# Patient Record
Sex: FEMALE | Race: WHITE | Employment: OTHER | URBAN - NONMETROPOLITAN AREA
[De-identification: names, ages, dates, MRNs, and addresses within clinical notes are randomized per-mention and may not be internally consistent; named-entity substitution may affect disease eponyms.]

---

## 2017-01-13 ENCOUNTER — TELEPHONE (OUTPATIENT)
Dept: VASCULAR SURGERY | Age: 82
End: 2017-01-13

## 2017-01-16 ENCOUNTER — HOSPITAL ENCOUNTER (OUTPATIENT)
Dept: VASCULAR LAB | Age: 82
Discharge: HOME | End: 2017-01-16
Payer: MEDICARE

## 2017-01-16 ENCOUNTER — OFFICE VISIT (OUTPATIENT)
Dept: VASCULAR SURGERY | Age: 82
End: 2017-01-16
Payer: MEDICARE

## 2017-01-16 VITALS — DIASTOLIC BLOOD PRESSURE: 78 MMHG | TEMPERATURE: 98.6 F | HEART RATE: 70 BPM | SYSTOLIC BLOOD PRESSURE: 167 MMHG

## 2017-01-16 DIAGNOSIS — I73.9 PVD (PERIPHERAL VASCULAR DISEASE) (HCC): ICD-10-CM

## 2017-01-16 DIAGNOSIS — I65.23 BILATERAL CAROTID ARTERY STENOSIS: Primary | ICD-10-CM

## 2017-01-16 DIAGNOSIS — I70.213 ATHEROSCLEROSIS OF NATIVE ARTERY OF BOTH LOWER EXTREMITIES WITH INTERMITTENT CLAUDICATION (HCC): ICD-10-CM

## 2017-01-16 DIAGNOSIS — I65.23 BILATERAL CAROTID ARTERY STENOSIS: ICD-10-CM

## 2017-01-16 PROCEDURE — 93922 UPR/L XTREMITY ART 2 LEVELS: CPT

## 2017-01-16 PROCEDURE — G8598 ASA/ANTIPLAT THER USED: HCPCS | Performed by: NURSE PRACTITIONER

## 2017-01-16 PROCEDURE — G8427 DOCREV CUR MEDS BY ELIG CLIN: HCPCS | Performed by: NURSE PRACTITIONER

## 2017-01-16 PROCEDURE — 1090F PRES/ABSN URINE INCON ASSESS: CPT | Performed by: NURSE PRACTITIONER

## 2017-01-16 PROCEDURE — G8400 PT W/DXA NO RESULTS DOC: HCPCS | Performed by: NURSE PRACTITIONER

## 2017-01-16 PROCEDURE — 4040F PNEUMOC VAC/ADMIN/RCVD: CPT | Performed by: NURSE PRACTITIONER

## 2017-01-16 PROCEDURE — G8421 BMI NOT CALCULATED: HCPCS | Performed by: NURSE PRACTITIONER

## 2017-01-16 PROCEDURE — 93926 LOWER EXTREMITY STUDY: CPT

## 2017-01-16 PROCEDURE — 4004F PT TOBACCO SCREEN RCVD TLK: CPT | Performed by: NURSE PRACTITIONER

## 2017-01-16 PROCEDURE — G8484 FLU IMMUNIZE NO ADMIN: HCPCS | Performed by: NURSE PRACTITIONER

## 2017-01-16 PROCEDURE — 1123F ACP DISCUSS/DSCN MKR DOCD: CPT | Performed by: NURSE PRACTITIONER

## 2017-01-16 PROCEDURE — 93880 EXTRACRANIAL BILAT STUDY: CPT

## 2017-01-16 PROCEDURE — 99214 OFFICE O/P EST MOD 30 MIN: CPT | Performed by: NURSE PRACTITIONER

## 2017-01-16 RX ORDER — PRAMIPEXOLE DIHYDROCHLORIDE 0.25 MG/1
0.25 TABLET ORAL NIGHTLY
COMMUNITY
Start: 2016-10-12 | End: 2017-10-15 | Stop reason: SDUPTHER

## 2017-01-16 RX ORDER — ATORVASTATIN CALCIUM 40 MG/1
40 TABLET, FILM COATED ORAL NIGHTLY
COMMUNITY
Start: 2016-12-27 | End: 2017-09-15 | Stop reason: SDUPTHER

## 2017-01-16 RX ORDER — HYDROCODONE BITARTRATE AND ACETAMINOPHEN 5; 325 MG/1; MG/1
1 TABLET ORAL EVERY 6 HOURS PRN
COMMUNITY
Start: 2016-12-22 | End: 2017-06-12 | Stop reason: SDUPTHER

## 2017-01-17 ENCOUNTER — TELEPHONE (OUTPATIENT)
Dept: VASCULAR SURGERY | Age: 82
End: 2017-01-17

## 2017-01-17 ENCOUNTER — PREP FOR PROCEDURE (OUTPATIENT)
Dept: VASCULAR SURGERY | Age: 82
End: 2017-01-17

## 2017-01-17 RX ORDER — CLONIDINE HYDROCHLORIDE 0.1 MG/1
0.1 TABLET ORAL PRN
Status: CANCELLED | OUTPATIENT
Start: 2017-01-17

## 2017-01-17 RX ORDER — SODIUM CHLORIDE 0.9 % (FLUSH) 0.9 %
10 SYRINGE (ML) INJECTION PRN
Status: CANCELLED | OUTPATIENT
Start: 2017-01-17

## 2017-01-17 RX ORDER — SODIUM CHLORIDE 9 MG/ML
INJECTION, SOLUTION INTRAVENOUS CONTINUOUS
Status: CANCELLED | OUTPATIENT
Start: 2017-01-17

## 2017-01-17 RX ORDER — ASPIRIN 81 MG/1
81 TABLET ORAL ONCE
Status: CANCELLED | OUTPATIENT
Start: 2017-01-17 | End: 2017-01-17

## 2017-01-25 ENCOUNTER — HOSPITAL ENCOUNTER (OUTPATIENT)
Dept: INTERVENTIONAL RADIOLOGY/VASCULAR | Age: 82
Discharge: HOME | End: 2017-01-25
Payer: MEDICARE

## 2017-01-25 VITALS
OXYGEN SATURATION: 93 % | SYSTOLIC BLOOD PRESSURE: 147 MMHG | TEMPERATURE: 99.4 F | HEART RATE: 62 BPM | RESPIRATION RATE: 19 BRPM | BODY MASS INDEX: 24.66 KG/M2 | WEIGHT: 148 LBS | HEIGHT: 65 IN | DIASTOLIC BLOOD PRESSURE: 55 MMHG

## 2017-01-25 DIAGNOSIS — I70.213 ATHEROSCLEROSIS OF NATIVE ARTERY OF BOTH LOWER EXTREMITIES WITH INTERMITTENT CLAUDICATION (HCC): ICD-10-CM

## 2017-01-25 PROCEDURE — 6360000002 HC RX W HCPCS: Performed by: SURGERY

## 2017-01-25 PROCEDURE — 6370000000 HC RX 637 (ALT 250 FOR IP): Performed by: SURGERY

## 2017-01-25 PROCEDURE — 75716 ARTERY X-RAYS ARMS/LEGS: CPT | Performed by: SURGERY

## 2017-01-25 PROCEDURE — 6370000000 HC RX 637 (ALT 250 FOR IP): Performed by: NURSE PRACTITIONER

## 2017-01-25 PROCEDURE — 37221 PR REVSC OPN/PRQ ILIAC ART W/STNT PLMT & ANGIOPLSTY: CPT | Performed by: SURGERY

## 2017-01-25 PROCEDURE — 37226 HC FEMPOP PLASTY & STENT: CPT | Performed by: SURGERY

## 2017-01-25 PROCEDURE — 37223 HC PLASTY ILIAC W STENT EA ADDL: CPT | Performed by: SURGERY

## 2017-01-25 PROCEDURE — 37221 HC PLASTY ILIAC W STENT: CPT | Performed by: SURGERY

## 2017-01-25 PROCEDURE — 2580000003 HC RX 258: Performed by: NURSE PRACTITIONER

## 2017-01-25 PROCEDURE — 6360000002 HC RX W HCPCS: Performed by: NURSE PRACTITIONER

## 2017-01-25 PROCEDURE — 93922 UPR/L XTREMITY ART 2 LEVELS: CPT

## 2017-01-25 PROCEDURE — 2500000003 HC RX 250 WO HCPCS: Performed by: SURGERY

## 2017-01-25 PROCEDURE — C1769 GUIDE WIRE: HCPCS

## 2017-01-25 PROCEDURE — C1725 CATH, TRANSLUMIN NON-LASER: HCPCS

## 2017-01-25 PROCEDURE — 75625 CONTRAST EXAM ABDOMINL AORTA: CPT | Performed by: SURGERY

## 2017-01-25 PROCEDURE — 37224 PR REVSC OPN/PRG FEM/POP W/ANGIOPLASTY UNI: CPT | Performed by: SURGERY

## 2017-01-25 PROCEDURE — 6360000004 HC RX CONTRAST MEDICATION: Performed by: SURGERY

## 2017-01-25 RX ORDER — ONDANSETRON 2 MG/ML
4 INJECTION INTRAMUSCULAR; INTRAVENOUS EVERY 8 HOURS PRN
Status: DISCONTINUED | OUTPATIENT
Start: 2017-01-25 | End: 2017-01-27 | Stop reason: HOSPADM

## 2017-01-25 RX ORDER — IODIXANOL 320 MG/ML
INJECTION, SOLUTION INTRAVASCULAR
Status: COMPLETED | OUTPATIENT
Start: 2017-01-25 | End: 2017-01-25

## 2017-01-25 RX ORDER — FENTANYL CITRATE 50 UG/ML
INJECTION, SOLUTION INTRAMUSCULAR; INTRAVENOUS
Status: COMPLETED | OUTPATIENT
Start: 2017-01-25 | End: 2017-01-25

## 2017-01-25 RX ORDER — MIDAZOLAM HYDROCHLORIDE 1 MG/ML
INJECTION INTRAMUSCULAR; INTRAVENOUS
Status: COMPLETED | OUTPATIENT
Start: 2017-01-25 | End: 2017-01-25

## 2017-01-25 RX ORDER — CLONIDINE HYDROCHLORIDE 0.1 MG/1
0.1 TABLET ORAL PRN
Status: DISCONTINUED | OUTPATIENT
Start: 2017-01-25 | End: 2017-01-27 | Stop reason: HOSPADM

## 2017-01-25 RX ORDER — HEPARIN SODIUM 5000 [USP'U]/ML
INJECTION, SOLUTION INTRAVENOUS; SUBCUTANEOUS
Status: COMPLETED | OUTPATIENT
Start: 2017-01-25 | End: 2017-01-25

## 2017-01-25 RX ORDER — CLOPIDOGREL BISULFATE 75 MG/1
75 TABLET ORAL ONCE
Status: COMPLETED | OUTPATIENT
Start: 2017-01-25 | End: 2017-01-25

## 2017-01-25 RX ORDER — ACETAMINOPHEN 325 MG/1
650 TABLET ORAL EVERY 4 HOURS PRN
Status: DISCONTINUED | OUTPATIENT
Start: 2017-01-25 | End: 2017-01-25 | Stop reason: SDUPTHER

## 2017-01-25 RX ORDER — HYDROCODONE BITARTRATE AND ACETAMINOPHEN 5; 325 MG/1; MG/1
2 TABLET ORAL EVERY 4 HOURS PRN
Status: DISCONTINUED | OUTPATIENT
Start: 2017-01-25 | End: 2017-01-27 | Stop reason: HOSPADM

## 2017-01-25 RX ORDER — CLOPIDOGREL BISULFATE 75 MG/1
75 TABLET ORAL DAILY
Qty: 30 TABLET | Refills: 12 | Status: SHIPPED | OUTPATIENT
Start: 2017-01-25 | End: 2017-01-25

## 2017-01-25 RX ORDER — LIDOCAINE HYDROCHLORIDE 20 MG/ML
INJECTION, SOLUTION INFILTRATION; PERINEURAL
Status: COMPLETED | OUTPATIENT
Start: 2017-01-25 | End: 2017-01-25

## 2017-01-25 RX ORDER — SODIUM CHLORIDE 9 MG/ML
INJECTION, SOLUTION INTRAVENOUS CONTINUOUS
Status: DISCONTINUED | OUTPATIENT
Start: 2017-01-25 | End: 2017-01-27 | Stop reason: HOSPADM

## 2017-01-25 RX ORDER — ASPIRIN 81 MG/1
81 TABLET ORAL ONCE
Status: COMPLETED | OUTPATIENT
Start: 2017-01-25 | End: 2017-01-25

## 2017-01-25 RX ORDER — ACETAMINOPHEN 325 MG/1
650 TABLET ORAL EVERY 4 HOURS PRN
Status: DISCONTINUED | OUTPATIENT
Start: 2017-01-25 | End: 2017-01-27 | Stop reason: HOSPADM

## 2017-01-25 RX ORDER — CLOPIDOGREL BISULFATE 75 MG/1
75 TABLET ORAL DAILY
Qty: 30 TABLET | Refills: 12 | Status: SHIPPED | OUTPATIENT
Start: 2017-01-25 | End: 2017-04-06 | Stop reason: SDUPTHER

## 2017-01-25 RX ORDER — SODIUM CHLORIDE 0.9 % (FLUSH) 0.9 %
10 SYRINGE (ML) INJECTION PRN
Status: DISCONTINUED | OUTPATIENT
Start: 2017-01-25 | End: 2017-01-27 | Stop reason: HOSPADM

## 2017-01-25 RX ORDER — HYDROCODONE BITARTRATE AND ACETAMINOPHEN 5; 325 MG/1; MG/1
1 TABLET ORAL EVERY 4 HOURS PRN
Status: DISCONTINUED | OUTPATIENT
Start: 2017-01-25 | End: 2017-01-27 | Stop reason: HOSPADM

## 2017-01-25 RX ADMIN — CLOPIDOGREL BISULFATE 75 MG: 75 TABLET ORAL at 11:20

## 2017-01-25 RX ADMIN — FENTANYL CITRATE 25 MCG: 50 INJECTION INTRAMUSCULAR; INTRAVENOUS at 08:35

## 2017-01-25 RX ADMIN — HEPARIN SODIUM 3000 UNITS: 5000 INJECTION, SOLUTION INTRAVENOUS; SUBCUTANEOUS at 08:34

## 2017-01-25 RX ADMIN — LIDOCAINE HYDROCHLORIDE 15 ML: 20 INJECTION, SOLUTION INFILTRATION; PERINEURAL at 09:23

## 2017-01-25 RX ADMIN — ONDANSETRON 4 MG: 2 INJECTION INTRAMUSCULAR; INTRAVENOUS at 11:04

## 2017-01-25 RX ADMIN — HEPARIN SODIUM 1000 UNITS: 5000 INJECTION, SOLUTION INTRAVENOUS; SUBCUTANEOUS at 09:49

## 2017-01-25 RX ADMIN — LIDOCAINE HYDROCHLORIDE 10 ML: 20 INJECTION, SOLUTION INFILTRATION; PERINEURAL at 08:19

## 2017-01-25 RX ADMIN — Medication 2 G: at 07:46

## 2017-01-25 RX ADMIN — MIDAZOLAM HYDROCHLORIDE 1 MG: 1 INJECTION, SOLUTION INTRAMUSCULAR; INTRAVENOUS at 08:35

## 2017-01-25 RX ADMIN — MIDAZOLAM HYDROCHLORIDE 1 MG: 1 INJECTION, SOLUTION INTRAMUSCULAR; INTRAVENOUS at 08:13

## 2017-01-25 RX ADMIN — IODIXANOL 175 ML: 320 INJECTION, SOLUTION INTRAVASCULAR at 10:12

## 2017-01-25 RX ADMIN — FENTANYL CITRATE 25 MCG: 50 INJECTION INTRAMUSCULAR; INTRAVENOUS at 09:26

## 2017-01-25 RX ADMIN — FENTANYL CITRATE 25 MCG: 50 INJECTION INTRAMUSCULAR; INTRAVENOUS at 08:13

## 2017-01-25 RX ADMIN — FENTANYL CITRATE 50 MCG: 50 INJECTION INTRAMUSCULAR; INTRAVENOUS at 08:56

## 2017-01-25 RX ADMIN — SODIUM CHLORIDE: 9 INJECTION, SOLUTION INTRAVENOUS at 07:15

## 2017-01-25 RX ADMIN — HEPARIN SODIUM 5000 UNITS: 5000 INJECTION, SOLUTION INTRAVENOUS; SUBCUTANEOUS at 08:12

## 2017-01-25 RX ADMIN — ACETAMINOPHEN 650 MG: 325 TABLET, FILM COATED ORAL at 12:29

## 2017-01-25 RX ADMIN — ASPIRIN 81 MG: 81 TABLET, COATED ORAL at 06:53

## 2017-01-25 RX ADMIN — MIDAZOLAM HYDROCHLORIDE 1 MG: 1 INJECTION, SOLUTION INTRAMUSCULAR; INTRAVENOUS at 09:23

## 2017-01-25 ASSESSMENT — PAIN SCALES - GENERAL
PAINLEVEL_OUTOF10: 10
PAINLEVEL_OUTOF10: 5

## 2017-02-01 ENCOUNTER — OFFICE VISIT (OUTPATIENT)
Dept: VASCULAR SURGERY | Age: 82
End: 2017-02-01
Payer: MEDICARE

## 2017-02-01 VITALS
OXYGEN SATURATION: 98 % | SYSTOLIC BLOOD PRESSURE: 138 MMHG | TEMPERATURE: 99 F | DIASTOLIC BLOOD PRESSURE: 72 MMHG | HEART RATE: 67 BPM

## 2017-02-01 DIAGNOSIS — I70.213 ATHEROSCLEROSIS OF NATIVE ARTERY OF BOTH LOWER EXTREMITIES WITH INTERMITTENT CLAUDICATION (HCC): Primary | ICD-10-CM

## 2017-02-01 PROCEDURE — 1090F PRES/ABSN URINE INCON ASSESS: CPT | Performed by: PHYSICIAN ASSISTANT

## 2017-02-01 PROCEDURE — G8484 FLU IMMUNIZE NO ADMIN: HCPCS | Performed by: PHYSICIAN ASSISTANT

## 2017-02-01 PROCEDURE — 4004F PT TOBACCO SCREEN RCVD TLK: CPT | Performed by: PHYSICIAN ASSISTANT

## 2017-02-01 PROCEDURE — 4040F PNEUMOC VAC/ADMIN/RCVD: CPT | Performed by: PHYSICIAN ASSISTANT

## 2017-02-01 PROCEDURE — 99213 OFFICE O/P EST LOW 20 MIN: CPT | Performed by: PHYSICIAN ASSISTANT

## 2017-02-01 PROCEDURE — G8400 PT W/DXA NO RESULTS DOC: HCPCS | Performed by: PHYSICIAN ASSISTANT

## 2017-02-01 PROCEDURE — G8420 CALC BMI NORM PARAMETERS: HCPCS | Performed by: PHYSICIAN ASSISTANT

## 2017-02-01 PROCEDURE — 1123F ACP DISCUSS/DSCN MKR DOCD: CPT | Performed by: PHYSICIAN ASSISTANT

## 2017-02-01 PROCEDURE — G8598 ASA/ANTIPLAT THER USED: HCPCS | Performed by: PHYSICIAN ASSISTANT

## 2017-02-01 PROCEDURE — G8427 DOCREV CUR MEDS BY ELIG CLIN: HCPCS | Performed by: PHYSICIAN ASSISTANT

## 2017-04-06 RX ORDER — CLOPIDOGREL BISULFATE 75 MG/1
75 TABLET ORAL DAILY
Qty: 30 TABLET | Refills: 12 | Status: SHIPPED | OUTPATIENT
Start: 2017-04-06 | End: 2018-01-29 | Stop reason: SDUPTHER

## 2017-05-02 ENCOUNTER — OFFICE VISIT (OUTPATIENT)
Dept: VASCULAR SURGERY | Age: 82
End: 2017-05-02
Payer: MEDICARE

## 2017-05-02 ENCOUNTER — HOSPITAL ENCOUNTER (OUTPATIENT)
Dept: VASCULAR LAB | Age: 82
Discharge: HOME | End: 2017-05-02
Payer: MEDICARE

## 2017-05-02 VITALS
OXYGEN SATURATION: 97 % | SYSTOLIC BLOOD PRESSURE: 128 MMHG | DIASTOLIC BLOOD PRESSURE: 60 MMHG | BODY MASS INDEX: 24.49 KG/M2 | RESPIRATION RATE: 20 BRPM | HEIGHT: 65 IN | WEIGHT: 147 LBS | HEART RATE: 70 BPM

## 2017-05-02 DIAGNOSIS — I73.9 PVD (PERIPHERAL VASCULAR DISEASE) (HCC): Primary | ICD-10-CM

## 2017-05-02 DIAGNOSIS — I70.213 ATHEROSCLEROSIS OF NATIVE ARTERY OF BOTH LOWER EXTREMITIES WITH INTERMITTENT CLAUDICATION (HCC): ICD-10-CM

## 2017-05-02 PROCEDURE — G8420 CALC BMI NORM PARAMETERS: HCPCS | Performed by: PHYSICIAN ASSISTANT

## 2017-05-02 PROCEDURE — 4040F PNEUMOC VAC/ADMIN/RCVD: CPT | Performed by: PHYSICIAN ASSISTANT

## 2017-05-02 PROCEDURE — 4004F PT TOBACCO SCREEN RCVD TLK: CPT | Performed by: PHYSICIAN ASSISTANT

## 2017-05-02 PROCEDURE — 93926 LOWER EXTREMITY STUDY: CPT

## 2017-05-02 PROCEDURE — 99213 OFFICE O/P EST LOW 20 MIN: CPT | Performed by: PHYSICIAN ASSISTANT

## 2017-05-02 PROCEDURE — 1123F ACP DISCUSS/DSCN MKR DOCD: CPT | Performed by: PHYSICIAN ASSISTANT

## 2017-05-02 PROCEDURE — 93922 UPR/L XTREMITY ART 2 LEVELS: CPT

## 2017-05-02 PROCEDURE — G8427 DOCREV CUR MEDS BY ELIG CLIN: HCPCS | Performed by: PHYSICIAN ASSISTANT

## 2017-05-02 PROCEDURE — G8400 PT W/DXA NO RESULTS DOC: HCPCS | Performed by: PHYSICIAN ASSISTANT

## 2017-05-02 PROCEDURE — 1090F PRES/ABSN URINE INCON ASSESS: CPT | Performed by: PHYSICIAN ASSISTANT

## 2017-05-02 PROCEDURE — G8598 ASA/ANTIPLAT THER USED: HCPCS | Performed by: PHYSICIAN ASSISTANT

## 2017-05-15 LAB
ALBUMIN SERPL-MCNC: 4 G/DL (ref 3.5–5.2)
ALP BLD-CCNC: 102 U/L (ref 35–104)
ALT SERPL-CCNC: 13 U/L (ref 5–33)
ANION GAP SERPL CALCULATED.3IONS-SCNC: 15 MMOL/L (ref 7–19)
AST SERPL-CCNC: 17 U/L (ref 5–32)
BILIRUB SERPL-MCNC: 0.4 MG/DL (ref 0.2–1.2)
BUN BLDV-MCNC: 17 MG/DL (ref 8–23)
CALCIUM SERPL-MCNC: 9.3 MG/DL (ref 8.8–10.2)
CHLORIDE BLD-SCNC: 98 MMOL/L (ref 98–111)
CHOLESTEROL, TOTAL: 140 MG/DL (ref 160–199)
CO2: 26 MMOL/L (ref 22–29)
CREAT SERPL-MCNC: 1 MG/DL (ref 0.5–0.9)
GFR NON-AFRICAN AMERICAN: 53
GLOBULIN: 2.7 G/DL
GLUCOSE BLD-MCNC: 94 MG/DL (ref 74–109)
HDLC SERPL-MCNC: 51 MG/DL (ref 65–121)
LDL CHOLESTEROL CALCULATED: 70 MG/DL
POTASSIUM SERPL-SCNC: 4.3 MMOL/L (ref 3.5–5)
SODIUM BLD-SCNC: 139 MMOL/L (ref 136–145)
TOTAL PROTEIN: 6.7 G/DL (ref 6.6–8.7)
TRIGL SERPL-MCNC: 95 MG/DL (ref 150–199)
VITAMIN D 25-HYDROXY: 29.3 NG/ML

## 2017-06-12 RX ORDER — HYDROCODONE BITARTRATE AND ACETAMINOPHEN 5; 325 MG/1; MG/1
1 TABLET ORAL EVERY 6 HOURS PRN
Qty: 120 TABLET | Refills: 0 | Status: SHIPPED | OUTPATIENT
Start: 2017-06-12 | End: 2017-07-05 | Stop reason: SDUPTHER

## 2017-07-05 RX ORDER — HYDROCHLOROTHIAZIDE 12.5 MG/1
12.5 CAPSULE, GELATIN COATED ORAL 4 TIMES DAILY PRN
Qty: 120 CAPSULE | Refills: 3 | OUTPATIENT
Start: 2017-07-05

## 2017-07-05 RX ORDER — HYDROCHLOROTHIAZIDE 12.5 MG/1
12.5 CAPSULE, GELATIN COATED ORAL DAILY
Qty: 30 CAPSULE | Refills: 5 | Status: CANCELLED | OUTPATIENT
Start: 2017-07-05

## 2017-07-06 RX ORDER — HYDROCODONE BITARTRATE AND ACETAMINOPHEN 5; 325 MG/1; MG/1
1 TABLET ORAL EVERY 6 HOURS PRN
Qty: 120 TABLET | Refills: 0 | Status: SHIPPED | OUTPATIENT
Start: 2017-07-06 | End: 2017-08-08 | Stop reason: SDUPTHER

## 2017-07-12 ENCOUNTER — TELEPHONE (OUTPATIENT)
Dept: INTERNAL MEDICINE | Age: 82
End: 2017-07-12

## 2017-08-08 RX ORDER — HYDROCODONE BITARTRATE AND ACETAMINOPHEN 5; 325 MG/1; MG/1
1 TABLET ORAL EVERY 6 HOURS PRN
Qty: 120 TABLET | Refills: 0 | Status: SHIPPED | OUTPATIENT
Start: 2017-08-08 | End: 2017-09-06 | Stop reason: SDUPTHER

## 2017-09-06 DIAGNOSIS — G89.4 CHRONIC PAIN SYNDROME: Primary | ICD-10-CM

## 2017-09-06 PROBLEM — G89.29 CHRONIC PAIN: Status: ACTIVE | Noted: 2017-09-06

## 2017-09-06 RX ORDER — HYDROCODONE BITARTRATE AND ACETAMINOPHEN 5; 325 MG/1; MG/1
1 TABLET ORAL EVERY 6 HOURS PRN
Qty: 120 TABLET | Refills: 0 | Status: SHIPPED | OUTPATIENT
Start: 2017-09-06 | End: 2017-10-09 | Stop reason: SDUPTHER

## 2017-09-11 DIAGNOSIS — I10 HYPERTENSION, ESSENTIAL: ICD-10-CM

## 2017-09-11 DIAGNOSIS — E55.9 UNSPECIFIED VITAMIN D DEFICIENCY: ICD-10-CM

## 2017-09-11 DIAGNOSIS — E78.5 OTHER AND UNSPECIFIED HYPERLIPIDEMIA: Primary | ICD-10-CM

## 2017-09-15 DIAGNOSIS — I10 HYPERTENSION, ESSENTIAL: ICD-10-CM

## 2017-09-15 DIAGNOSIS — E55.9 UNSPECIFIED VITAMIN D DEFICIENCY: ICD-10-CM

## 2017-09-15 DIAGNOSIS — E78.5 OTHER AND UNSPECIFIED HYPERLIPIDEMIA: ICD-10-CM

## 2017-09-15 LAB
ALBUMIN SERPL-MCNC: 3.9 G/DL (ref 3.5–5.2)
ALP BLD-CCNC: 103 U/L (ref 35–104)
ALT SERPL-CCNC: 12 U/L (ref 5–33)
ANION GAP SERPL CALCULATED.3IONS-SCNC: 14 MMOL/L (ref 7–19)
AST SERPL-CCNC: 16 U/L (ref 5–32)
BILIRUB SERPL-MCNC: 0.4 MG/DL (ref 0.2–1.2)
BUN BLDV-MCNC: 21 MG/DL (ref 8–23)
CALCIUM SERPL-MCNC: 9.3 MG/DL (ref 8.8–10.2)
CHLORIDE BLD-SCNC: 99 MMOL/L (ref 98–111)
CHOLESTEROL, TOTAL: 166 MG/DL (ref 160–199)
CO2: 27 MMOL/L (ref 22–29)
CREAT SERPL-MCNC: 1 MG/DL (ref 0.5–0.9)
GFR NON-AFRICAN AMERICAN: 53
GLUCOSE BLD-MCNC: 85 MG/DL (ref 74–109)
HCT VFR BLD CALC: 39.5 % (ref 37–47)
HDLC SERPL-MCNC: 48 MG/DL (ref 65–121)
HEMOGLOBIN: 13 G/DL (ref 12–16)
LDL CHOLESTEROL CALCULATED: 95 MG/DL
MCH RBC QN AUTO: 30.3 PG (ref 27–31)
MCHC RBC AUTO-ENTMCNC: 32.9 G/DL (ref 33–37)
MCV RBC AUTO: 92.1 FL (ref 81–99)
PDW BLD-RTO: 13.9 % (ref 11.5–14.5)
PLATELET # BLD: 254 K/UL (ref 130–400)
PMV BLD AUTO: 9.4 FL (ref 9.4–12.3)
POTASSIUM SERPL-SCNC: 4.4 MMOL/L (ref 3.5–5)
RBC # BLD: 4.29 M/UL (ref 4.2–5.4)
SODIUM BLD-SCNC: 140 MMOL/L (ref 136–145)
TOTAL PROTEIN: 6.6 G/DL (ref 6.6–8.7)
TRIGL SERPL-MCNC: 113 MG/DL (ref 150–199)
VITAMIN D 25-HYDROXY: 35.4 NG/ML
WBC # BLD: 6 K/UL (ref 4.8–10.8)

## 2017-09-15 RX ORDER — ATORVASTATIN CALCIUM 40 MG/1
TABLET, FILM COATED ORAL
Qty: 90 TABLET | Refills: 0 | Status: SHIPPED | OUTPATIENT
Start: 2017-09-15 | End: 2017-11-20 | Stop reason: SDUPTHER

## 2017-09-15 RX ORDER — HYDROCHLOROTHIAZIDE 12.5 MG/1
CAPSULE, GELATIN COATED ORAL
Qty: 90 CAPSULE | Refills: 3 | Status: SHIPPED | OUTPATIENT
Start: 2017-09-15 | End: 2018-08-15 | Stop reason: SDUPTHER

## 2017-09-15 RX ORDER — LISINOPRIL 40 MG/1
TABLET ORAL
Qty: 90 TABLET | Refills: 3 | Status: SHIPPED | OUTPATIENT
Start: 2017-09-15 | End: 2018-08-15 | Stop reason: SDUPTHER

## 2017-09-15 RX ORDER — AMLODIPINE BESYLATE 5 MG/1
TABLET ORAL
Qty: 90 TABLET | Refills: 3 | Status: SHIPPED | OUTPATIENT
Start: 2017-09-15 | End: 2018-08-15 | Stop reason: SDUPTHER

## 2017-09-17 PROBLEM — G25.81 RESTLESS LEGS SYNDROME: Chronic | Status: ACTIVE | Noted: 2017-09-17

## 2017-09-17 PROBLEM — J43.9 PULMONARY EMPHYSEMA (HCC): Chronic | Status: ACTIVE | Noted: 2017-09-17

## 2017-09-17 PROBLEM — M81.0 OSTEOPOROSIS: Chronic | Status: ACTIVE | Noted: 2017-09-17

## 2017-09-18 RX ORDER — IBANDRONATE SODIUM 150 MG/1
150 TABLET, FILM COATED ORAL
COMMUNITY
End: 2018-05-10

## 2017-09-19 ENCOUNTER — OFFICE VISIT (OUTPATIENT)
Dept: INTERNAL MEDICINE | Age: 82
End: 2017-09-19
Payer: MEDICARE

## 2017-09-19 VITALS
HEART RATE: 62 BPM | SYSTOLIC BLOOD PRESSURE: 128 MMHG | DIASTOLIC BLOOD PRESSURE: 70 MMHG | WEIGHT: 140 LBS | BODY MASS INDEX: 23.32 KG/M2 | OXYGEN SATURATION: 96 % | RESPIRATION RATE: 18 BRPM | HEIGHT: 65 IN

## 2017-09-19 DIAGNOSIS — E78.2 MIXED HYPERLIPIDEMIA: ICD-10-CM

## 2017-09-19 DIAGNOSIS — I73.9 PVD (PERIPHERAL VASCULAR DISEASE) (HCC): ICD-10-CM

## 2017-09-19 DIAGNOSIS — J43.9 PULMONARY EMPHYSEMA, UNSPECIFIED EMPHYSEMA TYPE (HCC): Chronic | ICD-10-CM

## 2017-09-19 DIAGNOSIS — M81.0 OSTEOPOROSIS, UNSPECIFIED OSTEOPOROSIS TYPE, UNSPECIFIED PATHOLOGICAL FRACTURE PRESENCE: Primary | Chronic | ICD-10-CM

## 2017-09-19 DIAGNOSIS — M15.9 PRIMARY OSTEOARTHRITIS INVOLVING MULTIPLE JOINTS: ICD-10-CM

## 2017-09-19 DIAGNOSIS — I65.23 BILATERAL CAROTID ARTERY STENOSIS: ICD-10-CM

## 2017-09-19 DIAGNOSIS — I10 ESSENTIAL HYPERTENSION: ICD-10-CM

## 2017-09-19 PROCEDURE — 3023F SPIROM DOC REV: CPT | Performed by: INTERNAL MEDICINE

## 2017-09-19 PROCEDURE — 99214 OFFICE O/P EST MOD 30 MIN: CPT | Performed by: INTERNAL MEDICINE

## 2017-09-19 PROCEDURE — G8598 ASA/ANTIPLAT THER USED: HCPCS | Performed by: INTERNAL MEDICINE

## 2017-09-19 PROCEDURE — 1090F PRES/ABSN URINE INCON ASSESS: CPT | Performed by: INTERNAL MEDICINE

## 2017-09-19 PROCEDURE — G8926 SPIRO NO PERF OR DOC: HCPCS | Performed by: INTERNAL MEDICINE

## 2017-09-19 PROCEDURE — 4005F PHARM THX FOR OP RXD: CPT | Performed by: INTERNAL MEDICINE

## 2017-09-19 PROCEDURE — G8400 PT W/DXA NO RESULTS DOC: HCPCS | Performed by: INTERNAL MEDICINE

## 2017-09-19 PROCEDURE — 4004F PT TOBACCO SCREEN RCVD TLK: CPT | Performed by: INTERNAL MEDICINE

## 2017-09-19 PROCEDURE — G8420 CALC BMI NORM PARAMETERS: HCPCS | Performed by: INTERNAL MEDICINE

## 2017-09-19 PROCEDURE — G8427 DOCREV CUR MEDS BY ELIG CLIN: HCPCS | Performed by: INTERNAL MEDICINE

## 2017-09-19 PROCEDURE — 4040F PNEUMOC VAC/ADMIN/RCVD: CPT | Performed by: INTERNAL MEDICINE

## 2017-09-19 PROCEDURE — 1123F ACP DISCUSS/DSCN MKR DOCD: CPT | Performed by: INTERNAL MEDICINE

## 2017-09-19 ASSESSMENT — ENCOUNTER SYMPTOMS
SHORTNESS OF BREATH: 0
BACK PAIN: 0
NAUSEA: 0
COUGH: 0
DIARRHEA: 0
ABDOMINAL PAIN: 0

## 2017-10-09 DIAGNOSIS — G89.4 CHRONIC PAIN SYNDROME: ICD-10-CM

## 2017-10-09 RX ORDER — HYDROCODONE BITARTRATE AND ACETAMINOPHEN 5; 325 MG/1; MG/1
1 TABLET ORAL EVERY 6 HOURS PRN
Qty: 120 TABLET | Refills: 0 | Status: SHIPPED | OUTPATIENT
Start: 2017-10-09 | End: 2017-11-07 | Stop reason: SDUPTHER

## 2017-10-16 RX ORDER — PRAMIPEXOLE DIHYDROCHLORIDE 0.25 MG/1
TABLET ORAL
Qty: 180 TABLET | Refills: 1 | Status: SHIPPED | OUTPATIENT
Start: 2017-10-16 | End: 2018-06-29 | Stop reason: SDUPTHER

## 2017-11-07 DIAGNOSIS — G89.4 CHRONIC PAIN SYNDROME: ICD-10-CM

## 2017-11-07 RX ORDER — HYDROCODONE BITARTRATE AND ACETAMINOPHEN 5; 325 MG/1; MG/1
1 TABLET ORAL EVERY 6 HOURS PRN
Qty: 120 TABLET | Refills: 0 | Status: SHIPPED | OUTPATIENT
Start: 2017-11-07 | End: 2017-12-07 | Stop reason: SDUPTHER

## 2017-11-08 ENCOUNTER — HOSPITAL ENCOUNTER (OUTPATIENT)
Dept: VASCULAR LAB | Age: 82
Discharge: HOME | End: 2017-11-08
Payer: MEDICARE

## 2017-11-08 ENCOUNTER — OFFICE VISIT (OUTPATIENT)
Dept: VASCULAR SURGERY | Age: 82
End: 2017-11-08
Payer: MEDICARE

## 2017-11-08 VITALS
DIASTOLIC BLOOD PRESSURE: 80 MMHG | RESPIRATION RATE: 18 BRPM | TEMPERATURE: 96.5 F | SYSTOLIC BLOOD PRESSURE: 140 MMHG | HEART RATE: 68 BPM

## 2017-11-08 DIAGNOSIS — I73.9 PVD (PERIPHERAL VASCULAR DISEASE) (HCC): ICD-10-CM

## 2017-11-08 DIAGNOSIS — I70.213 ATHEROSCLEROSIS OF NATIVE ARTERY OF BOTH LOWER EXTREMITIES WITH INTERMITTENT CLAUDICATION (HCC): Primary | ICD-10-CM

## 2017-11-08 PROCEDURE — G8598 ASA/ANTIPLAT THER USED: HCPCS | Performed by: PHYSICIAN ASSISTANT

## 2017-11-08 PROCEDURE — 4040F PNEUMOC VAC/ADMIN/RCVD: CPT | Performed by: PHYSICIAN ASSISTANT

## 2017-11-08 PROCEDURE — G8427 DOCREV CUR MEDS BY ELIG CLIN: HCPCS | Performed by: PHYSICIAN ASSISTANT

## 2017-11-08 PROCEDURE — 4004F PT TOBACCO SCREEN RCVD TLK: CPT | Performed by: PHYSICIAN ASSISTANT

## 2017-11-08 PROCEDURE — 99213 OFFICE O/P EST LOW 20 MIN: CPT | Performed by: PHYSICIAN ASSISTANT

## 2017-11-08 PROCEDURE — G8484 FLU IMMUNIZE NO ADMIN: HCPCS | Performed by: PHYSICIAN ASSISTANT

## 2017-11-08 PROCEDURE — G8420 CALC BMI NORM PARAMETERS: HCPCS | Performed by: PHYSICIAN ASSISTANT

## 2017-11-08 PROCEDURE — 1090F PRES/ABSN URINE INCON ASSESS: CPT | Performed by: PHYSICIAN ASSISTANT

## 2017-11-08 PROCEDURE — 93922 UPR/L XTREMITY ART 2 LEVELS: CPT

## 2017-11-08 PROCEDURE — 1123F ACP DISCUSS/DSCN MKR DOCD: CPT | Performed by: PHYSICIAN ASSISTANT

## 2017-11-08 PROCEDURE — G8400 PT W/DXA NO RESULTS DOC: HCPCS | Performed by: PHYSICIAN ASSISTANT

## 2017-11-08 PROCEDURE — 93926 LOWER EXTREMITY STUDY: CPT

## 2017-11-08 NOTE — PROGRESS NOTES
Patient Care Team:  Coleen Robert MD as PCP - General (Internal Medicine)  Dolly Fitzgerald MD as Consulting Physician (Vascular Surgery)  Tasha Lozada    Mrs. Samir Alegria has a history of peripheral vascular disease of the lower extremities. She has had this for 1 - 5 years. Current treatment includes clopidogrel 75 mg po qd, ASA EC and a statin daily. Annie Cao has not had new wounds. She  reports claudication at approximately 1/2 block. She is still dancing. She reports continued intermittent neuropathy type pain in her legs. Teresa Mari is a 80 y.o. female with the following history reviewed and recorded in [facility]:  Patient Active Problem List    Diagnosis Date Noted    Osteoporosis 09/17/2017    Restless legs syndrome 09/17/2017    Pulmonary emphysema ([facility] [address] 09/17/2017    Chronic pain 09/06/2017    Atherosclerosis of native artery of both lower extremities with intermittent claudication (HCC)     PVD (peripheral vascular disease) ([facility] [address] 02/05/2016     S/p R SHE PTA, L SFA [id number], s/p  L SFA stent with bilat kissing iliac stents and R SHE stent extension 2017      Chronic respiratory failure ([facility] [address] 10/23/2013    Carotid artery stenosis 09/19/2013    Atherosclerosis of native artery of extremity with intermittent claudication (HCC) 09/19/2013    Hyperlipemia     Hypertension     Irritable bowel syndrome     Osteoarthritis     Actinic keratosis     Macular degeneration      Current Outpatient Prescriptions   Medication Sig Dispense Refill    HYDROcodone-acetaminophen (NORCO) 5-325 MG per tablet Take 1 tablet by mouth every 6 hours as needed for Pain .  Earliest Fill Date: 11/7/17 120 tablet 0    pramipexole (MIRAPEX) 0.25 MG tablet TAKE 1 TO 2 TABLETS BY MOUTH AT BEDTIME AS NEEDED 180 tablet 1    ibandronate (BONIVA) 150 MG tablet Take 150 mg by mouth every 30 days      atorvastatin (LIPITOR) 40 MG tablet TAKE 1 TABLET DAILY (NEEDS APPOINTMENT WITH PCP) 90 tablet 0  hydrochlorothiazide (MICROZIDE) 12.5 MG capsule TAKE 1 CAPSULE EVERY MORNING 90 capsule 3    amLODIPine (NORVASC) 5 MG tablet TAKE 1 TABLET EVERY DAY 90 tablet 3    lisinopril (PRINIVIL;ZESTRIL) 40 MG tablet TAKE 1 TABLET EVERY DAY 90 tablet 3    clopidogrel (PLAVIX) 75 MG tablet Take 1 tablet by mouth daily 30 tablet 12    vitamin D (ERGOCALCIFEROL) 43648 UNITS CAPS capsule Take 50,000 Units by mouth once a week      aspirin 81 MG tablet Take 81 mg by mouth daily.  budesonide-formoterol (SYMBICORT) 160-4.5 MCG/ACT AERO Inhale 2 puffs into the lungs 2 times daily.  dicyclomine (BENTYL) 10 MG capsule Take 10 mg by mouth three times daily. No current facility-administered medications for this visit. Allergies: Valium  Past Medical History:   Diagnosis Date    Actinic keratosis     Atherosclerosis of native arteries of the extremities with intermittent claudication 9/19/2013    Benign fundic gland polyps of stomach     Blocked artery (HCC)     Carotid artery bruit     right    Carotid artery stenosis 9/19/2013    Chronic cough     Emphysema of lung (HCC)     Hyperlipemia     Hypertension     Irritable bowel syndrome     Lower back pain     Macular degeneration     Need for prophylactic vaccination with Streptococcus pneumoniae (Pneumococcus) and Influenza vaccines     Osteoarthritis     Osteoporosis 9/17/2017    Prediabetes     Pulmonary emphysema ([address] 9/17/2017    PVD (peripheral vascular disease) ([facility] [address]     Restless legs syndrome     Transient ischemic attack      Past Surgical History:   Procedure Laterality Date    HYSTERECTOMY      OTHER SURGICAL HISTORY      cyst on breast    VASCULAR SURGERY  9-25-13 [facility]    Fluroscopic guidance for access tor R femoral artery after attempted access of L femoral artery. Aortoiliofemoral arteriogram with bilateral lower extremity runoff. R proximal common iliac artery angioplasty X2 with 8mm x 40 mm balloon.  L superficial femoral artery angioplasty x1 with 4 mm x 40 mm balloon, then x1 with 5mm x 40 mm balloon. L superficial femoral artery SUPERA stent 4mm x 40mm and     VASCULAR SURGERY  cont. ..    a second SUPERA stent 4 mm x 60 mm Completion arteriograms Mynx closure R groin access site.  VASCULAR SURGERY  01/25/2017    SLC-AIF with runoff angioplasty L SFA with 5x20 cutting balloon, 5x150 lutonix DCB, 5x40 [place]aseo balloon stent proximal L SFA with 5x80 Innova stent kissing stents B SHE with 9x59 atrium stents extension R SHE/EIA with 9x57 express stent extension L SHE with 9x37 express stent completion arteriograms Mynx closure B CFA     Family History   Problem Relation Age of Onset    Cancer Mother     Cancer Father     High Blood Pressure Son     High Cholesterol Son     High Blood Pressure Daughter     High Cholesterol Daughter     Heart Attack Daughter     Heart Attack Brother      Social History   Substance Use Topics    Smoking status: Current Every Day Smoker     Packs/day: 0.50    Smokeless tobacco: Never Used      Comment: 3/4 of a pack daily    Alcohol use No         Review of Systems    Constitutional - no significant activity change, appetite change, or unexpected weight change. No fever or chills. No diaphoresis or significant fatigue. HENT - no significant rhinorrhea or epistaxis. No tinnitus or significant hearing loss. Eyes - no sudden vision change or amaurosis. Respiratory - no significant shortness of breath, wheezing, or stridor. No apnea, cough, or chest tightness associated with shortness of breath. Cardiovascular - no chest pain, syncope, or significant dizziness. No palpitations or significant leg swelling. Patient reports no claudication. Gastrointestinal - no abdominal swelling or pain. No blood in stool. No severe constipation, diarrhea, nausea, or vomiting. Genitourinary - No difficulty urinating, dysuria, frequency, or urgency. No flank pain or hematuria.   Musculoskeletal - no back pain, gait disturbance, or myalgia. Skin - no color change, rash, pallor, or new wound. Neurologic - no dizziness, facial asymmetry, or light headedness. No seizures. No speech difficulty or lateralizing weakness. Hematologic - no easy bruising or excessive bleeding. Psychiatric - no severe anxiety or nervousness. No confusion. All other review of systems are negative. Physical Exam    BP (!) 140/80 (Site: Left Arm, Position: Sitting, Cuff Size: Medium Adult) Comment: left  Pulse 68   Temp 96.5 °F (35.8 °C)   Resp 18     Constitutional - well developed, well nourished. No diaphoresis or acute distress. HENT - head normocephalic. Right external ear canal appears normal.  Left external ear canal appears normal.  Septum appears midline. Eyes - conjunctiva normal.  EOMS normal.  No exudate. No icterus. Neck- ROM appears normal, no tracheal deviation. Cardiovascular - Regular rate and rhythm. Heart sounds are normal.  No murmur, rub, or gallop. Carotid pulses are 2+ to palpation bilaterally without bruit. Extremities - Radial and brachial pulses are 2+ to palpation bilaterally. Bilateral DP and right  PT pulses are palpable. No cyanosis, clubbing, or significant edema. No signs atheroembolic event. Pulmonary - effort appears normal.  No respiratory distress. Lungs - Breath sounds normal. No wheezes or rales. GI - Abdomen - soft, non tender, bowel sounds X 4 quadrants. No guarding or rebound tenderness. No distension or palpable mass. Genitourinary - deferred. Musculoskeletal - ROM appears normal.  No significant edema. Neurologic - alert and oriented X 3. Physiologic. Skin - warm, dry, and intact. No rash, erythema, or pallor.   Psychiatric - mood, affect, and behavior appear normal.  Judgment and thought processes appear normal.    Risk factors for atherosclerosis of all vascular beds have been reviewed with the patient including:  Family history, tobacco abuse in all forms, elevated cholesterol, hyperlipidemia, and diabetes. Left lower extremity arterial scan:  Velocities left leg stent range from 132-330 Cm/s  Right KOLTON 0.82, Left KOLTON 0.87  Individual films reviewed: Yes. Test results were reviewed with the patient. Disease process is stable      Risk factors for atherosclerosis of all vascular beds have been reviewed with the patient including:  Family history, tobacco abuse in all forms, elevated cholesterol, hyperlipidemia, and diabetes. Options have been discussed with the patient including continued medical management. Patient has opted to proceed with continued medical management. Assessment    1.  Atherosclerosis of native artery of both lower extremities with intermittent claudication (HCC)        Plan    Continue ASA EC 81 mg daily  Continue Plavix 75 mg po daily  Strongly encourage statin therapy  Education about caludication causes and treatment discussed  Call with any new wound development or progressive claudication  Walking program  Good moisturization  Good skin care  Recommend no smoking  Follow up with a left A'scan with KOLTON's in 6 months

## 2017-11-21 RX ORDER — ATORVASTATIN CALCIUM 40 MG/1
TABLET, FILM COATED ORAL
Qty: 90 TABLET | Refills: 1 | Status: SHIPPED | OUTPATIENT
Start: 2017-11-21 | End: 2018-03-26 | Stop reason: SDUPTHER

## 2017-12-07 DIAGNOSIS — G89.4 CHRONIC PAIN SYNDROME: ICD-10-CM

## 2017-12-07 RX ORDER — HYDROCODONE BITARTRATE AND ACETAMINOPHEN 5; 325 MG/1; MG/1
1 TABLET ORAL EVERY 6 HOURS PRN
Qty: 120 TABLET | Refills: 0 | Status: SHIPPED | OUTPATIENT
Start: 2017-12-07 | End: 2018-01-08 | Stop reason: SDUPTHER

## 2018-01-08 DIAGNOSIS — G89.4 CHRONIC PAIN SYNDROME: ICD-10-CM

## 2018-01-08 RX ORDER — HYDROCODONE BITARTRATE AND ACETAMINOPHEN 5; 325 MG/1; MG/1
1 TABLET ORAL EVERY 6 HOURS PRN
Qty: 120 TABLET | Refills: 0 | Status: SHIPPED | OUTPATIENT
Start: 2018-01-08 | End: 2018-05-07 | Stop reason: SDUPTHER

## 2018-01-29 RX ORDER — CLOPIDOGREL BISULFATE 75 MG/1
TABLET ORAL
Qty: 90 TABLET | Refills: 1 | Status: SHIPPED | OUTPATIENT
Start: 2018-01-29 | End: 2018-06-11 | Stop reason: SDUPTHER

## 2018-02-09 ENCOUNTER — TELEPHONE (OUTPATIENT)
Dept: INTERNAL MEDICINE | Age: 83
End: 2018-02-09

## 2018-02-09 RX ORDER — HYDROCODONE BITARTRATE AND ACETAMINOPHEN 5; 325 MG/1; MG/1
TABLET ORAL
Qty: 120 TABLET | Refills: 0 | Status: SHIPPED | OUTPATIENT
Start: 2018-02-09 | End: 2018-03-07 | Stop reason: SDUPTHER

## 2018-02-12 DIAGNOSIS — E78.2 MIXED HYPERLIPIDEMIA: ICD-10-CM

## 2018-02-12 DIAGNOSIS — M81.0 OSTEOPOROSIS, UNSPECIFIED OSTEOPOROSIS TYPE, UNSPECIFIED PATHOLOGICAL FRACTURE PRESENCE: Chronic | ICD-10-CM

## 2018-02-12 DIAGNOSIS — I10 ESSENTIAL HYPERTENSION: ICD-10-CM

## 2018-02-12 LAB
ALBUMIN SERPL-MCNC: 4 G/DL (ref 3.5–5.2)
ALP BLD-CCNC: 100 U/L (ref 35–104)
ALT SERPL-CCNC: 12 U/L (ref 5–33)
ANION GAP SERPL CALCULATED.3IONS-SCNC: 13 MMOL/L (ref 7–19)
AST SERPL-CCNC: 18 U/L (ref 5–32)
BILIRUB SERPL-MCNC: 0.4 MG/DL (ref 0.2–1.2)
BUN BLDV-MCNC: 19 MG/DL (ref 8–23)
CALCIUM SERPL-MCNC: 9.5 MG/DL (ref 8.8–10.2)
CHLORIDE BLD-SCNC: 99 MMOL/L (ref 98–111)
CHOLESTEROL, TOTAL: 151 MG/DL (ref 160–199)
CO2: 30 MMOL/L (ref 22–29)
CREAT SERPL-MCNC: 1.2 MG/DL (ref 0.5–0.9)
GFR NON-AFRICAN AMERICAN: 43
GLUCOSE BLD-MCNC: 88 MG/DL (ref 74–109)
HDLC SERPL-MCNC: 53 MG/DL (ref 65–121)
LDL CHOLESTEROL CALCULATED: 79 MG/DL
POTASSIUM SERPL-SCNC: 4.4 MMOL/L (ref 3.5–5)
SODIUM BLD-SCNC: 142 MMOL/L (ref 136–145)
TOTAL PROTEIN: 6.8 G/DL (ref 6.6–8.7)
TRIGL SERPL-MCNC: 97 MG/DL (ref 0–149)

## 2018-02-13 LAB — VITAMIN D 1,25-DIHYDROXY: 25.9 PG/ML (ref 19.9–79.3)

## 2018-02-16 ENCOUNTER — TELEPHONE (OUTPATIENT)
Dept: INTERNAL MEDICINE | Age: 83
End: 2018-02-16

## 2018-02-16 DIAGNOSIS — R94.4 DECREASED GFR: Primary | ICD-10-CM

## 2018-02-16 DIAGNOSIS — M81.0 OSTEOPOROSIS, UNSPECIFIED OSTEOPOROSIS TYPE, UNSPECIFIED PATHOLOGICAL FRACTURE PRESENCE: ICD-10-CM

## 2018-02-16 RX ORDER — ERGOCALCIFEROL (VITAMIN D2) 1250 MCG
50000 CAPSULE ORAL WEEKLY
Qty: 4 CAPSULE | Refills: 3 | Status: SHIPPED | OUTPATIENT
Start: 2018-02-16 | End: 2018-06-13 | Stop reason: SDUPTHER

## 2018-02-16 NOTE — TELEPHONE ENCOUNTER
Notes Recorded by Becky Zimmerman on 2/16/2018 at 11:00 AM CST  I sw pt about her results and she voiced understanding. I will send in the Vitamin D to [facility] in [place]. She will come back in March to get another lab. She will keep appt with Lizet Cola coming up.  ------    Notes Recorded by Umberto Oswald MD on 2/15/2018 at 10:47 AM CST  She sees Lizetbret Childress in a few days. Her vitamin D has drifted back down and I would put her back on ergocalciferol 50,000 units weekly for about 4 months. She has been on over-the-counter only. She might not be taking it. Her cholesterol looks good. Her CMP looks stable other than a slight decline in GFR. We might want to repeat that in a month or so.  I included Lizet Cola on this so she would know that we made vitamin D change

## 2018-03-05 RX ORDER — BUDESONIDE AND FORMOTEROL FUMARATE DIHYDRATE 160; 4.5 UG/1; UG/1
AEROSOL RESPIRATORY (INHALATION)
Qty: 30.6 INHALER | Refills: 2 | Status: SHIPPED | OUTPATIENT
Start: 2018-03-05 | End: 2019-05-10 | Stop reason: SDUPTHER

## 2018-03-07 RX ORDER — HYDROCODONE BITARTRATE AND ACETAMINOPHEN 5; 325 MG/1; MG/1
TABLET ORAL
Qty: 120 TABLET | Refills: 0 | Status: SHIPPED | OUTPATIENT
Start: 2018-03-07 | End: 2018-03-12 | Stop reason: SDUPTHER

## 2018-03-07 NOTE — TELEPHONE ENCOUNTER
Pt needs to schedule an appointment. She has not been seen since September. Appointments have been cancelled.

## 2018-03-12 ENCOUNTER — OFFICE VISIT (OUTPATIENT)
Dept: INTERNAL MEDICINE | Age: 83
End: 2018-03-12
Payer: MEDICARE

## 2018-03-12 VITALS
SYSTOLIC BLOOD PRESSURE: 144 MMHG | BODY MASS INDEX: 23.14 KG/M2 | TEMPERATURE: 97.5 F | HEIGHT: 66 IN | DIASTOLIC BLOOD PRESSURE: 72 MMHG | WEIGHT: 144 LBS | HEART RATE: 68 BPM | OXYGEN SATURATION: 94 %

## 2018-03-12 DIAGNOSIS — R94.4 DECREASED GFR: ICD-10-CM

## 2018-03-12 DIAGNOSIS — G89.4 CHRONIC PAIN SYNDROME: ICD-10-CM

## 2018-03-12 DIAGNOSIS — Z23 INFLUENZA VACCINE NEEDED: ICD-10-CM

## 2018-03-12 DIAGNOSIS — M15.9 PRIMARY OSTEOARTHRITIS INVOLVING MULTIPLE JOINTS: Primary | ICD-10-CM

## 2018-03-12 LAB
ALBUMIN SERPL-MCNC: 3.8 G/DL (ref 3.5–5.2)
ALP BLD-CCNC: 102 U/L (ref 35–104)
ALT SERPL-CCNC: 20 U/L (ref 5–33)
ANION GAP SERPL CALCULATED.3IONS-SCNC: 10 MMOL/L (ref 7–19)
AST SERPL-CCNC: 23 U/L (ref 5–32)
BILIRUB SERPL-MCNC: 0.3 MG/DL (ref 0.2–1.2)
BUN BLDV-MCNC: 14 MG/DL (ref 8–23)
CALCIUM SERPL-MCNC: 9.1 MG/DL (ref 8.8–10.2)
CHLORIDE BLD-SCNC: 99 MMOL/L (ref 98–111)
CO2: 32 MMOL/L (ref 22–29)
CREAT SERPL-MCNC: 0.9 MG/DL (ref 0.5–0.9)
GFR NON-AFRICAN AMERICAN: 60
GLUCOSE BLD-MCNC: 91 MG/DL (ref 74–109)
POTASSIUM SERPL-SCNC: 4.2 MMOL/L (ref 3.5–5)
SODIUM BLD-SCNC: 141 MMOL/L (ref 136–145)
TOTAL PROTEIN: 6.4 G/DL (ref 6.6–8.7)

## 2018-03-12 PROCEDURE — 1090F PRES/ABSN URINE INCON ASSESS: CPT | Performed by: NURSE PRACTITIONER

## 2018-03-12 PROCEDURE — G8420 CALC BMI NORM PARAMETERS: HCPCS | Performed by: NURSE PRACTITIONER

## 2018-03-12 PROCEDURE — 4040F PNEUMOC VAC/ADMIN/RCVD: CPT | Performed by: NURSE PRACTITIONER

## 2018-03-12 PROCEDURE — G0008 ADMIN INFLUENZA VIRUS VAC: HCPCS | Performed by: NURSE PRACTITIONER

## 2018-03-12 PROCEDURE — G8482 FLU IMMUNIZE ORDER/ADMIN: HCPCS | Performed by: NURSE PRACTITIONER

## 2018-03-12 PROCEDURE — 1123F ACP DISCUSS/DSCN MKR DOCD: CPT | Performed by: NURSE PRACTITIONER

## 2018-03-12 PROCEDURE — 4004F PT TOBACCO SCREEN RCVD TLK: CPT | Performed by: NURSE PRACTITIONER

## 2018-03-12 PROCEDURE — 99214 OFFICE O/P EST MOD 30 MIN: CPT | Performed by: NURSE PRACTITIONER

## 2018-03-12 PROCEDURE — G8427 DOCREV CUR MEDS BY ELIG CLIN: HCPCS | Performed by: NURSE PRACTITIONER

## 2018-03-12 PROCEDURE — G8400 PT W/DXA NO RESULTS DOC: HCPCS | Performed by: NURSE PRACTITIONER

## 2018-03-12 PROCEDURE — 90662 IIV NO PRSV INCREASED AG IM: CPT | Performed by: NURSE PRACTITIONER

## 2018-03-12 PROCEDURE — G8598 ASA/ANTIPLAT THER USED: HCPCS | Performed by: NURSE PRACTITIONER

## 2018-03-12 RX ORDER — HYDROCODONE BITARTRATE AND ACETAMINOPHEN 5; 325 MG/1; MG/1
1 TABLET ORAL 4 TIMES DAILY PRN
Qty: 120 TABLET | Refills: 0 | Status: SHIPPED | OUTPATIENT
Start: 2018-03-12 | End: 2018-04-09 | Stop reason: SDUPTHER

## 2018-03-12 RX ORDER — ERGOCALCIFEROL 1.25 MG/1
50000 CAPSULE ORAL WEEKLY
Qty: 12 CAPSULE | Refills: 0 | Status: SHIPPED | OUTPATIENT
Start: 2018-03-12 | End: 2018-11-05 | Stop reason: SDUPTHER

## 2018-03-12 ASSESSMENT — PATIENT HEALTH QUESTIONNAIRE - PHQ9
SUM OF ALL RESPONSES TO PHQ QUESTIONS 1-9: 0
SUM OF ALL RESPONSES TO PHQ9 QUESTIONS 1 & 2: 0
1. LITTLE INTEREST OR PLEASURE IN DOING THINGS: 0
2. FEELING DOWN, DEPRESSED OR HOPELESS: 0

## 2018-03-12 ASSESSMENT — ENCOUNTER SYMPTOMS
COUGH: 0
BACK PAIN: 0
PHOTOPHOBIA: 0
COLOR CHANGE: 0
VOMITING: 0
NAUSEA: 0
SHORTNESS OF BREATH: 0
RHINORRHEA: 0
VOICE CHANGE: 0

## 2018-03-12 NOTE — PROGRESS NOTES
visual disturbance. Respiratory: Negative for cough and shortness of breath. Cardiovascular: Negative for chest pain and palpitations. Gastrointestinal: Negative for nausea and vomiting. Endocrine: Negative. Negative for cold intolerance and heat intolerance. Genitourinary: Negative for difficulty urinating and flank pain. Musculoskeletal: Negative for back pain and neck pain. Skin: Negative for color change and rash. Allergic/Immunologic: Negative for environmental allergies and food allergies. Neurological: Negative for dizziness, speech difficulty and headaches. Hematological: Does not bruise/bleed easily. Psychiatric/Behavioral: Negative for sleep disturbance and suicidal ideas. Objective:     Physical Exam   Constitutional: She is oriented to person, place, and time. She appears well-developed and well-nourished. HENT:   Head: Atraumatic. Right Ear: External ear normal.   Left Ear: External ear normal.   Nose: Nose normal.   Mouth/Throat: Oropharynx is clear and moist.   Eyes: Conjunctivae are normal. Pupils are equal, round, and reactive to light. Neck: Normal range of motion. Neck supple. Cardiovascular: Normal rate, regular rhythm, S1 normal, S2 normal and normal heart sounds. Pulmonary/Chest: Effort normal and breath sounds normal.   Abdominal: Soft. Bowel sounds are normal.   Musculoskeletal: Normal range of motion. Neurological: She is alert and oriented to person, place, and time. Skin: Skin is warm and dry. Psychiatric: She has a normal mood and affect. Her behavior is normal.   Nursing note and vitals reviewed. BP (!) 144/72   Pulse 68   Temp 97.5 °F (36.4 °C)   Ht 5' 5.5\" (1.664 m)   Wt 144 lb (65.3 kg)   SpO2 94%   BMI 23.60 kg/m²     Assessment:      1. Primary osteoarthritis involving multiple joints     2. Chronic pain syndrome     3. Influenza vaccine needed         Plan:     Repeat CMP today; flu shot given.  Follow-up in 3 months with

## 2018-03-26 RX ORDER — ATORVASTATIN CALCIUM 40 MG/1
TABLET, FILM COATED ORAL
Qty: 90 TABLET | Refills: 1 | Status: SHIPPED | OUTPATIENT
Start: 2018-03-26 | End: 2018-08-15 | Stop reason: SDUPTHER

## 2018-04-09 RX ORDER — HYDROCODONE BITARTRATE AND ACETAMINOPHEN 5; 325 MG/1; MG/1
1 TABLET ORAL 4 TIMES DAILY PRN
Qty: 120 TABLET | Refills: 0 | Status: SHIPPED | OUTPATIENT
Start: 2018-04-09 | End: 2018-05-10

## 2018-05-07 DIAGNOSIS — G89.4 CHRONIC PAIN SYNDROME: ICD-10-CM

## 2018-05-07 RX ORDER — HYDROCODONE BITARTRATE AND ACETAMINOPHEN 5; 325 MG/1; MG/1
1 TABLET ORAL EVERY 6 HOURS PRN
Qty: 120 TABLET | Refills: 0 | Status: SHIPPED | OUTPATIENT
Start: 2018-05-07 | End: 2018-06-05 | Stop reason: SDUPTHER

## 2018-05-10 ENCOUNTER — HOSPITAL ENCOUNTER (OUTPATIENT)
Dept: VASCULAR LAB | Age: 83
Discharge: HOME | End: 2018-05-10
Payer: MEDICARE

## 2018-05-10 ENCOUNTER — OFFICE VISIT (OUTPATIENT)
Dept: VASCULAR SURGERY | Age: 83
End: 2018-05-10
Payer: MEDICARE

## 2018-05-10 VITALS — SYSTOLIC BLOOD PRESSURE: 122 MMHG | DIASTOLIC BLOOD PRESSURE: 62 MMHG | HEART RATE: 67 BPM | TEMPERATURE: 98.8 F

## 2018-05-10 DIAGNOSIS — I70.213 ATHEROSCLEROSIS OF NATIVE ARTERY OF BOTH LOWER EXTREMITIES WITH INTERMITTENT CLAUDICATION (HCC): ICD-10-CM

## 2018-05-10 DIAGNOSIS — I65.23 BILATERAL CAROTID ARTERY STENOSIS: Primary | ICD-10-CM

## 2018-05-10 PROCEDURE — 1090F PRES/ABSN URINE INCON ASSESS: CPT | Performed by: NURSE PRACTITIONER

## 2018-05-10 PROCEDURE — 93922 UPR/L XTREMITY ART 2 LEVELS: CPT

## 2018-05-10 PROCEDURE — 1123F ACP DISCUSS/DSCN MKR DOCD: CPT | Performed by: NURSE PRACTITIONER

## 2018-05-10 PROCEDURE — G8400 PT W/DXA NO RESULTS DOC: HCPCS | Performed by: NURSE PRACTITIONER

## 2018-05-10 PROCEDURE — G8420 CALC BMI NORM PARAMETERS: HCPCS | Performed by: NURSE PRACTITIONER

## 2018-05-10 PROCEDURE — 4040F PNEUMOC VAC/ADMIN/RCVD: CPT | Performed by: NURSE PRACTITIONER

## 2018-05-10 PROCEDURE — G8598 ASA/ANTIPLAT THER USED: HCPCS | Performed by: NURSE PRACTITIONER

## 2018-05-10 PROCEDURE — 99214 OFFICE O/P EST MOD 30 MIN: CPT | Performed by: NURSE PRACTITIONER

## 2018-05-10 PROCEDURE — 4004F PT TOBACCO SCREEN RCVD TLK: CPT | Performed by: NURSE PRACTITIONER

## 2018-05-10 PROCEDURE — G8427 DOCREV CUR MEDS BY ELIG CLIN: HCPCS | Performed by: NURSE PRACTITIONER

## 2018-05-10 PROCEDURE — 93926 LOWER EXTREMITY STUDY: CPT

## 2018-05-15 ENCOUNTER — HOSPITAL ENCOUNTER (OUTPATIENT)
Dept: VASCULAR LAB | Age: 83
Discharge: HOME | End: 2018-05-15
Payer: MEDICARE

## 2018-05-15 DIAGNOSIS — I65.23 BILATERAL CAROTID ARTERY STENOSIS: ICD-10-CM

## 2018-05-15 PROCEDURE — 93880 EXTRACRANIAL BILAT STUDY: CPT

## 2018-05-16 ENCOUNTER — PREP FOR PROCEDURE (OUTPATIENT)
Dept: VASCULAR SURGERY | Age: 83
End: 2018-05-16

## 2018-05-16 RX ORDER — ASPIRIN 81 MG/1
81 TABLET ORAL ONCE
Status: CANCELLED | OUTPATIENT
Start: 2018-05-16 | End: 2018-05-16

## 2018-05-16 RX ORDER — CLONIDINE HYDROCHLORIDE 0.1 MG/1
0.1 TABLET ORAL PRN
Status: CANCELLED | OUTPATIENT
Start: 2018-05-16

## 2018-05-16 RX ORDER — SODIUM CHLORIDE 9 MG/ML
INJECTION, SOLUTION INTRAVENOUS CONTINUOUS
Status: CANCELLED | OUTPATIENT
Start: 2018-05-16

## 2018-05-16 RX ORDER — SODIUM CHLORIDE 0.9 % (FLUSH) 0.9 %
10 SYRINGE (ML) INJECTION PRN
Status: CANCELLED | OUTPATIENT
Start: 2018-05-16

## 2018-05-17 ENCOUNTER — TELEPHONE (OUTPATIENT)
Dept: VASCULAR SURGERY | Age: 83
End: 2018-05-17

## 2018-05-17 DIAGNOSIS — Z01.818 PRE-OP TESTING: Primary | ICD-10-CM

## 2018-05-21 DIAGNOSIS — Z01.818 PRE-OP TESTING: ICD-10-CM

## 2018-05-21 LAB
ANION GAP SERPL CALCULATED.3IONS-SCNC: 14 MMOL/L (ref 7–19)
BUN BLDV-MCNC: 16 MG/DL (ref 8–23)
CALCIUM SERPL-MCNC: 9.6 MG/DL (ref 8.8–10.2)
CHLORIDE BLD-SCNC: 97 MMOL/L (ref 98–111)
CO2: 30 MMOL/L (ref 22–29)
CREAT SERPL-MCNC: 1 MG/DL (ref 0.5–0.9)
GFR NON-AFRICAN AMERICAN: 53
GLUCOSE BLD-MCNC: 98 MG/DL (ref 74–109)
HCT VFR BLD CALC: 45.4 % (ref 37–47)
HEMOGLOBIN: 14.4 G/DL (ref 12–16)
MCH RBC QN AUTO: 29 PG (ref 27–31)
MCHC RBC AUTO-ENTMCNC: 31.7 G/DL (ref 33–37)
MCV RBC AUTO: 91.5 FL (ref 81–99)
PDW BLD-RTO: 14.2 % (ref 11.5–14.5)
PLATELET # BLD: 245 K/UL (ref 130–400)
PMV BLD AUTO: 9.8 FL (ref 9.4–12.3)
POTASSIUM SERPL-SCNC: 4.3 MMOL/L (ref 3.5–5)
RBC # BLD: 4.96 M/UL (ref 4.2–5.4)
SODIUM BLD-SCNC: 141 MMOL/L (ref 136–145)
WBC # BLD: 5.8 K/UL (ref 4.8–10.8)

## 2018-05-29 ENCOUNTER — HOSPITAL ENCOUNTER (OUTPATIENT)
Dept: INTERVENTIONAL RADIOLOGY/VASCULAR | Age: 83
Discharge: HOME | End: 2018-05-29
Payer: MEDICARE

## 2018-05-29 VITALS
OXYGEN SATURATION: 97 % | BODY MASS INDEX: 23.14 KG/M2 | HEIGHT: 66 IN | SYSTOLIC BLOOD PRESSURE: 109 MMHG | WEIGHT: 144 LBS | RESPIRATION RATE: 16 BRPM | HEART RATE: 57 BPM | DIASTOLIC BLOOD PRESSURE: 50 MMHG | TEMPERATURE: 98.7 F

## 2018-05-29 DIAGNOSIS — I73.9 PVD (PERIPHERAL VASCULAR DISEASE) (HCC): ICD-10-CM

## 2018-05-29 PROCEDURE — 99152 MOD SED SAME PHYS/QHP 5/>YRS: CPT | Performed by: SURGERY

## 2018-05-29 PROCEDURE — 36200 PLACE CATHETER IN AORTA: CPT | Performed by: SURGERY

## 2018-05-29 PROCEDURE — 75625 CONTRAST EXAM ABDOMINL AORTA: CPT | Performed by: SURGERY

## 2018-05-29 PROCEDURE — 2500000003 HC RX 250 WO HCPCS: Performed by: SURGERY

## 2018-05-29 PROCEDURE — 6360000002 HC RX W HCPCS: Performed by: NURSE PRACTITIONER

## 2018-05-29 PROCEDURE — 6360000002 HC RX W HCPCS: Performed by: SURGERY

## 2018-05-29 PROCEDURE — 37224 PR REVSC OPN/PRG FEM/POP W/ANGIOPLASTY UNI: CPT | Performed by: SURGERY

## 2018-05-29 PROCEDURE — 37224 HC PLASTY UNI FEMPOP: CPT | Performed by: SURGERY

## 2018-05-29 PROCEDURE — 99153 MOD SED SAME PHYS/QHP EA: CPT | Performed by: SURGERY

## 2018-05-29 PROCEDURE — 2709999900 IR AORTAGRAM ABDOMINAL SERIALOGRAM

## 2018-05-29 PROCEDURE — 6370000000 HC RX 637 (ALT 250 FOR IP): Performed by: NURSE PRACTITIONER

## 2018-05-29 PROCEDURE — 2580000003 HC RX 258: Performed by: NURSE PRACTITIONER

## 2018-05-29 PROCEDURE — 75716 ARTERY X-RAYS ARMS/LEGS: CPT | Performed by: SURGERY

## 2018-05-29 PROCEDURE — 6360000004 HC RX CONTRAST MEDICATION: Performed by: SURGERY

## 2018-05-29 PROCEDURE — 75774 ARTERY X-RAY EACH VESSEL: CPT | Performed by: SURGERY

## 2018-05-29 RX ORDER — ASPIRIN 81 MG/1
81 TABLET ORAL ONCE
Status: COMPLETED | OUTPATIENT
Start: 2018-05-29 | End: 2018-05-29

## 2018-05-29 RX ORDER — HYDROCODONE BITARTRATE AND ACETAMINOPHEN 5; 325 MG/1; MG/1
2 TABLET ORAL EVERY 4 HOURS PRN
Status: DISCONTINUED | OUTPATIENT
Start: 2018-05-29 | End: 2018-05-31 | Stop reason: HOSPADM

## 2018-05-29 RX ORDER — HYDROCODONE BITARTRATE AND ACETAMINOPHEN 5; 325 MG/1; MG/1
1 TABLET ORAL EVERY 4 HOURS PRN
Status: DISCONTINUED | OUTPATIENT
Start: 2018-05-29 | End: 2018-05-31 | Stop reason: HOSPADM

## 2018-05-29 RX ORDER — FENTANYL CITRATE 50 UG/ML
INJECTION, SOLUTION INTRAMUSCULAR; INTRAVENOUS
Status: COMPLETED | OUTPATIENT
Start: 2018-05-29 | End: 2018-05-29

## 2018-05-29 RX ORDER — MIDAZOLAM HYDROCHLORIDE 1 MG/ML
INJECTION INTRAMUSCULAR; INTRAVENOUS
Status: COMPLETED | OUTPATIENT
Start: 2018-05-29 | End: 2018-05-29

## 2018-05-29 RX ORDER — ACETAMINOPHEN 325 MG/1
650 TABLET ORAL EVERY 4 HOURS PRN
Status: DISCONTINUED | OUTPATIENT
Start: 2018-05-29 | End: 2018-05-31 | Stop reason: HOSPADM

## 2018-05-29 RX ORDER — LIDOCAINE HYDROCHLORIDE 20 MG/ML
INJECTION, SOLUTION INFILTRATION; PERINEURAL
Status: COMPLETED | OUTPATIENT
Start: 2018-05-29 | End: 2018-05-29

## 2018-05-29 RX ORDER — SODIUM CHLORIDE 0.9 % (FLUSH) 0.9 %
10 SYRINGE (ML) INJECTION PRN
Status: DISCONTINUED | OUTPATIENT
Start: 2018-05-29 | End: 2018-05-31 | Stop reason: HOSPADM

## 2018-05-29 RX ORDER — SODIUM CHLORIDE 9 MG/ML
INJECTION, SOLUTION INTRAVENOUS CONTINUOUS
Status: DISCONTINUED | OUTPATIENT
Start: 2018-05-29 | End: 2018-05-31 | Stop reason: HOSPADM

## 2018-05-29 RX ORDER — IODIXANOL 320 MG/ML
INJECTION, SOLUTION INTRAVASCULAR
Status: COMPLETED | OUTPATIENT
Start: 2018-05-29 | End: 2018-05-29

## 2018-05-29 RX ORDER — ONDANSETRON 2 MG/ML
4 INJECTION INTRAMUSCULAR; INTRAVENOUS EVERY 8 HOURS PRN
Status: DISCONTINUED | OUTPATIENT
Start: 2018-05-29 | End: 2018-05-31 | Stop reason: HOSPADM

## 2018-05-29 RX ORDER — HEPARIN SODIUM 5000 [USP'U]/ML
INJECTION, SOLUTION INTRAVENOUS; SUBCUTANEOUS
Status: COMPLETED | OUTPATIENT
Start: 2018-05-29 | End: 2018-05-29

## 2018-05-29 RX ORDER — CLONIDINE HYDROCHLORIDE 0.1 MG/1
0.1 TABLET ORAL PRN
Status: DISCONTINUED | OUTPATIENT
Start: 2018-05-29 | End: 2018-05-31 | Stop reason: HOSPADM

## 2018-05-29 RX ORDER — SODIUM CHLORIDE 9 MG/ML
INJECTION, SOLUTION INTRAVENOUS CONTINUOUS
Status: DISCONTINUED | OUTPATIENT
Start: 2018-05-29 | End: 2018-05-29 | Stop reason: SDUPTHER

## 2018-05-29 RX ADMIN — FENTANYL CITRATE 25 MCG: 50 INJECTION, SOLUTION INTRAMUSCULAR; INTRAVENOUS at 08:49

## 2018-05-29 RX ADMIN — LIDOCAINE HYDROCHLORIDE 10 ML: 20 INJECTION, SOLUTION INFILTRATION; PERINEURAL at 08:03

## 2018-05-29 RX ADMIN — MIDAZOLAM HYDROCHLORIDE 1 MG: 1 INJECTION, SOLUTION INTRAMUSCULAR; INTRAVENOUS at 08:49

## 2018-05-29 RX ADMIN — Medication 2 G: at 07:45

## 2018-05-29 RX ADMIN — FENTANYL CITRATE 25 MCG: 50 INJECTION, SOLUTION INTRAMUSCULAR; INTRAVENOUS at 08:05

## 2018-05-29 RX ADMIN — MIDAZOLAM HYDROCHLORIDE 1 MG: 1 INJECTION, SOLUTION INTRAMUSCULAR; INTRAVENOUS at 08:03

## 2018-05-29 RX ADMIN — SODIUM CHLORIDE: 9 INJECTION, SOLUTION INTRAVENOUS at 07:11

## 2018-05-29 RX ADMIN — HEPARIN SODIUM 4000 UNITS: 5000 INJECTION, SOLUTION INTRAVENOUS; SUBCUTANEOUS at 08:34

## 2018-05-29 RX ADMIN — FENTANYL CITRATE 25 MCG: 50 INJECTION, SOLUTION INTRAMUSCULAR; INTRAVENOUS at 08:03

## 2018-05-29 RX ADMIN — IODIXANOL 100 ML: 320 INJECTION, SOLUTION INTRAVASCULAR at 08:48

## 2018-05-29 RX ADMIN — MIDAZOLAM HYDROCHLORIDE 1 MG: 1 INJECTION, SOLUTION INTRAMUSCULAR; INTRAVENOUS at 08:28

## 2018-05-29 RX ADMIN — HEPARIN SODIUM 5000 UNITS: 5000 INJECTION, SOLUTION INTRAVENOUS; SUBCUTANEOUS at 08:02

## 2018-05-29 RX ADMIN — FENTANYL CITRATE 25 MCG: 50 INJECTION, SOLUTION INTRAMUSCULAR; INTRAVENOUS at 08:28

## 2018-05-29 RX ADMIN — MIDAZOLAM HYDROCHLORIDE 1 MG: 1 INJECTION, SOLUTION INTRAMUSCULAR; INTRAVENOUS at 08:05

## 2018-05-29 RX ADMIN — ASPIRIN 81 MG: 81 TABLET, COATED ORAL at 07:16

## 2018-06-05 DIAGNOSIS — G89.4 CHRONIC PAIN SYNDROME: ICD-10-CM

## 2018-06-05 RX ORDER — HYDROCODONE BITARTRATE AND ACETAMINOPHEN 5; 325 MG/1; MG/1
1 TABLET ORAL EVERY 6 HOURS PRN
Qty: 120 TABLET | Refills: 0 | Status: SHIPPED | OUTPATIENT
Start: 2018-06-05 | End: 2018-07-05 | Stop reason: SDUPTHER

## 2018-06-13 ENCOUNTER — OFFICE VISIT (OUTPATIENT)
Dept: INTERNAL MEDICINE | Age: 83
End: 2018-06-13
Payer: MEDICARE

## 2018-06-13 VITALS
DIASTOLIC BLOOD PRESSURE: 60 MMHG | WEIGHT: 142.4 LBS | OXYGEN SATURATION: 95 % | SYSTOLIC BLOOD PRESSURE: 122 MMHG | RESPIRATION RATE: 20 BRPM | HEART RATE: 71 BPM | BODY MASS INDEX: 24.31 KG/M2 | HEIGHT: 64 IN

## 2018-06-13 DIAGNOSIS — M81.8 OTHER OSTEOPOROSIS WITHOUT CURRENT PATHOLOGICAL FRACTURE: ICD-10-CM

## 2018-06-13 DIAGNOSIS — Z12.11 COLON CANCER SCREENING: ICD-10-CM

## 2018-06-13 DIAGNOSIS — K58.9 IRRITABLE BOWEL SYNDROME, UNSPECIFIED TYPE: ICD-10-CM

## 2018-06-13 DIAGNOSIS — I70.213 ATHEROSCLEROSIS OF NATIVE ARTERY OF BOTH LOWER EXTREMITIES WITH INTERMITTENT CLAUDICATION (HCC): ICD-10-CM

## 2018-06-13 DIAGNOSIS — I73.9 PVD (PERIPHERAL VASCULAR DISEASE) (HCC): ICD-10-CM

## 2018-06-13 DIAGNOSIS — Z13.820 SCREENING FOR OSTEOPOROSIS: ICD-10-CM

## 2018-06-13 DIAGNOSIS — E78.2 MIXED HYPERLIPIDEMIA: Primary | ICD-10-CM

## 2018-06-13 DIAGNOSIS — J43.9 PULMONARY EMPHYSEMA, UNSPECIFIED EMPHYSEMA TYPE (HCC): Chronic | ICD-10-CM

## 2018-06-13 DIAGNOSIS — I10 ESSENTIAL HYPERTENSION: ICD-10-CM

## 2018-06-13 DIAGNOSIS — E55.9 VITAMIN D DEFICIENCY: Chronic | ICD-10-CM

## 2018-06-13 DIAGNOSIS — G25.81 RESTLESS LEGS SYNDROME: Chronic | ICD-10-CM

## 2018-06-13 DIAGNOSIS — M81.0 OSTEOPOROSIS, UNSPECIFIED OSTEOPOROSIS TYPE, UNSPECIFIED PATHOLOGICAL FRACTURE PRESENCE: Chronic | ICD-10-CM

## 2018-06-13 DIAGNOSIS — Z12.39 BREAST CANCER SCREENING: ICD-10-CM

## 2018-06-13 DIAGNOSIS — I65.23 BILATERAL CAROTID ARTERY STENOSIS: ICD-10-CM

## 2018-06-13 PROCEDURE — 4040F PNEUMOC VAC/ADMIN/RCVD: CPT | Performed by: INTERNAL MEDICINE

## 2018-06-13 PROCEDURE — 3023F SPIROM DOC REV: CPT | Performed by: INTERNAL MEDICINE

## 2018-06-13 PROCEDURE — 99214 OFFICE O/P EST MOD 30 MIN: CPT | Performed by: INTERNAL MEDICINE

## 2018-06-13 PROCEDURE — 1090F PRES/ABSN URINE INCON ASSESS: CPT | Performed by: INTERNAL MEDICINE

## 2018-06-13 PROCEDURE — G8427 DOCREV CUR MEDS BY ELIG CLIN: HCPCS | Performed by: INTERNAL MEDICINE

## 2018-06-13 PROCEDURE — 4004F PT TOBACCO SCREEN RCVD TLK: CPT | Performed by: INTERNAL MEDICINE

## 2018-06-13 PROCEDURE — G8420 CALC BMI NORM PARAMETERS: HCPCS | Performed by: INTERNAL MEDICINE

## 2018-06-13 PROCEDURE — G8399 PT W/DXA RESULTS DOCUMENT: HCPCS | Performed by: INTERNAL MEDICINE

## 2018-06-13 PROCEDURE — G8926 SPIRO NO PERF OR DOC: HCPCS | Performed by: INTERNAL MEDICINE

## 2018-06-13 PROCEDURE — 1123F ACP DISCUSS/DSCN MKR DOCD: CPT | Performed by: INTERNAL MEDICINE

## 2018-06-13 PROCEDURE — G8598 ASA/ANTIPLAT THER USED: HCPCS | Performed by: INTERNAL MEDICINE

## 2018-06-13 ASSESSMENT — ENCOUNTER SYMPTOMS
COUGH: 0
CONSTIPATION: 0
NAUSEA: 0
SHORTNESS OF BREATH: 1
ABDOMINAL PAIN: 0
WHEEZING: 0
DIARRHEA: 0

## 2018-06-13 ASSESSMENT — PATIENT HEALTH QUESTIONNAIRE - PHQ9
2. FEELING DOWN, DEPRESSED OR HOPELESS: 0
SUM OF ALL RESPONSES TO PHQ9 QUESTIONS 1 & 2: 0
SUM OF ALL RESPONSES TO PHQ QUESTIONS 1-9: 0
1. LITTLE INTEREST OR PLEASURE IN DOING THINGS: 0

## 2018-06-20 ENCOUNTER — HOSPITAL ENCOUNTER (OUTPATIENT)
Dept: WOMENS IMAGING | Age: 83
Discharge: HOME | End: 2018-06-20
Payer: MEDICARE

## 2018-06-20 DIAGNOSIS — M81.8 OTHER OSTEOPOROSIS WITHOUT CURRENT PATHOLOGICAL FRACTURE: ICD-10-CM

## 2018-06-20 DIAGNOSIS — Z13.820 SCREENING FOR OSTEOPOROSIS: ICD-10-CM

## 2018-06-20 DIAGNOSIS — Z12.39 BREAST CANCER SCREENING: ICD-10-CM

## 2018-06-20 PROCEDURE — 77067 SCR MAMMO BI INCL CAD: CPT

## 2018-06-20 PROCEDURE — 77080 DXA BONE DENSITY AXIAL: CPT

## 2018-06-25 ENCOUNTER — OFFICE VISIT (OUTPATIENT)
Dept: VASCULAR SURGERY | Age: 83
End: 2018-06-25
Payer: MEDICARE

## 2018-06-25 VITALS
OXYGEN SATURATION: 97 % | RESPIRATION RATE: 18 BRPM | HEART RATE: 68 BPM | SYSTOLIC BLOOD PRESSURE: 124 MMHG | DIASTOLIC BLOOD PRESSURE: 72 MMHG

## 2018-06-25 DIAGNOSIS — I73.9 PVD (PERIPHERAL VASCULAR DISEASE) (HCC): Primary | ICD-10-CM

## 2018-06-25 PROCEDURE — 1090F PRES/ABSN URINE INCON ASSESS: CPT | Performed by: PHYSICIAN ASSISTANT

## 2018-06-25 PROCEDURE — G8427 DOCREV CUR MEDS BY ELIG CLIN: HCPCS | Performed by: PHYSICIAN ASSISTANT

## 2018-06-25 PROCEDURE — G8598 ASA/ANTIPLAT THER USED: HCPCS | Performed by: PHYSICIAN ASSISTANT

## 2018-06-25 PROCEDURE — G8399 PT W/DXA RESULTS DOCUMENT: HCPCS | Performed by: PHYSICIAN ASSISTANT

## 2018-06-25 PROCEDURE — 1123F ACP DISCUSS/DSCN MKR DOCD: CPT | Performed by: PHYSICIAN ASSISTANT

## 2018-06-25 PROCEDURE — 4004F PT TOBACCO SCREEN RCVD TLK: CPT | Performed by: PHYSICIAN ASSISTANT

## 2018-06-25 PROCEDURE — G8420 CALC BMI NORM PARAMETERS: HCPCS | Performed by: PHYSICIAN ASSISTANT

## 2018-06-25 PROCEDURE — 99212 OFFICE O/P EST SF 10 MIN: CPT | Performed by: PHYSICIAN ASSISTANT

## 2018-06-25 PROCEDURE — 4040F PNEUMOC VAC/ADMIN/RCVD: CPT | Performed by: PHYSICIAN ASSISTANT

## 2018-06-28 DIAGNOSIS — K58.9 IRRITABLE BOWEL SYNDROME, UNSPECIFIED TYPE: ICD-10-CM

## 2018-06-28 DIAGNOSIS — I10 ESSENTIAL HYPERTENSION: ICD-10-CM

## 2018-06-28 DIAGNOSIS — I73.9 PVD (PERIPHERAL VASCULAR DISEASE) (HCC): ICD-10-CM

## 2018-06-28 DIAGNOSIS — G25.81 RESTLESS LEGS SYNDROME: Chronic | ICD-10-CM

## 2018-06-28 DIAGNOSIS — E55.9 VITAMIN D DEFICIENCY: Chronic | ICD-10-CM

## 2018-06-28 DIAGNOSIS — M81.0 OSTEOPOROSIS, UNSPECIFIED OSTEOPOROSIS TYPE, UNSPECIFIED PATHOLOGICAL FRACTURE PRESENCE: Chronic | ICD-10-CM

## 2018-06-28 DIAGNOSIS — I70.213 ATHEROSCLEROSIS OF NATIVE ARTERY OF BOTH LOWER EXTREMITIES WITH INTERMITTENT CLAUDICATION (HCC): ICD-10-CM

## 2018-06-28 DIAGNOSIS — E78.2 MIXED HYPERLIPIDEMIA: ICD-10-CM

## 2018-06-28 DIAGNOSIS — J43.9 PULMONARY EMPHYSEMA, UNSPECIFIED EMPHYSEMA TYPE (HCC): Chronic | ICD-10-CM

## 2018-06-28 DIAGNOSIS — I65.23 BILATERAL CAROTID ARTERY STENOSIS: ICD-10-CM

## 2018-06-28 LAB
ALBUMIN SERPL-MCNC: 3.9 G/DL (ref 3.5–5.2)
ALP BLD-CCNC: 107 U/L (ref 35–104)
ALT SERPL-CCNC: 12 U/L (ref 5–33)
ANION GAP SERPL CALCULATED.3IONS-SCNC: 12 MMOL/L (ref 7–19)
AST SERPL-CCNC: 18 U/L (ref 5–32)
BILIRUB SERPL-MCNC: 0.3 MG/DL (ref 0.2–1.2)
BUN BLDV-MCNC: 15 MG/DL (ref 8–23)
CALCIUM SERPL-MCNC: 9.5 MG/DL (ref 8.8–10.2)
CHLORIDE BLD-SCNC: 98 MMOL/L (ref 98–111)
CHOLESTEROL, TOTAL: 148 MG/DL (ref 160–199)
CO2: 28 MMOL/L (ref 22–29)
CREAT SERPL-MCNC: 1.1 MG/DL (ref 0.5–0.9)
GFR NON-AFRICAN AMERICAN: 47
GLUCOSE BLD-MCNC: 109 MG/DL (ref 74–109)
HDLC SERPL-MCNC: 45 MG/DL (ref 65–121)
LDL CHOLESTEROL CALCULATED: 78 MG/DL
POTASSIUM SERPL-SCNC: 4.3 MMOL/L (ref 3.5–5)
SODIUM BLD-SCNC: 138 MMOL/L (ref 136–145)
TOTAL PROTEIN: 6.9 G/DL (ref 6.6–8.7)
TRIGL SERPL-MCNC: 125 MG/DL (ref 0–149)
VITAMIN D 25-HYDROXY: 36.8 NG/ML

## 2018-06-29 RX ORDER — PRAMIPEXOLE DIHYDROCHLORIDE 0.25 MG/1
TABLET ORAL
Qty: 180 TABLET | Refills: 1 | Status: SHIPPED | OUTPATIENT
Start: 2018-06-29 | End: 2018-12-12 | Stop reason: SDUPTHER

## 2018-07-05 DIAGNOSIS — G89.4 CHRONIC PAIN SYNDROME: ICD-10-CM

## 2018-07-05 RX ORDER — HYDROCODONE BITARTRATE AND ACETAMINOPHEN 5; 325 MG/1; MG/1
1 TABLET ORAL EVERY 6 HOURS PRN
Qty: 120 TABLET | Refills: 0 | Status: SHIPPED | OUTPATIENT
Start: 2018-07-05 | End: 2018-08-03 | Stop reason: SDUPTHER

## 2018-07-06 DIAGNOSIS — M81.0 OSTEOPOROSIS, UNSPECIFIED OSTEOPOROSIS TYPE, UNSPECIFIED PATHOLOGICAL FRACTURE PRESENCE: ICD-10-CM

## 2018-07-06 RX ORDER — ERGOCALCIFEROL 1.25 MG/1
50000 CAPSULE ORAL WEEKLY
Qty: 4 CAPSULE | Refills: 3 | Status: SHIPPED | OUTPATIENT
Start: 2018-07-06 | End: 2018-10-16 | Stop reason: SDUPTHER

## 2018-07-17 ENCOUNTER — TELEPHONE (OUTPATIENT)
Dept: INTERNAL MEDICINE | Age: 83
End: 2018-07-17

## 2018-07-17 RX ORDER — IBANDRONATE SODIUM 150 MG/1
150 TABLET, FILM COATED ORAL
Qty: 4 TABLET | Refills: 3 | Status: SHIPPED | OUTPATIENT
Start: 2018-07-17 | End: 2018-07-20 | Stop reason: CLARIF

## 2018-07-18 ENCOUNTER — TELEPHONE (OUTPATIENT)
Dept: INTERNAL MEDICINE | Age: 83
End: 2018-07-18

## 2018-07-18 NOTE — TELEPHONE ENCOUNTER
[facility] states insurance will pay for Fosamax and requesting to substitute Fosamax for Boniva. Please advise if that is ok or if you want us to PA the Boniva.

## 2018-07-20 RX ORDER — ALENDRONATE SODIUM 70 MG/1
70 TABLET ORAL
Qty: 13 TABLET | Refills: 3 | Status: SHIPPED | OUTPATIENT
Start: 2018-07-20 | End: 2018-10-15 | Stop reason: SDUPTHER

## 2018-07-20 NOTE — TELEPHONE ENCOUNTER
Requested Prescriptions     Pending Prescriptions Disp Refills    alendronate (FOSAMAX) 70 MG tablet 13 tablet 3     Sig: Take 1 tablet by mouth every 7 days (Replaces Rosa Elena)

## 2018-07-27 RX ORDER — CLOPIDOGREL BISULFATE 75 MG/1
75 TABLET ORAL DAILY
COMMUNITY
End: 2020-08-10

## 2018-07-27 RX ORDER — ATORVASTATIN CALCIUM 80 MG/1
40 TABLET, FILM COATED ORAL DAILY
COMMUNITY
End: 2021-01-01 | Stop reason: SDUPTHER

## 2018-07-27 RX ORDER — DICYCLOMINE HYDROCHLORIDE 10 MG/1
10 CAPSULE ORAL 2 TIMES DAILY PRN
Status: ON HOLD | COMMUNITY
End: 2022-01-01

## 2018-07-27 RX ORDER — ASPIRIN 81 MG/1
81 TABLET ORAL DAILY
COMMUNITY
End: 2022-01-01 | Stop reason: HOSPADM

## 2018-07-27 RX ORDER — AMLODIPINE BESYLATE 5 MG/1
5 TABLET ORAL DAILY
Status: ON HOLD | COMMUNITY
End: 2021-01-01 | Stop reason: SDUPTHER

## 2018-07-27 RX ORDER — HYDROCHLOROTHIAZIDE 12.5 MG/1
12.5 CAPSULE, GELATIN COATED ORAL DAILY
COMMUNITY
End: 2021-01-01 | Stop reason: HOSPADM

## 2018-07-27 RX ORDER — LISINOPRIL 40 MG/1
40 TABLET ORAL DAILY
COMMUNITY
End: 2021-01-01 | Stop reason: SDUPTHER

## 2018-07-27 RX ORDER — BUDESONIDE AND FORMOTEROL FUMARATE DIHYDRATE 160; 4.5 UG/1; UG/1
2 AEROSOL RESPIRATORY (INHALATION)
COMMUNITY
End: 2021-01-01

## 2018-07-27 RX ORDER — HYDROCODONE BITARTRATE AND ACETAMINOPHEN 5; 325 MG/1; MG/1
1 TABLET ORAL AS NEEDED
COMMUNITY
End: 2021-01-01

## 2018-07-27 RX ORDER — ERGOCALCIFEROL 1.25 MG/1
50000 CAPSULE ORAL WEEKLY
COMMUNITY
End: 2021-01-01 | Stop reason: SDUPTHER

## 2018-07-27 RX ORDER — PRAMIPEXOLE DIHYDROCHLORIDE 0.25 MG/1
.25-.5 TABLET ORAL NIGHTLY PRN
Status: ON HOLD | COMMUNITY
End: 2022-01-01

## 2018-08-03 DIAGNOSIS — G89.4 CHRONIC PAIN SYNDROME: ICD-10-CM

## 2018-08-03 RX ORDER — HYDROCODONE BITARTRATE AND ACETAMINOPHEN 5; 325 MG/1; MG/1
1 TABLET ORAL EVERY 6 HOURS PRN
Qty: 120 TABLET | Refills: 0 | Status: SHIPPED | OUTPATIENT
Start: 2018-08-03 | End: 2018-09-04 | Stop reason: SDUPTHER

## 2018-08-15 RX ORDER — AMLODIPINE BESYLATE 5 MG/1
TABLET ORAL
Qty: 90 TABLET | Refills: 3 | Status: SHIPPED | OUTPATIENT
Start: 2018-08-15 | End: 2019-06-17 | Stop reason: SDUPTHER

## 2018-08-15 RX ORDER — LISINOPRIL 40 MG/1
TABLET ORAL
Qty: 90 TABLET | Refills: 3 | Status: SHIPPED | OUTPATIENT
Start: 2018-08-15 | End: 2019-06-17 | Stop reason: SDUPTHER

## 2018-08-15 RX ORDER — HYDROCHLOROTHIAZIDE 12.5 MG/1
CAPSULE, GELATIN COATED ORAL
Qty: 90 CAPSULE | Refills: 3 | Status: SHIPPED | OUTPATIENT
Start: 2018-08-15 | End: 2019-06-17 | Stop reason: SDUPTHER

## 2018-08-15 RX ORDER — ATORVASTATIN CALCIUM 40 MG/1
TABLET, FILM COATED ORAL
Qty: 90 TABLET | Refills: 3 | Status: SHIPPED | OUTPATIENT
Start: 2018-08-15 | End: 2018-10-16 | Stop reason: SDUPTHER

## 2018-09-04 ENCOUNTER — TELEPHONE (OUTPATIENT)
Dept: INTERNAL MEDICINE | Age: 83
End: 2018-09-04

## 2018-09-04 DIAGNOSIS — G89.4 CHRONIC PAIN SYNDROME: ICD-10-CM

## 2018-09-04 NOTE — TELEPHONE ENCOUNTER
Pt forgot to call and request her Hydrocodone script. She is needing this as soon as possible. She states that her son  in her home and he daughter was recently in a wreck. With all this going on she simply forgot to call us. I told her that I would call her once this was ready for her to . Requested Prescriptions     Pending Prescriptions Disp Refills    HYDROcodone-acetaminophen (NORCO) 5-325 MG per tablet 120 tablet 0     Sig: Take 1 tablet by mouth every 6 hours as needed for Pain for up to 30 days. G89.4.  Earliest Fill Date: 18

## 2018-09-05 RX ORDER — HYDROCODONE BITARTRATE AND ACETAMINOPHEN 5; 325 MG/1; MG/1
1 TABLET ORAL EVERY 6 HOURS PRN
Qty: 120 TABLET | Refills: 0 | Status: SHIPPED | OUTPATIENT
Start: 2018-09-05 | End: 2018-10-02 | Stop reason: SDUPTHER

## 2018-09-12 ENCOUNTER — OFFICE VISIT (OUTPATIENT)
Dept: GASTROENTEROLOGY | Facility: CLINIC | Age: 83
End: 2018-09-12

## 2018-09-12 VITALS
WEIGHT: 143 LBS | BODY MASS INDEX: 23.82 KG/M2 | DIASTOLIC BLOOD PRESSURE: 66 MMHG | OXYGEN SATURATION: 95 % | SYSTOLIC BLOOD PRESSURE: 172 MMHG | HEIGHT: 65 IN | TEMPERATURE: 95.9 F | HEART RATE: 68 BPM

## 2018-09-12 DIAGNOSIS — Z72.0 TOBACCO USE: ICD-10-CM

## 2018-09-12 DIAGNOSIS — Z86.010 HX OF COLONIC POLYP: Primary | ICD-10-CM

## 2018-09-12 DIAGNOSIS — Z83.71 FAMILY HX COLONIC POLYPS: ICD-10-CM

## 2018-09-12 DIAGNOSIS — Z80.0 FAMILY HX OF COLON CANCER: ICD-10-CM

## 2018-09-12 DIAGNOSIS — D68.9 COAGULOPATHY (HCC): ICD-10-CM

## 2018-09-12 DIAGNOSIS — I10 ESSENTIAL HYPERTENSION: ICD-10-CM

## 2018-09-12 PROBLEM — Z83.719 FAMILY HX COLONIC POLYPS: Status: ACTIVE | Noted: 2018-09-12

## 2018-09-12 PROCEDURE — S0260 H&P FOR SURGERY: HCPCS | Performed by: NURSE PRACTITIONER

## 2018-09-12 RX ORDER — POLYETHYLENE GLYCOL 3350, SODIUM CHLORIDE, SODIUM BICARBONATE, POTASSIUM CHLORIDE 420; 11.2; 5.72; 1.48 G/4L; G/4L; G/4L; G/4L
4000 POWDER, FOR SOLUTION ORAL SEE ADMIN INSTRUCTIONS
Qty: 4000 ML | Refills: 0 | Status: SHIPPED | OUTPATIENT
Start: 2018-09-12 | End: 2019-10-03

## 2018-09-12 RX ORDER — ALENDRONATE SODIUM 70 MG/1
70 TABLET ORAL
COMMUNITY
Start: 2018-07-20 | End: 2019-10-03

## 2018-09-12 NOTE — PROGRESS NOTES
[facility]    Primary Physician Jhon Mcdonnell MD    9/12/2018    Cindy Hicks   1935      Chief Complaint   Patient presents with   • Colonoscopy       Subjective     HPI    Cindy Hicks is a 82 y.o. female who presents as a referral for preventative maintenance. She has no complaints of nausea or vomiting. No change in bowels. No wt loss. No BRBPR. No melena. No abdominal pain.  Last colonoscopy was 3/2013 mild diverticulosis, ascending polyp ( hyperplastic), small internal hemorrhoid, recall rec 3 yr.  The patient does  have history of colon polyps. The patient does not  have history of colon cancer.   There is  family history of colon polyps brother in his 70's.    There is family history of colon cancer sister in her 50's.       She is on plavix , from vascular perspective followed by the vascular group at [facility].      Past Medical History:   Diagnosis Date   • Actinic keratosis    • Arthritis    • Atherosclerosis    • Benign fundic gland polyps of stomach    • Blocked artery (CMS/HCC)    • Carotid artery bruit    • Carotid artery stenosis    • COPD (chronic obstructive pulmonary disease) (CMS/HCC)    • Diverticulosis    • Emphysema of lung (CMS/HCC)    • History of colon polyps    • Hyperlipidemia    • Hypertension    • IBS (irritable bowel syndrome)    • Macular degeneration    • Osteoporosis    • Prediabetes    • PVD (peripheral vascular disease) (CMS/HCC)    • TIA (transient ischemic attack)    • Vitamin D deficiency        Past Surgical History:   Procedure Laterality Date   • CATARACT EXTRACTION     • COLONOSCOPY  03/15/2013    polyp, hyperplastic   • CYST REMOVAL     • FEMORAL ARTERY STENT     • HYSTERECTOMY     • VASCULAR SURGERY      multiple       Outpatient Prescriptions Marked as Taking for the 9/12/18 encounter (Office Visit) with Elsa Ward APRN   Medication Sig Dispense Refill   • alendronate (FOSAMAX) 70 MG tablet Take 70 mg by mouth.     • amLODIPine  (NORVASC) 5 MG tablet Take 5 mg by mouth Daily.     • aspirin 81 MG EC tablet Take 81 mg by mouth Daily.     • atorvastatin (LIPITOR) 40 MG tablet Take 40 mg by mouth Daily.     • budesonide-formoterol (SYMBICORT) 160-4.5 MCG/ACT inhaler Inhale 2 puffs 2 (Two) Times a Day.     • clopidogrel (PLAVIX) 75 MG tablet Take 75 mg by mouth Daily.     • dicyclomine (BENTYL) 10 MG capsule Take 10 mg by mouth 3 (Three) Times a Day.     • hydrochlorothiazide (MICROZIDE) 12.5 MG capsule Take 12.5 mg by mouth Daily.     • HYDROcodone-acetaminophen (NORCO) 5-325 MG per tablet Take 1 tablet by mouth As Needed.     • lisinopril (PRINIVIL,ZESTRIL) 40 MG tablet Take 40 mg by mouth Daily.     • pramipexole (MIRAPEX) 0.25 MG tablet Take 0.25 mg by mouth 3 (Three) Times a Day.     • vitamin D (ERGOCALCIFEROL) 89193 units capsule capsule Take 50,000 Units by mouth 1 (One) Time Per Week.         Allergies   Allergen Reactions   • Codeine Nausea And Vomiting   • Valium [Diazepam] Nausea Only       Social History     Social History   • Marital status:      Spouse name: N/A   • Number of children: N/A   • Years of education: N/A     Occupational History   • Not on file.     Social History Main Topics   • Smoking status: Current Every Day Smoker   • Smokeless tobacco: Never Used   • Alcohol use No   • Drug use: No   • Sexual activity: Defer     Other Topics Concern   • Not on file     Social History Narrative   • No narrative on file       Family History   Problem Relation Age of Onset   • Colon cancer Sister    • Colon polyps Brother        Review of Systems   Constitutional: Negative for appetite change, chills, fatigue, fever and unexpected weight change.   HENT: Negative for sore throat and trouble swallowing.    Eyes: Negative for visual disturbance.   Respiratory: Negative for cough, chest tightness, shortness of breath and wheezing.    Cardiovascular: Negative for chest pain and palpitations.   Gastrointestinal: Negative for  abdominal distention, abdominal pain, anal bleeding, blood in stool, constipation, diarrhea, nausea, rectal pain and vomiting.        As mentioned in hpi   Genitourinary: Negative for difficulty urinating and hematuria.   Musculoskeletal: Positive for arthralgias (chronic ) and back pain (chronic ).   Skin: Negative for color change and rash.   Neurological: Positive for dizziness (occasional ) and light-headedness (occasional ). Negative for seizures, syncope and headaches.   Hematological: Negative for adenopathy.   Psychiatric/Behavioral: Negative for confusion. The patient is not nervous/anxious.        Objective     Vitals:    09/12/18 0931   BP: 172/66   Pulse: 68   Temp: 95.9 °F (35.5 °C)   SpO2: 95%     1    09/12/18 0931   Weight: 64.9 kg (143 lb)     Body mass index is 23.8 kg/m².    Physical Exam   Constitutional: She appears well-developed and well-nourished. No distress.   HENT:   Head: Normocephalic and atraumatic.   Eyes: EOM are normal. No scleral icterus.   Neck: Neck supple. No JVD present.   Cardiovascular: Normal rate, regular rhythm and normal heart sounds.    Pulmonary/Chest: Effort normal and breath sounds normal. No stridor.   Abdominal: Soft. Bowel sounds are normal. She exhibits no distension and no mass. There is no tenderness. There is no rebound and no guarding.   Musculoskeletal: Normal range of motion. She exhibits no deformity.   Neurological: She is alert.   Skin: Skin is warm and dry. No rash noted.   Psychiatric: She has a normal mood and affect. Her behavior is normal.   Vitals reviewed.      Imaging Results (most recent)     None          Assessment/Plan     Cindy was seen today for colonoscopy.    Diagnoses and all orders for this visit:    Hx of colonic polyp  -     Case Request; Standing  -     Case Request    Family hx of colon cancer  -     Case Request; Standing  -     Case Request    Family hx colonic polyps  -     Case Request; Standing  -     Case  Request    Coagulopathy (CMS/[facility])  Comments:  plavix for PVD, followed by the vascular group at [facility].     Essential hypertension    Tobacco use    Other orders  -     Follow Anesthesia Guidelines / Standing Orders; Future  -     Implement Anesthesia Orders Day of Procedure; Standing  -     Obtain Informed Consent; Standing  -     polyethylene glycol-electrolytes (NULYTELY WITH FLAVOR PACKS) 420 g solution; Take 4,000 mL by mouth See Admin Instructions.    Plan for colonoscopy. The patient was advised to take any blood pressure or heart  medications the morning of  procedure if that is when he/she normally takes.                  Body mass index is 23.8 kg/m².    Patient's Body mass index is 23.8 kg/m². BMI is within normal parameters. No follow-up required.      COLONOSCOPY WITH ANESTHESIA (N/A)  All risks, benefits, alternatives, and indications of colonoscopy procedure have been discussed with the patient. Risks to include perforation of the colon requiring possible surgery or colostomy, risk of bleeding from biopsies or removal of colon tissue, possibility of missing a colon polyp or cancer, or adverse drug reaction.  Benefits to include the diagnosis and management of disease of the colon and rectum. Alternatives to include barium enema, radiographic evaluation, lab testing or no intervention. Pt verbalizes understanding and agrees.    The patient was advised on the risks of stopping blood thinners for a procedure.  The risks discussed included the risk of developing myocardial infarction, CVA, embolus, clogging of the arteries or stents, etc.  We discussed the potential consequences of the risks discussed.  The benefits of stopping as well as the alternatives were discussed as well.   Patient is to hold their anticoagulation medication per the direction of their prescribing physician, Dr. Obrien.    A letter will be sent to prescribing This is to prevent any risk or complication from bleeding intra and post  procedure. If they develop bleeding post procedure they are to go the emergency department for further evaluation and treatment immediately.       I advised Cindy of the risks of continuing to use tobacco, and I provided her with tobacco cessation educational materials in the After Visit Summary.     During this visit, I spent > 3  minutes counseling the patient regarding tobacco cessation.                  HALIAM Garrett      EMR Dragon/transcription disclaimer:  Much of this encounter note is electronic transcription/translation of spoken language to printed text.  The electronic translation of spoken language may be erroneous, or at times, nonsensical words or phrases may be inadvertently transcribed.  Although I have reviewed the note for such errors, some may still exist.

## 2018-09-17 ENCOUNTER — TELEPHONE (OUTPATIENT)
Dept: GASTROENTEROLOGY | Facility: CLINIC | Age: 83
End: 2018-09-17

## 2018-09-17 NOTE — TELEPHONE ENCOUNTER
Please let patient know that monica willson with vascular at [facility] ok'd her to be off plavix 7 days prior to procedure scheduled 10-2-18. Thank you

## 2018-09-19 ENCOUNTER — TELEPHONE (OUTPATIENT)
Dept: INTERNAL MEDICINE | Age: 83
End: 2018-09-19

## 2018-10-02 ENCOUNTER — ANESTHESIA (OUTPATIENT)
Dept: GASTROENTEROLOGY | Facility: HOSPITAL | Age: 83
End: 2018-10-02

## 2018-10-02 ENCOUNTER — ANESTHESIA EVENT (OUTPATIENT)
Dept: GASTROENTEROLOGY | Facility: HOSPITAL | Age: 83
End: 2018-10-02

## 2018-10-02 ENCOUNTER — HOSPITAL ENCOUNTER (OUTPATIENT)
Facility: HOSPITAL | Age: 83
Setting detail: HOSPITAL OUTPATIENT SURGERY
Discharge: HOME | End: 2018-10-02
Attending: INTERNAL MEDICINE | Admitting: INTERNAL MEDICINE

## 2018-10-02 VITALS
TEMPERATURE: 98 F | BODY MASS INDEX: 23.32 KG/M2 | OXYGEN SATURATION: 97 % | WEIGHT: 140 LBS | HEIGHT: 65 IN | HEART RATE: 63 BPM | SYSTOLIC BLOOD PRESSURE: 127 MMHG | DIASTOLIC BLOOD PRESSURE: 47 MMHG | RESPIRATION RATE: 17 BRPM

## 2018-10-02 DIAGNOSIS — G89.4 CHRONIC PAIN SYNDROME: ICD-10-CM

## 2018-10-02 DIAGNOSIS — Z86.010 HX OF COLONIC POLYP: ICD-10-CM

## 2018-10-02 DIAGNOSIS — Z80.0 FAMILY HX OF COLON CANCER: ICD-10-CM

## 2018-10-02 DIAGNOSIS — Z83.71 FAMILY HX COLONIC POLYPS: ICD-10-CM

## 2018-10-02 PROCEDURE — 88305 TISSUE EXAM BY PATHOLOGIST: CPT | Performed by: INTERNAL MEDICINE

## 2018-10-02 PROCEDURE — 25010000002 PROPOFOL 10 MG/ML EMULSION: Performed by: NURSE ANESTHETIST, CERTIFIED REGISTERED

## 2018-10-02 PROCEDURE — 45385 COLONOSCOPY W/LESION REMOVAL: CPT | Performed by: INTERNAL MEDICINE

## 2018-10-02 RX ORDER — SODIUM CHLORIDE 0.9 % (FLUSH) 0.9 %
3 SYRINGE (ML) INJECTION EVERY 12 HOURS SCHEDULED
Status: CANCELLED | OUTPATIENT
Start: 2018-10-02

## 2018-10-02 RX ORDER — SODIUM CHLORIDE 0.9 % (FLUSH) 0.9 %
3-10 SYRINGE (ML) INJECTION AS NEEDED
Status: CANCELLED | OUTPATIENT
Start: 2018-10-02

## 2018-10-02 RX ORDER — LIDOCAINE HYDROCHLORIDE 20 MG/ML
INJECTION, SOLUTION INFILTRATION; PERINEURAL AS NEEDED
Status: DISCONTINUED | OUTPATIENT
Start: 2018-10-02 | End: 2018-10-02 | Stop reason: SURG

## 2018-10-02 RX ORDER — ONDANSETRON 2 MG/ML
4 INJECTION INTRAMUSCULAR; INTRAVENOUS ONCE AS NEEDED
Status: DISCONTINUED | OUTPATIENT
Start: 2018-10-02 | End: 2018-10-02 | Stop reason: HOSPADM

## 2018-10-02 RX ORDER — SODIUM CHLORIDE 0.9 % (FLUSH) 0.9 %
3 SYRINGE (ML) INJECTION AS NEEDED
Status: DISCONTINUED | OUTPATIENT
Start: 2018-10-02 | End: 2018-10-02 | Stop reason: HOSPADM

## 2018-10-02 RX ORDER — PROPOFOL 10 MG/ML
VIAL (ML) INTRAVENOUS AS NEEDED
Status: DISCONTINUED | OUTPATIENT
Start: 2018-10-02 | End: 2018-10-02 | Stop reason: SURG

## 2018-10-02 RX ORDER — HYDROCODONE BITARTRATE AND ACETAMINOPHEN 5; 325 MG/1; MG/1
1 TABLET ORAL EVERY 6 HOURS PRN
Qty: 120 TABLET | Refills: 0 | Status: SHIPPED | OUTPATIENT
Start: 2018-10-02 | End: 2018-10-29 | Stop reason: SDUPTHER

## 2018-10-02 RX ORDER — SODIUM CHLORIDE 9 MG/ML
100 INJECTION, SOLUTION INTRAVENOUS CONTINUOUS
Status: CANCELLED | OUTPATIENT
Start: 2018-10-02

## 2018-10-02 RX ORDER — SODIUM CHLORIDE 9 MG/ML
500 INJECTION, SOLUTION INTRAVENOUS CONTINUOUS PRN
Status: DISCONTINUED | OUTPATIENT
Start: 2018-10-02 | End: 2018-10-02 | Stop reason: HOSPADM

## 2018-10-02 RX ADMIN — PROPOFOL 20 MG: 10 INJECTION, EMULSION INTRAVENOUS at 08:28

## 2018-10-02 RX ADMIN — PROPOFOL 20 MG: 10 INJECTION, EMULSION INTRAVENOUS at 08:31

## 2018-10-02 RX ADMIN — PROPOFOL 20 MG: 10 INJECTION, EMULSION INTRAVENOUS at 08:23

## 2018-10-02 RX ADMIN — PROPOFOL 20 MG: 10 INJECTION, EMULSION INTRAVENOUS at 08:26

## 2018-10-02 RX ADMIN — PROPOFOL 20 MG: 10 INJECTION, EMULSION INTRAVENOUS at 08:21

## 2018-10-02 RX ADMIN — PROPOFOL 50 MG: 10 INJECTION, EMULSION INTRAVENOUS at 08:14

## 2018-10-02 RX ADMIN — PROPOFOL 20 MG: 10 INJECTION, EMULSION INTRAVENOUS at 08:17

## 2018-10-02 RX ADMIN — PROPOFOL 20 MG: 10 INJECTION, EMULSION INTRAVENOUS at 08:24

## 2018-10-02 RX ADMIN — PROPOFOL 20 MG: 10 INJECTION, EMULSION INTRAVENOUS at 08:19

## 2018-10-02 RX ADMIN — SODIUM CHLORIDE 500 ML: 9 INJECTION, SOLUTION INTRAVENOUS at 07:19

## 2018-10-02 RX ADMIN — LIDOCAINE HYDROCHLORIDE 60 MG: 20 INJECTION, SOLUTION INFILTRATION; PERINEURAL at 08:14

## 2018-10-02 RX ADMIN — PROPOFOL 20 MG: 10 INJECTION, EMULSION INTRAVENOUS at 08:15

## 2018-10-02 RX ADMIN — LIDOCAINE HYDROCHLORIDE 0.5 ML: 10 INJECTION, SOLUTION EPIDURAL; INFILTRATION; INTRACAUDAL; PERINEURAL at 07:19

## 2018-10-02 NOTE — ANESTHESIA POSTPROCEDURE EVALUATION
Patient: Cindy Hicks    Procedure Summary     Date:  10/02/18 Room / Location:  [facility]COPY 4 /  [facility]    Anesthesia Start:  0810 Anesthesia Stop:  0836    Procedure:  COLONOSCOPY WITH ANESTHESIA (N/A ) Diagnosis:       Family hx colonic polyps      Family hx of colon cancer      Hx of colonic polyp      (Family hx colonic polyps [Z83.71])      (Family hx of colon cancer [Z80.0])      (Hx of colonic polyp [Z86.010])    Surgeon:  Harris Escobar MD Provider:  Evangelina Kumar CRNA    Anesthesia Type:  general ASA Status:  3          Anesthesia Type: general  Last vitals  BP   170/74 (10/02/18 0659)   Temp   98 °F (36.7 °C) (10/02/18 0659)   Pulse   69 (10/02/18 0659)   Resp   20 (10/02/18 0659)     SpO2   95 % (10/02/18 0659)     Post Anesthesia Care and Evaluation    Patient location during evaluation: PHASE II  Patient participation: complete - patient participated  Level of consciousness: awake and alert  Pain score: 0  Pain management: adequate  Airway patency: patent  Anesthetic complications: No anesthetic complications    Cardiovascular status: acceptable and stable  Respiratory status: acceptable and unassisted  Hydration status: stable

## 2018-10-02 NOTE — ANESTHESIA PREPROCEDURE EVALUATION
Anesthesia Evaluation     no history of anesthetic complications:  NPO Solid Status: > 8 hours  NPO Liquid Status: > 2 hours           Airway   Mallampati: I  TM distance: >3 FB  Neck ROM: full  Dental    (+) edentulous, upper dentures and lower dentures    Pulmonary    (+) a smoker (1 ppd) Current, COPD, decreased breath sounds (globally decreased),   (-) asthma, recent URI, sleep apnea  Cardiovascular - normal exam  Exercise tolerance: good (4-7 METS)    PT is on anticoagulation therapy  Rhythm: regular  Rate: normal    (+) hypertension well controlled, hyperlipidemia,  carotid artery disease  (-) pacemaker, past MI, angina, cardiac stents, CABG      Neuro/Psych  (+) TIA (>10 yrs ago),     (-) seizures, CVA  GI/Hepatic/Renal/Endo    (-) GERD, liver disease, no renal disease, diabetes, hypothyroidism, hyperthyroidism    Musculoskeletal     Abdominal    Substance History      OB/GYN          Other                        Anesthesia Plan    ASA 3     general   total IV anesthesia  intravenous induction   Anesthetic plan, all risks, benefits, and alternatives have been provided, discussed and informed consent has been obtained with: patient.

## 2018-10-03 LAB
CYTO UR: NORMAL
LAB AP CASE REPORT: NORMAL
PATH REPORT.FINAL DX SPEC: NORMAL
PATH REPORT.GROSS SPEC: NORMAL

## 2018-10-10 DIAGNOSIS — J43.9 PULMONARY EMPHYSEMA, UNSPECIFIED EMPHYSEMA TYPE (HCC): Chronic | ICD-10-CM

## 2018-10-10 DIAGNOSIS — M81.0 OSTEOPOROSIS, UNSPECIFIED OSTEOPOROSIS TYPE, UNSPECIFIED PATHOLOGICAL FRACTURE PRESENCE: Chronic | ICD-10-CM

## 2018-10-10 DIAGNOSIS — I10 ESSENTIAL HYPERTENSION: ICD-10-CM

## 2018-10-10 DIAGNOSIS — I70.213 ATHEROSCLEROSIS OF NATIVE ARTERY OF BOTH LOWER EXTREMITIES WITH INTERMITTENT CLAUDICATION (HCC): ICD-10-CM

## 2018-10-10 DIAGNOSIS — E55.9 VITAMIN D DEFICIENCY: Chronic | ICD-10-CM

## 2018-10-10 DIAGNOSIS — E78.2 MIXED HYPERLIPIDEMIA: ICD-10-CM

## 2018-10-10 DIAGNOSIS — G25.81 RESTLESS LEGS SYNDROME: Chronic | ICD-10-CM

## 2018-10-10 DIAGNOSIS — I65.23 BILATERAL CAROTID ARTERY STENOSIS: ICD-10-CM

## 2018-10-10 DIAGNOSIS — K58.9 IRRITABLE BOWEL SYNDROME, UNSPECIFIED TYPE: ICD-10-CM

## 2018-10-10 DIAGNOSIS — I73.9 PVD (PERIPHERAL VASCULAR DISEASE) (HCC): ICD-10-CM

## 2018-10-10 LAB
ALBUMIN SERPL-MCNC: 3.9 G/DL (ref 3.5–5.2)
ALP BLD-CCNC: 93 U/L (ref 35–104)
ALT SERPL-CCNC: 13 U/L (ref 5–33)
ANION GAP SERPL CALCULATED.3IONS-SCNC: 11 MMOL/L (ref 7–19)
AST SERPL-CCNC: 17 U/L (ref 5–32)
BASOPHILS ABSOLUTE: 0 K/UL (ref 0–0.2)
BASOPHILS RELATIVE PERCENT: 0.7 % (ref 0–1)
BILIRUB SERPL-MCNC: 0.3 MG/DL (ref 0.2–1.2)
BUN BLDV-MCNC: 15 MG/DL (ref 8–23)
CALCIUM SERPL-MCNC: 9.4 MG/DL (ref 8.8–10.2)
CHLORIDE BLD-SCNC: 100 MMOL/L (ref 98–111)
CHOLESTEROL, TOTAL: 152 MG/DL (ref 160–199)
CO2: 30 MMOL/L (ref 22–29)
CREAT SERPL-MCNC: 1.1 MG/DL (ref 0.5–0.9)
EOSINOPHILS ABSOLUTE: 0.2 K/UL (ref 0–0.6)
EOSINOPHILS RELATIVE PERCENT: 4.4 % (ref 0–5)
GFR NON-AFRICAN AMERICAN: 47
GLUCOSE BLD-MCNC: 99 MG/DL (ref 74–109)
HCT VFR BLD CALC: 42 % (ref 37–47)
HDLC SERPL-MCNC: 50 MG/DL (ref 65–121)
HEMOGLOBIN: 13.2 G/DL (ref 12–16)
LDL CHOLESTEROL CALCULATED: 80 MG/DL
LYMPHOCYTES ABSOLUTE: 1.4 K/UL (ref 1.1–4.5)
LYMPHOCYTES RELATIVE PERCENT: 26.5 % (ref 20–40)
MCH RBC QN AUTO: 28.7 PG (ref 27–31)
MCHC RBC AUTO-ENTMCNC: 31.4 G/DL (ref 33–37)
MCV RBC AUTO: 91.3 FL (ref 81–99)
MONOCYTES ABSOLUTE: 0.6 K/UL (ref 0–0.9)
MONOCYTES RELATIVE PERCENT: 11.2 % (ref 0–10)
NEUTROPHILS ABSOLUTE: 3.1 K/UL (ref 1.5–7.5)
NEUTROPHILS RELATIVE PERCENT: 57 % (ref 50–65)
PDW BLD-RTO: 14.2 % (ref 11.5–14.5)
PLATELET # BLD: 249 K/UL (ref 130–400)
PMV BLD AUTO: 9.7 FL (ref 9.4–12.3)
POTASSIUM SERPL-SCNC: 4.7 MMOL/L (ref 3.5–5)
RBC # BLD: 4.6 M/UL (ref 4.2–5.4)
SODIUM BLD-SCNC: 141 MMOL/L (ref 136–145)
TOTAL PROTEIN: 6.6 G/DL (ref 6.6–8.7)
TRIGL SERPL-MCNC: 112 MG/DL (ref 0–149)
WBC # BLD: 5.4 K/UL (ref 4.8–10.8)

## 2018-10-15 ENCOUNTER — OFFICE VISIT (OUTPATIENT)
Dept: VASCULAR SURGERY | Age: 83
End: 2018-10-15
Payer: MEDICARE

## 2018-10-15 ENCOUNTER — HOSPITAL ENCOUNTER (OUTPATIENT)
Dept: NON INVASIVE DIAGNOSTICS | Age: 83
Discharge: HOME | End: 2018-10-15
Payer: MEDICARE

## 2018-10-15 VITALS
SYSTOLIC BLOOD PRESSURE: 142 MMHG | DIASTOLIC BLOOD PRESSURE: 60 MMHG | RESPIRATION RATE: 18 BRPM | OXYGEN SATURATION: 96 % | HEART RATE: 70 BPM

## 2018-10-15 DIAGNOSIS — I73.9 PVD (PERIPHERAL VASCULAR DISEASE) (HCC): Primary | ICD-10-CM

## 2018-10-15 DIAGNOSIS — I73.9 PVD (PERIPHERAL VASCULAR DISEASE) (HCC): ICD-10-CM

## 2018-10-15 PROCEDURE — G8598 ASA/ANTIPLAT THER USED: HCPCS | Performed by: PHYSICIAN ASSISTANT

## 2018-10-15 PROCEDURE — 93926 LOWER EXTREMITY STUDY: CPT

## 2018-10-15 PROCEDURE — G8427 DOCREV CUR MEDS BY ELIG CLIN: HCPCS | Performed by: PHYSICIAN ASSISTANT

## 2018-10-15 PROCEDURE — 1101F PT FALLS ASSESS-DOCD LE1/YR: CPT | Performed by: PHYSICIAN ASSISTANT

## 2018-10-15 PROCEDURE — G8420 CALC BMI NORM PARAMETERS: HCPCS | Performed by: PHYSICIAN ASSISTANT

## 2018-10-15 PROCEDURE — 1090F PRES/ABSN URINE INCON ASSESS: CPT | Performed by: PHYSICIAN ASSISTANT

## 2018-10-15 PROCEDURE — 4040F PNEUMOC VAC/ADMIN/RCVD: CPT | Performed by: PHYSICIAN ASSISTANT

## 2018-10-15 PROCEDURE — G8484 FLU IMMUNIZE NO ADMIN: HCPCS | Performed by: PHYSICIAN ASSISTANT

## 2018-10-15 PROCEDURE — 1123F ACP DISCUSS/DSCN MKR DOCD: CPT | Performed by: PHYSICIAN ASSISTANT

## 2018-10-15 PROCEDURE — 4004F PT TOBACCO SCREEN RCVD TLK: CPT | Performed by: PHYSICIAN ASSISTANT

## 2018-10-15 PROCEDURE — 99213 OFFICE O/P EST LOW 20 MIN: CPT | Performed by: PHYSICIAN ASSISTANT

## 2018-10-15 PROCEDURE — G8399 PT W/DXA RESULTS DOCUMENT: HCPCS | Performed by: PHYSICIAN ASSISTANT

## 2018-10-15 PROCEDURE — 93923 UPR/LXTR ART STDY 3+ LVLS: CPT

## 2018-10-15 RX ORDER — ALENDRONATE SODIUM 70 MG/1
70 TABLET ORAL WEEKLY
COMMUNITY
Start: 2018-07-20 | End: 2019-04-15 | Stop reason: SDUPTHER

## 2018-10-15 NOTE — PROGRESS NOTES
Patient Care Team:  Ferrell Kanner, MD as PCP - General (Internal Medicine)  Ferrell Kanner, MD as PCP - S Attributed Provider  Raj Bennett MD as Consulting Physician (Vascular Surgery)  Carlos Monterroso      Ms. Londell Schaumann  presents for follow up PVD. She had an arteriogram with BA left SFA done on 5/29/18 by Dr. Shraddha Garsia. She reports no new wounds. She denies any claudication or IRP. Gary Dsouza is a 80 y.o. female with the following history reviewed and recorded in [facility]:  Patient Active Problem List    Diagnosis Date Noted    Vitamin D deficiency 06/13/2018    Osteoporosis 09/17/2017    Restless legs syndrome 09/17/2017    Pulmonary emphysema ([facility] 09/17/2017    Chronic pain 09/06/2017    Atherosclerosis of native artery of both lower extremities with intermittent claudication (HCC)     PVD (peripheral vascular disease) ([facility] 02/05/2016    Chronic respiratory failure ([facility] 10/23/2013    Carotid artery stenosis 09/19/2013    Atherosclerosis of native artery of extremity with intermittent claudication (HCC) 09/19/2013    Hyperlipemia     Hypertension     Irritable bowel syndrome     Osteoarthritis     Actinic keratosis     Macular degeneration      Current Outpatient Prescriptions   Medication Sig Dispense Refill    alendronate (FOSAMAX) 70 MG tablet Take 70 mg by mouth once a week      HYDROcodone-acetaminophen (NORCO) 5-325 MG per tablet Take 1 tablet by mouth every 6 hours as needed for Pain for up to 30 days.  G89.4. 120 tablet 0    atorvastatin (LIPITOR) 40 MG tablet TAKE 1 TABLET EVERY DAY (NEEDS APPOINTMENT WITH PRIMARY CARE PHYSICIAN) 90 tablet 3    hydrochlorothiazide (MICROZIDE) 12.5 MG capsule TAKE 1 CAPSULE EVERY MORNING 90 capsule 3    amLODIPine (NORVASC) 5 MG tablet TAKE 1 TABLET EVERY DAY 90 tablet 3    lisinopril (PRINIVIL;ZESTRIL) 40 MG tablet TAKE 1 TABLET EVERY DAY 90 tablet 3    vitamin D (ERGOCALCIFEROL) 77919 units CAPS capsule TAKE 1 CAPSULE BY MOUTH ONCE A

## 2018-10-16 ENCOUNTER — OFFICE VISIT (OUTPATIENT)
Dept: INTERNAL MEDICINE | Age: 83
End: 2018-10-16
Payer: MEDICARE

## 2018-10-16 VITALS
BODY MASS INDEX: 23.37 KG/M2 | WEIGHT: 145.4 LBS | HEART RATE: 70 BPM | OXYGEN SATURATION: 92 % | HEIGHT: 66 IN | RESPIRATION RATE: 22 BRPM | SYSTOLIC BLOOD PRESSURE: 118 MMHG | DIASTOLIC BLOOD PRESSURE: 56 MMHG

## 2018-10-16 DIAGNOSIS — E78.2 MIXED HYPERLIPIDEMIA: Primary | ICD-10-CM

## 2018-10-16 DIAGNOSIS — M81.0 OSTEOPOROSIS, UNSPECIFIED OSTEOPOROSIS TYPE, UNSPECIFIED PATHOLOGICAL FRACTURE PRESENCE: Chronic | ICD-10-CM

## 2018-10-16 DIAGNOSIS — I10 ESSENTIAL HYPERTENSION: ICD-10-CM

## 2018-10-16 DIAGNOSIS — M15.9 PRIMARY OSTEOARTHRITIS INVOLVING MULTIPLE JOINTS: ICD-10-CM

## 2018-10-16 DIAGNOSIS — Z23 FLU VACCINE NEED: ICD-10-CM

## 2018-10-16 DIAGNOSIS — Z23 NEED FOR PROPHYLACTIC VACCINATION AGAINST DIPHTHERIA-TETANUS-PERTUSSIS (DTP): ICD-10-CM

## 2018-10-16 DIAGNOSIS — Z23 NEED FOR PROPHYLACTIC VACCINATION AND INOCULATION AGAINST VARICELLA: ICD-10-CM

## 2018-10-16 DIAGNOSIS — I65.23 BILATERAL CAROTID ARTERY STENOSIS: ICD-10-CM

## 2018-10-16 DIAGNOSIS — E55.9 VITAMIN D DEFICIENCY: Chronic | ICD-10-CM

## 2018-10-16 DIAGNOSIS — I73.9 PVD (PERIPHERAL VASCULAR DISEASE) (HCC): ICD-10-CM

## 2018-10-16 DIAGNOSIS — J43.9 PULMONARY EMPHYSEMA, UNSPECIFIED EMPHYSEMA TYPE (HCC): Chronic | ICD-10-CM

## 2018-10-16 DIAGNOSIS — G25.81 RESTLESS LEGS SYNDROME: Chronic | ICD-10-CM

## 2018-10-16 DIAGNOSIS — I70.213 ATHEROSCLEROSIS OF NATIVE ARTERY OF BOTH LOWER EXTREMITIES WITH INTERMITTENT CLAUDICATION (HCC): ICD-10-CM

## 2018-10-16 DIAGNOSIS — H35.30 MACULAR DEGENERATION OF BOTH EYES, UNSPECIFIED TYPE: ICD-10-CM

## 2018-10-16 PROCEDURE — G8420 CALC BMI NORM PARAMETERS: HCPCS | Performed by: INTERNAL MEDICINE

## 2018-10-16 PROCEDURE — 4040F PNEUMOC VAC/ADMIN/RCVD: CPT | Performed by: INTERNAL MEDICINE

## 2018-10-16 PROCEDURE — 90662 IIV NO PRSV INCREASED AG IM: CPT | Performed by: INTERNAL MEDICINE

## 2018-10-16 PROCEDURE — 1090F PRES/ABSN URINE INCON ASSESS: CPT | Performed by: INTERNAL MEDICINE

## 2018-10-16 PROCEDURE — G8427 DOCREV CUR MEDS BY ELIG CLIN: HCPCS | Performed by: INTERNAL MEDICINE

## 2018-10-16 PROCEDURE — 1123F ACP DISCUSS/DSCN MKR DOCD: CPT | Performed by: INTERNAL MEDICINE

## 2018-10-16 PROCEDURE — 1101F PT FALLS ASSESS-DOCD LE1/YR: CPT | Performed by: INTERNAL MEDICINE

## 2018-10-16 PROCEDURE — 4004F PT TOBACCO SCREEN RCVD TLK: CPT | Performed by: INTERNAL MEDICINE

## 2018-10-16 PROCEDURE — G8482 FLU IMMUNIZE ORDER/ADMIN: HCPCS | Performed by: INTERNAL MEDICINE

## 2018-10-16 PROCEDURE — G8598 ASA/ANTIPLAT THER USED: HCPCS | Performed by: INTERNAL MEDICINE

## 2018-10-16 PROCEDURE — G0008 ADMIN INFLUENZA VIRUS VAC: HCPCS | Performed by: INTERNAL MEDICINE

## 2018-10-16 PROCEDURE — G8399 PT W/DXA RESULTS DOCUMENT: HCPCS | Performed by: INTERNAL MEDICINE

## 2018-10-16 PROCEDURE — G8926 SPIRO NO PERF OR DOC: HCPCS | Performed by: INTERNAL MEDICINE

## 2018-10-16 PROCEDURE — 3023F SPIROM DOC REV: CPT | Performed by: INTERNAL MEDICINE

## 2018-10-16 PROCEDURE — 99214 OFFICE O/P EST MOD 30 MIN: CPT | Performed by: INTERNAL MEDICINE

## 2018-10-16 RX ORDER — ATORVASTATIN CALCIUM 80 MG/1
80 TABLET, FILM COATED ORAL DAILY
Qty: 90 TABLET | Refills: 3 | Status: SHIPPED | OUTPATIENT
Start: 2018-10-16 | End: 2019-11-04 | Stop reason: SDUPTHER

## 2018-10-16 ASSESSMENT — ENCOUNTER SYMPTOMS
COUGH: 0
NAUSEA: 0
SHORTNESS OF BREATH: 0
ABDOMINAL PAIN: 0
DIARRHEA: 0

## 2018-10-16 NOTE — PROGRESS NOTES
Chief Complaint   Patient presents with    Hyperlipidemia      HPI:   Vascular followup yesterday with  Stable ABIs bilaterally. Continued Plavix and ASA. Hyperlipidemia    Lipids are currently being treated with atorvastatin. No reported side effects from lipid medication. Low-fat diet is being followed. Hypertension  Blood pressure control has been acceptable  . Compliant with medications. Tolerating medications without problem. Mirapex helps RLS still and tolerated well. COPD has been stable. Compliant with inhalers. Has not had to use Proventil. She has been able to tolerate Fosomax for osteoporosis. She takes Vit D weekly at present. Past Medical History:   Diagnosis Date    Actinic keratosis     Atherosclerosis of native arteries of the extremities with intermittent claudication 9/19/2013    Benign fundic gland polyps of stomach     Blocked artery     Carotid artery bruit     right    Carotid artery stenosis 9/19/2013    Chronic cough     Emphysema of lung ([address]     Hyperlipemia     Hypertension     Irritable bowel syndrome     Lower back pain     Macular degeneration     Need for prophylactic vaccination with Streptococcus pneumoniae (Pneumococcus) and Influenza vaccines     Osteoarthritis     Osteoporosis 9/17/2017    Prediabetes     Pulmonary emphysema ([address] 9/17/2017    PVD (peripheral vascular disease) ([address]     Restless legs syndrome     Transient ischemic attack     Vitamin D deficiency       Past Surgical History:   Procedure Laterality Date    CATARACT REMOVAL Bilateral     HYSTERECTOMY      OTHER SURGICAL HISTORY      cyst on breast    VASCULAR SURGERY  9-25-13 [facility]    Fluroscopic guidance for access tor R femoral artery after attempted access of L femoral artery. Aortoiliofemoral arteriogram with bilateral lower extremity runoff. R proximal common iliac artery angioplasty X2 with 8mm x 40 mm balloon.  L superficial femoral artery angioplasty cyanosis or edema. Neurologic: No focal weakness. Cranial nerves are intact except for decreased vision.      Results for orders placed or performed in visit on 10/10/18   Comprehensive Metabolic Panel   Result Value Ref Range    Sodium 141 136 - 145 mmol/L    Potassium 4.7 3.5 - 5.0 mmol/L    Chloride 100 98 - 111 mmol/L    CO2 30 (H) 22 - 29 mmol/L    Anion Gap 11 7 - 19 mmol/L    Glucose 99 74 - 109 mg/dL    BUN 15 8 - 23 mg/dL    CREATININE 1.1 (H) 0.5 - 0.9 mg/dL    GFR Non- 47 (A) >60    Calcium 9.4 8.8 - 10.2 mg/dL    Total Protein 6.6 6.6 - 8.7 g/dL    Alb 3.9 3.5 - 5.2 g/dL    Total Bilirubin 0.3 0.2 - 1.2 mg/dL    Alkaline Phosphatase 93 35 - 104 U/L    ALT 13 5 - 33 U/L    AST 17 5 - 32 U/L   Lipid Panel   Result Value Ref Range    Cholesterol, Total 152 (L) 160 - 199 mg/dL    Triglycerides 112 0 - 149 mg/dL    HDL 50 (L) 65 - 121 mg/dL    LDL Calculated 80 <100 mg/dL   CBC Auto Differential   Result Value Ref Range    WBC 5.4 4.8 - 10.8 K/uL    RBC 4.60 4.20 - 5.40 M/uL    Hemoglobin 13.2 12.0 - 16.0 g/dL    Hematocrit 42.0 37.0 - 47.0 %    MCV 91.3 81.0 - 99.0 fL    MCH 28.7 27.0 - 31.0 pg    MCHC 31.4 (L) 33.0 - 37.0 g/dL    RDW 14.2 11.5 - 14.5 %    Platelets 466 402 - 560 K/uL    MPV 9.7 9.4 - 12.3 fL    Neutrophils % 57.0 50.0 - 65.0 %    Lymphocytes % 26.5 20.0 - 40.0 %    Monocytes % 11.2 (H) 0.0 - 10.0 %    Eosinophils % 4.4 0.0 - 5.0 %    Basophils % 0.7 0.0 - 1.0 %    Neutrophils # 3.1 1.5 - 7.5 K/uL    Lymphocytes # 1.4 1.1 - 4.5 K/uL    Monocytes # 0.60 0.00 - 0.90 K/uL    Eosinophils # 0.20 0.00 - 0.60 K/uL    Basophils # 0.00 0.00 - 0.20 K/uL           Patient Active Problem List   Diagnosis    Mixed hyperlipidemia    Hypertension    Irritable bowel syndrome    Osteoarthritis    Actinic keratosis    Macular degeneration    Carotid artery stenosis    Atherosclerosis of native artery of extremity with intermittent claudication (HCC)    Chronic respiratory failure

## 2018-10-17 DIAGNOSIS — I73.9 PVD (PERIPHERAL VASCULAR DISEASE) (HCC): ICD-10-CM

## 2018-10-17 DIAGNOSIS — I70.213 ATHEROSCLEROSIS OF NATIVE ARTERY OF BOTH LOWER EXTREMITIES WITH INTERMITTENT CLAUDICATION (HCC): ICD-10-CM

## 2018-10-17 DIAGNOSIS — I65.23 BILATERAL CAROTID ARTERY STENOSIS: Primary | ICD-10-CM

## 2018-10-29 DIAGNOSIS — G89.4 CHRONIC PAIN SYNDROME: ICD-10-CM

## 2018-10-29 RX ORDER — HYDROCODONE BITARTRATE AND ACETAMINOPHEN 5; 325 MG/1; MG/1
1 TABLET ORAL EVERY 6 HOURS PRN
Qty: 120 TABLET | Refills: 0 | Status: SHIPPED | OUTPATIENT
Start: 2018-10-29 | End: 2018-11-27 | Stop reason: SDUPTHER

## 2018-11-06 RX ORDER — ERGOCALCIFEROL 1.25 MG/1
CAPSULE ORAL
Qty: 4 CAPSULE | Refills: 1 | Status: SHIPPED | OUTPATIENT
Start: 2018-11-06 | End: 2019-01-24 | Stop reason: SDUPTHER

## 2018-11-27 DIAGNOSIS — G89.4 CHRONIC PAIN SYNDROME: ICD-10-CM

## 2018-11-27 RX ORDER — HYDROCODONE BITARTRATE AND ACETAMINOPHEN 5; 325 MG/1; MG/1
1 TABLET ORAL EVERY 6 HOURS PRN
Qty: 120 TABLET | Refills: 0 | Status: SHIPPED | OUTPATIENT
Start: 2018-11-27 | End: 2018-12-26 | Stop reason: SDUPTHER

## 2018-11-27 NOTE — TELEPHONE ENCOUNTER
CHATO:11/27/18  Last appt:10/16/2018  Next appt:4/15/2019  Requested Prescriptions     Pending Prescriptions Disp Refills    HYDROcodone-acetaminophen (NORCO) 5-325 MG per tablet 120 tablet 0     Sig: Take 1 tablet by mouth every 6 hours as needed for Pain for up to 30 days. G89.4.

## 2018-12-17 RX ORDER — PRAMIPEXOLE DIHYDROCHLORIDE 0.25 MG/1
TABLET ORAL
Qty: 180 TABLET | Refills: 3 | Status: SHIPPED | OUTPATIENT
Start: 2018-12-17 | End: 2019-09-18 | Stop reason: SDUPTHER

## 2018-12-26 DIAGNOSIS — G89.4 CHRONIC PAIN SYNDROME: ICD-10-CM

## 2018-12-27 RX ORDER — HYDROCODONE BITARTRATE AND ACETAMINOPHEN 5; 325 MG/1; MG/1
1 TABLET ORAL EVERY 6 HOURS PRN
Qty: 120 TABLET | Refills: 0 | Status: SHIPPED | OUTPATIENT
Start: 2018-12-27 | End: 2019-01-25 | Stop reason: SDUPTHER

## 2019-01-25 DIAGNOSIS — G89.4 CHRONIC PAIN SYNDROME: ICD-10-CM

## 2019-01-25 RX ORDER — ERGOCALCIFEROL 1.25 MG/1
CAPSULE ORAL
Qty: 4 CAPSULE | Refills: 5 | Status: SHIPPED | OUTPATIENT
Start: 2019-01-25 | End: 2019-07-26 | Stop reason: SDUPTHER

## 2019-01-25 RX ORDER — HYDROCODONE BITARTRATE AND ACETAMINOPHEN 5; 325 MG/1; MG/1
1 TABLET ORAL EVERY 6 HOURS PRN
Qty: 120 TABLET | Refills: 0 | Status: SHIPPED | OUTPATIENT
Start: 2019-01-25 | End: 2019-02-21 | Stop reason: SDUPTHER

## 2019-02-21 DIAGNOSIS — G89.4 CHRONIC PAIN SYNDROME: ICD-10-CM

## 2019-02-22 RX ORDER — HYDROCODONE BITARTRATE AND ACETAMINOPHEN 5; 325 MG/1; MG/1
1 TABLET ORAL EVERY 6 HOURS PRN
Qty: 120 TABLET | Refills: 0 | Status: SHIPPED | OUTPATIENT
Start: 2019-02-22 | End: 2019-03-19 | Stop reason: SDUPTHER

## 2019-03-19 DIAGNOSIS — G89.4 CHRONIC PAIN SYNDROME: ICD-10-CM

## 2019-03-19 RX ORDER — HYDROCODONE BITARTRATE AND ACETAMINOPHEN 5; 325 MG/1; MG/1
1 TABLET ORAL EVERY 6 HOURS PRN
Qty: 120 TABLET | Refills: 0 | Status: SHIPPED | OUTPATIENT
Start: 2019-03-19 | End: 2019-03-22 | Stop reason: SDUPTHER

## 2019-03-22 DIAGNOSIS — G89.4 CHRONIC PAIN SYNDROME: ICD-10-CM

## 2019-03-22 RX ORDER — HYDROCODONE BITARTRATE AND ACETAMINOPHEN 5; 325 MG/1; MG/1
1 TABLET ORAL EVERY 6 HOURS PRN
Qty: 120 TABLET | Refills: 0 | Status: SHIPPED | OUTPATIENT
Start: 2019-03-22 | End: 2019-04-15 | Stop reason: SDUPTHER

## 2019-04-08 DIAGNOSIS — J43.9 PULMONARY EMPHYSEMA, UNSPECIFIED EMPHYSEMA TYPE (HCC): Chronic | ICD-10-CM

## 2019-04-08 DIAGNOSIS — I65.23 BILATERAL CAROTID ARTERY STENOSIS: ICD-10-CM

## 2019-04-08 DIAGNOSIS — I73.9 PVD (PERIPHERAL VASCULAR DISEASE) (HCC): ICD-10-CM

## 2019-04-08 DIAGNOSIS — H35.30 MACULAR DEGENERATION OF BOTH EYES, UNSPECIFIED TYPE: ICD-10-CM

## 2019-04-08 DIAGNOSIS — M81.0 OSTEOPOROSIS, UNSPECIFIED OSTEOPOROSIS TYPE, UNSPECIFIED PATHOLOGICAL FRACTURE PRESENCE: Chronic | ICD-10-CM

## 2019-04-08 DIAGNOSIS — I70.213 ATHEROSCLEROSIS OF NATIVE ARTERY OF BOTH LOWER EXTREMITIES WITH INTERMITTENT CLAUDICATION (HCC): ICD-10-CM

## 2019-04-08 DIAGNOSIS — E78.2 MIXED HYPERLIPIDEMIA: ICD-10-CM

## 2019-04-08 DIAGNOSIS — E55.9 VITAMIN D DEFICIENCY: Chronic | ICD-10-CM

## 2019-04-08 DIAGNOSIS — G25.81 RESTLESS LEGS SYNDROME: Chronic | ICD-10-CM

## 2019-04-08 DIAGNOSIS — M15.9 PRIMARY OSTEOARTHRITIS INVOLVING MULTIPLE JOINTS: ICD-10-CM

## 2019-04-08 DIAGNOSIS — I10 ESSENTIAL HYPERTENSION: ICD-10-CM

## 2019-04-08 LAB
ALBUMIN SERPL-MCNC: 4.1 G/DL (ref 3.5–5.2)
ALP BLD-CCNC: 84 U/L (ref 35–104)
ALT SERPL-CCNC: 13 U/L (ref 5–33)
ANION GAP SERPL CALCULATED.3IONS-SCNC: 13 MMOL/L (ref 7–19)
AST SERPL-CCNC: 17 U/L (ref 5–32)
BASOPHILS ABSOLUTE: 0.1 K/UL (ref 0–0.2)
BASOPHILS RELATIVE PERCENT: 1.1 % (ref 0–1)
BILIRUB SERPL-MCNC: <0.2 MG/DL (ref 0.2–1.2)
BUN BLDV-MCNC: 21 MG/DL (ref 8–23)
CALCIUM SERPL-MCNC: 9 MG/DL (ref 8.8–10.2)
CHLORIDE BLD-SCNC: 101 MMOL/L (ref 98–111)
CHOLESTEROL, TOTAL: 144 MG/DL (ref 160–199)
CO2: 27 MMOL/L (ref 22–29)
CREAT SERPL-MCNC: 1 MG/DL (ref 0.5–0.9)
EOSINOPHILS ABSOLUTE: 0.3 K/UL (ref 0–0.6)
EOSINOPHILS RELATIVE PERCENT: 4.9 % (ref 0–5)
GFR NON-AFRICAN AMERICAN: 53
GLUCOSE BLD-MCNC: 103 MG/DL (ref 74–109)
HCT VFR BLD CALC: 39.9 % (ref 37–47)
HDLC SERPL-MCNC: 54 MG/DL (ref 65–121)
HEMOGLOBIN: 12.3 G/DL (ref 12–16)
LDL CHOLESTEROL CALCULATED: 73 MG/DL
LYMPHOCYTES ABSOLUTE: 1.3 K/UL (ref 1.1–4.5)
LYMPHOCYTES RELATIVE PERCENT: 22.9 % (ref 20–40)
MCH RBC QN AUTO: 29.4 PG (ref 27–31)
MCHC RBC AUTO-ENTMCNC: 30.8 G/DL (ref 33–37)
MCV RBC AUTO: 95.2 FL (ref 81–99)
MONOCYTES ABSOLUTE: 0.7 K/UL (ref 0–0.9)
MONOCYTES RELATIVE PERCENT: 11.5 % (ref 0–10)
NEUTROPHILS ABSOLUTE: 3.4 K/UL (ref 1.5–7.5)
NEUTROPHILS RELATIVE PERCENT: 59.2 % (ref 50–65)
PDW BLD-RTO: 13.9 % (ref 11.5–14.5)
PLATELET # BLD: 246 K/UL (ref 130–400)
PMV BLD AUTO: 10 FL (ref 9.4–12.3)
POTASSIUM SERPL-SCNC: 4.7 MMOL/L (ref 3.5–5)
RBC # BLD: 4.19 M/UL (ref 4.2–5.4)
SODIUM BLD-SCNC: 141 MMOL/L (ref 136–145)
TOTAL PROTEIN: 6.8 G/DL (ref 6.6–8.7)
TRIGL SERPL-MCNC: 85 MG/DL (ref 0–149)
VITAMIN D 25-HYDROXY: 49.3 NG/ML
WBC # BLD: 5.7 K/UL (ref 4.8–10.8)

## 2019-04-15 ENCOUNTER — OFFICE VISIT (OUTPATIENT)
Dept: INTERNAL MEDICINE | Age: 84
End: 2019-04-15
Payer: MEDICARE

## 2019-04-15 VITALS
HEIGHT: 66 IN | DIASTOLIC BLOOD PRESSURE: 82 MMHG | RESPIRATION RATE: 18 BRPM | SYSTOLIC BLOOD PRESSURE: 136 MMHG | OXYGEN SATURATION: 97 % | WEIGHT: 142 LBS | HEART RATE: 72 BPM | BODY MASS INDEX: 22.82 KG/M2

## 2019-04-15 DIAGNOSIS — Z72.0 TOBACCO ABUSE: ICD-10-CM

## 2019-04-15 DIAGNOSIS — Z12.39 SCREENING FOR BREAST CANCER: Primary | ICD-10-CM

## 2019-04-15 DIAGNOSIS — M89.9 DISORDER OF BONE: ICD-10-CM

## 2019-04-15 DIAGNOSIS — Z00.00 HEALTHCARE MAINTENANCE: ICD-10-CM

## 2019-04-15 DIAGNOSIS — E78.2 MIXED HYPERLIPIDEMIA: ICD-10-CM

## 2019-04-15 DIAGNOSIS — Z78.0 POST-MENOPAUSAL: ICD-10-CM

## 2019-04-15 DIAGNOSIS — I70.213 ATHEROSCLEROSIS OF NATIVE ARTERY OF BOTH LOWER EXTREMITIES WITH INTERMITTENT CLAUDICATION (HCC): ICD-10-CM

## 2019-04-15 DIAGNOSIS — G89.4 CHRONIC PAIN SYNDROME: ICD-10-CM

## 2019-04-15 DIAGNOSIS — J43.9 PULMONARY EMPHYSEMA, UNSPECIFIED EMPHYSEMA TYPE (HCC): ICD-10-CM

## 2019-04-15 DIAGNOSIS — I10 ESSENTIAL HYPERTENSION: ICD-10-CM

## 2019-04-15 DIAGNOSIS — M85.89 OSTEOPENIA OF MULTIPLE SITES: ICD-10-CM

## 2019-04-15 PROCEDURE — 1090F PRES/ABSN URINE INCON ASSESS: CPT | Performed by: NURSE PRACTITIONER

## 2019-04-15 PROCEDURE — G8598 ASA/ANTIPLAT THER USED: HCPCS | Performed by: NURSE PRACTITIONER

## 2019-04-15 PROCEDURE — G8926 SPIRO NO PERF OR DOC: HCPCS | Performed by: NURSE PRACTITIONER

## 2019-04-15 PROCEDURE — 99214 OFFICE O/P EST MOD 30 MIN: CPT | Performed by: NURSE PRACTITIONER

## 2019-04-15 PROCEDURE — G8399 PT W/DXA RESULTS DOCUMENT: HCPCS | Performed by: NURSE PRACTITIONER

## 2019-04-15 PROCEDURE — G8420 CALC BMI NORM PARAMETERS: HCPCS | Performed by: NURSE PRACTITIONER

## 2019-04-15 PROCEDURE — G8427 DOCREV CUR MEDS BY ELIG CLIN: HCPCS | Performed by: NURSE PRACTITIONER

## 2019-04-15 PROCEDURE — 3023F SPIROM DOC REV: CPT | Performed by: NURSE PRACTITIONER

## 2019-04-15 PROCEDURE — 4004F PT TOBACCO SCREEN RCVD TLK: CPT | Performed by: NURSE PRACTITIONER

## 2019-04-15 PROCEDURE — 4040F PNEUMOC VAC/ADMIN/RCVD: CPT | Performed by: NURSE PRACTITIONER

## 2019-04-15 PROCEDURE — 1123F ACP DISCUSS/DSCN MKR DOCD: CPT | Performed by: NURSE PRACTITIONER

## 2019-04-15 RX ORDER — ALENDRONATE SODIUM 70 MG/1
70 TABLET ORAL WEEKLY
Qty: 21 TABLET | Refills: 1 | Status: SHIPPED | OUTPATIENT
Start: 2019-04-15 | End: 2019-09-18 | Stop reason: SDUPTHER

## 2019-04-15 RX ORDER — HYDROCODONE BITARTRATE AND ACETAMINOPHEN 5; 325 MG/1; MG/1
1 TABLET ORAL EVERY 6 HOURS PRN
Qty: 120 TABLET | Refills: 0 | Status: SHIPPED | OUTPATIENT
Start: 2019-04-22 | End: 2019-06-17 | Stop reason: SDUPTHER

## 2019-04-15 ASSESSMENT — PATIENT HEALTH QUESTIONNAIRE - PHQ9
SUM OF ALL RESPONSES TO PHQ QUESTIONS 1-9: 0
SUM OF ALL RESPONSES TO PHQ QUESTIONS 1-9: 0
SUM OF ALL RESPONSES TO PHQ9 QUESTIONS 1 & 2: 0
1. LITTLE INTEREST OR PLEASURE IN DOING THINGS: 0
2. FEELING DOWN, DEPRESSED OR HOPELESS: 0

## 2019-04-15 ASSESSMENT — ENCOUNTER SYMPTOMS
CONSTIPATION: 0
EYE DISCHARGE: 0
EYE ITCHING: 0
ABDOMINAL DISTENTION: 0
DIARRHEA: 0
NAUSEA: 0
COUGH: 0
VOMITING: 0
SHORTNESS OF BREATH: 0
CHOKING: 0
BLOOD IN STOOL: 0
SORE THROAT: 0
BACK PAIN: 1
COLOR CHANGE: 0
STRIDOR: 0
ABDOMINAL PAIN: 0
WHEEZING: 0
TROUBLE SWALLOWING: 0

## 2019-04-15 NOTE — PROGRESS NOTES
[facility]  [facility]  [address] [place] [address]  Dept: [phone]  Dept Fax: [phone]  Loc: [phone]    Samanta Gomez is a 80 y.o. female who presents today for her medical conditions/complaints as noted below. Samanta Gomez is c/arlyn Hyperlipidemia (Patient is here for routine follow up visit and review of labs done 4/8/19.)        HPI:     HPI   1.  PAD  Has stents in her legs; has pain with this ; She sees Vascular routinely and aster have appt this month  2. Osteoarthritis  With chronic pain syndrome  On hydrocodone;    3.  HTN;  HTN:  Stable on current meds; No side effects of the meds; Takes as directed; takes blood pressure 3-4 times a week   4. Hyperlipidemia  Hyperlipidemia-  Stable on current meds; Takes as directed; No side effects of the meds;  5. Osteopenia;  -  She is on fosamax; No side effects of the meds;  \  6. COPD  Stable with inhalers mostly at night; She has a chronic cough ; continues to smoke 1 PPD  She has a 70+ pack year history   Chief Complaint   Patient presents with    Hyperlipidemia     Patient is here for routine follow up visit and review of labs done 4/8/19.        Past Medical History:   Diagnosis Date    Actinic keratosis     Atherosclerosis of native arteries of the extremities with intermittent claudication 9/19/2013    Benign fundic gland polyps of stomach     Blocked artery     Carotid artery bruit     right    Carotid artery stenosis 9/19/2013    Chronic cough     Emphysema of lung ([address]     Hyperlipemia     Hypertension     Irritable bowel syndrome     Lower back pain     Macular degeneration     Need for prophylactic vaccination with Streptococcus pneumoniae (Pneumococcus) and Influenza vaccines     Osteoarthritis     Osteoporosis 9/17/2017    Prediabetes     Pulmonary emphysema ([address] 9/17/2017    PVD (peripheral vascular disease) (HCC)     Restless legs syndrome  Transient ischemic attack     Vitamin D deficiency       Past Surgical History:   Procedure Laterality Date    CATARACT REMOVAL Bilateral     HYSTERECTOMY      OTHER SURGICAL HISTORY      cyst on breast    VASCULAR SURGERY  9-25-13 [facility]    Fluroscopic guidance for access tor R femoral artery after attempted access of L femoral artery. Aortoiliofemoral arteriogram with bilateral lower extremity runoff. R proximal common iliac artery angioplasty X2 with 8mm x 40 mm balloon. L superficial femoral artery angioplasty x1 with 4 mm x 40 mm balloon, then x1 with 5mm x 40 mm balloon. L superficial femoral artery SUPERA stent 4mm x 40mm and     VASCULAR SURGERY  cont. ..    a second SUPERA stent 4 mm x 60 mm Completion arteriograms Mynx closure R groin access site.  VASCULAR SURGERY  01/25/2017    SLC-AIF with runoff angioplasty L SFA with 5x20 cutting balloon, 5x150 lutonix DCB, 5x40 Paseo balloon stent proximal L SFA with 5x80 Innova stent kissing stents B SHE with 9x59 atrium stents extension R SHE/EIA with 9x57 express stent extension L SHE with 9x37 express stent completion arteriograms Mynx closure B CFA    VASCULAR SURGERY  05/29/2018    DJM. Angio, SFA PTA       Vitals 4/15/2019 10/16/2018 10/15/2018 10/15/2018 6/25/2018 5/54/2015   SYSTOLIC 248 025 092 474 356 452   DIASTOLIC 82 56 60 60 72 70   Site - Left Upper Arm Left Upper Arm Right Upper Arm Right Arm Left Arm   Position - Sitting Sitting Sitting Sitting Sitting   Cuff Size - - Medium Adult Medium Adult Medium Adult Medium Adult   Pulse 72 70 70 70 68 68   Temp - - - - - -   Resp 18 22 - 18 - 18   Weight 142 lb 145 lb 6.4 oz - - - -   Height 5' 5.5\" 5' 5.5\" - - - -   BMI (wt*703/ht~2) 23.27 kg/m2 23.83 kg/m2 - - - -   Some recent data might be hidden       Family History   Problem Relation Age of Onset    Cancer Mother     Cancer Father     High Blood Pressure Son     High Cholesterol Son     High Blood Pressure Daughter     High Cholesterol Daughter     Heart Attack Daughter     Heart Attack Brother        Social History     Tobacco Use    Smoking status: Current Every Day Smoker     Packs/day: 0.75    Smokeless tobacco: Never Used    Tobacco comment: 3/4 of a pack daily   Substance Use Topics    Alcohol use: No      Current Outpatient Medications   Medication Sig Dispense Refill    [START ON 4/22/2019] HYDROcodone-acetaminophen (NORCO) 5-325 MG per tablet Take 1 tablet by mouth every 6 hours as needed for Pain for up to 30 days. G89.4 120 tablet 0    alendronate (FOSAMAX) 70 MG tablet Take 1 tablet by mouth once a week 21 tablet 1    vitamin D (ERGOCALCIFEROL) 67287 units CAPS capsule TAKE 1 CAPSULE BY MOUTH ONCE A WEEK 4 capsule 5    pramipexole (MIRAPEX) 0.25 MG tablet TAKE 1 TO 2 TABLETS BY MOUTH AT BEDTIME AS NEEDED 180 tablet 3    atorvastatin (LIPITOR) 80 MG tablet Take 1 tablet by mouth daily 90 tablet 3    hydrochlorothiazide (MICROZIDE) 12.5 MG capsule TAKE 1 CAPSULE EVERY MORNING 90 capsule 3    amLODIPine (NORVASC) 5 MG tablet TAKE 1 TABLET EVERY DAY 90 tablet 3    lisinopril (PRINIVIL;ZESTRIL) 40 MG tablet TAKE 1 TABLET EVERY DAY 90 tablet 3    clopidogrel (PLAVIX) 75 MG tablet TAKE 1 TABLET EVERY DAY 90 tablet 1    SYMBICORT 160-4.5 MCG/ACT AERO INHALE 2 PUFFS TWICE DAILY. RINSE MOUTH AFTER USE. 30.6 Inhaler 2    aspirin 81 MG tablet Take 81 mg by mouth daily.  dicyclomine (BENTYL) 10 MG capsule Take 10 mg by mouth three times daily. No current facility-administered medications for this visit.       Allergies   Allergen Reactions    Codeine Nausea And Vomiting    Valium Nausea Only       Health Maintenance   Topic Date Due    Shingles Vaccine (1 of 2) 10/19/1985    DTaP/Tdap/Td vaccine (1 - Tdap) 05/06/2019 (Originally 10/19/1954)    Potassium monitoring  04/08/2020    Creatinine monitoring  04/08/2020    DEXA (modify frequency per FRAX score)  06/20/2020    Breast cancer screen  06/20/2020    Colon cancer screen colonoscopy  10/03/2021    Flu vaccine  Completed    Pneumococcal 65+ years Vaccine  Completed       No results found for: LABA1C  No results found for: PSA, PSADIA  No results found for: TSH]  Lab Results   Component Value Date     04/08/2019    K 4.7 04/08/2019     04/08/2019    CO2 27 04/08/2019    BUN 21 04/08/2019    CREATININE 1.0 (H) 04/08/2019    GLUCOSE 103 04/08/2019    CALCIUM 9.0 04/08/2019    PROT 6.8 04/08/2019    LABALBU 4.1 04/08/2019    BILITOT <0.2 04/08/2019    ALKPHOS 84 04/08/2019    AST 17 04/08/2019    ALT 13 04/08/2019    LABGLOM 53 (A) 04/08/2019    GLOB 2.7 05/15/2017     Lab Results   Component Value Date    CHOL 144 (L) 04/08/2019    CHOL 152 (L) 10/10/2018    CHOL 148 (L) 06/28/2018     Lab Results   Component Value Date    TRIG 85 04/08/2019    TRIG 112 10/10/2018    TRIG 125 06/28/2018     Lab Results   Component Value Date    HDL 54 (L) 04/08/2019    HDL 50 (L) 10/10/2018    HDL 45 (L) 06/28/2018     Lab Results   Component Value Date    LDLCALC 73 04/08/2019    LDLCALC 80 10/10/2018    LDLCALC 78 06/28/2018     Lab Results   Component Value Date     04/08/2019    K 4.7 04/08/2019     04/08/2019    CO2 27 04/08/2019    BUN 21 04/08/2019    CREATININE 1.0 04/08/2019    GLUCOSE 103 04/08/2019    CALCIUM 9.0 04/08/2019      Lab Results   Component Value Date    WBC 5.7 04/08/2019    HGB 12.3 04/08/2019    HCT 39.9 04/08/2019    MCV 95.2 04/08/2019     04/08/2019    LABLYMP 1.47 09/25/2013    LYMPHOPCT 22.9 04/08/2019    RBC 4.19 (L) 04/08/2019    MCH 29.4 04/08/2019    MCHC 30.8 (L) 04/08/2019    RDW 13.9 04/08/2019     Lab Results   Component Value Date    VITD25 49.3 04/08/2019       Subjective:      Review of Systems   Constitutional: Negative for fatigue, fever and unexpected weight change. HENT: Negative for ear discharge, ear pain, mouth sores, sore throat and trouble swallowing.     Eyes: Positive for visual disturbance (advanced macular ). Negative for discharge and itching. Respiratory: Negative for cough, choking, shortness of breath, wheezing and stridor. Cardiovascular: Negative for chest pain, palpitations and leg swelling. Gastrointestinal: Negative for abdominal distention, abdominal pain, blood in stool, constipation, diarrhea, nausea and vomiting. Endocrine: Negative for cold intolerance, polydipsia and polyuria. Genitourinary: Negative for difficulty urinating, dysuria, frequency and urgency. Musculoskeletal: Positive for arthralgias and back pain. Negative for gait problem. Skin: Negative for color change and rash. Allergic/Immunologic: Negative for food allergies and immunocompromised state. Neurological: Negative for dizziness, tremors, syncope, speech difficulty, weakness and headaches. Hematological: Negative for adenopathy. Does not bruise/bleed easily. Psychiatric/Behavioral: Negative for confusion and hallucinations. Objective:     Physical Exam   Constitutional: She is oriented to person, place, and time. She appears well-developed and well-nourished. No distress. HENT:   Head: Normocephalic and atraumatic. Eyes: Pupils are equal, round, and reactive to light. Right eye exhibits no discharge. Left eye exhibits no discharge. No scleral icterus. Neck: Normal range of motion. Neck supple. No JVD present. No thyromegaly present. Cardiovascular: Normal rate, regular rhythm and normal heart sounds. No murmur heard. Pulmonary/Chest: Effort normal and breath sounds normal. No respiratory distress. She has no wheezes. She has no rales. Coarse cough     Abdominal: Soft. Bowel sounds are normal. She exhibits no distension and no mass. There is no tenderness. There is no rebound and no guarding. Musculoskeletal: Normal range of motion. She exhibits no edema or tenderness. Neurological: She is alert and oriented to person, place, and time. She has normal reflexes.  She displays normal reflexes. No cranial nerve deficit. Coordination normal.   Skin: Skin is warm and dry. No rash noted. No erythema. Psychiatric: Her behavior is normal. Judgment and thought content normal. She does not exhibit a depressed mood. /82   Pulse 72   Resp 18   Ht 5' 5.5\" (1.664 m)   Wt 142 lb (64.4 kg)   SpO2 97%   BMI 23.27 kg/m²     Assessment:       Diagnosis Orders   1. Screening for breast cancer  TG DIGITAL DIAGNOSTIC W OR WO CAD BILATERAL   2. Chronic pain syndrome  HYDROcodone-acetaminophen (NORCO) 5-325 MG per tablet   3. Post-menopausal  DEXA BONE DENSITY 2 SITES   4. Tobacco abuse  CT CHEST LOW DOSE   5. Atherosclerosis of native artery of both lower extremities with intermittent claudication ([address]     6. Pulmonary emphysema, unspecified emphysema type ([address]     7. Osteopenia of multiple sites     8. Essential hypertension  CBC Auto Differential    Comprehensive Metabolic Panel   9. Mixed hyperlipidemia     10. Healthcare maintenance  CBC Auto Differential    Comprehensive Metabolic Panel    Vitamin D 25 Hydroxy    TSH without Reflex   11. Disorder of bone   Vitamin D 25 Hydroxy     Labs reviewedfrom 4/8/19    Plan:        Patient given educational materials - see patient instructions. Discussed use, benefit, and side effects of prescribed medications. Allpatient questions answered. Pt voiced understanding. Reviewed health maintenance. Instructed to continue current medications, diet and exercise. Patient agreed with treatment plan. Follow up as directed. MEDICATIONS:  Orders Placed This Encounter   Medications    HYDROcodone-acetaminophen (NORCO) 5-325 MG per tablet     Sig: Take 1 tablet by mouth every 6 hours as needed for Pain for up to 30 days.  G89.4     Dispense:  120 tablet     Refill:  0     Reduce doses taken as pain becomes manageable    alendronate (FOSAMAX) 70 MG tablet     Sig: Take 1 tablet by mouth once a week     Dispense:  21 tablet     Refill:  1     Quality & Risk Score Accuracy    Visit Dx:  H74.951 - Atherosclerosis of native artery of both lower extremities with intermittent claudication (HCC)  Assessment and plan:  Stable based upon symptoms and exam. Continue current treatment plan and follow up at least yearly. Visit Dx:  J43.9 - Pulmonary emphysema, unspecified emphysema type ([facility]  Assessment and plan:  Stable based upon symptoms and exam. Continue current treatment plan and follow up at least yearly. Last edited 04/15/19 09:39 CDT by PRAVEEN Silvestre         ORDERS:  Orders Placed This Encounter   Procedures    TG DIGITAL DIAGNOSTIC W OR WO CAD BILATERAL    DEXA BONE DENSITY 2 SITES    CT CHEST LOW DOSE    CBC Auto Differential    Comprehensive Metabolic Panel    Vitamin D 25 Hydroxy    TSH without Reflex       Follow-up:  Return in about 3 months (around 7/15/2019) for awv, yearly physical.    PATIENT INSTRUCTIONS:  Patient Instructions   1.  PAd;  Keep fu with Vascular  2. Chronic pain syndrome;  Stable on current meds   3. HTN . The current medical regimen is effective;  continue present plan and medications. 4.  Hyperlipidemia; The current medical regimen is effective;  continue present plan and medications. 5. Osteopenia; Bone density   6. Health maintenance;  Mammogram    7. COPD we will get low dose CT she has never had one     Electronically signed by PRAVEEN Silvestre on 4/15/2019 at 9:41 AM    EMRDragon/transcription disclaimer:  Much of this encounter note is electronic transcription/translation of spoken language to printed texts. The electronic translation of spoken language may be erroneous, or at times,nonsensical words or phrases may be inadvertently transcribed.   Although I have reviewed the note for such errors, some may still exist.

## 2019-04-15 NOTE — PATIENT INSTRUCTIONS
1.  PAd;  Keep fu with Vascular  2. Chronic pain syndrome;  Stable on current meds   3. HTN . The current medical regimen is effective;  continue present plan and medications. 4.  Hyperlipidemia; The current medical regimen is effective;  continue present plan and medications. 5. Osteopenia; Bone density   6. Health maintenance;  Mammogram    7.   COPD we will get low dose CT she has never had one

## 2019-04-22 ENCOUNTER — HOSPITAL ENCOUNTER (EMERGENCY)
Age: 84
Discharge: HOME | End: 2019-04-22
Payer: MEDICARE

## 2019-04-22 ENCOUNTER — APPOINTMENT (OUTPATIENT)
Dept: CT IMAGING | Age: 84
End: 2019-04-22
Payer: MEDICARE

## 2019-04-22 ENCOUNTER — APPOINTMENT (OUTPATIENT)
Dept: GENERAL RADIOLOGY | Age: 84
End: 2019-04-22
Payer: MEDICARE

## 2019-04-22 VITALS
SYSTOLIC BLOOD PRESSURE: 166 MMHG | OXYGEN SATURATION: 92 % | BODY MASS INDEX: 23.66 KG/M2 | HEART RATE: 67 BPM | RESPIRATION RATE: 23 BRPM | DIASTOLIC BLOOD PRESSURE: 69 MMHG | TEMPERATURE: 98.1 F | HEIGHT: 65 IN | WEIGHT: 142 LBS

## 2019-04-22 DIAGNOSIS — R42 DIZZINESS: Primary | ICD-10-CM

## 2019-04-22 DIAGNOSIS — H81.10 BENIGN PAROXYSMAL POSITIONAL VERTIGO, UNSPECIFIED LATERALITY: ICD-10-CM

## 2019-04-22 LAB
ALBUMIN SERPL-MCNC: 4.1 G/DL (ref 3.5–5.2)
ALP BLD-CCNC: 87 U/L (ref 35–104)
ALT SERPL-CCNC: 13 U/L (ref 5–33)
ANION GAP SERPL CALCULATED.3IONS-SCNC: 12 MMOL/L (ref 7–19)
AST SERPL-CCNC: 22 U/L (ref 5–32)
BASOPHILS ABSOLUTE: 0.1 K/UL (ref 0–0.2)
BASOPHILS RELATIVE PERCENT: 1.1 % (ref 0–1)
BILIRUB SERPL-MCNC: <0.2 MG/DL (ref 0.2–1.2)
BILIRUBIN URINE: NEGATIVE
BLOOD, URINE: NEGATIVE
BUN BLDV-MCNC: 27 MG/DL (ref 8–23)
CALCIUM SERPL-MCNC: 9.1 MG/DL (ref 8.8–10.2)
CHLORIDE BLD-SCNC: 96 MMOL/L (ref 98–111)
CLARITY: CLEAR
CO2: 29 MMOL/L (ref 22–29)
COLOR: YELLOW
CREAT SERPL-MCNC: 1.4 MG/DL (ref 0.5–0.9)
EOSINOPHILS ABSOLUTE: 0.3 K/UL (ref 0–0.6)
EOSINOPHILS RELATIVE PERCENT: 4.9 % (ref 0–5)
GFR NON-AFRICAN AMERICAN: 36
GLUCOSE BLD-MCNC: 168 MG/DL (ref 74–109)
GLUCOSE URINE: NEGATIVE MG/DL
HCT VFR BLD CALC: 39.3 % (ref 37–47)
HEMOGLOBIN: 12.4 G/DL (ref 12–16)
INR BLD: 1.05 (ref 0.88–1.18)
KETONES, URINE: NEGATIVE MG/DL
LEUKOCYTE ESTERASE, URINE: NEGATIVE
LYMPHOCYTES ABSOLUTE: 1.4 K/UL (ref 1.1–4.5)
LYMPHOCYTES RELATIVE PERCENT: 25.9 % (ref 20–40)
MCH RBC QN AUTO: 28.7 PG (ref 27–31)
MCHC RBC AUTO-ENTMCNC: 31.6 G/DL (ref 33–37)
MCV RBC AUTO: 91 FL (ref 81–99)
MONOCYTES ABSOLUTE: 0.7 K/UL (ref 0–0.9)
MONOCYTES RELATIVE PERCENT: 12.8 % (ref 0–10)
NEUTROPHILS ABSOLUTE: 3.1 K/UL (ref 1.5–7.5)
NEUTROPHILS RELATIVE PERCENT: 55.1 % (ref 50–65)
NITRITE, URINE: NEGATIVE
PDW BLD-RTO: 13.6 % (ref 11.5–14.5)
PH UA: 6.5 (ref 5–8)
PLATELET # BLD: 233 K/UL (ref 130–400)
PMV BLD AUTO: 9.6 FL (ref 9.4–12.3)
POTASSIUM SERPL-SCNC: 3.8 MMOL/L (ref 3.5–5)
PROTEIN UA: NEGATIVE MG/DL
PROTHROMBIN TIME: 13.1 SEC (ref 12–14.6)
RBC # BLD: 4.32 M/UL (ref 4.2–5.4)
SODIUM BLD-SCNC: 137 MMOL/L (ref 136–145)
SPECIFIC GRAVITY UA: 1.01 (ref 1–1.03)
TOTAL PROTEIN: 6.7 G/DL (ref 6.6–8.7)
TROPONIN: <0.01 NG/ML (ref 0–0.03)
URINE REFLEX TO CULTURE: NORMAL
UROBILINOGEN, URINE: 1 E.U./DL
WBC # BLD: 5.6 K/UL (ref 4.8–10.8)

## 2019-04-22 PROCEDURE — 36415 COLL VENOUS BLD VENIPUNCTURE: CPT

## 2019-04-22 PROCEDURE — 99285 EMERGENCY DEPT VISIT HI MDM: CPT

## 2019-04-22 PROCEDURE — 70450 CT HEAD/BRAIN W/O DYE: CPT

## 2019-04-22 PROCEDURE — 93005 ELECTROCARDIOGRAM TRACING: CPT

## 2019-04-22 PROCEDURE — 99284 EMERGENCY DEPT VISIT MOD MDM: CPT | Performed by: NURSE PRACTITIONER

## 2019-04-22 PROCEDURE — 85025 COMPLETE CBC W/AUTO DIFF WBC: CPT

## 2019-04-22 PROCEDURE — 81003 URINALYSIS AUTO W/O SCOPE: CPT

## 2019-04-22 PROCEDURE — 6370000000 HC RX 637 (ALT 250 FOR IP): Performed by: NURSE PRACTITIONER

## 2019-04-22 PROCEDURE — 71045 X-RAY EXAM CHEST 1 VIEW: CPT

## 2019-04-22 PROCEDURE — 85610 PROTHROMBIN TIME: CPT

## 2019-04-22 PROCEDURE — 84484 ASSAY OF TROPONIN QUANT: CPT

## 2019-04-22 PROCEDURE — 2580000003 HC RX 258: Performed by: NURSE PRACTITIONER

## 2019-04-22 PROCEDURE — 80053 COMPREHEN METABOLIC PANEL: CPT

## 2019-04-22 RX ORDER — MECLIZINE HYDROCHLORIDE 25 MG/1
25 TABLET ORAL 3 TIMES DAILY PRN
Qty: 30 TABLET | Refills: 0 | Status: SHIPPED | OUTPATIENT
Start: 2019-04-22 | End: 2019-05-02

## 2019-04-22 RX ORDER — MECLIZINE HCL 12.5 MG/1
25 TABLET ORAL ONCE
Status: COMPLETED | OUTPATIENT
Start: 2019-04-22 | End: 2019-04-22

## 2019-04-22 RX ORDER — 0.9 % SODIUM CHLORIDE 0.9 %
500 INTRAVENOUS SOLUTION INTRAVENOUS ONCE
Status: COMPLETED | OUTPATIENT
Start: 2019-04-22 | End: 2019-04-22

## 2019-04-22 RX ADMIN — SODIUM CHLORIDE 500 ML: 9 INJECTION, SOLUTION INTRAVENOUS at 17:58

## 2019-04-22 RX ADMIN — MECLIZINE 25 MG: 12.5 TABLET ORAL at 17:54

## 2019-04-22 ASSESSMENT — ENCOUNTER SYMPTOMS
VOMITING: 0
NAUSEA: 0

## 2019-04-22 NOTE — ED PROVIDER NOTES
[facility] EMERGENCY DEPT  eMERGENCY dEPARTMENT eNCOUnter      Pt Name: Margo Johnson  MRN: [id number]  [place] 1935  Date of evaluation: 4/22/2019  Provider: PRAVEEN Groves    CHIEF COMPLAINT       Chief Complaint   Patient presents with    Hypertension    Dizziness         HISTORY OF PRESENT ILLNESS   (Location/Symptom, Timing/Onset,Context/Setting, Quality, Duration, Modifying Factors, Severity)  Note limiting factors. Margo Johnson is a 80 y.o. female who presents to the emergency department with vertigo. Started 60 minutes ago. Worse with any head movement. Has not felt good for the last 2 days. She has been dizzy before but never this bad. She denies any chest pain. She denies any fever. No change in medication. No headache. Patient is blind. The history is provided by the patient. Dizziness   Quality:  Head spinning  Severity:  Severe  Onset quality:  Sudden  Duration:  1 hour  Timing:  Constant  Progression:  Worsening  Chronicity:  Recurrent  Context: head movement    Context: not with loss of consciousness and not with medication    Worsened by:  Turning head  Ineffective treatments:  None tried  Associated symptoms: no chest pain, no headaches, no nausea, no syncope, no vomiting and no weakness    Risk factors: hx of stroke        NursingNotes were reviewed. REVIEW OF SYSTEMS    (2-9 systems for level 4, 10 or more for level 5)     Review of Systems   Cardiovascular: Negative for chest pain and syncope. Gastrointestinal: Negative for nausea and vomiting. Neurological: Positive for dizziness. Negative for weakness and headaches. Except as noted above the remainder of the review of systems was reviewed and negative.        PAST MEDICAL HISTORY     Past Medical History:   Diagnosis Date    Actinic keratosis     Atherosclerosis of native arteries of the extremities with intermittent claudication 9/19/2013    Benign fundic gland polyps of stomach     Blocked artery     Carotid artery bruit     right    Carotid artery stenosis 9/19/2013    Chronic cough     Emphysema of lung (HCC)     Hyperlipemia     Hypertension     Irritable bowel syndrome     Lower back pain     Macular degeneration     Need for prophylactic vaccination with Streptococcus pneumoniae (Pneumococcus) and Influenza vaccines     Osteoarthritis     Osteoporosis 9/17/2017    Prediabetes     Pulmonary emphysema ([facility] 9/17/2017    PVD (peripheral vascular disease) ([facility]     Restless legs syndrome     Transient ischemic attack     Vitamin D deficiency          SURGICALHISTORY       Past Surgical History:   Procedure Laterality Date    CATARACT REMOVAL Bilateral     HYSTERECTOMY      OTHER SURGICAL HISTORY      cyst on breast    VASCULAR SURGERY  9-25-13 [facility] & [address]    Fluroscopic guidance for access tor R femoral artery after attempted access of L femoral artery. Aortoiliofemoral arteriogram with bilateral lower extremity runoff. R proximal common iliac artery angioplasty X2 with 8mm x 40 mm balloon. L superficial femoral artery angioplasty x1 with 4 mm x 40 mm balloon, then x1 with 5mm x 40 mm balloon. L superficial femoral artery SUPERA stent 4mm x 40mm and     VASCULAR SURGERY  cont. ..    a second SUPERA stent 4 mm x 60 mm Completion arteriograms Mynx closure R groin access site.  VASCULAR SURGERY  01/25/2017    SLC-AIF with runoff angioplasty L SFA with 5x20 cutting balloon, 5x150 lutonix DCB, 5x40 [address]aseo balloon stent proximal L SFA with 5x80 Innova stent kissing stents B SHE with 9x59 atrium stents extension R SHE/EIA with 9x57 express stent extension L SHE with 9x37 express stent completion arteriograms Mynx closure B CFA    VASCULAR SURGERY  05/29/2018    DJM. Angio, SFA PTA         CURRENT MEDICATIONS       Previous Medications    ALENDRONATE (FOSAMAX) 70 MG TABLET    Take 1 tablet by mouth once a week    AMLODIPINE (NORVASC) 5 MG TABLET    TAKE 1 TABLET EVERY DAY    ASPIRIN 81 MG TABLET    Take 81 mg by mouth daily. ATORVASTATIN (LIPITOR) 80 MG TABLET    Take 1 tablet by mouth daily    CLOPIDOGREL (PLAVIX) 75 MG TABLET    TAKE 1 TABLET EVERY DAY    DICYCLOMINE (BENTYL) 10 MG CAPSULE    Take 10 mg by mouth three times daily. HYDROCHLOROTHIAZIDE (MICROZIDE) 12.5 MG CAPSULE    TAKE 1 CAPSULE EVERY MORNING    HYDROCODONE-ACETAMINOPHEN (NORCO) 5-325 MG PER TABLET    Take 1 tablet by mouth every 6 hours as needed for Pain for up to 30 days. G89.4    LISINOPRIL (PRINIVIL;ZESTRIL) 40 MG TABLET    TAKE 1 TABLET EVERY DAY    PRAMIPEXOLE (MIRAPEX) 0.25 MG TABLET    TAKE 1 TO 2 TABLETS BY MOUTH AT BEDTIME AS NEEDED    SYMBICORT 160-4.5 MCG/ACT AERO    INHALE 2 PUFFS TWICE DAILY. RINSE MOUTH AFTER USE.    VITAMIN D (ERGOCALCIFEROL) 28778 UNITS CAPS CAPSULE    TAKE 1 CAPSULE BY MOUTH ONCE A WEEK       ALLERGIES     Codeine and Valium    FAMILY HISTORY       Family History   Problem Relation Age of Onset    Cancer Mother     Cancer Father     High Blood Pressure Son     High Cholesterol Son     High Blood Pressure Daughter     High Cholesterol Daughter     Heart Attack Daughter     Heart Attack Brother           SOCIAL HISTORY       Social History     Socioeconomic History    Marital status:       Spouse name: None    Number of children: 2    Years of education: 9    Highest education level: None   Occupational History    Occupation: retired   Social Needs    Financial resource strain: None    Food insecurity:     Worry: None     Inability: None    Transportation needs:     Medical: None     Non-medical: None   Tobacco Use    Smoking status: Current Every Day Smoker     Packs/day: 0.75     Types: Cigarettes    Smokeless tobacco: Never Used    Tobacco comment: 3/4 of a pack daily   Substance and Sexual Activity    Alcohol use: No    Drug use: No    Sexual activity: Never   Lifestyle    Physical activity:     Days per week: None     Minutes per session: None    Stress: None

## 2019-04-22 NOTE — ED NOTES
Bed: 09  Expected date:   Expected time:   Means of arrival:   Comments:  Alexander Warren RN  04/22/19 [id number]

## 2019-04-22 NOTE — ED NOTES
Patient placed on cardiac monitor, continuous pulse oximeter, and NIBP monitor. Monitor alarms on.        Pool Barnes RN  04/22/19 [id number]

## 2019-04-23 LAB
EKG P AXIS: NORMAL DEGREES
EKG P-R INTERVAL: NORMAL MS
EKG Q-T INTERVAL: 410 MS
EKG QRS DURATION: 88 MS
EKG QTC CALCULATION (BAZETT): 429 MS
EKG T AXIS: 81 DEGREES

## 2019-05-10 RX ORDER — BUDESONIDE AND FORMOTEROL FUMARATE DIHYDRATE 160; 4.5 UG/1; UG/1
AEROSOL RESPIRATORY (INHALATION)
Qty: 30.6 INHALER | Refills: 2 | Status: SHIPPED | OUTPATIENT
Start: 2019-05-10 | End: 2019-11-25 | Stop reason: SDUPTHER

## 2019-05-20 ENCOUNTER — HOSPITAL ENCOUNTER (OUTPATIENT)
Dept: CT IMAGING | Age: 84
Discharge: HOME | End: 2019-05-20
Payer: MEDICARE

## 2019-05-20 DIAGNOSIS — Z72.0 TOBACCO ABUSE: ICD-10-CM

## 2019-05-20 PROCEDURE — G0297 LDCT FOR LUNG CA SCREEN: HCPCS

## 2019-05-28 ENCOUNTER — HOSPITAL ENCOUNTER (OUTPATIENT)
Dept: VASCULAR LAB | Age: 84
Discharge: HOME | End: 2019-05-28
Payer: MEDICARE

## 2019-05-28 DIAGNOSIS — I65.23 BILATERAL CAROTID ARTERY STENOSIS: ICD-10-CM

## 2019-05-28 DIAGNOSIS — I73.9 PVD (PERIPHERAL VASCULAR DISEASE) (HCC): ICD-10-CM

## 2019-05-28 DIAGNOSIS — I70.213 ATHEROSCLEROSIS OF NATIVE ARTERY OF BOTH LOWER EXTREMITIES WITH INTERMITTENT CLAUDICATION (HCC): ICD-10-CM

## 2019-05-28 PROCEDURE — 93926 LOWER EXTREMITY STUDY: CPT

## 2019-05-28 PROCEDURE — 93922 UPR/L XTREMITY ART 2 LEVELS: CPT

## 2019-05-28 PROCEDURE — 93880 EXTRACRANIAL BILAT STUDY: CPT

## 2019-06-17 DIAGNOSIS — G89.4 CHRONIC PAIN SYNDROME: ICD-10-CM

## 2019-06-17 RX ORDER — HYDROCODONE BITARTRATE AND ACETAMINOPHEN 5; 325 MG/1; MG/1
1 TABLET ORAL EVERY 6 HOURS PRN
Qty: 120 TABLET | Refills: 0 | Status: SHIPPED | OUTPATIENT
Start: 2019-06-17 | End: 2019-07-10 | Stop reason: SDUPTHER

## 2019-06-17 NOTE — TELEPHONE ENCOUNTER
Michael Gerber called to request a refill on her medication. Last office visit : 4/15/2019   Next office visit : 7/15/2019     Last UDS: No results found for: Michciara Schmidt, LABBENZ, BUPRENUR, COCAIMETSCRU, GABAPENTIN, MDMA, METAMPU, OPIATESCREENURINE, OXTCOSU, PHENCYCLIDINESCREENURINE, PROPOXYPHENE, THCSCREENUR, TRICYUR    Last Yogi: 4/2019  Medication Contract: 4/15/19    Requested Prescriptions     Pending Prescriptions Disp Refills    HYDROcodone-acetaminophen (NORCO) 5-325 MG per tablet 120 tablet 0     Sig: Take 1 tablet by mouth every 6 hours as needed for Pain for up to 30 days. G89.4         Please approve or refuse this medication.    Brandon Noe

## 2019-06-18 RX ORDER — AMLODIPINE BESYLATE 5 MG/1
TABLET ORAL
Qty: 90 TABLET | Refills: 3 | Status: SHIPPED | OUTPATIENT
Start: 2019-06-18 | End: 2020-03-30

## 2019-06-18 RX ORDER — LISINOPRIL 40 MG/1
TABLET ORAL
Qty: 90 TABLET | Refills: 3 | Status: SHIPPED | OUTPATIENT
Start: 2019-06-18 | End: 2019-12-09 | Stop reason: SDUPTHER

## 2019-06-18 RX ORDER — HYDROCHLOROTHIAZIDE 12.5 MG/1
CAPSULE, GELATIN COATED ORAL
Qty: 90 CAPSULE | Refills: 3 | Status: SHIPPED | OUTPATIENT
Start: 2019-06-18 | End: 2020-03-30

## 2019-06-18 NOTE — TELEPHONE ENCOUNTER
Keon Chavez called requesting a refill of the below medication which has been pended for you:     Requested Prescriptions     Pending Prescriptions Disp Refills    hydrochlorothiazide (MICROZIDE) 12.5 MG capsule [Pharmacy Med Name: HYDROCHLOROTHIAZIDE 12.5 MG Capsule] 90 capsule 3     Sig: TAKE 1 CAPSULE EVERY MORNING    lisinopril (PRINIVIL;ZESTRIL) 40 MG tablet [Pharmacy Med Name: LISINOPRIL 40 MG Tablet] 90 tablet 3     Sig: TAKE 1 TABLET EVERY DAY    amLODIPine (NORVASC) 5 MG tablet [Pharmacy Med Name: AMLODIPINE BESYLATE 5 MG Tablet] 90 tablet 3     Sig: TAKE 1 TABLET EVERY DAY       Last Appointment Date: 10/16/2018  Next Appointment Date: 7/15/2019    Allergies   Allergen Reactions    Codeine Nausea And Vomiting    Valium Nausea Only

## 2019-06-26 ENCOUNTER — OFFICE VISIT (OUTPATIENT)
Dept: VASCULAR SURGERY | Age: 84
End: 2019-06-26
Payer: MEDICARE

## 2019-06-26 VITALS
OXYGEN SATURATION: 98 % | SYSTOLIC BLOOD PRESSURE: 137 MMHG | RESPIRATION RATE: 18 BRPM | TEMPERATURE: 98.6 F | DIASTOLIC BLOOD PRESSURE: 64 MMHG | HEART RATE: 77 BPM

## 2019-06-26 DIAGNOSIS — I70.218 ATHEROSCLEROSIS OF NATIVE ARTERY OF OTHER EXTREMITY WITH INTERMITTENT CLAUDICATION (HCC): Primary | ICD-10-CM

## 2019-06-26 DIAGNOSIS — I65.23 BILATERAL CAROTID ARTERY STENOSIS: ICD-10-CM

## 2019-06-26 PROCEDURE — G8598 ASA/ANTIPLAT THER USED: HCPCS | Performed by: PHYSICIAN ASSISTANT

## 2019-06-26 PROCEDURE — G8427 DOCREV CUR MEDS BY ELIG CLIN: HCPCS | Performed by: PHYSICIAN ASSISTANT

## 2019-06-26 PROCEDURE — 1123F ACP DISCUSS/DSCN MKR DOCD: CPT | Performed by: PHYSICIAN ASSISTANT

## 2019-06-26 PROCEDURE — G8420 CALC BMI NORM PARAMETERS: HCPCS | Performed by: PHYSICIAN ASSISTANT

## 2019-06-26 PROCEDURE — 4004F PT TOBACCO SCREEN RCVD TLK: CPT | Performed by: PHYSICIAN ASSISTANT

## 2019-06-26 PROCEDURE — 1090F PRES/ABSN URINE INCON ASSESS: CPT | Performed by: PHYSICIAN ASSISTANT

## 2019-06-26 PROCEDURE — G8399 PT W/DXA RESULTS DOCUMENT: HCPCS | Performed by: PHYSICIAN ASSISTANT

## 2019-06-26 PROCEDURE — 99214 OFFICE O/P EST MOD 30 MIN: CPT | Performed by: PHYSICIAN ASSISTANT

## 2019-06-26 PROCEDURE — 4040F PNEUMOC VAC/ADMIN/RCVD: CPT | Performed by: PHYSICIAN ASSISTANT

## 2019-06-26 RX ORDER — CLOPIDOGREL BISULFATE 75 MG/1
TABLET ORAL
Qty: 90 TABLET | Refills: 1 | Status: SHIPPED | OUTPATIENT
Start: 2019-06-26 | End: 2019-11-11 | Stop reason: SDUPTHER

## 2019-06-28 NOTE — PROGRESS NOTES
Patient Care Team:  PRAVEEN Dorado as PCP - General (Nurse Practitioner Acute Care)  PRAVEEN Dorado as PCP - [facility]  Marlon Rivas MD as Consulting Physician (Vascular Surgery)  NO TEAM MEMBERS      History and Physical    Mrs. Rose Marie aMrie is a 79 yo female who has a history of PVD. She has had this for > 5 years. She comes in today for follow up and to discuss recent results of her DINESH. Current medication include statin, asa, Plavix daily. Ty Foster has not had new wounds. She reports calf claudication (cramping) at approximately 1 block. She reports a short recovery phase when she stops walking. She is still smoking.      Eloina Robbins is a 80 y.o. female with the following history reviewed and recorded in [facility]:  Patient Active Problem List    Diagnosis Date Noted    Osteopenia of multiple sites 04/15/2019    Vitamin D deficiency 06/13/2018    Osteoporosis 09/17/2017    Restless legs syndrome 09/17/2017    Pulmonary emphysema ([address] 09/17/2017    Chronic pain 09/06/2017    Atherosclerosis of native artery of both lower extremities with intermittent claudication ([address]     PVD (peripheral vascular disease) ([address] 02/05/2016     S/p R SHE PTA, L SFA [id number], s/p  L SFA stent with bilat kissing iliac stents and R SHE stent extension 2017      Chronic respiratory failure ([address] 10/23/2013    Carotid artery stenosis 09/19/2013    Atherosclerosis of native artery of extremity with intermittent claudication (HCC) 09/19/2013    Atherosclerosis of native arteries of the extremities with intermittent claudication 09/19/2013    Mixed hyperlipidemia     Hypertension     Irritable bowel syndrome     Osteoarthritis     Actinic keratosis     Macular degeneration      Current Outpatient Medications   Medication Sig Dispense Refill    hydrochlorothiazide (MICROZIDE) 12.5 MG capsule TAKE 1 CAPSULE EVERY MORNING 90 capsule 3    lisinopril (PRINIVIL;ZESTRIL) 40 MG tablet TAKE 1 daily  Continue Plavix 75 mg daily (hold 5 days for procedure)  Strongly encourage she continue statin therapy  Good skin care/checks daily  Recommend smoking cessation  Discussed walking as much as possible    Proceed with Aortogram with bilateral runoff; possible angioplasty/atherectomy/stent (we will treat her renal insufficiency with mucomyst and IVF's for hydration)

## 2019-07-02 ENCOUNTER — TELEPHONE (OUTPATIENT)
Dept: VASCULAR SURGERY | Age: 84
End: 2019-07-02

## 2019-07-02 RX ORDER — ACETYLCYSTEINE 100 MG/ML
SOLUTION ORAL; RESPIRATORY (INHALATION)
Qty: 1 ML | Refills: 0 | Status: ON HOLD | OUTPATIENT
Start: 2019-07-02 | End: 2019-08-14 | Stop reason: HOSPADM

## 2019-07-10 ENCOUNTER — PREP FOR PROCEDURE (OUTPATIENT)
Dept: VASCULAR SURGERY | Age: 84
End: 2019-07-10

## 2019-07-10 DIAGNOSIS — G89.4 CHRONIC PAIN SYNDROME: ICD-10-CM

## 2019-07-10 DIAGNOSIS — M89.9 DISORDER OF BONE: ICD-10-CM

## 2019-07-10 DIAGNOSIS — I70.218 ATHEROSCLEROSIS OF NATIVE ARTERY OF OTHER EXTREMITY WITH INTERMITTENT CLAUDICATION (HCC): Primary | ICD-10-CM

## 2019-07-10 DIAGNOSIS — Z00.00 HEALTHCARE MAINTENANCE: ICD-10-CM

## 2019-07-10 DIAGNOSIS — I10 ESSENTIAL HYPERTENSION: ICD-10-CM

## 2019-07-10 LAB
ALBUMIN SERPL-MCNC: 4.1 G/DL (ref 3.5–5.2)
ALP BLD-CCNC: 86 U/L (ref 35–104)
ALT SERPL-CCNC: 12 U/L (ref 5–33)
ANION GAP SERPL CALCULATED.3IONS-SCNC: 13 MMOL/L (ref 7–19)
AST SERPL-CCNC: 16 U/L (ref 5–32)
BASOPHILS ABSOLUTE: 0.1 K/UL (ref 0–0.2)
BASOPHILS RELATIVE PERCENT: 1.1 % (ref 0–1)
BILIRUB SERPL-MCNC: 0.3 MG/DL (ref 0.2–1.2)
BUN BLDV-MCNC: 19 MG/DL (ref 8–23)
CALCIUM SERPL-MCNC: 9.5 MG/DL (ref 8.8–10.2)
CHLORIDE BLD-SCNC: 99 MMOL/L (ref 98–111)
CO2: 30 MMOL/L (ref 22–29)
CREAT SERPL-MCNC: 1 MG/DL (ref 0.5–0.9)
EOSINOPHILS ABSOLUTE: 0.3 K/UL (ref 0–0.6)
EOSINOPHILS RELATIVE PERCENT: 5.9 % (ref 0–5)
GFR NON-AFRICAN AMERICAN: 53
GLUCOSE BLD-MCNC: 90 MG/DL (ref 74–109)
HCT VFR BLD CALC: 41.9 % (ref 37–47)
HEMOGLOBIN: 12.9 G/DL (ref 12–16)
LYMPHOCYTES ABSOLUTE: 1.3 K/UL (ref 1.1–4.5)
LYMPHOCYTES RELATIVE PERCENT: 23.4 % (ref 20–40)
MCH RBC QN AUTO: 28.4 PG (ref 27–31)
MCHC RBC AUTO-ENTMCNC: 30.8 G/DL (ref 33–37)
MCV RBC AUTO: 92.1 FL (ref 81–99)
MONOCYTES ABSOLUTE: 0.7 K/UL (ref 0–0.9)
MONOCYTES RELATIVE PERCENT: 12.4 % (ref 0–10)
NEUTROPHILS ABSOLUTE: 3.1 K/UL (ref 1.5–7.5)
NEUTROPHILS RELATIVE PERCENT: 57 % (ref 50–65)
PDW BLD-RTO: 14.7 % (ref 11.5–14.5)
PLATELET # BLD: 228 K/UL (ref 130–400)
PMV BLD AUTO: 10 FL (ref 9.4–12.3)
POTASSIUM SERPL-SCNC: 4.4 MMOL/L (ref 3.5–5)
RBC # BLD: 4.55 M/UL (ref 4.2–5.4)
SODIUM BLD-SCNC: 142 MMOL/L (ref 136–145)
TOTAL PROTEIN: 6.6 G/DL (ref 6.6–8.7)
TSH SERPL DL<=0.05 MIU/L-ACNC: 2.53 UIU/ML (ref 0.27–4.2)
VITAMIN D 25-HYDROXY: 57.1 NG/ML
WBC # BLD: 5.4 K/UL (ref 4.8–10.8)

## 2019-07-10 RX ORDER — ASPIRIN 81 MG/1
81 TABLET ORAL ONCE
Status: CANCELLED | OUTPATIENT
Start: 2019-07-10 | End: 2019-07-10

## 2019-07-10 RX ORDER — HYDROCODONE BITARTRATE AND ACETAMINOPHEN 5; 325 MG/1; MG/1
1 TABLET ORAL EVERY 6 HOURS PRN
Qty: 120 TABLET | Refills: 0 | Status: SHIPPED | OUTPATIENT
Start: 2019-07-17 | End: 2019-08-12 | Stop reason: SDUPTHER

## 2019-07-10 RX ORDER — ONDANSETRON 2 MG/ML
4 INJECTION INTRAMUSCULAR; INTRAVENOUS EVERY 6 HOURS PRN
Status: CANCELLED | OUTPATIENT
Start: 2019-07-10

## 2019-07-10 RX ORDER — SODIUM CHLORIDE 0.9 % (FLUSH) 0.9 %
10 SYRINGE (ML) INJECTION PRN
Status: CANCELLED | OUTPATIENT
Start: 2019-07-10

## 2019-07-10 NOTE — H&P
Substance Use Topics    Alcohol use: No         Review of Systems     Constitutional - no significant activity change, appetite change, or unexpected weight change. No fever or chills. No diaphoresis or significant fatigue. HENT - no significant rhinorrhea or epistaxis. No tinnitus or significant hearing loss. Eyes - no sudden vision change or amaurosis. Respiratory - no significant shortness of breath, wheezing, or stridor. No apnea, cough, or chest tightness associated with shortness of breath. Cardiovascular - no chest pain, syncope, or significant dizziness. No palpitations or significant leg swelling. Patient reports left calf claudication. (see HPI)  Gastrointestinal - no abdominal swelling or pain. No blood in stool. No severe constipation, diarrhea, nausea, or vomiting. Genitourinary - No difficulty urinating, dysuria, frequency, or urgency. No flank pain or hematuria. Musculoskeletal - no back pain, gait disturbance, or myalgia. Skin - no color change, rash, pallor, or new wound. Neurologic - no dizziness, facial asymmetry, or light headedness. No seizures. No speech difficulty or lateralizing weakness. Hematologic - no easy bruising or excessive bleeding. Psychiatric - no severe anxiety or nervousness. No confusion. All other review of systems are negative.        Physical Exam     /64 Comment: left  Pulse 77   Temp 98.6 °F (37 °C)   Resp 18   SpO2 98%      Constitutional - well developed, well nourished. No diaphoresis or acute distress. HENT - head normocephalic. Right external ear canal appears normal.  Left external ear canal appears normal.  Septum appears midline. Eyes - conjunctiva normal.  EOMS normal.  No exudate. No icterus. Neck- ROM appears normal, no tracheal deviation. Cardiovascular - Regular rate and rhythm. Heart sounds are normal.  No murmur, rub, or gallop. Carotid pulses are 2+ to palpation bilaterally without bruit.     Extremities - Radial

## 2019-07-15 ENCOUNTER — OFFICE VISIT (OUTPATIENT)
Dept: INTERNAL MEDICINE | Age: 84
End: 2019-07-15
Payer: MEDICARE

## 2019-07-15 VITALS
HEIGHT: 66 IN | RESPIRATION RATE: 18 BRPM | DIASTOLIC BLOOD PRESSURE: 64 MMHG | WEIGHT: 140 LBS | OXYGEN SATURATION: 98 % | HEART RATE: 64 BPM | SYSTOLIC BLOOD PRESSURE: 122 MMHG | BODY MASS INDEX: 22.5 KG/M2

## 2019-07-15 DIAGNOSIS — M89.9 DISORDER OF BONE: ICD-10-CM

## 2019-07-15 DIAGNOSIS — M81.0 OSTEOPOROSIS, UNSPECIFIED OSTEOPOROSIS TYPE, UNSPECIFIED PATHOLOGICAL FRACTURE PRESENCE: Chronic | ICD-10-CM

## 2019-07-15 DIAGNOSIS — Z00.00 ROUTINE GENERAL MEDICAL EXAMINATION AT A HEALTH CARE FACILITY: ICD-10-CM

## 2019-07-15 DIAGNOSIS — I73.9 PVD (PERIPHERAL VASCULAR DISEASE) (HCC): Primary | ICD-10-CM

## 2019-07-15 DIAGNOSIS — Z00.00 HEALTHCARE MAINTENANCE: ICD-10-CM

## 2019-07-15 DIAGNOSIS — N18.30 CHRONIC KIDNEY DISEASE, STAGE III (MODERATE) (HCC): ICD-10-CM

## 2019-07-15 DIAGNOSIS — I10 ESSENTIAL HYPERTENSION: ICD-10-CM

## 2019-07-15 PROCEDURE — G0439 PPPS, SUBSEQ VISIT: HCPCS | Performed by: NURSE PRACTITIONER

## 2019-07-15 PROCEDURE — 4004F PT TOBACCO SCREEN RCVD TLK: CPT | Performed by: NURSE PRACTITIONER

## 2019-07-15 PROCEDURE — 1123F ACP DISCUSS/DSCN MKR DOCD: CPT | Performed by: NURSE PRACTITIONER

## 2019-07-15 PROCEDURE — G8427 DOCREV CUR MEDS BY ELIG CLIN: HCPCS | Performed by: NURSE PRACTITIONER

## 2019-07-15 PROCEDURE — G8420 CALC BMI NORM PARAMETERS: HCPCS | Performed by: NURSE PRACTITIONER

## 2019-07-15 PROCEDURE — 1090F PRES/ABSN URINE INCON ASSESS: CPT | Performed by: NURSE PRACTITIONER

## 2019-07-15 PROCEDURE — 4040F PNEUMOC VAC/ADMIN/RCVD: CPT | Performed by: NURSE PRACTITIONER

## 2019-07-15 PROCEDURE — 99214 OFFICE O/P EST MOD 30 MIN: CPT | Performed by: NURSE PRACTITIONER

## 2019-07-15 PROCEDURE — G8598 ASA/ANTIPLAT THER USED: HCPCS | Performed by: NURSE PRACTITIONER

## 2019-07-15 PROCEDURE — G8399 PT W/DXA RESULTS DOCUMENT: HCPCS | Performed by: NURSE PRACTITIONER

## 2019-07-15 ASSESSMENT — ENCOUNTER SYMPTOMS
ABDOMINAL PAIN: 0
STRIDOR: 0
COUGH: 0
EYE ITCHING: 0
SORE THROAT: 0
BLOOD IN STOOL: 0
DIARRHEA: 0
WHEEZING: 0
SHORTNESS OF BREATH: 0
EYE DISCHARGE: 0
CHOKING: 0
COLOR CHANGE: 0
VOMITING: 0
TROUBLE SWALLOWING: 0
NAUSEA: 0
CONSTIPATION: 0
ABDOMINAL DISTENTION: 0

## 2019-07-15 ASSESSMENT — PATIENT HEALTH QUESTIONNAIRE - PHQ9
SUM OF ALL RESPONSES TO PHQ QUESTIONS 1-9: 0
SUM OF ALL RESPONSES TO PHQ QUESTIONS 1-9: 0

## 2019-07-15 ASSESSMENT — LIFESTYLE VARIABLES: HOW OFTEN DO YOU HAVE A DRINK CONTAINING ALCOHOL: 0

## 2019-07-15 NOTE — PROGRESS NOTES
Temp - - 98.6 - - -   Resp 18 18 - - - -   SpO2 98 - 98 92 - 93   Weight 140 lb - - - - -   Height 5' 5.5\" - - - - -   BMI (wt*703/ht~2) 22.94 kg/m2 - - - - -   Some recent data might be hidden       Family History   Problem Relation Age of Onset    Cancer Mother     Cancer Father     High Blood Pressure Son     High Cholesterol Son     High Blood Pressure Daughter     High Cholesterol Daughter     Heart Attack Daughter     Heart Attack Brother        Social History     Tobacco Use    Smoking status: Current Every Day Smoker     Packs/day: 0.75     Years: 58.00     Pack years: 43.50     Types: Cigarettes    Smokeless tobacco: Never Used    Tobacco comment: 3/4 of a pack daily   Substance Use Topics    Alcohol use: No      Current Outpatient Medications   Medication Sig Dispense Refill    [START ON 7/17/2019] HYDROcodone-acetaminophen (NORCO) 5-325 MG per tablet Take 1 tablet by mouth every 6 hours as needed for Pain for up to 30 days. G89.4 120 tablet 0    acetylcysteine (MUCOMYST) 10 % nebulizer solution Procedure scheduled for 7/18/19. Mucomyst 600mg po bid the day before procedure, the day of procedure, and the day after procedure. 1 mL 0    clopidogrel (PLAVIX) 75 MG tablet TAKE 1 TABLET EVERY DAY 90 tablet 1    hydrochlorothiazide (MICROZIDE) 12.5 MG capsule TAKE 1 CAPSULE EVERY MORNING 90 capsule 3    lisinopril (PRINIVIL;ZESTRIL) 40 MG tablet TAKE 1 TABLET EVERY DAY 90 tablet 3    amLODIPine (NORVASC) 5 MG tablet TAKE 1 TABLET EVERY DAY 90 tablet 3    SYMBICORT 160-4.5 MCG/ACT AERO INHALE 2 PUFFS TWICE DAILY.  RINSE MOUTH AFTER USE. 30.6 Inhaler 2    alendronate (FOSAMAX) 70 MG tablet Take 1 tablet by mouth once a week 21 tablet 1    vitamin D (ERGOCALCIFEROL) 52031 units CAPS capsule TAKE 1 CAPSULE BY MOUTH ONCE A WEEK 4 capsule 5    pramipexole (MIRAPEX) 0.25 MG tablet TAKE 1 TO 2 TABLETS BY MOUTH AT BEDTIME AS NEEDED 180 tablet 3    atorvastatin (LIPITOR) 80 MG tablet Take 1 tablet

## 2019-07-15 NOTE — PATIENT INSTRUCTIONS
usually necessary to meet this goal.  · When exposed to the sun, use a sunscreen that protects against both UVA and UVB radiation with an SPF of 30 or greater. Reapply every 2 to 3 hours or after sweating, drying off with a towel, or swimming. · Always wear a seat belt when traveling in a car. Always wear a helmet when riding a bicycle or motorcycle.

## 2019-07-18 ENCOUNTER — HOSPITAL ENCOUNTER (OUTPATIENT)
Dept: INTERVENTIONAL RADIOLOGY/VASCULAR | Age: 84
Discharge: HOME | End: 2019-07-18
Payer: MEDICARE

## 2019-07-18 VITALS
BODY MASS INDEX: 22.5 KG/M2 | OXYGEN SATURATION: 91 % | WEIGHT: 140 LBS | RESPIRATION RATE: 22 BRPM | HEIGHT: 66 IN | HEART RATE: 61 BPM | SYSTOLIC BLOOD PRESSURE: 167 MMHG | TEMPERATURE: 98.5 F | DIASTOLIC BLOOD PRESSURE: 76 MMHG

## 2019-07-18 DIAGNOSIS — I70.213 ATHEROSCLEROSIS OF NATIVE ARTERY OF BOTH LOWER EXTREMITIES WITH INTERMITTENT CLAUDICATION (HCC): ICD-10-CM

## 2019-07-18 PROCEDURE — 2709999900 IR AORTAGRAM ABDOMINAL SERIALOGRAM

## 2019-07-18 PROCEDURE — 36200 PLACE CATHETER IN AORTA: CPT | Performed by: SURGERY

## 2019-07-18 PROCEDURE — 2500000003 HC RX 250 WO HCPCS: Performed by: SURGERY

## 2019-07-18 PROCEDURE — 75625 CONTRAST EXAM ABDOMINL AORTA: CPT | Performed by: SURGERY

## 2019-07-18 PROCEDURE — 2500000003 HC RX 250 WO HCPCS: Performed by: PHYSICIAN ASSISTANT

## 2019-07-18 PROCEDURE — 99153 MOD SED SAME PHYS/QHP EA: CPT | Performed by: SURGERY

## 2019-07-18 PROCEDURE — 99152 MOD SED SAME PHYS/QHP 5/>YRS: CPT | Performed by: SURGERY

## 2019-07-18 PROCEDURE — 6360000004 HC RX CONTRAST MEDICATION: Performed by: SURGERY

## 2019-07-18 PROCEDURE — 6360000002 HC RX W HCPCS: Performed by: SURGERY

## 2019-07-18 PROCEDURE — 2580000003 HC RX 258: Performed by: SURGERY

## 2019-07-18 PROCEDURE — 75716 ARTERY X-RAYS ARMS/LEGS: CPT | Performed by: SURGERY

## 2019-07-18 PROCEDURE — 2580000003 HC RX 258: Performed by: PHYSICIAN ASSISTANT

## 2019-07-18 RX ORDER — ONDANSETRON 2 MG/ML
4 INJECTION INTRAMUSCULAR; INTRAVENOUS EVERY 8 HOURS PRN
Status: DISCONTINUED | OUTPATIENT
Start: 2019-07-18 | End: 2019-07-20 | Stop reason: HOSPADM

## 2019-07-18 RX ORDER — MIDAZOLAM HYDROCHLORIDE 1 MG/ML
INJECTION INTRAMUSCULAR; INTRAVENOUS
Status: COMPLETED | OUTPATIENT
Start: 2019-07-18 | End: 2019-07-18

## 2019-07-18 RX ORDER — LIDOCAINE HYDROCHLORIDE 20 MG/ML
INJECTION, SOLUTION INFILTRATION; PERINEURAL
Status: COMPLETED | OUTPATIENT
Start: 2019-07-18 | End: 2019-07-18

## 2019-07-18 RX ORDER — HYDROCODONE BITARTRATE AND ACETAMINOPHEN 5; 325 MG/1; MG/1
1 TABLET ORAL EVERY 4 HOURS PRN
Status: DISCONTINUED | OUTPATIENT
Start: 2019-07-18 | End: 2019-07-20 | Stop reason: HOSPADM

## 2019-07-18 RX ORDER — CEFAZOLIN SODIUM 1 G/50ML
1 INJECTION, SOLUTION INTRAVENOUS
Status: DISPENSED | OUTPATIENT
Start: 2019-07-18 | End: 2019-07-18

## 2019-07-18 RX ORDER — FUROSEMIDE 10 MG/ML
10 INJECTION INTRAMUSCULAR; INTRAVENOUS ONCE
Status: ON HOLD | COMMUNITY
End: 2019-08-14 | Stop reason: HOSPADM

## 2019-07-18 RX ORDER — FENTANYL CITRATE 50 UG/ML
INJECTION, SOLUTION INTRAMUSCULAR; INTRAVENOUS
Status: COMPLETED | OUTPATIENT
Start: 2019-07-18 | End: 2019-07-18

## 2019-07-18 RX ORDER — SODIUM CHLORIDE 9 MG/ML
INJECTION, SOLUTION INTRAVENOUS CONTINUOUS
Status: DISCONTINUED | OUTPATIENT
Start: 2019-07-18 | End: 2019-07-20 | Stop reason: HOSPADM

## 2019-07-18 RX ORDER — ASPIRIN 81 MG/1
81 TABLET ORAL ONCE
Status: DISCONTINUED | OUTPATIENT
Start: 2019-07-18 | End: 2019-07-20 | Stop reason: HOSPADM

## 2019-07-18 RX ORDER — IODIXANOL 320 MG/ML
INJECTION, SOLUTION INTRAVASCULAR
Status: COMPLETED | OUTPATIENT
Start: 2019-07-18 | End: 2019-07-18

## 2019-07-18 RX ORDER — SODIUM CHLORIDE 0.9 % (FLUSH) 0.9 %
10 SYRINGE (ML) INJECTION PRN
Status: DISCONTINUED | OUTPATIENT
Start: 2019-07-18 | End: 2019-07-20 | Stop reason: HOSPADM

## 2019-07-18 RX ORDER — HYDROCODONE BITARTRATE AND ACETAMINOPHEN 5; 325 MG/1; MG/1
2 TABLET ORAL EVERY 4 HOURS PRN
Status: DISCONTINUED | OUTPATIENT
Start: 2019-07-18 | End: 2019-07-20 | Stop reason: HOSPADM

## 2019-07-18 RX ORDER — ACETAMINOPHEN 325 MG/1
650 TABLET ORAL EVERY 4 HOURS PRN
Status: DISCONTINUED | OUTPATIENT
Start: 2019-07-18 | End: 2019-07-20 | Stop reason: HOSPADM

## 2019-07-18 RX ORDER — HEPARIN SODIUM 5000 [USP'U]/ML
INJECTION, SOLUTION INTRAVENOUS; SUBCUTANEOUS
Status: COMPLETED | OUTPATIENT
Start: 2019-07-18 | End: 2019-07-18

## 2019-07-18 RX ORDER — ONDANSETRON 2 MG/ML
4 INJECTION INTRAMUSCULAR; INTRAVENOUS EVERY 6 HOURS PRN
Status: DISCONTINUED | OUTPATIENT
Start: 2019-07-18 | End: 2019-07-20 | Stop reason: HOSPADM

## 2019-07-18 RX ADMIN — Medication: at 09:42

## 2019-07-18 RX ADMIN — IODIXANOL 120 ML: 320 INJECTION, SOLUTION INTRAVASCULAR at 14:43

## 2019-07-18 RX ADMIN — FENTANYL CITRATE 25 MCG: 50 INJECTION INTRAMUSCULAR; INTRAVENOUS at 14:24

## 2019-07-18 RX ADMIN — SODIUM CHLORIDE: 9 INJECTION, SOLUTION INTRAVENOUS at 15:34

## 2019-07-18 RX ADMIN — LIDOCAINE HYDROCHLORIDE 10 ML: 20 INJECTION, SOLUTION INFILTRATION; PERINEURAL at 14:26

## 2019-07-18 RX ADMIN — HEPARIN SODIUM 5000 UNITS: 5000 INJECTION, SOLUTION INTRAVENOUS; SUBCUTANEOUS at 14:24

## 2019-07-18 RX ADMIN — MIDAZOLAM 1 MG: 1 INJECTION INTRAMUSCULAR; INTRAVENOUS at 14:24

## 2019-07-18 NOTE — PROGRESS NOTES
Patient states need to urinate. Voided approx 400ml clear yellow urine per bedpan. No voiced complaints.

## 2019-07-18 NOTE — H&P
wheezing, or stridor.  No apnea, cough, or chest tightness associated with shortness of breath. Cardiovascular - no chest pain, syncope, or significant dizziness.  No palpitations or significant leg swelling.  Patient reports left calf claudication. (see HPI)  Gastrointestinal - no abdominal swelling or pain.  No blood in stool.  No severe constipation, diarrhea, nausea, or vomiting. Genitourinary - No difficulty urinating, dysuria, frequency, or urgency.  No flank pain or hematuria. Musculoskeletal - no back pain, gait disturbance, or myalgia. Skin - no color change, rash, pallor, or new wound. Neurologic - no dizziness, facial asymmetry, or light headedness.  No seizures.  No speech difficulty or lateralizing weakness. Hematologic - no easy bruising or excessive bleeding. Psychiatric - no severe anxiety or nervousness.  No confusion. All other review of systems are negative.        Physical Exam     /64 Comment: left  Pulse 77   Temp 98.6 °F (37 °C)   Resp 18   SpO2 98%      Constitutional - well developed, well nourished.  No diaphoresis or acute distress. HENT - head normocephalic.  Right external ear canal appears normal.  Left external ear canal appears normal.  Septum appears midline. Eyes - conjunctiva normal.  EOMS normal.  No exudate.  No icterus. Neck- ROM appears normal, no tracheal deviation. Cardiovascular - Regular rate and rhythm.  Heart sounds are normal.  No murmur, rub, or gallop.  Carotid pulses are 2+ to palpation bilaterally without bruit.    Extremities - Radial and brachial pulses are 2+ to palpation bilaterally. Femoral pulses are palpable. DP and PT pulses are palpable. No cyanosis, clubbing, or significant edema.  No signs atheroembolic event.   Pulmonary - effort appears normal.  No respiratory distress.    Lungs - Breath sounds normal. No wheezes or rales.    GI - Abdomen - soft, non tender, bowel sounds X 4 quadrants.  No guarding or rebound tenderness.  No          Plan        Continue ASA EC 81 mg daily  Continue Plavix 75 mg daily (hold 5 days for procedure)  Strongly encourage she continue statin therapy  Good skin care/checks daily  Recommend smoking cessation  Discussed walking as much as possible     Proceed with Aortogram with bilateral runoff; possible angioplasty/atherectomy/stent (we will treat her renal insufficiency with mucomyst and IVF's for hydration)

## 2019-07-19 NOTE — PROGRESS NOTES
Patient ambulated to bathroom then ambulated down hallway without difficulty. IV D/C'd. Discharge instructions given. Patient voiced understanding and patient discharged home with granddaughter in stable condition.

## 2019-07-22 ENCOUNTER — OFFICE VISIT (OUTPATIENT)
Dept: VASCULAR SURGERY | Age: 84
End: 2019-07-22
Payer: MEDICARE

## 2019-07-22 ENCOUNTER — HOSPITAL ENCOUNTER (OUTPATIENT)
Dept: GENERAL RADIOLOGY | Age: 84
Discharge: HOME | End: 2019-07-22
Payer: MEDICARE

## 2019-07-22 ENCOUNTER — HOSPITAL ENCOUNTER (OUTPATIENT)
Dept: PREADMISSION TESTING | Age: 84
Discharge: HOME | End: 2019-07-26
Payer: MEDICARE

## 2019-07-22 VITALS — HEIGHT: 66 IN | WEIGHT: 137 LBS | BODY MASS INDEX: 22.02 KG/M2

## 2019-07-22 VITALS
HEART RATE: 72 BPM | SYSTOLIC BLOOD PRESSURE: 140 MMHG | RESPIRATION RATE: 18 BRPM | OXYGEN SATURATION: 97 % | TEMPERATURE: 98.1 F | DIASTOLIC BLOOD PRESSURE: 71 MMHG

## 2019-07-22 DIAGNOSIS — I70.218 ATHEROSCLEROSIS OF NATIVE ARTERY OF OTHER EXTREMITY WITH INTERMITTENT CLAUDICATION (HCC): ICD-10-CM

## 2019-07-22 DIAGNOSIS — Z01.818 PRE-OP TESTING: Primary | ICD-10-CM

## 2019-07-22 DIAGNOSIS — Z01.818 PRE-OP TESTING: ICD-10-CM

## 2019-07-22 DIAGNOSIS — Z09 FOLLOW UP: Primary | ICD-10-CM

## 2019-07-22 LAB
ANION GAP SERPL CALCULATED.3IONS-SCNC: 12 MMOL/L (ref 7–19)
APTT: 29.7 SEC (ref 26–36.2)
BUN BLDV-MCNC: 19 MG/DL (ref 8–23)
CALCIUM SERPL-MCNC: 9.4 MG/DL (ref 8.8–10.2)
CHLORIDE BLD-SCNC: 98 MMOL/L (ref 98–111)
CO2: 29 MMOL/L (ref 22–29)
CREAT SERPL-MCNC: 1.1 MG/DL (ref 0.5–0.9)
EKG P AXIS: 74 DEGREES
EKG P-R INTERVAL: 180 MS
EKG Q-T INTERVAL: 430 MS
EKG QRS DURATION: 84 MS
EKG QTC CALCULATION (BAZETT): 431 MS
EKG T AXIS: 76 DEGREES
GFR NON-AFRICAN AMERICAN: 47
GLUCOSE BLD-MCNC: 104 MG/DL (ref 74–109)
HCT VFR BLD CALC: 41.9 % (ref 37–47)
HEMOGLOBIN: 13.1 G/DL (ref 12–16)
INR BLD: 1.06 (ref 0.88–1.18)
MCH RBC QN AUTO: 28.5 PG (ref 27–31)
MCHC RBC AUTO-ENTMCNC: 31.3 G/DL (ref 33–37)
MCV RBC AUTO: 91.3 FL (ref 81–99)
PDW BLD-RTO: 15 % (ref 11.5–14.5)
PLATELET # BLD: 243 K/UL (ref 130–400)
PMV BLD AUTO: 10.3 FL (ref 9.4–12.3)
POTASSIUM SERPL-SCNC: 4.3 MMOL/L (ref 3.5–5)
PROTHROMBIN TIME: 13.2 SEC (ref 12–14.6)
RBC # BLD: 4.59 M/UL (ref 4.2–5.4)
SODIUM BLD-SCNC: 139 MMOL/L (ref 136–145)
WBC # BLD: 5.7 K/UL (ref 4.8–10.8)

## 2019-07-22 PROCEDURE — G8427 DOCREV CUR MEDS BY ELIG CLIN: HCPCS | Performed by: PHYSICIAN ASSISTANT

## 2019-07-22 PROCEDURE — 1090F PRES/ABSN URINE INCON ASSESS: CPT | Performed by: PHYSICIAN ASSISTANT

## 2019-07-22 PROCEDURE — 80048 BASIC METABOLIC PNL TOTAL CA: CPT

## 2019-07-22 PROCEDURE — 93005 ELECTROCARDIOGRAM TRACING: CPT

## 2019-07-22 PROCEDURE — G8399 PT W/DXA RESULTS DOCUMENT: HCPCS | Performed by: PHYSICIAN ASSISTANT

## 2019-07-22 PROCEDURE — 99214 OFFICE O/P EST MOD 30 MIN: CPT | Performed by: PHYSICIAN ASSISTANT

## 2019-07-22 PROCEDURE — G8598 ASA/ANTIPLAT THER USED: HCPCS | Performed by: PHYSICIAN ASSISTANT

## 2019-07-22 PROCEDURE — 1123F ACP DISCUSS/DSCN MKR DOCD: CPT | Performed by: PHYSICIAN ASSISTANT

## 2019-07-22 PROCEDURE — G8420 CALC BMI NORM PARAMETERS: HCPCS | Performed by: PHYSICIAN ASSISTANT

## 2019-07-22 PROCEDURE — 71046 X-RAY EXAM CHEST 2 VIEWS: CPT

## 2019-07-22 PROCEDURE — 4040F PNEUMOC VAC/ADMIN/RCVD: CPT | Performed by: PHYSICIAN ASSISTANT

## 2019-07-22 PROCEDURE — 85027 COMPLETE CBC AUTOMATED: CPT

## 2019-07-22 PROCEDURE — 85730 THROMBOPLASTIN TIME PARTIAL: CPT

## 2019-07-22 PROCEDURE — 85610 PROTHROMBIN TIME: CPT

## 2019-07-22 PROCEDURE — 87081 CULTURE SCREEN ONLY: CPT

## 2019-07-22 PROCEDURE — 4004F PT TOBACCO SCREEN RCVD TLK: CPT | Performed by: PHYSICIAN ASSISTANT

## 2019-07-22 RX ORDER — FUROSEMIDE 20 MG/1
10 TABLET ORAL DAILY
COMMUNITY

## 2019-07-22 NOTE — PROGRESS NOTES
are 2+ to palpation bilaterally without bruit. Extremities - Radial and brachial pulses are 2+ to palpation bilaterally. Femoral pulses are palpable. DP and PT pulses are palpable. Right groin with small area of bruising noted. No cyanosis, clubbing, or significant edema. No signs atheroembolic event. Pulmonary - effort appears normal.  No respiratory distress. Lungs - Breath sounds normal. No wheezes or rales. GI - Abdomen - soft, non tender, bowel sounds X 4 quadrants. No guarding or rebound tenderness. No distension or palpable mass. Genitourinary - deferred. Musculoskeletal - ROM appears normal.  No significant edema. Neurologic - alert and oriented X 3. Physiologic. Skin - warm, dry, and intact. No rash, erythema, or pallor. Psychiatric - mood, affect, and behavior appear normal.  Judgment and thought processes appear normal.     Risk factors for atherosclerosis of all vascular beds have been reviewed with the patient including:  Family history, tobacco abuse in all forms, elevated cholesterol, hyperlipidemia, and diabetes.     Left lower extremity arterial scan: 5/28/19[facility]  Impression        Left lower extremity arterial duplex exam was performed. There appears to    be a focal plaque in the CFA causing elevated velocities and a stenosis.    There is patency of the SFA stent, however, there is intrastent recurrence    distally this causes about a 50% stenosis. Run off vessels are patent.    (velocity at distal stent 379 cm/s)      Right KOLTON 0.87, Left KOLTON 0.82. Individual images reviewed by myself  Test results were reviewed with the patient.     A-gram - 7/18/19 (reviewed by Dr. Kelly Reyes)  Showed the patient is in need of a left CFA endarterectomy due to severe plaque  (final report no available for viewing)        Options have been discussed by Dr. Kelly Reyes  with the patient including continued medical management vs. proceeding with left CFA endarterectomy .   Patient has opted to proceed

## 2019-07-24 LAB — MRSA CULTURE ONLY: NORMAL

## 2019-07-25 ENCOUNTER — HOSPITAL ENCOUNTER (OUTPATIENT)
Dept: WOMENS IMAGING | Age: 84
Discharge: HOME | End: 2019-07-25
Payer: MEDICARE

## 2019-07-25 DIAGNOSIS — Z78.0 POST-MENOPAUSAL: ICD-10-CM

## 2019-07-25 DIAGNOSIS — Z12.39 SCREENING FOR BREAST CANCER: ICD-10-CM

## 2019-07-25 PROCEDURE — 77063 BREAST TOMOSYNTHESIS BI: CPT

## 2019-07-26 RX ORDER — ERGOCALCIFEROL 1.25 MG/1
CAPSULE ORAL
Qty: 4 CAPSULE | Refills: 5 | Status: SHIPPED | OUTPATIENT
Start: 2019-07-26 | End: 2019-12-09 | Stop reason: SDUPTHER

## 2019-08-05 RX ORDER — DICYCLOMINE HYDROCHLORIDE 10 MG/1
10 CAPSULE ORAL 2 TIMES DAILY
Qty: 120 CAPSULE | Refills: 2 | Status: SHIPPED | OUTPATIENT
Start: 2019-08-05 | End: 2019-09-18 | Stop reason: SDUPTHER

## 2019-08-12 ENCOUNTER — PREP FOR PROCEDURE (OUTPATIENT)
Dept: VASCULAR SURGERY | Age: 84
End: 2019-08-12

## 2019-08-12 DIAGNOSIS — G89.4 CHRONIC PAIN SYNDROME: ICD-10-CM

## 2019-08-12 RX ORDER — SODIUM CHLORIDE 0.9 % (FLUSH) 0.9 %
10 SYRINGE (ML) INJECTION EVERY 12 HOURS SCHEDULED
Status: CANCELLED | OUTPATIENT
Start: 2019-08-12

## 2019-08-12 RX ORDER — ASPIRIN 81 MG/1
81 TABLET ORAL ONCE
Status: CANCELLED | OUTPATIENT
Start: 2019-08-12 | End: 2019-08-12

## 2019-08-12 RX ORDER — HYDROCODONE BITARTRATE AND ACETAMINOPHEN 5; 325 MG/1; MG/1
1 TABLET ORAL EVERY 6 HOURS PRN
Qty: 120 TABLET | Refills: 0 | Status: SHIPPED | OUTPATIENT
Start: 2019-08-12 | End: 2019-09-09 | Stop reason: SDUPTHER

## 2019-08-12 RX ORDER — SODIUM CHLORIDE 0.9 % (FLUSH) 0.9 %
10 SYRINGE (ML) INJECTION PRN
Status: CANCELLED | OUTPATIENT
Start: 2019-08-12

## 2019-08-12 NOTE — H&P (VIEW-ONLY)
Refill    hydrochlorothiazide (MICROZIDE) 12.5 MG capsule TAKE 1 CAPSULE EVERY MORNING 90 capsule 3    lisinopril (PRINIVIL;ZESTRIL) 40 MG tablet TAKE 1 TABLET EVERY DAY 90 tablet 3    amLODIPine (NORVASC) 5 MG tablet TAKE 1 TABLET EVERY DAY 90 tablet 3    HYDROcodone-acetaminophen (NORCO) 5-325 MG per tablet Take 1 tablet by mouth every 6 hours as needed for Pain for up to 30 days. G89.4 120 tablet 0    SYMBICORT 160-4.5 MCG/ACT AERO INHALE 2 PUFFS TWICE DAILY.  RINSE MOUTH AFTER USE. 30.6 Inhaler 2    alendronate (FOSAMAX) 70 MG tablet Take 1 tablet by mouth once a week 21 tablet 1    vitamin D (ERGOCALCIFEROL) 64250 units CAPS capsule TAKE 1 CAPSULE BY MOUTH ONCE A WEEK 4 capsule 5    pramipexole (MIRAPEX) 0.25 MG tablet TAKE 1 TO 2 TABLETS BY MOUTH AT BEDTIME AS NEEDED 180 tablet 3    atorvastatin (LIPITOR) 80 MG tablet Take 1 tablet by mouth daily 90 tablet 3    aspirin 81 MG tablet Take 81 mg by mouth daily.        dicyclomine (BENTYL) 10 MG capsule Take 10 mg by mouth three times daily.        clopidogrel (PLAVIX) 75 MG tablet TAKE 1 TABLET EVERY DAY 90 tablet 1      No current facility-administered medications for this visit.          Allergies: Codeine and Valium  Past Medical History           Past Medical History:   Diagnosis Date    Actinic keratosis      Atherosclerosis of native arteries of the extremities with intermittent claudication 9/19/2013    Benign fundic gland polyps of stomach      Blocked artery      Carotid artery bruit       right    Carotid artery stenosis 9/19/2013    Chronic cough      Emphysema of lung (HCC)      Hyperlipemia      Hypertension      Irritable bowel syndrome      Lower back pain      Macular degeneration      Need for prophylactic vaccination with Streptococcus pneumoniae (Pneumococcus) and Influenza vaccines      Osteoarthritis      Osteoporosis 9/17/2017    Prediabetes      Pulmonary emphysema ([facility] 9/17/2017    PVD (peripheral vascular

## 2019-08-13 ENCOUNTER — APPOINTMENT (OUTPATIENT)
Dept: INTERVENTIONAL RADIOLOGY/VASCULAR | Age: 84
DRG: 254 | End: 2019-08-13
Attending: SURGERY
Payer: MEDICARE

## 2019-08-13 ENCOUNTER — HOSPITAL ENCOUNTER (INPATIENT)
Age: 84
LOS: 1 days | Discharge: HOME | DRG: 254 | End: 2019-08-14
Attending: SURGERY | Admitting: SURGERY
Payer: MEDICARE

## 2019-08-13 ENCOUNTER — ANESTHESIA (OUTPATIENT)
Dept: OPERATING ROOM | Age: 84
DRG: 254 | End: 2019-08-13
Payer: MEDICARE

## 2019-08-13 ENCOUNTER — ANESTHESIA EVENT (OUTPATIENT)
Dept: OPERATING ROOM | Age: 84
DRG: 254 | End: 2019-08-13
Payer: MEDICARE

## 2019-08-13 VITALS
OXYGEN SATURATION: 80 % | SYSTOLIC BLOOD PRESSURE: 156 MMHG | RESPIRATION RATE: 6 BRPM | DIASTOLIC BLOOD PRESSURE: 67 MMHG | TEMPERATURE: 96.8 F

## 2019-08-13 PROBLEM — I70.219 ATHEROSCLEROSIS WITH CLAUDICATION OF EXTREMITY (HCC): Status: ACTIVE | Noted: 2019-08-13

## 2019-08-13 LAB
ABO/RH: NORMAL
ANION GAP SERPL CALCULATED.3IONS-SCNC: 10 MMOL/L (ref 7–19)
ANTIBODY SCREEN: NORMAL
APTT: 28.3 SEC (ref 26–36.2)
BUN BLDV-MCNC: 24 MG/DL (ref 8–23)
CALCIUM SERPL-MCNC: 9.3 MG/DL (ref 8.8–10.2)
CHLORIDE BLD-SCNC: 103 MMOL/L (ref 98–111)
CO2: 28 MMOL/L (ref 22–29)
CREAT SERPL-MCNC: 1.3 MG/DL (ref 0.5–0.9)
GFR NON-AFRICAN AMERICAN: 39
GLUCOSE BLD-MCNC: 148 MG/DL (ref 74–109)
HCT VFR BLD CALC: 41 % (ref 37–47)
HEMOGLOBIN: 13 G/DL (ref 12–16)
INR BLD: 1.08 (ref 0.88–1.18)
MCH RBC QN AUTO: 29.4 PG (ref 27–31)
MCHC RBC AUTO-ENTMCNC: 31.7 G/DL (ref 33–37)
MCV RBC AUTO: 92.8 FL (ref 81–99)
PDW BLD-RTO: 15.2 % (ref 11.5–14.5)
PLATELET # BLD: 235 K/UL (ref 130–400)
PMV BLD AUTO: 10 FL (ref 9.4–12.3)
POTASSIUM REFLEX MAGNESIUM: 4.6 MMOL/L (ref 3.5–4.9)
PROTHROMBIN TIME: 13.4 SEC (ref 12–14.6)
RBC # BLD: 4.42 M/UL (ref 4.2–5.4)
SODIUM BLD-SCNC: 141 MMOL/L (ref 136–145)
WBC # BLD: 6.3 K/UL (ref 4.8–10.8)

## 2019-08-13 PROCEDURE — 6360000002 HC RX W HCPCS: Performed by: SURGERY

## 2019-08-13 PROCEDURE — 3600000015 HC SURGERY LEVEL 5 ADDTL 15MIN: Performed by: SURGERY

## 2019-08-13 PROCEDURE — B41GYZZ FLUOROSCOPY OF LEFT LOWER EXTREMITY ARTERIES USING OTHER CONTRAST: ICD-10-PCS | Performed by: SURGERY

## 2019-08-13 PROCEDURE — 2580000003 HC RX 258: Performed by: SURGERY

## 2019-08-13 PROCEDURE — 6360000002 HC RX W HCPCS: Performed by: NURSE PRACTITIONER

## 2019-08-13 PROCEDURE — C1769 GUIDE WIRE: HCPCS | Performed by: SURGERY

## 2019-08-13 PROCEDURE — 85730 THROMBOPLASTIN TIME PARTIAL: CPT

## 2019-08-13 PROCEDURE — 04CL0ZZ EXTIRPATION OF MATTER FROM LEFT FEMORAL ARTERY, OPEN APPROACH: ICD-10-PCS | Performed by: SURGERY

## 2019-08-13 PROCEDURE — 7100000001 HC PACU RECOVERY - ADDTL 15 MIN: Performed by: SURGERY

## 2019-08-13 PROCEDURE — 2780000010 HC IMPLANT OTHER: Performed by: SURGERY

## 2019-08-13 PROCEDURE — 7100000000 HC PACU RECOVERY - FIRST 15 MIN: Performed by: SURGERY

## 2019-08-13 PROCEDURE — 94640 AIRWAY INHALATION TREATMENT: CPT

## 2019-08-13 PROCEDURE — 36415 COLL VENOUS BLD VENIPUNCTURE: CPT

## 2019-08-13 PROCEDURE — 88311 DECALCIFY TISSUE: CPT

## 2019-08-13 PROCEDURE — 3700000001 HC ADD 15 MINUTES (ANESTHESIA): Performed by: SURGERY

## 2019-08-13 PROCEDURE — 6370000000 HC RX 637 (ALT 250 FOR IP): Performed by: SURGERY

## 2019-08-13 PROCEDURE — 04UL0JZ SUPPLEMENT LEFT FEMORAL ARTERY WITH SYNTHETIC SUBSTITUTE, OPEN APPROACH: ICD-10-PCS | Performed by: SURGERY

## 2019-08-13 PROCEDURE — 86901 BLOOD TYPING SEROLOGIC RH(D): CPT

## 2019-08-13 PROCEDURE — 2700000000 HC OXYGEN THERAPY PER DAY

## 2019-08-13 PROCEDURE — 3700000000 HC ANESTHESIA ATTENDED CARE: Performed by: SURGERY

## 2019-08-13 PROCEDURE — 94002 VENT MGMT INPAT INIT DAY: CPT

## 2019-08-13 PROCEDURE — 3600000005 HC SURGERY LEVEL 5 BASE: Performed by: SURGERY

## 2019-08-13 PROCEDURE — 86900 BLOOD TYPING SEROLOGIC ABO: CPT

## 2019-08-13 PROCEDURE — C1894 INTRO/SHEATH, NON-LASER: HCPCS | Performed by: SURGERY

## 2019-08-13 PROCEDURE — 6360000002 HC RX W HCPCS: Performed by: ANESTHESIOLOGY

## 2019-08-13 PROCEDURE — 1210000000 HC MED SURG R&B

## 2019-08-13 PROCEDURE — 86850 RBC ANTIBODY SCREEN: CPT

## 2019-08-13 PROCEDURE — C1768 GRAFT, VASCULAR: HCPCS | Performed by: SURGERY

## 2019-08-13 PROCEDURE — 85027 COMPLETE CBC AUTOMATED: CPT

## 2019-08-13 PROCEDURE — 85610 PROTHROMBIN TIME: CPT

## 2019-08-13 PROCEDURE — 2709999900 HC NON-CHARGEABLE SUPPLY: Performed by: SURGERY

## 2019-08-13 PROCEDURE — 88304 TISSUE EXAM BY PATHOLOGIST: CPT

## 2019-08-13 PROCEDURE — 35302 RECHANNELING OF ARTERY: CPT | Performed by: SURGERY

## 2019-08-13 PROCEDURE — 2500000003 HC RX 250 WO HCPCS

## 2019-08-13 PROCEDURE — 80048 BASIC METABOLIC PNL TOTAL CA: CPT

## 2019-08-13 PROCEDURE — 6360000002 HC RX W HCPCS

## 2019-08-13 DEVICE — PATCH BIOLOGIC VASC 1CM WX14CM L .55MM THICKNESSXENOSURE: Type: IMPLANTABLE DEVICE | Site: ARTERIAL | Status: FUNCTIONAL

## 2019-08-13 RX ORDER — PROPOFOL 10 MG/ML
INJECTION, EMULSION INTRAVENOUS PRN
Status: DISCONTINUED | OUTPATIENT
Start: 2019-08-13 | End: 2019-08-13 | Stop reason: SDUPTHER

## 2019-08-13 RX ORDER — SODIUM CHLORIDE, SODIUM LACTATE, POTASSIUM CHLORIDE, CALCIUM CHLORIDE 600; 310; 30; 20 MG/100ML; MG/100ML; MG/100ML; MG/100ML
INJECTION, SOLUTION INTRAVENOUS CONTINUOUS
Status: DISCONTINUED | OUTPATIENT
Start: 2019-08-13 | End: 2019-08-13

## 2019-08-13 RX ORDER — SODIUM CHLORIDE 0.9 % (FLUSH) 0.9 %
10 SYRINGE (ML) INJECTION PRN
Status: DISCONTINUED | OUTPATIENT
Start: 2019-08-13 | End: 2019-08-13 | Stop reason: HOSPADM

## 2019-08-13 RX ORDER — ONDANSETRON 2 MG/ML
4 INJECTION INTRAMUSCULAR; INTRAVENOUS EVERY 6 HOURS PRN
Status: DISCONTINUED | OUTPATIENT
Start: 2019-08-13 | End: 2019-08-14 | Stop reason: HOSPADM

## 2019-08-13 RX ORDER — SODIUM CHLORIDE 0.9 % (FLUSH) 0.9 %
10 SYRINGE (ML) INJECTION EVERY 12 HOURS SCHEDULED
Status: DISCONTINUED | OUTPATIENT
Start: 2019-08-13 | End: 2019-08-14 | Stop reason: HOSPADM

## 2019-08-13 RX ORDER — HYDROCODONE BITARTRATE AND ACETAMINOPHEN 5; 325 MG/1; MG/1
1 TABLET ORAL EVERY 4 HOURS PRN
Status: DISCONTINUED | OUTPATIENT
Start: 2019-08-13 | End: 2019-08-14 | Stop reason: HOSPADM

## 2019-08-13 RX ORDER — LIDOCAINE HYDROCHLORIDE 10 MG/ML
INJECTION, SOLUTION EPIDURAL; INFILTRATION; INTRACAUDAL; PERINEURAL PRN
Status: DISCONTINUED | OUTPATIENT
Start: 2019-08-13 | End: 2019-08-13 | Stop reason: SDUPTHER

## 2019-08-13 RX ORDER — MORPHINE SULFATE 2 MG/ML
2 INJECTION, SOLUTION INTRAMUSCULAR; INTRAVENOUS EVERY 5 MIN PRN
Status: DISCONTINUED | OUTPATIENT
Start: 2019-08-13 | End: 2019-08-13 | Stop reason: HOSPADM

## 2019-08-13 RX ORDER — MORPHINE SULFATE 2 MG/ML
4 INJECTION, SOLUTION INTRAMUSCULAR; INTRAVENOUS EVERY 5 MIN PRN
Status: DISCONTINUED | OUTPATIENT
Start: 2019-08-13 | End: 2019-08-13 | Stop reason: HOSPADM

## 2019-08-13 RX ORDER — SODIUM CHLORIDE 0.9 % (FLUSH) 0.9 %
10 SYRINGE (ML) INJECTION PRN
Status: DISCONTINUED | OUTPATIENT
Start: 2019-08-13 | End: 2019-08-14 | Stop reason: HOSPADM

## 2019-08-13 RX ORDER — BUDESONIDE 0.25 MG/2ML
0.25 INHALANT ORAL 2 TIMES DAILY
Status: DISCONTINUED | OUTPATIENT
Start: 2019-08-13 | End: 2019-08-14 | Stop reason: HOSPADM

## 2019-08-13 RX ORDER — ACETYLCYSTEINE 200 MG/ML
600 SOLUTION ORAL; RESPIRATORY (INHALATION) 2 TIMES DAILY
Status: DISCONTINUED | OUTPATIENT
Start: 2019-08-13 | End: 2019-08-14 | Stop reason: HOSPADM

## 2019-08-13 RX ORDER — ALBUTEROL SULFATE 2.5 MG/3ML
2.5 SOLUTION RESPIRATORY (INHALATION) 4 TIMES DAILY
Status: DISCONTINUED | OUTPATIENT
Start: 2019-08-13 | End: 2019-08-14 | Stop reason: HOSPADM

## 2019-08-13 RX ORDER — CLOPIDOGREL BISULFATE 75 MG/1
75 TABLET ORAL DAILY
Status: DISCONTINUED | OUTPATIENT
Start: 2019-08-13 | End: 2019-08-14 | Stop reason: HOSPADM

## 2019-08-13 RX ORDER — SUCCINYLCHOLINE CHLORIDE 20 MG/ML
INJECTION INTRAMUSCULAR; INTRAVENOUS PRN
Status: DISCONTINUED | OUTPATIENT
Start: 2019-08-13 | End: 2019-08-13 | Stop reason: SDUPTHER

## 2019-08-13 RX ORDER — AMLODIPINE BESYLATE 5 MG/1
5 TABLET ORAL DAILY
Status: DISCONTINUED | OUTPATIENT
Start: 2019-08-14 | End: 2019-08-14 | Stop reason: HOSPADM

## 2019-08-13 RX ORDER — HYDROCHLOROTHIAZIDE 12.5 MG/1
12.5 CAPSULE, GELATIN COATED ORAL 2 TIMES DAILY
Status: DISCONTINUED | OUTPATIENT
Start: 2019-08-13 | End: 2019-08-14 | Stop reason: HOSPADM

## 2019-08-13 RX ORDER — FENTANYL CITRATE 50 UG/ML
INJECTION, SOLUTION INTRAMUSCULAR; INTRAVENOUS PRN
Status: DISCONTINUED | OUTPATIENT
Start: 2019-08-13 | End: 2019-08-13 | Stop reason: SDUPTHER

## 2019-08-13 RX ORDER — ALENDRONATE SODIUM 70 MG/1
70 TABLET ORAL WEEKLY
Status: DISCONTINUED | OUTPATIENT
Start: 2019-08-13 | End: 2019-08-13

## 2019-08-13 RX ORDER — SODIUM CHLORIDE 0.9 % (FLUSH) 0.9 %
10 SYRINGE (ML) INJECTION EVERY 12 HOURS SCHEDULED
Status: DISCONTINUED | OUTPATIENT
Start: 2019-08-13 | End: 2019-08-13 | Stop reason: HOSPADM

## 2019-08-13 RX ORDER — MORPHINE SULFATE 4 MG/ML
4 INJECTION, SOLUTION INTRAMUSCULAR; INTRAVENOUS
Status: DISCONTINUED | OUTPATIENT
Start: 2019-08-13 | End: 2019-08-13 | Stop reason: HOSPADM

## 2019-08-13 RX ORDER — POLYETHYLENE GLYCOL 3350 17 G/17G
17 POWDER, FOR SOLUTION ORAL DAILY PRN
Status: DISCONTINUED | OUTPATIENT
Start: 2019-08-13 | End: 2019-08-14 | Stop reason: HOSPADM

## 2019-08-13 RX ORDER — MIDAZOLAM HYDROCHLORIDE 1 MG/ML
2 INJECTION INTRAMUSCULAR; INTRAVENOUS
Status: COMPLETED | OUTPATIENT
Start: 2019-08-13 | End: 2019-08-13

## 2019-08-13 RX ORDER — HEPARIN SODIUM 1000 [USP'U]/ML
INJECTION, SOLUTION INTRAVENOUS; SUBCUTANEOUS PRN
Status: DISCONTINUED | OUTPATIENT
Start: 2019-08-13 | End: 2019-08-13 | Stop reason: SDUPTHER

## 2019-08-13 RX ORDER — ROCURONIUM BROMIDE 10 MG/ML
INJECTION, SOLUTION INTRAVENOUS PRN
Status: DISCONTINUED | OUTPATIENT
Start: 2019-08-13 | End: 2019-08-13 | Stop reason: SDUPTHER

## 2019-08-13 RX ORDER — PROMETHAZINE HYDROCHLORIDE 25 MG/ML
6.25 INJECTION, SOLUTION INTRAMUSCULAR; INTRAVENOUS
Status: COMPLETED | OUTPATIENT
Start: 2019-08-13 | End: 2019-08-13

## 2019-08-13 RX ORDER — EPHEDRINE SULFATE 50 MG/ML
INJECTION, SOLUTION INTRAVENOUS PRN
Status: DISCONTINUED | OUTPATIENT
Start: 2019-08-13 | End: 2019-08-13 | Stop reason: SDUPTHER

## 2019-08-13 RX ORDER — DICYCLOMINE HYDROCHLORIDE 10 MG/1
10 CAPSULE ORAL 2 TIMES DAILY
Status: DISCONTINUED | OUTPATIENT
Start: 2019-08-13 | End: 2019-08-14 | Stop reason: HOSPADM

## 2019-08-13 RX ORDER — ONDANSETRON 2 MG/ML
INJECTION INTRAMUSCULAR; INTRAVENOUS PRN
Status: DISCONTINUED | OUTPATIENT
Start: 2019-08-13 | End: 2019-08-13 | Stop reason: SDUPTHER

## 2019-08-13 RX ORDER — DIPHENHYDRAMINE HYDROCHLORIDE 50 MG/ML
12.5 INJECTION INTRAMUSCULAR; INTRAVENOUS
Status: DISCONTINUED | OUTPATIENT
Start: 2019-08-13 | End: 2019-08-13 | Stop reason: HOSPADM

## 2019-08-13 RX ORDER — HYDRALAZINE HYDROCHLORIDE 20 MG/ML
5 INJECTION INTRAMUSCULAR; INTRAVENOUS EVERY 10 MIN PRN
Status: DISCONTINUED | OUTPATIENT
Start: 2019-08-13 | End: 2019-08-13 | Stop reason: HOSPADM

## 2019-08-13 RX ORDER — PRAMIPEXOLE DIHYDROCHLORIDE 0.12 MG/1
0.25 TABLET ORAL NIGHTLY
Status: DISCONTINUED | OUTPATIENT
Start: 2019-08-13 | End: 2019-08-14 | Stop reason: HOSPADM

## 2019-08-13 RX ORDER — SODIUM CHLORIDE 9 MG/ML
INJECTION, SOLUTION INTRAVENOUS CONTINUOUS
Status: ACTIVE | OUTPATIENT
Start: 2019-08-13 | End: 2019-08-13

## 2019-08-13 RX ORDER — LABETALOL 20 MG/4 ML (5 MG/ML) INTRAVENOUS SYRINGE
5 EVERY 10 MIN PRN
Status: DISCONTINUED | OUTPATIENT
Start: 2019-08-13 | End: 2019-08-13 | Stop reason: HOSPADM

## 2019-08-13 RX ORDER — PROTAMINE SULFATE 10 MG/ML
INJECTION, SOLUTION INTRAVENOUS PRN
Status: DISCONTINUED | OUTPATIENT
Start: 2019-08-13 | End: 2019-08-13 | Stop reason: SDUPTHER

## 2019-08-13 RX ORDER — METOCLOPRAMIDE HYDROCHLORIDE 5 MG/ML
10 INJECTION INTRAMUSCULAR; INTRAVENOUS
Status: DISCONTINUED | OUTPATIENT
Start: 2019-08-13 | End: 2019-08-13 | Stop reason: HOSPADM

## 2019-08-13 RX ORDER — LIDOCAINE HYDROCHLORIDE 10 MG/ML
1 INJECTION, SOLUTION EPIDURAL; INFILTRATION; INTRACAUDAL; PERINEURAL
Status: DISCONTINUED | OUTPATIENT
Start: 2019-08-13 | End: 2019-08-13 | Stop reason: HOSPADM

## 2019-08-13 RX ORDER — DEXAMETHASONE SODIUM PHOSPHATE 10 MG/ML
INJECTION INTRAMUSCULAR; INTRAVENOUS PRN
Status: DISCONTINUED | OUTPATIENT
Start: 2019-08-13 | End: 2019-08-13 | Stop reason: SDUPTHER

## 2019-08-13 RX ORDER — FUROSEMIDE 20 MG/1
10 TABLET ORAL DAILY
Status: DISCONTINUED | OUTPATIENT
Start: 2019-08-13 | End: 2019-08-14 | Stop reason: HOSPADM

## 2019-08-13 RX ORDER — HYDROCODONE BITARTRATE AND ACETAMINOPHEN 5; 325 MG/1; MG/1
2 TABLET ORAL EVERY 4 HOURS PRN
Status: DISCONTINUED | OUTPATIENT
Start: 2019-08-13 | End: 2019-08-14 | Stop reason: HOSPADM

## 2019-08-13 RX ORDER — FENTANYL CITRATE 50 UG/ML
50 INJECTION, SOLUTION INTRAMUSCULAR; INTRAVENOUS
Status: DISCONTINUED | OUTPATIENT
Start: 2019-08-13 | End: 2019-08-13 | Stop reason: HOSPADM

## 2019-08-13 RX ORDER — MEPERIDINE HYDROCHLORIDE 50 MG/ML
12.5 INJECTION INTRAMUSCULAR; INTRAVENOUS; SUBCUTANEOUS EVERY 5 MIN PRN
Status: DISCONTINUED | OUTPATIENT
Start: 2019-08-13 | End: 2019-08-13 | Stop reason: HOSPADM

## 2019-08-13 RX ORDER — HYDRALAZINE HYDROCHLORIDE 20 MG/ML
10 INJECTION INTRAMUSCULAR; INTRAVENOUS
Status: DISCONTINUED | OUTPATIENT
Start: 2019-08-13 | End: 2019-08-14 | Stop reason: HOSPADM

## 2019-08-13 RX ORDER — ATORVASTATIN CALCIUM 80 MG/1
80 TABLET, FILM COATED ORAL DAILY
Status: DISCONTINUED | OUTPATIENT
Start: 2019-08-13 | End: 2019-08-14 | Stop reason: HOSPADM

## 2019-08-13 RX ORDER — CLONIDINE HYDROCHLORIDE 0.1 MG/1
0.1 TABLET ORAL
Status: DISCONTINUED | OUTPATIENT
Start: 2019-08-13 | End: 2019-08-14 | Stop reason: HOSPADM

## 2019-08-13 RX ORDER — LISINOPRIL 20 MG/1
40 TABLET ORAL DAILY
Status: DISCONTINUED | OUTPATIENT
Start: 2019-08-14 | End: 2019-08-14 | Stop reason: HOSPADM

## 2019-08-13 RX ORDER — ASPIRIN 81 MG/1
81 TABLET ORAL DAILY
Status: DISCONTINUED | OUTPATIENT
Start: 2019-08-14 | End: 2019-08-14 | Stop reason: HOSPADM

## 2019-08-13 RX ORDER — SCOLOPAMINE TRANSDERMAL SYSTEM 1 MG/1
1 PATCH, EXTENDED RELEASE TRANSDERMAL ONCE
Status: DISCONTINUED | OUTPATIENT
Start: 2019-08-13 | End: 2019-08-13 | Stop reason: HOSPADM

## 2019-08-13 RX ORDER — ASPIRIN 81 MG/1
81 TABLET ORAL ONCE
Status: DISCONTINUED | OUTPATIENT
Start: 2019-08-13 | End: 2019-08-13 | Stop reason: HOSPADM

## 2019-08-13 RX ORDER — ACETAMINOPHEN 325 MG/1
650 TABLET ORAL EVERY 4 HOURS PRN
Status: DISCONTINUED | OUTPATIENT
Start: 2019-08-13 | End: 2019-08-14 | Stop reason: HOSPADM

## 2019-08-13 RX ADMIN — HEPARIN SODIUM 6000 UNITS: 1000 INJECTION, SOLUTION INTRAVENOUS; SUBCUTANEOUS at 11:51

## 2019-08-13 RX ADMIN — ACETAMINOPHEN 650 MG: 325 TABLET ORAL at 17:29

## 2019-08-13 RX ADMIN — EPHEDRINE SULFATE 10 MG: 50 INJECTION INTRAMUSCULAR; INTRAVENOUS; SUBCUTANEOUS at 11:10

## 2019-08-13 RX ADMIN — SUCCINYLCHOLINE CHLORIDE 140 MG: 20 INJECTION, SOLUTION INTRAMUSCULAR; INTRAVENOUS; PARENTERAL at 10:51

## 2019-08-13 RX ADMIN — PRAMIPEXOLE DIHYDROCHLORIDE 0.25 MG: 0.12 TABLET ORAL at 21:53

## 2019-08-13 RX ADMIN — Medication 2 G: at 11:00

## 2019-08-13 RX ADMIN — Medication 10 ML: at 21:56

## 2019-08-13 RX ADMIN — FUROSEMIDE 10 MG: 20 TABLET ORAL at 17:29

## 2019-08-13 RX ADMIN — FENTANYL CITRATE 50 MCG: 50 INJECTION INTRAMUSCULAR; INTRAVENOUS at 12:44

## 2019-08-13 RX ADMIN — Medication 2 G: at 20:10

## 2019-08-13 RX ADMIN — MIDAZOLAM 2 MG: 1 INJECTION INTRAMUSCULAR; INTRAVENOUS at 09:59

## 2019-08-13 RX ADMIN — SUGAMMADEX 10 MG: 100 INJECTION, SOLUTION INTRAVENOUS at 12:51

## 2019-08-13 RX ADMIN — ROCURONIUM BROMIDE 50 MG: 10 INJECTION INTRAVENOUS at 11:00

## 2019-08-13 RX ADMIN — FENTANYL CITRATE 50 MCG: 50 INJECTION INTRAMUSCULAR; INTRAVENOUS at 12:32

## 2019-08-13 RX ADMIN — SODIUM CHLORIDE, SODIUM LACTATE, POTASSIUM CHLORIDE, AND CALCIUM CHLORIDE: 600; 310; 30; 20 INJECTION, SOLUTION INTRAVENOUS at 11:41

## 2019-08-13 RX ADMIN — PROTAMINE SULFATE 30 MG: 10 INJECTION, SOLUTION INTRAVENOUS at 12:41

## 2019-08-13 RX ADMIN — BUDESONIDE 250 MCG: 0.25 INHALANT RESPIRATORY (INHALATION) at 19:11

## 2019-08-13 RX ADMIN — PROPOFOL 160 MG: 10 INJECTION, EMULSION INTRAVENOUS at 10:51

## 2019-08-13 RX ADMIN — DICYCLOMINE HYDROCHLORIDE 10 MG: 10 CAPSULE ORAL at 21:53

## 2019-08-13 RX ADMIN — EPHEDRINE SULFATE 10 MG: 50 INJECTION INTRAMUSCULAR; INTRAVENOUS; SUBCUTANEOUS at 11:03

## 2019-08-13 RX ADMIN — PROMETHAZINE HYDROCHLORIDE 6.25 MG: 25 INJECTION INTRAMUSCULAR; INTRAVENOUS at 14:21

## 2019-08-13 RX ADMIN — CLOPIDOGREL BISULFATE 75 MG: 75 TABLET ORAL at 17:29

## 2019-08-13 RX ADMIN — ROCURONIUM BROMIDE 30 MG: 10 INJECTION INTRAVENOUS at 12:28

## 2019-08-13 RX ADMIN — MORPHINE SULFATE 2 MG: 2 INJECTION, SOLUTION INTRAMUSCULAR; INTRAVENOUS at 14:00

## 2019-08-13 RX ADMIN — FENTANYL CITRATE 50 MCG: 50 INJECTION INTRAMUSCULAR; INTRAVENOUS at 11:41

## 2019-08-13 RX ADMIN — HYDROCHLOROTHIAZIDE 12.5 MG: 12.5 CAPSULE ORAL at 21:53

## 2019-08-13 RX ADMIN — ONDANSETRON HYDROCHLORIDE 4 MG: 2 INJECTION, SOLUTION INTRAMUSCULAR; INTRAVENOUS at 12:41

## 2019-08-13 RX ADMIN — ATORVASTATIN CALCIUM 80 MG: 80 TABLET, FILM COATED ORAL at 17:36

## 2019-08-13 RX ADMIN — SUGAMMADEX 50 MG: 100 INJECTION, SOLUTION INTRAVENOUS at 12:53

## 2019-08-13 RX ADMIN — DEXAMETHASONE SODIUM PHOSPHATE 10 MG: 10 INJECTION INTRAMUSCULAR; INTRAVENOUS at 11:00

## 2019-08-13 RX ADMIN — SUGAMMADEX 10 MG: 100 INJECTION, SOLUTION INTRAVENOUS at 12:52

## 2019-08-13 RX ADMIN — ALBUTEROL SULFATE 2.5 MG: 2.5 SOLUTION RESPIRATORY (INHALATION) at 19:12

## 2019-08-13 RX ADMIN — ACETYLCYSTEINE 600 MG: 200 SOLUTION ORAL; RESPIRATORY (INHALATION) at 21:53

## 2019-08-13 RX ADMIN — SODIUM CHLORIDE 75 ML/HR: 9 INJECTION, SOLUTION INTRAVENOUS at 17:18

## 2019-08-13 RX ADMIN — SODIUM CHLORIDE, SODIUM LACTATE, POTASSIUM CHLORIDE, AND CALCIUM CHLORIDE: 600; 310; 30; 20 INJECTION, SOLUTION INTRAVENOUS at 09:12

## 2019-08-13 RX ADMIN — LIDOCAINE HYDROCHLORIDE 50 MG: 10 INJECTION, SOLUTION EPIDURAL; INFILTRATION; INTRACAUDAL; PERINEURAL at 10:51

## 2019-08-13 ASSESSMENT — PAIN DESCRIPTION - PAIN TYPE
TYPE: SURGICAL PAIN
TYPE: SURGICAL PAIN

## 2019-08-13 ASSESSMENT — PAIN SCALES - GENERAL
PAINLEVEL_OUTOF10: 0
PAINLEVEL_OUTOF10: 0
PAINLEVEL_OUTOF10: 5
PAINLEVEL_OUTOF10: 6

## 2019-08-13 ASSESSMENT — PULMONARY FUNCTION TESTS
PIF_VALUE: 4.1
PIF_VALUE: 25.2
PIF_VALUE: 28.9

## 2019-08-13 ASSESSMENT — PAIN - FUNCTIONAL ASSESSMENT: PAIN_FUNCTIONAL_ASSESSMENT: 0-10

## 2019-08-13 ASSESSMENT — ENCOUNTER SYMPTOMS: SHORTNESS OF BREATH: 0

## 2019-08-13 ASSESSMENT — LIFESTYLE VARIABLES: SMOKING_STATUS: 1

## 2019-08-13 NOTE — PROGRESS NOTES
PHARMACY NOTE  Rajan Whitmore was ordered Fosamax. Per the [address], this medication is non-formulary and not stocked by pharmacy. It has been discontinued. The medication can be reordered at discharge.      Electronically signed by hCris Deluna [facility] on 8/13/2019 at 4:20 PM

## 2019-08-13 NOTE — ANESTHESIA PRE PROCEDURE
tablet TAKE 1 TO 2 TABLETS BY MOUTH AT BEDTIME AS NEEDED 12/17/18   PRAVEEN Berrios   atorvastatin (LIPITOR) 80 MG tablet Take 1 tablet by mouth daily 10/16/18   Juan Nicholson MD   aspirin 81 MG tablet Take 81 mg by mouth daily. Historical Provider, MD       Current medications:    Current Facility-Administered Medications   Medication Dose Route Frequency Provider Last Rate Last Dose    lactated ringers infusion   Intravenous Continuous India Galvan MD        ceFAZolin (ANCEF) 2 g in 0.9% sodium chloride 50 mL IVPB  2 g Intravenous On Call to [address] [place] [address], APRRISSA        sodium chloride flush 0.9 % injection 10 mL  10 mL Intravenous 2 times per day PRAVEEN Arteaga        sodium chloride flush 0.9 % injection 10 mL  10 mL Intravenous PRN PRAVEEN Arteaga        aspirin EC tablet 81 mg  81 mg Oral Once PRAVEEN Arteaga           Allergies:     Allergies   Allergen Reactions    Codeine Nausea And Vomiting    Valium Nausea Only       Problem List:    Patient Active Problem List   Diagnosis Code    Mixed hyperlipidemia E78.2    Hypertension I10    Irritable bowel syndrome K58.9    Osteoarthritis M19.90    Actinic keratosis L57.0    Macular degeneration H35.30    Carotid artery stenosis I65.29    Atherosclerosis of native artery of extremity with intermittent claudication ([facility]) I70.219    Chronic respiratory failure ([facility]) J96.10    PVD (peripheral vascular disease) ([facility]) I73.9    Atherosclerosis of native artery of both lower extremities with intermittent claudication ([facility]) I70.213    Chronic pain G89.29    Osteoporosis M81.0    Restless legs syndrome G25.81    Pulmonary emphysema ([facility]) J43.9    Vitamin D deficiency E55.9    Osteopenia of multiple sites M85.89    Atherosclerosis of native arteries of the extremities with intermittent claudication I70.219    Chronic kidney disease, stage III (moderate) ([facility]) N18.3       Past Medical History:        Diagnosis Date

## 2019-08-13 NOTE — PROGRESS NOTES
4 Eyes Skin Assessment    Manuel Woodard is being assessed upon: Admission    I agree that I, Simon Becker, along with Malini Bennett (either 2 RN's or 1 LPN and 1 RN) have performed a thorough Head to Toe Skin Assessment on the patient. ALL assessment sites listed below have been assessed. Areas assessed by both nurses:     [x]   Head, Face, and Ears   [x]   Shoulders, Back, and Chest  [x]   Arms, Elbows, and Hands   [x]   Coccyx, Sacrum, and Ischium  [x]   Legs, Feet, and Heels    Does the Patient have Skin Breakdown? Yes, wound(s) noted upon assessment. It is the responsibility of the Primary Nurse to assure that the following documentation, preventions, orders, and consults are complete on the above noted wound(s): Wound LDA initiated. LDA Flowsheet Documentation includes the Donna-wound, Wound Assessment, Measurements, Dressing Treatment, Drainage, and Color.     Demarcus Prevention initiated: Yes  Wound Care Orders initiated: Yes    [id number]  nurse consulted for Pressure Injury (Stage 3,4, Unstageable, DTI, NWPT, and Complex wounds) and New or Established Ostomies: No        Primary Nurse eSignature: Simon Becker RN on 8/13/2019 at 5:00 PM      Co-Signer eSignature: Electronically signed by Franklin Patel RN on 8/13/19 at 6:59 PM

## 2019-08-14 VITALS
RESPIRATION RATE: 18 BRPM | BODY MASS INDEX: 22.82 KG/M2 | TEMPERATURE: 98 F | HEART RATE: 66 BPM | OXYGEN SATURATION: 94 % | WEIGHT: 137 LBS | DIASTOLIC BLOOD PRESSURE: 60 MMHG | SYSTOLIC BLOOD PRESSURE: 130 MMHG | HEIGHT: 65 IN

## 2019-08-14 PROBLEM — N18.30 CHRONIC KIDNEY DISEASE, STAGE III (MODERATE) (HCC): Chronic | Status: ACTIVE | Noted: 2019-07-15

## 2019-08-14 LAB
ANION GAP SERPL CALCULATED.3IONS-SCNC: 13 MMOL/L (ref 7–19)
BUN BLDV-MCNC: 21 MG/DL (ref 8–23)
CALCIUM SERPL-MCNC: 8.4 MG/DL (ref 8.8–10.2)
CHLORIDE BLD-SCNC: 99 MMOL/L (ref 98–111)
CO2: 27 MMOL/L (ref 22–29)
CREAT SERPL-MCNC: 1.1 MG/DL (ref 0.5–0.9)
GFR NON-AFRICAN AMERICAN: 47
GLUCOSE BLD-MCNC: 139 MG/DL (ref 74–109)
HCT VFR BLD CALC: 38.1 % (ref 37–47)
HEMOGLOBIN: 11.9 G/DL (ref 12–16)
MCH RBC QN AUTO: 29.5 PG (ref 27–31)
MCHC RBC AUTO-ENTMCNC: 31.2 G/DL (ref 33–37)
MCV RBC AUTO: 94.5 FL (ref 81–99)
PDW BLD-RTO: 15.2 % (ref 11.5–14.5)
PLATELET # BLD: 227 K/UL (ref 130–400)
PMV BLD AUTO: 9.9 FL (ref 9.4–12.3)
POTASSIUM SERPL-SCNC: 4.2 MMOL/L (ref 3.5–5)
RBC # BLD: 4.03 M/UL (ref 4.2–5.4)
SODIUM BLD-SCNC: 139 MMOL/L (ref 136–145)
WBC # BLD: 10.3 K/UL (ref 4.8–10.8)

## 2019-08-14 PROCEDURE — 94640 AIRWAY INHALATION TREATMENT: CPT

## 2019-08-14 PROCEDURE — 6370000000 HC RX 637 (ALT 250 FOR IP): Performed by: SURGERY

## 2019-08-14 PROCEDURE — 36415 COLL VENOUS BLD VENIPUNCTURE: CPT

## 2019-08-14 PROCEDURE — 93922 UPR/L XTREMITY ART 2 LEVELS: CPT

## 2019-08-14 PROCEDURE — 80048 BASIC METABOLIC PNL TOTAL CA: CPT

## 2019-08-14 PROCEDURE — 6360000002 HC RX W HCPCS: Performed by: SURGERY

## 2019-08-14 PROCEDURE — 2580000003 HC RX 258: Performed by: SURGERY

## 2019-08-14 PROCEDURE — 85027 COMPLETE CBC AUTOMATED: CPT

## 2019-08-14 PROCEDURE — 99024 POSTOP FOLLOW-UP VISIT: CPT | Performed by: NURSE PRACTITIONER

## 2019-08-14 RX ADMIN — HYDROCHLOROTHIAZIDE 12.5 MG: 12.5 CAPSULE ORAL at 08:15

## 2019-08-14 RX ADMIN — ACETAMINOPHEN 650 MG: 325 TABLET ORAL at 06:00

## 2019-08-14 RX ADMIN — Medication 10 ML: at 08:16

## 2019-08-14 RX ADMIN — ASPIRIN 81 MG: 81 TABLET, COATED ORAL at 08:15

## 2019-08-14 RX ADMIN — Medication 2 G: at 02:40

## 2019-08-14 RX ADMIN — DICYCLOMINE HYDROCHLORIDE 10 MG: 10 CAPSULE ORAL at 08:15

## 2019-08-14 RX ADMIN — AMLODIPINE BESYLATE 5 MG: 5 TABLET ORAL at 08:15

## 2019-08-14 RX ADMIN — ALBUTEROL SULFATE 2.5 MG: 2.5 SOLUTION RESPIRATORY (INHALATION) at 11:06

## 2019-08-14 RX ADMIN — ATORVASTATIN CALCIUM 80 MG: 80 TABLET, FILM COATED ORAL at 08:15

## 2019-08-14 RX ADMIN — CLOPIDOGREL BISULFATE 75 MG: 75 TABLET ORAL at 08:15

## 2019-08-14 RX ADMIN — FUROSEMIDE 10 MG: 20 TABLET ORAL at 08:15

## 2019-08-14 RX ADMIN — LISINOPRIL 40 MG: 20 TABLET ORAL at 08:15

## 2019-08-14 RX ADMIN — ALBUTEROL SULFATE 2.5 MG: 2.5 SOLUTION RESPIRATORY (INHALATION) at 07:19

## 2019-08-14 RX ADMIN — BUDESONIDE 250 MCG: 0.25 INHALANT RESPIRATORY (INHALATION) at 07:19

## 2019-08-14 ASSESSMENT — PAIN SCALES - GENERAL: PAINLEVEL_OUTOF10: 3

## 2019-08-14 NOTE — DISCHARGE SUMMARY
cooperative. HEENT: Normocephalic. Atraumatic. SLAVA. Neck: Supple. No JVD. Trachea midline. Respiratory:  Normal respiratory effort. Coarse, but fairly clear to auscultation bilaterally without rales, wheezes, or rhonchi. Cardiovascular: Regular rate and rhythm with normal heart tones without murmurs, rubs or gallops. Abdomen: Soft, non-tender, non-distended with normal bowel sounds. Extremities: No clubbing, cyanosis or edema bilaterally. Feet warm to touch/pink color, +palp DP pulses noted. Neurologic:  Grossly intact. Psychiatric: Alert and oriented, thought content appropriate, normal insight. Surgical Incision:  Left groin incision line with edges well approximated with staples. Activity: Activity as tolerated    Diet: Renal, 2gm Na    Labs: For convenience and continuity at follow-up the following most recent labs are provided:    CBC:   Recent Labs     08/13/19 0922 08/14/19  0753   WBC 6.3 10.3   HGB 13.0 11.9*   HCT 41.0 38.1   MCV 92.8 94.5    227     BMP:    Recent Labs     08/13/19  0922 08/14/19  0753    139   K 4.6 4.2    99   CO2 28 27   BUN 24* 21   CREATININE 1.3* 1.1*     PT/INR:   Recent Labs     08/13/19 0922   PROTIME 13.4   INR 1.08     APTT:   Recent Labs     08/13/19 0922   APTT 28.3         Discharge Medications:     Current Discharge Medication List           Details   HYDROcodone-acetaminophen (NORCO) 5-325 MG per tablet Take 1 tablet by mouth every 6 hours as needed for Pain for up to 30 days.  G89.4  Qty: 120 tablet, Refills: 0    Comments: Reduce doses taken as pain becomes manageable  Associated Diagnoses: Chronic pain syndrome      dicyclomine (BENTYL) 10 MG capsule Take 1 capsule by mouth 2 times daily as needed  Qty: 120 capsule, Refills: 2      vitamin D (ERGOCALCIFEROL) 26909 units CAPS capsule TAKE ONE CAPSULE BY MOUTH ONE TIME PER WEEK  Qty: 4 capsule, Refills: 5      furosemide (LASIX) 20 MG tablet Take 10 mg by mouth daily      clopidogrel

## 2019-08-14 NOTE — PLAN OF CARE
Problem: Falls - Risk of:  Goal: Will remain free from falls  Description  Will remain free from falls  8/14/2019 0509 by Arianne Chung RN  Outcome: Ongoing  8/13/2019 1711 by Simon Becker RN  Outcome: Ongoing  Goal: Absence of physical injury  Description  Absence of physical injury  8/14/2019 0509 by Arianne Chung RN  Outcome: Ongoing  8/13/2019 1711 by Simon Becker RN  Outcome: Ongoing     Problem: Infection:  Goal: Will remain free from infection  Description  Will remain free from infection  8/14/2019 0509 by Arianne Chung RN  Outcome: Ongoing  8/13/2019 1711 by Simon Becker RN  Outcome: Ongoing     Problem: Safety:  Goal: Free from accidental physical injury  Description  Free from accidental physical injury  8/14/2019 0509 by Arianne Chung RN  Outcome: Ongoing  8/13/2019 1711 by Simon Becker RN  Outcome: Ongoing  Goal: Free from intentional harm  Description  Free from intentional harm  8/14/2019 0509 by Arianne Chung RN  Outcome: Ongoing  8/13/2019 1711 by Simon Becker RN  Outcome: Ongoing     Problem: Infection:  Goal: Will remain free from infection  Description  Will remain free from infection  8/14/2019 0509 by Arianne Chung RN  Outcome: Ongoing  8/13/2019 1711 by Simon Becker RN  Outcome: Ongoing     Problem: Safety:  Goal: Free from accidental physical injury  Description  Free from accidental physical injury  8/14/2019 0509 by Arianne Chung RN  Outcome: Ongoing  8/13/2019 1711 by Simon Becker RN  Outcome: Ongoing  Goal: Free from intentional harm  Description  Free from intentional harm  8/14/2019 0509 by Arianne Chung RN  Outcome: Ongoing  8/13/2019 1711 by Simon Becker RN  Outcome: Ongoing     Problem: Daily Care:  Goal: Daily care needs are met  Description  Daily care needs are met  8/14/2019 0509 by Arianne Chung RN  Outcome: Ongoing  8/13/2019 1711 by Simon Becker RN  Outcome: Ongoing     Problem: Discharge Planning:  Goal: Patients continuum of care needs are met  Description  Patients continuum of care needs are met  8/14/2019 0509 by Mandi Thrasher RN  Outcome: Ongoing  8/13/2019 1711 by Jb German RN  Outcome: Ongoing

## 2019-08-14 NOTE — PROGRESS NOTES
Vascular Surgery  Dr.Scott Apoorva Salazar   Daily Progress Note    Pt Name: [id number] Record Number: [id number]  Date of Birth 1935   Today's Date: 8/14/2019    SUBJECTIVE:     Patient was seen and examined. Pain is  controlled  Patient stating she feels shaky this am like her Parkinson's is a little worse because of her OR    OBJECTIVE:     Patient Vitals for the past 24 hrs:   BP Temp Temp src Pulse Resp SpO2 Height Weight   08/14/19 0719 -- -- -- -- 18 94 % -- --   08/14/19 0610 130/60 98 °F (36.7 °C) Temporal 66 16 93 % -- --   08/13/19 2337 121/61 98.8 °F (37.1 °C) Temporal 68 18 94 % -- --   08/13/19 1822 126/65 98.6 °F (37 °C) Temporal 69 16 90 % -- --   08/13/19 1535 (!) 115/50 97.5 °F (36.4 °C) -- 67 16 92 % -- --   08/13/19 1521 (!) 128/47 -- -- 69 19 96 % -- --   08/13/19 1515 (!) 99/51 -- -- 69 14 91 % -- --   08/13/19 1500 (!) 91/53 97.3 °F (36.3 °C) Tympanic 69 14 95 % -- --   08/13/19 1445 93/64 -- -- 68 16 96 % -- --   08/13/19 1430 (!) 104/56 -- -- 66 17 95 % -- --   08/13/19 1415 (!) 106/51 -- -- 65 17 95 % -- --   08/13/19 1400 (!) 91/56 -- -- 66 17 96 % -- --   08/13/19 1345 (!) 104/57 -- -- 64 17 94 % -- --   08/13/19 1333 (!) 100/54 -- -- 64 14 93 % -- --   08/13/19 1331 -- -- -- 69 15 (!) 84 % -- --   08/13/19 1330 -- -- -- 70 -- -- -- --   08/13/19 1329 (!) 99/59 -- -- 68 20 98 % -- --   08/13/19 1325 (!) 98/53 -- -- 56 14 97 % -- --   08/13/19 1324 -- -- -- 56 14 97 % -- --   08/13/19 1321 -- 98.2 °F (36.8 °C) Tympanic 58 10 98 % -- --   08/13/19 1018 (!) 141/63 98.1 °F (36.7 °C) Tympanic 65 16 97 % 5' 5\" (1.651 m) 137 lb (62.1 kg)   08/13/19 0907 -- -- -- -- -- -- 5' 5\" (1.651 m) 137 lb (62.1 kg)       Intake/Output Summary (Last 24 hours) at 8/14/2019 0807  Last data filed at 8/14/2019 0606  Gross per 24 hour   Intake 2170 ml   Output 1600 ml   Net 570 ml     In: 1170 [P.O.:640; I.V.:480]  Out: 1600 [Urine:1600]    I/O last 3 completed shifts:   In: 2170 [P.O.:640; I.V.:1480; IV Piggyback:50]  Out: 1600 [[facility]:0950]     Date 08/14/19 0000 - 08/14/19 2359   Shift 2454-7947 3650-4001 3199-8575 24 Hour Total   INTAKE   P.O.(mL/kg/hr) 200(0.4)   200   IV Piggyback(mL/kg) 50(0.8)   50(0.8)   Shift Total(mL/kg) 250(4)   250(4)   OUTPUT   Urine(mL/kg/hr) 1400(2.8)   1400   Shift Total(mL/kg) 1400(22.5)   1400(22.5)   Weight (kg) 62.1 62.1 62.1 62.1     Wt Readings from Last 3 Encounters:   08/13/19 137 lb (62.1 kg)   07/22/19 137 lb (62.1 kg)   07/18/19 140 lb (63.5 kg)      Body mass index is 22.8 kg/m². Diet: DIET RENAL;    MEDS:     Scheduled Meds:   acetylcysteine  600 mg Oral BID    amLODIPine  5 mg Oral Daily    aspirin  81 mg Oral Daily    atorvastatin  80 mg Oral Daily    clopidogrel  75 mg Oral Daily    dicyclomine  10 mg Oral BID    furosemide  10 mg Oral Daily    hydrochlorothiazide  12.5 mg Oral BID    lisinopril  40 mg Oral Daily    pramipexole  0.25 mg Oral Nightly    sodium chloride flush  10 mL Intravenous 2 times per day    budesonide  0.25 mg Nebulization BID    And    albuterol  2.5 mg Nebulization 4x daily     Continuous Infusions:  PRN Meds:  sodium chloride flush 10 mL PRN   acetaminophen 650 mg Q4H PRN   HYDROcodone 5 mg - acetaminophen 1 tablet Q4H PRN   Or     HYDROcodone 5 mg - acetaminophen 2 tablet Q4H PRN   ondansetron 4 mg Q6H PRN   metoprolol tartrate 25 mg Q12H PRN   cloNIDine 0.1 mg Q2H PRN   HYDROmorphone 0.25 mg Q3H PRN   Or     HYDROmorphone 0.5 mg Q3H PRN   polyethylene glycol 17 g Daily PRN   hydrALAZINE 10 mg Q1H PRN     PHYSICAL EXAM:     CONSTITUTIONAL: Alert and oriented times 3, no acute distress and cooperative to examination. LUNGS: slightly coarse bilaterally anterior  CARDIOVASCULAR: Regular rate and rhythm  ABDOMEN: soft, nontender, nondistended  NEUROLOGIC: Awake, alert, oriented to name, place and time.     WOUND/INCISION:  Left groin incision line with edges well approximated with staples  EXTREMITY: feet warm to touch/pink

## 2019-08-15 ENCOUNTER — CARE COORDINATION (OUTPATIENT)
Dept: CASE MANAGEMENT | Age: 84
End: 2019-08-15

## 2019-08-15 DIAGNOSIS — I70.213 ATHEROSCLEROSIS OF NATIVE ARTERY OF BOTH LOWER EXTREMITIES WITH INTERMITTENT CLAUDICATION (HCC): Primary | ICD-10-CM

## 2019-08-15 PROCEDURE — 1111F DSCHRG MED/CURRENT MED MERGE: CPT | Performed by: NURSE PRACTITIONER

## 2019-08-15 NOTE — CARE COORDINATION
[facility] Transitions Initial Follow Up Call    Call within 2 business days of discharge: Yes    Patient: Greta Singh Patient : 1935   MRN: [id number]  Reason for Admission:   Discharge Date: 19 RARS: Readmission Risk Score: 15      Last Discharge [facility]       Complaint Diagnosis Description Type Department Provider    19   Admission (Discharged) Kye Wang MD           Spoke with: [address] [place] [address]: [place] [id number]      Non-face-to-face services provided:  Reviewed encounter information for continuity of care prior to follow up phone call - chart notes, consults, progress notes, test results, med list, appointments, AVS, other information. Care Transitions 24 Hour Call    Do you have any ongoing symptoms?:  No  Do you have a copy of your discharge instructions?:  Yes  Do you have all of your prescriptions and are they filled?:  Yes  Have you been contacted by a [address]?:  No  Have you scheduled your follow up appointment?:  Yes  How are you going to get to your appointment?:  Car - family or friend to transport  Were you discharged with any Home Care or Post Acute Services:  No  Do you have support at home?:  Child, Grandchild  Do you feel like you have everything you need to keep you well at home?:  Yes  Are you an active caregiver in your home?:  Yes  Care Transitions Interventions         Follow Up : Spoke to patient today for initial follow up phone call after discharge. She says she is tired, but doing well. She says she had some soup and a sandwich for lunch, saying her appetite is good. She is just not use to sitting and not being as active as she was, but she understands it will take a little time to feel better and get back to her normal. She was able to get her discharge medications today, and reconcile them. 1111F order in. She has her follow up appointments set up and is aware.  She says family

## 2019-08-16 ENCOUNTER — CARE COORDINATION (OUTPATIENT)
Dept: CASE MANAGEMENT | Age: 84
End: 2019-08-16

## 2019-08-16 NOTE — CARE COORDINATION
[facility] Transitions Follow Up Call    2019    Patient: Promise Bender  Patient : 1935   MRN: [id number]    Discharge Date: 19 RARS: Readmission Risk Score: 15         Spoke with:Kylie Desouza Transitions Subsequent and Final Call    Subsequent and Final Calls  Care Transitions Interventions  Other Interventions: Follow Up: Received a call from patient reporting that she is needing a small shower chair for her use because she is too weak to  the shower at this time. Informed her that Medicare and Medicaid do not pay for those. She reported that she does not have the money to buy one out right herself. Will check around and see if I can find one for her.         Future Appointments   Date Time Provider Silvia Chan   2019 10:30 AM PRAVEEN Nath [facility]   2019 11:30 AM RAVEN Angel [facility]   2019  9:00 AM PRAVEEN Nath [facility]   2020 10:15 AM PRAVEEN Nath [facility] Vera RN

## 2019-08-19 ENCOUNTER — TELEPHONE (OUTPATIENT)
Dept: INTERNAL MEDICINE | Age: 84
End: 2019-08-19

## 2019-08-21 ENCOUNTER — CARE COORDINATION (OUTPATIENT)
Dept: CASE MANAGEMENT | Age: 84
End: 2019-08-21

## 2019-08-22 ENCOUNTER — CARE COORDINATION (OUTPATIENT)
Dept: CASE MANAGEMENT | Age: 84
End: 2019-08-22

## 2019-08-26 ENCOUNTER — CARE COORDINATION (OUTPATIENT)
Dept: CASE MANAGEMENT | Age: 84
End: 2019-08-26

## 2019-08-29 ENCOUNTER — OFFICE VISIT (OUTPATIENT)
Dept: VASCULAR SURGERY | Age: 84
End: 2019-08-29

## 2019-08-29 VITALS
RESPIRATION RATE: 18 BRPM | HEART RATE: 74 BPM | OXYGEN SATURATION: 98 % | TEMPERATURE: 99 F | SYSTOLIC BLOOD PRESSURE: 127 MMHG | DIASTOLIC BLOOD PRESSURE: 67 MMHG

## 2019-08-29 DIAGNOSIS — I73.9 PVD (PERIPHERAL VASCULAR DISEASE) (HCC): ICD-10-CM

## 2019-08-29 DIAGNOSIS — I73.9 PAD (PERIPHERAL ARTERY DISEASE) (HCC): ICD-10-CM

## 2019-08-29 DIAGNOSIS — I70.218 ATHEROSCLEROSIS OF NATIVE ARTERY OF OTHER EXTREMITY WITH INTERMITTENT CLAUDICATION (HCC): Primary | ICD-10-CM

## 2019-08-29 PROCEDURE — 99024 POSTOP FOLLOW-UP VISIT: CPT | Performed by: PHYSICIAN ASSISTANT

## 2019-08-29 NOTE — PROGRESS NOTES
Patient Care Team:  PRAVEEN Escobar as PCP - General (Nurse Practitioner Acute Care)  PRAVEEN Escobar as PCP - [facility]  Willie Cochran MD as Consulting Physician (Vascular Surgery)  [address] [place] [address], RN as Care Transition  Jacob Esposito RN as Care Transition    Ms. Kyle Tinsley is a 80 y.o. female who presents for post op evaluation. Patient had a Left common femoral proximal superficial femoral and profunda femoris endarterectomy with bovine pericardial patch on 8/13/19 This was performed by Dr. Samia Alexis. Post op symptoms reported none. Post op pain is mild. She denies any fever/chills. On evaluation today, incision is clean, dry, intact. Today we removed all sutures and applied steri strips. Bilateral DP,PT and Peroneal pulses are 2+ with DS. Feet are warm and without wounds. Today we have recommended she wash incision daily with soap and water and dry thoroughly. We have recommended continued ASA 81 mg po qd, clopidogrel 75 mg po qd daily. We will follow up with her in 3 months with a left A'scan with KOLTON's with or sooner if She develops fever/chills or wound deterioration. This should bring you up to date on Ulysess Pretty  As always we want to thank you for allowing us to participate in the care of your patients.     Sincerely,    Etienne Morris PA-C

## 2019-08-30 ENCOUNTER — CARE COORDINATION (OUTPATIENT)
Dept: CASE MANAGEMENT | Age: 84
End: 2019-08-30

## 2019-09-05 ENCOUNTER — OFFICE VISIT (OUTPATIENT)
Dept: VASCULAR SURGERY | Age: 84
End: 2019-09-05

## 2019-09-05 ENCOUNTER — HOSPITAL ENCOUNTER (OUTPATIENT)
Dept: VASCULAR LAB | Age: 84
Discharge: HOME | End: 2019-09-05
Payer: MEDICARE

## 2019-09-05 VITALS — DIASTOLIC BLOOD PRESSURE: 70 MMHG | SYSTOLIC BLOOD PRESSURE: 147 MMHG | HEART RATE: 78 BPM

## 2019-09-05 DIAGNOSIS — M79.605 PAIN AND SWELLING OF LOWER EXTREMITY, LEFT: ICD-10-CM

## 2019-09-05 DIAGNOSIS — M79.605 PAIN AND SWELLING OF LOWER EXTREMITY, LEFT: Primary | ICD-10-CM

## 2019-09-05 DIAGNOSIS — M79.89 PAIN AND SWELLING OF LOWER EXTREMITY, LEFT: Primary | ICD-10-CM

## 2019-09-05 DIAGNOSIS — M79.89 PAIN AND SWELLING OF LOWER EXTREMITY, LEFT: ICD-10-CM

## 2019-09-05 PROCEDURE — 99024 POSTOP FOLLOW-UP VISIT: CPT | Performed by: PHYSICIAN ASSISTANT

## 2019-09-05 PROCEDURE — 93971 EXTREMITY STUDY: CPT

## 2019-09-06 ENCOUNTER — TELEPHONE (OUTPATIENT)
Dept: VASCULAR SURGERY | Age: 84
End: 2019-09-06

## 2019-09-09 DIAGNOSIS — G89.4 CHRONIC PAIN SYNDROME: ICD-10-CM

## 2019-09-09 RX ORDER — HYDROCODONE BITARTRATE AND ACETAMINOPHEN 5; 325 MG/1; MG/1
1 TABLET ORAL EVERY 6 HOURS PRN
Qty: 120 TABLET | Refills: 0 | Status: SHIPPED | OUTPATIENT
Start: 2019-09-09 | End: 2019-10-07 | Stop reason: SDUPTHER

## 2019-09-09 NOTE — TELEPHONE ENCOUNTER
Ricardo Mullen called requesting a refill of the below medication which has been pended for you:     Requested Prescriptions     Pending Prescriptions Disp Refills    HYDROcodone-acetaminophen (NORCO) 5-325 MG per tablet 120 tablet 0     Sig: Take 1 tablet by mouth every 6 hours as needed for Pain for up to 30 days.  G89.4   Johan Pop 9/9/19            Drug Contract 4/15/19    Last Appointment Date: 7/15/2019  Next Appointment Date: 11/18/2019    Allergies   Allergen Reactions    Codeine Nausea And Vomiting    Valium Nausea Only

## 2019-09-11 ENCOUNTER — HOSPITAL ENCOUNTER (INPATIENT)
Age: 84
LOS: 2 days | Discharge: HOME | DRG: 378 | End: 2019-09-13
Attending: EMERGENCY MEDICINE | Admitting: INTERNAL MEDICINE
Payer: MEDICARE

## 2019-09-11 ENCOUNTER — APPOINTMENT (OUTPATIENT)
Dept: GENERAL RADIOLOGY | Age: 84
DRG: 378 | End: 2019-09-11
Payer: MEDICARE

## 2019-09-11 DIAGNOSIS — K92.1 MELENA: ICD-10-CM

## 2019-09-11 DIAGNOSIS — Z98.890 HISTORY OF VASCULAR SURGERY: ICD-10-CM

## 2019-09-11 DIAGNOSIS — D62 ANEMIA DUE TO ACUTE BLOOD LOSS: Primary | ICD-10-CM

## 2019-09-11 PROBLEM — K92.2 GI BLEED: Status: ACTIVE | Noted: 2019-09-11

## 2019-09-11 LAB
ABO/RH: NORMAL
ALBUMIN SERPL-MCNC: 3.7 G/DL (ref 3.5–5.2)
ALP BLD-CCNC: 68 U/L (ref 35–104)
ALT SERPL-CCNC: 11 U/L (ref 5–33)
ANION GAP SERPL CALCULATED.3IONS-SCNC: 11 MMOL/L (ref 7–19)
ANTIBODY SCREEN: NORMAL
APTT: 25.9 SEC (ref 26–36.2)
AST SERPL-CCNC: 16 U/L (ref 5–32)
BASOPHILS ABSOLUTE: 0 K/UL (ref 0–0.2)
BASOPHILS RELATIVE PERCENT: 0.6 % (ref 0–1)
BILIRUB SERPL-MCNC: <0.2 MG/DL (ref 0.2–1.2)
BILIRUBIN DIRECT: 0.2 MG/DL (ref 0–0.3)
BILIRUBIN, INDIRECT: 0 MG/DL (ref 0.1–1)
BUN BLDV-MCNC: 45 MG/DL (ref 8–23)
CALCIUM SERPL-MCNC: 9.4 MG/DL (ref 8.8–10.2)
CHLORIDE BLD-SCNC: 101 MMOL/L (ref 98–111)
CO2: 26 MMOL/L (ref 22–29)
CREAT SERPL-MCNC: 1.4 MG/DL (ref 0.5–0.9)
EOSINOPHILS ABSOLUTE: 0.3 K/UL (ref 0–0.6)
EOSINOPHILS RELATIVE PERCENT: 5.2 % (ref 0–5)
GFR NON-AFRICAN AMERICAN: 36
GLUCOSE BLD-MCNC: 111 MG/DL (ref 74–109)
HCT VFR BLD CALC: 29.3 % (ref 37–47)
HEMOGLOBIN: 8.5 G/DL (ref 12–16)
HEMOGLOBIN: 9.2 G/DL (ref 12–16)
IMMATURE GRANULOCYTES #: 0 K/UL
INR BLD: 1.07 (ref 0.88–1.18)
LACTIC ACID, SEPSIS: 0.7 MG/DL (ref 0.5–1.9)
LACTIC ACID, SEPSIS: 1 MG/DL (ref 0.5–1.9)
LIPASE: 53 U/L (ref 13–60)
LYMPHOCYTES ABSOLUTE: 1 K/UL (ref 1.1–4.5)
LYMPHOCYTES RELATIVE PERCENT: 21 % (ref 20–40)
MCH RBC QN AUTO: 29.6 PG (ref 27–31)
MCHC RBC AUTO-ENTMCNC: 31.4 G/DL (ref 33–37)
MCV RBC AUTO: 94.2 FL (ref 81–99)
MONOCYTES ABSOLUTE: 0.6 K/UL (ref 0–0.9)
MONOCYTES RELATIVE PERCENT: 11.5 % (ref 0–10)
NEUTROPHILS ABSOLUTE: 2.9 K/UL (ref 1.5–7.5)
NEUTROPHILS RELATIVE PERCENT: 61.3 % (ref 50–65)
PDW BLD-RTO: 14.7 % (ref 11.5–14.5)
PLATELET # BLD: 230 K/UL (ref 130–400)
PMV BLD AUTO: 9.7 FL (ref 9.4–12.3)
POTASSIUM REFLEX MAGNESIUM: 4.3 MMOL/L (ref 3.5–5)
PROTHROMBIN TIME: 13.3 SEC (ref 12–14.6)
RBC # BLD: 3.11 M/UL (ref 4.2–5.4)
SODIUM BLD-SCNC: 138 MMOL/L (ref 136–145)
TOTAL PROTEIN: 6 G/DL (ref 6.6–8.7)
WBC # BLD: 4.8 K/UL (ref 4.8–10.8)

## 2019-09-11 PROCEDURE — 85025 COMPLETE CBC W/AUTO DIFF WBC: CPT

## 2019-09-11 PROCEDURE — 6360000002 HC RX W HCPCS: Performed by: EMERGENCY MEDICINE

## 2019-09-11 PROCEDURE — 2580000003 HC RX 258: Performed by: EMERGENCY MEDICINE

## 2019-09-11 PROCEDURE — 80048 BASIC METABOLIC PNL TOTAL CA: CPT

## 2019-09-11 PROCEDURE — 87040 BLOOD CULTURE FOR BACTERIA: CPT

## 2019-09-11 PROCEDURE — 80076 HEPATIC FUNCTION PANEL: CPT

## 2019-09-11 PROCEDURE — 71046 X-RAY EXAM CHEST 2 VIEWS: CPT

## 2019-09-11 PROCEDURE — 99999 PR OFFICE/OUTPT VISIT,PROCEDURE ONLY: CPT | Performed by: EMERGENCY MEDICINE

## 2019-09-11 PROCEDURE — 85730 THROMBOPLASTIN TIME PARTIAL: CPT

## 2019-09-11 PROCEDURE — 85018 HEMOGLOBIN: CPT

## 2019-09-11 PROCEDURE — 94640 AIRWAY INHALATION TREATMENT: CPT

## 2019-09-11 PROCEDURE — 99221 1ST HOSP IP/OBS SF/LOW 40: CPT | Performed by: INTERNAL MEDICINE

## 2019-09-11 PROCEDURE — 83690 ASSAY OF LIPASE: CPT

## 2019-09-11 PROCEDURE — 93005 ELECTROCARDIOGRAM TRACING: CPT

## 2019-09-11 PROCEDURE — 83605 ASSAY OF LACTIC ACID: CPT

## 2019-09-11 PROCEDURE — 6360000002 HC RX W HCPCS: Performed by: INTERNAL MEDICINE

## 2019-09-11 PROCEDURE — 86850 RBC ANTIBODY SCREEN: CPT

## 2019-09-11 PROCEDURE — 85610 PROTHROMBIN TIME: CPT

## 2019-09-11 PROCEDURE — 96375 TX/PRO/DX INJ NEW DRUG ADDON: CPT

## 2019-09-11 PROCEDURE — 96374 THER/PROPH/DIAG INJ IV PUSH: CPT

## 2019-09-11 PROCEDURE — 86901 BLOOD TYPING SEROLOGIC RH(D): CPT

## 2019-09-11 PROCEDURE — 99285 EMERGENCY DEPT VISIT HI MDM: CPT

## 2019-09-11 PROCEDURE — 1210000000 HC MED SURG R&B

## 2019-09-11 PROCEDURE — 36415 COLL VENOUS BLD VENIPUNCTURE: CPT

## 2019-09-11 PROCEDURE — 86900 BLOOD TYPING SEROLOGIC ABO: CPT

## 2019-09-11 PROCEDURE — C9113 INJ PANTOPRAZOLE SODIUM, VIA: HCPCS | Performed by: EMERGENCY MEDICINE

## 2019-09-11 RX ORDER — SODIUM CHLORIDE 9 MG/ML
INJECTION, SOLUTION INTRAVENOUS CONTINUOUS
Status: DISCONTINUED | OUTPATIENT
Start: 2019-09-11 | End: 2019-09-13 | Stop reason: HOSPADM

## 2019-09-11 RX ORDER — 0.9 % SODIUM CHLORIDE 0.9 %
10 VIAL (ML) INJECTION DAILY
Status: DISCONTINUED | OUTPATIENT
Start: 2019-09-11 | End: 2019-09-11 | Stop reason: DRUGHIGH

## 2019-09-11 RX ORDER — ONDANSETRON 2 MG/ML
4 INJECTION INTRAMUSCULAR; INTRAVENOUS EVERY 6 HOURS PRN
Status: DISCONTINUED | OUTPATIENT
Start: 2019-09-11 | End: 2019-09-13 | Stop reason: HOSPADM

## 2019-09-11 RX ORDER — ALBUTEROL SULFATE 2.5 MG/3ML
2.5 SOLUTION RESPIRATORY (INHALATION) 4 TIMES DAILY
Status: DISCONTINUED | OUTPATIENT
Start: 2019-09-11 | End: 2019-09-13 | Stop reason: HOSPADM

## 2019-09-11 RX ORDER — BUDESONIDE 0.5 MG/2ML
0.5 INHALANT ORAL 2 TIMES DAILY
Status: DISCONTINUED | OUTPATIENT
Start: 2019-09-11 | End: 2019-09-13 | Stop reason: HOSPADM

## 2019-09-11 RX ORDER — PANTOPRAZOLE SODIUM 40 MG/10ML
40 INJECTION, POWDER, LYOPHILIZED, FOR SOLUTION INTRAVENOUS EVERY 12 HOURS
Status: DISCONTINUED | OUTPATIENT
Start: 2019-09-12 | End: 2019-09-13 | Stop reason: HOSPADM

## 2019-09-11 RX ORDER — SODIUM CHLORIDE 0.9 % (FLUSH) 0.9 %
10 SYRINGE (ML) INJECTION PRN
Status: DISCONTINUED | OUTPATIENT
Start: 2019-09-11 | End: 2019-09-13 | Stop reason: HOSPADM

## 2019-09-11 RX ORDER — PRAMIPEXOLE DIHYDROCHLORIDE 0.25 MG/1
0.25 TABLET ORAL NIGHTLY
Status: DISCONTINUED | OUTPATIENT
Start: 2019-09-11 | End: 2019-09-13 | Stop reason: HOSPADM

## 2019-09-11 RX ORDER — SODIUM CHLORIDE 0.9 % (FLUSH) 0.9 %
10 SYRINGE (ML) INJECTION EVERY 12 HOURS SCHEDULED
Status: DISCONTINUED | OUTPATIENT
Start: 2019-09-11 | End: 2019-09-13 | Stop reason: SDUPTHER

## 2019-09-11 RX ORDER — ATORVASTATIN CALCIUM 80 MG/1
80 TABLET, FILM COATED ORAL DAILY
Status: DISCONTINUED | OUTPATIENT
Start: 2019-09-11 | End: 2019-09-13 | Stop reason: HOSPADM

## 2019-09-11 RX ORDER — 0.9 % SODIUM CHLORIDE 0.9 %
10 VIAL (ML) INJECTION EVERY 12 HOURS
Status: DISCONTINUED | OUTPATIENT
Start: 2019-09-12 | End: 2019-09-13 | Stop reason: HOSPADM

## 2019-09-11 RX ORDER — ACETAMINOPHEN 325 MG/1
650 TABLET ORAL EVERY 4 HOURS PRN
Status: DISCONTINUED | OUTPATIENT
Start: 2019-09-11 | End: 2019-09-13 | Stop reason: HOSPADM

## 2019-09-11 RX ORDER — METOCLOPRAMIDE HYDROCHLORIDE 5 MG/ML
10 INJECTION INTRAMUSCULAR; INTRAVENOUS ONCE
Status: COMPLETED | OUTPATIENT
Start: 2019-09-11 | End: 2019-09-11

## 2019-09-11 RX ORDER — HYDROCODONE BITARTRATE AND ACETAMINOPHEN 5; 325 MG/1; MG/1
1 TABLET ORAL EVERY 6 HOURS PRN
Status: DISCONTINUED | OUTPATIENT
Start: 2019-09-11 | End: 2019-09-13 | Stop reason: HOSPADM

## 2019-09-11 RX ORDER — PANTOPRAZOLE SODIUM 40 MG/10ML
80 INJECTION, POWDER, LYOPHILIZED, FOR SOLUTION INTRAVENOUS DAILY
Status: DISCONTINUED | OUTPATIENT
Start: 2019-09-11 | End: 2019-09-11 | Stop reason: DRUGHIGH

## 2019-09-11 RX ORDER — SODIUM CHLORIDE, SODIUM LACTATE, POTASSIUM CHLORIDE, CALCIUM CHLORIDE 600; 310; 30; 20 MG/100ML; MG/100ML; MG/100ML; MG/100ML
1000 INJECTION, SOLUTION INTRAVENOUS ONCE
Status: COMPLETED | OUTPATIENT
Start: 2019-09-11 | End: 2019-09-11

## 2019-09-11 RX ADMIN — SODIUM CHLORIDE 10 ML: 9 INJECTION, SOLUTION INTRAMUSCULAR; INTRAVENOUS; SUBCUTANEOUS at 13:40

## 2019-09-11 RX ADMIN — METOCLOPRAMIDE 10 MG: 5 INJECTION, SOLUTION INTRAMUSCULAR; INTRAVENOUS at 15:14

## 2019-09-11 RX ADMIN — PANTOPRAZOLE SODIUM 80 MG: 40 INJECTION, POWDER, FOR SOLUTION INTRAVENOUS at 13:36

## 2019-09-11 RX ADMIN — BUDESONIDE 500 MCG: 0.5 INHALANT RESPIRATORY (INHALATION) at 21:16

## 2019-09-11 RX ADMIN — ALBUTEROL SULFATE 2.5 MG: 2.5 SOLUTION RESPIRATORY (INHALATION) at 21:16

## 2019-09-11 RX ADMIN — SODIUM CHLORIDE, POTASSIUM CHLORIDE, SODIUM LACTATE AND CALCIUM CHLORIDE 1000 ML: 600; 310; 30; 20 INJECTION, SOLUTION INTRAVENOUS at 15:14

## 2019-09-11 ASSESSMENT — PULMONARY FUNCTION TESTS: PEFR_L/MIN: 18

## 2019-09-11 ASSESSMENT — ENCOUNTER SYMPTOMS
RHINORRHEA: 0
COUGH: 1
VOICE CHANGE: 0
BLOOD IN STOOL: 1
EYE PAIN: 0
ABDOMINAL PAIN: 0
VOMITING: 0
SHORTNESS OF BREATH: 0
DIARRHEA: 0
EYE REDNESS: 0

## 2019-09-11 ASSESSMENT — PAIN SCALES - GENERAL: PAINLEVEL_OUTOF10: 0

## 2019-09-11 NOTE — ED PROVIDER NOTES
Negative for arthralgias and gait problem. Skin: Negative for rash and wound. Neurological: Positive for dizziness and light-headedness. Negative for weakness and headaches. Hematological: Negative. Psychiatric/Behavioral: Negative. All other systems reviewed and are negative. A complete review of systems was performed and is negative except as noted above in the HPI. PAST MEDICAL HISTORY     Past Medical History:   Diagnosis Date    Actinic keratosis     Atherosclerosis of native arteries of the extremities with intermittent claudication 9/19/2013    Benign fundic gland polyps of stomach     Blocked artery     Carotid artery bruit     right    Carotid artery stenosis 9/19/2013    Cerebral artery occlusion with cerebral infarction (HCC)     Chronic cough     Emphysema of lung (HCC)     Hyperlipemia     Hypertension     Irritable bowel syndrome     Lower back pain     Macular degeneration     Need for prophylactic vaccination with Streptococcus pneumoniae (Pneumococcus) and Influenza vaccines     Osteoarthritis     Osteoporosis 9/17/2017    PONV (postoperative nausea and vomiting)     Prediabetes     Pulmonary emphysema ([address] 9/17/2017    PVD [facility]peripheral vascular disease) ([facility])     Restless legs syndrome     Transient ischemic attack     Vitamin D deficiency          SURGICAL HISTORY       Past Surgical History:   Procedure Laterality Date    CATARACT REMOVAL Bilateral     FEMORAL ENDARTERECTOMY Left 8/13/2019    LEFT COMMON FEMORAL ENDARTERECTOMY WITH BOVINE PATCH ANGIOPLASTY AND COMPLETION ANGIOGRAM performed by Tram Guadarrama MD at [address]      cyst on breast    VASCULAR SURGERY  9-25-13 [facility]    Fluroscopic guidance for access tor R femoral artery after attempted access of L femoral artery. Aortoiliofemoral arteriogram with bilateral lower extremity runoff.  R proximal common iliac artery angioplasty X2 with 8mm x 40 mm Mother     Cancer Father     High Blood Pressure Son     High Cholesterol Son     High Blood Pressure Daughter     High Cholesterol Daughter     Heart Attack Daughter     Heart Attack Brother           SOCIAL HISTORY       Social History     Socioeconomic History    Marital status:      Spouse name: None    Number of children: 2    Years of education: 9    Highest education level: None   Occupational History    Occupation: retired   Social Needs    Financial resource strain: None    Food insecurity:     Worry: None     Inability: None    Transportation needs:     Medical: None     Non-medical: None   Tobacco Use    Smoking status: Current Every Day Smoker     Packs/day: 0.75     Years: 58.00     Pack years: 43.50     Types: Cigarettes    Smokeless tobacco: Never Used    Tobacco comment: 3/4 of a pack daily   Substance and Sexual Activity    Alcohol use: No    Drug use: No    Sexual activity: Never   Lifestyle    Physical activity:     Days per week: None     Minutes per session: None    Stress: None   Relationships    Social connections:     Talks on phone: None     Gets together: None     Attends Restorationism service: None     Active member of club or organization: None     Attends meetings of clubs or organizations: None     Relationship status: None    Intimate partner violence:     Fear of current or ex partner: None     Emotionally abused: None     Physically abused: None     Forced sexual activity: None   Other Topics Concern    None   Social History Narrative    None       SCREENINGS             PHYSICAL EXAM    (up to 7 for level 4, 8 or more for level 5)     ED Triage Vitals [09/11/19 1255]   BP Temp Temp Source Pulse Resp SpO2 Height Weight   (!) 123/49 98.3 °F (36.8 °C) Oral 68 17 93 % 5' 5\" (1.651 m) 143 lb (64.9 kg)       Physical Exam   Constitutional: She is oriented to person, place, and time. She appears well-developed and well-nourished. No distress.    HENT:   Head: 3.11 (*)     Hemoglobin 9.2 (*)     Hematocrit 29.3 (*)     MCHC 31.4 (*)     RDW 14.7 (*)     Monocytes % 11.5 (*)     Eosinophils % 5.2 (*)     Lymphocytes Absolute 1.0 (*)     All other components within normal limits   BASIC METABOLIC PANEL W/ REFLEX TO MG FOR LOW K - Abnormal; Notable for the following components:    Glucose 111 (*)     BUN 45 (*)     CREATININE 1.4 (*)     GFR Non- 36 (*)     All other components within normal limits   HEPATIC FUNCTION PANEL - Abnormal; Notable for the following components: Total Protein 6.0 (*)     Bilirubin, Indirect 0.0 (*)     All other components within normal limits   APTT - Abnormal; Notable for the following components:    aPTT 25.9 (*)     All other components within normal limits   CULTURE BLOOD #1   CULTURE BLOOD #2   LIPASE   PROTIME-INR   LACTATE, SEPSIS   URINALYSIS   LACTATE, SEPSIS   TYPE AND SCREEN       All other labs were within normal range or not returned as of this dictation. Medications   pantoprazole (PROTONIX) injection 80 mg (80 mg Intravenous Given 9/11/19 1336)     And   sodium chloride (PF) 0.9 % injection 10 mL (10 mLs Intravenous Given 9/11/19 1340)   lactated ringers infusion 1,000 mL (has no administration in time range)   metoclopramide (REGLAN) injection 10 mg (has no administration in time range)       EMERGENCY DEPARTMENT COURSE and DIFFERENTIALDIAGNOSIS/MDM:   Vitals:    Vitals:    09/11/19 1255 09/11/19 1302 09/11/19 1432   BP: (!) 123/49 (!) 108/57 (!) 135/46   Pulse: 68 66 65   Resp: 17 17 18   Temp: 98.3 °F (36.8 °C)     TempSrc: Oral     SpO2: 93% 96% 96%   Weight: 143 lb (64.9 kg)     Height: 5' 5\" (1.651 m)         MDM      Blood cultures and other infectious work-up ordered given patient's recent procedure, though story seems consistent with upper GI bleeding. ED Course as of Sep 11 1450   Wed Sep 11, 2019   1408 Hemoccult positive. Hemoglobin has dropped by nearly 3 points compared to last check.     [MARTI]

## 2019-09-11 NOTE — CONSULTS
[x] +BSx4, [x] NTND, [x] soft, [x] no guarding,  [x] no peritoneal signs  Muscuoskeletal: [x] Normal gait and station, [x]  Normal nails and digits bilaterally, [x] no muscle atrophy  Skin/SubQ: [x] No jaundice, [x] warm, dry skin, left inguinal clean incision site  Neurologic: [x]  Sensation grossly intact, [x] no slurred speech, [x]  No focal deficits  Psychiatric: [x]  Orientated to person, place, and time; [x] mood and affect unremarkable, [x] memory recent and remote intact      Labs:     Recent Labs     09/11/19  1320   WBC 4.8   RBC 3.11*   HGB 9.2*   HCT 29.3*   MCV 94.2   MCH 29.6   MCHC 31.4*        Recent Labs     09/11/19  1320      K 4.3   ANIONGAP 11      CO2 26   BUN 45*   CREATININE 1.4*   GLUCOSE 111*   CALCIUM 9.4     No results for input(s): MG, PHOS in the last 72 hours. Recent Labs     09/11/19  1320   AST 16   ALT 11   BILITOT <0.2   ALKPHOS 68     HgBA1c:  No components found for: HGBA1C  FLP:    Lab Results   Component Value Date    TRIG 85 04/08/2019    HDL 54 04/08/2019    LDLCALC 73 04/08/2019     TSH:    Lab Results   Component Value Date    TSH 2.530 07/10/2019     Troponin T: No results for input(s): TROPONINI in the last 72 hours. INR:   Recent Labs     09/11/19  1320   INR 1.07       Recent Labs     09/11/19  1320   LIPASE 48       Radiology:  Xr Chest Standard (2 Vw)    Result Date: 9/11/2019  Exam:   XR CHEST (2 VW)  Date:  9/11/2019 History:  Female, age  80 years; fatigue COMPARISON:  Chest x-ray dated 7/22/2019 Findings : The heart and mediastinum are normal in size. Lungs are without focal infiltrate, mass or effusions. Chronic interstitial lung changes. Emphysema. The bones show no acute pathology. Impression: No acute cardiopulmonary disease.  Signed by Dr Harris Gallardo on 9/11/2019 2:00 PM    Vl Extremity Venous Left    Result Date: 9/5/2019  Vascular Lower Extremities DVT Study Procedure  Demographics   Patient Name    Digna Hager Age

## 2019-09-12 PROBLEM — D50.0 NORMOCYTIC ANEMIA DUE TO BLOOD LOSS: Status: ACTIVE | Noted: 2019-09-12

## 2019-09-12 PROBLEM — N17.9 ACUTE KIDNEY INJURY SUPERIMPOSED ON CHRONIC KIDNEY DISEASE (HCC): Status: RESOLVED | Noted: 2019-09-12 | Resolved: 2019-09-12

## 2019-09-12 PROBLEM — D62 ANEMIA DUE TO ACUTE BLOOD LOSS: Status: ACTIVE | Noted: 2019-09-12

## 2019-09-12 PROBLEM — N18.9 ACUTE KIDNEY INJURY SUPERIMPOSED ON CHRONIC KIDNEY DISEASE (HCC): Status: ACTIVE | Noted: 2019-09-12

## 2019-09-12 PROBLEM — N18.9 ACUTE KIDNEY INJURY SUPERIMPOSED ON CHRONIC KIDNEY DISEASE (HCC): Status: RESOLVED | Noted: 2019-09-12 | Resolved: 2019-09-12

## 2019-09-12 PROBLEM — N17.9 ACUTE KIDNEY INJURY SUPERIMPOSED ON CHRONIC KIDNEY DISEASE (HCC): Status: ACTIVE | Noted: 2019-09-12

## 2019-09-12 LAB
ALBUMIN SERPL-MCNC: 3.3 G/DL (ref 3.5–5.2)
ALP BLD-CCNC: 61 U/L (ref 35–104)
ALT SERPL-CCNC: 9 U/L (ref 5–33)
ANION GAP SERPL CALCULATED.3IONS-SCNC: 8 MMOL/L (ref 7–19)
AST SERPL-CCNC: 15 U/L (ref 5–32)
BASOPHILS ABSOLUTE: 0 K/UL (ref 0–0.2)
BASOPHILS RELATIVE PERCENT: 0.7 % (ref 0–1)
BILIRUB SERPL-MCNC: <0.2 MG/DL (ref 0.2–1.2)
BUN BLDV-MCNC: 33 MG/DL (ref 8–23)
CALCIUM SERPL-MCNC: 8.9 MG/DL (ref 8.8–10.2)
CHLORIDE BLD-SCNC: 107 MMOL/L (ref 98–111)
CO2: 28 MMOL/L (ref 22–29)
CREAT SERPL-MCNC: 1.2 MG/DL (ref 0.5–0.9)
CREATININE URINE: 34.9 MG/DL (ref 4.2–622)
EKG P AXIS: 81 DEGREES
EKG P-R INTERVAL: 182 MS
EKG Q-T INTERVAL: 440 MS
EKG QRS DURATION: 86 MS
EKG QTC CALCULATION (BAZETT): 443 MS
EKG T AXIS: 79 DEGREES
EOSINOPHILS ABSOLUTE: 0.2 K/UL (ref 0–0.6)
EOSINOPHILS RELATIVE PERCENT: 4.2 % (ref 0–5)
GFR NON-AFRICAN AMERICAN: 43
GLUCOSE BLD-MCNC: 105 MG/DL (ref 74–109)
HCT VFR BLD CALC: 26.3 % (ref 37–47)
HCT VFR BLD CALC: 27.7 % (ref 37–47)
HEMOGLOBIN: 8.1 G/DL (ref 12–16)
HEMOGLOBIN: 8.1 G/DL (ref 12–16)
HEMOGLOBIN: 8.6 G/DL (ref 12–16)
HEMOGLOBIN: 8.8 G/DL (ref 12–16)
IMMATURE GRANULOCYTES #: 0 K/UL
INR BLD: 1.15 (ref 0.88–1.18)
LACTIC ACID: 0.6 MMOL/L (ref 0.5–1.9)
LYMPHOCYTES ABSOLUTE: 1.1 K/UL (ref 1.1–4.5)
LYMPHOCYTES RELATIVE PERCENT: 24.3 % (ref 20–40)
MCH RBC QN AUTO: 29.5 PG (ref 27–31)
MCH RBC QN AUTO: 29.6 PG (ref 27–31)
MCHC RBC AUTO-ENTMCNC: 30.8 G/DL (ref 33–37)
MCHC RBC AUTO-ENTMCNC: 31 G/DL (ref 33–37)
MCV RBC AUTO: 94.9 FL (ref 81–99)
MCV RBC AUTO: 96 FL (ref 81–99)
MICROALBUMIN UR-MCNC: <1.2 MG/DL (ref 0–19)
MICROALBUMIN/CREAT UR-RTO: NORMAL MG/G
MONOCYTES ABSOLUTE: 0.5 K/UL (ref 0–0.9)
MONOCYTES RELATIVE PERCENT: 10.2 % (ref 0–10)
NEUTROPHILS ABSOLUTE: 2.7 K/UL (ref 1.5–7.5)
NEUTROPHILS RELATIVE PERCENT: 60.2 % (ref 50–65)
PDW BLD-RTO: 14.7 % (ref 11.5–14.5)
PDW BLD-RTO: 15 % (ref 11.5–14.5)
PLATELET # BLD: 189 K/UL (ref 130–400)
PLATELET # BLD: 215 K/UL (ref 130–400)
PMV BLD AUTO: 10 FL (ref 9.4–12.3)
PMV BLD AUTO: 9.4 FL (ref 9.4–12.3)
POTASSIUM REFLEX MAGNESIUM: 4.5 MMOL/L (ref 3.5–5)
PROTHROMBIN TIME: 14.1 SEC (ref 12–14.6)
RBC # BLD: 2.74 M/UL (ref 4.2–5.4)
RBC # BLD: 2.92 M/UL (ref 4.2–5.4)
SODIUM BLD-SCNC: 143 MMOL/L (ref 136–145)
SODIUM URINE: 65 MMOL/L
TOTAL PROTEIN: 5.4 G/DL (ref 6.6–8.7)
UREA NITROGEN, UR: 346 MG/DL
WBC # BLD: 4.3 K/UL (ref 4.8–10.8)
WBC # BLD: 4.5 K/UL (ref 4.8–10.8)

## 2019-09-12 PROCEDURE — 84540 ASSAY OF URINE/UREA-N: CPT

## 2019-09-12 PROCEDURE — 85027 COMPLETE CBC AUTOMATED: CPT

## 2019-09-12 PROCEDURE — 84300 ASSAY OF URINE SODIUM: CPT

## 2019-09-12 PROCEDURE — 83605 ASSAY OF LACTIC ACID: CPT

## 2019-09-12 PROCEDURE — 82570 ASSAY OF URINE CREATININE: CPT

## 2019-09-12 PROCEDURE — 94640 AIRWAY INHALATION TREATMENT: CPT

## 2019-09-12 PROCEDURE — 2580000003 HC RX 258: Performed by: INTERNAL MEDICINE

## 2019-09-12 PROCEDURE — 6360000002 HC RX W HCPCS: Performed by: INTERNAL MEDICINE

## 2019-09-12 PROCEDURE — 99232 SBSQ HOSP IP/OBS MODERATE 35: CPT | Performed by: INTERNAL MEDICINE

## 2019-09-12 PROCEDURE — 6370000000 HC RX 637 (ALT 250 FOR IP): Performed by: INTERNAL MEDICINE

## 2019-09-12 PROCEDURE — C9113 INJ PANTOPRAZOLE SODIUM, VIA: HCPCS | Performed by: INTERNAL MEDICINE

## 2019-09-12 PROCEDURE — 2580000003 HC RX 258: Performed by: FAMILY MEDICINE

## 2019-09-12 PROCEDURE — 36415 COLL VENOUS BLD VENIPUNCTURE: CPT

## 2019-09-12 PROCEDURE — 1210000000 HC MED SURG R&B

## 2019-09-12 PROCEDURE — 80053 COMPREHEN METABOLIC PANEL: CPT

## 2019-09-12 PROCEDURE — 85025 COMPLETE CBC W/AUTO DIFF WBC: CPT

## 2019-09-12 PROCEDURE — 82043 UR ALBUMIN QUANTITATIVE: CPT

## 2019-09-12 PROCEDURE — 97165 OT EVAL LOW COMPLEX 30 MIN: CPT

## 2019-09-12 PROCEDURE — 85018 HEMOGLOBIN: CPT

## 2019-09-12 PROCEDURE — 85610 PROTHROMBIN TIME: CPT

## 2019-09-12 RX ADMIN — Medication 10 ML: at 14:41

## 2019-09-12 RX ADMIN — ALBUTEROL SULFATE 2.5 MG: 2.5 SOLUTION RESPIRATORY (INHALATION) at 20:37

## 2019-09-12 RX ADMIN — BUDESONIDE 500 MCG: 0.5 INHALANT RESPIRATORY (INHALATION) at 20:37

## 2019-09-12 RX ADMIN — ALBUTEROL SULFATE 2.5 MG: 2.5 SOLUTION RESPIRATORY (INHALATION) at 06:38

## 2019-09-12 RX ADMIN — ATORVASTATIN CALCIUM 80 MG: 80 TABLET, FILM COATED ORAL at 10:49

## 2019-09-12 RX ADMIN — ALBUTEROL SULFATE 2.5 MG: 2.5 SOLUTION RESPIRATORY (INHALATION) at 14:21

## 2019-09-12 RX ADMIN — PANTOPRAZOLE SODIUM 40 MG: 40 INJECTION, POWDER, FOR SOLUTION INTRAVENOUS at 14:41

## 2019-09-12 RX ADMIN — ACETAMINOPHEN 650 MG: 325 TABLET ORAL at 23:12

## 2019-09-12 RX ADMIN — SODIUM CHLORIDE: 9 INJECTION, SOLUTION INTRAVENOUS at 17:20

## 2019-09-12 RX ADMIN — BUDESONIDE 500 MCG: 0.5 INHALANT RESPIRATORY (INHALATION) at 06:38

## 2019-09-12 RX ADMIN — ALBUTEROL SULFATE 2.5 MG: 2.5 SOLUTION RESPIRATORY (INHALATION) at 10:12

## 2019-09-12 ASSESSMENT — PAIN SCALES - GENERAL
PAINLEVEL_OUTOF10: 4
PAINLEVEL_OUTOF10: 0
PAINLEVEL_OUTOF10: 0

## 2019-09-12 ASSESSMENT — PULMONARY FUNCTION TESTS
PEFR_L/MIN: 16
PEFR_L/MIN: 16
PEFR_L/MIN: 18
PEFR_L/MIN: 18

## 2019-09-12 NOTE — CARE COORDINATION
able to live independently?:  Yes  Hearing and Vision  Visual Impairment:  Visual impairment (Glasses/contacts)  Hearing Impairment:  Hard of hearing  Care Transitions Interventions         Follow Up: Met at bedside with patient and discussed CTC process. Contact information given. Patient is agreeable with appropriate follow up as indicated after discharge. Patient is known to me from previous hospitalizations and CTC follow. She lives at home with her daughters and grandchildren. She reported that nothing has changed at home. She is not back to complete independence with ADLs, but getting there since last admission and surgery. She is not aware of any needs at home. She denied any issues with obtaining medications or supplies. She denied any need for DME. She is not able to drive but one of her daughter's takes her to appointments, grocery shopping, etc.  She reported that she had been having bleeding for about 5 days and just let it go too long before coming in to be evaluated. She said she \"just didn't feel it was that bad. \"  She is not sure at this time what the plan is, possibly a scope today or tomorrow. No immediate needs noted. Will follow along while here and after discharge as indicated. Future Appointments   Date Time Provider Silvia Chan   11/18/2019  9:00 [facility]   12/5/2019  8:30 AM [facility]   12/5/2019  9:00 AM [facility]   12/5/2019 10:00 AM Quiana Padilla PA-C [facility]   7/17/2020 10:15 AM [facility]       Health Maintenance  There are no preventive care reminders to display for this patient.     Jamie Armenta RN

## 2019-09-12 NOTE — PROGRESS NOTES
!Yes       !Yes            ! None      ! +------------------------------------+----------+---------------+----------+ ! PTV                                 ! Yes       ! Yes            ! None      ! +------------------------------------+----------+---------------+----------+ ! GSV                                 ! Yes       ! Yes            ! None      ! +------------------------------------+----------+---------------+----------+ ! ATV                                 ! Yes       ! Yes            ! None      ! +------------------------------------+----------+---------------+----------+ ! Peroneal                            !Yes       ! Yes            ! None      ! +------------------------------------+----------+---------------+----------+ ! Soleal                              !Yes       ! Yes            ! None      ! +------------------------------------+----------+---------------+----------+    Vl Monisha Bilateral Limited 1-2 Levels    Result Date: 8/14/2019  Vascular Lower Arterial Plethysmography Procedure  Demographics   Patient Name    Seamus An Age                   80   Patient Number  [id number]            Gender                Female   Visit Number    [id number]         Interpreting          Jaime Jose MD                                    Physician   Date of Birth   1935        Referring Physician   Ashanti Parker MD   Accession       [id number]         [address]  Number  Procedure Type of Study:   Extremities Arteries:Lower Arterial Plethysmography, VL ANKLE / BRACHIAL  INDICES EXTREMITY COMPLETE . Indications for Study:MONISHA (post-op). Risk Factors   - The patient's risk factor(s) include: dyslipidemia and arterial     hypertension.   - Current - Every day.    - The patient's risk factor(s) include: Prior CVA, ,  Impression   Based on ankle brachial indices and doppler waveforms, the patient has  mildly diminished flow to the bilateral lower extremity arterial system at  rest.   Signature 18   Temp:  98 °F (36.7 °C) 98.5 °F (36.9 °C) 97 °F (36.1 °C)   TempSrc:  Temporal Temporal    SpO2:  95% 91% 95%   Weight:  143 lb (64.9 kg)     Height:  5' 5\" (1.651 m)       24HR INTAKE/OUTPUT:      Intake/Output Summary (Last 24 hours) at 9/12/2019 1135  Last data filed at 9/12/2019 4990  Gross per 24 hour   Intake 100 ml   Output 1000 ml   Net -900 ml     Constitutional: [x] NAD, [x] of stated age, [x] well nourished  Eyes: [x] conjunctiva clear, [x] Non inflamed irises, [x] no scleral icterus  ENT/Mouth: [x]  Nares patent with pink mucosa, [x] oropharynx clear without exudates or erythema, [x] hearing grossly normal  Head/Neck: [x] symmetrical, [x] supple  Lungs: [x] respirations non labored with good effort, [x] CTA bilaterally, [x] no respiratory distress  Heart: [x] normal S1S2, [x]  Regular rate, [x] pedal pulses preserved 2/4 bilaterally, [x] no LE edema  Abdomen: [x] +BSx4, [x] NTND, [x] soft, [x] no guarding, [x] no peritoneal signs  Muscuoskeletal: [x] Normal gait and station, [x]  Normal nails and digits bilaterally, [x] no muscle atrophy  Skin/SubQ: [x] No jaundice, [x] warm, dry skin, [x] no masses or rashes on inspection,   Psychiatric: [x]  Orientated to person, place, and time; [x] mood and affect unremarkable, [x] memory recent and remote intact      Impression:       1. GI bleed  2. Hx TIA on antiplatelets  3. Anemia  4. Hx emphysema    Plan:   - Pt's Hgb has stabilized. She last took her ASA/Plavix yesterday and are allowing effect to reduce. She is hemodynamically stable without hematemesis, hematochezia, shortness of breath, and chest pain. - continue protonix IV and monitor H/H.  Consider endoscopy towards the end of this week or early next week  - ok for clear liquids today    Samantha Arana, DO

## 2019-09-13 ENCOUNTER — ANESTHESIA EVENT (OUTPATIENT)
Dept: ENDOSCOPY | Age: 84
DRG: 378 | End: 2019-09-13
Payer: MEDICARE

## 2019-09-13 ENCOUNTER — ANESTHESIA (OUTPATIENT)
Dept: ENDOSCOPY | Age: 84
DRG: 378 | End: 2019-09-13
Payer: MEDICARE

## 2019-09-13 VITALS
RESPIRATION RATE: 23 BRPM | OXYGEN SATURATION: 97 % | DIASTOLIC BLOOD PRESSURE: 49 MMHG | SYSTOLIC BLOOD PRESSURE: 120 MMHG

## 2019-09-13 VITALS
HEART RATE: 70 BPM | DIASTOLIC BLOOD PRESSURE: 58 MMHG | OXYGEN SATURATION: 97 % | BODY MASS INDEX: 23.82 KG/M2 | WEIGHT: 143 LBS | SYSTOLIC BLOOD PRESSURE: 162 MMHG | HEIGHT: 65 IN | TEMPERATURE: 97.9 F | RESPIRATION RATE: 20 BRPM

## 2019-09-13 LAB
HCT VFR BLD CALC: 25.7 % (ref 37–47)
HCT VFR BLD CALC: 26.2 % (ref 37–47)
HEMOGLOBIN: 8.2 G/DL (ref 12–16)
HEMOGLOBIN: 8.2 G/DL (ref 12–16)
MCH RBC QN AUTO: 29.8 PG (ref 27–31)
MCH RBC QN AUTO: 30.1 PG (ref 27–31)
MCHC RBC AUTO-ENTMCNC: 31.3 G/DL (ref 33–37)
MCHC RBC AUTO-ENTMCNC: 31.9 G/DL (ref 33–37)
MCV RBC AUTO: 94.5 FL (ref 81–99)
MCV RBC AUTO: 95.3 FL (ref 81–99)
PDW BLD-RTO: 14.7 % (ref 11.5–14.5)
PDW BLD-RTO: 14.9 % (ref 11.5–14.5)
PLATELET # BLD: 208 K/UL (ref 130–400)
PLATELET # BLD: 214 K/UL (ref 130–400)
PMV BLD AUTO: 10 FL (ref 9.4–12.3)
PMV BLD AUTO: 9.7 FL (ref 9.4–12.3)
RBC # BLD: 2.72 M/UL (ref 4.2–5.4)
RBC # BLD: 2.75 M/UL (ref 4.2–5.4)
WBC # BLD: 4 K/UL (ref 4.8–10.8)
WBC # BLD: 6 K/UL (ref 4.8–10.8)

## 2019-09-13 PROCEDURE — 0DJ08ZZ INSPECTION OF UPPER INTESTINAL TRACT, VIA NATURAL OR ARTIFICIAL OPENING ENDOSCOPIC: ICD-10-PCS | Performed by: INTERNAL MEDICINE

## 2019-09-13 PROCEDURE — 2580000003 HC RX 258: Performed by: NURSE ANESTHETIST, CERTIFIED REGISTERED

## 2019-09-13 PROCEDURE — 2500000003 HC RX 250 WO HCPCS: Performed by: NURSE ANESTHETIST, CERTIFIED REGISTERED

## 2019-09-13 PROCEDURE — 7100000001 HC PACU RECOVERY - ADDTL 15 MIN: Performed by: INTERNAL MEDICINE

## 2019-09-13 PROCEDURE — 6360000002 HC RX W HCPCS: Performed by: INTERNAL MEDICINE

## 2019-09-13 PROCEDURE — 94640 AIRWAY INHALATION TREATMENT: CPT

## 2019-09-13 PROCEDURE — 36415 COLL VENOUS BLD VENIPUNCTURE: CPT

## 2019-09-13 PROCEDURE — 6360000002 HC RX W HCPCS: Performed by: NURSE ANESTHETIST, CERTIFIED REGISTERED

## 2019-09-13 PROCEDURE — 2580000003 HC RX 258: Performed by: INTERNAL MEDICINE

## 2019-09-13 PROCEDURE — 3609012800 HC EGD DIAGNOSTIC ONLY: Performed by: INTERNAL MEDICINE

## 2019-09-13 PROCEDURE — C9113 INJ PANTOPRAZOLE SODIUM, VIA: HCPCS | Performed by: INTERNAL MEDICINE

## 2019-09-13 PROCEDURE — 43235 EGD DIAGNOSTIC BRUSH WASH: CPT | Performed by: INTERNAL MEDICINE

## 2019-09-13 PROCEDURE — 3700000001 HC ADD 15 MINUTES (ANESTHESIA): Performed by: INTERNAL MEDICINE

## 2019-09-13 PROCEDURE — 7100000000 HC PACU RECOVERY - FIRST 15 MIN: Performed by: INTERNAL MEDICINE

## 2019-09-13 PROCEDURE — 85027 COMPLETE CBC AUTOMATED: CPT

## 2019-09-13 PROCEDURE — 3700000000 HC ANESTHESIA ATTENDED CARE: Performed by: INTERNAL MEDICINE

## 2019-09-13 RX ORDER — DIPHENHYDRAMINE HYDROCHLORIDE 50 MG/ML
12.5 INJECTION INTRAMUSCULAR; INTRAVENOUS
Status: DISCONTINUED | OUTPATIENT
Start: 2019-09-13 | End: 2019-09-13 | Stop reason: HOSPADM

## 2019-09-13 RX ORDER — ONDANSETRON 2 MG/ML
4 INJECTION INTRAMUSCULAR; INTRAVENOUS
Status: DISCONTINUED | OUTPATIENT
Start: 2019-09-13 | End: 2019-09-13 | Stop reason: HOSPADM

## 2019-09-13 RX ORDER — PANTOPRAZOLE SODIUM 40 MG/1
40 TABLET, DELAYED RELEASE ORAL
Qty: 60 TABLET | Refills: 1 | Status: SHIPPED | OUTPATIENT
Start: 2019-09-13 | End: 2019-09-18 | Stop reason: SDUPTHER

## 2019-09-13 RX ORDER — 0.9 % SODIUM CHLORIDE 0.9 %
10 VIAL (ML) INJECTION PRN
Status: DISCONTINUED | OUTPATIENT
Start: 2019-09-13 | End: 2019-09-13 | Stop reason: HOSPADM

## 2019-09-13 RX ORDER — SODIUM CHLORIDE 0.9 % (FLUSH) 0.9 %
10 SYRINGE (ML) INJECTION EVERY 12 HOURS SCHEDULED
Status: DISCONTINUED | OUTPATIENT
Start: 2019-09-13 | End: 2019-09-13 | Stop reason: HOSPADM

## 2019-09-13 RX ORDER — LIDOCAINE HYDROCHLORIDE 10 MG/ML
INJECTION, SOLUTION INFILTRATION; PERINEURAL PRN
Status: DISCONTINUED | OUTPATIENT
Start: 2019-09-13 | End: 2019-09-13 | Stop reason: SDUPTHER

## 2019-09-13 RX ORDER — PROPOFOL 10 MG/ML
INJECTION, EMULSION INTRAVENOUS PRN
Status: DISCONTINUED | OUTPATIENT
Start: 2019-09-13 | End: 2019-09-13 | Stop reason: SDUPTHER

## 2019-09-13 RX ORDER — PROMETHAZINE HYDROCHLORIDE 25 MG/ML
6.25 INJECTION, SOLUTION INTRAMUSCULAR; INTRAVENOUS
Status: DISCONTINUED | OUTPATIENT
Start: 2019-09-13 | End: 2019-09-13 | Stop reason: HOSPADM

## 2019-09-13 RX ORDER — SODIUM CHLORIDE, SODIUM LACTATE, POTASSIUM CHLORIDE, CALCIUM CHLORIDE 600; 310; 30; 20 MG/100ML; MG/100ML; MG/100ML; MG/100ML
INJECTION, SOLUTION INTRAVENOUS CONTINUOUS PRN
Status: DISCONTINUED | OUTPATIENT
Start: 2019-09-13 | End: 2019-09-13 | Stop reason: SDUPTHER

## 2019-09-13 RX ADMIN — BUDESONIDE 500 MCG: 0.5 INHALANT RESPIRATORY (INHALATION) at 07:04

## 2019-09-13 RX ADMIN — ALBUTEROL SULFATE 2.5 MG: 2.5 SOLUTION RESPIRATORY (INHALATION) at 07:04

## 2019-09-13 RX ADMIN — PANTOPRAZOLE SODIUM 40 MG: 40 INJECTION, POWDER, FOR SOLUTION INTRAVENOUS at 13:27

## 2019-09-13 RX ADMIN — LIDOCAINE HYDROCHLORIDE 50 MG: 10 INJECTION, SOLUTION INFILTRATION; PERINEURAL at 14:26

## 2019-09-13 RX ADMIN — ALBUTEROL SULFATE 2.5 MG: 2.5 SOLUTION RESPIRATORY (INHALATION) at 10:37

## 2019-09-13 RX ADMIN — SODIUM CHLORIDE, SODIUM LACTATE, POTASSIUM CHLORIDE, AND CALCIUM CHLORIDE: 600; 310; 30; 20 INJECTION, SOLUTION INTRAVENOUS at 14:22

## 2019-09-13 RX ADMIN — PROPOFOL 100 MG: 10 INJECTION, EMULSION INTRAVENOUS at 14:26

## 2019-09-13 RX ADMIN — Medication 10 ML: at 13:27

## 2019-09-13 ASSESSMENT — LIFESTYLE VARIABLES: SMOKING_STATUS: 1

## 2019-09-13 ASSESSMENT — ENCOUNTER SYMPTOMS: SHORTNESS OF BREATH: 0

## 2019-09-13 NOTE — ANESTHESIA PRE PROCEDURE
ROS       (-) hiatal hernia and GERD       Endo/Other: Negative Endo/Other ROS   (+) blood dyscrasia: anemia:., .          Pt had PAT visit. Abdominal:       Abdomen: soft. Vascular:   + PVD, aortic or cerebral, . Anesthesia Plan      general     ASA 3       Induction: intravenous. Anesthetic plan and risks discussed with patient. Plan discussed with CRNA.     Attending anesthesiologist reviewed and agrees with Pre Eval content              Bibi Paula MD   9/13/2019

## 2019-09-13 NOTE — DISCHARGE SUMMARY
however patient is tolerating p.o. and okay for discharge home according to GI. Medically she is stable. We will discharge her home today to follow-up as an outpatient with PCP for repeat CBC and with GI in several weeks. Exam:   Vitals: BP (!) 162/58   Pulse 70   Temp 97.9 °F (36.6 °C) (Temporal)   Resp 20   Ht 5' 5\" (1.651 m)   Wt 143 lb (64.9 kg)   SpO2 97%   BMI 23.80 kg/m²   24HR INTAKE/OUTPUT:      Intake/Output Summary (Last 24 hours) at 9/13/2019 1628  Last data filed at 9/13/2019 1434  Gross per 24 hour   Intake 200 ml   Output --   Net 200 ml     General appearance: NAD, alert and cooperative with exam  HEENT: atraumatic, PERRLA, EOMI, anicteric, trachea midline  Lungs: no increased work of breathing, CTAB, no wheezing/rhonchi/rales  Heart: RRR, +s1/s2, no rub  Abdomen: soft, nt/nd, +BS, no rebound/gaurding  Extremities: atraumatic, no edema, no cyanosis   Neurologic: AAOx3, no focal deficits  Skin: no rashes  Psych: Normal affect        Consults:   IP CONSULT TO GI  IP CONSULT TO CASE MANAGEMENT    Significant Diagnostic Studies:     XR CHEST STANDARD (2 VW) [[id number]] Resulted: 09/11/19 1400      Order Status: Completed Specimen: Chest Updated: 09/11/19 1402     Narrative:       Exam:   XR CHEST (2 VW)    Date:  9/11/2019   History:  Female, age  80 years; fatigue  COMPARISON:  Chest x-ray dated 7/22/2019  Findings : The heart and mediastinum are normal in size. Lungs are without focal  infiltrate, mass or effusions. Chronic interstitial lung changes. Emphysema. The bones show no acute pathology.       Impression:       Impression:  No acute cardiopulmonary disease. Signed by Dr Jam Simeon on 9/11/2019 2:00 PM             Disposition:  home     Condition at discharge: stable, tolerating po    Discharge Instructions/Follow-up:  F/u with pcp (repeat cbc check) and GI    Code Status:  Full Code     Activity: activity as tolerated    Labs:  For convenience and continuity at follow-up (LIPITOR) 80 MG tablet Take 1 tablet by mouth daily  Qty: 90 tablet, Refills: 3      aspirin 81 MG tablet Take 81 mg by mouth daily. Current Facility-Administered Medications   Medication Dose Route Frequency Provider Last Rate Last Dose    [MAR Hold] sodium chloride flush 0.9 % injection 10 mL  10 mL Intravenous 2 times per day Doris Nunez, DO        [MAR Hold] atorvastatin (LIPITOR) tablet 80 mg  80 mg Oral Daily Monroe Owusu MD   80 mg at 09/12/19 1049    [MAR Hold] HYDROcodone-acetaminophen (NORCO) 5-325 MG per tablet 1 tablet  1 tablet Oral Q6H PRN Monroe Owusu MD        [facility] Hold] sodium chloride flush 0.9 % injection 10 mL  10 mL Intravenous PRN Monroe Owusu MD        [facility] Hold] acetaminophen (TYLENOL) tablet 650 mg  650 mg Oral Q4H PRN Monroe Owusu MD   650 mg at 09/12/19 2312    [MAR Hold] ondansetron (ZOFRAN) injection 4 mg  4 mg Intravenous Q6H PRN Monroe Owusu MD        [facility] Hold] 0.9 % sodium chloride infusion   Intravenous Continuous Davian Kendrick, DO 50 mL/hr at 09/12/19 1720      [MAR Hold] pantoprazole (PROTONIX) injection 40 mg  40 mg Intravenous Q12H Monroe Owusu MD   40 mg at 09/13/19 1327    And    [MAR Hold] sodium chloride (PF) 0.9 % injection 10 mL  10 mL Intravenous Q12H Monroe Owusu MD   10 mL at 09/13/19 1327    [MAR Hold] budesonide (PULMICORT) nebulizer suspension 500 mcg  0.5 mg Nebulization BID Monroe Owusu MD   500 mcg at 09/13/19 0704    And    [MAR Hold] albuterol (PROVENTIL) nebulizer solution 2.5 mg  2.5 mg Nebulization 4x daily Monroe Owusu MD   2.5 mg at 09/13/19 1037    [MAR Hold] pramipexole (MIRAPEX) tablet 0.25 mg  0.25 mg Oral Nightly Monroe Owusu MD           Time Spent on discharge is more than 33 minutes in the examination, evaluation, counseling and review of medications and discharge plan. [employer], D.O.   Internal Medicine Hospitalist    Thank you PRAVEEN Woodall for the

## 2019-09-16 ENCOUNTER — CARE COORDINATION (OUTPATIENT)
Dept: CASE MANAGEMENT | Age: 84
End: 2019-09-16

## 2019-09-16 LAB
BLOOD CULTURE, ROUTINE: NORMAL
CULTURE, BLOOD 2: NORMAL

## 2019-09-17 ENCOUNTER — CARE COORDINATION (OUTPATIENT)
Dept: CASE MANAGEMENT | Age: 84
End: 2019-09-17

## 2019-09-17 DIAGNOSIS — K92.2 GASTROINTESTINAL HEMORRHAGE, UNSPECIFIED GASTROINTESTINAL HEMORRHAGE TYPE: Primary | ICD-10-CM

## 2019-09-17 PROCEDURE — 1111F DSCHRG MED/CURRENT MED MERGE: CPT | Performed by: NURSE PRACTITIONER

## 2019-09-17 NOTE — CARE COORDINATION
and about, making her daughter a meatloaf today for supper. She says she is getting stronger day by day, just slow going. She knows to take only tylenol, no NSAIDS, (motrin, ibuprofen, naproxen). She was able to get her discharge medications, reconciled them today. 1111F order added. She follows up tomorrow with Zenaida Alvarado for her HFU. She is to follow up with GI on October 1st with DR. Hodan Flores. Advised to call with any problems or concerns. Will follow up with patient later this week.    Future Appointments   Date Time Provider Silvia Chan   9/18/2019  9:00 AM PRAVEEN [place] [facility]   10/1/2019  4:40 PM Eliana Trimble MD [facility]   11/18/2019  9:00 AM [facility]   12/5/2019  8:30 AM L [facility]ASC LAB ROOM 1 [facility]   12/5/2019  9:00 AM L VASC LAB ROOM 1 L [facility]ASC LAB [facility]   12/5/2019 10:00 AM Tomás Godoy PA-C [facility]   7/17/2020 10:15 AM PRAVEEN [place] [facility]       Jamie Nolasco RN

## 2019-09-18 ENCOUNTER — OFFICE VISIT (OUTPATIENT)
Dept: INTERNAL MEDICINE | Age: 84
End: 2019-09-18
Payer: MEDICARE

## 2019-09-18 VITALS
OXYGEN SATURATION: 96 % | DIASTOLIC BLOOD PRESSURE: 60 MMHG | RESPIRATION RATE: 18 BRPM | BODY MASS INDEX: 22.99 KG/M2 | HEART RATE: 72 BPM | SYSTOLIC BLOOD PRESSURE: 136 MMHG | WEIGHT: 138 LBS | HEIGHT: 65 IN

## 2019-09-18 DIAGNOSIS — K92.2 GASTROINTESTINAL HEMORRHAGE, UNSPECIFIED GASTROINTESTINAL HEMORRHAGE TYPE: Primary | ICD-10-CM

## 2019-09-18 DIAGNOSIS — J43.9 PULMONARY EMPHYSEMA, UNSPECIFIED EMPHYSEMA TYPE (HCC): Chronic | ICD-10-CM

## 2019-09-18 DIAGNOSIS — M81.0 OSTEOPOROSIS, UNSPECIFIED OSTEOPOROSIS TYPE, UNSPECIFIED PATHOLOGICAL FRACTURE PRESENCE: Chronic | ICD-10-CM

## 2019-09-18 DIAGNOSIS — G47.09 OTHER INSOMNIA: ICD-10-CM

## 2019-09-18 DIAGNOSIS — I70.213 ATHEROSCLEROSIS OF NATIVE ARTERY OF BOTH LOWER EXTREMITIES WITH INTERMITTENT CLAUDICATION (HCC): ICD-10-CM

## 2019-09-18 PROCEDURE — 1111F DSCHRG MED/CURRENT MED MERGE: CPT | Performed by: NURSE PRACTITIONER

## 2019-09-18 PROCEDURE — 99495 TRANSJ CARE MGMT MOD F2F 14D: CPT | Performed by: NURSE PRACTITIONER

## 2019-09-18 RX ORDER — ALENDRONATE SODIUM 70 MG/1
70 TABLET ORAL WEEKLY
Qty: 21 TABLET | Refills: 1 | Status: SHIPPED | OUTPATIENT
Start: 2019-09-18 | End: 2019-09-18 | Stop reason: SINTOL

## 2019-09-18 RX ORDER — PANTOPRAZOLE SODIUM 40 MG/1
40 TABLET, DELAYED RELEASE ORAL
Qty: 180 TABLET | Refills: 1 | Status: SHIPPED | OUTPATIENT
Start: 2019-09-18 | End: 2020-03-23

## 2019-09-18 RX ORDER — TRAZODONE HYDROCHLORIDE 50 MG/1
50 TABLET ORAL NIGHTLY
Qty: 90 TABLET | Refills: 1 | Status: SHIPPED | OUTPATIENT
Start: 2019-09-18 | End: 2021-01-11

## 2019-09-18 RX ORDER — PRAMIPEXOLE DIHYDROCHLORIDE 0.25 MG/1
TABLET ORAL
Qty: 180 TABLET | Refills: 3 | Status: SHIPPED | OUTPATIENT
Start: 2019-09-18 | End: 2020-05-26

## 2019-09-18 RX ORDER — DICYCLOMINE HYDROCHLORIDE 10 MG/1
10 CAPSULE ORAL 2 TIMES DAILY
Qty: 120 CAPSULE | Refills: 2 | Status: SHIPPED | OUTPATIENT
Start: 2019-09-18 | End: 2020-10-06 | Stop reason: SDUPTHER

## 2019-09-18 NOTE — PROGRESS NOTES
systems was negative except for what was noted in the HPI. Vitals:    09/18/19 0857   BP: 136/60   Pulse: 72   Resp: 18   SpO2: 96%   Weight: 138 lb (62.6 kg)   Height: 5' 5\" (1.651 m)     Body mass index is 22.96 kg/m². Wt Readings from Last 3 Encounters:   09/18/19 138 lb (62.6 kg)   09/11/19 143 lb (64.9 kg)   08/13/19 137 lb (62.1 kg)     BP Readings from Last 3 Encounters:   09/18/19 136/60   09/13/19 (!) 162/58   09/13/19 (!) 120/49        Physical Exam:  General; alert, oriented, no acute distress. Skin no rashes or worrisome lesions. HEENT head is atraumatic. She has advanced macular degeneration she has very poor vision  Neck is supple without masses. Chest is clear without rales, rhonchi. Cardiovascular S1, S2 without murmur. Blood glucoses no cyanosis, clubbing peripheral edema. Abdomen is soft. Bowel sounds is present   Musculoskeletal adequate muscle tone range of motion and strength   blood vessels. No cyanosis clubbing peripheral edema; she has a healing incision in the left groin from her recent PAD surgery  Psych alert and oriented. No complaints      Assessment/Plan:  1. Gastrointestinal hemorrhage, unspecified gastrointestinal hemorrhage type  Stable on BID protonix     2. Atherosclerosis of native artery of both lower extremities with intermittent claudication ([address]  Stable post op; On asa and plavix     3. Pulmonary emphysema, unspecified emphysema type (HCC)  Stable  No recent exacerbations      4. Other insomnia  We will try trazaodone 50 at bedtime    5.  Osteoporosis, unspecified osteoporosis type, unspecified pathological fracture presence  We will try to get bone density redone and updated and get her on proliz and off the fosamax        Medical Decision Making: moderate complexity

## 2019-09-19 ENCOUNTER — CARE COORDINATION (OUTPATIENT)
Dept: CASE MANAGEMENT | Age: 84
End: 2019-09-19

## 2019-09-23 ENCOUNTER — CARE COORDINATION (OUTPATIENT)
Dept: CASE MANAGEMENT | Age: 84
End: 2019-09-23

## 2019-09-23 NOTE — CARE COORDINATION
[facility] Transitions Follow Up Call    2019    Patient: Bibi Wallace  Patient : 1935   MRN: [id number]  Reason for Admission:   Discharge Date: 19 RARS: Readmission Risk Score: 12         Spoke with: [address] [place] [address] Transitions Subsequent and Final Call    Subsequent and Final Calls  Do you have any ongoing symptoms?:  No  Have your medications changed?:  No  Do you have any questions related to your medications?:  No  Do you currently have any active services?:  No  Do you have any needs or concerns that I can assist you with?:  No  Identified Barriers:  None  Care Transitions Interventions  Other Interventions: Follow Up : Spoke with patient today for follow up phone call. She says she is doing much better today, voice sounds stronger. She says her bowel issues have gotten better, no dark stools. Advised to watch for any changes in stool or black tarry stools. She says her appetite is good, she had lost 6-8 lbs, and she can tell she is getting it back. Today she had some BBQ ribs for lunch. She has had her follow up appointment with PCP. She says her leg is still sore from the left femoral endarterectomy she had done back in August. Otherwise she states that all is going good with it. She has a follow up with GI on , and vascular in December. No problems or issues going on with her. Encouraged her to call with concerns prn. Will follow up with her later this week.    Future Appointments   Date Time Provider Silvia Chan   10/1/2019  4:40 PM MD JAROCHO [facility]ues [facility]   2019  9:00 AM PRAVEEN [facility]   2019  8:30 AM [facility] ROOM 1 [facility] LAB [facility]   2019  9:00 AM [facility] LAB ROOM 1 [facility] LAB [facility]   2019 10:00 AM Javier Centeno PA-C [facility]   2020 10:15 AM [facility]       Km Snow RN

## 2019-09-27 ENCOUNTER — CARE COORDINATION (OUTPATIENT)
Dept: CASE MANAGEMENT | Age: 84
End: 2019-09-27

## 2019-10-01 ENCOUNTER — TELEPHONE (OUTPATIENT)
Dept: GASTROENTEROLOGY | Age: 84
End: 2019-10-01

## 2019-10-02 ENCOUNTER — TELEPHONE (OUTPATIENT)
Dept: GASTROENTEROLOGY | Facility: CLINIC | Age: 84
End: 2019-10-02

## 2019-10-02 NOTE — TELEPHONE ENCOUNTER
Pt states she has been having black stools. She has an appt with Elsa tomorrow. I explained if she feels she needs to she should go to our urgent care or the ER.

## 2019-10-03 ENCOUNTER — OFFICE VISIT (OUTPATIENT)
Dept: GASTROENTEROLOGY | Facility: CLINIC | Age: 84
End: 2019-10-03

## 2019-10-03 VITALS
SYSTOLIC BLOOD PRESSURE: 142 MMHG | OXYGEN SATURATION: 99 % | BODY MASS INDEX: 23.16 KG/M2 | DIASTOLIC BLOOD PRESSURE: 50 MMHG | HEIGHT: 65 IN | WEIGHT: 139 LBS | HEART RATE: 69 BPM | TEMPERATURE: 97.9 F

## 2019-10-03 DIAGNOSIS — K27.9 PEPTIC ULCER DISEASE: ICD-10-CM

## 2019-10-03 DIAGNOSIS — D68.9 COAGULOPATHY (HCC): ICD-10-CM

## 2019-10-03 DIAGNOSIS — K92.1 MELENA: Primary | ICD-10-CM

## 2019-10-03 PROCEDURE — 99214 OFFICE O/P EST MOD 30 MIN: CPT | Performed by: NURSE PRACTITIONER

## 2019-10-03 NOTE — PROGRESS NOTES
[facility] GASTROENTEROLOGY - OFFICE NOTE    10/3/2019    Cindy Hicks   1935    Primary Physician: Pam Sin APRN    Chief Complaint   Patient presents with   • Black or Bloody Stool     recent hospital stay ([facility])         HISTORY OF PRESENT ILLNESS:    Cindy Hicks is a 83 y.o. female presents with black stool and aniyah pain x 3 days. Had recent egd by Dr. Mahajan at [facility] 9-13-19 noting antral ulcer. Was on protonix in the hospital but did not take it after discharge. Takes asa daily. On plavix for history vascular surgeries and has not taken in the last 3 days.  She started back on her Plavix after she was discharged from [facility] on September 13, 2019.  No n/v. No bright red blood per rectum.        On admission to [facility] September 11, 2019 hemoglobin was 9.2 and prior to discharge hemoglobin was 8.2.  She did not require blood transfusion.   August 14, 2019 hemoglobin 11.9.  July 2019 hemoglobin 13.1.      EGD  at [facility] 9-13-19  By Dr. Mahajan  IMPRESSION:  1. Clean based antral ulcer (4mm)    Last colonoscopy 10/2018 by dr mckay.     She is on Plavix and states is managed by Dr. Calderon.  She had a left common femoral proximal superficial femoral and profound femoris endarterectomy with bovine pericardial patch on August 13, 2019.  This was performed by Dr. Calderon.      Past Medical History:   Diagnosis Date   • Actinic keratosis    • Arthritis    • Atherosclerosis    • Benign fundic gland polyps of stomach    • Bleeding ulcer    • Blocked artery    • Carotid artery bruit    • Carotid artery stenosis    • COPD (chronic obstructive pulmonary disease) (CMS/[facility])    • Diverticulosis    • Emphysema of lung (CMS/[facility])    • History of colon polyps    • Hyperlipidemia    • Hypertension    • IBS (irritable bowel syndrome)    • Macular degeneration    • Osteoporosis    • Prediabetes    • PVD (peripheral vascular disease) (CMS/[facility])    • TIA (transient ischemic attack)    • Vitamin D  deficiency        Past Surgical History:   Procedure Laterality Date   • CATARACT EXTRACTION     • COLONOSCOPY  03/15/2013    polyp, hyperplastic   • COLONOSCOPY N/A 10/2/2018    Procedure: COLONOSCOPY WITH ANESTHESIA;  Surgeon: Harris Escobar MD;  Location: [facility] ENDOSCOPY;  Service: Gastroenterology   • CYST REMOVAL     • ENDOSCOPY  2019   • FEMORAL ARTERY STENT     • HYSTERECTOMY     • VASCULAR SURGERY      multiple       Outpatient Medications Marked as Taking for the 10/3/19 encounter (Office Visit) with Elsa Ward APRN   Medication Sig Dispense Refill   • amLODIPine (NORVASC) 5 MG tablet Take 5 mg by mouth Daily.     • aspirin 81 MG EC tablet Take 81 mg by mouth Daily.     • atorvastatin (LIPITOR) 40 MG tablet Take 40 mg by mouth Daily.     • budesonide-formoterol (SYMBICORT) 160-4.5 MCG/ACT inhaler Inhale 2 puffs 2 (Two) Times a Day.     • clopidogrel (PLAVIX) 75 MG tablet Take 75 mg by mouth Daily.     • dicyclomine (BENTYL) 10 MG capsule Take 10 mg by mouth 3 (Three) Times a Day.     • hydrochlorothiazide (MICROZIDE) 12.5 MG capsule Take 12.5 mg by mouth Daily.     • HYDROcodone-acetaminophen (NORCO) 5-325 MG per tablet Take 1 tablet by mouth As Needed.     • lisinopril (PRINIVIL,ZESTRIL) 40 MG tablet Take 40 mg by mouth Daily.     • pramipexole (MIRAPEX) 0.25 MG tablet Take 0.25 mg by mouth 3 (Three) Times a Day.     • vitamin D (ERGOCALCIFEROL) 19707 units capsule capsule Take 50,000 Units by mouth 1 (One) Time Per Week.         Allergies   Allergen Reactions   • Codeine Nausea And Vomiting   • Valium [Diazepam] Nausea Only       Social History     Socioeconomic History   • Marital status:      Spouse name: Not on file   • Number of children: Not on file   • Years of education: Not on file   • Highest education level: Not on file   Tobacco Use   • Smoking status: Current Every Day Smoker     Packs/day: 1.00     Types: Cigarettes   • Smokeless tobacco: Never Used   Substance and Sexual  "Activity   • Alcohol use: No   • Drug use: No   • Sexual activity: Defer       Family History   Problem Relation Age of Onset   • Colon cancer Sister    • Colon polyps Brother        Review of Systems   Constitutional: Negative for chills and fever.   Respiratory: Negative for cough, shortness of breath and wheezing.    Cardiovascular: Negative for chest pain and palpitations.   Gastrointestinal: Positive for abdominal pain. Negative for abdominal distention, constipation, diarrhea, nausea and vomiting.        Vitals:    10/03/19 1252   BP: 142/50   Pulse: 69   Temp: 97.9 °F (36.6 °C)   SpO2: 99%   Weight: 63 kg (139 lb)   Height: 163.8 cm (64.5\")      Body mass index is 23.49 kg/m².    Physical Exam   Constitutional: No distress.   Cardiovascular: Normal rate, regular rhythm and normal heart sounds.   Pulmonary/Chest: Effort normal and breath sounds normal.   Abdominal: Soft. Bowel sounds are normal. She exhibits no distension. There is no tenderness.   Musculoskeletal: She exhibits no edema.   Neurological: She is alert.   Skin: Skin is warm and dry.   Vitals reviewed.      Results for orders placed or performed during the hospital encounter of 10/02/18   Tissue Pathology Exam   Result Value Ref Range    Case Report       Surgical Pathology Report                         Case: [id number]                                  Authorizing Provider:  Harris Escobar MD      Collected:           10/02/2018 08:21 AM          Ordering Location:     [facility]     Received:            10/02/2018 10:49 AM                                 ENDOSCOPY                                                                    Pathologist:           Lito Sin MD                                                     Specimens:   1) - Large Intestine, polyp at proximal transverse x2                                               2) - Large Intestine, polyp at distal ascending                                            " "Final Diagnosis       1.  Colon, proximal transverse colonic polypectomies ×2: Tubular adenomas, negative for evidence of high-grade dysplasia.    2.  Colon, distal ascending colonic polypectomy: Tubular adenoma, negative for evidence of high-grade dysplasia.      Gross Description       Specimen #1 is received in a formalin filled container, labeled with the patient's name, date of birth, and \"polyp at proximal transverse ×2\".  The specimen consists of 2 red-tan soft tissue polyps aggregating to 0.5 x 0.3 x 0.2 cm.  The resection margin of the larger polyp is inked blue and the polyp is bisected.  The specimen is totally submitted in block 1A.    Specimen #2 is received in a formalin filled container, labeled with the patient's name, date of birth, and \"polyp at distal ascending\".  The specimen consists of one yellow-pink soft tissue polyp measuring 0.4 x 0.3 x 0.3 cm.  The polyp is bisected and totally submitted in block 2A.      Microscopic Description       1-2.Microscopic examination reveal sections of polypoid fragments of colonic-type mucosa demonstrating pseudostratified columnar lining epithelium with elongated hyperchromatic nuclei.  The underlying colonic crypts are crowded and slightly irregularly shaped and also lined by pseudostratified columnar epithelium.  No evidence of high-grade dysplasia is noted.             ASSESSMENT AND PLAN    Assessment/Plan     Cindy was seen today for black or bloody stool.    Diagnoses and all orders for this visit:    Melena  -     CBC & Differential  -     Case Request; Standing  -     Case Request    Peptic ulcer disease  -     Case Request; Standing  -     Case Request    Coagulopathy (CMS/HCC)    Other orders  -     Follow Anesthesia Guidelines / Standing Orders; Future  -     Implement Anesthesia Orders Day of Procedure; Standing  -     Obtain Informed Consent; Standing  -     Obtain Informed Consent; Future          She had recent upper endoscopy noting an " antral ulcer which was clean based.  She was supposed to be on Protonix upon discharge but the patient cannot remember if she was taking.  She stopped Plavix 3 days ago due to black stool.  She is hemodynamically stable.  I have instructed that if she has worsening symptoms then she must go to the emergency room.  We did discuss symptoms of too much blood loss.  She verbalized understanding.  We are going to schedule her for an upper endoscopy in the morning.  Her daughter is with her today and she is going to check her medications at home to see if she does have Protonix.  I have asked her to take either that or over-the-counter Prilosec daily.  I would like her to take today as well.  I am going to check a CBC and will contact her with results.  Recommend ER if worsening symptoms.      ESOPHAGOGASTRODUODENOSCOPY WITH ANESTHESIA (N/A)   Risk, benefits, and alternatives of endoscopy were explained in full.  They understand that there is a risk of bleeding, perforation, and infection.  The risk of perforation goes up with esophageal dilation.  Other options to evaluate UGI complaints could involve barium swallow or UGI series, but these would be diagnostic tests only.  Patient was given time to ask questions.  I answered them to their satisfaction and they are agreeable to proceeding         Body mass index is 23.49 kg/m².    Patient's Body mass index is 23.49 kg/m². BMI is within normal parameters. No follow-up required..             HALIMA Garrett    EMR Dragon/transcription disclaimer:  Much of this encounter note is electronic transcription/translation of spoken language to printed text.  The electronic translation of spoken language may be erroneous, or at times, nonsensical words or phrases may be inadvertently transcribed.  Although I have reviewed the note for such errors, some may still exist.

## 2019-10-04 ENCOUNTER — ANESTHESIA (OUTPATIENT)
Dept: GASTROENTEROLOGY | Facility: HOSPITAL | Age: 84
End: 2019-10-04

## 2019-10-04 ENCOUNTER — ANESTHESIA EVENT (OUTPATIENT)
Dept: GASTROENTEROLOGY | Facility: HOSPITAL | Age: 84
End: 2019-10-04

## 2019-10-04 ENCOUNTER — HOSPITAL ENCOUNTER (OUTPATIENT)
Facility: HOSPITAL | Age: 84
Setting detail: HOSPITAL OUTPATIENT SURGERY
Discharge: HOME | End: 2019-10-04
Attending: INTERNAL MEDICINE | Admitting: INTERNAL MEDICINE

## 2019-10-04 VITALS
HEIGHT: 65 IN | TEMPERATURE: 97.7 F | SYSTOLIC BLOOD PRESSURE: 139 MMHG | BODY MASS INDEX: 22.82 KG/M2 | DIASTOLIC BLOOD PRESSURE: 49 MMHG | RESPIRATION RATE: 16 BRPM | WEIGHT: 137 LBS | OXYGEN SATURATION: 94 % | HEART RATE: 58 BPM

## 2019-10-04 LAB
BASOPHILS # BLD AUTO: 0.04 10*3/MM3 (ref 0–0.2)
BASOPHILS NFR BLD AUTO: 0.7 % (ref 0–1.5)
DEPRECATED RDW RBC AUTO: 47.6 FL (ref 37–54)
EOSINOPHIL # BLD AUTO: 0.33 10*3/MM3 (ref 0–0.4)
EOSINOPHIL NFR BLD AUTO: 5.8 % (ref 0.3–6.2)
ERYTHROCYTE [DISTWIDTH] IN BLOOD BY AUTOMATED COUNT: 14.7 % (ref 12.3–15.4)
HCT VFR BLD AUTO: 25.2 % (ref 34–46.6)
HGB BLD-MCNC: 8 G/DL (ref 12–15.9)
IMM GRANULOCYTES # BLD AUTO: 0.02 10*3/MM3 (ref 0–0.05)
IMM GRANULOCYTES NFR BLD AUTO: 0.4 % (ref 0–0.5)
LYMPHOCYTES # BLD AUTO: 1.25 10*3/MM3 (ref 0.7–3.1)
LYMPHOCYTES NFR BLD AUTO: 21.9 % (ref 19.6–45.3)
MCH RBC QN AUTO: 28.4 PG (ref 26.6–33)
MCHC RBC AUTO-ENTMCNC: 31.7 G/DL (ref 31.5–35.7)
MCV RBC AUTO: 89.4 FL (ref 79–97)
MONOCYTES # BLD AUTO: 0.61 10*3/MM3 (ref 0.1–0.9)
MONOCYTES NFR BLD AUTO: 10.7 % (ref 5–12)
NEUTROPHILS # BLD AUTO: 3.46 10*3/MM3 (ref 1.7–7)
NEUTROPHILS NFR BLD AUTO: 60.5 % (ref 42.7–76)
NRBC BLD AUTO-RTO: 0 /100 WBC (ref 0–0.2)
PLATELET # BLD AUTO: 273 10*3/MM3 (ref 140–450)
PMV BLD AUTO: 9.5 FL (ref 6–12)
RBC # BLD AUTO: 2.82 10*6/MM3 (ref 3.77–5.28)
WBC NRBC COR # BLD: 5.71 10*3/MM3 (ref 3.4–10.8)

## 2019-10-04 PROCEDURE — 36415 COLL VENOUS BLD VENIPUNCTURE: CPT | Performed by: NURSE PRACTITIONER

## 2019-10-04 PROCEDURE — 43235 EGD DIAGNOSTIC BRUSH WASH: CPT | Performed by: INTERNAL MEDICINE

## 2019-10-04 PROCEDURE — 85025 COMPLETE CBC W/AUTO DIFF WBC: CPT | Performed by: NURSE PRACTITIONER

## 2019-10-04 PROCEDURE — 25010000002 PROPOFOL 10 MG/ML EMULSION: Performed by: NURSE ANESTHETIST, CERTIFIED REGISTERED

## 2019-10-04 RX ORDER — PROPOFOL 10 MG/ML
VIAL (ML) INTRAVENOUS AS NEEDED
Status: DISCONTINUED | OUTPATIENT
Start: 2019-10-04 | End: 2019-10-04 | Stop reason: SURG

## 2019-10-04 RX ORDER — SODIUM CHLORIDE 9 MG/ML
500 INJECTION, SOLUTION INTRAVENOUS CONTINUOUS PRN
Status: DISCONTINUED | OUTPATIENT
Start: 2019-10-04 | End: 2019-10-04 | Stop reason: HOSPADM

## 2019-10-04 RX ORDER — SODIUM CHLORIDE 0.9 % (FLUSH) 0.9 %
10 SYRINGE (ML) INJECTION AS NEEDED
Status: DISCONTINUED | OUTPATIENT
Start: 2019-10-04 | End: 2019-10-04 | Stop reason: HOSPADM

## 2019-10-04 RX ORDER — ONDANSETRON 2 MG/ML
4 INJECTION INTRAMUSCULAR; INTRAVENOUS ONCE AS NEEDED
Status: DISCONTINUED | OUTPATIENT
Start: 2019-10-04 | End: 2019-10-04 | Stop reason: HOSPADM

## 2019-10-04 RX ORDER — PANTOPRAZOLE SODIUM 40 MG/1
40 TABLET, DELAYED RELEASE ORAL 2 TIMES DAILY
COMMUNITY

## 2019-10-04 RX ADMIN — PROPOFOL 50 MG: 10 INJECTION, EMULSION INTRAVENOUS at 13:29

## 2019-10-04 RX ADMIN — LIDOCAINE HYDROCHLORIDE 40 MG: 20 INJECTION, SOLUTION INTRAVENOUS at 13:29

## 2019-10-04 RX ADMIN — PROPOFOL 10 MG: 10 INJECTION, EMULSION INTRAVENOUS at 13:33

## 2019-10-04 RX ADMIN — SODIUM CHLORIDE 500 ML: 9 INJECTION, SOLUTION INTRAVENOUS at 11:57

## 2019-10-04 RX ADMIN — PROPOFOL 25 MG: 10 INJECTION, EMULSION INTRAVENOUS at 13:30

## 2019-10-04 NOTE — INTERVAL H&P NOTE
H&P updated. The patient was examined and the following changes are noted:  She does tell me she was taking Protonix twice a day every day since she left the hospital in mid-September.  She feels well today.

## 2019-10-04 NOTE — DISCHARGE INSTRUCTIONS
Nov 6th with Elsa at 1:30  Call office Mon 10-7-19 and let them know how your doing, ask them when to restart your Plavix.

## 2019-10-04 NOTE — ANESTHESIA PREPROCEDURE EVALUATION
Anesthesia Evaluation     Patient summary reviewed   no history of anesthetic complications:  NPO Solid Status: > 8 hours  NPO Liquid Status: > 2 hours           Airway   Mallampati: I  TM distance: >3 FB  Neck ROM: full  Dental    (+) edentulous, upper dentures and lower dentures    Pulmonary    (+) a smoker (1 ppd) Current, COPD,   (-) asthma, recent URI, sleep apnea  Cardiovascular   Exercise tolerance: good (4-7 METS)    (+) hypertension well controlled, PVD, hyperlipidemia,  carotid artery disease  (-) pacemaker, past MI, angina, cardiac stents, CABG    ROS comment: Off plavix x 4 days    Neuro/Psych  (+) TIA (>10 yrs ago),     (-) seizures, CVA  GI/Hepatic/Renal/Endo    (+)  PUD,    (-) GERD, liver disease, no renal disease, diabetes, hypothyroidism, hyperthyroidism    Musculoskeletal     Abdominal    Substance History      OB/GYN          Other                          Anesthesia Plan    ASA 3     MAC     intravenous induction   Anesthetic plan, all risks, benefits, and alternatives have been provided, discussed and informed consent has been obtained with: patient.

## 2019-10-04 NOTE — ANESTHESIA POSTPROCEDURE EVALUATION
Patient: Cindy Hicks    Procedure Summary     Date:  10/04/19 Room / Location:  [facility] ENDOSCOPY 2 /  [facility]    Anesthesia Start:  1326 Anesthesia Stop:  1337    Procedure:  ESOPHAGOGASTRODUODENOSCOPY WITH ANESTHESIA (N/A ) Diagnosis:       Melena      Peptic ulcer disease      (Melena [K92.1])      (Peptic ulcer disease [K27.9])    Surgeon:  Harris Escobar MD Provider:  Alexander Ponce CRNA    Anesthesia Type:  MAC ASA Status:  3          Anesthesia Type: MAC  Last vitals  BP   155/48 (10/04/19 1141)   Temp   97.7 °F (36.5 °C) (10/04/19 1141)   Pulse   68 (10/04/19 1141)   Resp   20 (10/04/19 1141)     SpO2   98 % (10/04/19 1141)     Post Anesthesia Care and Evaluation    Patient location during evaluation: PHASE II  Patient participation: complete - patient participated  Level of consciousness: responsive to light touch  Pain score: 0  Airway patency: patent  Anesthetic complications: No anesthetic complications  PONV Status: none  Cardiovascular status: acceptable  Respiratory status: acceptable  Hydration status: acceptable  No anesthesia care post op

## 2019-10-07 ENCOUNTER — TELEPHONE (OUTPATIENT)
Dept: GASTROENTEROLOGY | Facility: CLINIC | Age: 84
End: 2019-10-07

## 2019-10-07 DIAGNOSIS — G89.4 CHRONIC PAIN SYNDROME: ICD-10-CM

## 2019-10-07 RX ORDER — HYDROCODONE BITARTRATE AND ACETAMINOPHEN 5; 325 MG/1; MG/1
1 TABLET ORAL EVERY 6 HOURS PRN
Qty: 120 TABLET | Refills: 0 | Status: SHIPPED | OUTPATIENT
Start: 2019-10-09 | End: 2019-11-05 | Stop reason: SDUPTHER

## 2019-10-07 NOTE — TELEPHONE ENCOUNTER
Let her know that her hemoglobin was 8.0 which is relatively unchanged from when she was in the hospital at [facility].  If she is no longer having black stools she can resume the Plavix after she has been off of it for 1 week.  She should continue to follow-up with her primary care provider and contact us if she has any additional bleeding or black stools.

## 2019-10-28 RX ORDER — ALENDRONATE SODIUM 70 MG/1
TABLET ORAL
Qty: 12 TABLET | Refills: 1 | Status: SHIPPED | OUTPATIENT
Start: 2019-10-28 | End: 2021-01-11

## 2019-11-04 ENCOUNTER — HOSPITAL ENCOUNTER (OUTPATIENT)
Dept: CT IMAGING | Age: 84
Discharge: HOME | End: 2019-11-04
Payer: MEDICARE

## 2019-11-04 ENCOUNTER — OFFICE VISIT (OUTPATIENT)
Dept: INTERNAL MEDICINE | Age: 84
End: 2019-11-04
Payer: MEDICARE

## 2019-11-04 ENCOUNTER — HOSPITAL ENCOUNTER (OUTPATIENT)
Dept: NON INVASIVE DIAGNOSTICS | Age: 84
Discharge: HOME | End: 2019-11-04
Payer: MEDICARE

## 2019-11-04 VITALS
SYSTOLIC BLOOD PRESSURE: 136 MMHG | RESPIRATION RATE: 18 BRPM | WEIGHT: 142 LBS | HEART RATE: 54 BPM | OXYGEN SATURATION: 95 % | DIASTOLIC BLOOD PRESSURE: 52 MMHG | BODY MASS INDEX: 23.66 KG/M2 | HEIGHT: 65 IN

## 2019-11-04 DIAGNOSIS — Z23 NEED FOR INFLUENZA VACCINATION: ICD-10-CM

## 2019-11-04 DIAGNOSIS — R55 NEAR SYNCOPE: ICD-10-CM

## 2019-11-04 DIAGNOSIS — N39.498 OTHER URINARY INCONTINENCE: ICD-10-CM

## 2019-11-04 DIAGNOSIS — M79.89 LEFT LEG SWELLING: ICD-10-CM

## 2019-11-04 DIAGNOSIS — M79.605 LEFT LEG PAIN: ICD-10-CM

## 2019-11-04 DIAGNOSIS — M79.605 LEFT LEG PAIN: Primary | ICD-10-CM

## 2019-11-04 LAB
ALBUMIN SERPL-MCNC: 4.1 G/DL (ref 3.5–5.2)
ALP BLD-CCNC: 90 U/L (ref 35–104)
ALT SERPL-CCNC: 16 U/L (ref 5–33)
ANION GAP SERPL CALCULATED.3IONS-SCNC: 13 MMOL/L (ref 7–19)
AST SERPL-CCNC: 19 U/L (ref 5–32)
BASOPHILS ABSOLUTE: 0.1 K/UL (ref 0–0.2)
BASOPHILS RELATIVE PERCENT: 0.7 % (ref 0–1)
BILIRUB SERPL-MCNC: 0.3 MG/DL (ref 0.2–1.2)
BUN BLDV-MCNC: 20 MG/DL (ref 8–23)
CALCIUM SERPL-MCNC: 8.9 MG/DL (ref 8.8–10.2)
CHLORIDE BLD-SCNC: 98 MMOL/L (ref 98–111)
CO2: 26 MMOL/L (ref 22–29)
CREAT SERPL-MCNC: 1.1 MG/DL (ref 0.5–0.9)
EOSINOPHILS ABSOLUTE: 0.2 K/UL (ref 0–0.6)
EOSINOPHILS RELATIVE PERCENT: 1.8 % (ref 0–5)
GFR NON-AFRICAN AMERICAN: 47
GFR NON-AFRICAN AMERICAN: 47
GLUCOSE BLD-MCNC: 137 MG/DL (ref 74–109)
HCT VFR BLD CALC: 26.2 % (ref 37–47)
HEMOGLOBIN: 7.6 G/DL (ref 12–16)
IMMATURE GRANULOCYTES #: 0 K/UL
LYMPHOCYTES ABSOLUTE: 1.1 K/UL (ref 1.1–4.5)
LYMPHOCYTES RELATIVE PERCENT: 11.8 % (ref 20–40)
MCH RBC QN AUTO: 25 PG (ref 27–31)
MCHC RBC AUTO-ENTMCNC: 29 G/DL (ref 33–37)
MCV RBC AUTO: 86.2 FL (ref 81–99)
MONOCYTES ABSOLUTE: 1 K/UL (ref 0–0.9)
MONOCYTES RELATIVE PERCENT: 11 % (ref 0–10)
NEUTROPHILS ABSOLUTE: 7 K/UL (ref 1.5–7.5)
NEUTROPHILS RELATIVE PERCENT: 74.3 % (ref 50–65)
PDW BLD-RTO: 17.4 % (ref 11.5–14.5)
PERFORMED ON: ABNORMAL
PLATELET # BLD: 329 K/UL (ref 130–400)
PMV BLD AUTO: 10.1 FL (ref 9.4–12.3)
POC CREATININE: 1.1 MG/DL (ref 0.3–1.3)
POC SAMPLE TYPE: ABNORMAL
POTASSIUM SERPL-SCNC: 4.8 MMOL/L (ref 3.5–5)
RBC # BLD: 3.04 M/UL (ref 4.2–5.4)
SODIUM BLD-SCNC: 137 MMOL/L (ref 136–145)
TOTAL PROTEIN: 6.8 G/DL (ref 6.6–8.7)
WBC # BLD: 9.4 K/UL (ref 4.8–10.8)

## 2019-11-04 PROCEDURE — 1123F ACP DISCUSS/DSCN MKR DOCD: CPT | Performed by: NURSE PRACTITIONER

## 2019-11-04 PROCEDURE — 4040F PNEUMOC VAC/ADMIN/RCVD: CPT | Performed by: NURSE PRACTITIONER

## 2019-11-04 PROCEDURE — G0008 ADMIN INFLUENZA VIRUS VAC: HCPCS | Performed by: NURSE PRACTITIONER

## 2019-11-04 PROCEDURE — 0509F URINE INCON PLAN DOCD: CPT | Performed by: NURSE PRACTITIONER

## 2019-11-04 PROCEDURE — 82565 ASSAY OF CREATININE: CPT

## 2019-11-04 PROCEDURE — 99214 OFFICE O/P EST MOD 30 MIN: CPT | Performed by: NURSE PRACTITIONER

## 2019-11-04 PROCEDURE — G8420 CALC BMI NORM PARAMETERS: HCPCS | Performed by: NURSE PRACTITIONER

## 2019-11-04 PROCEDURE — 93971 EXTREMITY STUDY: CPT

## 2019-11-04 PROCEDURE — G8399 PT W/DXA RESULTS DOCUMENT: HCPCS | Performed by: NURSE PRACTITIONER

## 2019-11-04 PROCEDURE — 90653 IIV ADJUVANT VACCINE IM: CPT | Performed by: NURSE PRACTITIONER

## 2019-11-04 PROCEDURE — G8598 ASA/ANTIPLAT THER USED: HCPCS | Performed by: NURSE PRACTITIONER

## 2019-11-04 PROCEDURE — 1090F PRES/ABSN URINE INCON ASSESS: CPT | Performed by: NURSE PRACTITIONER

## 2019-11-04 PROCEDURE — 4004F PT TOBACCO SCREEN RCVD TLK: CPT | Performed by: NURSE PRACTITIONER

## 2019-11-04 PROCEDURE — G8484 FLU IMMUNIZE NO ADMIN: HCPCS | Performed by: NURSE PRACTITIONER

## 2019-11-04 PROCEDURE — 6360000004 HC RX CONTRAST MEDICATION: Performed by: NURSE PRACTITIONER

## 2019-11-04 PROCEDURE — G8427 DOCREV CUR MEDS BY ELIG CLIN: HCPCS | Performed by: NURSE PRACTITIONER

## 2019-11-04 PROCEDURE — 71275 CT ANGIOGRAPHY CHEST: CPT

## 2019-11-04 RX ORDER — ATORVASTATIN CALCIUM 80 MG/1
TABLET, FILM COATED ORAL
Qty: 90 TABLET | Refills: 3 | Status: SHIPPED | OUTPATIENT
Start: 2019-11-04 | End: 2020-08-24

## 2019-11-04 RX ADMIN — IOPAMIDOL 90 ML: 755 INJECTION, SOLUTION INTRAVENOUS at 09:56

## 2019-11-04 ASSESSMENT — ENCOUNTER SYMPTOMS
CHOKING: 0
BLOOD IN STOOL: 0
TROUBLE SWALLOWING: 0
SHORTNESS OF BREATH: 1
ABDOMINAL DISTENTION: 0
COLOR CHANGE: 0
EYE ITCHING: 0
VOMITING: 0
SORE THROAT: 0
CONSTIPATION: 0
STRIDOR: 0
COUGH: 0
NAUSEA: 0
DIARRHEA: 0
ABDOMINAL PAIN: 0
EYE DISCHARGE: 0
WHEEZING: 0

## 2019-11-05 DIAGNOSIS — G89.4 CHRONIC PAIN SYNDROME: ICD-10-CM

## 2019-11-06 ENCOUNTER — TELEPHONE (OUTPATIENT)
Dept: VASCULAR SURGERY | Facility: CLINIC | Age: 84
End: 2019-11-06

## 2019-11-06 RX ORDER — HYDROCODONE BITARTRATE AND ACETAMINOPHEN 5; 325 MG/1; MG/1
1 TABLET ORAL EVERY 6 HOURS PRN
Qty: 120 TABLET | Refills: 0 | Status: SHIPPED | OUTPATIENT
Start: 2019-11-08 | End: 2019-12-11 | Stop reason: SDUPTHER

## 2019-11-08 ENCOUNTER — TELEPHONE (OUTPATIENT)
Dept: INTERNAL MEDICINE | Age: 84
End: 2019-11-08

## 2019-11-08 DIAGNOSIS — J43.0 UNILATERAL EMPHYSEMA (HCC): ICD-10-CM

## 2019-11-08 DIAGNOSIS — R06.02 SHORTNESS OF BREATH: Primary | ICD-10-CM

## 2019-11-08 RX ORDER — ALBUTEROL SULFATE 2.5 MG/3ML
2.5 SOLUTION RESPIRATORY (INHALATION) EVERY 4 HOURS PRN
Qty: 60 EACH | Refills: 3 | Status: SHIPPED | OUTPATIENT
Start: 2019-11-08 | End: 2019-12-09 | Stop reason: SDUPTHER

## 2019-11-11 ENCOUNTER — OFFICE VISIT (OUTPATIENT)
Dept: VASCULAR SURGERY | Facility: CLINIC | Age: 84
End: 2019-11-11

## 2019-11-11 VITALS
SYSTOLIC BLOOD PRESSURE: 162 MMHG | HEART RATE: 67 BPM | BODY MASS INDEX: 24.16 KG/M2 | DIASTOLIC BLOOD PRESSURE: 66 MMHG | OXYGEN SATURATION: 97 % | WEIGHT: 145 LBS | HEIGHT: 65 IN

## 2019-11-11 DIAGNOSIS — I70.213 ATHEROSCLEROSIS OF NATIVE ARTERY OF BOTH LOWER EXTREMITIES WITH INTERMITTENT CLAUDICATION (HCC): ICD-10-CM

## 2019-11-11 DIAGNOSIS — R60.0 EDEMA OF BOTH LOWER EXTREMITIES: Primary | ICD-10-CM

## 2019-11-11 PROCEDURE — 99204 OFFICE O/P NEW MOD 45 MIN: CPT | Performed by: SURGERY

## 2019-11-11 NOTE — PROGRESS NOTES
11/11/2019      Pam Sin APRN  [address] [place] [address] [address]  [place] [address]    Cindy Hicks  1935    Chief Complaint   Patient presents with   • Establish Care     Left leg pain and swelling.  Pt states that she had left common femoral endarterectomy in August 2019 by Dr. Calderon at [facility]. She states that he is no longer her doctor. Pt has a hx of PVD.  HALIMA Vega, referring.       Dear HALIMA Plummer:      HPI  I had the pleasure of seeing your patient Cindy Hicks in the office today.  Thank you kindly for this consultation.  As you recall, Cindy Hicks is a 84 y.o.  female who you are currently following for general medical care.  She was referred here today for evaluation of left lower extremity edema.  On reviewing her chart and discussing with the patient she has a long-standing history of peripheral vascular disease.  Up to now she is followed with the vascular surgery team across [facility].  She is undergone multiple prior lower extremity angiograms and has stents placed in the bilateral lower extremities.  More recently she was seen by Dr. Calderon and underwent a left common femoral, proximal superficial femoral, and profunda endarterectomy with patch angioplasty for lifestyle limiting claudication of the left lower extremity.  She reports since that surgery her claudication has resolved however she is continued to have left lower extremity edema.  She was seen by them postoperatively back in September and did have a venous duplex at that time which showed no evidence of DVT.  She reports that she was told that this baseline level of edema was now her new normal and she was unhappy with that and so now comes for a second opinion.  Her only other complaint is some mild claudication to the right lower extremity however this is not lifestyle limiting.  She denies any prior history of DVT.  She does report having intermittently worn  compression in the past but does not wear it on a regular basis.  She otherwise is without complaint.    Past Medical History:   Diagnosis Date   • Actinic keratosis    • Arthritis    • Atherosclerosis    • Benign fundic gland polyps of stomach    • Bleeding ulcer    • Blocked artery    • Carotid artery bruit    • Carotid artery stenosis    • COPD (chronic obstructive pulmonary disease) (CMS/[facility])    • Diverticulosis    • Emphysema of lung (CMS/[facility])    • History of colon polyps    • Hyperlipidemia    • Hypertension    • IBS (irritable bowel syndrome)    • Macular degeneration    • Osteoporosis    • Prediabetes    • PVD (peripheral vascular disease) (CMS/[facility])    • TIA (transient ischemic attack)    • Vitamin D deficiency        Past Surgical History:   Procedure Laterality Date   • CATARACT EXTRACTION     • COLONOSCOPY  03/15/2013    polyp, hyperplastic   • COLONOSCOPY N/A 10/2/2018    Procedure: COLONOSCOPY WITH ANESTHESIA;  Surgeon: Harris Escobar MD;  Location: [facility] ENDOSCOPY;  Service: Gastroenterology   • CYST REMOVAL     • ENDOSCOPY  2019   • ENDOSCOPY N/A 10/4/2019    Procedure: ESOPHAGOGASTRODUODENOSCOPY WITH ANESTHESIA;  Surgeon: Harris Escobar MD;  Location: [facility] ENDOSCOPY;  Service: Gastroenterology   • FEMORAL ARTERY STENT     • HYSTERECTOMY     • VASCULAR SURGERY      multiple       Family History   Problem Relation Age of Onset   • Colon cancer Sister    • Colon polyps Brother    • Heart disease Daughter    • Heart disease Son        Social History     Socioeconomic History   • Marital status:      Spouse name: Not on file   • Number of children: Not on file   • Years of education: Not on file   • Highest education level: Not on file   Tobacco Use   • Smoking status: Current Every Day Smoker     Packs/day: 0.50     Types: Cigarettes   • Smokeless tobacco: Never Used   Substance and Sexual Activity   • Alcohol use: No   • Drug use: No   • Sexual activity: Defer       Allergies    Allergen Reactions   • Codeine Nausea And Vomiting   • Valium [Diazepam] Nausea Only       Prior to Admission medications    Medication Sig Start Date End Date Taking? Authorizing Provider   amLODIPine (NORVASC) 5 MG tablet Take 5 mg by mouth Daily.   Yes Lainey Goodwin MD   aspirin 81 MG EC tablet Take 81 mg by mouth Daily.   Yes Lainey Goodwin MD   atorvastatin (LIPITOR) 40 MG tablet Take 40 mg by mouth Daily.   Yes Lainey Goodwin MD   budesonide-formoterol (SYMBICORT) 160-4.5 MCG/ACT inhaler Inhale 2 puffs 2 (Two) Times a Day.   Yes Lainey Goodwin MD   clopidogrel (PLAVIX) 75 MG tablet Take 75 mg by mouth Daily.   Yes Lainey Goodwin MD   dicyclomine (BENTYL) 10 MG capsule Take 10 mg by mouth 3 (Three) Times a Day.   Yes Lainey Goodwin MD   hydrochlorothiazide (MICROZIDE) 12.5 MG capsule Take 12.5 mg by mouth Daily.   Yes Lainey Goodwin MD   HYDROcodone-acetaminophen (NORCO) 5-325 MG per tablet Take 1 tablet by mouth As Needed.   Yes Lainey Goodwin MD   lisinopril (PRINIVIL,ZESTRIL) 40 MG tablet Take 40 mg by mouth Daily.   Yes Lainey Goodwin MD   pantoprazole (PROTONIX) 40 MG EC tablet Take 40 mg by mouth 2 (Two) Times a Day.   Yes Lainey Goodwin MD   pramipexole (MIRAPEX) 0.25 MG tablet Take 0.25 mg by mouth 3 (Three) Times a Day.   Yes Lainey Goodwin MD   vitamin D (ERGOCALCIFEROL) 94793 units capsule capsule Take 50,000 Units by mouth 1 (One) Time Per Week.   Yes Lainey Goodwin MD       Review of Systems   Constitutional: Negative.  Negative for activity change, appetite change, chills, diaphoresis, fatigue and fever.   HENT: Negative.  Negative for congestion, sneezing, sore throat and trouble swallowing.    Eyes: Negative.  Negative for visual disturbance.   Respiratory: Negative.  Negative for chest tightness and shortness of breath.    Cardiovascular: Positive for leg swelling (Left worse than right). Negative for chest  "pain and palpitations.   Gastrointestinal: Negative.  Negative for abdominal distention, abdominal pain, nausea and vomiting.   Endocrine: Negative.    Genitourinary: Negative.    Musculoskeletal: Negative.         She reports some claudication to the right calf.   Skin: Negative.    Allergic/Immunologic: Negative.    Neurological: Negative.    Hematological: Negative.    Psychiatric/Behavioral: Negative.        /66   Pulse 67   Ht 165.1 cm (65\")   Wt 65.8 kg (145 lb)   SpO2 97%   BMI 24.13 kg/m²   Physical Exam   Constitutional: She is oriented to person, place, and time. She appears well-developed and well-nourished.   HENT:   Head: Normocephalic and atraumatic.   Eyes: EOM are normal. Pupils are equal, round, and reactive to light.   Neck: Normal range of motion. Neck supple. No JVD present.   Cardiovascular: Normal rate and regular rhythm.   Pulses:       Carotid pulses are 2+ on the right side, and 2+ on the left side.       Radial pulses are 2+ on the right side, and 2+ on the left side.        Femoral pulses are 2+ on the right side, and 2+ on the left side.       Popliteal pulses are 1+ on the right side, and 2+ on the left side.        Dorsalis pedis pulses are 0 on the right side, and 2+ on the left side.        Posterior tibial pulses are 0 on the right side, and 2+ on the left side.   On the right she does have Doppler signals present at the DP and PT.  Both feet are warm and well perfused appearing.   Pulmonary/Chest: Effort normal. No respiratory distress.   Abdominal: Soft. She exhibits no distension and no mass. There is no tenderness.   Musculoskeletal: She exhibits edema (Edema to the bilateral lower extremities to the level of the knee.  Left slightly worse than right.). She exhibits no tenderness or deformity.   Neurological: She is alert and oriented to person, place, and time. No sensory deficit. She exhibits normal muscle tone.   Skin: Skin is warm and dry. Capillary refill takes " less than 2 seconds.   Psychiatric: She has a normal mood and affect. Her behavior is normal. Judgment and thought content normal.   Vitals reviewed.      No results found.    Patient Active Problem List   Diagnosis   • Family hx colonic polyps   • Family hx of colon cancer   • Hx of colonic polyp   • Melena   • Peptic ulcer disease         ICD-10-CM ICD-9-CM   1. Edema of both lower extremities R60.0 782.3   2. Atherosclerosis of native artery of both lower extremities with intermittent claudication (CMS/[facility]) I70.213 440.21       Lab Frequency Next Occurrence   Follow Anesthesia Guidelines / Standing Orders Once 09/12/2018   Follow Anesthesia Guidelines / Standing Orders Once 10/03/2019   Obtain Informed Consent Once 10/08/2019   Diet: Once 10/04/2019   US Venous Doppler Lower Extremity Bilateral (duplex) Once 02/09/2020   US Ankle / Brachial Indices Extremity Complete Once 05/09/2020       Plan: After thoroughly evaluating Cindy Hicks, I believe the best course of action is to initially remain conservative from a vascular standpoint.  From an arterial standpoint after her recent left femoral endarterectomy and profundoplasty she does have palpable pedal pulses and no signs of arterial insufficiency.  She reports that her claudication has resolved in her left lower extremity and she only has mild residual right lower extremity claudication.  However she continues to have lower extremity edema worse on the left than the right.  She also clinically does have some changes of venous stasis with some stasis dermatitis of the bilateral lower legs.  Given this I recommended that she start to wear compression on a daily basis to help aid in edema reduction.  I did give her a prescription for compression stockings and discussed the correct way to wear them.  I have also ordered a venous duplex with insufficiency study to be done when she returns to the office in 3 months for further evaluation.  Finally given her  previous history of peripheral disease and multiple interventions I have ordered an JENNIFER/PVR to obtain a baseline as I do not have any of this information available.  The patient can continue taking their current medication regimen as previously planned. I did discuss vascular risk factors as they pertain to the progression of vascular disease including controlling hypertension, and hyperlipidemia. These factors remain stable. Patient's Body mass index is 24.13 kg/m². BMI is within normal parameters. No follow-up required. This was all discussed in full with complete understanding.    Thank you for allowing me to participate in the care of your patient.  Please do not hesitate with any questions or concerns.  I will keep you aware of any further encounters with Cindy Hicks.        Sincerely yours,         Jerald Henriquez MD

## 2019-11-15 DIAGNOSIS — Z00.00 HEALTHCARE MAINTENANCE: ICD-10-CM

## 2019-11-15 DIAGNOSIS — M89.9 DISORDER OF BONE: ICD-10-CM

## 2019-11-15 LAB
ALBUMIN SERPL-MCNC: 3.9 G/DL (ref 3.5–5.2)
ALP BLD-CCNC: 89 U/L (ref 35–104)
ALT SERPL-CCNC: 22 U/L (ref 5–33)
ANION GAP SERPL CALCULATED.3IONS-SCNC: 14 MMOL/L (ref 7–19)
AST SERPL-CCNC: 23 U/L (ref 5–32)
BILIRUB SERPL-MCNC: <0.2 MG/DL (ref 0.2–1.2)
BUN BLDV-MCNC: 23 MG/DL (ref 8–23)
CALCIUM SERPL-MCNC: 9 MG/DL (ref 8.8–10.2)
CHLORIDE BLD-SCNC: 99 MMOL/L (ref 98–111)
CHOLESTEROL, TOTAL: 119 MG/DL (ref 160–199)
CO2: 26 MMOL/L (ref 22–29)
CREAT SERPL-MCNC: 1.1 MG/DL (ref 0.5–0.9)
GFR NON-AFRICAN AMERICAN: 47
GLUCOSE BLD-MCNC: 105 MG/DL (ref 74–109)
HDLC SERPL-MCNC: 58 MG/DL (ref 65–121)
LDL CHOLESTEROL CALCULATED: 49 MG/DL
POTASSIUM SERPL-SCNC: 4.7 MMOL/L (ref 3.5–5)
SODIUM BLD-SCNC: 139 MMOL/L (ref 136–145)
TOTAL PROTEIN: 6.2 G/DL (ref 6.6–8.7)
TRIGL SERPL-MCNC: 60 MG/DL (ref 0–149)
TSH SERPL DL<=0.05 MIU/L-ACNC: 4.24 UIU/ML (ref 0.27–4.2)
VITAMIN D 25-HYDROXY: 55.8 NG/ML

## 2019-11-18 ENCOUNTER — OFFICE VISIT (OUTPATIENT)
Dept: INTERNAL MEDICINE | Age: 84
End: 2019-11-18
Payer: MEDICARE

## 2019-11-18 VITALS
OXYGEN SATURATION: 95 % | HEIGHT: 65 IN | DIASTOLIC BLOOD PRESSURE: 55 MMHG | SYSTOLIC BLOOD PRESSURE: 128 MMHG | RESPIRATION RATE: 18 BRPM | WEIGHT: 144 LBS | BODY MASS INDEX: 23.99 KG/M2 | HEART RATE: 71 BPM

## 2019-11-18 DIAGNOSIS — I70.213 ATHEROSCLEROSIS OF NATIVE ARTERY OF BOTH LOWER EXTREMITIES WITH INTERMITTENT CLAUDICATION (HCC): ICD-10-CM

## 2019-11-18 DIAGNOSIS — Z00.00 HEALTHCARE MAINTENANCE: ICD-10-CM

## 2019-11-18 DIAGNOSIS — M89.9 DISORDER OF BONE, UNSPECIFIED: ICD-10-CM

## 2019-11-18 DIAGNOSIS — D64.9 ANEMIA, UNSPECIFIED TYPE: ICD-10-CM

## 2019-11-18 DIAGNOSIS — N18.30 CHRONIC KIDNEY DISEASE, STAGE III (MODERATE) (HCC): Chronic | ICD-10-CM

## 2019-11-18 DIAGNOSIS — R19.5 STOOL GUAIAC POSITIVE: ICD-10-CM

## 2019-11-18 DIAGNOSIS — D64.9 ANEMIA, UNSPECIFIED TYPE: Primary | ICD-10-CM

## 2019-11-18 DIAGNOSIS — I10 ESSENTIAL HYPERTENSION: Chronic | ICD-10-CM

## 2019-11-18 LAB
ANISOCYTOSIS: ABNORMAL
BASOPHILS ABSOLUTE: 0.1 K/UL (ref 0–0.2)
BASOPHILS RELATIVE PERCENT: 1.1 % (ref 0–1)
EOSINOPHILS ABSOLUTE: 0.2 K/UL (ref 0–0.6)
EOSINOPHILS RELATIVE PERCENT: 3.6 % (ref 0–5)
HCT VFR BLD CALC: 26.5 % (ref 37–47)
HEMOGLOBIN: 7.5 G/DL (ref 12–16)
HYPOCHROMIA: ABNORMAL
IMMATURE GRANULOCYTES #: 0 K/UL
LYMPHOCYTES ABSOLUTE: 1 K/UL (ref 1.1–4.5)
LYMPHOCYTES RELATIVE PERCENT: 14.7 % (ref 20–40)
MCH RBC QN AUTO: 23 PG (ref 27–31)
MCHC RBC AUTO-ENTMCNC: 28.3 G/DL (ref 33–37)
MCV RBC AUTO: 81.3 FL (ref 81–99)
MICROCYTES: ABNORMAL
MONOCYTES ABSOLUTE: 0.9 K/UL (ref 0–0.9)
MONOCYTES RELATIVE PERCENT: 12.9 % (ref 0–10)
NEUTROPHILS ABSOLUTE: 4.5 K/UL (ref 1.5–7.5)
NEUTROPHILS RELATIVE PERCENT: 67.4 % (ref 50–65)
OVALOCYTES: ABNORMAL
PDW BLD-RTO: 19.5 % (ref 11.5–14.5)
PLATELET # BLD: 354 K/UL (ref 130–400)
PLATELET SLIDE REVIEW: ADEQUATE
PMV BLD AUTO: 10.2 FL (ref 9.4–12.3)
POLYCHROMASIA: ABNORMAL
RBC # BLD: 3.26 M/UL (ref 4.2–5.4)
WBC # BLD: 6.6 K/UL (ref 4.8–10.8)

## 2019-11-18 PROCEDURE — G8482 FLU IMMUNIZE ORDER/ADMIN: HCPCS | Performed by: NURSE PRACTITIONER

## 2019-11-18 PROCEDURE — G8399 PT W/DXA RESULTS DOCUMENT: HCPCS | Performed by: NURSE PRACTITIONER

## 2019-11-18 PROCEDURE — 1090F PRES/ABSN URINE INCON ASSESS: CPT | Performed by: NURSE PRACTITIONER

## 2019-11-18 PROCEDURE — 1123F ACP DISCUSS/DSCN MKR DOCD: CPT | Performed by: NURSE PRACTITIONER

## 2019-11-18 PROCEDURE — G8420 CALC BMI NORM PARAMETERS: HCPCS | Performed by: NURSE PRACTITIONER

## 2019-11-18 PROCEDURE — G8427 DOCREV CUR MEDS BY ELIG CLIN: HCPCS | Performed by: NURSE PRACTITIONER

## 2019-11-18 PROCEDURE — 4040F PNEUMOC VAC/ADMIN/RCVD: CPT | Performed by: NURSE PRACTITIONER

## 2019-11-18 PROCEDURE — 99214 OFFICE O/P EST MOD 30 MIN: CPT | Performed by: NURSE PRACTITIONER

## 2019-11-18 PROCEDURE — G8598 ASA/ANTIPLAT THER USED: HCPCS | Performed by: NURSE PRACTITIONER

## 2019-11-18 PROCEDURE — 4004F PT TOBACCO SCREEN RCVD TLK: CPT | Performed by: NURSE PRACTITIONER

## 2019-11-18 ASSESSMENT — ENCOUNTER SYMPTOMS
TROUBLE SWALLOWING: 0
ABDOMINAL PAIN: 0
ABDOMINAL DISTENTION: 0
WHEEZING: 0
CHOKING: 0
COLOR CHANGE: 0
EYE ITCHING: 0
COUGH: 0
STRIDOR: 0
BLOOD IN STOOL: 0
VOMITING: 0
CONSTIPATION: 0
DIARRHEA: 0
SORE THROAT: 0
EYE DISCHARGE: 0
NAUSEA: 0
SHORTNESS OF BREATH: 1

## 2019-11-25 ENCOUNTER — OFFICE VISIT (OUTPATIENT)
Dept: INTERNAL MEDICINE | Age: 84
End: 2019-11-25
Payer: MEDICARE

## 2019-11-25 VITALS
BODY MASS INDEX: 24.16 KG/M2 | RESPIRATION RATE: 18 BRPM | HEIGHT: 65 IN | HEART RATE: 76 BPM | WEIGHT: 145 LBS | OXYGEN SATURATION: 93 % | DIASTOLIC BLOOD PRESSURE: 61 MMHG | SYSTOLIC BLOOD PRESSURE: 134 MMHG

## 2019-11-25 DIAGNOSIS — I10 ESSENTIAL HYPERTENSION: Chronic | ICD-10-CM

## 2019-11-25 DIAGNOSIS — D64.9 ANEMIA, UNSPECIFIED TYPE: Primary | ICD-10-CM

## 2019-11-25 DIAGNOSIS — D64.9 ANEMIA, UNSPECIFIED TYPE: ICD-10-CM

## 2019-11-25 LAB
ANISOCYTOSIS: ABNORMAL
BASOPHILS ABSOLUTE: 0 K/UL (ref 0–0.2)
BASOPHILS RELATIVE PERCENT: 0.7 % (ref 0–1)
EOSINOPHILS ABSOLUTE: 0.3 K/UL (ref 0–0.6)
EOSINOPHILS RELATIVE PERCENT: 4.2 % (ref 0–5)
HCT VFR BLD CALC: 26.2 % (ref 37–47)
HEMOGLOBIN: 7.4 G/DL (ref 12–16)
HYPOCHROMIA: ABNORMAL
IMMATURE GRANULOCYTES #: 0 K/UL
LYMPHOCYTES ABSOLUTE: 1 K/UL (ref 1.1–4.5)
LYMPHOCYTES RELATIVE PERCENT: 16.8 % (ref 20–40)
MCH RBC QN AUTO: 22.7 PG (ref 27–31)
MCHC RBC AUTO-ENTMCNC: 28.2 G/DL (ref 33–37)
MCV RBC AUTO: 80.4 FL (ref 81–99)
MICROCYTES: ABNORMAL
MONOCYTES ABSOLUTE: 0.8 K/UL (ref 0–0.9)
MONOCYTES RELATIVE PERCENT: 13.5 % (ref 0–10)
NEUTROPHILS ABSOLUTE: 4 K/UL (ref 1.5–7.5)
NEUTROPHILS RELATIVE PERCENT: 64.6 % (ref 50–65)
OVALOCYTES: ABNORMAL
PDW BLD-RTO: 19.7 % (ref 11.5–14.5)
PLATELET # BLD: 332 K/UL (ref 130–400)
PLATELET SLIDE REVIEW: ADEQUATE
PMV BLD AUTO: 10.4 FL (ref 9.4–12.3)
POLYCHROMASIA: ABNORMAL
RBC # BLD: 3.26 M/UL (ref 4.2–5.4)
WBC # BLD: 6.1 K/UL (ref 4.8–10.8)

## 2019-11-25 PROCEDURE — 4040F PNEUMOC VAC/ADMIN/RCVD: CPT | Performed by: NURSE PRACTITIONER

## 2019-11-25 PROCEDURE — 1123F ACP DISCUSS/DSCN MKR DOCD: CPT | Performed by: NURSE PRACTITIONER

## 2019-11-25 PROCEDURE — 1090F PRES/ABSN URINE INCON ASSESS: CPT | Performed by: NURSE PRACTITIONER

## 2019-11-25 PROCEDURE — G8420 CALC BMI NORM PARAMETERS: HCPCS | Performed by: NURSE PRACTITIONER

## 2019-11-25 PROCEDURE — G8598 ASA/ANTIPLAT THER USED: HCPCS | Performed by: NURSE PRACTITIONER

## 2019-11-25 PROCEDURE — G8482 FLU IMMUNIZE ORDER/ADMIN: HCPCS | Performed by: NURSE PRACTITIONER

## 2019-11-25 PROCEDURE — G8399 PT W/DXA RESULTS DOCUMENT: HCPCS | Performed by: NURSE PRACTITIONER

## 2019-11-25 PROCEDURE — 4004F PT TOBACCO SCREEN RCVD TLK: CPT | Performed by: NURSE PRACTITIONER

## 2019-11-25 PROCEDURE — G8427 DOCREV CUR MEDS BY ELIG CLIN: HCPCS | Performed by: NURSE PRACTITIONER

## 2019-11-25 PROCEDURE — 99214 OFFICE O/P EST MOD 30 MIN: CPT | Performed by: NURSE PRACTITIONER

## 2019-11-25 RX ORDER — IPRATROPIUM BROMIDE AND ALBUTEROL SULFATE 2.5; .5 MG/3ML; MG/3ML
1 SOLUTION RESPIRATORY (INHALATION) EVERY 6 HOURS
Qty: 360 ML | Refills: 3 | Status: SHIPPED | OUTPATIENT
Start: 2019-11-25 | End: 2019-12-09 | Stop reason: SDUPTHER

## 2019-11-25 RX ORDER — CEFDINIR 300 MG/1
300 CAPSULE ORAL 2 TIMES DAILY
Qty: 14 CAPSULE | Refills: 0 | Status: SHIPPED | OUTPATIENT
Start: 2019-11-25 | End: 2019-12-02

## 2019-11-25 RX ORDER — BUDESONIDE AND FORMOTEROL FUMARATE DIHYDRATE 160; 4.5 UG/1; UG/1
AEROSOL RESPIRATORY (INHALATION)
Qty: 30.6 INHALER | Refills: 2 | Status: SHIPPED | OUTPATIENT
Start: 2019-11-25 | End: 2020-06-22

## 2019-11-25 ASSESSMENT — ENCOUNTER SYMPTOMS
BLOOD IN STOOL: 0
COUGH: 0
TROUBLE SWALLOWING: 0
WHEEZING: 0
STRIDOR: 0
NAUSEA: 0
ABDOMINAL PAIN: 0
VOMITING: 0
DIARRHEA: 0
SHORTNESS OF BREATH: 0
SORE THROAT: 0
COLOR CHANGE: 0
EYE ITCHING: 0
ABDOMINAL DISTENTION: 0
CONSTIPATION: 0
EYE DISCHARGE: 0
CHOKING: 0

## 2019-11-26 ENCOUNTER — TELEPHONE (OUTPATIENT)
Dept: INTERNAL MEDICINE | Age: 84
End: 2019-11-26

## 2019-12-09 ENCOUNTER — OFFICE VISIT (OUTPATIENT)
Dept: INTERNAL MEDICINE | Age: 84
End: 2019-12-09
Payer: MEDICARE

## 2019-12-09 ENCOUNTER — CARE COORDINATION (OUTPATIENT)
Dept: CARE COORDINATION | Age: 84
End: 2019-12-09

## 2019-12-09 VITALS
HEART RATE: 75 BPM | SYSTOLIC BLOOD PRESSURE: 128 MMHG | WEIGHT: 144 LBS | OXYGEN SATURATION: 94 % | RESPIRATION RATE: 24 BRPM | DIASTOLIC BLOOD PRESSURE: 56 MMHG | BODY MASS INDEX: 23.99 KG/M2 | HEIGHT: 65 IN

## 2019-12-09 DIAGNOSIS — D64.9 ANEMIA, UNSPECIFIED TYPE: ICD-10-CM

## 2019-12-09 DIAGNOSIS — J43.0 UNILATERAL EMPHYSEMA (HCC): ICD-10-CM

## 2019-12-09 DIAGNOSIS — R06.02 SHORTNESS OF BREATH: ICD-10-CM

## 2019-12-09 DIAGNOSIS — J44.9 CHRONIC OBSTRUCTIVE PULMONARY DISEASE, UNSPECIFIED COPD TYPE (HCC): ICD-10-CM

## 2019-12-09 DIAGNOSIS — R09.02 HYPOXIA: Primary | ICD-10-CM

## 2019-12-09 LAB
HCT VFR BLD CALC: 25.5 % (ref 37–47)
HEMOGLOBIN: 7.1 G/DL (ref 12–16)
IRON: 14 UG/DL (ref 37–145)
MCH RBC QN AUTO: 21 PG (ref 27–31)
MCHC RBC AUTO-ENTMCNC: 27.8 G/DL (ref 33–37)
MCV RBC AUTO: 75.4 FL (ref 81–99)
PDW BLD-RTO: 20.7 % (ref 11.5–14.5)
PLATELET # BLD: 327 K/UL (ref 130–400)
PMV BLD AUTO: 10.6 FL (ref 9.4–12.3)
RBC # BLD: 3.38 M/UL (ref 4.2–5.4)
WBC # BLD: 6.2 K/UL (ref 4.8–10.8)

## 2019-12-09 PROCEDURE — G8598 ASA/ANTIPLAT THER USED: HCPCS | Performed by: NURSE PRACTITIONER

## 2019-12-09 PROCEDURE — 4040F PNEUMOC VAC/ADMIN/RCVD: CPT | Performed by: NURSE PRACTITIONER

## 2019-12-09 PROCEDURE — 1123F ACP DISCUSS/DSCN MKR DOCD: CPT | Performed by: NURSE PRACTITIONER

## 2019-12-09 PROCEDURE — G8420 CALC BMI NORM PARAMETERS: HCPCS | Performed by: NURSE PRACTITIONER

## 2019-12-09 PROCEDURE — 1090F PRES/ABSN URINE INCON ASSESS: CPT | Performed by: NURSE PRACTITIONER

## 2019-12-09 PROCEDURE — G8399 PT W/DXA RESULTS DOCUMENT: HCPCS | Performed by: NURSE PRACTITIONER

## 2019-12-09 PROCEDURE — G8427 DOCREV CUR MEDS BY ELIG CLIN: HCPCS | Performed by: NURSE PRACTITIONER

## 2019-12-09 PROCEDURE — 4004F PT TOBACCO SCREEN RCVD TLK: CPT | Performed by: NURSE PRACTITIONER

## 2019-12-09 PROCEDURE — G8482 FLU IMMUNIZE ORDER/ADMIN: HCPCS | Performed by: NURSE PRACTITIONER

## 2019-12-09 PROCEDURE — 99214 OFFICE O/P EST MOD 30 MIN: CPT | Performed by: NURSE PRACTITIONER

## 2019-12-09 PROCEDURE — 3023F SPIROM DOC REV: CPT | Performed by: NURSE PRACTITIONER

## 2019-12-09 PROCEDURE — G8926 SPIRO NO PERF OR DOC: HCPCS | Performed by: NURSE PRACTITIONER

## 2019-12-09 RX ORDER — LISINOPRIL 40 MG/1
TABLET ORAL
Qty: 90 TABLET | Refills: 3 | Status: SHIPPED | OUTPATIENT
Start: 2019-12-09 | End: 2020-10-26

## 2019-12-09 RX ORDER — IPRATROPIUM BROMIDE AND ALBUTEROL SULFATE 2.5; .5 MG/3ML; MG/3ML
1 SOLUTION RESPIRATORY (INHALATION) EVERY 6 HOURS
Qty: 360 ML | Refills: 3 | Status: SHIPPED | OUTPATIENT
Start: 2019-12-09 | End: 2020-03-08

## 2019-12-09 RX ORDER — ALBUTEROL SULFATE 90 UG/1
2 AEROSOL, METERED RESPIRATORY (INHALATION) 4 TIMES DAILY PRN
Qty: 3 INHALER | Refills: 1 | Status: SHIPPED | OUTPATIENT
Start: 2019-12-09

## 2019-12-09 RX ORDER — BUDESONIDE AND FORMOTEROL FUMARATE DIHYDRATE 160; 4.5 UG/1; UG/1
AEROSOL RESPIRATORY (INHALATION)
Qty: 30.6 INHALER | Refills: 2 | Status: CANCELLED | OUTPATIENT
Start: 2019-12-09

## 2019-12-09 RX ORDER — ERGOCALCIFEROL 1.25 MG/1
CAPSULE ORAL
Qty: 12 CAPSULE | Refills: 3 | Status: SHIPPED | OUTPATIENT
Start: 2019-12-09 | End: 2020-03-26

## 2019-12-09 RX ORDER — ALBUTEROL SULFATE 2.5 MG/3ML
2.5 SOLUTION RESPIRATORY (INHALATION) EVERY 4 HOURS PRN
Qty: 60 EACH | Refills: 3 | Status: SHIPPED | OUTPATIENT
Start: 2019-12-09 | End: 2020-02-03 | Stop reason: SDUPTHER

## 2019-12-09 ASSESSMENT — ENCOUNTER SYMPTOMS
NAUSEA: 0
DIARRHEA: 0
ABDOMINAL DISTENTION: 0
VOMITING: 0
SHORTNESS OF BREATH: 1
EYE ITCHING: 0
CHOKING: 0
TROUBLE SWALLOWING: 0
EYE DISCHARGE: 0
BLOOD IN STOOL: 0
WHEEZING: 0
CONSTIPATION: 0
ABDOMINAL PAIN: 0
SORE THROAT: 0
STRIDOR: 0
DYSPNEA ASSOCIATED WITH: MINIMAL EXERTION
COLOR CHANGE: 0
COUGH: 1

## 2019-12-11 DIAGNOSIS — G89.4 CHRONIC PAIN SYNDROME: ICD-10-CM

## 2019-12-11 RX ORDER — HYDROCODONE BITARTRATE AND ACETAMINOPHEN 5; 325 MG/1; MG/1
1 TABLET ORAL EVERY 6 HOURS PRN
Qty: 120 TABLET | Refills: 0 | Status: SHIPPED | OUTPATIENT
Start: 2019-12-11 | End: 2020-01-06 | Stop reason: SDUPTHER

## 2020-01-02 RX ORDER — CLOPIDOGREL BISULFATE 75 MG/1
TABLET ORAL
Qty: 30 TABLET | Refills: 0 | Status: SHIPPED | OUTPATIENT
Start: 2020-01-02 | End: 2020-01-27

## 2020-01-06 RX ORDER — HYDROCODONE BITARTRATE AND ACETAMINOPHEN 5; 325 MG/1; MG/1
1 TABLET ORAL EVERY 6 HOURS PRN
Qty: 120 TABLET | Refills: 0 | Status: SHIPPED | OUTPATIENT
Start: 2020-01-06 | End: 2020-02-03 | Stop reason: SDUPTHER

## 2020-01-06 NOTE — TELEPHONE ENCOUNTER
Pt called to get a refill on the following medication which has been pended:    Requested Prescriptions     Pending Prescriptions Disp Refills    HYDROcodone-acetaminophen (NORCO) 5-325 MG per tablet 120 tablet 0     Sig: Take 1 tablet by mouth every 6 hours as needed for Pain for up to 30 days.  G89.4     [facility]    Last appointment: 12/09/2019  Next appointment: 02/17/2020

## 2020-01-27 RX ORDER — CLOPIDOGREL BISULFATE 75 MG/1
TABLET ORAL
Qty: 30 TABLET | Refills: 0 | Status: SHIPPED | OUTPATIENT
Start: 2020-01-27 | End: 2021-01-11

## 2020-02-03 RX ORDER — HYDROCODONE BITARTRATE AND ACETAMINOPHEN 5; 325 MG/1; MG/1
1 TABLET ORAL EVERY 6 HOURS PRN
Qty: 120 TABLET | Refills: 0 | Status: SHIPPED | OUTPATIENT
Start: 2020-02-05 | End: 2020-03-04 | Stop reason: SDUPTHER

## 2020-02-03 RX ORDER — HYDROCODONE BITARTRATE AND ACETAMINOPHEN 5; 325 MG/1; MG/1
1 TABLET ORAL EVERY 6 HOURS PRN
Qty: 120 TABLET | Refills: 0 | Status: SHIPPED | OUTPATIENT
Start: 2020-02-05 | End: 2020-02-03 | Stop reason: SDUPTHER

## 2020-02-03 RX ORDER — ALBUTEROL SULFATE 2.5 MG/3ML
2.5 SOLUTION RESPIRATORY (INHALATION) EVERY 4 HOURS PRN
Qty: 60 EACH | Refills: 3 | Status: SHIPPED | OUTPATIENT
Start: 2020-02-03 | End: 2020-07-28

## 2020-02-03 NOTE — TELEPHONE ENCOUNTER
Annette Richardson called requesting a refill of the below medication which has been pended for you:     Requested Prescriptions      No prescriptions requested or ordered in this encounter       Last Appointment Date: 12/9/2019  Next Appointment Date: 2/17/2020    Allergies   Allergen Reactions    Codeine Nausea And Vomiting    Valium Nausea Only

## 2020-02-03 NOTE — TELEPHONE ENCOUNTER
Pt states we need to call her pharmacy to clarify Albuterol Rx  Also, she needs her Hydrocodone sent to [facility] instead of [facility]

## 2020-02-07 ENCOUNTER — TELEPHONE (OUTPATIENT)
Dept: VASCULAR SURGERY | Facility: CLINIC | Age: 85
End: 2020-02-07

## 2020-02-07 NOTE — TELEPHONE ENCOUNTER
Called the patient to remind her of her testing and to see Dr. Henriquez on Monday.  She needed a little more information regarding her appointments.  We went over all of the information with these and the patient understands.  She confirmed these appointments.

## 2020-02-10 ENCOUNTER — HOSPITAL ENCOUNTER (OUTPATIENT)
Dept: ULTRASOUND IMAGING | Facility: HOSPITAL | Age: 85
Discharge: HOME | End: 2020-02-10

## 2020-02-10 ENCOUNTER — HOSPITAL ENCOUNTER (OUTPATIENT)
Dept: ULTRASOUND IMAGING | Facility: HOSPITAL | Age: 85
Discharge: HOME | End: 2020-02-10
Admitting: SURGERY

## 2020-02-10 ENCOUNTER — OFFICE VISIT (OUTPATIENT)
Dept: VASCULAR SURGERY | Facility: CLINIC | Age: 85
End: 2020-02-10

## 2020-02-10 VITALS
HEART RATE: 65 BPM | SYSTOLIC BLOOD PRESSURE: 128 MMHG | OXYGEN SATURATION: 98 % | BODY MASS INDEX: 21.49 KG/M2 | WEIGHT: 129 LBS | HEIGHT: 65 IN | DIASTOLIC BLOOD PRESSURE: 72 MMHG

## 2020-02-10 DIAGNOSIS — R60.0 EDEMA OF BOTH LOWER EXTREMITIES: ICD-10-CM

## 2020-02-10 DIAGNOSIS — I70.213 ATHEROSCLEROSIS OF NATIVE ARTERY OF BOTH LOWER EXTREMITIES WITH INTERMITTENT CLAUDICATION (HCC): ICD-10-CM

## 2020-02-10 DIAGNOSIS — I65.23 BILATERAL CAROTID ARTERY STENOSIS: Primary | ICD-10-CM

## 2020-02-10 PROCEDURE — 93924 LWR XTR VASC STDY BILAT: CPT | Performed by: SURGERY

## 2020-02-10 PROCEDURE — 93970 EXTREMITY STUDY: CPT

## 2020-02-10 PROCEDURE — 93970 EXTREMITY STUDY: CPT | Performed by: SURGERY

## 2020-02-10 PROCEDURE — 99214 OFFICE O/P EST MOD 30 MIN: CPT | Performed by: SURGERY

## 2020-02-10 PROCEDURE — 93923 UPR/LXTR ART STDY 3+ LVLS: CPT

## 2020-02-11 NOTE — PROGRESS NOTES
02/10/2020      Pam Sin APRN  [address] [place] [address] [address] [place] [address]        Cindy Hicks  1935    Chief Complaint   Patient presents with   • Follow-up     3 month f/u with ABIs and venous duplex.  Pt states that after she stopped the Plavix that her legs started feeling better.  She says that don't swell as bad.  She isn't aware of the medications that she takes by either name.        Dear Pam Sin APRN:    HPI     I had the pleasure of seeing your patient in the office today for follow up.  As you recall, the patient is a 84 y.o. female who we are currently following for previous complaints of lower extremity edema as well as peripheral vascular disease.  She had previously undergone an extensive endarterectomy of the left common femoral, profunda, and proximal superficial femoral arteries at [facility] back in August of last year by Dr. Calderon.  Following that procedure she had complained of left lower extremity edema and follow-up at [facility] had a venous duplex which showed no DVT and she was told this was likely a result of her revascularization.  Ultimately there was no further surgical intervention necessary.  She was unhappy with this and so came here for second opinion.  When I saw her she did have some edema of the left lower extremity but otherwise had palpable pulses in the left foot and no signs of any arterial insufficiency.  She returns today after having undergone JENNIFER/PVR as well as a venous duplex of the lower extremities.  The JENNIFER/PVR shows no evidence of any significant arterial insufficiency at rest.  Her venous duplex shows no evidence of DVT and no evidence of any significant reflux in the bilateral greater saphenous veins.  She reports since our last visit that in consultation with her primary care physicians he stopped her Plavix and remains on aspirin daily.  She reports since that time that her left lower extremity edema has significantly  "decreased and is now almost back to normal.  She continues to complain of mild claudication to the right calf but this is not lifestyle limiting and this is consistent with her post exercise JENNIFER values.  She otherwise is without any acute complaints.      Review of Systems   Constitutional: Negative.  Negative for activity change, appetite change, chills, diaphoresis, fatigue and fever.   HENT: Negative.  Negative for congestion, sneezing, sore throat and trouble swallowing.    Eyes: Negative.  Negative for visual disturbance.   Respiratory: Negative.  Negative for chest tightness and shortness of breath.    Cardiovascular: Positive for leg swelling (Left lower extremity edema significantly improved.). Negative for chest pain and palpitations.   Gastrointestinal: Negative.  Negative for abdominal distention, abdominal pain, nausea and vomiting.   Endocrine: Negative.    Genitourinary: Negative.    Musculoskeletal: Negative.         She reports some claudication to the right calf.   Skin: Negative.    Allergic/Immunologic: Negative.    Neurological: Negative.    Hematological: Negative.    Psychiatric/Behavioral: Negative.        /72   Pulse 65   Ht 165.1 cm (65\")   Wt 58.5 kg (129 lb)   SpO2 98%   BMI 21.47 kg/m²   Physical Exam   Constitutional: She is oriented to person, place, and time. She appears well-developed and well-nourished.   HENT:   Head: Normocephalic and atraumatic.   Eyes: Pupils are equal, round, and reactive to light. EOM are normal.   Neck: Normal range of motion. Neck supple. No JVD present.   Cardiovascular: Normal rate and regular rhythm.   Pulses:       Carotid pulses are 2+ on the right side, and 2+ on the left side.       Radial pulses are 2+ on the right side, and 2+ on the left side.        Femoral pulses are 2+ on the right side, and 2+ on the left side.       Popliteal pulses are 1+ on the right side, and 2+ on the left side.        Dorsalis pedis pulses are 1+ on the right " side, and 2+ on the left side.        Posterior tibial pulses are 0 on the right side, and 2+ on the left side.   On the right she does have Doppler signals present at the DP and PT.  Both feet are warm and well perfused appearing.   Pulmonary/Chest: Effort normal. No respiratory distress.   Abdominal: Soft. She exhibits no distension and no mass. There is no tenderness.   Musculoskeletal: She exhibits edema (Edema to the bilateral lower extremities to the level of the knee.  Left slightly worse than right.). She exhibits no tenderness or deformity.   Neurological: She is alert and oriented to person, place, and time. No sensory deficit. She exhibits normal muscle tone.   Skin: Skin is warm and dry. Capillary refill takes less than 2 seconds.   Psychiatric: She has a normal mood and affect. Her behavior is normal. Judgment and thought content normal.   Vitals reviewed.      DIAGNOSTIC DATA:    Us Ankle / Brachial Indices Extremity Complete    Result Date: 2/10/2020  Narrative:  History: PAD  Comments: Bilateral lower extremity arterial with multi-level pulse volume recordings and segmental pressures were performed at rest and stress.  The right ankle/brachial index is 0.98. The waveforms are biphasic without dampening.These findings are consistent with no significant arterial insufficiency of the right lower extremity at rest.  The postexercise ABIs 0.65.  The left ankle/brachial index is 1.03. The waveforms are triphasic without dampening. These findings are consistent with no significant arterial insufficiency of the left lower extremity at rest.    The postexercise ABIs 0.80.      Impression: Impression: 1. No significant arterial insufficiency of the right lower extremity at rest. 2. No significant arterial insufficiency of the left lower extremity at rest.   This report was finalized on 02/10/2020 14:26 by Dr. Wilfredo Mckeon MD.    Us Venous Doppler Lower Extremity Bilateral (duplex)    Result Date:  2/10/2020  Narrative: History: Swelling   Comments: Venous valvular insufficiency testing was performed in the bilateral lower extremities using duplex ultrasound with compression techniques.  The common femoral vein, superficial femoral, popliteal vein, posterior tibial vein, peroneal vein, greater saphenous vein, and lesser saphenous veins were interrogated.  On the right, the greater saphenous vein at the junction measured 6mm. In the mid thigh measured 3.4mm. Above the knee measured 3mm. In the mid calf measured 1.5mm. At the ankle measured 2.2mm. There is no evidence of venous insufficiency in the greater saphenous vein. The lesser saphenous vein is within normal limits and no evidence of reflux. There is no evidence of DVT.  On the left, the greater saphenous vein at the junction measured 5.5mm. In the mid thigh measured 3.1mm. Above the knee measured 2.6mm. In the mid calf measured 2.4mm. At the ankle measured 2.6mm. There is no evidence of venous insufficiency in the greater saphenous vein.The lesser saphenous vein is within normal limits and no evidence of reflux. There is no evidence of DVT.      Impression: Impression: There is no evidence of venous insufficiency in either lower extremity greater saphenous vein or the lesser saphenous veins.   This report was finalized on 02/10/2020 14:25 by Dr. Wilfredo Mckeon MD.      Patient Active Problem List   Diagnosis   • Family hx colonic polyps   • Family hx of colon cancer   • Hx of colonic polyp   • Melena   • Peptic ulcer disease         ICD-10-CM ICD-9-CM   1. Bilateral carotid artery stenosis I65.23 433.10     433.30   2. Atherosclerosis of native artery of both lower extremities with intermittent claudication (CMS/[facility])  I70.213 440.21       Lab Frequency Next Occurrence   Follow Anesthesia Guidelines / Standing Orders Once 09/12/2018   Follow Anesthesia Guidelines / Standing Orders Once 10/03/2019   Obtain Informed Consent Once 10/08/2019   Diet: Once  10/04/2019   US Carotid Bilateral Once 08/08/2020   US Ankle / Brachial Indices Extremity Complete Once 08/08/2020       PLAN: After thoroughly evaluating Cindy Hicks, I believe the best course of action is to remain conservative from a vascular standpoint.  From a lower extremity peripheral standpoint she seems to be doing quite well.  Her left lower extremity looks well after her prior femoral endarterectomy and does have a palpable dorsalis pedis pulse.  On the right she is really had no change and continues to complain of very mild claudication that is not lifestyle limiting in the calf.  Otherwise her foot is warm and well perfused appearing.  At this point she should continue on her aspirin and her statin as previous and the plan will be to recheck an JENNIFER/PVR in 6 months.  Her left lower extremity edema has significantly improved and her venous duplex showed no evidence of DVT or significant venous reflux in the saphenous veins.  As such she can continue with leg elevation as needed and can wear light compression to help aid in edema reduction.  Finally she does have a history of carotid disease however has not had a surveillance carotid duplex that I can see in quite some time and so when I see her back in 6 months she will also have a repeat carotid duplex for continued surveillance.  The patient is to continue taking their medications as previously discussed.   I did discuss vascular risk factors as they pertain to the progression of vascular disease including controlling hypertension, and hyperlipidemia. These factors remain stable. Patient's Body mass index is 21.47 kg/m². BMI is within normal parameters. No follow-up required. I did  extensively on smoking cessation, and the patient was advised of the continued risks of smoking.  I provided over 10 minutes counseling on this matter.   This was all discussed in full with complete understanding.  Thank you for allowing me to participate in the  care of your patient.  Please do not hesitate to call with any questions or concerns.  We will keep you aware of any further encounters with Cindy Hicks.      Sincerely Yours,      Jerald Henriquez MD

## 2020-02-18 DIAGNOSIS — D64.9 ANEMIA, UNSPECIFIED TYPE: ICD-10-CM

## 2020-02-18 DIAGNOSIS — I10 ESSENTIAL HYPERTENSION: Chronic | ICD-10-CM

## 2020-02-18 DIAGNOSIS — N18.30 CHRONIC KIDNEY DISEASE, STAGE III (MODERATE) (HCC): Chronic | ICD-10-CM

## 2020-02-18 DIAGNOSIS — Z00.00 HEALTHCARE MAINTENANCE: ICD-10-CM

## 2020-02-18 DIAGNOSIS — M89.9 DISORDER OF BONE, UNSPECIFIED: ICD-10-CM

## 2020-02-18 DIAGNOSIS — R19.5 STOOL GUAIAC POSITIVE: ICD-10-CM

## 2020-02-18 LAB
ALBUMIN SERPL-MCNC: 4.1 G/DL (ref 3.5–5.2)
ALP BLD-CCNC: 66 U/L (ref 35–104)
ALT SERPL-CCNC: 8 U/L (ref 5–33)
ANION GAP SERPL CALCULATED.3IONS-SCNC: 15 MMOL/L (ref 7–19)
ANISOCYTOSIS: ABNORMAL
AST SERPL-CCNC: 16 U/L (ref 5–32)
BASOPHILS ABSOLUTE: 0.1 K/UL (ref 0–0.2)
BASOPHILS RELATIVE PERCENT: 1.3 % (ref 0–1)
BILIRUB SERPL-MCNC: 0.4 MG/DL (ref 0.2–1.2)
BUN BLDV-MCNC: 15 MG/DL (ref 8–23)
CALCIUM SERPL-MCNC: 9.1 MG/DL (ref 8.8–10.2)
CHLORIDE BLD-SCNC: 96 MMOL/L (ref 98–111)
CHOLESTEROL, TOTAL: 143 MG/DL (ref 160–199)
CO2: 27 MMOL/L (ref 22–29)
CREAT SERPL-MCNC: 1.1 MG/DL (ref 0.5–0.9)
EOSINOPHILS ABSOLUTE: 0.5 K/UL (ref 0–0.6)
EOSINOPHILS RELATIVE PERCENT: 10 % (ref 0–5)
GFR NON-AFRICAN AMERICAN: 47
GLUCOSE BLD-MCNC: 80 MG/DL (ref 74–109)
HCT VFR BLD CALC: 43.2 % (ref 37–47)
HDLC SERPL-MCNC: 56 MG/DL (ref 65–121)
HEMOGLOBIN: 13 G/DL (ref 12–16)
HYPOCHROMIA: ABNORMAL
IMMATURE GRANULOCYTES #: 0 K/UL
IRON: 74 UG/DL (ref 37–145)
LDL CHOLESTEROL CALCULATED: 70 MG/DL
LYMPHOCYTES ABSOLUTE: 1.6 K/UL (ref 1.1–4.5)
LYMPHOCYTES RELATIVE PERCENT: 33.8 % (ref 20–40)
MCH RBC QN AUTO: 26.1 PG (ref 27–31)
MCHC RBC AUTO-ENTMCNC: 30.1 G/DL (ref 33–37)
MCV RBC AUTO: 86.7 FL (ref 81–99)
MONOCYTES ABSOLUTE: 0.5 K/UL (ref 0–0.9)
MONOCYTES RELATIVE PERCENT: 11.3 % (ref 0–10)
NEUTROPHILS ABSOLUTE: 2 K/UL (ref 1.5–7.5)
NEUTROPHILS RELATIVE PERCENT: 43.4 % (ref 50–65)
PDW BLD-RTO: 22.8 % (ref 11.5–14.5)
PLATELET # BLD: 227 K/UL (ref 130–400)
PLATELET SLIDE REVIEW: ADEQUATE
PMV BLD AUTO: 9.7 FL (ref 9.4–12.3)
POIKILOCYTES: ABNORMAL
POTASSIUM SERPL-SCNC: 4.3 MMOL/L (ref 3.5–5)
RBC # BLD: 4.98 M/UL (ref 4.2–5.4)
SODIUM BLD-SCNC: 138 MMOL/L (ref 136–145)
TOTAL PROTEIN: 6.6 G/DL (ref 6.6–8.7)
TRIGL SERPL-MCNC: 86 MG/DL (ref 0–149)
TSH SERPL DL<=0.05 MIU/L-ACNC: 2.61 UIU/ML (ref 0.27–4.2)
VITAMIN D 25-HYDROXY: 55 NG/ML
WBC # BLD: 4.6 K/UL (ref 4.8–10.8)

## 2020-02-20 ENCOUNTER — CARE COORDINATION (OUTPATIENT)
Dept: CARE COORDINATION | Age: 85
End: 2020-02-20

## 2020-02-20 ENCOUNTER — OFFICE VISIT (OUTPATIENT)
Dept: INTERNAL MEDICINE | Age: 85
End: 2020-02-20
Payer: MEDICARE

## 2020-02-20 VITALS
HEIGHT: 65 IN | WEIGHT: 131 LBS | SYSTOLIC BLOOD PRESSURE: 138 MMHG | HEART RATE: 75 BPM | BODY MASS INDEX: 21.83 KG/M2 | OXYGEN SATURATION: 96 % | DIASTOLIC BLOOD PRESSURE: 78 MMHG

## 2020-02-20 PROBLEM — D50.8 IRON DEFICIENCY ANEMIA SECONDARY TO INADEQUATE DIETARY IRON INTAKE: Status: ACTIVE | Noted: 2020-02-20

## 2020-02-20 PROCEDURE — 1090F PRES/ABSN URINE INCON ASSESS: CPT | Performed by: NURSE PRACTITIONER

## 2020-02-20 PROCEDURE — G8482 FLU IMMUNIZE ORDER/ADMIN: HCPCS | Performed by: NURSE PRACTITIONER

## 2020-02-20 PROCEDURE — G8399 PT W/DXA RESULTS DOCUMENT: HCPCS | Performed by: NURSE PRACTITIONER

## 2020-02-20 PROCEDURE — 99214 OFFICE O/P EST MOD 30 MIN: CPT | Performed by: NURSE PRACTITIONER

## 2020-02-20 PROCEDURE — 3023F SPIROM DOC REV: CPT | Performed by: NURSE PRACTITIONER

## 2020-02-20 PROCEDURE — G8926 SPIRO NO PERF OR DOC: HCPCS | Performed by: NURSE PRACTITIONER

## 2020-02-20 PROCEDURE — 1123F ACP DISCUSS/DSCN MKR DOCD: CPT | Performed by: NURSE PRACTITIONER

## 2020-02-20 PROCEDURE — G8427 DOCREV CUR MEDS BY ELIG CLIN: HCPCS | Performed by: NURSE PRACTITIONER

## 2020-02-20 PROCEDURE — G8420 CALC BMI NORM PARAMETERS: HCPCS | Performed by: NURSE PRACTITIONER

## 2020-02-20 PROCEDURE — 4040F PNEUMOC VAC/ADMIN/RCVD: CPT | Performed by: NURSE PRACTITIONER

## 2020-02-20 PROCEDURE — 4004F PT TOBACCO SCREEN RCVD TLK: CPT | Performed by: NURSE PRACTITIONER

## 2020-02-20 ASSESSMENT — ENCOUNTER SYMPTOMS
STRIDOR: 0
COLOR CHANGE: 0
SHORTNESS OF BREATH: 0
NAUSEA: 0
CONSTIPATION: 0
WHEEZING: 0
BLOOD IN STOOL: 0
SORE THROAT: 0
VOMITING: 0
COUGH: 0
ABDOMINAL PAIN: 0
EYE DISCHARGE: 0
DIARRHEA: 0
CHOKING: 0
TROUBLE SWALLOWING: 0
EYE ITCHING: 0
ABDOMINAL DISTENTION: 0

## 2020-02-20 ASSESSMENT — PATIENT HEALTH QUESTIONNAIRE - PHQ9
SUM OF ALL RESPONSES TO PHQ QUESTIONS 1-9: 0
1. LITTLE INTEREST OR PLEASURE IN DOING THINGS: 0
SUM OF ALL RESPONSES TO PHQ QUESTIONS 1-9: 0
2. FEELING DOWN, DEPRESSED OR HOPELESS: 0
SUM OF ALL RESPONSES TO PHQ9 QUESTIONS 1 & 2: 0

## 2020-02-20 NOTE — PROGRESS NOTES
extension R SHE/EIA with 9x57 express stent extension L SHE with 9x37 express stent completion arteriograms Mynx closure B CFA    VASCULAR SURGERY  05/29/2018    DJM. Angio, SFA PTA       Vitals 2/20/2020 2/20/2020 2/20/2020 12/9/2019 11/25/2019 68/89/3213   SYSTOLIC 426 771 377 003 874 615   DIASTOLIC 78 70 68 56 61 55   Pulse - - 75 75 76 71   Temp - - - - - -   Resp - - - 24 18 18   SpO2 - - 96 94 93 95   Weight - - 131 lb 144 lb 145 lb 144 lb   Height - - 5' 5\" 5' 5\" 5' 5\" 5' 5\"   BMI (wt*703/ht~2) - - 21.8 kg/m2 23.96 kg/m2 24.13 kg/m2 23.96 kg/m2   Pain Level - - - - - -   Some recent data might be hidden       Family History   Problem Relation Age of Onset    Cancer Mother     Cancer Father     High Blood Pressure Son     High Cholesterol Son     High Blood Pressure Daughter     High Cholesterol Daughter     Heart Attack Daughter     Heart Attack Brother        Social History     Tobacco Use    Smoking status: Current Every Day Smoker     Packs/day: 0.75     Years: 58.00     Pack years: 43.50     Types: Cigarettes    Smokeless tobacco: Never Used    Tobacco comment: 3/4 of a pack daily   Substance Use Topics    Alcohol use: No      Current Outpatient Medications   Medication Sig Dispense Refill    albuterol (PROVENTIL) (2.5 MG/3ML) 0.083% nebulizer solution Take 3 mLs by nebulization every 4 hours as needed for Wheezing or Shortness of Breath 60 each 3    HYDROcodone-acetaminophen (NORCO) 5-325 MG per tablet Take 1 tablet by mouth every 6 hours as needed for Pain for up to 30 days.  G89.4 120 tablet 0    clopidogrel (PLAVIX) 75 MG tablet TAKE 1 TABLET EVERY DAY (NO FURTHER REFILLS WITHOUT FOLLOW UP APPT, CALL OFFICE) 30 tablet 0    albuterol sulfate  (90 Base) MCG/ACT inhaler Inhale 2 puffs into the lungs 4 times daily as needed for Wheezing 3 Inhaler 1    lisinopril (PRINIVIL;ZESTRIL) 40 MG tablet TAKE 1 TABLET EVERY DAY 90 tablet 3    ipratropium-albuterol (DUONEB) 0.5-2.5 (3) MG/3ML SOLN nebulizer solution Inhale 3 mLs into the lungs every 6 hours 360 mL 3    vitamin D (ERGOCALCIFEROL) 1.25 MG (42715 UT) CAPS capsule TAKE ONE CAPSULE BY MOUTH ONE TIME PER WEEK 12 capsule 3    budesonide-formoterol (SYMBICORT) 160-4.5 MCG/ACT AERO INHALE 2 PUFFS TWICE DAILY. RINSE MOUTH AFTER USE. 30.6 Inhaler 2    Nebulizers (COMPRESSOR/NEBULIZER) MISC Use to administer neb treatments 4 times a day as needed 1 each 3    atorvastatin (LIPITOR) 80 MG tablet TAKE 1 TABLET EVERY DAY 90 tablet 3    alendronate (FOSAMAX) 70 MG tablet TAKE 1 TABLET BY MOUTH ONE TIME PER WEEK 12 tablet 1    pantoprazole (PROTONIX) 40 MG tablet Take 1 tablet by mouth 2 times daily (before meals) 180 tablet 1    pramipexole (MIRAPEX) 0.25 MG tablet TAKE 1 TO 2 TABLETS BY MOUTH AT BEDTIME AS NEEDED 180 tablet 3    dicyclomine (BENTYL) 10 MG capsule Take 1 capsule by mouth 2 times daily as needed 120 capsule 2    traZODone (DESYREL) 50 MG tablet Take 1 tablet by mouth nightly 90 tablet 1    furosemide (LASIX) 20 MG tablet Take 10 mg by mouth daily      hydrochlorothiazide (MICROZIDE) 12.5 MG capsule TAKE 1 CAPSULE EVERY MORNING 90 capsule 3    amLODIPine (NORVASC) 5 MG tablet TAKE 1 TABLET EVERY DAY (Patient taking differently: Take 5 mg by mouth daily ) 90 tablet 3    aspirin 81 MG tablet Take 81 mg by mouth daily. No current facility-administered medications for this visit.       Allergies   Allergen Reactions    Codeine Nausea And Vomiting    Valium Nausea Only       Health Maintenance   Topic Date Due    DTaP/Tdap/Td vaccine (1 - Tdap) 03/09/2020 (Originally 10/19/1946)    Shingles Vaccine (1 of 2) 04/18/2028 (Originally 10[birth year]19/1985)    DEXA (modify frequency per FRAX score)  06/20/2020    Annual Wellness Visit (AWV)  07/15/2020    Lipid screen  02/18/2021    Potassium monitoring  02/18/2021    Creatinine monitoring  02/18/2021    Breast cancer screen  07/25/2021    Colon cancer screen colonoscopy 10/03/2021    Flu vaccine  Completed    Pneumococcal 65+ years Vaccine  Completed    Hepatitis A vaccine  Aged Out    Hepatitis B vaccine  Aged Out    Hib vaccine  Aged Out    Meningococcal (ACWY) vaccine  Aged Out       No results found for: LABA1C  No results found for: PSA, PSADIA  TSH   Date Value Ref Range Status   02/18/2020 2.610 0.270 - 4.200 uIU/mL Final   ]  Lab Results   Component Value Date     02/18/2020    K 4.3 02/18/2020    CL 96 (L) 02/18/2020    CO2 27 02/18/2020    BUN 15 02/18/2020    CREATININE 1.1 (H) 02/18/2020    GLUCOSE 80 02/18/2020    CALCIUM 9.1 02/18/2020    PROT 6.6 02/18/2020    LABALBU 4.1 02/18/2020    BILITOT 0.4 02/18/2020    ALKPHOS 66 02/18/2020    AST 16 02/18/2020    ALT 8 02/18/2020    LABGLOM 47 (A) 02/18/2020    GLOB 2.7 05/15/2017     Lab Results   Component Value Date    CHOL 143 (L) 02/18/2020    CHOL 119 (L) 11/15/2019    CHOL 144 (L) 04/08/2019     Lab Results   Component Value Date    TRIG 86 02/18/2020    TRIG 60 11/15/2019    TRIG 85 04/08/2019     Lab Results   Component Value Date    HDL 56 (L) 02/18/2020    HDL 58 (L) 11/15/2019    HDL 54 (L) 04/08/2019     Lab Results   Component Value Date    LDLCALC 70 02/18/2020    LDLCALC 49 11/15/2019    LDLCALC 73 04/08/2019     Lab Results   Component Value Date     02/18/2020    K 4.3 02/18/2020    K 4.5 09/12/2019    CL 96 02/18/2020    CO2 27 02/18/2020    BUN 15 02/18/2020    CREATININE 1.1 02/18/2020    GLUCOSE 80 02/18/2020    CALCIUM 9.1 02/18/2020      Lab Results   Component Value Date    WBC 4.6 (L) 02/18/2020    HGB 13.0 02/18/2020    HCT 43.2 02/18/2020    MCV 86.7 02/18/2020     02/18/2020    LABLYMP 1.47 09/25/2013    LYMPHOPCT 33.8 02/18/2020    RBC 4.98 02/18/2020    MCH 26.1 (L) 02/18/2020    MCHC 30.1 (L) 02/18/2020    RDW 22.8 (H) 02/18/2020     Lab Results   Component Value Date    VITD25 55.0 02/18/2020       Subjective:      Review of Systems   Constitutional: Negative for fatigue, fever and unexpected weight change. HENT: Negative for ear discharge, ear pain, mouth sores, sore throat and trouble swallowing. Eyes: Negative for discharge, itching and visual disturbance. Respiratory: Negative for cough, choking, shortness of breath, wheezing and stridor. Cardiovascular: Negative for chest pain, palpitations and leg swelling. Gastrointestinal: Negative for abdominal distention, abdominal pain, blood in stool, constipation, diarrhea, nausea and vomiting. Hemorrhoids   Endocrine: Negative for cold intolerance, polydipsia and polyuria. Genitourinary: Negative for difficulty urinating, dysuria, frequency and urgency. Musculoskeletal: Negative for arthralgias and gait problem. Skin: Negative for color change and rash. Allergic/Immunologic: Negative for food allergies and immunocompromised state. Neurological: Negative for dizziness, tremors, syncope, speech difficulty, weakness and headaches. Hematological: Negative for adenopathy. Does not bruise/bleed easily. Psychiatric/Behavioral: Negative for confusion and hallucinations. Objective:     Physical Exam  Constitutional:       General: She is not in acute distress. Appearance: She is well-developed. HENT:      Head: Normocephalic and atraumatic. Eyes:      General: No scleral icterus. Right eye: No discharge. Left eye: No discharge. Pupils: Pupils are equal, round, and reactive to light. Neck:      Musculoskeletal: Normal range of motion and neck supple. Thyroid: No thyromegaly. Vascular: No JVD. Cardiovascular:      Rate and Rhythm: Normal rate and regular rhythm. Heart sounds: Normal heart sounds. No murmur. Pulmonary:      Effort: Pulmonary effort is normal. No respiratory distress. Breath sounds: Normal breath sounds. No wheezing or rales. Abdominal:      General: Bowel sounds are normal. There is no distension.       Palpations: Abdomen is soft. There is no mass. Tenderness: There is no abdominal tenderness. There is no guarding or rebound. Genitourinary:     Rectum: Guaiac result negative. Musculoskeletal: Normal range of motion. General: No tenderness. Skin:     General: Skin is warm and dry. Findings: No erythema or rash. Neurological:      Mental Status: She is alert and oriented to person, place, and time. Cranial Nerves: No cranial nerve deficit. Coordination: Coordination normal.      Deep Tendon Reflexes: Reflexes are normal and symmetric. Reflexes normal.   Psychiatric:         Mood and Affect: Mood is not depressed. Behavior: Behavior normal.         Thought Content: Thought content normal.         Judgment: Judgment normal.       /78   Pulse 75   Ht 5' 5\" (1.651 m)   Wt 131 lb (59.4 kg)   SpO2 96%   BMI 21.80 kg/m²     Assessment:       Diagnosis Orders   1. Atherosclerosis of native artery of both lower extremities with intermittent claudication ([facility] [address]     2. Chronic obstructive pulmonary disease, unspecified COPD type ([facility] [address]     3. Iron deficiency anemia secondary to inadequate dietary iron intake     4. Mixed hyperlipidemia     5. Vitamin D deficiency       Labs reviewedfrom 2/18/2020    Plan:        Patient given educational materials - see patient instructions. Discussed use, benefit, and side effects of prescribed medications. Allpatient questions answered. Pt voiced understanding. Reviewed health maintenance. Instructed to continue current medications, diet and exercise. Patient agreed with treatment plan. Follow up as directed. MEDICATIONS:  No orders of the defined types were placed in this encounter. ORDERS:  No orders of the defined types were placed in this encounter. Follow-up:  Return in about 3 months (around 5/20/2020) for have labs done prior to appt. PATIENT INSTRUCTIONS:  Patient Instructions   1. COPD continue with inhalers  2.   Atherosclerosis; Stable followed by Dr Octavio Pride  Will stay off plavix for now and take 2 baby ASA   3. Anemia ; Much improved;    Stool is negative today   4. Hyperlipidemia; The current medical regimen is effective;  continue present plan and medications. 5.  Vit d def;  Level is good continue the same    Electronically signed by PRAVEEN Weaver on 2/20/2020 at 9:40 AM    EMRDragon/transcription disclaimer:  Much of this encounter note is electronic transcription/translation of spoken language to printed texts. The electronic translation of spoken language may be erroneous, or at times,nonsensical words or phrases may be inadvertently transcribed.   Although I have reviewed the note for such errors, some may still exist.

## 2020-02-20 NOTE — CARE COORDINATION
condition. Barriers: lack of education and transportation  Plan for overcoming my barriers: [facility] and [facility]; family/neighbor assistance for transportation  Confidence: 8/10  Anticipated Goal Completion Date: 3/9/19         Self Monitoring   On track     Other Self-Monitoring - I will monitor my cough, mucus production, resp effort - Daily    None Recently Recorded    Barriers: lack of education  Plan for overcoming my barriers: Education and support from [facility]  Confidence: 8/10  Anticipated Goal Completion Date: 3/9/19              Prior to Admission medications    Medication Sig Start Date End Date Taking? Authorizing Provider   albuterol (PROVENTIL) (2.5 MG/3ML) 0.083% nebulizer solution Take 3 mLs by nebulization every 4 hours as needed for Wheezing or Shortness of Breath 2/3/20   Bola Due, APRN   HYDROcodone-acetaminophen (NORCO) 5-325 MG per tablet Take 1 tablet by mouth every 6 hours as needed for Pain for up to 30 days. G89.4 2/5/20 3/6/20  Bola Due, APRN   clopidogrel (PLAVIX) 75 MG tablet TAKE 1 TABLET EVERY DAY (NO FURTHER REFILLS WITHOUT FOLLOW UP APPT, CALL OFFICE) 1/27/20   Constantine Padilla PA-C   albuterol sulfate  (90 Base) MCG/ACT inhaler Inhale 2 puffs into the lungs 4 times daily as needed for Wheezing 12/9/19   Bola Due, APRN   lisinopril (PRINIVIL;ZESTRIL) 40 MG tablet TAKE 1 TABLET EVERY DAY 12/9/19   Bola Due, APRN   ipratropium-albuterol (DUONEB) 0.5-2.5 (3) MG/3ML SOLN nebulizer solution Inhale 3 mLs into the lungs every 6 hours 12/9/19 3/8/20  Bola Due, APRN   vitamin D (ERGOCALCIFEROL) 1.25 MG (38274 UT) CAPS capsule TAKE ONE CAPSULE BY MOUTH ONE TIME PER WEEK 12/9/19   Bola Due, APRN   budesonide-formoterol (SYMBICORT) 160-4.5 MCG/ACT AERO INHALE 2 PUFFS TWICE DAILY.  RINSE MOUTH AFTER USE. 11/25/19   Bola Due, APRN   Nebulizers (COMPRESSOR/NEBULIZER) MISC Use to administer neb treatments 4 times a day as needed 11/8/19

## 2020-03-04 ENCOUNTER — TELEPHONE (OUTPATIENT)
Dept: INTERNAL MEDICINE | Age: 85
End: 2020-03-04

## 2020-03-04 RX ORDER — HYDROCODONE BITARTRATE AND ACETAMINOPHEN 5; 325 MG/1; MG/1
1 TABLET ORAL EVERY 6 HOURS PRN
Qty: 120 TABLET | Refills: 0 | Status: CANCELLED | OUTPATIENT
Start: 2020-03-04 | End: 2020-04-03

## 2020-03-04 RX ORDER — HYDROCODONE BITARTRATE AND ACETAMINOPHEN 5; 325 MG/1; MG/1
1 TABLET ORAL EVERY 6 HOURS PRN
Qty: 120 TABLET | Refills: 0 | Status: SHIPPED | OUTPATIENT
Start: 2020-03-04 | End: 2020-03-26 | Stop reason: SDUPTHER

## 2020-03-04 NOTE — TELEPHONE ENCOUNTER
Pt needs a refill on her Hydrocodone sent to THE [facility]

## 2020-03-04 NOTE — TELEPHONE ENCOUNTER
Kathy Pierson called requesting a refill of the below medication which has been pended for you:     Requested Prescriptions      No prescriptions requested or ordered in this encounter       Last Appointment Date: 2/20/2020  Next Appointment Date: 5/20/2020    Allergies   Allergen Reactions    Codeine Nausea And Vomiting    Valium Nausea Only

## 2020-03-04 NOTE — TELEPHONE ENCOUNTER
Jannette Gitelman called to request a refill on her medication. Last office visit : 2/20/2020   Next office visit : 5/20/2020     Last UDS: No results found for: LABAMPH, LABBARB, LABBENZ, BUPRENUR, COCAIMETSCRU, GABAPENTIN, MDMA, METAMPU, OPIATESCREENURINE, OXTCOSU, PHENCYCLIDINESCREENURINE, PROPOXYPHENE, THCSCREENUR, TRICYUR        Requested Prescriptions     Pending Prescriptions Disp Refills    HYDROcodone-acetaminophen (NORCO) 5-325 MG per tablet 120 tablet 0     Sig: Take 1 tablet by mouth every 6 hours as needed for Pain for up to 30 days. G89.4         Please approve or refuse this medication.    Tolu Blunt

## 2020-03-23 RX ORDER — PANTOPRAZOLE SODIUM 40 MG/1
TABLET, DELAYED RELEASE ORAL
Qty: 180 TABLET | Refills: 1 | Status: SHIPPED | OUTPATIENT
Start: 2020-03-23 | End: 2020-05-20 | Stop reason: SDUPTHER

## 2020-03-26 RX ORDER — ERGOCALCIFEROL 1.25 MG/1
CAPSULE ORAL
Qty: 4 CAPSULE | Refills: 5 | Status: SHIPPED | OUTPATIENT
Start: 2020-03-26 | End: 2020-05-20 | Stop reason: SDUPTHER

## 2020-03-26 RX ORDER — HYDROCODONE BITARTRATE AND ACETAMINOPHEN 5; 325 MG/1; MG/1
1 TABLET ORAL EVERY 6 HOURS PRN
Qty: 120 TABLET | Refills: 0 | Status: SHIPPED | OUTPATIENT
Start: 2020-03-26 | End: 2020-04-27 | Stop reason: SDUPTHER

## 2020-03-26 NOTE — TELEPHONE ENCOUNTER
Miguel Colmenares called requesting a refill of the below medication which has been pended for you:     Requested Prescriptions      No prescriptions requested or ordered in this encounter       Last Appointment Date: 2/20/2020  Next Appointment Date: 5/20/2020    Allergies   Allergen Reactions    Codeine Nausea And Vomiting    Valium Nausea Only

## 2020-03-30 RX ORDER — HYDROCHLOROTHIAZIDE 12.5 MG/1
CAPSULE, GELATIN COATED ORAL
Qty: 90 CAPSULE | Refills: 3 | Status: SHIPPED | OUTPATIENT
Start: 2020-03-30 | End: 2021-02-04

## 2020-03-30 RX ORDER — AMLODIPINE BESYLATE 5 MG/1
TABLET ORAL
Qty: 90 TABLET | Refills: 3 | Status: SHIPPED | OUTPATIENT
Start: 2020-03-30 | End: 2021-02-04

## 2020-04-27 RX ORDER — HYDROCODONE BITARTRATE AND ACETAMINOPHEN 5; 325 MG/1; MG/1
1 TABLET ORAL EVERY 6 HOURS PRN
Qty: 120 TABLET | Refills: 0 | Status: SHIPPED | OUTPATIENT
Start: 2020-04-27 | End: 2020-05-27 | Stop reason: SDUPTHER

## 2020-04-28 RX ORDER — HYDROCODONE BITARTRATE AND ACETAMINOPHEN 5; 325 MG/1; MG/1
1 TABLET ORAL EVERY 6 HOURS PRN
Qty: 120 TABLET | Refills: 0 | Status: CANCELLED | OUTPATIENT
Start: 2020-04-28 | End: 2020-05-28

## 2020-05-20 ENCOUNTER — VIRTUAL VISIT (OUTPATIENT)
Dept: INTERNAL MEDICINE | Age: 85
End: 2020-05-20
Payer: MEDICARE

## 2020-05-20 PROCEDURE — 99443 PR PHYS/QHP TELEPHONE EVALUATION 21-30 MIN: CPT | Performed by: NURSE PRACTITIONER

## 2020-05-20 RX ORDER — PANTOPRAZOLE SODIUM 40 MG/1
40 TABLET, DELAYED RELEASE ORAL 2 TIMES DAILY
Qty: 180 TABLET | Refills: 1 | Status: SHIPPED | OUTPATIENT
Start: 2020-05-20 | End: 2020-12-21

## 2020-05-20 RX ORDER — ERGOCALCIFEROL 1.25 MG/1
50000 CAPSULE ORAL WEEKLY
Qty: 12 CAPSULE | Refills: 1 | Status: SHIPPED | OUTPATIENT
Start: 2020-05-20 | End: 2021-03-10

## 2020-05-20 ASSESSMENT — ENCOUNTER SYMPTOMS
CHOKING: 0
NAUSEA: 0
STRIDOR: 0
BLOOD IN STOOL: 0
BACK PAIN: 1
VOMITING: 0
ABDOMINAL DISTENTION: 0
COLOR CHANGE: 0
SORE THROAT: 0
COUGH: 0
CONSTIPATION: 0
DIARRHEA: 0
TROUBLE SWALLOWING: 0
ABDOMINAL PAIN: 0
WHEEZING: 0
SHORTNESS OF BREATH: 1
EYE ITCHING: 0
EYE DISCHARGE: 0

## 2020-05-20 NOTE — PROGRESS NOTES
[facility] INTERNAL MEDICINE  [address] [address]  [address] [address]  Dept: [phone]  Dept Fax: [phone]  Loc: [phone]    Dyan Rivas is a 80 y.o. female who were are performing tele health communication  for her medical conditions/complaints as noted below. Currently, our state is under umbrella of national state of emergency and we are using alternative methods of taking care of the needs of our patients so they are not exposed in our office; The patient has consented to this form of communication  Dyan Rivas is c/arlyn   Hypertension    THE patient is at home   Alla Pittman is at home   Others in attendance are none    HPI:     HPI   1. Iron def anemia she is currently taking 1 ferrous sulfate daily   2. HTN:  Stable on current meds; No side effects of the meds; Takes as directed; takes blood pressure 3-4 times a week   3. Pulmonary emphysema she is stable for now; She is just staying in the house;    4. Chronic pain syndrome she is stable on current meds    Controlled Substance Monitoring:    Acute and Chronic Pain Monitoring:   RX Monitoring 5/20/2020   Attestation The Prescription Monitoring Report for this patient was reviewed today. Periodic Controlled Substance Monitoring Possible medication side effects, risk of tolerance/dependence & alternative treatments discussed. Chronic Pain > 50 MEDD Re-evaluated the status of the patient's underlying condition causing pain. 5.  Vit d def Stable on current dose;  Weekly;   No side effects of the meds     Chief Complaint   Patient presents with    Hypertension       Past Medical History:   Diagnosis Date    Actinic keratosis     Atherosclerosis of native arteries of the extremities with intermittent claudication 9/19/2013    Benign fundic gland polyps of stomach     Blocked artery     Carotid artery bruit     right    Carotid artery stenosis 9/19/2013    Cerebral artery 01/25/2017    SLC-AIF with runoff angioplasty L SFA with 5x20 cutting balloon, 5x150 lutonix DCB, 5x40 Paseo balloon stent proximal L SFA with 5x80 Innova stent kissing stents B SHE with 9x59 atrium stents extension R SHE/EIA with 9x57 express stent extension L SHE with 9x37 express stent completion arteriograms Mynx closure B CFA    VASCULAR SURGERY  05/29/2018    DJM. Angio, SFA PTA       Vitals 2/20/2020 2/20/2020 2/20/2020 12/9/2019 11/25/2019 92/33/9554   SYSTOLIC [phone] 010   DIASTOLIC 78 70 68 56 61 55   Pulse - - 75 75 76 71   Temp - - - - - -   Resp - - - 24 18 18   SpO2 - - 96 94 93 95   Weight - - 131 lb 144 lb 145 lb 144 lb   Height - - 5' 5\" 5' 5\" 5' 5\" 5' 5\"   BMI (wt*703/ht~2) - - 21.8 kg/m2 23.96 kg/m2 24.13 kg/m2 23.96 kg/m2   Pain Level - - - - - -   Some recent data might be hidden       Family History   Problem Relation Age of Onset    Cancer Mother     Cancer Father     High Blood Pressure Son     High Cholesterol Son     High Blood Pressure Daughter     High Cholesterol Daughter     Heart Attack Daughter     Heart Attack Brother        Social History     Tobacco Use    Smoking status: Current Every Day Smoker     Packs/day: 0.75     Years: 58.00     Pack years: 43.50     Types: Cigarettes    Smokeless tobacco: Never Used    Tobacco comment: 3/4 of a pack daily   Substance Use Topics    Alcohol use: No      Current Outpatient Medications   Medication Sig Dispense Refill    vitamin D (ERGOCALCIFEROL) 1.25 MG (28006 UT) CAPS capsule Take 1 capsule by mouth once a week 12 capsule 1    pantoprazole (PROTONIX) 40 MG tablet Take 1 tablet by mouth 2 times daily 180 tablet 1    HYDROcodone-acetaminophen (NORCO) 5-325 MG per tablet Take 1 tablet by mouth every 6 hours as needed for Pain for up to 30 days.  G89.4 120 tablet 0    amLODIPine (NORVASC) 5 MG tablet TAKE 1 TABLET EVERY DAY 90 tablet 3    hydrochlorothiazide (MICROZIDE) 12.5 MG capsule TAKE 1 CAPSULE EVERY MORNING 90 capsule 3    albuterol (PROVENTIL) (2.5 MG/3ML) 0.083% nebulizer solution Take 3 mLs by nebulization every 4 hours as needed for Wheezing or Shortness of Breath 60 each 3    clopidogrel (PLAVIX) 75 MG tablet TAKE 1 TABLET EVERY DAY (NO FURTHER REFILLS WITHOUT FOLLOW UP APPT, CALL OFFICE) 30 tablet 0    albuterol sulfate  (90 Base) MCG/ACT inhaler Inhale 2 puffs into the lungs 4 times daily as needed for Wheezing 3 Inhaler 1    lisinopril (PRINIVIL;ZESTRIL) 40 MG tablet TAKE 1 TABLET EVERY DAY 90 tablet 3    ipratropium-albuterol (DUONEB) 0.5-2.5 (3) MG/3ML SOLN nebulizer solution Inhale 3 mLs into the lungs every 6 hours 360 mL 3    budesonide-formoterol (SYMBICORT) 160-4.5 MCG/ACT AERO INHALE 2 PUFFS TWICE DAILY. RINSE MOUTH AFTER USE. 30.6 Inhaler 2    Nebulizers (COMPRESSOR/NEBULIZER) MISC Use to administer neb treatments 4 times a day as needed 1 each 3    atorvastatin (LIPITOR) 80 MG tablet TAKE 1 TABLET EVERY DAY 90 tablet 3    alendronate (FOSAMAX) 70 MG tablet TAKE 1 TABLET BY MOUTH ONE TIME PER WEEK 12 tablet 1    pramipexole (MIRAPEX) 0.25 MG tablet TAKE 1 TO 2 TABLETS BY MOUTH AT BEDTIME AS NEEDED 180 tablet 3    dicyclomine (BENTYL) 10 MG capsule Take 1 capsule by mouth 2 times daily as needed 120 capsule 2    traZODone (DESYREL) 50 MG tablet Take 1 tablet by mouth nightly 90 tablet 1    furosemide (LASIX) 20 MG tablet Take 10 mg by mouth daily      aspirin 81 MG tablet Take 81 mg by mouth daily. No current facility-administered medications for this visit.       Allergies   Allergen Reactions    Codeine Nausea And Vomiting    Valium Nausea Only       Health Maintenance   Topic Date Due    DTaP/Tdap/Td vaccine (1 - Tdap) 10/19/1954    DEXA (modify frequency per FRAX score)  06/20/2020    Shingles Vaccine (1 of 2) 04/18/2028 (Originally 10/19/1985)    Annual Wellness Visit (AWV)  07/15/2020    Lipid screen  02/18/2021    Potassium monitoring  02/18/2021    Creatinine VITD25 55.0 02/18/2020       Subjective:      Review of Systems   Constitutional: Negative for fatigue, fever and unexpected weight change. HENT: Negative for ear discharge, ear pain, mouth sores, sore throat and trouble swallowing. Eyes: Negative for discharge, itching and visual disturbance. Respiratory: Positive for shortness of breath. Negative for cough, choking, wheezing and stridor. Cardiovascular: Negative for chest pain, palpitations and leg swelling. Gastrointestinal: Negative for abdominal distention, abdominal pain, blood in stool, constipation, diarrhea, nausea and vomiting. Endocrine: Negative for cold intolerance, polydipsia and polyuria. Genitourinary: Negative for difficulty urinating, dysuria, frequency and urgency. Musculoskeletal: Positive for back pain. Negative for arthralgias and gait problem. Skin: Negative for color change and rash. Allergic/Immunologic: Negative for food allergies and immunocompromised state. Neurological: Negative for dizziness, tremors, syncope, speech difficulty, weakness and headaches. Hematological: Negative for adenopathy. Does not bruise/bleed easily. Psychiatric/Behavioral: Negative for confusion and hallucinations. DGEVISITPE      GENERAL:   Afebrile alert no acute distress  Respiratory no use of accessory muscles no acute distress no audible wheezing  Mental status alert oriented adequate thought processes        Vital signs were not taken at this visit as no equipment was available;      Objective:     Physical Exam  There were no vitals taken for this visit. Assessment:       Diagnosis Orders   1. Pulmonary emphysema, unspecified emphysema type ([facility] [address]     2. Chronic pain syndrome     3. Vitamin D deficiency     4. Iron deficiency anemia secondary to inadequate dietary iron intake     5. Essential hypertension       Labs reviewedfrom feb 2020    Plan:        Patient given educational materials - see patient instructions.

## 2020-05-20 NOTE — PATIENT INSTRUCTIONS
1.  COPD;  Stable for now   2. Vit d def;  Refill and continue to take weekly   3. Iron def anemia; Stable for now;  Continue daily dose, we will get labs at your July OV:   4. Chronic pain syndrome; The current medical regimen is effective;  continue present plan and medications.   5.  Hypertension; stable for now; no changes

## 2020-05-26 RX ORDER — PRAMIPEXOLE DIHYDROCHLORIDE 0.25 MG/1
TABLET ORAL
Qty: 180 TABLET | Refills: 3 | Status: SHIPPED | OUTPATIENT
Start: 2020-05-26 | End: 2020-05-28 | Stop reason: SDUPTHER

## 2020-05-27 RX ORDER — HYDROCODONE BITARTRATE AND ACETAMINOPHEN 5; 325 MG/1; MG/1
1 TABLET ORAL EVERY 6 HOURS PRN
Qty: 120 TABLET | Refills: 0 | Status: SHIPPED | OUTPATIENT
Start: 2020-05-27 | End: 2021-01-11

## 2020-05-28 RX ORDER — PRAMIPEXOLE DIHYDROCHLORIDE 0.25 MG/1
TABLET ORAL
Qty: 180 TABLET | Refills: 3 | Status: SHIPPED | OUTPATIENT
Start: 2020-05-28 | End: 2020-09-09

## 2020-06-22 RX ORDER — BUDESONIDE AND FORMOTEROL FUMARATE DIHYDRATE 160; 4.5 UG/1; UG/1
AEROSOL RESPIRATORY (INHALATION)
Qty: 1 INHALER | Refills: 1 | Status: SHIPPED | OUTPATIENT
Start: 2020-06-22 | End: 2020-08-12

## 2020-07-28 RX ORDER — ALBUTEROL SULFATE 2.5 MG/3ML
SOLUTION RESPIRATORY (INHALATION)
Qty: 150 ML | Refills: 3 | Status: SHIPPED | OUTPATIENT
Start: 2020-07-28 | End: 2021-04-27

## 2020-08-07 ENCOUNTER — TELEPHONE (OUTPATIENT)
Dept: VASCULAR SURGERY | Facility: CLINIC | Age: 85
End: 2020-08-07

## 2020-08-07 NOTE — TELEPHONE ENCOUNTER
Called the patient to remind her of her testing and appt with Dr. Henriquez on Monday.  She was available and confirmed.

## 2020-08-10 ENCOUNTER — HOSPITAL ENCOUNTER (OUTPATIENT)
Dept: ULTRASOUND IMAGING | Facility: HOSPITAL | Age: 85
Discharge: HOME | End: 2020-08-10
Admitting: SURGERY

## 2020-08-10 ENCOUNTER — OFFICE VISIT (OUTPATIENT)
Dept: VASCULAR SURGERY | Facility: CLINIC | Age: 85
End: 2020-08-10

## 2020-08-10 ENCOUNTER — HOSPITAL ENCOUNTER (OUTPATIENT)
Dept: ULTRASOUND IMAGING | Facility: HOSPITAL | Age: 85
Discharge: HOME | End: 2020-08-10

## 2020-08-10 ENCOUNTER — APPOINTMENT (OUTPATIENT)
Dept: ULTRASOUND IMAGING | Facility: HOSPITAL | Age: 85
End: 2020-08-10

## 2020-08-10 VITALS
HEIGHT: 65 IN | OXYGEN SATURATION: 97 % | WEIGHT: 136 LBS | DIASTOLIC BLOOD PRESSURE: 74 MMHG | BODY MASS INDEX: 22.66 KG/M2 | SYSTOLIC BLOOD PRESSURE: 152 MMHG | HEART RATE: 68 BPM

## 2020-08-10 DIAGNOSIS — I70.213 ATHEROSCLEROSIS OF NATIVE ARTERY OF BOTH LOWER EXTREMITIES WITH INTERMITTENT CLAUDICATION (HCC): ICD-10-CM

## 2020-08-10 DIAGNOSIS — I10 ESSENTIAL HYPERTENSION: ICD-10-CM

## 2020-08-10 DIAGNOSIS — I65.23 BILATERAL CAROTID ARTERY STENOSIS: ICD-10-CM

## 2020-08-10 DIAGNOSIS — I70.213 ATHEROSCLEROSIS OF NATIVE ARTERY OF BOTH LOWER EXTREMITIES WITH INTERMITTENT CLAUDICATION (HCC): Primary | ICD-10-CM

## 2020-08-10 DIAGNOSIS — E78.2 MIXED HYPERLIPIDEMIA: ICD-10-CM

## 2020-08-10 PROCEDURE — 93923 UPR/LXTR ART STDY 3+ LVLS: CPT

## 2020-08-10 PROCEDURE — 93880 EXTRACRANIAL BILAT STUDY: CPT | Performed by: SURGERY

## 2020-08-10 PROCEDURE — 99214 OFFICE O/P EST MOD 30 MIN: CPT | Performed by: SURGERY

## 2020-08-10 PROCEDURE — 93923 UPR/LXTR ART STDY 3+ LVLS: CPT | Performed by: SURGERY

## 2020-08-10 PROCEDURE — 93880 EXTRACRANIAL BILAT STUDY: CPT

## 2020-08-10 NOTE — PROGRESS NOTES
08/10/2020      Pam Sin APRN  [address] [place] [address] [address] [place] [address]        Cindy Hicks  1935    Chief Complaint   Patient presents with   • Follow-up     6 month f/u with carotid and ABIs.  Pt states that her right leg hurts when she walks very far.  Pt isn't sure of her medications.       Dear Pam Sin APRN:    HPI     I had the pleasure of seeing your patient in the office today for follow up.  As you recall, the patient is a 84 y.o. female who we are currently following for known peripheral vascular disease as well as carotid stenosis.  She returns today after having undergone repeat JENNIFER/PVR as well as carotid duplex.  I did personally review both of these studies in the office today.  Her JENNIFER shows moderate arterial insufficiency in the right lower extremity with biphasic waveforms, and mild arterial insufficiency in the left lower extremity with biphasic waveforms.  Carotid duplex shows less than 50% stenosis of the bilateral internal carotid arteries based on velocity criteria.  She continues to complain of some mild claudication in the right calf at walking moderate distances however this is not lifestyle limiting and has really not changed since our last visit.  She denies any left lower extremity claudication.  She otherwise has no other acute complaints.  She remains on aspirin, and a statin.  She had previously followed with vascular surgery team at [facility] and her most recent vascular procedure was a left femoral endarterectomy in August 2019 but she did not wish to follow-up with [facility] following that so has been following here since November 2019.      Review of Systems   Constitutional: Negative.  Negative for activity change, appetite change, chills, diaphoresis, fatigue and fever.   HENT: Negative.  Negative for congestion, sneezing, sore throat and trouble swallowing.    Eyes: Negative.  Negative for visual disturbance.   Respiratory:  "Negative.  Negative for chest tightness and shortness of breath.    Cardiovascular: Positive for leg swelling (Minimal left lower extremity edema.). Negative for chest pain and palpitations.   Gastrointestinal: Negative.  Negative for abdominal distention, abdominal pain, nausea and vomiting.   Endocrine: Negative.    Genitourinary: Negative.    Musculoskeletal: Negative.         She reports some claudication to the right calf.   Skin: Negative.    Allergic/Immunologic: Negative.    Neurological: Negative.    Hematological: Negative.    Psychiatric/Behavioral: Negative.        /74   Pulse 68   Ht 165.1 cm (65\")   Wt 61.7 kg (136 lb)   SpO2 97%   BMI 22.63 kg/m²   Physical Exam   Constitutional: She is oriented to person, place, and time. She appears well-developed and well-nourished.   HENT:   Head: Normocephalic and atraumatic.   Eyes: Pupils are equal, round, and reactive to light. EOM are normal.   Neck: Normal range of motion. Neck supple. No JVD present.   Cardiovascular: Normal rate and regular rhythm.   Pulses:       Carotid pulses are 2+ on the right side, and 2+ on the left side.       Radial pulses are 2+ on the right side, and 2+ on the left side.        Femoral pulses are 2+ on the right side, and 2+ on the left side.       Popliteal pulses are 1+ on the right side, and 2+ on the left side.        Dorsalis pedis pulses are 1+ on the right side, and 2+ on the left side.        Posterior tibial pulses are 0 on the right side, and 2+ on the left side.   On the right she does have Doppler signals present at the DP and PT.  Both feet are warm and well perfused appearing.   Pulmonary/Chest: Effort normal. No respiratory distress.   Abdominal: Soft. She exhibits no distension and no mass. There is no tenderness.   Musculoskeletal: She exhibits edema (Mild edema to the left lower extremity to the level of the knee.). She exhibits no tenderness or deformity.   Neurological: She is alert and oriented to " person, place, and time. No sensory deficit. She exhibits normal muscle tone.   Skin: Skin is warm and dry. Capillary refill takes less than 2 seconds.   Psychiatric: She has a normal mood and affect. Her behavior is normal. Judgment and thought content normal.   Vitals reviewed.      DIAGNOSTIC DATA:        Patient Active Problem List   Diagnosis   • Family hx colonic polyps   • Family hx of colon cancer   • Hx of colonic polyp   • Melena   • Peptic ulcer disease   • Essential hypertension   • Mixed hyperlipidemia         ICD-10-CM ICD-9-CM   1. Atherosclerosis of native artery of both lower extremities with intermittent claudication (CMS/[facility]) I70.213 440.21   2. Essential hypertension I10 401.9   3. Mixed hyperlipidemia E78.2 272.2   4. Bilateral carotid artery stenosis I65.23 433.10     433.30       Lab Frequency Next Occurrence   Follow Anesthesia Guidelines / Standing Orders Once 09/12/2018   Follow Anesthesia Guidelines / Standing Orders Once 10/03/2019   Obtain Informed Consent Once 10/08/2019   Diet: Once 10/04/2019   US Ankle / Brachial Indices Extremity Complete Once 02/06/2021       PLAN: After thoroughly evaluating Cindy Hicks, I believe the best course of action is to remain conservative from a vascular standpoint.  Overall she continues to do quite well.  She complains of mild claudication to the right calf but this is basically unchanged from previous and is not lifestyle limiting.  Clinically she does have a palpable femoral, popliteal, and dorsalis pedis pulse in the right lower extremity and all pulses are palpable in the left lower extremity.  Her JENNIFER is somewhat diminished in the right but waveforms remain biphasic and given that her clinical symptoms are mild I would not recommend any further intervention at this point she also wishes not to have any further surgical procedures at this point.  I will plan to see her back in the office in 6 months with repeat JENNIFER/PVR for continued  surveillance.  From a carotid standpoint she has less than 50% stenosis of the bilateral internal carotid arteries and will need repeat carotid duplex in 1 year for continued surveillance.  The patient is to continue taking their medications as previously discussed.   I did discuss vascular risk factors as they pertain to the progression of vascular disease including controlling hypertension, and hyperlipidemia. These factors remain stable. Patient's Body mass index is 22.63 kg/m². BMI is within normal parameters. No follow-up required. I did  extensively on smoking cessation, and the patient was advised of the continued risks of smoking.  I provided over 10 minutes counseling on this matter.This was all discussed in full with complete understanding.  Thank you for allowing me to participate in the care of your patient.  Please do not hesitate to call with any questions or concerns.  We will keep you aware of any further encounters with Cindy Hicks.      Sincerely Yours,      Jerald Henriquez MD

## 2020-08-12 RX ORDER — BUDESONIDE AND FORMOTEROL FUMARATE DIHYDRATE 160; 4.5 UG/1; UG/1
AEROSOL RESPIRATORY (INHALATION)
Qty: 1 INHALER | Refills: 1 | Status: SHIPPED | OUTPATIENT
Start: 2020-08-12 | End: 2020-09-28

## 2020-08-12 NOTE — TELEPHONE ENCOUNTER
Here is another person that did not get a fu appt from virtual visit in May. So do we know if she was mailed her summary?

## 2020-08-24 RX ORDER — ATORVASTATIN CALCIUM 80 MG/1
TABLET, FILM COATED ORAL
Qty: 90 TABLET | Refills: 3 | Status: SHIPPED | OUTPATIENT
Start: 2020-08-24 | End: 2021-07-14

## 2020-08-24 NOTE — TELEPHONE ENCOUNTER
Brown Arroyo called requesting a refill of the below medication which has been pended for you:     Requested Prescriptions     Pending Prescriptions Disp Refills    atorvastatin (LIPITOR) 80 MG tablet [Pharmacy Med Name: ATORVASTATIN CALCIUM 80 MG Tablet] 90 tablet 3     Sig: TAKE 1 TABLET EVERY DAY       Last Appointment Date: 5/20/2020  Next Appointment Date: Visit date not found    Allergies   Allergen Reactions    Codeine Nausea And Vomiting    Valium Nausea Only

## 2020-08-31 ENCOUNTER — VIRTUAL VISIT (OUTPATIENT)
Dept: INTERNAL MEDICINE | Age: 85
End: 2020-08-31
Payer: MEDICARE

## 2020-08-31 ENCOUNTER — OFFICE VISIT (OUTPATIENT)
Age: 85
End: 2020-08-31

## 2020-08-31 VITALS — TEMPERATURE: 98.4 F | OXYGEN SATURATION: 98 % | HEART RATE: 73 BPM

## 2020-08-31 DIAGNOSIS — E55.9 VITAMIN D DEFICIENCY: Chronic | ICD-10-CM

## 2020-08-31 DIAGNOSIS — I70.213 ATHEROSCLEROSIS OF NATIVE ARTERY OF BOTH LOWER EXTREMITIES WITH INTERMITTENT CLAUDICATION (HCC): ICD-10-CM

## 2020-08-31 DIAGNOSIS — D50.8 IRON DEFICIENCY ANEMIA SECONDARY TO INADEQUATE DIETARY IRON INTAKE: ICD-10-CM

## 2020-08-31 LAB
ALBUMIN SERPL-MCNC: 4.2 G/DL (ref 3.5–5.2)
ALP BLD-CCNC: 74 U/L (ref 35–104)
ALT SERPL-CCNC: 10 U/L (ref 5–33)
ANION GAP SERPL CALCULATED.3IONS-SCNC: 11 MMOL/L (ref 7–19)
AST SERPL-CCNC: 16 U/L (ref 5–32)
BASOPHILS ABSOLUTE: 0 K/UL (ref 0–0.2)
BASOPHILS RELATIVE PERCENT: 0.7 % (ref 0–1)
BILIRUB SERPL-MCNC: 0.3 MG/DL (ref 0.2–1.2)
BILIRUBIN URINE: NEGATIVE
BLOOD, URINE: NEGATIVE
BUN BLDV-MCNC: 20 MG/DL (ref 8–23)
CALCIUM SERPL-MCNC: 9.2 MG/DL (ref 8.8–10.2)
CHLORIDE BLD-SCNC: 98 MMOL/L (ref 98–111)
CLARITY: CLEAR
CO2: 30 MMOL/L (ref 22–29)
COLOR: YELLOW
CREAT SERPL-MCNC: 1.1 MG/DL (ref 0.5–0.9)
EOSINOPHILS ABSOLUTE: 0.2 K/UL (ref 0–0.6)
EOSINOPHILS RELATIVE PERCENT: 3.7 % (ref 0–5)
GFR AFRICAN AMERICAN: 57
GFR NON-AFRICAN AMERICAN: 47
GLUCOSE BLD-MCNC: 100 MG/DL (ref 74–109)
GLUCOSE URINE: NEGATIVE MG/DL
HCT VFR BLD CALC: 42.4 % (ref 37–47)
HEMOGLOBIN: 13.7 G/DL (ref 12–16)
IMMATURE GRANULOCYTES #: 0 K/UL
IRON: 93 UG/DL (ref 37–145)
KETONES, URINE: NEGATIVE MG/DL
LEUKOCYTE ESTERASE, URINE: NEGATIVE
LYMPHOCYTES ABSOLUTE: 1.6 K/UL (ref 1.1–4.5)
LYMPHOCYTES RELATIVE PERCENT: 26.1 % (ref 20–40)
MCH RBC QN AUTO: 30.5 PG (ref 27–31)
MCHC RBC AUTO-ENTMCNC: 32.3 G/DL (ref 33–37)
MCV RBC AUTO: 94.4 FL (ref 81–99)
MONOCYTES ABSOLUTE: 0.6 K/UL (ref 0–0.9)
MONOCYTES RELATIVE PERCENT: 10.8 % (ref 0–10)
NEUTROPHILS ABSOLUTE: 3.5 K/UL (ref 1.5–7.5)
NEUTROPHILS RELATIVE PERCENT: 58.2 % (ref 50–65)
NITRITE, URINE: NEGATIVE
PDW BLD-RTO: 13.7 % (ref 11.5–14.5)
PH UA: 7.5 (ref 5–8)
PLATELET # BLD: 235 K/UL (ref 130–400)
PMV BLD AUTO: 9.7 FL (ref 9.4–12.3)
POTASSIUM SERPL-SCNC: 4.5 MMOL/L (ref 3.5–5)
PROTEIN UA: NEGATIVE MG/DL
RBC # BLD: 4.49 M/UL (ref 4.2–5.4)
SODIUM BLD-SCNC: 139 MMOL/L (ref 136–145)
SPECIFIC GRAVITY UA: 1.01 (ref 1–1.03)
TOTAL PROTEIN: 6.8 G/DL (ref 6.6–8.7)
UROBILINOGEN, URINE: 0.2 E.U./DL
VITAMIN D 25-HYDROXY: 59.4 NG/ML
WBC # BLD: 5.9 K/UL (ref 4.8–10.8)

## 2020-08-31 PROCEDURE — 4004F PT TOBACCO SCREEN RCVD TLK: CPT | Performed by: NURSE PRACTITIONER

## 2020-08-31 PROCEDURE — 1123F ACP DISCUSS/DSCN MKR DOCD: CPT | Performed by: NURSE PRACTITIONER

## 2020-08-31 PROCEDURE — 4040F PNEUMOC VAC/ADMIN/RCVD: CPT | Performed by: NURSE PRACTITIONER

## 2020-08-31 PROCEDURE — 99213 OFFICE O/P EST LOW 20 MIN: CPT | Performed by: NURSE PRACTITIONER

## 2020-08-31 PROCEDURE — 1090F PRES/ABSN URINE INCON ASSESS: CPT | Performed by: NURSE PRACTITIONER

## 2020-08-31 PROCEDURE — G8427 DOCREV CUR MEDS BY ELIG CLIN: HCPCS | Performed by: NURSE PRACTITIONER

## 2020-08-31 PROCEDURE — G8420 CALC BMI NORM PARAMETERS: HCPCS | Performed by: NURSE PRACTITIONER

## 2020-08-31 PROCEDURE — G8399 PT W/DXA RESULTS DOCUMENT: HCPCS | Performed by: NURSE PRACTITIONER

## 2020-08-31 RX ORDER — SUCRALFATE 1 G/1
1 TABLET ORAL 4 TIMES DAILY
Qty: 120 TABLET | Refills: 3 | Status: SHIPPED | OUTPATIENT
Start: 2020-08-31

## 2020-08-31 ASSESSMENT — PATIENT HEALTH QUESTIONNAIRE - PHQ9
SUM OF ALL RESPONSES TO PHQ QUESTIONS 1-9: 0
2. FEELING DOWN, DEPRESSED OR HOPELESS: 0
SUM OF ALL RESPONSES TO PHQ QUESTIONS 1-9: 0
SUM OF ALL RESPONSES TO PHQ9 QUESTIONS 1 & 2: 0
1. LITTLE INTEREST OR PLEASURE IN DOING THINGS: 0

## 2020-08-31 ASSESSMENT — ENCOUNTER SYMPTOMS
STRIDOR: 0
ABDOMINAL DISTENTION: 0
VOMITING: 0
COLOR CHANGE: 0
CONSTIPATION: 0
SHORTNESS OF BREATH: 0
WHEEZING: 0
TROUBLE SWALLOWING: 0
EYE DISCHARGE: 0
SORE THROAT: 0
BLOOD IN STOOL: 0
NAUSEA: 1
ABDOMINAL PAIN: 1
EYE ITCHING: 0
CHOKING: 0
DIARRHEA: 0
COUGH: 0

## 2020-08-31 NOTE — PATIENT INSTRUCTIONS
1. Fatigue we will get COVID test today   Labs are all pending but WBC hgb are fine and so is her iron   2. Hypertension; The current medical regimen is effective;  continue present plan and medications.   3.  abd pain ;   Start taking the carafate today ;  Get 3 in today before meals and at bedtime;

## 2020-08-31 NOTE — PROGRESS NOTES
[facility] INTERNAL MEDICINE  [address] [address]  [address] [address]  Dept: [phone]  Dept Fax: [phone]  Loc: [phone]    Mary Arellano is a 80 y.o. female who were are performing tele health communication  for her medical conditions/complaints as noted below. Currently, our state is under umbrella of national state of emergency and we are using alternative methods of taking care of the needs of our patients so they are not exposed in our office; The patient has consented to this form of communication  Mary Arellano is c/arlyn   Abdominal Pain (Patient states has been having abdominal pain, nausea, and dizziness. Patient had labs and urine done.)    THE patient is at home  Bibi Smith is at office   Others in attendance are none  After speaking with her I asked her to come on in so we could exam her and gets labs;        HPI:     HPI   1.  abd pain ; she has nausea as wel; The pain Is really all over her belly; she is weak and shaky dizzy ; She feels like she did when she had bleeding ulcer;  ;she has abd pain and soreness; She really cant eat well; It makes her nauseated when she eats; No diarrhea; No fever    She is concerned about her daughter so she would like to have COvid testing for fatigue ;    2. She is caring for her daughter who is on hospice; She had advanced CAD; She has declined in the last month ;  Her granddaughter comes and fights with them and doesn't follow the nurses guidelines;   3. Hypertension;   Good today; She is taking amlodipine 5 mg and hctz 12.5 with lasix 20 mg;        Chief Complaint   Patient presents with    Abdominal Pain     Patient states has been having abdominal pain, nausea, and dizziness. Patient had labs and urine done.        Past Medical History:   Diagnosis Date    Actinic keratosis     Atherosclerosis of native arteries of the extremities with intermittent claudication 9/19/2013    Benign fundic gland polyps of stomach     Blocked artery     Carotid artery bruit     right    Carotid artery stenosis 9/19/2013    Cerebral artery occlusion with cerebral infarction (HCC)     Chronic cough     Emphysema of lung (HCC)     Hyperlipemia     Hypertension     Irritable bowel syndrome     Lower back pain     Macular degeneration     Need for prophylactic vaccination with Streptococcus pneumoniae (Pneumococcus) and Influenza vaccines     Osteoarthritis     Osteoporosis 9/17/2017    PONV (postoperative nausea and vomiting)     Prediabetes     Pulmonary emphysema ([address] 9/17/2017    PVD (peripheral vascular disease) ([address]     Restless legs syndrome     Transient ischemic attack     Vitamin D deficiency       Past Surgical History:   Procedure Laterality Date    CATARACT REMOVAL Bilateral     COLONOSCOPY  03/15/2013    Shiben:  mild diverticulosis, HP, small internal hemorrhoids,  3yr recall    COLONOSCOPY  10/02/2018    Shiben:  Tubular AP x3, negative for evidence of high-grade dysplasia    FEMORAL ENDARTERECTOMY Left 8/13/2019    LEFT COMMON FEMORAL ENDARTERECTOMY WITH BOVINE PATCH ANGIOPLASTY AND COMPLETION ANGIOGRAM performed by Amena Vargas MD at [address]      cyst on breast    UPPER GASTROINTESTINAL ENDOSCOPY  09/13/2019    Dr Monique Hurtado based antral ulcer (4mm)    UPPER GASTROINTESTINAL ENDOSCOPY Left 9/13/2019    EGD DIAGNOSTIC ONLY 9-13-19 Dr. Whitman Pac performed by Erik Adhikari DO at [facility]    VASCULAR SURGERY  9-25-13 [facility]    Fluroscopic guidance for access tor R femoral artery after attempted access of L femoral artery. Aortoiliofemoral arteriogram with bilateral lower extremity runoff. R proximal common iliac artery angioplasty X2 with 8mm x 40 mm balloon. L superficial femoral artery angioplasty x1 with 4 mm x 40 mm balloon, then x1 with 5mm x 40 mm balloon.  L superficial femoral artery SUPERA stent 4mm x 40mm EVERY 4 HOURS AS NEEDED FOR WHEEZING OR SHORTNESS OF BREATH 150 mL 3    pramipexole (MIRAPEX) 0.25 MG tablet TAKE 1 TO 2 TABLETS BY MOUTH AT BEDTIME AS NEEDED 180 tablet 3    HYDROcodone-acetaminophen (NORCO) 5-325 MG per tablet Take 1 tablet by mouth every 6 hours as needed for Pain for up to 30 days. G89.4 120 tablet 0    vitamin D (ERGOCALCIFEROL) 1.25 MG (75688 UT) CAPS capsule Take 1 capsule by mouth once a week 12 capsule 1    pantoprazole (PROTONIX) 40 MG tablet Take 1 tablet by mouth 2 times daily 180 tablet 1    amLODIPine (NORVASC) 5 MG tablet TAKE 1 TABLET EVERY DAY 90 tablet 3    hydrochlorothiazide (MICROZIDE) 12.5 MG capsule TAKE 1 CAPSULE EVERY MORNING 90 capsule 3    clopidogrel (PLAVIX) 75 MG tablet TAKE 1 TABLET EVERY DAY (NO FURTHER REFILLS WITHOUT FOLLOW UP APPT, CALL OFFICE) 30 tablet 0    albuterol sulfate  (90 Base) MCG/ACT inhaler Inhale 2 puffs into the lungs 4 times daily as needed for Wheezing 3 Inhaler 1    lisinopril (PRINIVIL;ZESTRIL) 40 MG tablet TAKE 1 TABLET EVERY DAY 90 tablet 3    ipratropium-albuterol (DUONEB) 0.5-2.5 (3) MG/3ML SOLN nebulizer solution Inhale 3 mLs into the lungs every 6 hours 360 mL 3    Nebulizers (COMPRESSOR/NEBULIZER) MISC Use to administer neb treatments 4 times a day as needed 1 each 3    alendronate (FOSAMAX) 70 MG tablet TAKE 1 TABLET BY MOUTH ONE TIME PER WEEK 12 tablet 1    dicyclomine (BENTYL) 10 MG capsule Take 1 capsule by mouth 2 times daily as needed 120 capsule 2    traZODone (DESYREL) 50 MG tablet Take 1 tablet by mouth nightly 90 tablet 1    furosemide (LASIX) 20 MG tablet Take 10 mg by mouth daily      aspirin 81 MG tablet Take 81 mg by mouth daily. No current facility-administered medications for this visit.       Allergies   Allergen Reactions    Codeine Nausea And Vomiting    Valium Nausea Only       Health Maintenance   Topic Date Due    DTaP/Tdap/Td vaccine (1 - Tdap) 10/19/1954    Annual Wellness Visit (AWV) 05/29/2019    DEXA (modify frequency per FRAX score)  06/20/2020    Shingles Vaccine (1 of 2) 04/18/2028 (Originally 10/19/1985)    Flu vaccine (1) 09/01/2020    Lipid screen  02/18/2021    Potassium monitoring  02/18/2021    Creatinine monitoring  02/18/2021    Breast cancer screen  07/25/2021    Colon cancer screen colonoscopy  10/03/2021    Pneumococcal 65+ years Vaccine  Completed    Hepatitis A vaccine  Aged Out    Hepatitis B vaccine  Aged Out    Hib vaccine  Aged Out    Meningococcal (ACWY) vaccine  Aged Out       No results found for: LABA1C  No results found for: PSA, PSADIA  TSH   Date Value Ref Range Status   02/18/2020 2.610 0.270 - 4.200 uIU/mL Final   ]  Lab Results   Component Value Date     08/31/2020    K 4.5 08/31/2020    CL 98 08/31/2020    CO2 30 (H) 08/31/2020    BUN 20 08/31/2020    CREATININE 1.1 (H) 08/31/2020    GLUCOSE 100 08/31/2020    CALCIUM 9.2 08/31/2020    PROT 6.8 08/31/2020    LABALBU 4.2 08/31/2020    BILITOT 0.3 08/31/2020    ALKPHOS 74 08/31/2020    AST 16 08/31/2020    ALT 10 08/31/2020    LABGLOM 47 (A) 08/31/2020    GFRAA 57 (L) 08/31/2020    GLOB 2.7 05/15/2017     Lab Results   Component Value Date    CHOL 143 (L) 02/18/2020    CHOL 119 (L) 11/15/2019    CHOL 144 (L) 04/08/2019     Lab Results   Component Value Date    TRIG 86 02/18/2020    TRIG 60 11/15/2019    TRIG 85 04/08/2019     Lab Results   Component Value Date    HDL 56 (L) 02/18/2020    HDL 58 (L) 11/15/2019    HDL 54 (L) 04/08/2019     Lab Results   Component Value Date    LDLCALC 70 02/18/2020    LDLCALC 49 11/15/2019    LDLCALC 73 04/08/2019     Lab Results   Component Value Date     08/31/2020    K 4.5 08/31/2020    K 4.5 09/12/2019    CL 98 08/31/2020    CO2 30 08/31/2020    BUN 20 08/31/2020    CREATININE 1.1 08/31/2020    GLUCOSE 100 08/31/2020    CALCIUM 9.2 08/31/2020      Lab Results   Component Value Date    WBC 5.9 08/31/2020    HGB 13.7 08/31/2020    HCT 42.4 08/31/2020    MCV 94.4 08/31/2020     08/31/2020    LABLYMP 1.47 09/25/2013    LYMPHOPCT 26.1 08/31/2020    RBC 4.49 08/31/2020    MCH 30.5 08/31/2020    MCHC 32.3 (L) 08/31/2020    RDW 13.7 08/31/2020     Lab Results   Component Value Date    VITD25 59.4 08/31/2020       Subjective:      Review of Systems   Constitutional: Positive for fatigue. Negative for fever and unexpected weight change. HENT: Negative for ear discharge, ear pain, mouth sores, sore throat and trouble swallowing. Eyes: Negative for discharge, itching and visual disturbance. Respiratory: Negative for cough, choking, shortness of breath, wheezing and stridor. Cardiovascular: Negative for chest pain, palpitations and leg swelling. Gastrointestinal: Positive for abdominal pain and nausea. Negative for abdominal distention, blood in stool, constipation, diarrhea and vomiting. Endocrine: Negative for cold intolerance, polydipsia and polyuria. Genitourinary: Negative for difficulty urinating, dysuria, frequency and urgency. Musculoskeletal: Negative for arthralgias and gait problem. Skin: Negative for color change and rash. Allergic/Immunologic: Negative for food allergies and immunocompromised state. Neurological: Negative for dizziness, tremors, syncope, speech difficulty, weakness and headaches. Hematological: Negative for adenopathy. Does not bruise/bleed easily. Psychiatric/Behavioral: Negative for confusion and hallucinations. Objective: We did these in the office     General; alert, oriented, no acute distress. Skin no rashes or worrisome lesions. HEENT head is atraumatic. Pupils equal, reactive light and accommodation. Neck is supple without masses. Chest is clear without rales, rhonchi. Cardiovascular S1, S2 without murmur. Blood vessels no cyanosis, clubbing peripheral edema. Abdomen is soft.  Bowel sounds  present   Musculoskeletal adequate tone range of motion and strength   lymph there is no tenderness

## 2020-09-04 LAB — SARS-COV-2, NAA: NOT DETECTED

## 2020-09-09 RX ORDER — PRAMIPEXOLE DIHYDROCHLORIDE 0.25 MG/1
TABLET ORAL
Qty: 180 TABLET | Refills: 3 | Status: SHIPPED | OUTPATIENT
Start: 2020-09-09 | End: 2021-07-14

## 2020-09-28 ENCOUNTER — OFFICE VISIT (OUTPATIENT)
Dept: INTERNAL MEDICINE | Age: 85
End: 2020-09-28
Payer: MEDICARE

## 2020-09-28 PROCEDURE — 99442 PR PHYS/QHP TELEPHONE EVALUATION 11-20 MIN: CPT | Performed by: NURSE PRACTITIONER

## 2020-09-28 RX ORDER — BUDESONIDE AND FORMOTEROL FUMARATE DIHYDRATE 160; 4.5 UG/1; UG/1
AEROSOL RESPIRATORY (INHALATION)
Qty: 1 INHALER | Refills: 1 | Status: SHIPPED | OUTPATIENT
Start: 2020-09-28 | End: 2020-12-31

## 2020-09-28 ASSESSMENT — ENCOUNTER SYMPTOMS
ABDOMINAL PAIN: 0
SORE THROAT: 0
COUGH: 0
WHEEZING: 0
CONSTIPATION: 0
ABDOMINAL DISTENTION: 0
CHOKING: 0
COLOR CHANGE: 0
EYE DISCHARGE: 0
SHORTNESS OF BREATH: 1
NAUSEA: 0
BLOOD IN STOOL: 0
EYE ITCHING: 0
STRIDOR: 0
DIARRHEA: 0
VOMITING: 0
TROUBLE SWALLOWING: 0

## 2020-09-28 NOTE — TELEPHONE ENCOUNTER
Yoshi Jean called requesting a refill of the below medication which has been pended for you:     Requested Prescriptions     Pending Prescriptions Disp Refills    budesonide-formoterol (SYMBICORT) 160-4.5 MCG/ACT AERO [Pharmacy Med Name: SYMBICORT 160-4.5 MCG/ACT Aerosol] 1 Inhaler 1     Sig: INHALE 2 PUFFS TWICE DAILY. RINSE MOUTH AFTER USE.        Last Appointment Date: 8/31/2020  Next Appointment Date: Visit date not found    Allergies   Allergen Reactions    Codeine Nausea And Vomiting    Valium Nausea Only

## 2020-09-28 NOTE — PROGRESS NOTES
[address] INTERNAL MEDICINE  [address] [address] [place] [address]  Dept: [phone]  Dept Fax: [phone]  Loc: [phone]    Sudhakar Edwards is a 80 y.o. female who were are performing tele health communication  for her medical conditions/complaints as noted below. Currently, our state is under umbrella of national state of emergency and we are using alternative methods of taking care of the needs of our patients so they are not exposed in our office; The patient has consented to this form of communication  Sudhakar Edwards is c/arlyn   COPD    THE patient is at home  Jovany Gr is at office   Others in attendance are none     HPI:     HPI   1. Copd with hypoxia especially at night and during the day if she has activity; She cannot really do anything without it;   She tries to do things in her home;      Chief Complaint   Patient presents with    COPD       Past Medical History:   Diagnosis Date    Actinic keratosis     Atherosclerosis of native arteries of the extremities with intermittent claudication 9/19/2013    Benign fundic gland polyps of stomach     Blocked artery     Carotid artery bruit     right    Carotid artery stenosis 9/19/2013    Cerebral artery occlusion with cerebral infarction ([address]     Chronic cough     Emphysema of lung ([address]     Hyperlipemia     Hypertension     Irritable bowel syndrome     Lower back pain     Macular degeneration     Need for prophylactic vaccination with Streptococcus pneumoniae (Pneumococcus) and Influenza vaccines     Osteoarthritis     Osteoporosis 9/17/2017    PONV (postoperative nausea and vomiting)     Prediabetes     Pulmonary emphysema ([address] 9/17/2017    PVD (peripheral vascular disease) (HCC)     Restless legs syndrome     Transient ischemic attack     Vitamin D deficiency       Past Surgical History:   Procedure Laterality Date    CATARACT REMOVAL Bilateral     COLONOSCOPY 03/15/2013    Shiben:  mild diverticulosis, HP, small internal hemorrhoids,  3yr recall    COLONOSCOPY  10/02/2018    Shiben:  Tubular AP x3, negative for evidence of high-grade dysplasia    FEMORAL ENDARTERECTOMY Left 8/13/2019    LEFT COMMON FEMORAL ENDARTERECTOMY WITH BOVINE PATCH ANGIOPLASTY AND COMPLETION ANGIOGRAM performed by Asha Carlos MD at [address]      cyst on breast    UPPER GASTROINTESTINAL ENDOSCOPY  09/13/2019    Dr Obinna Mann based antral ulcer (4mm)    UPPER GASTROINTESTINAL ENDOSCOPY Left 9/13/2019    EGD DIAGNOSTIC ONLY 9-13-19 Dr. Yennifer Shepherd [facility] Claudell Route, DO [facility] [address] [facility]    [facility]  [facility]25-[facility]    Fluroscopic guidance for access tor R femoral artery after attempted access of L femoral artery. Aortoiliofemoral arteriogram with bilateral lower extremity runoff. R proximal common iliac artery angioplasty X2 with 8mm x 40 mm balloon. L superficial femoral artery angioplasty x1 with 4 mm x 40 mm balloon, then x1 with 5mm x 40 mm balloon. L superficial femoral artery SUPERA stent 4mm x 40mm and     VASCULAR SURGERY  cont. ..    a second SUPERA stent 4 mm x 60 mm Completion arteriograms Mynx closure R groin access site.  VASCULAR SURGERY  01/25/2017    SLC-AIF with runoff angioplasty L SFA with 5x20 cutting balloon, 5x150 lutonix DCB, 5x40 [address] balloon stent proximal L SFA with 5x80 Innova stent kissing stents B SHE with 9x59 atrium stents extension R SHE/EIA with 9x57 express stent extension L SHE with 9x37 express stent completion arteriograms Mynx closure B CFA    VASCULAR SURGERY  05/29/2018    DJM. Angio, SFA PTA       Vitals 8/31/2020 2/20/2020 2/20/2020 2/20/2020 12/9/2019 49/93/8780   SYSTOLIC - 511 061 038 664 033   DIASTOLIC - 78 70 68 56 61   Pulse 73 - - 75 75 76   Temp 98.4 - - - - -   Resp - - - - 24 18   SpO2 98 - - 96 94 93   Weight - - - 131 lb 144 lb 145 lb   Height - - - 5' 5\" 5' 5\" 5' 5\"   BMI (wt*703/ht~2) - - - 21.8 kg/m2 23.96 kg/m2 24.13 kg/m2   Pain Level - - - - - -   Some recent data might be hidden       Family History   Problem Relation Age of Onset    Cancer Mother     Cancer Father     High Blood Pressure Son     High Cholesterol Son     High Blood Pressure Daughter     High Cholesterol Daughter     Heart Attack Daughter     Heart Attack Brother        Social History     Tobacco Use    Smoking status: Current Every Day Smoker     Packs/day: 0.75     Years: 58.00     Pack years: 43.50     Types: Cigarettes    Smokeless tobacco: Never Used    Tobacco comment: 3/4 of a pack daily   Substance Use Topics    Alcohol use: No      Current Outpatient Medications   Medication Sig Dispense Refill    budesonide-formoterol (SYMBICORT) 160-4.5 MCG/ACT AERO INHALE 2 PUFFS TWICE DAILY. RINSE MOUTH AFTER USE. 1 Inhaler 1    pramipexole (MIRAPEX) 0.25 MG tablet TAKE 1 TO 2 TABLETS BY MOUTH AT BEDTIME AS NEEDED 180 tablet 3    sucralfate (CARAFATE) 1 GM tablet Take 1 tablet by mouth 4 times daily 120 tablet 3    atorvastatin (LIPITOR) 80 MG tablet TAKE 1 TABLET EVERY DAY 90 tablet 3    albuterol (PROVENTIL) (2.5 MG/3ML) 0.083% nebulizer solution INHALE 1 VIAL BY NEBULIZER EVERY 4 HOURS AS NEEDED FOR WHEEZING OR SHORTNESS OF BREATH 150 mL 3    HYDROcodone-acetaminophen (NORCO) 5-325 MG per tablet Take 1 tablet by mouth every 6 hours as needed for Pain for up to 30 days.  G89.4 120 tablet 0    vitamin D (ERGOCALCIFEROL) 1.25 MG (19299 UT) CAPS capsule Take 1 capsule by mouth once a week 12 capsule 1    pantoprazole (PROTONIX) 40 MG tablet Take 1 tablet by mouth 2 times daily 180 tablet 1    amLODIPine (NORVASC) 5 MG tablet TAKE 1 TABLET EVERY DAY 90 tablet 3    hydrochlorothiazide (MICROZIDE) 12.5 MG capsule TAKE 1 CAPSULE EVERY MORNING 90 capsule 3    clopidogrel (PLAVIX) 75 MG tablet TAKE 1 TABLET EVERY DAY (NO FURTHER REFILLS WITHOUT FOLLOW UP APPT, CALL OFFICE) 30 tablet 0    albuterol sulfate  (90 Base) MCG/ACT inhaler Inhale 2 puffs into the lungs 4 times daily as needed for Wheezing 3 Inhaler 1    lisinopril (PRINIVIL;ZESTRIL) 40 MG tablet TAKE 1 TABLET EVERY DAY 90 tablet 3    ipratropium-albuterol (DUONEB) 0.5-2.5 (3) MG/3ML SOLN nebulizer solution Inhale 3 mLs into the lungs every 6 hours 360 mL 3    Nebulizers (COMPRESSOR/NEBULIZER) MISC Use to administer neb treatments 4 times a day as needed 1 each 3    alendronate (FOSAMAX) 70 MG tablet TAKE 1 TABLET BY MOUTH ONE TIME PER WEEK 12 tablet 1    dicyclomine (BENTYL) 10 MG capsule Take 1 capsule by mouth 2 times daily as needed 120 capsule 2    traZODone (DESYREL) 50 MG tablet Take 1 tablet by mouth nightly 90 tablet 1    furosemide (LASIX) 20 MG tablet Take 10 mg by mouth daily      aspirin 81 MG tablet Take 81 mg by mouth daily. No current facility-administered medications for this visit.       Allergies   Allergen Reactions    Codeine Nausea And Vomiting    Valium Nausea Only       Health Maintenance   Topic Date Due    DTaP/Tdap/Td vaccine (1 - Tdap) 10/19/1954    Annual Wellness Visit (AWV)  05/29/2019    DEXA (modify frequency per FRAX score)  06/20/2020    Flu vaccine (1) 09/01/2020    Shingles Vaccine (1 of 2) 04/18/2028 (Originally 10/19/1985)    Lipid screen  02/18/2021    Breast cancer screen  07/25/2021    Statin Therapy  08/24/2021    Potassium monitoring  08/31/2021    Creatinine monitoring  08/31/2021    Colon cancer screen colonoscopy  10/03/2021    Pneumococcal 65+ years Vaccine  Completed    Hepatitis A vaccine  Aged Out    Hepatitis B vaccine  Aged Out    Hib vaccine  Aged Out    Meningococcal (ACWY) vaccine  Aged Out       No results found for: LABA1C  No results found for: PSA, PSADIA  TSH   Date Value Ref Range Status   02/18/2020 2.610 0.270 - 4.200 uIU/mL Final   ]  Lab Results   Component Value Date     08/31/2020    K 4.5 08/31/2020    CL 98 08/31/2020    CO2 30 (H) 08/31/2020    BUN 20 08/31/2020    CREATININE 1.1 (H) 08/31/2020    GLUCOSE 100 08/31/2020    CALCIUM 9.2 08/31/2020    PROT 6.8 08/31/2020    LABALBU 4.2 08/31/2020    BILITOT 0.3 08/31/2020    ALKPHOS 74 08/31/2020    AST 16 08/31/2020    ALT 10 08/31/2020    LABGLOM 47 (A) 08/31/2020    GFRAA 57 (L) 08/31/2020    GLOB 2.7 05/15/2017     Lab Results   Component Value Date    CHOL 143 (L) 02/18/2020    CHOL 119 (L) 11/15/2019    CHOL 144 (L) 04/08/2019     Lab Results   Component Value Date    TRIG 86 02/18/2020    TRIG 60 11/15/2019    TRIG 85 04/08/2019     Lab Results   Component Value Date    HDL 56 (L) 02/18/2020    HDL 58 (L) 11/15/2019    HDL 54 (L) 04/08/2019     Lab Results   Component Value Date    LDLCALC 70 02/18/2020    LDLCALC 49 11/15/2019    LDLCALC 73 04/08/2019     Lab Results   Component Value Date     08/31/2020    K 4.5 08/31/2020    K 4.5 09/12/2019    CL 98 08/31/2020    CO2 30 08/31/2020    BUN 20 08/31/2020    CREATININE 1.1 08/31/2020    GLUCOSE 100 08/31/2020    CALCIUM 9.2 08/31/2020      Lab Results   Component Value Date    WBC 5.9 08/31/2020    HGB 13.7 08/31/2020    HCT 42.4 08/31/2020    MCV 94.4 08/31/2020     08/31/2020    LABLYMP 1.47 09/25/2013    LYMPHOPCT 26.1 08/31/2020    RBC 4.49 08/31/2020    MCH 30.5 08/31/2020    MCHC 32.3 (L) 08/31/2020    RDW 13.7 08/31/2020     Lab Results   Component Value Date    VITD25 59.4 08/31/2020       Subjective:      Review of Systems   Constitutional: Negative for fatigue, fever and unexpected weight change. HENT: Negative for ear discharge, ear pain, mouth sores, sore throat and trouble swallowing. Eyes: Negative for discharge, itching and visual disturbance. Respiratory: Positive for shortness of breath. Negative for cough, choking, wheezing and stridor. Cardiovascular: Negative for chest pain, palpitations and leg swelling.    Gastrointestinal: Negative for abdominal distention, abdominal pain, blood in stool, constipation, diarrhea, nausea and vomiting. Endocrine: Negative for cold intolerance, polydipsia and polyuria. Genitourinary: Negative for difficulty urinating, dysuria, frequency and urgency. Musculoskeletal: Negative for arthralgias and gait problem. Skin: Negative for color change and rash. Allergic/Immunologic: Negative for food allergies and immunocompromised state. Neurological: Negative for dizziness, tremors, syncope, speech difficulty, weakness and headaches. Hematological: Negative for adenopathy. Does not bruise/bleed easily. Psychiatric/Behavioral: Negative for confusion and hallucinations. Objective:     Physical Exam\  DGEVISITPE      GENERAL:   Afebrile alert no acute distress  Respiratory no use of accessory muscles no acute distress no audible wheezing  Mental status alert oriented adequate thought processes        Vital signs were not taken at this visit as no equipment was available; There were no vitals taken for this visit. Assessment:       Diagnosis Orders   1. Pulmonary emphysema, unspecified emphysema type ([facility]       Labs reviewedfrom 8/2020    Plan:        Patient given educational materials - see patient instructions. Discussed use, benefit, and side effects of prescribed medications. Allpatient questions answered. Pt voiced understanding. Reviewed health maintenance. Instructed to continue current medications, diet and exercise. Patient agreed with treatment plan. Follow up as directed. MEDICATIONS:  No orders of the defined types were placed in this encounter. ORDERS:  No orders of the defined types were placed in this encounter. Follow-up:  Return in about 3 months (around 12/28/2020). Following the remote visit today we will call you when we are available to reschedule your follow up appt      PATIENT INSTRUCTIONS:  There are no Patient Instructions on file for this visit.   Electronically signed by PRAVEEN Baig on 9/28/2020 at 2:55 PM   televisit 20 minutes  We are communicating with telehealth for the 3 month fu for controlled substance      Pursuant to the emergency declaration under the [facility], [facility] waiver authority and the [employer] Act, this Virtual Visit was conducted, with patient's consent, to reduce the patient's risk of exposure to COVID-19 and provide continuity of care for an established patient.        EMRDragon/transcription disclaimer:  Much of this encounter note is electronic

## 2020-10-06 RX ORDER — DICYCLOMINE HYDROCHLORIDE 10 MG/1
10 CAPSULE ORAL 2 TIMES DAILY
Qty: 60 CAPSULE | Refills: 0 | Status: SHIPPED | OUTPATIENT
Start: 2020-10-06 | End: 2020-10-26 | Stop reason: SDUPTHER

## 2020-10-26 RX ORDER — LISINOPRIL 40 MG/1
TABLET ORAL
Qty: 90 TABLET | Refills: 3 | Status: SHIPPED | OUTPATIENT
Start: 2020-10-26 | End: 2021-07-21

## 2020-10-26 RX ORDER — DICYCLOMINE HYDROCHLORIDE 10 MG/1
10 CAPSULE ORAL 2 TIMES DAILY
Qty: 180 CAPSULE | Refills: 1 | Status: SHIPPED | OUTPATIENT
Start: 2020-10-26 | End: 2020-10-29

## 2020-10-29 RX ORDER — DICYCLOMINE HYDROCHLORIDE 10 MG/1
CAPSULE ORAL
Qty: 60 CAPSULE | Refills: 1 | Status: SHIPPED | OUTPATIENT
Start: 2020-10-29 | End: 2021-02-02

## 2020-12-21 RX ORDER — PANTOPRAZOLE SODIUM 40 MG/1
TABLET, DELAYED RELEASE ORAL
Qty: 180 TABLET | Refills: 1 | Status: SHIPPED | OUTPATIENT
Start: 2020-12-21 | End: 2021-07-20

## 2020-12-21 NOTE — TELEPHONE ENCOUNTER
Nelli Mayes called requesting a refill of the below medication which has been pended for you:     Requested Prescriptions     Pending Prescriptions Disp Refills    pantoprazole (PROTONIX) 40 MG tablet [Pharmacy Med Name: PANTOPRAZOLE SOD DR 40 MG TAB] 180 tablet 1     Sig: TAKE 1 TABLET BY MOUTH TWICE A DAY       Last Appointment Date: 9/28/2020  Next Appointment Date: 12/28/2020    Allergies   Allergen Reactions    Codeine Nausea And Vomiting    Valium Nausea Only

## 2020-12-31 RX ORDER — BUDESONIDE AND FORMOTEROL FUMARATE DIHYDRATE 160; 4.5 UG/1; UG/1
AEROSOL RESPIRATORY (INHALATION)
Qty: 1 INHALER | Refills: 2 | Status: SHIPPED | OUTPATIENT
Start: 2020-12-31 | End: 2021-04-16

## 2020-12-31 NOTE — TELEPHONE ENCOUNTER
Earl Fontaine called requesting a refill of the below medication which has been pended for you:     Requested Prescriptions     Pending Prescriptions Disp Refills    budesonide-formoterol (SYMBICORT) 160-4.5 MCG/ACT AERO [Pharmacy Med Name: SYMBICORT 160-4.5 MCG/ACT Aerosol] 1 Inhaler 2     Sig: INHALE 2 PUFFS TWICE DAILY. RINSE MOUTH AFTER USE.        Last Appointment Date: 9/28/2020  Next Appointment Date: 1/11/2021    Allergies   Allergen Reactions    Codeine Nausea And Vomiting    Valium Nausea Only

## 2021-01-01 ENCOUNTER — TRANSITIONAL CARE MANAGEMENT TELEPHONE ENCOUNTER (OUTPATIENT)
Dept: CALL CENTER | Facility: HOSPITAL | Age: 86
End: 2021-01-01

## 2021-01-01 ENCOUNTER — READMISSION MANAGEMENT (OUTPATIENT)
Dept: CALL CENTER | Facility: HOSPITAL | Age: 86
End: 2021-01-01

## 2021-01-01 ENCOUNTER — HOME CARE VISIT (OUTPATIENT)
Dept: HOME HEALTH SERVICES | Facility: CLINIC | Age: 86
End: 2021-01-01

## 2021-01-01 ENCOUNTER — OFFICE VISIT (OUTPATIENT)
Dept: VASCULAR SURGERY | Facility: CLINIC | Age: 86
End: 2021-01-01

## 2021-01-01 ENCOUNTER — HOSPITAL ENCOUNTER (OUTPATIENT)
Dept: ULTRASOUND IMAGING | Facility: HOSPITAL | Age: 86
Discharge: HOME | End: 2021-09-22

## 2021-01-01 ENCOUNTER — TELEPHONE (OUTPATIENT)
Dept: INTERNAL MEDICINE | Facility: CLINIC | Age: 86
End: 2021-01-01

## 2021-01-01 ENCOUNTER — HOSPITAL ENCOUNTER (INPATIENT)
Facility: HOSPITAL | Age: 86
LOS: 4 days | Discharge: HOME | End: 2021-08-02
Attending: INTERNAL MEDICINE | Admitting: INTERNAL MEDICINE

## 2021-01-01 ENCOUNTER — HOSPITAL ENCOUNTER (OUTPATIENT)
Dept: GENERAL RADIOLOGY | Facility: HOSPITAL | Age: 86
Discharge: HOME | End: 2021-10-12
Admitting: NURSE PRACTITIONER

## 2021-01-01 ENCOUNTER — HOSPITAL ENCOUNTER (INPATIENT)
Facility: HOSPITAL | Age: 86
LOS: 2 days | Discharge: HOME | End: 2021-09-18
Attending: FAMILY MEDICINE | Admitting: FAMILY MEDICINE

## 2021-01-01 ENCOUNTER — APPOINTMENT (OUTPATIENT)
Dept: CT IMAGING | Facility: HOSPITAL | Age: 86
End: 2021-01-01

## 2021-01-01 ENCOUNTER — OFFICE VISIT (OUTPATIENT)
Dept: PULMONOLOGY | Facility: CLINIC | Age: 86
End: 2021-01-01

## 2021-01-01 ENCOUNTER — LAB (OUTPATIENT)
Dept: LAB | Facility: HOSPITAL | Age: 86
End: 2021-01-01

## 2021-01-01 ENCOUNTER — TELEPHONE (OUTPATIENT)
Dept: PULMONOLOGY | Facility: CLINIC | Age: 86
End: 2021-01-01

## 2021-01-01 ENCOUNTER — NURSE TRIAGE (OUTPATIENT)
Dept: CALL CENTER | Facility: HOSPITAL | Age: 86
End: 2021-01-01

## 2021-01-01 ENCOUNTER — APPOINTMENT (OUTPATIENT)
Dept: GENERAL RADIOLOGY | Facility: HOSPITAL | Age: 86
End: 2021-01-01

## 2021-01-01 ENCOUNTER — HOME CARE VISIT (OUTPATIENT)
Dept: HOME HEALTH SERVICES | Facility: HOME HEALTHCARE | Age: 86
End: 2021-01-01

## 2021-01-01 ENCOUNTER — HOME HEALTH ADMISSION (OUTPATIENT)
Dept: HOME HEALTH SERVICES | Facility: HOME HEALTHCARE | Age: 86
End: 2021-01-01

## 2021-01-01 ENCOUNTER — HOSPITAL ENCOUNTER (EMERGENCY)
Facility: HOSPITAL | Age: 86
Discharge: HOME | End: 2021-11-26
Attending: EMERGENCY MEDICINE | Admitting: EMERGENCY MEDICINE

## 2021-01-01 ENCOUNTER — TELEPHONE (OUTPATIENT)
Dept: VASCULAR SURGERY | Facility: CLINIC | Age: 86
End: 2021-01-01

## 2021-01-01 ENCOUNTER — HOSPITAL ENCOUNTER (EMERGENCY)
Facility: HOSPITAL | Age: 86
Discharge: HOME | End: 2021-09-22
Attending: EMERGENCY MEDICINE | Admitting: EMERGENCY MEDICINE

## 2021-01-01 ENCOUNTER — HOSPITAL ENCOUNTER (EMERGENCY)
Facility: HOSPITAL | Age: 86
Discharge: HOME | End: 2021-10-13
Attending: EMERGENCY MEDICINE | Admitting: EMERGENCY MEDICINE

## 2021-01-01 ENCOUNTER — HOSPITAL ENCOUNTER (OUTPATIENT)
Dept: ULTRASOUND IMAGING | Facility: HOSPITAL | Age: 86
Discharge: HOME | End: 2021-07-06
Admitting: SURGERY

## 2021-01-01 ENCOUNTER — TELEMEDICINE (OUTPATIENT)
Dept: INTERNAL MEDICINE | Facility: CLINIC | Age: 86
End: 2021-01-01

## 2021-01-01 ENCOUNTER — OFFICE VISIT (OUTPATIENT)
Dept: INTERNAL MEDICINE | Facility: CLINIC | Age: 86
End: 2021-01-01

## 2021-01-01 ENCOUNTER — APPOINTMENT (OUTPATIENT)
Dept: CARDIOLOGY | Facility: HOSPITAL | Age: 86
End: 2021-01-01

## 2021-01-01 ENCOUNTER — HOSPITAL ENCOUNTER (OUTPATIENT)
Dept: GENERAL RADIOLOGY | Facility: HOSPITAL | Age: 86
Discharge: HOME | End: 2021-09-15
Admitting: NURSE PRACTITIONER

## 2021-01-01 ENCOUNTER — PATIENT ROUNDING (BHMG ONLY) (OUTPATIENT)
Dept: INTERNAL MEDICINE | Facility: CLINIC | Age: 86
End: 2021-01-01

## 2021-01-01 ENCOUNTER — DOCUMENTATION (OUTPATIENT)
Dept: HOSPICE | Facility: HOSPITAL | Age: 86
End: 2021-01-01

## 2021-01-01 ENCOUNTER — HOSPITAL ENCOUNTER (OUTPATIENT)
Dept: MRI IMAGING | Facility: HOSPITAL | Age: 86
Discharge: HOME | End: 2021-12-15
Admitting: NURSE PRACTITIONER

## 2021-01-01 ENCOUNTER — TRANSCRIBE ORDERS (OUTPATIENT)
Dept: LAB | Facility: HOSPITAL | Age: 86
End: 2021-01-01

## 2021-01-01 ENCOUNTER — HOSPITAL ENCOUNTER (OUTPATIENT)
Dept: PULMONOLOGY | Facility: HOSPITAL | Age: 86
Discharge: HOME | End: 2021-09-16
Admitting: NURSE PRACTITIONER

## 2021-01-01 ENCOUNTER — LAB REQUISITION (OUTPATIENT)
Dept: LAB | Facility: HOSPITAL | Age: 86
End: 2021-01-01

## 2021-01-01 ENCOUNTER — OFFICE VISIT (OUTPATIENT)
Dept: WOUND CARE | Facility: HOSPITAL | Age: 86
End: 2021-01-01

## 2021-01-01 ENCOUNTER — HOSPITAL ENCOUNTER (EMERGENCY)
Facility: HOSPITAL | Age: 86
Discharge: HOME | End: 2021-09-06
Attending: EMERGENCY MEDICINE | Admitting: EMERGENCY MEDICINE

## 2021-01-01 ENCOUNTER — HOSPITAL ENCOUNTER (OUTPATIENT)
Dept: CT IMAGING | Facility: HOSPITAL | Age: 86
Discharge: HOME | End: 2021-09-30
Admitting: NURSE PRACTITIONER

## 2021-01-01 ENCOUNTER — TELEPHONE (OUTPATIENT)
Dept: RETAIL CLINIC | Facility: CLINIC | Age: 86
End: 2021-01-01

## 2021-01-01 ENCOUNTER — HOSPITAL ENCOUNTER (OUTPATIENT)
Dept: CT IMAGING | Facility: HOSPITAL | Age: 86
Discharge: HOME | End: 2021-11-02
Admitting: NURSE PRACTITIONER

## 2021-01-01 VITALS
TEMPERATURE: 98.5 F | RESPIRATION RATE: 20 BRPM | SYSTOLIC BLOOD PRESSURE: 140 MMHG | WEIGHT: 150 LBS | HEART RATE: 65 BPM | BODY MASS INDEX: 24.96 KG/M2 | OXYGEN SATURATION: 96 % | DIASTOLIC BLOOD PRESSURE: 44 MMHG

## 2021-01-01 VITALS
SYSTOLIC BLOOD PRESSURE: 178 MMHG | DIASTOLIC BLOOD PRESSURE: 88 MMHG | HEIGHT: 65 IN | HEART RATE: 74 BPM | WEIGHT: 154.5 LBS | OXYGEN SATURATION: 97 % | BODY MASS INDEX: 25.74 KG/M2

## 2021-01-01 VITALS
TEMPERATURE: 98 F | OXYGEN SATURATION: 90 % | SYSTOLIC BLOOD PRESSURE: 125 MMHG | HEART RATE: 65 BPM | RESPIRATION RATE: 20 BRPM | DIASTOLIC BLOOD PRESSURE: 50 MMHG

## 2021-01-01 VITALS
SYSTOLIC BLOOD PRESSURE: 142 MMHG | DIASTOLIC BLOOD PRESSURE: 68 MMHG | OXYGEN SATURATION: 96 % | HEART RATE: 67 BPM | BODY MASS INDEX: 26.16 KG/M2 | WEIGHT: 157 LBS | RESPIRATION RATE: 20 BRPM | HEIGHT: 65 IN | TEMPERATURE: 98.2 F

## 2021-01-01 VITALS
DIASTOLIC BLOOD PRESSURE: 60 MMHG | RESPIRATION RATE: 19 BRPM | BODY MASS INDEX: 22.02 KG/M2 | OXYGEN SATURATION: 98 % | TEMPERATURE: 98.7 F | HEIGHT: 66 IN | WEIGHT: 137 LBS | HEART RATE: 62 BPM | SYSTOLIC BLOOD PRESSURE: 143 MMHG

## 2021-01-01 VITALS
HEART RATE: 68 BPM | SYSTOLIC BLOOD PRESSURE: 140 MMHG | DIASTOLIC BLOOD PRESSURE: 72 MMHG | BODY MASS INDEX: 24.59 KG/M2 | HEIGHT: 66 IN | OXYGEN SATURATION: 92 % | WEIGHT: 153 LBS

## 2021-01-01 VITALS
WEIGHT: 148 LBS | HEART RATE: 76 BPM | BODY MASS INDEX: 24.66 KG/M2 | OXYGEN SATURATION: 95 % | HEIGHT: 65 IN | SYSTOLIC BLOOD PRESSURE: 144 MMHG | DIASTOLIC BLOOD PRESSURE: 58 MMHG

## 2021-01-01 VITALS
DIASTOLIC BLOOD PRESSURE: 58 MMHG | BODY MASS INDEX: 25.33 KG/M2 | OXYGEN SATURATION: 96 % | TEMPERATURE: 97.7 F | WEIGHT: 152 LBS | HEIGHT: 65 IN | SYSTOLIC BLOOD PRESSURE: 150 MMHG | RESPIRATION RATE: 20 BRPM | HEART RATE: 63 BPM

## 2021-01-01 VITALS
OXYGEN SATURATION: 98 % | BODY MASS INDEX: 24.99 KG/M2 | SYSTOLIC BLOOD PRESSURE: 156 MMHG | DIASTOLIC BLOOD PRESSURE: 76 MMHG | HEART RATE: 73 BPM | WEIGHT: 150 LBS | HEIGHT: 65 IN

## 2021-01-01 VITALS
DIASTOLIC BLOOD PRESSURE: 56 MMHG | TEMPERATURE: 98.9 F | OXYGEN SATURATION: 97 % | SYSTOLIC BLOOD PRESSURE: 130 MMHG | HEART RATE: 80 BPM | RESPIRATION RATE: 18 BRPM

## 2021-01-01 VITALS
HEIGHT: 65 IN | DIASTOLIC BLOOD PRESSURE: 64 MMHG | WEIGHT: 154.6 LBS | BODY MASS INDEX: 25.76 KG/M2 | HEART RATE: 70 BPM | SYSTOLIC BLOOD PRESSURE: 136 MMHG | OXYGEN SATURATION: 93 %

## 2021-01-01 VITALS
RESPIRATION RATE: 20 BRPM | SYSTOLIC BLOOD PRESSURE: 140 MMHG | OXYGEN SATURATION: 98 % | DIASTOLIC BLOOD PRESSURE: 55 MMHG | TEMPERATURE: 97.8 F | HEART RATE: 70 BPM

## 2021-01-01 VITALS
BODY MASS INDEX: 25.53 KG/M2 | TEMPERATURE: 98.3 F | WEIGHT: 153.4 LBS | HEART RATE: 80 BPM | OXYGEN SATURATION: 96 % | DIASTOLIC BLOOD PRESSURE: 60 MMHG | SYSTOLIC BLOOD PRESSURE: 180 MMHG | RESPIRATION RATE: 20 BRPM

## 2021-01-01 VITALS
RESPIRATION RATE: 20 BRPM | HEART RATE: 71 BPM | TEMPERATURE: 98.9 F | BODY MASS INDEX: 23.66 KG/M2 | OXYGEN SATURATION: 95 % | SYSTOLIC BLOOD PRESSURE: 145 MMHG | HEIGHT: 65 IN | WEIGHT: 142 LBS | DIASTOLIC BLOOD PRESSURE: 88 MMHG

## 2021-01-01 VITALS
HEART RATE: 64 BPM | OXYGEN SATURATION: 97 % | SYSTOLIC BLOOD PRESSURE: 120 MMHG | DIASTOLIC BLOOD PRESSURE: 60 MMHG | TEMPERATURE: 98 F | RESPIRATION RATE: 20 BRPM

## 2021-01-01 VITALS
RESPIRATION RATE: 16 BRPM | WEIGHT: 158.5 LBS | DIASTOLIC BLOOD PRESSURE: 74 MMHG | OXYGEN SATURATION: 94 % | BODY MASS INDEX: 26.41 KG/M2 | TEMPERATURE: 97.8 F | SYSTOLIC BLOOD PRESSURE: 130 MMHG | HEART RATE: 64 BPM | HEIGHT: 65 IN

## 2021-01-01 VITALS
HEART RATE: 77 BPM | DIASTOLIC BLOOD PRESSURE: 70 MMHG | SYSTOLIC BLOOD PRESSURE: 158 MMHG | WEIGHT: 157 LBS | BODY MASS INDEX: 26.16 KG/M2 | HEIGHT: 65 IN | OXYGEN SATURATION: 95 %

## 2021-01-01 VITALS
DIASTOLIC BLOOD PRESSURE: 60 MMHG | OXYGEN SATURATION: 97 % | HEIGHT: 65 IN | WEIGHT: 145 LBS | SYSTOLIC BLOOD PRESSURE: 146 MMHG | RESPIRATION RATE: 16 BRPM | TEMPERATURE: 98 F | OXYGEN SATURATION: 93 % | DIASTOLIC BLOOD PRESSURE: 60 MMHG | BODY MASS INDEX: 25.56 KG/M2 | HEIGHT: 66 IN | HEART RATE: 62 BPM | RESPIRATION RATE: 16 BRPM | TEMPERATURE: 98 F | BODY MASS INDEX: 23.3 KG/M2 | WEIGHT: 153.4 LBS | HEART RATE: 73 BPM | SYSTOLIC BLOOD PRESSURE: 160 MMHG

## 2021-01-01 VITALS
SYSTOLIC BLOOD PRESSURE: 132 MMHG | HEART RATE: 67 BPM | BODY MASS INDEX: 22.82 KG/M2 | WEIGHT: 142 LBS | HEIGHT: 66 IN | OXYGEN SATURATION: 98 % | DIASTOLIC BLOOD PRESSURE: 52 MMHG

## 2021-01-01 VITALS
SYSTOLIC BLOOD PRESSURE: 140 MMHG | TEMPERATURE: 97.8 F | OXYGEN SATURATION: 90 % | RESPIRATION RATE: 18 BRPM | DIASTOLIC BLOOD PRESSURE: 58 MMHG | HEART RATE: 69 BPM

## 2021-01-01 VITALS
TEMPERATURE: 98.3 F | RESPIRATION RATE: 16 BRPM | DIASTOLIC BLOOD PRESSURE: 78 MMHG | HEART RATE: 65 BPM | BODY MASS INDEX: 23.99 KG/M2 | HEIGHT: 65 IN | WEIGHT: 144 LBS | OXYGEN SATURATION: 96 % | SYSTOLIC BLOOD PRESSURE: 142 MMHG

## 2021-01-01 VITALS
TEMPERATURE: 98 F | DIASTOLIC BLOOD PRESSURE: 60 MMHG | OXYGEN SATURATION: 96 % | SYSTOLIC BLOOD PRESSURE: 125 MMHG | RESPIRATION RATE: 18 BRPM | HEART RATE: 61 BPM

## 2021-01-01 VITALS
SYSTOLIC BLOOD PRESSURE: 142 MMHG | OXYGEN SATURATION: 92 % | WEIGHT: 157.8 LBS | BODY MASS INDEX: 26.29 KG/M2 | DIASTOLIC BLOOD PRESSURE: 52 MMHG | HEIGHT: 65 IN | HEART RATE: 76 BPM

## 2021-01-01 VITALS
TEMPERATURE: 97.5 F | DIASTOLIC BLOOD PRESSURE: 59 MMHG | BODY MASS INDEX: 27.47 KG/M2 | RESPIRATION RATE: 18 BRPM | HEIGHT: 65 IN | WEIGHT: 164.9 LBS | OXYGEN SATURATION: 95 % | HEART RATE: 60 BPM | SYSTOLIC BLOOD PRESSURE: 162 MMHG

## 2021-01-01 VITALS
RESPIRATION RATE: 16 BRPM | DIASTOLIC BLOOD PRESSURE: 54 MMHG | BODY MASS INDEX: 25.33 KG/M2 | TEMPERATURE: 97.9 F | SYSTOLIC BLOOD PRESSURE: 148 MMHG | HEART RATE: 66 BPM | WEIGHT: 157.6 LBS | OXYGEN SATURATION: 93 % | HEIGHT: 66 IN

## 2021-01-01 VITALS
OXYGEN SATURATION: 97 % | RESPIRATION RATE: 18 BRPM | HEART RATE: 59 BPM | SYSTOLIC BLOOD PRESSURE: 130 MMHG | TEMPERATURE: 97.6 F | DIASTOLIC BLOOD PRESSURE: 50 MMHG

## 2021-01-01 VITALS
SYSTOLIC BLOOD PRESSURE: 134 MMHG | TEMPERATURE: 97.9 F | OXYGEN SATURATION: 98 % | HEART RATE: 61 BPM | DIASTOLIC BLOOD PRESSURE: 56 MMHG | RESPIRATION RATE: 20 BRPM

## 2021-01-01 VITALS
SYSTOLIC BLOOD PRESSURE: 160 MMHG | DIASTOLIC BLOOD PRESSURE: 60 MMHG | HEART RATE: 65 BPM | HEIGHT: 64 IN | OXYGEN SATURATION: 98 % | WEIGHT: 150 LBS | BODY MASS INDEX: 25.61 KG/M2

## 2021-01-01 VITALS
RESPIRATION RATE: 18 BRPM | TEMPERATURE: 98.3 F | OXYGEN SATURATION: 96 % | HEART RATE: 70 BPM | SYSTOLIC BLOOD PRESSURE: 140 MMHG | DIASTOLIC BLOOD PRESSURE: 52 MMHG

## 2021-01-01 VITALS — HEART RATE: 74 BPM | OXYGEN SATURATION: 93 % | RESPIRATION RATE: 18 BRPM

## 2021-01-01 VITALS — DIASTOLIC BLOOD PRESSURE: 60 MMHG | SYSTOLIC BLOOD PRESSURE: 180 MMHG

## 2021-01-01 DIAGNOSIS — J44.9 STAGE 3 SEVERE COPD BY GOLD CLASSIFICATION (HCC): Primary | ICD-10-CM

## 2021-01-01 DIAGNOSIS — J18.9 PNEUMONIA DUE TO INFECTIOUS ORGANISM, UNSPECIFIED LATERALITY, UNSPECIFIED PART OF LUNG: ICD-10-CM

## 2021-01-01 DIAGNOSIS — M25.551 RIGHT HIP PAIN: ICD-10-CM

## 2021-01-01 DIAGNOSIS — I73.9 PERIPHERAL ARTERIAL DISEASE (HCC): ICD-10-CM

## 2021-01-01 DIAGNOSIS — J44.9 STAGE 3 SEVERE COPD BY GOLD CLASSIFICATION (HCC): ICD-10-CM

## 2021-01-01 DIAGNOSIS — Z72.0 TOBACCO USE: ICD-10-CM

## 2021-01-01 DIAGNOSIS — J43.9 PULMONARY EMPHYSEMA, UNSPECIFIED EMPHYSEMA TYPE (HCC): ICD-10-CM

## 2021-01-01 DIAGNOSIS — J44.1 COPD WITH ACUTE EXACERBATION (HCC): ICD-10-CM

## 2021-01-01 DIAGNOSIS — J96.11 CHRONIC RESPIRATORY FAILURE WITH HYPOXIA AND HYPERCAPNIA (HCC): ICD-10-CM

## 2021-01-01 DIAGNOSIS — J43.9 PULMONARY EMPHYSEMA, UNSPECIFIED EMPHYSEMA TYPE (HCC): Chronic | ICD-10-CM

## 2021-01-01 DIAGNOSIS — I50.32 CHRONIC DIASTOLIC (CONGESTIVE) HEART FAILURE (HCC): ICD-10-CM

## 2021-01-01 DIAGNOSIS — J44.1 COPD EXACERBATION (HCC): ICD-10-CM

## 2021-01-01 DIAGNOSIS — I73.9 PERIPHERAL VASCULAR DISEASE, UNSPECIFIED (HCC): ICD-10-CM

## 2021-01-01 DIAGNOSIS — R60.0 EDEMA OF BOTH LOWER EXTREMITIES: Primary | ICD-10-CM

## 2021-01-01 DIAGNOSIS — I70.213 ATHEROSCLEROSIS OF NATIVE ARTERY OF BOTH LOWER EXTREMITIES WITH INTERMITTENT CLAUDICATION (HCC): Primary | ICD-10-CM

## 2021-01-01 DIAGNOSIS — R05.9 COUGH: ICD-10-CM

## 2021-01-01 DIAGNOSIS — R07.89 CHEST WALL PAIN: ICD-10-CM

## 2021-01-01 DIAGNOSIS — I87.2 VENOUS INSUFFICIENCY OF BOTH LOWER EXTREMITIES: ICD-10-CM

## 2021-01-01 DIAGNOSIS — I10 ESSENTIAL HYPERTENSION: ICD-10-CM

## 2021-01-01 DIAGNOSIS — E78.2 MIXED HYPERLIPIDEMIA: ICD-10-CM

## 2021-01-01 DIAGNOSIS — J40 BRONCHITIS: ICD-10-CM

## 2021-01-01 DIAGNOSIS — J96.11 CHRONIC RESPIRATORY FAILURE WITH HYPOXIA AND HYPERCAPNIA (HCC): Chronic | ICD-10-CM

## 2021-01-01 DIAGNOSIS — J90 PLEURAL EFFUSION ON RIGHT: ICD-10-CM

## 2021-01-01 DIAGNOSIS — J44.1 COPD WITH ACUTE EXACERBATION (HCC): Primary | ICD-10-CM

## 2021-01-01 DIAGNOSIS — R60.0 LOCALIZED EDEMA: ICD-10-CM

## 2021-01-01 DIAGNOSIS — J44.9 STAGE 3 SEVERE COPD BY GOLD CLASSIFICATION (HCC): Primary | Chronic | ICD-10-CM

## 2021-01-01 DIAGNOSIS — J15.1 PNEUMONIA DUE TO PSEUDOMONAS SPECIES, UNSPECIFIED LATERALITY, UNSPECIFIED PART OF LUNG (HCC): ICD-10-CM

## 2021-01-01 DIAGNOSIS — F17.210 CIGARETTE SMOKER: Chronic | ICD-10-CM

## 2021-01-01 DIAGNOSIS — T14.8XXA TENDON TEAR: Primary | ICD-10-CM

## 2021-01-01 DIAGNOSIS — J90 PLEURAL EFFUSION, RIGHT: Chronic | ICD-10-CM

## 2021-01-01 DIAGNOSIS — N18.31 STAGE 3A CHRONIC KIDNEY DISEASE (HCC): ICD-10-CM

## 2021-01-01 DIAGNOSIS — E87.1 HYPONATREMIA: ICD-10-CM

## 2021-01-01 DIAGNOSIS — K21.9 GASTROESOPHAGEAL REFLUX DISEASE, UNSPECIFIED WHETHER ESOPHAGITIS PRESENT: Chronic | ICD-10-CM

## 2021-01-01 DIAGNOSIS — F17.210 CIGARETTE SMOKER: ICD-10-CM

## 2021-01-01 DIAGNOSIS — J96.21 ACUTE ON CHRONIC RESPIRATORY FAILURE WITH HYPOXIA AND HYPERCAPNIA (HCC): Primary | ICD-10-CM

## 2021-01-01 DIAGNOSIS — K21.9 GASTROESOPHAGEAL REFLUX DISEASE, UNSPECIFIED WHETHER ESOPHAGITIS PRESENT: ICD-10-CM

## 2021-01-01 DIAGNOSIS — J18.9 PNEUMONIA DUE TO INFECTIOUS ORGANISM, UNSPECIFIED LATERALITY, UNSPECIFIED PART OF LUNG: Primary | ICD-10-CM

## 2021-01-01 DIAGNOSIS — M51.16 LUMBAR DISC DISEASE WITH RADICULOPATHY: Primary | ICD-10-CM

## 2021-01-01 DIAGNOSIS — J44.1 COPD EXACERBATION (HCC): Primary | ICD-10-CM

## 2021-01-01 DIAGNOSIS — J96.12 CHRONIC RESPIRATORY FAILURE WITH HYPOXIA AND HYPERCAPNIA (HCC): ICD-10-CM

## 2021-01-01 DIAGNOSIS — Z00.00 ENCOUNTER FOR GENERAL ADULT MEDICAL EXAMINATION WITHOUT ABNORMAL FINDINGS: ICD-10-CM

## 2021-01-01 DIAGNOSIS — I50.9 ACUTE ON CHRONIC CONGESTIVE HEART FAILURE, UNSPECIFIED HEART FAILURE TYPE (HCC): ICD-10-CM

## 2021-01-01 DIAGNOSIS — B37.0 THRUSH: ICD-10-CM

## 2021-01-01 DIAGNOSIS — J96.11 CHRONIC RESPIRATORY FAILURE WITH HYPOXIA AND HYPERCAPNIA (HCC): Primary | ICD-10-CM

## 2021-01-01 DIAGNOSIS — Z79.899 LONG TERM CURRENT USE OF DIURETIC: ICD-10-CM

## 2021-01-01 DIAGNOSIS — I70.213 ATHEROSCLEROSIS OF NATIVE ARTERY OF BOTH LOWER EXTREMITIES WITH INTERMITTENT CLAUDICATION (HCC): ICD-10-CM

## 2021-01-01 DIAGNOSIS — Z87.891 PERSONAL HISTORY OF NICOTINE DEPENDENCE: ICD-10-CM

## 2021-01-01 DIAGNOSIS — J96.12 CHRONIC RESPIRATORY FAILURE WITH HYPOXIA AND HYPERCAPNIA (HCC): Primary | ICD-10-CM

## 2021-01-01 DIAGNOSIS — I51.89 DIASTOLIC DYSFUNCTION: ICD-10-CM

## 2021-01-01 DIAGNOSIS — E66.3 OVERWEIGHT (BMI 25.0-29.9): ICD-10-CM

## 2021-01-01 DIAGNOSIS — I65.23 BILATERAL CAROTID ARTERY STENOSIS: ICD-10-CM

## 2021-01-01 DIAGNOSIS — F33.1 MODERATE EPISODE OF RECURRENT MAJOR DEPRESSIVE DISORDER (HCC): ICD-10-CM

## 2021-01-01 DIAGNOSIS — J96.12 CHRONIC RESPIRATORY FAILURE WITH HYPOXIA AND HYPERCAPNIA (HCC): Chronic | ICD-10-CM

## 2021-01-01 DIAGNOSIS — J18.1 CONSOLIDATION OF RIGHT LOWER LOBE OF LUNG (HCC): ICD-10-CM

## 2021-01-01 DIAGNOSIS — R05.9 COUGH: Primary | ICD-10-CM

## 2021-01-01 DIAGNOSIS — I50.32 CHRONIC DIASTOLIC (CONGESTIVE) HEART FAILURE (HCC): Chronic | ICD-10-CM

## 2021-01-01 DIAGNOSIS — L97.812 NON-PRESSURE CHRONIC ULCER OF OTHER PART OF RIGHT LOWER LEG WITH FAT LAYER EXPOSED (HCC): ICD-10-CM

## 2021-01-01 DIAGNOSIS — M54.31 SCIATICA OF RIGHT SIDE: Primary | ICD-10-CM

## 2021-01-01 DIAGNOSIS — Z23 NEED FOR INFLUENZA VACCINATION: ICD-10-CM

## 2021-01-01 DIAGNOSIS — E55.9 VITAMIN D DEFICIENCY: ICD-10-CM

## 2021-01-01 DIAGNOSIS — M85.89 OSTEOPENIA OF MULTIPLE SITES: ICD-10-CM

## 2021-01-01 DIAGNOSIS — J15.1 PNEUMONIA DUE TO PSEUDOMONAS SPECIES, UNSPECIFIED LATERALITY, UNSPECIFIED PART OF LUNG (HCC): Primary | ICD-10-CM

## 2021-01-01 DIAGNOSIS — R09.02 HYPOXIA: ICD-10-CM

## 2021-01-01 DIAGNOSIS — N18.31 STAGE 3A CHRONIC KIDNEY DISEASE (HCC): Primary | ICD-10-CM

## 2021-01-01 DIAGNOSIS — H35.30 MACULAR DEGENERATION OF BOTH EYES, UNSPECIFIED TYPE: ICD-10-CM

## 2021-01-01 DIAGNOSIS — J96.20 ACUTE ON CHRONIC RESPIRATORY FAILURE, UNSPECIFIED WHETHER WITH HYPOXIA OR HYPERCAPNIA (HCC): ICD-10-CM

## 2021-01-01 DIAGNOSIS — R60.0 EDEMA OF BOTH LOWER EXTREMITIES: ICD-10-CM

## 2021-01-01 DIAGNOSIS — J40 BRONCHITIS: Primary | ICD-10-CM

## 2021-01-01 DIAGNOSIS — S09.90XA CLOSED HEAD INJURY, INITIAL ENCOUNTER: Primary | ICD-10-CM

## 2021-01-01 DIAGNOSIS — J96.22 ACUTE ON CHRONIC RESPIRATORY FAILURE WITH HYPOXIA AND HYPERCAPNIA (HCC): Primary | ICD-10-CM

## 2021-01-01 DIAGNOSIS — J44.1 ACUTE EXACERBATION OF CHRONIC OBSTRUCTIVE PULMONARY DISEASE (COPD) (HCC): Primary | ICD-10-CM

## 2021-01-01 DIAGNOSIS — I48.0 PAROXYSMAL ATRIAL FIBRILLATION (HCC): ICD-10-CM

## 2021-01-01 DIAGNOSIS — J44.1 COPD WITH EXACERBATION (HCC): Primary | ICD-10-CM

## 2021-01-01 LAB
ALBUMIN SERPL-MCNC: 4 G/DL (ref 3.5–5.2)
ALBUMIN SERPL-MCNC: 4.1 G/DL (ref 3.5–5.2)
ALBUMIN SERPL-MCNC: 4.2 G/DL (ref 3.5–5.2)
ALBUMIN SERPL-MCNC: 4.3 G/DL (ref 3.5–5.2)
ALBUMIN SERPL-MCNC: 4.3 G/DL (ref 3.5–5.2)
ALBUMIN/GLOB SERPL: 1.5 G/DL
ALBUMIN/GLOB SERPL: 1.7 G/DL
ALBUMIN/GLOB SERPL: 2 G/DL
ALP SERPL-CCNC: 101 U/L (ref 39–117)
ALP SERPL-CCNC: 79 U/L (ref 39–117)
ALP SERPL-CCNC: 85 U/L (ref 39–117)
ALP SERPL-CCNC: 88 U/L (ref 39–117)
ALP SERPL-CCNC: 95 U/L (ref 39–117)
ALT SERPL W P-5'-P-CCNC: 12 U/L (ref 1–33)
ALT SERPL W P-5'-P-CCNC: 12 U/L (ref 1–33)
ALT SERPL W P-5'-P-CCNC: 16 U/L (ref 1–33)
ALT SERPL W P-5'-P-CCNC: 18 U/L (ref 1–33)
ALT SERPL W P-5'-P-CCNC: 20 U/L (ref 1–33)
ANION GAP SERPL CALCULATED.3IONS-SCNC: 10 MMOL/L (ref 5–15)
ANION GAP SERPL CALCULATED.3IONS-SCNC: 10 MMOL/L (ref 5–15)
ANION GAP SERPL CALCULATED.3IONS-SCNC: 5 MMOL/L (ref 5–15)
ANION GAP SERPL CALCULATED.3IONS-SCNC: 7 MMOL/L (ref 5–15)
ANION GAP SERPL CALCULATED.3IONS-SCNC: 7 MMOL/L (ref 5–15)
ANION GAP SERPL CALCULATED.3IONS-SCNC: 8 MMOL/L (ref 5–15)
ANION GAP SERPL CALCULATED.3IONS-SCNC: 9 MMOL/L (ref 5–15)
ANION GAP SERPL CALCULATED.3IONS-SCNC: 9 MMOL/L (ref 5–15)
APTT PPP: 27.6 SECONDS (ref 24.1–35)
ARTERIAL PATENCY WRIST A: ABNORMAL
ARTERIAL PATENCY WRIST A: POSITIVE
AST SERPL-CCNC: 17 U/L (ref 1–32)
AST SERPL-CCNC: 18 U/L (ref 1–32)
AST SERPL-CCNC: 19 U/L (ref 1–32)
AST SERPL-CCNC: 19 U/L (ref 1–32)
AST SERPL-CCNC: 24 U/L (ref 1–32)
ATMOSPHERIC PRESS: 749 MMHG
ATMOSPHERIC PRESS: 749 MMHG
ATMOSPHERIC PRESS: 751 MMHG
ATMOSPHERIC PRESS: 753 MMHG
BACTERIA SPEC AEROBE CULT: ABNORMAL
BACTERIA SPEC AEROBE CULT: NORMAL
BACTERIA SPEC ANAEROBE CULT: NORMAL
BACTERIA SPEC RESP CULT: ABNORMAL
BACTERIA SPEC RESP CULT: ABNORMAL
BACTERIA SPEC RESP CULT: NORMAL
BACTERIA UR QL AUTO: ABNORMAL /HPF
BASE EXCESS BLDA CALC-SCNC: 5.3 MMOL/L (ref 0–2)
BASE EXCESS BLDA CALC-SCNC: 5.7 MMOL/L (ref 0–2)
BASE EXCESS BLDA CALC-SCNC: 7 MMOL/L (ref 0–2)
BASE EXCESS BLDA CALC-SCNC: 8.7 MMOL/L (ref 0–2)
BASOPHILS # BLD AUTO: 0.03 10*3/MM3 (ref 0–0.2)
BASOPHILS # BLD AUTO: 0.04 10*3/MM3 (ref 0–0.2)
BASOPHILS # BLD AUTO: 0.05 10*3/MM3 (ref 0–0.2)
BASOPHILS # BLD AUTO: 0.05 10*3/MM3 (ref 0–0.2)
BASOPHILS # BLD AUTO: 0.06 10*3/MM3 (ref 0–0.2)
BASOPHILS NFR BLD AUTO: 0.3 % (ref 0–1.5)
BASOPHILS NFR BLD AUTO: 0.5 % (ref 0–1.5)
BASOPHILS NFR BLD AUTO: 0.6 % (ref 0–1.5)
BASOPHILS NFR BLD AUTO: 0.7 % (ref 0–1.5)
BASOPHILS NFR BLD AUTO: 0.9 % (ref 0–1.5)
BDY SITE: ABNORMAL
BH CV ECHO MEAS - AO MAX PG (FULL): 8.4 MMHG
BH CV ECHO MEAS - AO MAX PG: 11.3 MMHG
BH CV ECHO MEAS - AO MEAN PG (FULL): 5 MMHG
BH CV ECHO MEAS - AO MEAN PG: 7 MMHG
BH CV ECHO MEAS - AO ROOT AREA (BSA CORRECTED): 1.8
BH CV ECHO MEAS - AO ROOT AREA: 8 CM^2
BH CV ECHO MEAS - AO ROOT DIAM: 3.2 CM
BH CV ECHO MEAS - AO V2 MAX: 168 CM/SEC
BH CV ECHO MEAS - AO V2 MEAN: 123 CM/SEC
BH CV ECHO MEAS - AO V2 VTI: 51 CM
BH CV ECHO MEAS - AVA(I,A): 1.7 CM^2
BH CV ECHO MEAS - AVA(I,D): 1.7 CM^2
BH CV ECHO MEAS - AVA(V,A): 1.8 CM^2
BH CV ECHO MEAS - AVA(V,D): 1.8 CM^2
BH CV ECHO MEAS - BSA(HAYCOCK): 1.9 M^2
BH CV ECHO MEAS - BSA: 1.8 M^2
BH CV ECHO MEAS - BZI_BMI: 27.5 KILOGRAMS/M^2
BH CV ECHO MEAS - BZI_METRIC_HEIGHT: 165.1 CM
BH CV ECHO MEAS - BZI_METRIC_WEIGHT: 74.8 KG
BH CV ECHO MEAS - EDV(CUBED): 143.1 ML
BH CV ECHO MEAS - EDV(MOD-SP4): 140 ML
BH CV ECHO MEAS - EDV(TEICH): 131.2 ML
BH CV ECHO MEAS - EF(MOD-SP4): 59.9 %
BH CV ECHO MEAS - ESV(MOD-SP4): 56.1 ML
BH CV ECHO MEAS - IVS/LVPW: 1.2
BH CV ECHO MEAS - IVSD: 1.2 CM
BH CV ECHO MEAS - LA DIMENSION: 4.8 CM
BH CV ECHO MEAS - LA/AO: 1.5
BH CV ECHO MEAS - LAT PEAK E' VEL: 5.5 CM/SEC
BH CV ECHO MEAS - LV DIASTOLIC VOL/BSA (35-75): 76.8 ML/M^2
BH CV ECHO MEAS - LV MASS(C)D: 213.3 GRAMS
BH CV ECHO MEAS - LV MASS(C)DI: 117 GRAMS/M^2
BH CV ECHO MEAS - LV MAX PG: 2.9 MMHG
BH CV ECHO MEAS - LV MEAN PG: 2 MMHG
BH CV ECHO MEAS - LV SYSTOLIC VOL/BSA (12-30): 30.8 ML/M^2
BH CV ECHO MEAS - LV V1 MAX: 85.7 CM/SEC
BH CV ECHO MEAS - LV V1 MEAN: 65.2 CM/SEC
BH CV ECHO MEAS - LV V1 VTI: 25.2 CM
BH CV ECHO MEAS - LVIDD: 5.2 CM
BH CV ECHO MEAS - LVLD AP4: 7.3 CM
BH CV ECHO MEAS - LVLS AP4: 6 CM
BH CV ECHO MEAS - LVOT AREA (M): 3.5 CM^2
BH CV ECHO MEAS - LVOT AREA: 3.5 CM^2
BH CV ECHO MEAS - LVOT DIAM: 2.1 CM
BH CV ECHO MEAS - LVPWD: 0.96 CM
BH CV ECHO MEAS - MED PEAK E' VEL: 4.1 CM/SEC
BH CV ECHO MEAS - MR MAX PG: 100.8 MMHG
BH CV ECHO MEAS - MR MAX VEL: 502 CM/SEC
BH CV ECHO MEAS - MR MEAN PG: 74 MMHG
BH CV ECHO MEAS - MR MEAN VEL: 416 CM/SEC
BH CV ECHO MEAS - MR VTI: 186 CM
BH CV ECHO MEAS - MV A MAX VEL: 97.7 CM/SEC
BH CV ECHO MEAS - MV DEC SLOPE: 544 CM/SEC^2
BH CV ECHO MEAS - MV DEC TIME: 0.22 SEC
BH CV ECHO MEAS - MV E MAX VEL: 107 CM/SEC
BH CV ECHO MEAS - MV E/A: 1.1
BH CV ECHO MEAS - MV P1/2T MAX VEL: 133 CM/SEC
BH CV ECHO MEAS - MV P1/2T: 71.6 MSEC
BH CV ECHO MEAS - MVA P1/2T LCG: 1.7 CM^2
BH CV ECHO MEAS - MVA(P1/2T): 3.1 CM^2
BH CV ECHO MEAS - RAP SYSTOLE: 10 MMHG
BH CV ECHO MEAS - RVSP: 59.6 MMHG
BH CV ECHO MEAS - SI(AO): 225 ML/M^2
BH CV ECHO MEAS - SI(LVOT): 47.9 ML/M^2
BH CV ECHO MEAS - SI(MOD-SP4): 46 ML/M^2
BH CV ECHO MEAS - SV(AO): 410.2 ML
BH CV ECHO MEAS - SV(LVOT): 87.3 ML
BH CV ECHO MEAS - SV(MOD-SP4): 83.9 ML
BH CV ECHO MEAS - TR MAX VEL: 352 CM/SEC
BH CV ECHO MEASUREMENTS AVERAGE E/E' RATIO: 22.29
BILIRUB SERPL-MCNC: 0.3 MG/DL (ref 0–1.2)
BILIRUB SERPL-MCNC: 0.4 MG/DL (ref 0–1.2)
BILIRUB SERPL-MCNC: 0.7 MG/DL (ref 0–1.2)
BILIRUB UR QL STRIP: NEGATIVE
BODY TEMPERATURE: 37 C
BUN SERPL-MCNC: 11 MG/DL (ref 8–23)
BUN SERPL-MCNC: 14 MG/DL (ref 8–23)
BUN SERPL-MCNC: 16 MG/DL (ref 8–23)
BUN SERPL-MCNC: 18 MG/DL (ref 8–23)
BUN SERPL-MCNC: 19 MG/DL (ref 8–23)
BUN SERPL-MCNC: 19 MG/DL (ref 8–23)
BUN SERPL-MCNC: 27 MG/DL (ref 8–23)
BUN SERPL-MCNC: 28 MG/DL (ref 8–23)
BUN/CREAT SERPL: 12.6 (ref 7–25)
BUN/CREAT SERPL: 14 (ref 7–25)
BUN/CREAT SERPL: 15.9 (ref 7–25)
BUN/CREAT SERPL: 16.7 (ref 7–25)
BUN/CREAT SERPL: 16.8 (ref 7–25)
BUN/CREAT SERPL: 17.3 (ref 7–25)
BUN/CREAT SERPL: 17.3 (ref 7–25)
BUN/CREAT SERPL: 18.8 (ref 7–25)
BUN/CREAT SERPL: 26.2 (ref 7–25)
BUN/CREAT SERPL: 27.6 (ref 7–25)
CALCIUM SPEC-SCNC: 8.5 MG/DL (ref 8.6–10.5)
CALCIUM SPEC-SCNC: 8.6 MG/DL (ref 8.6–10.5)
CALCIUM SPEC-SCNC: 8.8 MG/DL (ref 8.6–10.5)
CALCIUM SPEC-SCNC: 9 MG/DL (ref 8.6–10.5)
CALCIUM SPEC-SCNC: 9.2 MG/DL (ref 8.6–10.5)
CALCIUM SPEC-SCNC: 9.3 MG/DL (ref 8.6–10.5)
CALCIUM SPEC-SCNC: 9.3 MG/DL (ref 8.6–10.5)
CALCIUM SPEC-SCNC: 9.4 MG/DL (ref 8.6–10.5)
CALCIUM SPEC-SCNC: 9.4 MG/DL (ref 8.6–10.5)
CALCIUM SPEC-SCNC: 9.7 MG/DL (ref 8.6–10.5)
CHLORIDE SERPL-SCNC: 100 MMOL/L (ref 98–107)
CHLORIDE SERPL-SCNC: 90 MMOL/L (ref 98–107)
CHLORIDE SERPL-SCNC: 94 MMOL/L (ref 98–107)
CHLORIDE SERPL-SCNC: 95 MMOL/L (ref 98–107)
CHLORIDE SERPL-SCNC: 95 MMOL/L (ref 98–107)
CHLORIDE SERPL-SCNC: 96 MMOL/L (ref 98–107)
CLARITY UR: CLEAR
CO2 SERPL-SCNC: 29 MMOL/L (ref 22–29)
CO2 SERPL-SCNC: 30 MMOL/L (ref 22–29)
CO2 SERPL-SCNC: 31 MMOL/L (ref 22–29)
CO2 SERPL-SCNC: 31 MMOL/L (ref 22–29)
CO2 SERPL-SCNC: 33 MMOL/L (ref 22–29)
CO2 SERPL-SCNC: 34 MMOL/L (ref 22–29)
CO2 SERPL-SCNC: 34 MMOL/L (ref 22–29)
CO2 SERPL-SCNC: 35 MMOL/L (ref 22–29)
COLOR UR: YELLOW
CREAT SERPL-MCNC: 0.87 MG/DL (ref 0.57–1)
CREAT SERPL-MCNC: 0.95 MG/DL (ref 0.57–1)
CREAT SERPL-MCNC: 0.98 MG/DL (ref 0.57–1)
CREAT SERPL-MCNC: 1 MG/DL (ref 0.57–1)
CREAT SERPL-MCNC: 1.01 MG/DL (ref 0.57–1)
CREAT SERPL-MCNC: 1.04 MG/DL (ref 0.57–1)
CREAT SERPL-MCNC: 1.07 MG/DL (ref 0.57–1)
CREAT SERPL-MCNC: 1.08 MG/DL (ref 0.57–1)
CREAT SERPL-MCNC: 1.1 MG/DL (ref 0.57–1)
CREAT SERPL-MCNC: 1.13 MG/DL (ref 0.57–1)
D DIMER PPP FEU-MCNC: 1.5 MG/L (FEU) (ref 0–0.5)
D DIMER PPP FEU-MCNC: 1.58 MG/L (FEU) (ref 0–0.5)
D DIMER PPP FEU-MCNC: 1.76 MG/L (FEU) (ref 0–0.5)
D-LACTATE SERPL-SCNC: 0.6 MMOL/L (ref 0.5–2)
D-LACTATE SERPL-SCNC: 1 MMOL/L (ref 0.5–2)
DEPRECATED RDW RBC AUTO: 47.3 FL (ref 37–54)
DEPRECATED RDW RBC AUTO: 47.3 FL (ref 37–54)
DEPRECATED RDW RBC AUTO: 47.5 FL (ref 37–54)
DEPRECATED RDW RBC AUTO: 47.8 FL (ref 37–54)
DEPRECATED RDW RBC AUTO: 48.1 FL (ref 37–54)
DEPRECATED RDW RBC AUTO: 49.2 FL (ref 37–54)
DEPRECATED RDW RBC AUTO: 49.4 FL (ref 37–54)
EOSINOPHIL # BLD AUTO: 0.13 10*3/MM3 (ref 0–0.4)
EOSINOPHIL # BLD AUTO: 0.26 10*3/MM3 (ref 0–0.4)
EOSINOPHIL # BLD AUTO: 0.32 10*3/MM3 (ref 0–0.4)
EOSINOPHIL # BLD AUTO: 0.34 10*3/MM3 (ref 0–0.4)
EOSINOPHIL # BLD AUTO: 0.96 10*3/MM3 (ref 0–0.4)
EOSINOPHIL NFR BLD AUTO: 1.2 % (ref 0.3–6.2)
EOSINOPHIL NFR BLD AUTO: 15.1 % (ref 0.3–6.2)
EOSINOPHIL NFR BLD AUTO: 3.6 % (ref 0.3–6.2)
EOSINOPHIL NFR BLD AUTO: 3.6 % (ref 0.3–6.2)
EOSINOPHIL NFR BLD AUTO: 4.4 % (ref 0.3–6.2)
ERYTHROCYTE [DISTWIDTH] IN BLOOD BY AUTOMATED COUNT: 14.3 % (ref 12.3–15.4)
ERYTHROCYTE [DISTWIDTH] IN BLOOD BY AUTOMATED COUNT: 14.6 % (ref 12.3–15.4)
ERYTHROCYTE [DISTWIDTH] IN BLOOD BY AUTOMATED COUNT: 14.6 % (ref 12.3–15.4)
ERYTHROCYTE [DISTWIDTH] IN BLOOD BY AUTOMATED COUNT: 14.7 % (ref 12.3–15.4)
ERYTHROCYTE [DISTWIDTH] IN BLOOD BY AUTOMATED COUNT: 14.8 % (ref 12.3–15.4)
ERYTHROCYTE [DISTWIDTH] IN BLOOD BY AUTOMATED COUNT: 14.9 % (ref 12.3–15.4)
ERYTHROCYTE [DISTWIDTH] IN BLOOD BY AUTOMATED COUNT: 15.3 % (ref 12.3–15.4)
GAS FLOW AIRWAY: 2 LPM
GAS FLOW AIRWAY: 2.5 LPM
GAS FLOW AIRWAY: 3 LPM
GFR SERPL CREATININE-BSD FRML MDRD: 46 ML/MIN/1.73
GFR SERPL CREATININE-BSD FRML MDRD: 47 ML/MIN/1.73
GFR SERPL CREATININE-BSD FRML MDRD: 48 ML/MIN/1.73
GFR SERPL CREATININE-BSD FRML MDRD: 49 ML/MIN/1.73
GFR SERPL CREATININE-BSD FRML MDRD: 50 ML/MIN/1.73
GFR SERPL CREATININE-BSD FRML MDRD: 52 ML/MIN/1.73
GFR SERPL CREATININE-BSD FRML MDRD: 53 ML/MIN/1.73
GFR SERPL CREATININE-BSD FRML MDRD: 54 ML/MIN/1.73
GFR SERPL CREATININE-BSD FRML MDRD: 56 ML/MIN/1.73
GFR SERPL CREATININE-BSD FRML MDRD: 62 ML/MIN/1.73
GLOBULIN UR ELPH-MCNC: 2.2 GM/DL
GLOBULIN UR ELPH-MCNC: 2.4 GM/DL
GLOBULIN UR ELPH-MCNC: 2.5 GM/DL
GLOBULIN UR ELPH-MCNC: 2.5 GM/DL
GLOBULIN UR ELPH-MCNC: 2.8 GM/DL
GLUCOSE SERPL-MCNC: 105 MG/DL (ref 65–99)
GLUCOSE SERPL-MCNC: 109 MG/DL (ref 65–99)
GLUCOSE SERPL-MCNC: 110 MG/DL (ref 65–99)
GLUCOSE SERPL-MCNC: 120 MG/DL (ref 65–99)
GLUCOSE SERPL-MCNC: 122 MG/DL (ref 65–99)
GLUCOSE SERPL-MCNC: 123 MG/DL (ref 65–99)
GLUCOSE SERPL-MCNC: 141 MG/DL (ref 65–99)
GLUCOSE SERPL-MCNC: 155 MG/DL (ref 65–99)
GLUCOSE SERPL-MCNC: 185 MG/DL (ref 65–99)
GLUCOSE SERPL-MCNC: 190 MG/DL (ref 65–99)
GLUCOSE UR STRIP-MCNC: NEGATIVE MG/DL
GRAM STN SPEC: ABNORMAL
GRAM STN SPEC: NORMAL
HCO3 BLDA-SCNC: 31.3 MMOL/L (ref 20–26)
HCO3 BLDA-SCNC: 31.4 MMOL/L (ref 20–26)
HCO3 BLDA-SCNC: 32.7 MMOL/L (ref 20–26)
HCO3 BLDA-SCNC: 35.5 MMOL/L (ref 20–26)
HCT VFR BLD AUTO: 36.2 % (ref 34–46.6)
HCT VFR BLD AUTO: 37.7 % (ref 34–46.6)
HCT VFR BLD AUTO: 37.7 % (ref 34–46.6)
HCT VFR BLD AUTO: 38.5 % (ref 34–46.6)
HCT VFR BLD AUTO: 38.5 % (ref 34–46.6)
HCT VFR BLD AUTO: 39.7 % (ref 34–46.6)
HCT VFR BLD AUTO: 42.6 % (ref 34–46.6)
HGB BLD-MCNC: 11.5 G/DL (ref 12–15.9)
HGB BLD-MCNC: 11.8 G/DL (ref 12–15.9)
HGB BLD-MCNC: 11.9 G/DL (ref 12–15.9)
HGB BLD-MCNC: 12 G/DL (ref 12–15.9)
HGB BLD-MCNC: 12.5 G/DL (ref 12–15.9)
HGB BLD-MCNC: 12.5 G/DL (ref 12–15.9)
HGB BLD-MCNC: 13.2 G/DL (ref 12–15.9)
HGB UR QL STRIP.AUTO: NEGATIVE
HOLD SPECIMEN: NORMAL
HYALINE CASTS UR QL AUTO: ABNORMAL /LPF
IMM GRANULOCYTES # BLD AUTO: 0.01 10*3/MM3 (ref 0–0.05)
IMM GRANULOCYTES # BLD AUTO: 0.02 10*3/MM3 (ref 0–0.05)
IMM GRANULOCYTES # BLD AUTO: 0.03 10*3/MM3 (ref 0–0.05)
IMM GRANULOCYTES # BLD AUTO: 0.07 10*3/MM3 (ref 0–0.05)
IMM GRANULOCYTES # BLD AUTO: 0.07 10*3/MM3 (ref 0–0.05)
IMM GRANULOCYTES NFR BLD AUTO: 0.2 % (ref 0–0.5)
IMM GRANULOCYTES NFR BLD AUTO: 0.3 % (ref 0–0.5)
IMM GRANULOCYTES NFR BLD AUTO: 0.4 % (ref 0–0.5)
IMM GRANULOCYTES NFR BLD AUTO: 0.6 % (ref 0–0.5)
IMM GRANULOCYTES NFR BLD AUTO: 0.8 % (ref 0–0.5)
INR PPP: 0.99 (ref 0.91–1.09)
INR PPP: 1.04 (ref 0.91–1.09)
KETONES UR QL STRIP: NEGATIVE
L PNEUMO1 AG UR QL IA: NEGATIVE
LEFT ATRIUM VOLUME INDEX: 31.4 ML/M2
LEFT ATRIUM VOLUME: 57.2 CM3
LEUKOCYTE ESTERASE UR QL STRIP.AUTO: ABNORMAL
LYMPHOCYTES # BLD AUTO: 0.78 10*3/MM3 (ref 0.7–3.1)
LYMPHOCYTES # BLD AUTO: 0.8 10*3/MM3 (ref 0.7–3.1)
LYMPHOCYTES # BLD AUTO: 0.98 10*3/MM3 (ref 0.7–3.1)
LYMPHOCYTES # BLD AUTO: 1.07 10*3/MM3 (ref 0.7–3.1)
LYMPHOCYTES # BLD AUTO: 2 10*3/MM3 (ref 0.7–3.1)
LYMPHOCYTES NFR BLD AUTO: 11 % (ref 19.6–45.3)
LYMPHOCYTES NFR BLD AUTO: 11 % (ref 19.6–45.3)
LYMPHOCYTES NFR BLD AUTO: 13.8 % (ref 19.6–45.3)
LYMPHOCYTES NFR BLD AUTO: 31.4 % (ref 19.6–45.3)
LYMPHOCYTES NFR BLD AUTO: 7 % (ref 19.6–45.3)
Lab: ABNORMAL
MAGNESIUM SERPL-MCNC: 1.8 MG/DL (ref 1.6–2.4)
MAXIMAL PREDICTED HEART RATE: 135 BPM
MCH RBC QN AUTO: 27.3 PG (ref 26.6–33)
MCH RBC QN AUTO: 27.5 PG (ref 26.6–33)
MCH RBC QN AUTO: 27.9 PG (ref 26.6–33)
MCH RBC QN AUTO: 28.2 PG (ref 26.6–33)
MCH RBC QN AUTO: 28.5 PG (ref 26.6–33)
MCH RBC QN AUTO: 28.7 PG (ref 26.6–33)
MCH RBC QN AUTO: 28.8 PG (ref 26.6–33)
MCHC RBC AUTO-ENTMCNC: 30.9 G/DL (ref 31.5–35.7)
MCHC RBC AUTO-ENTMCNC: 31 G/DL (ref 31.5–35.7)
MCHC RBC AUTO-ENTMCNC: 31.3 G/DL (ref 31.5–35.7)
MCHC RBC AUTO-ENTMCNC: 31.5 G/DL (ref 31.5–35.7)
MCHC RBC AUTO-ENTMCNC: 31.8 G/DL (ref 31.5–35.7)
MCHC RBC AUTO-ENTMCNC: 31.8 G/DL (ref 31.5–35.7)
MCHC RBC AUTO-ENTMCNC: 32.5 G/DL (ref 31.5–35.7)
MCV RBC AUTO: 86.9 FL (ref 79–97)
MCV RBC AUTO: 87.9 FL (ref 79–97)
MCV RBC AUTO: 88.2 FL (ref 79–97)
MCV RBC AUTO: 90.2 FL (ref 79–97)
MCV RBC AUTO: 90.4 FL (ref 79–97)
MCV RBC AUTO: 90.4 FL (ref 79–97)
MCV RBC AUTO: 90.5 FL (ref 79–97)
MODALITY: ABNORMAL
MONOCYTES # BLD AUTO: 0.55 10*3/MM3 (ref 0.1–0.9)
MONOCYTES # BLD AUTO: 0.85 10*3/MM3 (ref 0.1–0.9)
MONOCYTES # BLD AUTO: 0.87 10*3/MM3 (ref 0.1–0.9)
MONOCYTES # BLD AUTO: 0.99 10*3/MM3 (ref 0.1–0.9)
MONOCYTES # BLD AUTO: 1.13 10*3/MM3 (ref 0.1–0.9)
MONOCYTES NFR BLD AUTO: 12 % (ref 5–12)
MONOCYTES NFR BLD AUTO: 12.7 % (ref 5–12)
MONOCYTES NFR BLD AUTO: 12.7 % (ref 5–12)
MONOCYTES NFR BLD AUTO: 13.4 % (ref 5–12)
MONOCYTES NFR BLD AUTO: 4.9 % (ref 5–12)
NEUTROPHILS NFR BLD AUTO: 2.48 10*3/MM3 (ref 1.7–7)
NEUTROPHILS NFR BLD AUTO: 39 % (ref 42.7–76)
NEUTROPHILS NFR BLD AUTO: 5.23 10*3/MM3 (ref 1.7–7)
NEUTROPHILS NFR BLD AUTO: 5.31 10*3/MM3 (ref 1.7–7)
NEUTROPHILS NFR BLD AUTO: 6.36 10*3/MM3 (ref 1.7–7)
NEUTROPHILS NFR BLD AUTO: 68.3 % (ref 42.7–76)
NEUTROPHILS NFR BLD AUTO: 71.3 % (ref 42.7–76)
NEUTROPHILS NFR BLD AUTO: 72.3 % (ref 42.7–76)
NEUTROPHILS NFR BLD AUTO: 86 % (ref 42.7–76)
NEUTROPHILS NFR BLD AUTO: 9.58 10*3/MM3 (ref 1.7–7)
NITRITE UR QL STRIP: NEGATIVE
NOTIFIED BY: ABNORMAL
NOTIFIED WHO: ABNORMAL
NRBC BLD AUTO-RTO: 0 /100 WBC (ref 0–0.2)
NT-PROBNP SERPL-MCNC: 1020 PG/ML (ref 0–1800)
NT-PROBNP SERPL-MCNC: 1759 PG/ML (ref 0–1800)
NT-PROBNP SERPL-MCNC: 1885 PG/ML (ref 0–1800)
NT-PROBNP SERPL-MCNC: 2341 PG/ML (ref 0–1800)
NT-PROBNP SERPL-MCNC: 3192 PG/ML (ref 0–1800)
PCO2 BLDA: 49 MM HG (ref 35–45)
PCO2 BLDA: 49.9 MM HG (ref 35–45)
PCO2 BLDA: 51.8 MM HG (ref 35–45)
PCO2 BLDA: 57.9 MM HG (ref 35–45)
PCO2 TEMP ADJ BLD: 49 MM HG (ref 35–45)
PCO2 TEMP ADJ BLD: 49.9 MM HG (ref 35–45)
PCO2 TEMP ADJ BLD: 51.8 MM HG (ref 35–45)
PCO2 TEMP ADJ BLD: 57.9 MM HG (ref 35–45)
PH BLDA: 7.39 PH UNITS (ref 7.35–7.45)
PH BLDA: 7.4 PH UNITS (ref 7.35–7.45)
PH BLDA: 7.41 PH UNITS (ref 7.35–7.45)
PH BLDA: 7.42 PH UNITS (ref 7.35–7.45)
PH UR STRIP.AUTO: 6.5 [PH] (ref 5–8)
PH, TEMP CORRECTED: 7.39 PH UNITS (ref 7.35–7.45)
PH, TEMP CORRECTED: 7.4 PH UNITS (ref 7.35–7.45)
PH, TEMP CORRECTED: 7.41 PH UNITS (ref 7.35–7.45)
PH, TEMP CORRECTED: 7.42 PH UNITS (ref 7.35–7.45)
PLATELET # BLD AUTO: 230 10*3/MM3 (ref 140–450)
PLATELET # BLD AUTO: 239 10*3/MM3 (ref 140–450)
PLATELET # BLD AUTO: 244 10*3/MM3 (ref 140–450)
PLATELET # BLD AUTO: 258 10*3/MM3 (ref 140–450)
PLATELET # BLD AUTO: 258 10*3/MM3 (ref 140–450)
PLATELET # BLD AUTO: 271 10*3/MM3 (ref 140–450)
PLATELET # BLD AUTO: 317 10*3/MM3 (ref 140–450)
PMV BLD AUTO: 9.1 FL (ref 6–12)
PMV BLD AUTO: 9.2 FL (ref 6–12)
PMV BLD AUTO: 9.4 FL (ref 6–12)
PMV BLD AUTO: 9.5 FL (ref 6–12)
PMV BLD AUTO: 9.6 FL (ref 6–12)
PMV BLD AUTO: 9.8 FL (ref 6–12)
PMV BLD AUTO: 9.8 FL (ref 6–12)
PO2 BLDA: 35.9 MM HG (ref 83–108)
PO2 BLDA: 85.8 MM HG (ref 83–108)
PO2 BLDA: 86.5 MM HG (ref 83–108)
PO2 BLDA: 98.9 MM HG (ref 83–108)
PO2 TEMP ADJ BLD: 35.9 MM HG (ref 83–108)
PO2 TEMP ADJ BLD: 85.8 MM HG (ref 83–108)
PO2 TEMP ADJ BLD: 86.5 MM HG (ref 83–108)
PO2 TEMP ADJ BLD: 98.9 MM HG (ref 83–108)
POTASSIUM SERPL-SCNC: 3.7 MMOL/L (ref 3.5–5.2)
POTASSIUM SERPL-SCNC: 4.2 MMOL/L (ref 3.5–5.2)
POTASSIUM SERPL-SCNC: 4.3 MMOL/L (ref 3.5–5.2)
POTASSIUM SERPL-SCNC: 4.4 MMOL/L (ref 3.5–5.2)
POTASSIUM SERPL-SCNC: 4.4 MMOL/L (ref 3.5–5.2)
POTASSIUM SERPL-SCNC: 4.5 MMOL/L (ref 3.5–5.2)
POTASSIUM SERPL-SCNC: 4.6 MMOL/L (ref 3.5–5.2)
PROCALCITONIN SERPL-MCNC: 0.07 NG/ML (ref 0–0.25)
PROCALCITONIN SERPL-MCNC: 0.08 NG/ML (ref 0–0.25)
PROT SERPL-MCNC: 6.4 G/DL (ref 6–8.5)
PROT SERPL-MCNC: 6.5 G/DL (ref 6–8.5)
PROT SERPL-MCNC: 6.7 G/DL (ref 6–8.5)
PROT SERPL-MCNC: 6.8 G/DL (ref 6–8.5)
PROT SERPL-MCNC: 6.9 G/DL (ref 6–8.5)
PROT UR QL STRIP: NEGATIVE
PROTHROMBIN TIME: 12.7 SECONDS (ref 11.9–14.6)
PROTHROMBIN TIME: 12.8 SECONDS (ref 11.5–13.4)
QT INTERVAL: 382 MS
QT INTERVAL: 394 MS
QT INTERVAL: 408 MS
QT INTERVAL: 416 MS
QT INTERVAL: 418 MS
QTC INTERVAL: 418 MS
QTC INTERVAL: 431 MS
QTC INTERVAL: 441 MS
QTC INTERVAL: 442 MS
QTC INTERVAL: 446 MS
RBC # BLD AUTO: 4 10*6/MM3 (ref 3.77–5.28)
RBC # BLD AUTO: 4.18 10*6/MM3 (ref 3.77–5.28)
RBC # BLD AUTO: 4.26 10*6/MM3 (ref 3.77–5.28)
RBC # BLD AUTO: 4.29 10*6/MM3 (ref 3.77–5.28)
RBC # BLD AUTO: 4.39 10*6/MM3 (ref 3.77–5.28)
RBC # BLD AUTO: 4.43 10*6/MM3 (ref 3.77–5.28)
RBC # BLD AUTO: 4.83 10*6/MM3 (ref 3.77–5.28)
RBC # UR: ABNORMAL /HPF
REF LAB TEST METHOD: ABNORMAL
S PNEUM AG SPEC QL LA: NEGATIVE
SAO2 % BLDCOA: 70.4 % (ref 94–99)
SAO2 % BLDCOA: 97.6 % (ref 94–99)
SAO2 % BLDCOA: 97.7 % (ref 94–99)
SAO2 % BLDCOA: 98.6 % (ref 94–99)
SARS-COV-2 ORF1AB RESP QL NAA+PROBE: NOT DETECTED
SARS-COV-2 RNA PNL SPEC NAA+PROBE: NOT DETECTED
SARS-COV-2 RNA RESP QL NAA+PROBE: NOT DETECTED
SODIUM SERPL-SCNC: 128 MMOL/L (ref 136–145)
SODIUM SERPL-SCNC: 133 MMOL/L (ref 136–145)
SODIUM SERPL-SCNC: 134 MMOL/L (ref 136–145)
SODIUM SERPL-SCNC: 135 MMOL/L (ref 136–145)
SODIUM SERPL-SCNC: 135 MMOL/L (ref 136–145)
SODIUM SERPL-SCNC: 136 MMOL/L (ref 136–145)
SODIUM SERPL-SCNC: 137 MMOL/L (ref 136–145)
SODIUM SERPL-SCNC: 137 MMOL/L (ref 136–145)
SODIUM SERPL-SCNC: 138 MMOL/L (ref 136–145)
SODIUM SERPL-SCNC: 139 MMOL/L (ref 136–145)
SP GR UR STRIP: 1.02 (ref 1–1.03)
SQUAMOUS #/AREA URNS HPF: ABNORMAL /HPF
STRESS TARGET HR: 115 BPM
TROPONIN T SERPL-MCNC: 0.01 NG/ML (ref 0–0.03)
TROPONIN T SERPL-MCNC: <0.01 NG/ML (ref 0–0.03)
UROBILINOGEN UR QL STRIP: ABNORMAL
VENTILATOR MODE: ABNORMAL
WBC # BLD AUTO: 11.14 10*3/MM3 (ref 3.4–10.8)
WBC # BLD AUTO: 5.2 10*3/MM3 (ref 3.4–10.8)
WBC # BLD AUTO: 5.45 10*3/MM3 (ref 3.4–10.8)
WBC # BLD AUTO: 6.36 10*3/MM3 (ref 3.4–10.8)
WBC # BLD AUTO: 7.24 10*3/MM3 (ref 3.4–10.8)
WBC # BLD AUTO: 7.77 10*3/MM3 (ref 3.4–10.8)
WBC # BLD AUTO: 8.91 10*3/MM3 (ref 3.4–10.8)
WBC UR QL AUTO: ABNORMAL /HPF
WHOLE BLOOD HOLD SPECIMEN: NORMAL

## 2021-01-01 PROCEDURE — 94664 DEMO&/EVAL PT USE INHALER: CPT

## 2021-01-01 PROCEDURE — 85379 FIBRIN DEGRADATION QUANT: CPT | Performed by: EMERGENCY MEDICINE

## 2021-01-01 PROCEDURE — 25010000002 AZITHROMYCIN PER 500 MG: Performed by: NURSE PRACTITIONER

## 2021-01-01 PROCEDURE — 25010000002 ENOXAPARIN PER 10 MG: Performed by: NURSE PRACTITIONER

## 2021-01-01 PROCEDURE — 25010000002 FUROSEMIDE PER 20 MG: Performed by: FAMILY MEDICINE

## 2021-01-01 PROCEDURE — 87635 SARS-COV-2 COVID-19 AMP PRB: CPT

## 2021-01-01 PROCEDURE — 25010000002 CEFTRIAXONE PER 250 MG: Performed by: NURSE PRACTITIONER

## 2021-01-01 PROCEDURE — 80053 COMPREHEN METABOLIC PANEL: CPT

## 2021-01-01 PROCEDURE — 93880 EXTRACRANIAL BILAT STUDY: CPT

## 2021-01-01 PROCEDURE — 94799 UNLISTED PULMONARY SVC/PX: CPT

## 2021-01-01 PROCEDURE — 25010000002 METHYLPREDNISOLONE PER 40 MG: Performed by: INTERNAL MEDICINE

## 2021-01-01 PROCEDURE — 93970 EXTREMITY STUDY: CPT | Performed by: SURGERY

## 2021-01-01 PROCEDURE — 99214 OFFICE O/P EST MOD 30 MIN: CPT | Performed by: NURSE PRACTITIONER

## 2021-01-01 PROCEDURE — 99213 OFFICE O/P EST LOW 20 MIN: CPT | Performed by: SURGERY

## 2021-01-01 PROCEDURE — 72125 CT NECK SPINE W/O DYE: CPT

## 2021-01-01 PROCEDURE — G0157 HHC PT ASSISTANT EA 15: HCPCS

## 2021-01-01 PROCEDURE — 93010 ELECTROCARDIOGRAM REPORT: CPT | Performed by: INTERNAL MEDICINE

## 2021-01-01 PROCEDURE — 87186 SC STD MICRODIL/AGAR DIL: CPT | Performed by: NURSE PRACTITIONER

## 2021-01-01 PROCEDURE — 36600 WITHDRAWAL OF ARTERIAL BLOOD: CPT

## 2021-01-01 PROCEDURE — 99285 EMERGENCY DEPT VISIT HI MDM: CPT

## 2021-01-01 PROCEDURE — 87205 SMEAR GRAM STAIN: CPT | Performed by: PODIATRIST

## 2021-01-01 PROCEDURE — 71045 X-RAY EXAM CHEST 1 VIEW: CPT

## 2021-01-01 PROCEDURE — 85610 PROTHROMBIN TIME: CPT | Performed by: NURSE PRACTITIONER

## 2021-01-01 PROCEDURE — G0300 HHS/HOSPICE OF LPN EA 15 MIN: HCPCS

## 2021-01-01 PROCEDURE — 93005 ELECTROCARDIOGRAM TRACING: CPT | Performed by: INTERNAL MEDICINE

## 2021-01-01 PROCEDURE — 94010 BREATHING CAPACITY TEST: CPT | Performed by: NURSE PRACTITIONER

## 2021-01-01 PROCEDURE — 93306 TTE W/DOPPLER COMPLETE: CPT | Performed by: INTERNAL MEDICINE

## 2021-01-01 PROCEDURE — 93005 ELECTROCARDIOGRAM TRACING: CPT | Performed by: EMERGENCY MEDICINE

## 2021-01-01 PROCEDURE — 87635 SARS-COV-2 COVID-19 AMP PRB: CPT | Performed by: NURSE PRACTITIONER

## 2021-01-01 PROCEDURE — 94640 AIRWAY INHALATION TREATMENT: CPT

## 2021-01-01 PROCEDURE — 83880 ASSAY OF NATRIURETIC PEPTIDE: CPT | Performed by: NURSE PRACTITIONER

## 2021-01-01 PROCEDURE — 82803 BLOOD GASES ANY COMBINATION: CPT

## 2021-01-01 PROCEDURE — 87040 BLOOD CULTURE FOR BACTERIA: CPT | Performed by: EMERGENCY MEDICINE

## 2021-01-01 PROCEDURE — 99283 EMERGENCY DEPT VISIT LOW MDM: CPT

## 2021-01-01 PROCEDURE — 93306 TTE W/DOPPLER COMPLETE: CPT

## 2021-01-01 PROCEDURE — 83880 ASSAY OF NATRIURETIC PEPTIDE: CPT | Performed by: FAMILY MEDICINE

## 2021-01-01 PROCEDURE — 93970 EXTREMITY STUDY: CPT

## 2021-01-01 PROCEDURE — 87040 BLOOD CULTURE FOR BACTERIA: CPT | Performed by: NURSE PRACTITIONER

## 2021-01-01 PROCEDURE — 71250 CT THORAX DX C-: CPT

## 2021-01-01 PROCEDURE — 94664 DEMO&/EVAL PT USE INHALER: CPT | Performed by: NURSE PRACTITIONER

## 2021-01-01 PROCEDURE — 85610 PROTHROMBIN TIME: CPT | Performed by: EMERGENCY MEDICINE

## 2021-01-01 PROCEDURE — G0463 HOSPITAL OUTPT CLINIC VISIT: HCPCS

## 2021-01-01 PROCEDURE — 80048 BASIC METABOLIC PNL TOTAL CA: CPT | Performed by: NURSE PRACTITIONER

## 2021-01-01 PROCEDURE — G0151 HHCP-SERV OF PT,EA 15 MIN: HCPCS

## 2021-01-01 PROCEDURE — 99214 OFFICE O/P EST MOD 30 MIN: CPT | Performed by: INTERNAL MEDICINE

## 2021-01-01 PROCEDURE — 83735 ASSAY OF MAGNESIUM: CPT | Performed by: EMERGENCY MEDICINE

## 2021-01-01 PROCEDURE — 84145 PROCALCITONIN (PCT): CPT | Performed by: EMERGENCY MEDICINE

## 2021-01-01 PROCEDURE — 81001 URINALYSIS AUTO W/SCOPE: CPT | Performed by: NURSE PRACTITIONER

## 2021-01-01 PROCEDURE — 72131 CT LUMBAR SPINE W/O DYE: CPT

## 2021-01-01 PROCEDURE — 87635 SARS-COV-2 COVID-19 AMP PRB: CPT | Performed by: EMERGENCY MEDICINE

## 2021-01-01 PROCEDURE — 25010000002 METHYLPREDNISOLONE PER 125 MG: Performed by: EMERGENCY MEDICINE

## 2021-01-01 PROCEDURE — 87077 CULTURE AEROBIC IDENTIFY: CPT | Performed by: NURSE PRACTITIONER

## 2021-01-01 PROCEDURE — 1159F MED LIST DOCD IN RCRD: CPT | Performed by: INTERNAL MEDICINE

## 2021-01-01 PROCEDURE — 83880 ASSAY OF NATRIURETIC PEPTIDE: CPT

## 2021-01-01 PROCEDURE — 83880 ASSAY OF NATRIURETIC PEPTIDE: CPT | Performed by: EMERGENCY MEDICINE

## 2021-01-01 PROCEDURE — 25010000002 METHYLPREDNISOLONE PER 125 MG: Performed by: FAMILY MEDICINE

## 2021-01-01 PROCEDURE — 87205 SMEAR GRAM STAIN: CPT

## 2021-01-01 PROCEDURE — 63710000001 PREDNISONE PER 1 MG: Performed by: NURSE PRACTITIONER

## 2021-01-01 PROCEDURE — 1170F FXNL STATUS ASSESSED: CPT | Performed by: INTERNAL MEDICINE

## 2021-01-01 PROCEDURE — 73502 X-RAY EXAM HIP UNI 2-3 VIEWS: CPT

## 2021-01-01 PROCEDURE — 71046 X-RAY EXAM CHEST 2 VIEWS: CPT

## 2021-01-01 PROCEDURE — U0004 COV-19 TEST NON-CDC HGH THRU: HCPCS

## 2021-01-01 PROCEDURE — 87176 TISSUE HOMOGENIZATION CULTR: CPT | Performed by: PODIATRIST

## 2021-01-01 PROCEDURE — 85730 THROMBOPLASTIN TIME PARTIAL: CPT | Performed by: EMERGENCY MEDICINE

## 2021-01-01 PROCEDURE — G0152 HHCP-SERV OF OT,EA 15 MIN: HCPCS

## 2021-01-01 PROCEDURE — 36415 COLL VENOUS BLD VENIPUNCTURE: CPT | Performed by: NURSE PRACTITIONER

## 2021-01-01 PROCEDURE — 93005 ELECTROCARDIOGRAM TRACING: CPT

## 2021-01-01 PROCEDURE — 93005 ELECTROCARDIOGRAM TRACING: CPT | Performed by: FAMILY MEDICINE

## 2021-01-01 PROCEDURE — 87147 CULTURE TYPE IMMUNOLOGIC: CPT | Performed by: PODIATRIST

## 2021-01-01 PROCEDURE — 83605 ASSAY OF LACTIC ACID: CPT | Performed by: EMERGENCY MEDICINE

## 2021-01-01 PROCEDURE — 94729 DIFFUSING CAPACITY: CPT | Performed by: NURSE PRACTITIONER

## 2021-01-01 PROCEDURE — 87070 CULTURE OTHR SPECIMN AEROBIC: CPT

## 2021-01-01 PROCEDURE — 84484 ASSAY OF TROPONIN QUANT: CPT | Performed by: EMERGENCY MEDICINE

## 2021-01-01 PROCEDURE — 90662 IIV NO PRSV INCREASED AG IM: CPT | Performed by: NURSE PRACTITIONER

## 2021-01-01 PROCEDURE — 85025 COMPLETE CBC W/AUTO DIFF WBC: CPT

## 2021-01-01 PROCEDURE — 99214 OFFICE O/P EST MOD 30 MIN: CPT | Performed by: SURGERY

## 2021-01-01 PROCEDURE — 94644 CONT INHLJ TX 1ST HOUR: CPT

## 2021-01-01 PROCEDURE — 85025 COMPLETE CBC W/AUTO DIFF WBC: CPT | Performed by: NURSE PRACTITIONER

## 2021-01-01 PROCEDURE — 85379 FIBRIN DEGRADATION QUANT: CPT | Performed by: NURSE PRACTITIONER

## 2021-01-01 PROCEDURE — 87205 SMEAR GRAM STAIN: CPT | Performed by: NURSE PRACTITIONER

## 2021-01-01 PROCEDURE — 36415 COLL VENOUS BLD VENIPUNCTURE: CPT

## 2021-01-01 PROCEDURE — 0 IOPAMIDOL PER 1 ML: Performed by: EMERGENCY MEDICINE

## 2021-01-01 PROCEDURE — 25010000002 MAGNESIUM SULFATE 2 GM/50ML SOLUTION: Performed by: EMERGENCY MEDICINE

## 2021-01-01 PROCEDURE — 25010000002 METHYLPREDNISOLONE PER 125 MG: Performed by: NURSE PRACTITIONER

## 2021-01-01 PROCEDURE — G0439 PPPS, SUBSEQ VISIT: HCPCS | Performed by: INTERNAL MEDICINE

## 2021-01-01 PROCEDURE — 0 IOPAMIDOL PER 1 ML: Performed by: NURSE PRACTITIONER

## 2021-01-01 PROCEDURE — 84484 ASSAY OF TROPONIN QUANT: CPT | Performed by: NURSE PRACTITIONER

## 2021-01-01 PROCEDURE — 85025 COMPLETE CBC W/AUTO DIFF WBC: CPT | Performed by: EMERGENCY MEDICINE

## 2021-01-01 PROCEDURE — 87070 CULTURE OTHR SPECIMN AEROBIC: CPT | Performed by: NURSE PRACTITIONER

## 2021-01-01 PROCEDURE — C9803 HOPD COVID-19 SPEC COLLECT: HCPCS | Performed by: EMERGENCY MEDICINE

## 2021-01-01 PROCEDURE — 94727 GAS DIL/WSHOT DETER LNG VOL: CPT | Performed by: NURSE PRACTITIONER

## 2021-01-01 PROCEDURE — 63710000001 ONDANSETRON ODT 4 MG TABLET DISPERSIBLE: Performed by: EMERGENCY MEDICINE

## 2021-01-01 PROCEDURE — 99284 EMERGENCY DEPT VISIT MOD MDM: CPT

## 2021-01-01 PROCEDURE — 85027 COMPLETE CBC AUTOMATED: CPT | Performed by: NURSE PRACTITIONER

## 2021-01-01 PROCEDURE — 87075 CULTR BACTERIA EXCEPT BLOOD: CPT | Performed by: PODIATRIST

## 2021-01-01 PROCEDURE — 99212 OFFICE O/P EST SF 10 MIN: CPT | Performed by: NURSE PRACTITIONER

## 2021-01-01 PROCEDURE — 73721 MRI JNT OF LWR EXTRE W/O DYE: CPT

## 2021-01-01 PROCEDURE — 80053 COMPREHEN METABOLIC PANEL: CPT | Performed by: FAMILY MEDICINE

## 2021-01-01 PROCEDURE — 99496 TRANSJ CARE MGMT HIGH F2F 7D: CPT | Performed by: NURSE PRACTITIONER

## 2021-01-01 PROCEDURE — 96374 THER/PROPH/DIAG INJ IV PUSH: CPT

## 2021-01-01 PROCEDURE — 96365 THER/PROPH/DIAG IV INF INIT: CPT

## 2021-01-01 PROCEDURE — 93923 UPR/LXTR ART STDY 3+ LVLS: CPT | Performed by: SURGERY

## 2021-01-01 PROCEDURE — 93923 UPR/LXTR ART STDY 3+ LVLS: CPT

## 2021-01-01 PROCEDURE — 1126F AMNT PAIN NOTED NONE PRSNT: CPT | Performed by: INTERNAL MEDICINE

## 2021-01-01 PROCEDURE — C9803 HOPD COVID-19 SPEC COLLECT: HCPCS

## 2021-01-01 PROCEDURE — 96366 THER/PROPH/DIAG IV INF ADDON: CPT

## 2021-01-01 PROCEDURE — 71275 CT ANGIOGRAPHY CHEST: CPT

## 2021-01-01 PROCEDURE — 93005 ELECTROCARDIOGRAM TRACING: CPT | Performed by: NURSE PRACTITIONER

## 2021-01-01 PROCEDURE — 80053 COMPREHEN METABOLIC PANEL: CPT | Performed by: EMERGENCY MEDICINE

## 2021-01-01 PROCEDURE — 25010000002 FUROSEMIDE PER 20 MG: Performed by: INTERNAL MEDICINE

## 2021-01-01 PROCEDURE — 99203 OFFICE O/P NEW LOW 30 MIN: CPT | Performed by: PODIATRIST

## 2021-01-01 PROCEDURE — 1111F DSCHRG MED/CURRENT MED MERGE: CPT | Performed by: NURSE PRACTITIONER

## 2021-01-01 PROCEDURE — 25010000002 FUROSEMIDE PER 20 MG: Performed by: NURSE PRACTITIONER

## 2021-01-01 PROCEDURE — U0005 INFEC AGEN DETEC AMPLI PROBE: HCPCS

## 2021-01-01 PROCEDURE — 70450 CT HEAD/BRAIN W/O DYE: CPT

## 2021-01-01 PROCEDURE — 93880 EXTRACRANIAL BILAT STUDY: CPT | Performed by: SURGERY

## 2021-01-01 PROCEDURE — 87635 SARS-COV-2 COVID-19 AMP PRB: CPT | Performed by: FAMILY MEDICINE

## 2021-01-01 PROCEDURE — 87070 CULTURE OTHR SPECIMN AEROBIC: CPT | Performed by: PODIATRIST

## 2021-01-01 PROCEDURE — G0495 RN CARE TRAIN/EDU IN HH: HCPCS

## 2021-01-01 PROCEDURE — 11042 DBRDMT SUBQ TIS 1ST 20SQCM/<: CPT | Performed by: PODIATRIST

## 2021-01-01 PROCEDURE — 85025 COMPLETE CBC W/AUTO DIFF WBC: CPT | Performed by: FAMILY MEDICINE

## 2021-01-01 PROCEDURE — 87899 AGENT NOS ASSAY W/OPTIC: CPT | Performed by: NURSE PRACTITIONER

## 2021-01-01 PROCEDURE — 25010000002 PERFLUTREN 6.52 MG/ML SUSPENSION: Performed by: INTERNAL MEDICINE

## 2021-01-01 PROCEDURE — 99213 OFFICE O/P EST LOW 20 MIN: CPT | Performed by: NURSE PRACTITIONER

## 2021-01-01 PROCEDURE — G0299 HHS/HOSPICE OF RN EA 15 MIN: HCPCS

## 2021-01-01 PROCEDURE — 99223 1ST HOSP IP/OBS HIGH 75: CPT | Performed by: INTERNAL MEDICINE

## 2021-01-01 PROCEDURE — 84484 ASSAY OF TROPONIN QUANT: CPT | Performed by: FAMILY MEDICINE

## 2021-01-01 PROCEDURE — G0008 ADMIN INFLUENZA VIRUS VAC: HCPCS | Performed by: NURSE PRACTITIONER

## 2021-01-01 PROCEDURE — 84484 ASSAY OF TROPONIN QUANT: CPT

## 2021-01-01 RX ORDER — GUAIFENESIN 600 MG/1
1200 TABLET, EXTENDED RELEASE ORAL EVERY 12 HOURS SCHEDULED
Status: DISCONTINUED | OUTPATIENT
Start: 2021-01-01 | End: 2021-01-01 | Stop reason: HOSPADM

## 2021-01-01 RX ORDER — ACETAMINOPHEN AND CODEINE PHOSPHATE 300; 30 MG/1; MG/1
1 TABLET ORAL 2 TIMES DAILY
Qty: 10 TABLET | Refills: 0 | Status: CANCELLED | OUTPATIENT
Start: 2021-01-01

## 2021-01-01 RX ORDER — ONDANSETRON 2 MG/ML
4 INJECTION INTRAMUSCULAR; INTRAVENOUS EVERY 6 HOURS PRN
Status: DISCONTINUED | OUTPATIENT
Start: 2021-01-01 | End: 2021-01-01 | Stop reason: HOSPADM

## 2021-01-01 RX ORDER — ALUMINA, MAGNESIA, AND SIMETHICONE 2400; 2400; 240 MG/30ML; MG/30ML; MG/30ML
15 SUSPENSION ORAL EVERY 6 HOURS PRN
Status: DISCONTINUED | OUTPATIENT
Start: 2021-01-01 | End: 2021-01-01 | Stop reason: HOSPADM

## 2021-01-01 RX ORDER — FUROSEMIDE 20 MG/1
20 TABLET ORAL DAILY PRN
Qty: 90 TABLET | Refills: 1 | Status: ON HOLD | OUTPATIENT
Start: 2021-01-01 | End: 2022-01-01

## 2021-01-01 RX ORDER — ACETAMINOPHEN 325 MG/1
650 TABLET ORAL EVERY 4 HOURS PRN
Status: DISCONTINUED | OUTPATIENT
Start: 2021-01-01 | End: 2021-01-01 | Stop reason: HOSPADM

## 2021-01-01 RX ORDER — PREDNISONE 10 MG/1
TABLET ORAL
Qty: 9 TABLET | Refills: 0 | Status: SHIPPED | OUTPATIENT
Start: 2021-01-01 | End: 2021-01-01

## 2021-01-01 RX ORDER — LISINOPRIL 10 MG/1
40 TABLET ORAL DAILY
Status: DISCONTINUED | OUTPATIENT
Start: 2021-01-01 | End: 2021-01-01 | Stop reason: HOSPADM

## 2021-01-01 RX ORDER — ALBUTEROL SULFATE 2.5 MG/3ML
10 SOLUTION RESPIRATORY (INHALATION)
Status: COMPLETED | OUTPATIENT
Start: 2021-01-01 | End: 2021-01-01

## 2021-01-01 RX ORDER — ONDANSETRON 4 MG/1
4 TABLET, ORALLY DISINTEGRATING ORAL ONCE
Status: COMPLETED | OUTPATIENT
Start: 2021-01-01 | End: 2021-01-01

## 2021-01-01 RX ORDER — ACETAMINOPHEN 160 MG/5ML
650 SOLUTION ORAL EVERY 4 HOURS PRN
Status: DISCONTINUED | OUTPATIENT
Start: 2021-01-01 | End: 2021-01-01 | Stop reason: HOSPADM

## 2021-01-01 RX ORDER — PREDNISONE 10 MG/1
TABLET ORAL
Qty: 6 TABLET | Refills: 0 | Status: SHIPPED | OUTPATIENT
Start: 2021-01-01 | End: 2021-01-01

## 2021-01-01 RX ORDER — IPRATROPIUM BROMIDE AND ALBUTEROL SULFATE 2.5; .5 MG/3ML; MG/3ML
3 SOLUTION RESPIRATORY (INHALATION)
Status: DISCONTINUED | OUTPATIENT
Start: 2021-01-01 | End: 2021-01-01

## 2021-01-01 RX ORDER — ATORVASTATIN CALCIUM 40 MG/1
40 TABLET, FILM COATED ORAL DAILY
Qty: 90 TABLET | Refills: 1 | Status: SHIPPED | OUTPATIENT
Start: 2021-01-01

## 2021-01-01 RX ORDER — DOCUSATE SODIUM 100 MG/1
100 CAPSULE, LIQUID FILLED ORAL 2 TIMES DAILY
Qty: 20 CAPSULE | Refills: 0 | Status: SHIPPED | OUTPATIENT
Start: 2021-01-01

## 2021-01-01 RX ORDER — BENZONATATE 100 MG/1
100 CAPSULE ORAL 3 TIMES DAILY PRN
Status: ON HOLD | COMMUNITY
End: 2022-01-01

## 2021-01-01 RX ORDER — DICYCLOMINE HYDROCHLORIDE 10 MG/1
10 CAPSULE ORAL 3 TIMES DAILY
Status: DISCONTINUED | OUTPATIENT
Start: 2021-01-01 | End: 2021-01-01 | Stop reason: HOSPADM

## 2021-01-01 RX ORDER — ACETAMINOPHEN AND CODEINE PHOSPHATE 300; 30 MG/1; MG/1
1 TABLET ORAL 2 TIMES DAILY
Qty: 10 TABLET | Refills: 0 | Status: SHIPPED | OUTPATIENT
Start: 2021-01-01 | End: 2021-01-01 | Stop reason: SDUPTHER

## 2021-01-01 RX ORDER — MULTIPLE VITAMINS W/ MINERALS TAB 9MG-400MCG
1 TAB ORAL DAILY
COMMUNITY

## 2021-01-01 RX ORDER — METHYLPREDNISOLONE SODIUM SUCCINATE 125 MG/2ML
60 INJECTION, POWDER, LYOPHILIZED, FOR SOLUTION INTRAMUSCULAR; INTRAVENOUS EVERY 8 HOURS
Status: DISCONTINUED | OUTPATIENT
Start: 2021-01-01 | End: 2021-01-01

## 2021-01-01 RX ORDER — DOXYCYCLINE HYCLATE 100 MG
100 TABLET ORAL 2 TIMES DAILY
Qty: 20 TABLET | Refills: 0 | Status: SHIPPED | OUTPATIENT
Start: 2021-01-01 | End: 2021-01-01

## 2021-01-01 RX ORDER — SODIUM CHLORIDE 0.9 % (FLUSH) 0.9 %
10 SYRINGE (ML) INJECTION AS NEEDED
Status: DISCONTINUED | OUTPATIENT
Start: 2021-01-01 | End: 2021-01-01 | Stop reason: SDUPTHER

## 2021-01-01 RX ORDER — HYDROCHLOROTHIAZIDE 25 MG/1
25 TABLET ORAL DAILY PRN
COMMUNITY
End: 2021-01-01 | Stop reason: HOSPADM

## 2021-01-01 RX ORDER — SODIUM CHLORIDE 0.9 % (FLUSH) 0.9 %
10 SYRINGE (ML) INJECTION AS NEEDED
Status: DISCONTINUED | OUTPATIENT
Start: 2021-01-01 | End: 2021-01-01 | Stop reason: HOSPADM

## 2021-01-01 RX ORDER — ATORVASTATIN CALCIUM 40 MG/1
40 TABLET, FILM COATED ORAL DAILY
Qty: 90 TABLET | Refills: 1 | Status: SHIPPED | OUTPATIENT
Start: 2021-01-01 | End: 2021-01-01 | Stop reason: SDUPTHER

## 2021-01-01 RX ORDER — HYDROCODONE BITARTRATE AND ACETAMINOPHEN 5; 325 MG/1; MG/1
1 TABLET ORAL EVERY 6 HOURS PRN
Qty: 9 TABLET | Refills: 0 | Status: SHIPPED | OUTPATIENT
Start: 2021-01-01 | End: 2021-01-01

## 2021-01-01 RX ORDER — IPRATROPIUM BROMIDE AND ALBUTEROL SULFATE 2.5; .5 MG/3ML; MG/3ML
3 SOLUTION RESPIRATORY (INHALATION) ONCE
Status: COMPLETED | OUTPATIENT
Start: 2021-01-01 | End: 2021-01-01

## 2021-01-01 RX ORDER — PREDNISONE 20 MG/1
20 TABLET ORAL
Status: DISCONTINUED | OUTPATIENT
Start: 2021-01-01 | End: 2021-01-01 | Stop reason: HOSPADM

## 2021-01-01 RX ORDER — BUDESONIDE AND FORMOTEROL FUMARATE DIHYDRATE 160; 4.5 UG/1; UG/1
2 AEROSOL RESPIRATORY (INHALATION)
Status: DISCONTINUED | OUTPATIENT
Start: 2021-01-01 | End: 2021-01-01 | Stop reason: HOSPADM

## 2021-01-01 RX ORDER — METHYLPREDNISOLONE 4 MG/1
TABLET ORAL
Qty: 1 EACH | Refills: 0 | Status: SHIPPED | OUTPATIENT
Start: 2021-01-01 | End: 2021-01-01

## 2021-01-01 RX ORDER — AMLODIPINE BESYLATE 10 MG/1
10 TABLET ORAL DAILY
Status: DISCONTINUED | OUTPATIENT
Start: 2021-01-01 | End: 2021-01-01 | Stop reason: HOSPADM

## 2021-01-01 RX ORDER — SERTRALINE HYDROCHLORIDE 25 MG/1
25 TABLET, FILM COATED ORAL DAILY
Qty: 30 TABLET | Refills: 3 | Status: SHIPPED | OUTPATIENT
Start: 2021-01-01 | End: 2022-01-01 | Stop reason: SDUPTHER

## 2021-01-01 RX ORDER — AMOXICILLIN 250 MG
1 CAPSULE ORAL NIGHTLY
Status: DISCONTINUED | OUTPATIENT
Start: 2021-01-01 | End: 2021-01-01 | Stop reason: HOSPADM

## 2021-01-01 RX ORDER — LEVOCETIRIZINE DIHYDROCHLORIDE 5 MG/1
5 TABLET, FILM COATED ORAL EVERY EVENING
Qty: 30 TABLET | Refills: 3 | Status: SHIPPED | OUTPATIENT
Start: 2021-01-01 | End: 2022-01-01

## 2021-01-01 RX ORDER — ALBUTEROL SULFATE 2.5 MG/3ML
2.5 SOLUTION RESPIRATORY (INHALATION) EVERY 4 HOURS PRN
Qty: 180 ML | Refills: 3 | Status: SHIPPED | OUTPATIENT
Start: 2021-01-01 | End: 2021-01-01

## 2021-01-01 RX ORDER — FUROSEMIDE 10 MG/ML
40 INJECTION INTRAMUSCULAR; INTRAVENOUS ONCE
Status: COMPLETED | OUTPATIENT
Start: 2021-01-01 | End: 2021-01-01

## 2021-01-01 RX ORDER — HYDROCODONE BITARTRATE AND ACETAMINOPHEN 5; 325 MG/1; MG/1
.5-1 TABLET ORAL EVERY 12 HOURS PRN
Qty: 20 TABLET | Refills: 0 | Status: SHIPPED | OUTPATIENT
Start: 2021-01-01 | End: 2022-01-01 | Stop reason: SDUPTHER

## 2021-01-01 RX ORDER — ACETAMINOPHEN 650 MG/1
650 SUPPOSITORY RECTAL EVERY 4 HOURS PRN
Status: DISCONTINUED | OUTPATIENT
Start: 2021-01-01 | End: 2021-01-01 | Stop reason: HOSPADM

## 2021-01-01 RX ORDER — BUDESONIDE, GLYCOPYRROLATE, AND FORMOTEROL FUMARATE 160; 9; 4.8 UG/1; UG/1; UG/1
2 AEROSOL, METERED RESPIRATORY (INHALATION) 2 TIMES DAILY
Qty: 1 EACH | Refills: 11 | Status: SHIPPED | OUTPATIENT
Start: 2021-01-01 | End: 2021-01-01 | Stop reason: ALTCHOICE

## 2021-01-01 RX ORDER — ALBUTEROL SULFATE 2.5 MG/3ML
2.5 SOLUTION RESPIRATORY (INHALATION) EVERY 4 HOURS PRN
COMMUNITY
Start: 2021-01-01 | End: 2021-01-01 | Stop reason: SDUPTHER

## 2021-01-01 RX ORDER — IPRATROPIUM BROMIDE AND ALBUTEROL SULFATE 2.5; .5 MG/3ML; MG/3ML
3 SOLUTION RESPIRATORY (INHALATION) 4 TIMES DAILY PRN
Qty: 120 ML | Refills: 5 | Status: SHIPPED | OUTPATIENT
Start: 2021-01-01 | End: 2021-01-01 | Stop reason: SDUPTHER

## 2021-01-01 RX ORDER — ONDANSETRON 4 MG/1
4 TABLET, FILM COATED ORAL EVERY 6 HOURS PRN
Status: DISCONTINUED | OUTPATIENT
Start: 2021-01-01 | End: 2021-01-01 | Stop reason: HOSPADM

## 2021-01-01 RX ORDER — PREDNISONE 20 MG/1
40 TABLET ORAL DAILY
Qty: 10 TABLET | Refills: 0 | Status: ON HOLD | OUTPATIENT
Start: 2021-01-01 | End: 2022-01-01

## 2021-01-01 RX ORDER — SODIUM CHLORIDE 0.9 % (FLUSH) 0.9 %
10 SYRINGE (ML) INJECTION EVERY 12 HOURS SCHEDULED
Status: DISCONTINUED | OUTPATIENT
Start: 2021-01-01 | End: 2021-01-01 | Stop reason: HOSPADM

## 2021-01-01 RX ORDER — AZITHROMYCIN 250 MG/1
TABLET, FILM COATED ORAL
Qty: 6 TABLET | Refills: 0 | Status: ON HOLD | OUTPATIENT
Start: 2021-01-01 | End: 2022-01-01

## 2021-01-01 RX ORDER — PANTOPRAZOLE SODIUM 40 MG/1
40 TABLET, DELAYED RELEASE ORAL 2 TIMES DAILY
Status: DISCONTINUED | OUTPATIENT
Start: 2021-01-01 | End: 2021-01-01 | Stop reason: HOSPADM

## 2021-01-01 RX ORDER — DIPHENHYDRAMINE HCL 25 MG
25 CAPSULE ORAL NIGHTLY PRN
Status: DISCONTINUED | OUTPATIENT
Start: 2021-01-01 | End: 2021-01-01 | Stop reason: HOSPADM

## 2021-01-01 RX ORDER — LEVOFLOXACIN 500 MG/1
500 TABLET, FILM COATED ORAL DAILY
Qty: 6 TABLET | Refills: 0 | Status: SHIPPED | OUTPATIENT
Start: 2021-01-01 | End: 2021-01-01

## 2021-01-01 RX ORDER — LIDOCAINE 50 MG/G
1 PATCH TOPICAL EVERY 24 HOURS
Qty: 15 EACH | Refills: 0 | Status: SHIPPED | OUTPATIENT
Start: 2021-01-01 | End: 2021-01-01

## 2021-01-01 RX ORDER — AMOXICILLIN AND CLAVULANATE POTASSIUM 875; 125 MG/1; MG/1
1 TABLET, FILM COATED ORAL 2 TIMES DAILY
Qty: 20 TABLET | Refills: 0 | Status: SHIPPED | OUTPATIENT
Start: 2021-01-01 | End: 2021-01-01

## 2021-01-01 RX ORDER — METOPROLOL SUCCINATE 25 MG/1
25 TABLET, EXTENDED RELEASE ORAL DAILY
Qty: 90 TABLET | Refills: 0 | Status: SHIPPED | OUTPATIENT
Start: 2021-01-01 | End: 2021-01-01 | Stop reason: SDUPTHER

## 2021-01-01 RX ORDER — BENZONATATE 100 MG/1
100 CAPSULE ORAL 3 TIMES DAILY PRN
Qty: 30 CAPSULE | Refills: 0 | Status: ON HOLD | OUTPATIENT
Start: 2021-01-01 | End: 2021-01-01

## 2021-01-01 RX ORDER — TIOTROPIUM BROMIDE INHALATION SPRAY 3.12 UG/1
2 SPRAY, METERED RESPIRATORY (INHALATION)
Qty: 2 EACH | Refills: 0 | COMMUNITY
Start: 2021-01-01 | End: 2021-01-01 | Stop reason: ALTCHOICE

## 2021-01-01 RX ORDER — ATORVASTATIN CALCIUM 80 MG/1
TABLET, FILM COATED ORAL
COMMUNITY
Start: 2021-01-01 | End: 2021-01-01 | Stop reason: HOSPADM

## 2021-01-01 RX ORDER — ALBUTEROL SULFATE 90 UG/1
2 AEROSOL, METERED RESPIRATORY (INHALATION) EVERY 4 HOURS PRN
Qty: 54 G | Refills: 3 | Status: SHIPPED | OUTPATIENT
Start: 2021-01-01 | End: 2021-01-01

## 2021-01-01 RX ORDER — AZITHROMYCIN 250 MG/1
250 TABLET, FILM COATED ORAL DAILY
Qty: 2 TABLET | Refills: 0 | Status: SHIPPED | OUTPATIENT
Start: 2021-01-01 | End: 2021-01-01

## 2021-01-01 RX ORDER — ACETAMINOPHEN AND CODEINE PHOSPHATE 300; 30 MG/1; MG/1
1 TABLET ORAL EVERY 6 HOURS PRN
Qty: 10 TABLET | Refills: 0 | Status: SHIPPED | OUTPATIENT
Start: 2021-01-01 | End: 2021-01-01 | Stop reason: SDUPTHER

## 2021-01-01 RX ORDER — AMOXICILLIN AND CLAVULANATE POTASSIUM 875; 125 MG/1; MG/1
1 TABLET, FILM COATED ORAL 2 TIMES DAILY
COMMUNITY
Start: 2021-01-01 | End: 2021-01-01 | Stop reason: HOSPADM

## 2021-01-01 RX ORDER — FUROSEMIDE 20 MG/1
20 TABLET ORAL DAILY PRN
Qty: 30 TABLET | Refills: 2 | Status: SHIPPED | OUTPATIENT
Start: 2021-01-01 | End: 2021-01-01

## 2021-01-01 RX ORDER — LISINOPRIL 40 MG/1
40 TABLET ORAL DAILY
Qty: 90 TABLET | Refills: 1 | Status: SHIPPED | OUTPATIENT
Start: 2021-01-01 | End: 2021-01-01 | Stop reason: SDUPTHER

## 2021-01-01 RX ORDER — MAGNESIUM SULFATE HEPTAHYDRATE 40 MG/ML
2 INJECTION, SOLUTION INTRAVENOUS ONCE
Status: COMPLETED | OUTPATIENT
Start: 2021-01-01 | End: 2021-01-01

## 2021-01-01 RX ORDER — IPRATROPIUM BROMIDE AND ALBUTEROL SULFATE 2.5; .5 MG/3ML; MG/3ML
3 SOLUTION RESPIRATORY (INHALATION) 4 TIMES DAILY
Qty: 120 ML | Refills: 11 | Status: SHIPPED | OUTPATIENT
Start: 2021-01-01 | End: 2021-01-01

## 2021-01-01 RX ORDER — METHYLPREDNISOLONE 4 MG/1
TABLET ORAL
Qty: 21 TABLET | Refills: 0 | Status: SHIPPED | OUTPATIENT
Start: 2021-01-01 | End: 2021-01-01

## 2021-01-01 RX ORDER — LEVOFLOXACIN 500 MG/1
500 TABLET, FILM COATED ORAL EVERY 24 HOURS
Status: DISCONTINUED | OUTPATIENT
Start: 2021-01-01 | End: 2021-01-01 | Stop reason: HOSPADM

## 2021-01-01 RX ORDER — ACETAMINOPHEN 500 MG
1000 TABLET ORAL ONCE
Status: COMPLETED | OUTPATIENT
Start: 2021-01-01 | End: 2021-01-01

## 2021-01-01 RX ORDER — METHYLPREDNISOLONE SODIUM SUCCINATE 125 MG/2ML
125 INJECTION, POWDER, LYOPHILIZED, FOR SOLUTION INTRAMUSCULAR; INTRAVENOUS ONCE
Status: COMPLETED | OUTPATIENT
Start: 2021-01-01 | End: 2021-01-01

## 2021-01-01 RX ORDER — PREDNISONE 20 MG/1
40 TABLET ORAL
Status: DISCONTINUED | OUTPATIENT
Start: 2021-01-01 | End: 2021-01-01 | Stop reason: HOSPADM

## 2021-01-01 RX ORDER — ASPIRIN 81 MG/1
81 TABLET ORAL DAILY
Status: DISCONTINUED | OUTPATIENT
Start: 2021-01-01 | End: 2021-01-01 | Stop reason: HOSPADM

## 2021-01-01 RX ORDER — AMLODIPINE BESYLATE 5 MG/1
10 TABLET ORAL DAILY
Qty: 30 TABLET | Refills: 3 | Status: SHIPPED | OUTPATIENT
Start: 2021-01-01 | End: 2021-01-01

## 2021-01-01 RX ORDER — BUDESONIDE AND FORMOTEROL FUMARATE DIHYDRATE 160; 4.5 UG/1; UG/1
2 AEROSOL RESPIRATORY (INHALATION) 2 TIMES DAILY
Qty: 30.6 G | Refills: 3 | Status: SHIPPED | OUTPATIENT
Start: 2021-01-01

## 2021-01-01 RX ORDER — FUROSEMIDE 20 MG/1
20 TABLET ORAL DAILY PRN
Qty: 30 TABLET | Refills: 2 | OUTPATIENT
Start: 2021-01-01

## 2021-01-01 RX ORDER — PANTOPRAZOLE SODIUM 40 MG/1
40 TABLET, DELAYED RELEASE ORAL
Status: DISCONTINUED | OUTPATIENT
Start: 2021-01-01 | End: 2021-01-01

## 2021-01-01 RX ORDER — AMLODIPINE BESYLATE 5 MG/1
5 TABLET ORAL DAILY
Status: DISCONTINUED | OUTPATIENT
Start: 2021-01-01 | End: 2021-01-01

## 2021-01-01 RX ORDER — FUROSEMIDE 10 MG/ML
20 INJECTION INTRAMUSCULAR; INTRAVENOUS EVERY 12 HOURS
Status: DISCONTINUED | OUTPATIENT
Start: 2021-01-01 | End: 2021-01-01 | Stop reason: HOSPADM

## 2021-01-01 RX ORDER — BUDESONIDE AND FORMOTEROL FUMARATE DIHYDRATE 160; 4.5 UG/1; UG/1
2 AEROSOL RESPIRATORY (INHALATION)
COMMUNITY
End: 2021-01-01

## 2021-01-01 RX ORDER — OXYCODONE HYDROCHLORIDE AND ACETAMINOPHEN 5; 325 MG/1; MG/1
1 TABLET ORAL ONCE
Status: COMPLETED | OUTPATIENT
Start: 2021-01-01 | End: 2021-01-01

## 2021-01-01 RX ORDER — AZITHROMYCIN 250 MG/1
500 TABLET, FILM COATED ORAL DAILY
Status: COMPLETED | OUTPATIENT
Start: 2021-01-01 | End: 2021-01-01

## 2021-01-01 RX ORDER — PRAMIPEXOLE DIHYDROCHLORIDE 0.25 MG/1
0.25 TABLET ORAL NIGHTLY PRN
Status: DISCONTINUED | OUTPATIENT
Start: 2021-01-01 | End: 2021-01-01 | Stop reason: HOSPADM

## 2021-01-01 RX ORDER — BUDESONIDE, GLYCOPYRROLATE, AND FORMOTEROL FUMARATE 160; 9; 4.8 UG/1; UG/1; UG/1
2 AEROSOL, METERED RESPIRATORY (INHALATION) 2 TIMES DAILY
Qty: 2 EACH | Refills: 0 | COMMUNITY
Start: 2021-01-01 | End: 2021-01-01

## 2021-01-01 RX ORDER — ONDANSETRON 4 MG/1
4 TABLET, ORALLY DISINTEGRATING ORAL EVERY 8 HOURS PRN
Qty: 12 TABLET | Refills: 0 | Status: SHIPPED | OUTPATIENT
Start: 2021-01-01 | End: 2022-01-01 | Stop reason: SDUPTHER

## 2021-01-01 RX ORDER — ATORVASTATIN CALCIUM 40 MG/1
40 TABLET, FILM COATED ORAL NIGHTLY
Status: DISCONTINUED | OUTPATIENT
Start: 2021-01-01 | End: 2021-01-01 | Stop reason: HOSPADM

## 2021-01-01 RX ORDER — IPRATROPIUM BROMIDE AND ALBUTEROL SULFATE 2.5; .5 MG/3ML; MG/3ML
SOLUTION RESPIRATORY (INHALATION)
Qty: 372 ML | Refills: 11 | Status: ON HOLD | OUTPATIENT
Start: 2021-01-01 | End: 2022-01-01

## 2021-01-01 RX ORDER — METHYLPREDNISOLONE SODIUM SUCCINATE 40 MG/ML
40 INJECTION, POWDER, LYOPHILIZED, FOR SOLUTION INTRAMUSCULAR; INTRAVENOUS EVERY 8 HOURS
Status: DISCONTINUED | OUTPATIENT
Start: 2021-01-01 | End: 2021-01-01

## 2021-01-01 RX ORDER — IPRATROPIUM BROMIDE AND ALBUTEROL SULFATE 2.5; .5 MG/3ML; MG/3ML
3 SOLUTION RESPIRATORY (INHALATION)
Status: DISCONTINUED | OUTPATIENT
Start: 2021-01-01 | End: 2021-01-01 | Stop reason: HOSPADM

## 2021-01-01 RX ORDER — LISINOPRIL 40 MG/1
40 TABLET ORAL DAILY
Qty: 90 TABLET | Refills: 1 | Status: ON HOLD | OUTPATIENT
Start: 2021-01-01 | End: 2022-01-01 | Stop reason: SDUPTHER

## 2021-01-01 RX ORDER — ONDANSETRON 4 MG/1
4 TABLET, ORALLY DISINTEGRATING ORAL EVERY 8 HOURS PRN
Qty: 12 TABLET | Refills: 0 | OUTPATIENT
Start: 2021-01-01

## 2021-01-01 RX ORDER — PANTOPRAZOLE SODIUM 40 MG/1
40 TABLET, DELAYED RELEASE ORAL
Status: DISCONTINUED | OUTPATIENT
Start: 2021-01-01 | End: 2021-01-01 | Stop reason: HOSPADM

## 2021-01-01 RX ORDER — METOPROLOL SUCCINATE 25 MG/1
25 TABLET, EXTENDED RELEASE ORAL DAILY
Qty: 90 TABLET | Refills: 0 | Status: SHIPPED | OUTPATIENT
Start: 2021-01-01 | End: 2021-01-01 | Stop reason: ALTCHOICE

## 2021-01-01 RX ORDER — AZITHROMYCIN 250 MG/1
250 TABLET, FILM COATED ORAL DAILY
Status: DISCONTINUED | OUTPATIENT
Start: 2021-01-01 | End: 2021-01-01 | Stop reason: HOSPADM

## 2021-01-01 RX ORDER — AMLODIPINE BESYLATE 10 MG/1
10 TABLET ORAL DAILY
Qty: 90 TABLET | Refills: 1 | Status: SHIPPED | OUTPATIENT
Start: 2021-01-01 | End: 2021-01-01 | Stop reason: SDUPTHER

## 2021-01-01 RX ORDER — ACETAMINOPHEN AND CODEINE PHOSPHATE 300; 30 MG/1; MG/1
1 TABLET ORAL 2 TIMES DAILY
Qty: 10 TABLET | Refills: 0 | Status: SHIPPED | OUTPATIENT
Start: 2021-01-01 | End: 2022-01-01

## 2021-01-01 RX ORDER — SODIUM CHLORIDE, SODIUM LACTATE, POTASSIUM CHLORIDE, CALCIUM CHLORIDE 600; 310; 30; 20 MG/100ML; MG/100ML; MG/100ML; MG/100ML
50 INJECTION, SOLUTION INTRAVENOUS CONTINUOUS
Status: DISCONTINUED | OUTPATIENT
Start: 2021-01-01 | End: 2021-01-01

## 2021-01-01 RX ORDER — CEFDINIR 300 MG/1
300 CAPSULE ORAL EVERY 12 HOURS SCHEDULED
Status: DISCONTINUED | OUTPATIENT
Start: 2021-01-01 | End: 2021-01-01

## 2021-01-01 RX ORDER — ATORVASTATIN CALCIUM 40 MG/1
40 TABLET, FILM COATED ORAL DAILY
Status: DISCONTINUED | OUTPATIENT
Start: 2021-01-01 | End: 2021-01-01

## 2021-01-01 RX ORDER — ONDANSETRON 4 MG/1
4 TABLET, ORALLY DISINTEGRATING ORAL EVERY 8 HOURS PRN
Qty: 12 TABLET | Refills: 0 | Status: SHIPPED | OUTPATIENT
Start: 2021-01-01 | End: 2021-01-01 | Stop reason: SDUPTHER

## 2021-01-01 RX ORDER — ERGOCALCIFEROL 1.25 MG/1
50000 CAPSULE ORAL WEEKLY
Qty: 12 CAPSULE | Refills: 1 | Status: ON HOLD | OUTPATIENT
Start: 2021-01-01 | End: 2022-01-01

## 2021-01-01 RX ORDER — AMLODIPINE BESYLATE 10 MG/1
10 TABLET ORAL DAILY
Qty: 90 TABLET | Refills: 1 | Status: SHIPPED | OUTPATIENT
Start: 2021-01-01 | End: 2022-01-01 | Stop reason: HOSPADM

## 2021-01-01 RX ORDER — ONDANSETRON 4 MG/1
4 TABLET, ORALLY DISINTEGRATING ORAL EVERY 8 HOURS PRN
Qty: 12 TABLET | Refills: 0 | Status: SHIPPED | OUTPATIENT
Start: 2021-01-01 | End: 2021-01-01

## 2021-01-01 RX ORDER — PRAMIPEXOLE DIHYDROCHLORIDE 0.25 MG/1
0.25 TABLET ORAL 3 TIMES DAILY
Status: DISCONTINUED | OUTPATIENT
Start: 2021-01-01 | End: 2021-01-01 | Stop reason: HOSPADM

## 2021-01-01 RX ORDER — LEVOFLOXACIN 250 MG/1
250 TABLET ORAL EVERY 24 HOURS
Status: DISCONTINUED | OUTPATIENT
Start: 2021-01-01 | End: 2021-01-01

## 2021-01-01 RX ORDER — ALBUTEROL SULFATE 2.5 MG/3ML
2.5 SOLUTION RESPIRATORY (INHALATION) EVERY 4 HOURS PRN
Qty: 180 ML | Refills: 3 | Status: SHIPPED | OUTPATIENT
Start: 2021-01-01 | End: 2021-01-01 | Stop reason: SDUPTHER

## 2021-01-01 RX ORDER — IBUPROFEN 400 MG/1
600 TABLET ORAL ONCE
Status: COMPLETED | OUTPATIENT
Start: 2021-01-01 | End: 2021-01-01

## 2021-01-01 RX ORDER — METOPROLOL SUCCINATE 25 MG/1
25 TABLET, EXTENDED RELEASE ORAL DAILY
Qty: 90 TABLET | Refills: 0 | Status: SHIPPED | OUTPATIENT
Start: 2021-01-01 | End: 2021-01-01

## 2021-01-01 RX ORDER — AZITHROMYCIN 250 MG/1
TABLET, FILM COATED ORAL
Qty: 6 TABLET | Refills: 0 | Status: SHIPPED | OUTPATIENT
Start: 2021-01-01 | End: 2021-01-01

## 2021-01-01 RX ORDER — L.ACID,PARA/B.BIFIDUM/S.THERM 8B CELL
1 CAPSULE ORAL DAILY
Status: DISCONTINUED | OUTPATIENT
Start: 2021-01-01 | End: 2021-01-01 | Stop reason: HOSPADM

## 2021-01-01 RX ORDER — ATORVASTATIN CALCIUM 40 MG/1
40 TABLET, FILM COATED ORAL DAILY
Status: DISCONTINUED | OUTPATIENT
Start: 2021-01-01 | End: 2021-01-01 | Stop reason: HOSPADM

## 2021-01-01 RX ADMIN — DICYCLOMINE HYDROCHLORIDE 10 MG: 10 CAPSULE ORAL at 16:12

## 2021-01-01 RX ADMIN — IPRATROPIUM BROMIDE AND ALBUTEROL SULFATE 3 ML: 2.5; .5 SOLUTION RESPIRATORY (INHALATION) at 15:13

## 2021-01-01 RX ADMIN — IPRATROPIUM BROMIDE AND ALBUTEROL SULFATE 3 ML: 2.5; .5 SOLUTION RESPIRATORY (INHALATION) at 07:12

## 2021-01-01 RX ADMIN — GUAIFENESIN 1200 MG: 600 TABLET, EXTENDED RELEASE ORAL at 22:10

## 2021-01-01 RX ADMIN — GUAIFENESIN 1200 MG: 600 TABLET, EXTENDED RELEASE ORAL at 09:28

## 2021-01-01 RX ADMIN — CEFTRIAXONE 1 G: 1 INJECTION, POWDER, FOR SOLUTION INTRAMUSCULAR; INTRAVENOUS at 20:44

## 2021-01-01 RX ADMIN — METHYLPREDNISOLONE SODIUM SUCCINATE 40 MG: 40 INJECTION, POWDER, FOR SOLUTION INTRAMUSCULAR; INTRAVENOUS at 03:42

## 2021-01-01 RX ADMIN — AMLODIPINE BESYLATE 10 MG: 10 TABLET ORAL at 08:30

## 2021-01-01 RX ADMIN — DICYCLOMINE HYDROCHLORIDE 10 MG: 10 CAPSULE ORAL at 09:30

## 2021-01-01 RX ADMIN — IPRATROPIUM BROMIDE AND ALBUTEROL SULFATE 3 ML: 2.5; .5 SOLUTION RESPIRATORY (INHALATION) at 21:45

## 2021-01-01 RX ADMIN — SODIUM CHLORIDE, PRESERVATIVE FREE 10 ML: 5 INJECTION INTRAVENOUS at 08:31

## 2021-01-01 RX ADMIN — DOCUSATE SODIUM 50 MG AND SENNOSIDES 8.6 MG 1 TABLET: 8.6; 5 TABLET, FILM COATED ORAL at 20:34

## 2021-01-01 RX ADMIN — IPRATROPIUM BROMIDE AND ALBUTEROL SULFATE 3 ML: 2.5; .5 SOLUTION RESPIRATORY (INHALATION) at 16:41

## 2021-01-01 RX ADMIN — ALUMINUM HYDROXIDE, MAGNESIUM HYDROXIDE, AND DIMETHICONE 15 ML: 400; 400; 40 SUSPENSION ORAL at 13:35

## 2021-01-01 RX ADMIN — ACETAMINOPHEN 1000 MG: 500 TABLET, FILM COATED ORAL at 08:44

## 2021-01-01 RX ADMIN — BUDESONIDE AND FORMOTEROL FUMARATE DIHYDRATE 2 PUFF: 160; 4.5 AEROSOL RESPIRATORY (INHALATION) at 20:33

## 2021-01-01 RX ADMIN — IPRATROPIUM BROMIDE AND ALBUTEROL SULFATE 3 ML: 2.5; .5 SOLUTION RESPIRATORY (INHALATION) at 11:09

## 2021-01-01 RX ADMIN — ATORVASTATIN CALCIUM 40 MG: 40 TABLET, FILM COATED ORAL at 20:40

## 2021-01-01 RX ADMIN — AMLODIPINE BESYLATE 5 MG: 5 TABLET ORAL at 08:50

## 2021-01-01 RX ADMIN — LEVOFLOXACIN 500 MG: 500 TABLET, FILM COATED ORAL at 11:31

## 2021-01-01 RX ADMIN — SODIUM CHLORIDE, PRESERVATIVE FREE 10 ML: 5 INJECTION INTRAVENOUS at 21:56

## 2021-01-01 RX ADMIN — ENOXAPARIN SODIUM 40 MG: 40 INJECTION SUBCUTANEOUS at 09:21

## 2021-01-01 RX ADMIN — IPRATROPIUM BROMIDE AND ALBUTEROL SULFATE 3 ML: 2.5; .5 SOLUTION RESPIRATORY (INHALATION) at 21:58

## 2021-01-01 RX ADMIN — PREDNISONE 40 MG: 20 TABLET ORAL at 08:16

## 2021-01-01 RX ADMIN — FUROSEMIDE 40 MG: 10 INJECTION, SOLUTION INTRAMUSCULAR; INTRAVENOUS at 19:34

## 2021-01-01 RX ADMIN — ACETAMINOPHEN 650 MG: 325 TABLET, FILM COATED ORAL at 09:33

## 2021-01-01 RX ADMIN — PRAMIPEXOLE DIHYDROCHLORIDE 0.25 MG: 0.25 TABLET ORAL at 20:34

## 2021-01-01 RX ADMIN — GUAIFENESIN 1200 MG: 600 TABLET, EXTENDED RELEASE ORAL at 20:40

## 2021-01-01 RX ADMIN — BUDESONIDE AND FORMOTEROL FUMARATE DIHYDRATE 2 PUFF: 160; 4.5 AEROSOL RESPIRATORY (INHALATION) at 07:36

## 2021-01-01 RX ADMIN — DICYCLOMINE HYDROCHLORIDE 10 MG: 10 CAPSULE ORAL at 21:11

## 2021-01-01 RX ADMIN — ATORVASTATIN CALCIUM 40 MG: 40 TABLET, FILM COATED ORAL at 20:34

## 2021-01-01 RX ADMIN — PANTOPRAZOLE SODIUM 40 MG: 40 TABLET, DELAYED RELEASE ORAL at 08:50

## 2021-01-01 RX ADMIN — METHYLPREDNISOLONE SODIUM SUCCINATE 60 MG: 125 INJECTION, POWDER, FOR SOLUTION INTRAMUSCULAR; INTRAVENOUS at 11:12

## 2021-01-01 RX ADMIN — DICYCLOMINE HYDROCHLORIDE 10 MG: 10 CAPSULE ORAL at 18:55

## 2021-01-01 RX ADMIN — ACETAMINOPHEN 650 MG: 325 TABLET, FILM COATED ORAL at 08:50

## 2021-01-01 RX ADMIN — Medication 1 CAPSULE: at 08:50

## 2021-01-01 RX ADMIN — IPRATROPIUM BROMIDE AND ALBUTEROL SULFATE 3 ML: 2.5; .5 SOLUTION RESPIRATORY (INHALATION) at 12:12

## 2021-01-01 RX ADMIN — SODIUM CHLORIDE, PRESERVATIVE FREE 10 ML: 5 INJECTION INTRAVENOUS at 13:35

## 2021-01-01 RX ADMIN — BUDESONIDE AND FORMOTEROL FUMARATE DIHYDRATE 2 PUFF: 160; 4.5 AEROSOL RESPIRATORY (INHALATION) at 07:03

## 2021-01-01 RX ADMIN — PANTOPRAZOLE SODIUM 40 MG: 40 TABLET, DELAYED RELEASE ORAL at 08:36

## 2021-01-01 RX ADMIN — IPRATROPIUM BROMIDE AND ALBUTEROL SULFATE 3 ML: 2.5; .5 SOLUTION RESPIRATORY (INHALATION) at 07:36

## 2021-01-01 RX ADMIN — PREDNISONE 20 MG: 20 TABLET ORAL at 08:30

## 2021-01-01 RX ADMIN — PANTOPRAZOLE SODIUM 40 MG: 40 TABLET, DELAYED RELEASE ORAL at 08:29

## 2021-01-01 RX ADMIN — ASPIRIN 81 MG: 81 TABLET, COATED ORAL at 09:21

## 2021-01-01 RX ADMIN — IPRATROPIUM BROMIDE AND ALBUTEROL SULFATE 3 ML: 2.5; .5 SOLUTION RESPIRATORY (INHALATION) at 20:33

## 2021-01-01 RX ADMIN — GUAIFENESIN 1200 MG: 600 TABLET, EXTENDED RELEASE ORAL at 22:16

## 2021-01-01 RX ADMIN — ASPIRIN 81 MG: 81 TABLET, COATED ORAL at 09:30

## 2021-01-01 RX ADMIN — DICYCLOMINE HYDROCHLORIDE 10 MG: 10 CAPSULE ORAL at 08:50

## 2021-01-01 RX ADMIN — IPRATROPIUM BROMIDE AND ALBUTEROL SULFATE 3 ML: 2.5; .5 SOLUTION RESPIRATORY (INHALATION) at 10:57

## 2021-01-01 RX ADMIN — DICYCLOMINE HYDROCHLORIDE 10 MG: 10 CAPSULE ORAL at 20:34

## 2021-01-01 RX ADMIN — ENOXAPARIN SODIUM 40 MG: 40 INJECTION SUBCUTANEOUS at 09:31

## 2021-01-01 RX ADMIN — DOCUSATE SODIUM 50 MG AND SENNOSIDES 8.6 MG 1 TABLET: 8.6; 5 TABLET, FILM COATED ORAL at 20:40

## 2021-01-01 RX ADMIN — CEFTRIAXONE 1 G: 1 INJECTION, POWDER, FOR SOLUTION INTRAMUSCULAR; INTRAVENOUS at 20:01

## 2021-01-01 RX ADMIN — GUAIFENESIN 1200 MG: 600 TABLET, EXTENDED RELEASE ORAL at 20:34

## 2021-01-01 RX ADMIN — PANTOPRAZOLE SODIUM 40 MG: 40 TABLET, DELAYED RELEASE ORAL at 20:40

## 2021-01-01 RX ADMIN — METHYLPREDNISOLONE SODIUM SUCCINATE 60 MG: 125 INJECTION, POWDER, FOR SOLUTION INTRAMUSCULAR; INTRAVENOUS at 05:01

## 2021-01-01 RX ADMIN — ONDANSETRON 4 MG: 4 TABLET, ORALLY DISINTEGRATING ORAL at 11:38

## 2021-01-01 RX ADMIN — ACETAMINOPHEN 1000 MG: 500 TABLET, FILM COATED ORAL at 08:28

## 2021-01-01 RX ADMIN — GUAIFENESIN 1200 MG: 600 TABLET, EXTENDED RELEASE ORAL at 09:21

## 2021-01-01 RX ADMIN — AZITHROMYCIN DIHYDRATE 500 MG: 500 INJECTION, POWDER, LYOPHILIZED, FOR SOLUTION INTRAVENOUS at 21:56

## 2021-01-01 RX ADMIN — IPRATROPIUM BROMIDE AND ALBUTEROL SULFATE 3 ML: 2.5; .5 SOLUTION RESPIRATORY (INHALATION) at 06:36

## 2021-01-01 RX ADMIN — DICYCLOMINE HYDROCHLORIDE 10 MG: 10 CAPSULE ORAL at 20:40

## 2021-01-01 RX ADMIN — BUDESONIDE AND FORMOTEROL FUMARATE DIHYDRATE 2 PUFF: 160; 4.5 AEROSOL RESPIRATORY (INHALATION) at 19:23

## 2021-01-01 RX ADMIN — DICYCLOMINE HYDROCHLORIDE 10 MG: 10 CAPSULE ORAL at 09:28

## 2021-01-01 RX ADMIN — PANTOPRAZOLE SODIUM 40 MG: 40 TABLET, DELAYED RELEASE ORAL at 09:31

## 2021-01-01 RX ADMIN — METHYLPREDNISOLONE SODIUM SUCCINATE 125 MG: 125 INJECTION, POWDER, FOR SOLUTION INTRAMUSCULAR; INTRAVENOUS at 18:07

## 2021-01-01 RX ADMIN — IPRATROPIUM BROMIDE AND ALBUTEROL SULFATE 3 ML: 2.5; .5 SOLUTION RESPIRATORY (INHALATION) at 11:24

## 2021-01-01 RX ADMIN — PRAMIPEXOLE DIHYDROCHLORIDE 0.25 MG: 0.25 TABLET ORAL at 08:36

## 2021-01-01 RX ADMIN — GUAIFENESIN 1200 MG: 600 TABLET, EXTENDED RELEASE ORAL at 09:30

## 2021-01-01 RX ADMIN — SODIUM CHLORIDE, PRESERVATIVE FREE 10 ML: 5 INJECTION INTRAVENOUS at 21:11

## 2021-01-01 RX ADMIN — BUDESONIDE AND FORMOTEROL FUMARATE DIHYDRATE 2 PUFF: 160; 4.5 AEROSOL RESPIRATORY (INHALATION) at 06:36

## 2021-01-01 RX ADMIN — IPRATROPIUM BROMIDE AND ALBUTEROL SULFATE 3 ML: 2.5; .5 SOLUTION RESPIRATORY (INHALATION) at 08:20

## 2021-01-01 RX ADMIN — IOPAMIDOL 100 ML: 755 INJECTION, SOLUTION INTRAVENOUS at 13:48

## 2021-01-01 RX ADMIN — ATORVASTATIN CALCIUM 40 MG: 40 TABLET, FILM COATED ORAL at 08:30

## 2021-01-01 RX ADMIN — AZITHROMYCIN DIHYDRATE 500 MG: 500 INJECTION, POWDER, LYOPHILIZED, FOR SOLUTION INTRAVENOUS at 21:58

## 2021-01-01 RX ADMIN — GUAIFENESIN 1200 MG: 600 TABLET, EXTENDED RELEASE ORAL at 08:50

## 2021-01-01 RX ADMIN — IPRATROPIUM BROMIDE AND ALBUTEROL SULFATE 3 ML: 2.5; .5 SOLUTION RESPIRATORY (INHALATION) at 10:48

## 2021-01-01 RX ADMIN — PERFLUTREN 8.48 MG: 6.52 INJECTION, SUSPENSION INTRAVENOUS at 09:01

## 2021-01-01 RX ADMIN — PRAMIPEXOLE DIHYDROCHLORIDE 0.25 MG: 0.25 TABLET ORAL at 09:28

## 2021-01-01 RX ADMIN — AZITHROMYCIN DIHYDRATE 500 MG: 500 INJECTION, POWDER, LYOPHILIZED, FOR SOLUTION INTRAVENOUS at 23:39

## 2021-01-01 RX ADMIN — METHYLPREDNISOLONE SODIUM SUCCINATE 40 MG: 40 INJECTION, POWDER, FOR SOLUTION INTRAMUSCULAR; INTRAVENOUS at 12:22

## 2021-01-01 RX ADMIN — PRAMIPEXOLE DIHYDROCHLORIDE 0.25 MG: 0.25 TABLET ORAL at 01:07

## 2021-01-01 RX ADMIN — SODIUM CHLORIDE, PRESERVATIVE FREE 10 ML: 5 INJECTION INTRAVENOUS at 05:02

## 2021-01-01 RX ADMIN — PRAMIPEXOLE DIHYDROCHLORIDE 0.25 MG: 0.25 TABLET ORAL at 22:16

## 2021-01-01 RX ADMIN — AZITHROMYCIN 250 MG: 250 TABLET, FILM COATED ORAL at 08:50

## 2021-01-01 RX ADMIN — PANTOPRAZOLE SODIUM 40 MG: 40 TABLET, DELAYED RELEASE ORAL at 20:44

## 2021-01-01 RX ADMIN — AMLODIPINE BESYLATE 10 MG: 10 TABLET ORAL at 09:30

## 2021-01-01 RX ADMIN — ENOXAPARIN SODIUM 40 MG: 40 INJECTION SUBCUTANEOUS at 09:30

## 2021-01-01 RX ADMIN — SODIUM CHLORIDE, PRESERVATIVE FREE 10 ML: 5 INJECTION INTRAVENOUS at 08:21

## 2021-01-01 RX ADMIN — METHYLPREDNISOLONE SODIUM SUCCINATE 40 MG: 40 INJECTION, POWDER, FOR SOLUTION INTRAMUSCULAR; INTRAVENOUS at 20:20

## 2021-01-01 RX ADMIN — IPRATROPIUM BROMIDE AND ALBUTEROL SULFATE 3 ML: 2.5; .5 SOLUTION RESPIRATORY (INHALATION) at 19:18

## 2021-01-01 RX ADMIN — ALBUTEROL SULFATE 10 MG: 2.5 SOLUTION RESPIRATORY (INHALATION) at 07:22

## 2021-01-01 RX ADMIN — ASPIRIN 81 MG: 81 TABLET, COATED ORAL at 08:19

## 2021-01-01 RX ADMIN — GUAIFENESIN 1200 MG: 600 TABLET, EXTENDED RELEASE ORAL at 08:30

## 2021-01-01 RX ADMIN — IPRATROPIUM BROMIDE AND ALBUTEROL SULFATE 3 ML: 2.5; .5 SOLUTION RESPIRATORY (INHALATION) at 22:19

## 2021-01-01 RX ADMIN — OXYCODONE HYDROCHLORIDE AND ACETAMINOPHEN 1 TABLET: 5; 325 TABLET ORAL at 11:38

## 2021-01-01 RX ADMIN — GUAIFENESIN 1200 MG: 600 TABLET, EXTENDED RELEASE ORAL at 21:10

## 2021-01-01 RX ADMIN — IPRATROPIUM BROMIDE AND ALBUTEROL SULFATE 3 ML: 2.5; .5 SOLUTION RESPIRATORY (INHALATION) at 07:22

## 2021-01-01 RX ADMIN — BUDESONIDE AND FORMOTEROL FUMARATE DIHYDRATE 2 PUFF: 160; 4.5 AEROSOL RESPIRATORY (INHALATION) at 08:20

## 2021-01-01 RX ADMIN — IBUPROFEN 600 MG: 400 TABLET, FILM COATED ORAL at 11:38

## 2021-01-01 RX ADMIN — ACETAMINOPHEN 650 MG: 325 TABLET, FILM COATED ORAL at 18:30

## 2021-01-01 RX ADMIN — ENOXAPARIN SODIUM 40 MG: 40 INJECTION SUBCUTANEOUS at 08:21

## 2021-01-01 RX ADMIN — ASPIRIN 81 MG: 81 TABLET ORAL at 08:30

## 2021-01-01 RX ADMIN — METHYLPREDNISOLONE SODIUM SUCCINATE 40 MG: 40 INJECTION, POWDER, FOR SOLUTION INTRAMUSCULAR; INTRAVENOUS at 12:41

## 2021-01-01 RX ADMIN — DICYCLOMINE HYDROCHLORIDE 10 MG: 10 CAPSULE ORAL at 08:36

## 2021-01-01 RX ADMIN — SODIUM CHLORIDE, PRESERVATIVE FREE 10 ML: 5 INJECTION INTRAVENOUS at 09:31

## 2021-01-01 RX ADMIN — AZITHROMYCIN DIHYDRATE 500 MG: 500 INJECTION, POWDER, LYOPHILIZED, FOR SOLUTION INTRAVENOUS at 22:16

## 2021-01-01 RX ADMIN — MAGNESIUM SULFATE HEPTAHYDRATE 2 G: 2 INJECTION, SOLUTION INTRAVENOUS at 07:39

## 2021-01-01 RX ADMIN — PANTOPRAZOLE SODIUM 40 MG: 40 TABLET, DELAYED RELEASE ORAL at 08:19

## 2021-01-01 RX ADMIN — PANTOPRAZOLE SODIUM 40 MG: 40 TABLET, DELAYED RELEASE ORAL at 18:55

## 2021-01-01 RX ADMIN — LISINOPRIL 40 MG: 10 TABLET ORAL at 08:30

## 2021-01-01 RX ADMIN — DICYCLOMINE HYDROCHLORIDE 10 MG: 10 CAPSULE ORAL at 08:30

## 2021-01-01 RX ADMIN — PRAMIPEXOLE DIHYDROCHLORIDE 0.25 MG: 0.25 TABLET ORAL at 20:40

## 2021-01-01 RX ADMIN — PRAMIPEXOLE DIHYDROCHLORIDE 0.25 MG: 0.25 TABLET ORAL at 15:22

## 2021-01-01 RX ADMIN — CEFDINIR 300 MG: 300 CAPSULE ORAL at 09:28

## 2021-01-01 RX ADMIN — IOPAMIDOL 100 ML: 755 INJECTION, SOLUTION INTRAVENOUS at 18:10

## 2021-01-01 RX ADMIN — SODIUM CHLORIDE, PRESERVATIVE FREE 10 ML: 5 INJECTION INTRAVENOUS at 22:11

## 2021-01-01 RX ADMIN — AMLODIPINE BESYLATE 10 MG: 10 TABLET ORAL at 09:28

## 2021-01-01 RX ADMIN — FUROSEMIDE 20 MG: 10 INJECTION, SOLUTION INTRAVENOUS at 06:04

## 2021-01-01 RX ADMIN — METHYLPREDNISOLONE SODIUM SUCCINATE 125 MG: 125 INJECTION, POWDER, FOR SOLUTION INTRAMUSCULAR; INTRAVENOUS at 12:32

## 2021-01-01 RX ADMIN — Medication 1 CAPSULE: at 08:30

## 2021-01-01 RX ADMIN — AZITHROMYCIN 500 MG: 250 TABLET, FILM COATED ORAL at 22:10

## 2021-01-01 RX ADMIN — DICYCLOMINE HYDROCHLORIDE 10 MG: 10 CAPSULE ORAL at 08:19

## 2021-01-01 RX ADMIN — GUAIFENESIN 1200 MG: 600 TABLET, EXTENDED RELEASE ORAL at 08:19

## 2021-01-01 RX ADMIN — IPRATROPIUM BROMIDE AND ALBUTEROL SULFATE 3 ML: 2.5; .5 SOLUTION RESPIRATORY (INHALATION) at 00:41

## 2021-01-01 RX ADMIN — AZITHROMYCIN 250 MG: 250 TABLET, FILM COATED ORAL at 08:30

## 2021-01-01 RX ADMIN — METHYLPREDNISOLONE SODIUM SUCCINATE 40 MG: 40 INJECTION, POWDER, FOR SOLUTION INTRAMUSCULAR; INTRAVENOUS at 20:34

## 2021-01-01 RX ADMIN — IPRATROPIUM BROMIDE AND ALBUTEROL SULFATE 3 ML: 2.5; .5 SOLUTION RESPIRATORY (INHALATION) at 07:03

## 2021-01-01 RX ADMIN — PREDNISONE 20 MG: 20 TABLET ORAL at 08:50

## 2021-01-01 RX ADMIN — GUAIFENESIN 1200 MG: 600 TABLET, EXTENDED RELEASE ORAL at 01:43

## 2021-01-01 RX ADMIN — AMLODIPINE BESYLATE 10 MG: 10 TABLET ORAL at 09:21

## 2021-01-01 RX ADMIN — IPRATROPIUM BROMIDE AND ALBUTEROL SULFATE 3 ML: 2.5; .5 SOLUTION RESPIRATORY (INHALATION) at 14:41

## 2021-01-01 RX ADMIN — SODIUM CHLORIDE, PRESERVATIVE FREE 10 ML: 5 INJECTION INTRAVENOUS at 06:04

## 2021-01-01 RX ADMIN — IPRATROPIUM BROMIDE AND ALBUTEROL SULFATE 3 ML: 2.5; .5 SOLUTION RESPIRATORY (INHALATION) at 17:42

## 2021-01-01 RX ADMIN — ASPIRIN 81 MG: 81 TABLET, COATED ORAL at 09:28

## 2021-01-01 RX ADMIN — ASPIRIN 81 MG: 81 TABLET ORAL at 08:50

## 2021-01-01 RX ADMIN — PANTOPRAZOLE SODIUM 40 MG: 40 TABLET, DELAYED RELEASE ORAL at 22:18

## 2021-01-01 RX ADMIN — SODIUM CHLORIDE, PRESERVATIVE FREE 10 ML: 5 INJECTION INTRAVENOUS at 22:16

## 2021-01-01 RX ADMIN — SODIUM CHLORIDE, PRESERVATIVE FREE 10 ML: 5 INJECTION INTRAVENOUS at 08:36

## 2021-01-01 RX ADMIN — ENOXAPARIN SODIUM 40 MG: 40 INJECTION SUBCUTANEOUS at 22:10

## 2021-01-01 RX ADMIN — FUROSEMIDE 20 MG: 10 INJECTION, SOLUTION INTRAVENOUS at 05:27

## 2021-01-01 RX ADMIN — ATORVASTATIN CALCIUM 40 MG: 40 TABLET, FILM COATED ORAL at 08:50

## 2021-01-01 RX ADMIN — ACETAMINOPHEN 650 MG: 325 TABLET, FILM COATED ORAL at 22:34

## 2021-01-01 RX ADMIN — IPRATROPIUM BROMIDE AND ALBUTEROL SULFATE 3 ML: 2.5; .5 SOLUTION RESPIRATORY (INHALATION) at 15:33

## 2021-01-01 RX ADMIN — DICYCLOMINE HYDROCHLORIDE 10 MG: 10 CAPSULE ORAL at 15:22

## 2021-01-01 RX ADMIN — DOCUSATE SODIUM 50 MG AND SENNOSIDES 8.6 MG 1 TABLET: 8.6; 5 TABLET, FILM COATED ORAL at 22:16

## 2021-01-01 RX ADMIN — ATORVASTATIN CALCIUM 40 MG: 40 TABLET, FILM COATED ORAL at 09:21

## 2021-01-01 RX ADMIN — CEFTRIAXONE SODIUM 1 G: 1 INJECTION, POWDER, FOR SOLUTION INTRAMUSCULAR; INTRAVENOUS at 19:32

## 2021-01-01 RX ADMIN — FUROSEMIDE 40 MG: 10 INJECTION, SOLUTION INTRAMUSCULAR; INTRAVENOUS at 18:09

## 2021-01-01 RX ADMIN — ENOXAPARIN SODIUM 40 MG: 40 INJECTION SUBCUTANEOUS at 21:11

## 2021-01-01 RX ADMIN — PANTOPRAZOLE SODIUM 40 MG: 40 TABLET, DELAYED RELEASE ORAL at 08:16

## 2021-01-01 RX ADMIN — METHYLPREDNISOLONE SODIUM SUCCINATE 40 MG: 40 INJECTION, POWDER, FOR SOLUTION INTRAMUSCULAR; INTRAVENOUS at 04:37

## 2021-01-01 RX ADMIN — FUROSEMIDE 20 MG: 10 INJECTION, SOLUTION INTRAVENOUS at 18:55

## 2021-01-01 RX ADMIN — METHYLPREDNISOLONE SODIUM SUCCINATE 125 MG: 125 INJECTION, POWDER, FOR SOLUTION INTRAMUSCULAR; INTRAVENOUS at 19:32

## 2021-01-01 RX ADMIN — BUDESONIDE AND FORMOTEROL FUMARATE DIHYDRATE 2 PUFF: 160; 4.5 AEROSOL RESPIRATORY (INHALATION) at 21:01

## 2021-01-01 RX ADMIN — AMLODIPINE BESYLATE 10 MG: 10 TABLET ORAL at 08:19

## 2021-01-01 RX ADMIN — ACETAMINOPHEN 650 MG: 325 TABLET, FILM COATED ORAL at 22:32

## 2021-01-01 RX ADMIN — DICYCLOMINE HYDROCHLORIDE 10 MG: 10 CAPSULE ORAL at 22:10

## 2021-01-01 RX ADMIN — CEFTRIAXONE 1 G: 1 INJECTION, POWDER, FOR SOLUTION INTRAMUSCULAR; INTRAVENOUS at 20:20

## 2021-01-01 RX ADMIN — IPRATROPIUM BROMIDE AND ALBUTEROL SULFATE 3 ML: 2.5; .5 SOLUTION RESPIRATORY (INHALATION) at 10:59

## 2021-01-01 RX ADMIN — DICYCLOMINE HYDROCHLORIDE 10 MG: 10 CAPSULE ORAL at 22:16

## 2021-01-01 RX ADMIN — IPRATROPIUM BROMIDE AND ALBUTEROL SULFATE 3 ML: 2.5; .5 SOLUTION RESPIRATORY (INHALATION) at 15:36

## 2021-01-11 ENCOUNTER — OFFICE VISIT (OUTPATIENT)
Dept: INTERNAL MEDICINE | Age: 86
End: 2021-01-11
Payer: MEDICARE

## 2021-01-11 ENCOUNTER — HOSPITAL ENCOUNTER (OUTPATIENT)
Dept: GENERAL RADIOLOGY | Age: 86
Discharge: HOME | End: 2021-01-11
Payer: MEDICARE

## 2021-01-11 VITALS
DIASTOLIC BLOOD PRESSURE: 72 MMHG | BODY MASS INDEX: 22.68 KG/M2 | HEIGHT: 66 IN | RESPIRATION RATE: 20 BRPM | OXYGEN SATURATION: 97 % | SYSTOLIC BLOOD PRESSURE: 138 MMHG | WEIGHT: 141.1 LBS | HEART RATE: 70 BPM

## 2021-01-11 DIAGNOSIS — M81.0 OSTEOPOROSIS, UNSPECIFIED OSTEOPOROSIS TYPE, UNSPECIFIED PATHOLOGICAL FRACTURE PRESENCE: Chronic | ICD-10-CM

## 2021-01-11 DIAGNOSIS — I10 ESSENTIAL HYPERTENSION: Chronic | ICD-10-CM

## 2021-01-11 DIAGNOSIS — E55.9 VITAMIN D DEFICIENCY: Chronic | ICD-10-CM

## 2021-01-11 DIAGNOSIS — G89.4 CHRONIC PAIN SYNDROME: ICD-10-CM

## 2021-01-11 DIAGNOSIS — Z13.31 POSITIVE DEPRESSION SCREENING: ICD-10-CM

## 2021-01-11 DIAGNOSIS — K21.9 GASTROESOPHAGEAL REFLUX DISEASE WITHOUT ESOPHAGITIS: ICD-10-CM

## 2021-01-11 DIAGNOSIS — K59.00 CONSTIPATION, UNSPECIFIED CONSTIPATION TYPE: ICD-10-CM

## 2021-01-11 DIAGNOSIS — E78.2 MIXED HYPERLIPIDEMIA: Chronic | ICD-10-CM

## 2021-01-11 DIAGNOSIS — Z23 NEED FOR VACCINATION: Primary | ICD-10-CM

## 2021-01-11 PROCEDURE — G0008 ADMIN INFLUENZA VIRUS VAC: HCPCS | Performed by: NURSE PRACTITIONER

## 2021-01-11 PROCEDURE — 4040F PNEUMOC VAC/ADMIN/RCVD: CPT | Performed by: NURSE PRACTITIONER

## 2021-01-11 PROCEDURE — G8420 CALC BMI NORM PARAMETERS: HCPCS | Performed by: NURSE PRACTITIONER

## 2021-01-11 PROCEDURE — G8484 FLU IMMUNIZE NO ADMIN: HCPCS | Performed by: NURSE PRACTITIONER

## 2021-01-11 PROCEDURE — 74019 RADEX ABDOMEN 2 VIEWS: CPT

## 2021-01-11 PROCEDURE — 99214 OFFICE O/P EST MOD 30 MIN: CPT | Performed by: NURSE PRACTITIONER

## 2021-01-11 PROCEDURE — G8431 POS CLIN DEPRES SCRN F/U DOC: HCPCS | Performed by: NURSE PRACTITIONER

## 2021-01-11 PROCEDURE — 4004F PT TOBACCO SCREEN RCVD TLK: CPT | Performed by: NURSE PRACTITIONER

## 2021-01-11 PROCEDURE — 1123F ACP DISCUSS/DSCN MKR DOCD: CPT | Performed by: NURSE PRACTITIONER

## 2021-01-11 PROCEDURE — 1090F PRES/ABSN URINE INCON ASSESS: CPT | Performed by: NURSE PRACTITIONER

## 2021-01-11 PROCEDURE — 90694 VACC AIIV4 NO PRSRV 0.5ML IM: CPT | Performed by: NURSE PRACTITIONER

## 2021-01-11 PROCEDURE — G8427 DOCREV CUR MEDS BY ELIG CLIN: HCPCS | Performed by: NURSE PRACTITIONER

## 2021-01-11 PROCEDURE — G8399 PT W/DXA RESULTS DOCUMENT: HCPCS | Performed by: NURSE PRACTITIONER

## 2021-01-11 ASSESSMENT — ENCOUNTER SYMPTOMS
SHORTNESS OF BREATH: 0
EYE ITCHING: 0
NAUSEA: 0
WHEEZING: 0
SORE THROAT: 0
COUGH: 0
DIARRHEA: 0
COLOR CHANGE: 0
BLOOD IN STOOL: 0
EYE DISCHARGE: 0
STRIDOR: 0
TROUBLE SWALLOWING: 0
CHOKING: 0
VOMITING: 0
ABDOMINAL DISTENTION: 0
ABDOMINAL PAIN: 0
CONSTIPATION: 1

## 2021-01-11 ASSESSMENT — PATIENT HEALTH QUESTIONNAIRE - PHQ9
SUM OF ALL RESPONSES TO PHQ9 QUESTIONS 1 & 2: 6
1. LITTLE INTEREST OR PLEASURE IN DOING THINGS: 3
SUM OF ALL RESPONSES TO PHQ QUESTIONS 1-9: 10
2. FEELING DOWN, DEPRESSED OR HOPELESS: 3
5. POOR APPETITE OR OVEREATING: 0
7. TROUBLE CONCENTRATING ON THINGS, SUCH AS READING THE NEWSPAPER OR WATCHING TELEVISION: 0
10. IF YOU CHECKED OFF ANY PROBLEMS, HOW DIFFICULT HAVE THESE PROBLEMS MADE IT FOR YOU TO DO YOUR WORK, TAKE CARE OF THINGS AT HOME, OR GET ALONG WITH OTHER PEOPLE: 0
6. FEELING BAD ABOUT YOURSELF - OR THAT YOU ARE A FAILURE OR HAVE LET YOURSELF OR YOUR FAMILY DOWN: 0
SUM OF ALL RESPONSES TO PHQ QUESTIONS 1-9: 10
8. MOVING OR SPEAKING SO SLOWLY THAT OTHER PEOPLE COULD HAVE NOTICED. OR THE OPPOSITE, BEING SO FIGETY OR RESTLESS THAT YOU HAVE BEEN MOVING AROUND A LOT MORE THAN USUAL: 0

## 2021-01-11 ASSESSMENT — COLUMBIA-SUICIDE SEVERITY RATING SCALE - C-SSRS
6. HAVE YOU EVER DONE ANYTHING, STARTED TO DO ANYTHING, OR PREPARED TO DO ANYTHING TO END YOUR LIFE?: NO
2. HAVE YOU ACTUALLY HAD ANY THOUGHTS OF KILLING YOURSELF?: NO
1. WITHIN THE PAST MONTH, HAVE YOU WISHED YOU WERE DEAD OR WISHED YOU COULD GO TO SLEEP AND NOT WAKE UP?: NO

## 2021-01-11 NOTE — PROGRESS NOTES
[address] [facility]  [address] [facility] [address] [place] [address]  Dept: [phone]  Dept Fax: [phone]: [phone] Randi Adkins (:  1935) is a 80 y.o. female,Established patient, here for evaluation of the following chief complaint(s): Medicare Bary Brush is a 80 y.o. female who presents today for her medical conditions/complaints as noted below. Jo Sam is c/arlyn Medicare AWV        HPI:     HPI   1. Constipated; She ahs been taking laxative all the time and still not having normal BM   She has had to use MAG CITRATE to get going   2. Depression; She is taking care of her daughter who is on hospice; She has to do everything for her; She has bad heart DM and bad lungs; She feels she has sadness with this but does not want to take anything for it;   3.  Osteoporosis; She got off the fosamax as it was constipating  And she will not take calcium tiehr   4. Vit d ;  She takes routinely    #5 hyperlipidemia she is known cholesterol meds atorvastatin 80 mg daily. #6 hypertension blood pressure initially was high on it admission  7/  .  Chronic pain syndrome; She has taken herself off the meds; She didn't want to get hooked on them; She still has some but doubts she will ever take them again   8.  GERD:  She is stable on protonix, carafate and bentyl   Chief Complaint   Patient presents with    Medicare AWV       Past Medical History:   Diagnosis Date    Actinic keratosis     Atherosclerosis of native arteries of the extremities with intermittent claudication 2013    Benign fundic gland polyps of stomach     Blocked artery     Carotid artery bruit     right    Carotid artery stenosis 2013    Cerebral artery occlusion with cerebral infarction (HCC)     Chronic cough     Emphysema of lung (HCC)     Hyperlipemia     Hypertension     Irritable bowel syndrome     Lower back pain     Macular degeneration     Need for prophylactic vaccination with Streptococcus pneumoniae (Pneumococcus) and Influenza vaccines     Osteoarthritis     Osteoporosis 9/17/2017    PONV (postoperative nausea and vomiting)     Prediabetes     Pulmonary emphysema ([address] 9/17/2017    PVD (peripheral vascular disease) [facility] [address]     Restless legs syndrome     Transient ischemic attack     Vitamin D deficiency       Past Surgical History:   Procedure Laterality Date    CATARACT REMOVAL Bilateral     COLONOSCOPY  03/15/2013    Shiben:  mild diverticulosis, HP, small internal hemorrhoids,  3yr recall    COLONOSCOPY  10/02/2018    Shiben:  Tubular AP x3, negative for evidence of high-grade dysplasia    FEMORAL ENDARTERECTOMY Left 8/13/2019    LEFT COMMON FEMORAL ENDARTERECTOMY WITH BOVINE PATCH ANGIOPLASTY AND COMPLETION ANGIOGRAM performed by Jose Woods MD at [address]      cyst on breast    UPPER GASTROINTESTINAL ENDOSCOPY  09/13/2019    Dr Cheung Officer based antral ulcer (4mm)    UPPER GASTROINTESTINAL ENDOSCOPY Left 9/13/2019    EGD DIAGNOSTIC ONLY 9-13-19 Dr. Adrienne Ambriz performed by John Butler DO at [address] [facility]    VASCULAR SURGERY  9-25-13 [facility] & [address]    Fluroscopic guidance for access tor R femoral artery after attempted access of L femoral artery. Aortoiliofemoral arteriogram with bilateral lower extremity runoff. R proximal common iliac artery angioplasty X2 with 8mm x 40 mm balloon. L superficial femoral artery angioplasty x1 with 4 mm x 40 mm balloon, then x1 with 5mm x 40 mm balloon. L superficial femoral artery SUPERA stent 4mm x 40mm and     VASCULAR SURGERY  cont. ..    a second SUPERA stent 4 mm x 60 mm Completion arteriograms Mynx closure R groin access site.     VASCULAR SURGERY  01/25/2017    SLC-AIF with runoff angioplasty L SFA with 5x20 cutting balloon, 5x150 lutonix DCB, 5x40 Paseo balloon stent proximal L SFA with 5x80 Innova stent kissing stents B SHE with 9x59 atrium stents extension R SHE/EIA with 9x57 express stent extension L SHE with 9x37 express stent completion arteriograms Mynx closure B CFA    VASCULAR SURGERY  05/29/2018    DJM. Angio, SFA PTA       Vitals 1/11/2021 1/11/2021 1/11/2021 8/31/2020 2/20/2020 7/60/7997   SYSTOLIC [phone]   DIASTOLIC 72 73 77 - 78 70   Pulse - - 70 73 - -   Temp - - - 98.4 - -   Resp - - 20 - - -   SpO2 - - 97 98 - -   Weight - - 141 lb 1.6 oz - - -   Height - - 5' 5.5\" - - -   Body mass index - - 23.12 kg/m2 - - -   Pain Level - - - - - -   Some recent data might be hidden       Family History   Problem Relation Age of Onset    Cancer Mother     Cancer Father     High Blood Pressure Son     High Cholesterol Son     High Blood Pressure Daughter     High Cholesterol Daughter     Heart Attack Daughter     Heart Attack Brother        Social History     Tobacco Use    Smoking status: Current Every Day Smoker     Packs/day: 0.75     Years: 58.00     Pack years: 43.50     Types: Cigarettes    Smokeless tobacco: Never Used    Tobacco comment: 3/4 of a pack daily   Substance Use Topics    Alcohol use: No      Current Outpatient Medications   Medication Sig Dispense Refill    budesonide-formoterol (SYMBICORT) 160-4.5 MCG/ACT AERO INHALE 2 PUFFS TWICE DAILY.  RINSE MOUTH AFTER USE. 1 Inhaler 2    pantoprazole (PROTONIX) 40 MG tablet TAKE 1 TABLET BY MOUTH TWICE A  tablet 1    dicyclomine (BENTYL) 10 MG capsule TAKE 1 CAPSULE BY MOUTH TWICE A DAY AS NEEDED 60 capsule 1    lisinopril (PRINIVIL;ZESTRIL) 40 MG tablet TAKE 1 TABLET EVERY DAY 90 tablet 3    pramipexole (MIRAPEX) 0.25 MG tablet TAKE 1 TO 2 TABLETS BY MOUTH AT BEDTIME AS NEEDED 180 tablet 3    sucralfate (CARAFATE) 1 GM tablet Take 1 tablet by mouth 4 times daily (Patient taking differently: Take 1 g by mouth 4 times daily PRN) 120 tablet 3    atorvastatin (LIPITOR) 80 MG tablet TAKE 1 TABLET EVERY DAY 90 tablet 3    albuterol (PROVENTIL) (2.5 MG/3ML) 0.083% nebulizer solution INHALE 1 VIAL BY NEBULIZER EVERY 4 HOURS AS NEEDED FOR WHEEZING OR SHORTNESS OF BREATH 150 mL 3    amLODIPine (NORVASC) 5 MG tablet TAKE 1 TABLET EVERY DAY 90 tablet 3    hydrochlorothiazide (MICROZIDE) 12.5 MG capsule TAKE 1 CAPSULE EVERY MORNING 90 capsule 3    albuterol sulfate  (90 Base) MCG/ACT inhaler Inhale 2 puffs into the lungs 4 times daily as needed for Wheezing 3 Inhaler 1    Nebulizers (COMPRESSOR/NEBULIZER) MISC Use to administer neb treatments 4 times a day as needed 1 each 3    furosemide (LASIX) 20 MG tablet Take 10 mg by mouth daily      aspirin 81 MG tablet Take 81 mg by mouth daily.  vitamin D (ERGOCALCIFEROL) 1.25 MG (29292 UT) CAPS capsule Take 1 capsule by mouth once a week 12 capsule 1    ipratropium-albuterol (DUONEB) 0.5-2.5 (3) MG/3ML SOLN nebulizer solution Inhale 3 mLs into the lungs every 6 hours 360 mL 3     No current facility-administered medications for this visit.       Allergies   Allergen Reactions    Codeine Nausea And Vomiting    Valium Nausea Only       Health Maintenance   Topic Date Due    Annual Wellness Visit (AWV)  05/29/2019    DEXA (modify frequency per FRAX score)  06/20/2020    DTaP/Tdap/Td vaccine (1 - Tdap) 02/01/2021 (Originally 10/19/1954)    Shingles Vaccine (1 of 2) 04/18/2028 (Originally 10[birth year]19/1985)    Lipid screen  02/18/2021    Breast cancer screen  07/25/2021    Potassium monitoring  08/31/2021    Creatinine monitoring  08/31/2021    Colon cancer screen colonoscopy  10/03/2021    Flu vaccine  Completed    Pneumococcal 65+ years Vaccine  Completed    Hepatitis A vaccine  Aged Out    Hepatitis B vaccine  Aged Out    Hib vaccine  Aged Out    Meningococcal (ACWY) vaccine  Aged Out       No results found for: LABA1C  No results found for: PSA, PSADIA  TSH   Date Value Ref Range Status   02/18/2020 2.610 0.270 - 4.200 uIU/mL Final   ]  Lab Results   Component Value Date     08/31/2020    K 4.5 08/31/2020    CL 98 08/31/2020    CO2 30 (H) 08/31/2020    BUN 20 08/31/2020    CREATININE 1.1 (H) 08/31/2020    GLUCOSE 100 08/31/2020    CALCIUM 9.2 08/31/2020    PROT 6.8 08/31/2020    LABALBU 4.2 08/31/2020    BILITOT 0.3 08/31/2020    ALKPHOS 74 08/31/2020    AST 16 08/31/2020    ALT 10 08/31/2020    LABGLOM 47 (A) 08/31/2020    GFRAA 57 (L) 08/31/2020    GLOB 2.7 05/15/2017     Lab Results   Component Value Date    CHOL 143 (L) 02/18/2020    CHOL 119 (L) 11/15/2019    CHOL 144 (L) 04/08/2019     Lab Results   Component Value Date    TRIG 86 02/18/2020    TRIG 60 11/15/2019    TRIG 85 04/08/2019     Lab Results   Component Value Date    HDL 56 (L) 02/18/2020    HDL 58 (L) 11/15/2019    HDL 54 (L) 04/08/2019     Lab Results   Component Value Date    LDLCALC 70 02/18/2020    LDLCALC 49 11/15/2019    LDLCALC 73 04/08/2019     Lab Results   Component Value Date     08/31/2020    K 4.5 08/31/2020    K 4.5 09/12/2019    CL 98 08/31/2020    CO2 30 08/31/2020    BUN 20 08/31/2020    CREATININE 1.1 08/31/2020    GLUCOSE 100 08/31/2020    CALCIUM 9.2 08/31/2020      Lab Results   Component Value Date    WBC 5.9 08/31/2020    HGB 13.7 08/31/2020    HCT 42.4 08/31/2020    MCV 94.4 08/31/2020     08/31/2020    LABLYMP 1.47 09/25/2013    LYMPHOPCT 26.1 08/31/2020    RBC 4.49 08/31/2020    MCH 30.5 08/31/2020    MCHC 32.3 (L) 08/31/2020    RDW 13.7 08/31/2020     Lab Results   Component Value Date    VITD25 59.4 08/31/2020       Subjective:      Review of Systems   Constitutional: Negative for fatigue, fever and unexpected weight change. HENT: Negative for ear discharge, ear pain, mouth sores, sore throat and trouble swallowing. Eyes: Positive for visual disturbance. Negative for discharge and itching. Respiratory: Negative for cough, choking, shortness of breath, wheezing and stridor. Cardiovascular: Negative for chest pain, palpitations and leg swelling.    Gastrointestinal: Positive for constipation. Negative for abdominal distention, abdominal pain, blood in stool, diarrhea, nausea and vomiting. Endocrine: Negative for cold intolerance, polydipsia and polyuria. Genitourinary: Negative for difficulty urinating, dysuria, frequency and urgency. Musculoskeletal: Negative for arthralgias and gait problem. Skin: Negative for color change and rash. Allergic/Immunologic: Negative for food allergies and immunocompromised state. Neurological: Negative for dizziness, tremors, syncope, speech difficulty, weakness and headaches. Hematological: Negative for adenopathy. Does not bruise/bleed easily. Psychiatric/Behavioral: Negative for confusion and hallucinations. Objective:     Physical Exam  Constitutional:       General: She is not in acute distress. Appearance: She is well-developed. HENT:      Head: Normocephalic and atraumatic. Eyes:      General: No scleral icterus. Right eye: No discharge. Left eye: No discharge. Pupils: Pupils are equal, round, and reactive to light. Neck:      Musculoskeletal: Normal range of motion and neck supple. Thyroid: No thyromegaly. Vascular: No JVD. Cardiovascular:      Rate and Rhythm: Normal rate and regular rhythm. Heart sounds: Normal heart sounds. No murmur. Pulmonary:      Effort: Pulmonary effort is normal. No respiratory distress. Breath sounds: Wheezing present. No rales. Abdominal:      General: Bowel sounds are normal. There is no distension. Palpations: Abdomen is soft. There is no mass. Tenderness: There is no abdominal tenderness. There is no guarding or rebound. Musculoskeletal: Normal range of motion. General: No tenderness. Skin:     General: Skin is warm and dry. Findings: No erythema or rash. Neurological:      Mental Status: She is alert and oriented to person, place, and time. Cranial Nerves: No cranial nerve deficit. Coordination: Coordination normal.      Deep Tendon Reflexes: Reflexes are normal and symmetric. Reflexes normal.   Psychiatric:         Mood and Affect: Mood is not depressed. Behavior: Behavior normal.         Thought Content: Thought content normal.         Judgment: Judgment normal.       /72   Pulse 70   Resp 20   Ht 5' 5.5\" (1.664 m)   Wt 141 lb 1.6 oz (64 kg)   SpO2 97%   BMI 23.12 kg/m²     Assessment:       Diagnosis Orders   1. Need for vaccination  INFLUENZA, QUADV, ADJUVANTED, 65 YRS =, IM, PF, PREFILL SYR, 0.5ML (FLUAD)   2. Constipation, unspecified constipation type  XR ABDOMEN (2 VIEWS)   3. Positive depression screening  Positive Screen for Clinical Depression with a Documented Follow-up Plan    4. Essential hypertension  CBC Auto Differential    Comprehensive Metabolic Panel    TSH without Reflex    Urinalysis Reflex to Culture   5. Chronic pain syndrome     6. Vitamin D deficiency  Vitamin D 25 Hydroxy   7. Mixed hyperlipidemia  Lipid Panel   8. Osteoporosis, unspecified osteoporosis type, unspecified pathological fracture presence     9. Gastroesophageal reflux disease without esophagitis       Labs reviewed from 8/2020  Diagnostics from today  Obstructive series   Plan:        Patient given educational materials - see patient instructions. Discussed use, benefit, and side effects of prescribed medications. Allpatient questions answered. Pt voiced understanding. Reviewed health maintenance. Instructed to continue current medications, diet and exercise. Patient agreed with treatment plan. Follow up as directed. MEDICATIONS:  No orders of the defined types were placed in this encounter.         ORDERS:  Orders Placed This Encounter   Procedures    XR ABDOMEN (2 VIEWS)    INFLUENZA, QUADV, ADJUVANTED, 65 YRS =, IM, PF, PREFILL SYR, 0.5ML (FLUAD)    CBC Auto Differential    Comprehensive Metabolic Panel    Lipid Panel    TSH without Reflex    Urinalysis Reflex to Culture    Vitamin D 25 Hydroxy    Positive Screen for Clinical Depression with a Documented Follow-up Plan        Follow-up:  Return in about 3 months (around 4/11/2021) for have labs done prior to appt. PATIENT INSTRUCTIONS:  Patient Instructions   1. Constipation we will get obstructive series today to ensure that there is nothing mechanically causing this. Then you can use MiraLAX up to twice daily  2. Depression she denies needed meds at this time we will just continue to monitor  3. Osteoporosis continue with weightbearing exercises much as you can  4. Vitamin D deficiency continue with the 50,000 weekly  5. Hyperlipidemia we will get labs next visit continue with your cholesterol meds  6. Hypertension we will just monitor this for now no changes currently  7. Chronic pain syndrome she is off the narcotic pain meds  8. GERD stable on current meds no changes are necessary    Electronically signed by PRAVEEN Olsen on 1/11/2021 at 2:01 PM    @On this date 01/11/21 I have spent 22  minutes reviewing previous notes, test results and face to face with the patient discussing the diagnosis and importance of compliance with the treatment plan as well as documenting on the day of the visit. EMRDragon/transcription disclaimer:  Much of this encounter note is electronic transcription/translation of spoken language to printed texts. The electronic translation of spoken language may be erroneous, or at times,nonsensical words or phrases may be inadvertently transcribed.   Although I have reviewed the note for such errors, some may still exist.

## 2021-01-11 NOTE — PATIENT INSTRUCTIONS
1.  Constipation we will get obstructive series today to ensure that there is nothing mechanically causing this. Then you can use MiraLAX up to twice daily  2. Depression she denies needed meds at this time we will just continue to monitor  3. Osteoporosis continue with weightbearing exercises much as you can  4. Vitamin D deficiency continue with the 50,000 weekly  5. Hyperlipidemia we will get labs next visit continue with your cholesterol meds  6. Hypertension we will just monitor this for now no changes currently  7. Chronic pain syndrome she is off the narcotic pain meds  8.   GERD stable on current meds no changes are necessary

## 2021-01-20 ENCOUNTER — CARE COORDINATION (OUTPATIENT)
Dept: CARE COORDINATION | Age: 86
End: 2021-01-20

## 2021-01-20 ASSESSMENT — ENCOUNTER SYMPTOMS: DYSPNEA ASSOCIATED WITH: EXERTION

## 2021-01-21 NOTE — CARE COORDINATION
Ambulatory Care Coordination Note  1/20/2021  CM Risk Score: 3  Charlson 10 Year Mortality Risk Score: 100%     ACC: Tremaine Leon RN    Summary Note: ACM spoke to RADHA today. She said she is doing well. She is compliant with her medications and reported some persistence of constipation. She said she has been drinking prune juice with some good results and will stick with that for now. RADHA said if she begins to have constipation again, she will call her GI - DrIsael Stanford. She denied any concerns with her COPD. Pt said she has more issues with symptoms during the summer. She has oxygen that she uses as needed and said she is compliant with her inhaler use and nebulizer use. She denied any financial concerns with her med costs, stated she does not have any copay with her PDP. Plan:  ACM praised pt for her compliance and encouraged her to continue good self-awareness. If her respiratory symptoms worsen, contact her PCP quickly. We reviewed COVID precautions and pt reported she is very careful, does not go out except to appts and grocery. She wears a mask and uses hand . ACM encouraged pt to check with her local health department or her PCP for COVID vaccine availability. She vu  RADHA has several family members living with her - a granddaughter and great-grandsons. She feels well supported. ACM encouraged pt to call me if she has any concerns or health care barriers I can help remove. She has my cell number and my name for call back if needed. Chart will be sent for graduation.     Care Coordination Interventions    Program Enrollment: Complex Care  Referral from Primary Care Provider: No  Suggested Interventions and Community Resources  Medi Set or Pill Pack: Completed (Comment: 1/20: Pt denied any problems with med management despite blindness)  Smoking Cessation: Declined (Comment: 1/20: Pt is not interested in smoking cessation) Zone Management Tools: Completed (Comment: 1/20: Pt is stable presently r/t her COPD mgmt. She uses her oxygen as needed and is med compliant with inhaler and nebulizer use. No increased cough or sputum.)         Goals Addressed                 This Visit's Progress     COMPLETED: Conditions and Symptoms        I will schedule office visits, as directed by my provider. I will keep my appointment or reschedule if I have to cancel. I will follow my Zone Management tool to seek urgent or emergent care. I will notify my provider of any symptoms that indicate a worsening of my condition. Barriers: lack of education and transportation  Plan for overcoming my barriers: Education and support from [place]; family/neighbor assistance for transportation  Confidence: 8/10  Anticipated Goal Completion Date: 3/9/19         COMPLETED: Self Monitoring        Other Self-Monitoring - I will monitor my cough, mucus production, resp effort - Daily    None Recently Recorded    Barriers: lack of education  Plan for overcoming my barriers: Education and support from [facility]  Confidence: 8/10  Anticipated Goal Completion Date: 3/9/19              Prior to Admission medications    Medication Sig Start Date End Date Taking? Authorizing Provider   OXYGEN Inhale 2 L into the lungs as needed   Yes Historical Provider, MD   budesonide-formoterol (SYMBICORT) 160-4.5 MCG/ACT AERO INHALE 2 PUFFS TWICE DAILY.  RINSE MOUTH AFTER USE. 12/31/20  Yes PRAVEEN Pace   pantoprazole (PROTONIX) 40 MG tablet TAKE 1 TABLET BY MOUTH TWICE A DAY 12/21/20  Yes PRAVEEN Pace   dicyclomine (BENTYL) 10 MG capsule TAKE 1 CAPSULE BY MOUTH TWICE A DAY AS NEEDED 10/29/20  Yes PRAVEEN Pace   lisinopril (PRINIVIL;ZESTRIL) 40 MG tablet TAKE 1 TABLET EVERY DAY 10/26/20  Yes PRAVEEN Pace   pramipexole (MIRAPEX) 0.25 MG tablet TAKE 1 TO 2 TABLETS BY MOUTH AT BEDTIME AS NEEDED 9/9/20  Yes PRAVEEN Pace sucralfate (CARAFATE) 1 GM tablet Take 1 tablet by mouth 4 times daily  Patient taking differently: Take 1 g by mouth 4 times daily PRN 8/31/20  Yes PRAVEEN Villanueva   atorvastatin (LIPITOR) 80 MG tablet TAKE 1 TABLET EVERY DAY 8/24/20  Yes PRAVEEN Villanueva   albuterol (PROVENTIL) (2.5 MG/3ML) 0.083% nebulizer solution INHALE 1 VIAL BY NEBULIZER EVERY 4 HOURS AS NEEDED FOR WHEEZING OR SHORTNESS OF BREATH 7/28/20  Yes PRAVEEN Villanueva   amLODIPine (NORVASC) 5 MG tablet TAKE 1 TABLET EVERY DAY 3/30/20  Yes PRAVEEN Villanueva   hydrochlorothiazide (MICROZIDE) 12.5 MG capsule TAKE 1 CAPSULE EVERY MORNING 3/30/20  Yes PRAVEEN Villanueva   albuterol sulfate  (90 Base) MCG/ACT inhaler Inhale 2 puffs into the lungs 4 times daily as needed for Wheezing 12/9/19  Yes PRAVEEN Villanueva   Nebulizers (COMPRESSOR/NEBULIZER) MISC Use to administer neb treatments 4 times a day as needed 11/8/19  Yes PRAVEEN Villanueva   furosemide (LASIX) 20 MG tablet Take 10 mg by mouth daily   Yes Historical Provider, MD   aspirin 81 MG tablet Take 81 mg by mouth daily.    Yes Historical Provider, MD   vitamin D (ERGOCALCIFEROL) 1.25 MG (89818 UT) CAPS capsule Take 1 capsule by mouth once a week 5/20/20 11/4/20  PRAVEEN Villanueva   ipratropium-albuterol (DUONEB) 0.5-2.5 (3) MG/3ML SOLN nebulizer solution Inhale 3 mLs into the lungs every 6 hours 12/9/19 3/8/20  PRAVEEN Villanueva       Future Appointments   Date Time Provider Silvia Chan   4/12/2021 12:30 PM [facility]      and   COPD Assessment    Does the patient understand envrionmental exposure?: Yes  Is the patient able to verbalize Rescue vs. Long Acting medications?: Yes  Does the patient have a nebulizer?: Yes  Does the patient use a space with inhaled medications?: No            Symptoms:  None: Yes      Symptom course: stable  Breathlessness: exertion  Increase use of rapid acting/rescue inhaled medications?: No

## 2021-02-02 RX ORDER — DICYCLOMINE HYDROCHLORIDE 10 MG/1
CAPSULE ORAL
Qty: 60 CAPSULE | Refills: 1 | Status: SHIPPED | OUTPATIENT
Start: 2021-02-02 | End: 2021-03-24

## 2021-02-04 RX ORDER — AMLODIPINE BESYLATE 5 MG/1
TABLET ORAL
Qty: 90 TABLET | Refills: 3 | Status: SHIPPED | OUTPATIENT
Start: 2021-02-04

## 2021-02-04 RX ORDER — HYDROCHLOROTHIAZIDE 12.5 MG/1
CAPSULE, GELATIN COATED ORAL
Qty: 90 CAPSULE | Refills: 3 | Status: SHIPPED | OUTPATIENT
Start: 2021-02-04

## 2021-02-04 NOTE — TELEPHONE ENCOUNTER
Janet Hinkle called requesting a refill of the below medication which has been pended for you:     Requested Prescriptions     Pending Prescriptions Disp Refills    hydroCHLOROthiazide (MICROZIDE) 12.5 MG capsule [Pharmacy Med Name: HYDROCHLOROTHIAZIDE 12.5 MG Capsule] 90 capsule 3     Sig: TAKE 1 CAPSULE EVERY MORNING    amLODIPine (NORVASC) 5 MG tablet [Pharmacy Med Name: AMLODIPINE BESYLATE 5 MG Tablet] 90 tablet 3     Sig: TAKE 1 TABLET EVERY DAY       Last Appointment Date: 1/11/2021  Next Appointment Date: 4/12/2021    Allergies   Allergen Reactions    Codeine Nausea And Vomiting    Valium Nausea Only

## 2021-02-10 ENCOUNTER — APPOINTMENT (OUTPATIENT)
Dept: ULTRASOUND IMAGING | Facility: HOSPITAL | Age: 86
End: 2021-02-10

## 2021-02-16 ENCOUNTER — APPOINTMENT (OUTPATIENT)
Dept: ULTRASOUND IMAGING | Facility: HOSPITAL | Age: 86
End: 2021-02-16

## 2021-03-10 RX ORDER — ERGOCALCIFEROL 1.25 MG/1
CAPSULE ORAL
Qty: 12 CAPSULE | Refills: 1 | Status: SHIPPED | OUTPATIENT
Start: 2021-03-10

## 2021-03-10 NOTE — TELEPHONE ENCOUNTER
Marilin Wray called requesting a refill of the below medication which has been pended for you:     Requested Prescriptions     Pending Prescriptions Disp Refills    vitamin D (ERGOCALCIFEROL) 1.25 MG (43222 UT) CAPS capsule [Pharmacy Med Name: VITAMIN D2 1.25MG(50,000 UNIT)] 12 capsule 1     Sig: TAKE 1 CAPSULE BY MOUTH ONE TIME PER WEEK       Last Appointment Date: 1/11/2021  Next Appointment Date: 4/12/2021    Allergies   Allergen Reactions    Codeine Nausea And Vomiting    Valium Nausea Only

## 2021-03-19 ENCOUNTER — TELEPHONE (OUTPATIENT)
Dept: VASCULAR SURGERY | Facility: CLINIC | Age: 86
End: 2021-03-19

## 2021-03-19 ENCOUNTER — HOSPITAL ENCOUNTER (OUTPATIENT)
Dept: ULTRASOUND IMAGING | Facility: HOSPITAL | Age: 86
Discharge: HOME | End: 2021-03-19
Admitting: SURGERY

## 2021-03-19 DIAGNOSIS — I70.213 ATHEROSCLEROSIS OF NATIVE ARTERY OF BOTH LOWER EXTREMITIES WITH INTERMITTENT CLAUDICATION (HCC): ICD-10-CM

## 2021-03-19 PROCEDURE — 93923 UPR/LXTR ART STDY 3+ LVLS: CPT | Performed by: SURGERY

## 2021-03-19 PROCEDURE — 93923 UPR/LXTR ART STDY 3+ LVLS: CPT

## 2021-03-19 NOTE — TELEPHONE ENCOUNTER
Called the patient to remind her of her appt on Monday for Dr. Henriquez.  Pt was aware and confirmed.

## 2021-03-22 NOTE — PATIENT INSTRUCTIONS
"BMI for Adults  What is BMI?  Body mass index (BMI) is a number that is calculated from a person's weight and height. BMI can help estimate how much of a person's weight is composed of fat. BMI does not measure body fat directly. Rather, it is an alternative to procedures that directly measure body fat, which can be difficult and expensive.  BMI can help identify people who may be at higher risk for certain medical problems.  What are BMI measurements used for?  BMI is used as a screening tool to identify possible weight problems. It helps determine whether a person is obese, overweight, a healthy weight, or underweight.  BMI is useful for:  · Identifying a weight problem that may be related to a medical condition or may increase the risk for medical problems.  · Promoting changes, such as changes in diet and exercise, to help reach a healthy weight. BMI screening can be repeated to see if these changes are working.  How is BMI calculated?  BMI involves measuring your weight in relation to your height. Both height and weight are measured, and the BMI is calculated from those numbers. This can be done either in English ([place] or metric measurements. Note that charts and online BMI calculators are available to help you find your BMI quickly and easily without having to do these calculations yourself.  To calculate your BMI in English ([place] measurements:    1. Measure your weight in pounds (lb).  2. Multiply the number of pounds by 703.  ? For example, for a person who weighs 180 lb, multiply that number by 703, which equals 126,540.  3. Measure your height in inches. Then multiply that number by itself to get a measurement called \"inches squared.\"  ? For example, for a person who is 70 inches tall, the \"inches squared\" measurement is 70 inches x 70 inches, which equals 4,900 inches squared.  4. Divide the total from step 2 (number of lb x 703) by the total from step 3 (inches squared): 126,540 ÷ 4,900 = 25.8. This is " "your BMI.  To calculate your BMI in metric measurements:  1. Measure your weight in kilograms (kg).  2. Measure your height in meters (m). Then multiply that number by itself to get a measurement called \"meters squared.\"  ? For example, for a person who is 1.75 m tall, the \"meters squared\" measurement is 1.75 m x 1.75 m, which is equal to 3.1 meters squared.  3. Divide the number of kilograms (your weight) by the meters squared number. In this example: 70 ÷ 3.1 = 22.6. This is your BMI.  What do the results mean?  BMI charts are used to identify whether you are underweight, normal weight, overweight, or obese. The following guidelines will be used:  · Underweight: BMI less than 18.5.  · Normal weight: BMI between 18.5 and 24.9.  · Overweight: BMI between 25 and 29.9.  · Obese: BMI of 30 or above.  Keep these notes in mind:  · Weight includes both fat and muscle, so someone with a muscular build, such as an athlete, may have a BMI that is higher than 24.9. In cases like these, BMI is not an accurate measure of body fat.  · To determine if excess body fat is the cause of a BMI of 25 or higher, further assessments may need to be done by a health care provider.  · BMI is usually interpreted in the same way for men and women.  Where to find more information  For more information about BMI, including tools to quickly calculate your BMI, go to these websites:  · Centers for Disease Control and Prevention: www.cdc.gov  · American Heart Association: www.heart.org  · National Heart, Lung, and Blood [place]: www.nhlbi.nih.gov  Summary  · Body mass index (BMI) is a number that is calculated from a person's weight and height.  · BMI may help estimate how much of a person's weight is composed of fat. BMI can help identify those who may be at higher risk for certain medical problems.  · BMI can be measured using English measurements or metric measurements.  · BMI charts are used to identify whether you are underweight, normal " weight, overweight, or obese.  This information is not intended to replace advice given to you by your health care provider. Make sure you discuss any questions you have with your health care provider.  Document Revised: 09/09/2020 Document Reviewed: 07/17/2020  Elsevier Patient Education © 2021 [employer]

## 2021-03-22 NOTE — PROGRESS NOTES
2021      Pam Sin, HALIMA  [address] [place] [address] [address] [place] [address]        Cindy Hicks  1935    Chief Complaint   Patient presents with   • Follow-up     6 month f/u with ABIs.  Pt states that her legs are getting more tired.  Pt denies any stroke like symptoms.        Dear Pam Sin APRN:    HPI     I had the pleasure of seeing your patient in the office today for follow up.  As you recall, the patient is a 85 y.o. female who we are currently following for peripheral arterial disease as well as carotid occlusive disease.  She returns today after having undergone repeat JENNIFER/PVR for continued surveillance.  I did personally review the study in the office today.  In the right lower extremity JENNIFER is 0.82, however with exercise that value does drop to 0.4.  This represents moderate arterial insufficiency.  On the left JENNIFER value is 0.92, but again with exercise does drop to 0.6.  This represents mild to moderate peripheral arterial disease in the left lower extremity.  She does continue to complain of mild calf claudication but this is not lifestyle limiting.  This is basically unchanged from our last visit.  She denies any rest pain.  From a carotid standpoint she denies any TIA or strokelike symptoms.  She is maintained on aspirin, and a statin.  Unfortunately she does continue to smoke about half a pack a day.  She also says that she has had increased stress recently and unfortunately her daughter did pass away recently and they had the  last week so she is still grieving from this.  She otherwise has no new acute complaints today.      Review of Systems   Constitutional: Negative.  Negative for activity change, appetite change, chills, diaphoresis, fatigue and fever.   HENT: Negative.  Negative for congestion, sneezing, sore throat and trouble swallowing.    Eyes: Negative.  Negative for visual disturbance.   Respiratory: Negative.  Negative for chest tightness and  "shortness of breath.    Cardiovascular: Positive for leg swelling (Minimal left lower extremity edema.). Negative for chest pain and palpitations.   Gastrointestinal: Negative.  Negative for abdominal distention, abdominal pain, nausea and vomiting.   Endocrine: Negative.    Genitourinary: Negative.    Musculoskeletal: Negative.         She reports some claudication to the right calf.   Skin: Negative.    Allergic/Immunologic: Negative.    Neurological: Negative.    Hematological: Negative.    Psychiatric/Behavioral: Negative.        /52   Pulse 67   Ht 166.4 cm (65.5\")   Wt 64.4 kg (142 lb)   SpO2 98%   BMI 23.27 kg/m²   Physical Exam  Vitals reviewed.   Constitutional:       Appearance: Normal appearance.   HENT:      Head: Normocephalic and atraumatic.      Right Ear: Tympanic membrane normal.      Left Ear: Tympanic membrane normal.      Nose: Nose normal.      Mouth/Throat:      Mouth: Mucous membranes are moist.   Eyes:      Extraocular Movements: Extraocular movements intact.      Pupils: Pupils are equal, round, and reactive to light.   Cardiovascular:      Rate and Rhythm: Normal rate and regular rhythm.      Pulses:           Carotid pulses are 2+ on the right side and 2+ on the left side.       Radial pulses are 2+ on the right side and 2+ on the left side.        Femoral pulses are 2+ on the right side and 2+ on the left side.       Popliteal pulses are 2+ on the right side and 2+ on the left side.        Dorsalis pedis pulses are 2+ on the right side and 2+ on the left side.        Posterior tibial pulses are detected w/ Doppler on the right side and 1+ on the left side.   Pulmonary:      Effort: Pulmonary effort is normal. No respiratory distress.   Abdominal:      General: There is no distension.      Palpations: Abdomen is soft. There is no mass.      Tenderness: There is no abdominal tenderness.   Musculoskeletal:         General: Normal range of motion.      Cervical back: Normal range " of motion and neck supple.      Right lower leg: No edema.      Left lower leg: No edema.   Skin:     General: Skin is dry.      Capillary Refill: Capillary refill takes less than 2 seconds.   Neurological:      General: No focal deficit present.      Mental Status: She is alert and oriented to person, place, and time.   Psychiatric:         Mood and Affect: Mood normal.         Behavior: Behavior normal.         Thought Content: Thought content normal.         Judgment: Judgment normal.         DIAGNOSTIC DATA:        Patient Active Problem List   Diagnosis   • Family hx colonic polyps   • Family hx of colon cancer   • Hx of colonic polyp   • Melena   • Peptic ulcer disease   • Essential hypertension   • Mixed hyperlipidemia         ICD-10-CM ICD-9-CM   1. Atherosclerosis of native artery of both lower extremities with intermittent claudication (CMS/[facility])  I70.213 440.21   2. Bilateral carotid artery stenosis  I65.23 433.10     433.30   3. Essential hypertension  I10 401.9   4. Mixed hyperlipidemia  E78.2 272.2       Lab Frequency Next Occurrence   Follow Anesthesia Guidelines / Standing Orders Once 09/12/2018   Follow Anesthesia Guidelines / Standing Orders Once 10/03/2019   Obtain Informed Consent Once 10/08/2019   US Ankle / Brachial Indices Extremity Complete Once 09/18/2021   US Carotid Bilateral Once 09/18/2021       PLAN: After thoroughly evaluating Cindy Hicks, I believe the best course of action is to remain conservative from a vascular standpoint.  From a peripheral arterial standpoint she continues to do well.  She has mild claudication of the bilateral calves but JENNIFER values at rest are 0.82 on the right, and 0.92 on the left.  There is some decrease with exercise but her claudication symptoms are not lifestyle limiting and she has no wounds and therefore I would continue to be conservative.  I recommended that she continue with her aspirin and statin daily, and continue with exercise of 30 minutes  at least 3 times a week.  Otherwise from a carotid standpoint she remains asymptomatic.  I will plan to see her back here in the office in 6 months with a repeat JENNIFER/PVR as well as a carotid artery duplex for continued surveillance.  The patient is to continue taking their medications as previously discussed.   I did discuss vascular risk factors as they pertain to the progression of vascular disease including controlling hypertension, and hyperlipidemia. These factors remain stable. Patient's Body mass index is 23.27 kg/m². BMI is within normal parameters. No follow-up required. This was all discussed in full with complete understanding.  Thank you for allowing me to participate in the care of your patient.  Please do not hesitate to call with any questions or concerns.  We will keep you aware of any further encounters with Cindy Hicks.      Sincerely Yours,      Jerald Henriquez MD

## 2021-03-24 RX ORDER — DICYCLOMINE HYDROCHLORIDE 10 MG/1
CAPSULE ORAL
Qty: 180 CAPSULE | Refills: 1 | Status: SHIPPED | OUTPATIENT
Start: 2021-03-24

## 2021-03-31 ENCOUNTER — VIRTUAL VISIT (OUTPATIENT)
Dept: INTERNAL MEDICINE | Age: 86
End: 2021-03-31
Payer: MEDICARE

## 2021-03-31 DIAGNOSIS — R05.9 COUGH: Primary | ICD-10-CM

## 2021-03-31 DIAGNOSIS — R50.9 FEVER, UNSPECIFIED FEVER CAUSE: ICD-10-CM

## 2021-03-31 PROCEDURE — 99442 PR PHYS/QHP TELEPHONE EVALUATION 11-20 MIN: CPT | Performed by: NURSE PRACTITIONER

## 2021-03-31 RX ORDER — GUAIFENESIN 600 MG/1
600 TABLET, EXTENDED RELEASE ORAL 2 TIMES DAILY
Qty: 30 TABLET | Refills: 0 | Status: SHIPPED | OUTPATIENT
Start: 2021-03-31 | End: 2021-04-15

## 2021-03-31 RX ORDER — CEFDINIR 300 MG/1
300 CAPSULE ORAL 2 TIMES DAILY
Qty: 14 CAPSULE | Refills: 0 | Status: SHIPPED | OUTPATIENT
Start: 2021-03-31 | End: 2021-04-07

## 2021-03-31 ASSESSMENT — ENCOUNTER SYMPTOMS
DIARRHEA: 0
ABDOMINAL DISTENTION: 0
NAUSEA: 0
SORE THROAT: 0
EYE DISCHARGE: 0
SHORTNESS OF BREATH: 0
TROUBLE SWALLOWING: 0
WHEEZING: 0
EYE ITCHING: 0
VOMITING: 0
CHOKING: 0
STRIDOR: 0
CONSTIPATION: 0
COLOR CHANGE: 0
ABDOMINAL PAIN: 0
COUGH: 1
BLOOD IN STOOL: 0

## 2021-03-31 NOTE — PROGRESS NOTES
[facility] INTERNAL MEDICINE  [address] [facility] [address] [place] [address]  Dept: [phone]  Dept Fax: [phone]: [phone] Randi Adkins (:  1935) is a 80 y.o. female,Established patient, here for evaluation of the following chief complaint(s): Cough      Swetha Weiss is a 80 y.o. female who were are performing tele health communication  for her medical conditions/complaints as noted below. Currently, our state is under umbrella of national state of emergency and we are using alternative methods of taking care of the needs of our patients so they are not exposed in our office; The patient has consented to this form of communication  Swetha Weiss is c/arlyn   Cough    THE patient is at home  Garcia Mosley is at office   Others in attendance are none    HPI:     HPI   1. Cough and congestion for the last couple days she is unable to bring up any sputum she felt that he had a lot of chest congestion. She had a low-grade fever. She said that she just feels poorly. She did just have her Covid injection about 1 week ago  2. Grief her daughter just  last week. All 4 of her children are now . She was caring for her daughter in her home.   Chief Complaint   Patient presents with    Cough       Past Medical History:   Diagnosis Date    Actinic keratosis     Atherosclerosis of native arteries of the extremities with intermittent claudication 2013    Benign fundic gland polyps of stomach     Blocked artery     Carotid artery bruit     right    Carotid artery stenosis 2013    Cerebral artery occlusion with cerebral infarction (HCC)     Chronic cough     Emphysema of lung ([address]     Hyperlipemia     Hypertension     Irritable bowel syndrome     Lower back pain     Macular degeneration     Need for prophylactic vaccination with Streptococcus pneumoniae (Pneumococcus) and Influenza vaccines     Osteoarthritis  Osteoporosis 9/17/2017    PONV (postoperative nausea and vomiting)     Prediabetes     Pulmonary emphysema ([address] 9/17/2017    PVD (peripheral vascular disease) (HCC)     Restless legs syndrome     Transient ischemic attack     Vitamin D deficiency       Past Surgical History:   Procedure Laterality Date    CATARACT REMOVAL Bilateral     COLONOSCOPY  03/15/2013    Shiben:  mild diverticulosis, HP, small internal hemorrhoids,  3yr recall    COLONOSCOPY  10/02/2018    Shiben:  Tubular AP x3, negative for evidence of high-grade dysplasia    FEMORAL ENDARTERECTOMY Left 8/13/2019    LEFT COMMON FEMORAL ENDARTERECTOMY WITH BOVINE PATCH ANGIOPLASTY AND COMPLETION ANGIOGRAM performed by Cynthia Rogers MD at [address] [place] [address]      cyst on breast    UPPER GASTROINTESTINAL ENDOSCOPY  09/13/2019    Dr Jei Sampson based antral ulcer (4mm)    UPPER GASTROINTESTINAL ENDOSCOPY Left 9/13/2019    EGD DIAGNOSTIC ONLY 9-13-19 Dr. Butch Naidu performed by Alvin Brown DO at [facility] Endoscopy    VASCULAR SURGERY  9-25-13 [facility] & [address]    Fluroscopic guidance for access tor R femoral artery after attempted access of L femoral artery. Aortoiliofemoral arteriogram with bilateral lower extremity runoff. R proximal common iliac artery angioplasty X2 with 8mm x 40 mm balloon. L superficial femoral artery angioplasty x1 with 4 mm x 40 mm balloon, then x1 with 5mm x 40 mm balloon. L superficial femoral artery SUPERA stent 4mm x 40mm and     VASCULAR SURGERY  cont. ..    a second SUPERA stent 4 mm x 60 mm Completion arteriograms Mynx closure R groin access site.     VASCULAR SURGERY  01/25/2017    SLC-AIF with runoff angioplasty L SFA with 5x20 cutting balloon, 5x150 lutonix DCB, 5x40 Paseo balloon stent proximal L SFA with 5x80 Innova stent kissing stents B SHE with 9x59 atrium stents extension R SHE/EIA with 9x57 express stent extension L SHE with 9x37 express stent completion arteriograms Mynx closure B [facility]    VASCULAR SURGERY  05/29/2018    DJM. Angio, SFA PTA       Vitals 1/11/2021 1/11/2021 1/11/2021 8/31/2020 2/20/2020 6/94/7783   SYSTOLIC 343 840 261 - 242 392   DIASTOLIC 72 73 77 - 78 70   Pulse - - 70 73 - -   Temp - - - 98.4 - -   Resp - - 20 - - -   SpO2 - - 97 98 - -   Weight - - 141 lb 1.6 oz - - -   Height - - 5' 5.5\" - - -   Body mass index - - 23.12 kg/m2 - - -   Pain Level - - - - - -   Some recent data might be hidden       Family History   Problem Relation Age of Onset    Cancer Mother     Cancer Father     High Blood Pressure Son     High Cholesterol Son     High Blood Pressure Daughter     High Cholesterol Daughter     Heart Attack Daughter     Heart Attack Brother        Social History     Tobacco Use    Smoking status: Current Every Day Smoker     Packs/day: 0.75     Years: 58.00     Pack years: 43.50     Types: Cigarettes    Smokeless tobacco: Never Used    Tobacco comment: 3/4 of a pack daily   Substance Use Topics    Alcohol use: No      Current Outpatient Medications   Medication Sig Dispense Refill    guaiFENesin (MUCINEX) 600 MG extended release tablet Take 1 tablet by mouth 2 times daily for 15 days 30 tablet 0    cefdinir (OMNICEF) 300 MG capsule Take 1 capsule by mouth 2 times daily for 7 days 14 capsule 0    dicyclomine (BENTYL) 10 MG capsule TAKE 1 CAPSULE BY MOUTH 2 TIMES DAILY AS NEEDED 180 capsule 1    vitamin D (ERGOCALCIFEROL) 1.25 MG (01017 UT) CAPS capsule TAKE 1 CAPSULE BY MOUTH ONE TIME PER WEEK 12 capsule 1    hydroCHLOROthiazide (MICROZIDE) 12.5 MG capsule TAKE 1 CAPSULE EVERY MORNING 90 capsule 3    amLODIPine (NORVASC) 5 MG tablet TAKE 1 TABLET EVERY DAY 90 tablet 3    OXYGEN Inhale 2 L into the lungs as needed      budesonide-formoterol (SYMBICORT) 160-4.5 MCG/ACT AERO INHALE 2 PUFFS TWICE DAILY.  RINSE MOUTH AFTER USE. 1 Inhaler 2    pantoprazole (PROTONIX) 40 MG tablet TAKE 1 TABLET BY MOUTH TWICE A  tablet 1    lisinopril (PRINIVIL;ZESTRIL) 40 MG tablet TAKE 1 TABLET EVERY DAY 90 tablet 3    pramipexole (MIRAPEX) 0.25 MG tablet TAKE 1 TO 2 TABLETS BY MOUTH AT BEDTIME AS NEEDED 180 tablet 3    sucralfate (CARAFATE) 1 GM tablet Take 1 tablet by mouth 4 times daily (Patient taking differently: Take 1 g by mouth 4 times daily PRN) 120 tablet 3    atorvastatin (LIPITOR) 80 MG tablet TAKE 1 TABLET EVERY DAY 90 tablet 3    albuterol (PROVENTIL) (2.5 MG/3ML) 0.083% nebulizer solution INHALE 1 VIAL BY NEBULIZER EVERY 4 HOURS AS NEEDED FOR WHEEZING OR SHORTNESS OF BREATH 150 mL 3    albuterol sulfate  (90 Base) MCG/ACT inhaler Inhale 2 puffs into the lungs 4 times daily as needed for Wheezing 3 Inhaler 1    ipratropium-albuterol (DUONEB) 0.5-2.5 (3) MG/3ML SOLN nebulizer solution Inhale 3 mLs into the lungs every 6 hours 360 mL 3    Nebulizers (COMPRESSOR/NEBULIZER) MISC Use to administer neb treatments 4 times a day as needed 1 each 3    furosemide (LASIX) 20 MG tablet Take 10 mg by mouth daily      aspirin 81 MG tablet Take 81 mg by mouth daily. No current facility-administered medications for this visit.       Allergies   Allergen Reactions    Codeine Nausea And Vomiting    Valium Nausea Only       Health Maintenance   Topic Date Due    COVID-19 Vaccine (1) Never done    DTaP/Tdap/Td vaccine (1 - Tdap) Never done    Annual Wellness Visit (AWV)  Never done    DEXA (modify frequency per FRAX score)  06/20/2020    Lipid screen  02/18/2021    Shingles Vaccine (1 of 2) 04/18/2028 (Originally 10[birth year]19/1985)    Breast cancer screen  07/25/2021    Potassium monitoring  08/31/2021    Creatinine monitoring  08/31/2021    Colon cancer screen colonoscopy  10/03/2021    Flu vaccine  Completed    Pneumococcal 65+ years Vaccine  Completed    Hepatitis A vaccine  Aged Out    Hepatitis B vaccine  Aged Out    Hib vaccine  Aged Out    Meningococcal (ACWY) vaccine  Aged Out       No results found for: LABA1C  No results found for: PSA, PSADIA  TSH   Date Value Ref Range Status   02/18/2020 2.610 0.270 - 4.200 uIU/mL Final   ]  Lab Results   Component Value Date     08/31/2020    K 4.5 08/31/2020    CL 98 08/31/2020    CO2 30 (H) 08/31/2020    BUN 20 08/31/2020    CREATININE 1.1 (H) 08/31/2020    GLUCOSE 100 08/31/2020    CALCIUM 9.2 08/31/2020    PROT 6.8 08/31/2020    LABALBU 4.2 08/31/2020    BILITOT 0.3 08/31/2020    ALKPHOS 74 08/31/2020    AST 16 08/31/2020    ALT 10 08/31/2020    LABGLOM 47 (A) 08/31/2020    GFRAA 57 (L) 08/31/2020    GLOB 2.7 05/15/2017     Lab Results   Component Value Date    CHOL 143 (L) 02/18/2020    CHOL 119 (L) 11/15/2019    CHOL 144 (L) 04/08/2019     Lab Results   Component Value Date    TRIG 86 02/18/2020    TRIG 60 11/15/2019    TRIG 85 04/08/2019     Lab Results   Component Value Date    HDL 56 (L) 02/18/2020    HDL 58 (L) 11/15/2019    HDL 54 (L) 04/08/2019     Lab Results   Component Value Date    LDLCALC 70 02/18/2020    LDLCALC 49 11/15/2019    LDLCALC 73 04/08/2019     Lab Results   Component Value Date     08/31/2020    K 4.5 08/31/2020    K 4.5 09/12/2019    CL 98 08/31/2020    CO2 30 08/31/2020    BUN 20 08/31/2020    CREATININE 1.1 08/31/2020    GLUCOSE 100 08/31/2020    CALCIUM 9.2 08/31/2020      Lab Results   Component Value Date    WBC 5.9 08/31/2020    HGB 13.7 08/31/2020    HCT 42.4 08/31/2020    MCV 94.4 08/31/2020     08/31/2020    LABLYMP 1.47 09/25/2013    LYMPHOPCT 26.1 08/31/2020    RBC 4.49 08/31/2020    MCH 30.5 08/31/2020    MCHC 32.3 (L) 08/31/2020    RDW 13.7 08/31/2020     Lab Results   Component Value Date    VITD25 59.4 08/31/2020       Subjective:      Review of Systems   Constitutional: Positive for fatigue and fever. Negative for unexpected weight change. HENT: Negative for ear discharge, ear pain, mouth sores, sore throat and trouble swallowing. Eyes: Negative for discharge, itching and visual disturbance. Respiratory: Positive for cough. Negative for choking, shortness of breath, wheezing and stridor. Cardiovascular: Negative for chest pain, palpitations and leg swelling. Gastrointestinal: Negative for abdominal distention, abdominal pain, blood in stool, constipation, diarrhea, nausea and vomiting. Endocrine: Negative for cold intolerance, polydipsia and polyuria. Genitourinary: Negative for difficulty urinating, dysuria, frequency and urgency. Musculoskeletal: Negative for arthralgias and gait problem. Skin: Negative for color change and rash. Allergic/Immunologic: Negative for food allergies and immunocompromised state. Neurological: Negative for dizziness, tremors, syncope, speech difficulty, weakness and headaches. Hematological: Negative for adenopathy. Does not bruise/bleed easily. Psychiatric/Behavioral: Negative for confusion and hallucinations. Objective:     Physical Exam     Pulmonary  effort appears normal.  No respiratory distress. Neurologic  alert and oriented X 3. Cranial Nerves II-XII grossly intact  Psychiatric  mood, affect, and behavior appear normal.  Judgment and thought processes appear normal.     Vital signs were not taken at this visit as no equipment was available; There were no vitals taken for this visit. Assessment:       Diagnosis Orders   1. Cough     2. Fever, unspecified fever cause         Plan:        Patient given educational materials - see patient instructions. Discussed use, benefit, and side effects of prescribed medications. Allpatient questions answered. Pt voiced understanding. Reviewed health maintenance. Instructed to continue current medications, diet and exercise. Patient agreed with treatment plan. Follow up as directed.    MEDICATIONS:  Orders Placed This Encounter   Medications    guaiFENesin (MUCINEX) 600 MG extended release tablet     Sig: Take 1 tablet by mouth 2 times daily for 15 days     Dispense:  30 tablet     Refill:  0    cefdinir (OMNICEF) 300 MG capsule     Sig: Take 1 capsule by mouth 2 times daily for 7 days     Dispense:  14 capsule     Refill:  0         ORDERS:  No orders of the defined types were placed in this encounter. Follow-up:  Return for keep fu appt, yearly physical, awv. Following the remote visit today we will call you when we are available to reschedule your follow up appt      PATIENT INSTRUCTIONS:  Patient Instructions   1. Cough  2. fever sent in cefdinir 300 twice a day for 7 days  Also sent Mucinex 600 twice a day P sure you drink lots of water when you take the Mucinex to prevent being dehydrated. Electronically signed by PRAVEEN Pace on 3/31/2021 at 10:46 AM   tele visit 14 minutes  We are communicating with telehealth for the 3 month fu for controlled substance      Pursuant to the emergency declaration under the [address] [place] [address] authority and the [employer] Act, this Virtual Visit was conducted, with patient's consent, to reduce the patient's risk of exposure to COVID-19 and provide continuity of care for an established patient. EMRDragon/transcription disclaimer:  Much of this encounter note is electronic    Vahe Loaiza is a 80 y.o. female being evaluated by a Virtual Visit (video visit) encounter to address concerns as mentioned above. A caregiver was present when appropriate. Due to this being a TeleHealth encounter ([facility]), evaluation of the following organ systems was limited: Vitals/Constitutional/EENT/Resp/CV/GI//MS/Neuro/Skin/Heme-Lymph-Imm.   Pursuant to the emergency declaration under the [address] [place] [address], [address] [place] [address] [facility] authority and the [employer] Act, this Virtual Visit was conducted with patient's (and/or legal guardian's) consent, to reduce the patient's risk of

## 2021-04-03 ENCOUNTER — HOSPITAL ENCOUNTER (EMERGENCY)
Age: 86
Discharge: HOME | End: 2021-04-03
Payer: MEDICARE

## 2021-04-03 ENCOUNTER — APPOINTMENT (OUTPATIENT)
Dept: GENERAL RADIOLOGY | Age: 86
End: 2021-04-03
Payer: MEDICARE

## 2021-04-03 VITALS
TEMPERATURE: 97.9 F | HEART RATE: 80 BPM | SYSTOLIC BLOOD PRESSURE: 177 MMHG | BODY MASS INDEX: 23.6 KG/M2 | WEIGHT: 144 LBS | DIASTOLIC BLOOD PRESSURE: 56 MMHG | RESPIRATION RATE: 21 BRPM | OXYGEN SATURATION: 90 %

## 2021-04-03 DIAGNOSIS — I50.9 ACUTE CONGESTIVE HEART FAILURE, UNSPECIFIED HEART FAILURE TYPE (HCC): ICD-10-CM

## 2021-04-03 DIAGNOSIS — J44.1 COPD EXACERBATION (HCC): Primary | ICD-10-CM

## 2021-04-03 LAB
ALBUMIN SERPL-MCNC: 4.1 G/DL (ref 3.5–5.2)
ALP BLD-CCNC: 105 U/L (ref 35–104)
ALT SERPL-CCNC: 12 U/L (ref 5–33)
ANION GAP SERPL CALCULATED.3IONS-SCNC: 6 MMOL/L (ref 7–19)
AST SERPL-CCNC: 18 U/L (ref 5–32)
BASE EXCESS ARTERIAL: 9.5 MMOL/L (ref -2–2)
BASOPHILS ABSOLUTE: 0 K/UL (ref 0–0.2)
BASOPHILS RELATIVE PERCENT: 0.6 % (ref 0–1)
BILIRUB SERPL-MCNC: 0.3 MG/DL (ref 0.2–1.2)
BUN BLDV-MCNC: 13 MG/DL (ref 8–23)
CALCIUM SERPL-MCNC: 9.3 MG/DL (ref 8.8–10.2)
CARBOXYHEMOGLOBIN ARTERIAL: 1.7 % (ref 0–5)
CHLORIDE BLD-SCNC: 94 MMOL/L (ref 98–111)
CO2: 35 MMOL/L (ref 22–29)
CREAT SERPL-MCNC: 0.9 MG/DL (ref 0.5–0.9)
EOSINOPHILS ABSOLUTE: 0.3 K/UL (ref 0–0.6)
EOSINOPHILS RELATIVE PERCENT: 3.9 % (ref 0–5)
GFR AFRICAN AMERICAN: >59
GFR NON-AFRICAN AMERICAN: 59
GLUCOSE BLD-MCNC: 138 MG/DL (ref 74–109)
HCO3 ARTERIAL: 36.1 MMOL/L (ref 22–26)
HCT VFR BLD CALC: 41.9 % (ref 37–47)
HEMOGLOBIN, ART, EXTENDED: 12.8 G/DL (ref 12–16)
HEMOGLOBIN: 13 G/DL (ref 12–16)
IMMATURE GRANULOCYTES #: 0 K/UL
LACTIC ACID: 1.3 MMOL/L (ref 0.5–1.9)
LYMPHOCYTES ABSOLUTE: 1.1 K/UL (ref 1.1–4.5)
LYMPHOCYTES RELATIVE PERCENT: 16.6 % (ref 20–40)
MCH RBC QN AUTO: 28.7 PG (ref 27–31)
MCHC RBC AUTO-ENTMCNC: 31 G/DL (ref 33–37)
MCV RBC AUTO: 92.5 FL (ref 81–99)
METHEMOGLOBIN ARTERIAL: 0.9 %
MONOCYTES ABSOLUTE: 0.8 K/UL (ref 0–0.9)
MONOCYTES RELATIVE PERCENT: 11.9 % (ref 0–10)
NEUTROPHILS ABSOLUTE: 4.2 K/UL (ref 1.5–7.5)
NEUTROPHILS RELATIVE PERCENT: 66.5 % (ref 50–65)
O2 CONTENT ARTERIAL: 16.3 ML/DL
O2 SAT, ARTERIAL: 90.4 %
O2 THERAPY: ABNORMAL
PCO2 ARTERIAL: 57 MMHG (ref 35–45)
PDW BLD-RTO: 13.6 % (ref 11.5–14.5)
PH ARTERIAL: 7.41 (ref 7.35–7.45)
PLATELET # BLD: 238 K/UL (ref 130–400)
PMV BLD AUTO: 9.7 FL (ref 9.4–12.3)
PO2 ARTERIAL: 55 MMHG (ref 80–100)
POTASSIUM REFLEX MAGNESIUM: 4.3 MMOL/L (ref 3.5–5)
POTASSIUM, WHOLE BLOOD: 4.4
PRO-BNP: 2561 PG/ML (ref 0–1800)
RBC # BLD: 4.53 M/UL (ref 4.2–5.4)
SARS-COV-2, NAAT: NOT DETECTED
SODIUM BLD-SCNC: 135 MMOL/L (ref 136–145)
TOTAL PROTEIN: 7.1 G/DL (ref 6.6–8.7)
TROPONIN: 0.01 NG/ML (ref 0–0.03)
TROPONIN: 0.02 NG/ML (ref 0–0.03)
WBC # BLD: 6.4 K/UL (ref 4.8–10.8)

## 2021-04-03 PROCEDURE — 83880 ASSAY OF NATRIURETIC PEPTIDE: CPT

## 2021-04-03 PROCEDURE — 82803 BLOOD GASES ANY COMBINATION: CPT

## 2021-04-03 PROCEDURE — 36600 WITHDRAWAL OF ARTERIAL BLOOD: CPT

## 2021-04-03 PROCEDURE — 6370000000 HC RX 637 (ALT 250 FOR IP): Performed by: NURSE PRACTITIONER

## 2021-04-03 PROCEDURE — 87635 SARS-COV-2 COVID-19 AMP PRB: CPT

## 2021-04-03 PROCEDURE — 84484 ASSAY OF TROPONIN QUANT: CPT

## 2021-04-03 PROCEDURE — 96374 THER/PROPH/DIAG INJ IV PUSH: CPT

## 2021-04-03 PROCEDURE — 99284 EMERGENCY DEPT VISIT MOD MDM: CPT

## 2021-04-03 PROCEDURE — 84132 ASSAY OF SERUM POTASSIUM: CPT

## 2021-04-03 PROCEDURE — 85025 COMPLETE CBC W/AUTO DIFF WBC: CPT

## 2021-04-03 PROCEDURE — 83605 ASSAY OF LACTIC ACID: CPT

## 2021-04-03 PROCEDURE — 80053 COMPREHEN METABOLIC PANEL: CPT

## 2021-04-03 PROCEDURE — 93005 ELECTROCARDIOGRAM TRACING: CPT | Performed by: NURSE PRACTITIONER

## 2021-04-03 PROCEDURE — 71045 X-RAY EXAM CHEST 1 VIEW: CPT

## 2021-04-03 PROCEDURE — 94640 AIRWAY INHALATION TREATMENT: CPT

## 2021-04-03 PROCEDURE — 6360000002 HC RX W HCPCS: Performed by: NURSE PRACTITIONER

## 2021-04-03 PROCEDURE — 87040 BLOOD CULTURE FOR BACTERIA: CPT

## 2021-04-03 PROCEDURE — 36415 COLL VENOUS BLD VENIPUNCTURE: CPT

## 2021-04-03 RX ORDER — FUROSEMIDE 10 MG/ML
40 INJECTION INTRAMUSCULAR; INTRAVENOUS ONCE
Status: COMPLETED | OUTPATIENT
Start: 2021-04-03 | End: 2021-04-03

## 2021-04-03 RX ORDER — IPRATROPIUM BROMIDE AND ALBUTEROL SULFATE 2.5; .5 MG/3ML; MG/3ML
1 SOLUTION RESPIRATORY (INHALATION)
Status: DISCONTINUED | OUTPATIENT
Start: 2021-04-03 | End: 2021-04-03 | Stop reason: HOSPADM

## 2021-04-03 RX ORDER — PREDNISONE 20 MG/1
20 TABLET ORAL 2 TIMES DAILY
Qty: 10 TABLET | Refills: 0 | Status: SHIPPED | OUTPATIENT
Start: 2021-04-03 | End: 2021-04-08

## 2021-04-03 RX ADMIN — FUROSEMIDE 40 MG: 10 INJECTION, SOLUTION INTRAMUSCULAR; INTRAVENOUS at 12:18

## 2021-04-03 RX ADMIN — IPRATROPIUM BROMIDE AND ALBUTEROL SULFATE 1 AMPULE: .5; 3 SOLUTION RESPIRATORY (INHALATION) at 12:33

## 2021-04-03 ASSESSMENT — ENCOUNTER SYMPTOMS
VOMITING: 0
SHORTNESS OF BREATH: 1
WHEEZING: 1
SPUTUM PRODUCTION: 1

## 2021-04-03 NOTE — ED PROVIDER NOTES
artery stenosis 9/19/2013    Cerebral artery occlusion with cerebral infarction (HCC)     Chronic cough     Emphysema of lung (HCC)     Hyperlipemia     Hypertension     Irritable bowel syndrome     Lower back pain     Macular degeneration     Need for prophylactic vaccination with Streptococcus pneumoniae (Pneumococcus) and Influenza vaccines     Osteoarthritis     Osteoporosis 9/17/2017    PONV (postoperative nausea and vomiting)     Prediabetes     Pulmonary emphysema ([address] 9/17/2017    PVD (peripheral vascular disease) ([address]     Restless legs syndrome     Transient ischemic attack     Vitamin D deficiency          SURGICALHISTORY       Past Surgical History:   Procedure Laterality Date    CATARACT REMOVAL Bilateral     COLONOSCOPY  03/15/2013    Shiben:  mild diverticulosis, HP, small internal hemorrhoids,  3yr recall    COLONOSCOPY  10/02/2018    Shiben:  Tubular AP x3, negative for evidence of high-grade dysplasia    FEMORAL ENDARTERECTOMY Left 8/13/2019    LEFT COMMON FEMORAL ENDARTERECTOMY WITH BOVINE PATCH ANGIOPLASTY AND COMPLETION ANGIOGRAM performed by Lonnell Pallas, MD at [address]      cyst on breast    UPPER GASTROINTESTINAL ENDOSCOPY  09/13/2019    Dr Radford Vladislav based antral ulcer (4mm)    UPPER GASTROINTESTINAL ENDOSCOPY Left 9/13/2019    EGD DIAGNOSTIC ONLY 9-13-19 Dr. Roxanne Leos performed by Jahaira Bacon DO at [facility] Endoscopy    VASCULAR SURGERY  9-25-13 [facility]    Fluroscopic guidance for access tor R femoral artery after attempted access of L femoral artery. Aortoiliofemoral arteriogram with bilateral lower extremity runoff. R proximal common iliac artery angioplasty X2 with 8mm x 40 mm balloon. L superficial femoral artery angioplasty x1 with 4 mm x 40 mm balloon, then x1 with 5mm x 40 mm balloon. L superficial femoral artery SUPERA stent 4mm x 40mm and     VASCULAR SURGERY  cont. ..    a second SUPERA stent 4 mm x 60 mm Completion arteriograms Mynx closure R groin access site.  VASCULAR SURGERY  01/25/2017    SLC-AIF with runoff angioplasty L SFA with 5x20 cutting balloon, 5x150 lutonix DCB, 5x40 Paseo balloon stent proximal L SFA with 5x80 Innova stent kissing stents B SHE with 9x59 atrium stents extension R SHE/EIA with 9x57 express stent extension L SHE with 9x37 express stent completion arteriograms Mynx closure B CFA    VASCULAR SURGERY  05/29/2018    DJM. Angio, SFA PTA         CURRENT MEDICATIONS       Previous Medications    ALBUTEROL (PROVENTIL) (2.5 MG/3ML) 0.083% NEBULIZER SOLUTION    INHALE 1 VIAL BY NEBULIZER EVERY 4 HOURS AS NEEDED FOR WHEEZING OR SHORTNESS OF BREATH    ALBUTEROL SULFATE  (90 BASE) MCG/ACT INHALER    Inhale 2 puffs into the lungs 4 times daily as needed for Wheezing    AMLODIPINE (NORVASC) 5 MG TABLET    TAKE 1 TABLET EVERY DAY    ASPIRIN 81 MG TABLET    Take 81 mg by mouth daily. ATORVASTATIN (LIPITOR) 80 MG TABLET    TAKE 1 TABLET EVERY DAY    BUDESONIDE-FORMOTEROL (SYMBICORT) 160-4.5 MCG/ACT AERO    INHALE 2 PUFFS TWICE DAILY. RINSE MOUTH AFTER USE.     CEFDINIR (OMNICEF) 300 MG CAPSULE    Take 1 capsule by mouth 2 times daily for 7 days    DICYCLOMINE (BENTYL) 10 MG CAPSULE    TAKE 1 CAPSULE BY MOUTH 2 TIMES DAILY AS NEEDED    FUROSEMIDE (LASIX) 20 MG TABLET    Take 10 mg by mouth daily    GUAIFENESIN (MUCINEX) 600 MG EXTENDED RELEASE TABLET    Take 1 tablet by mouth 2 times daily for 15 days    HYDROCHLOROTHIAZIDE (MICROZIDE) 12.5 MG CAPSULE    TAKE 1 CAPSULE EVERY MORNING    IPRATROPIUM-ALBUTEROL (DUONEB) 0.5-2.5 (3) MG/3ML SOLN NEBULIZER SOLUTION    Inhale 3 mLs into the lungs every 6 hours    LISINOPRIL (PRINIVIL;ZESTRIL) 40 MG TABLET    TAKE 1 TABLET EVERY DAY    NEBULIZERS (COMPRESSOR/NEBULIZER) MISC    Use to administer neb treatments 4 times a day as needed    OXYGEN    Inhale 2 L into the lungs as needed    PANTOPRAZOLE (PROTONIX) 40 MG TABLET    TAKE 1 TABLET BY MOUTH TWICE A DAY    PRAMIPEXOLE (MIRAPEX) 0.25 MG TABLET    TAKE 1 TO 2 TABLETS BY MOUTH AT BEDTIME AS NEEDED    SUCRALFATE (CARAFATE) 1 GM TABLET    Take 1 tablet by mouth 4 times daily    VITAMIN D (ERGOCALCIFEROL) 1.25 MG (41871 UT) CAPS CAPSULE    TAKE 1 CAPSULE BY MOUTH ONE TIME PER WEEK       ALLERGIES     Codeine and Valium    FAMILY HISTORY       Family History   Problem Relation Age of Onset    Cancer Mother     Cancer Father     High Blood Pressure Son     High Cholesterol Son     High Blood Pressure Daughter     High Cholesterol Daughter     Heart Attack Daughter     Heart Attack Brother           SOCIAL HISTORY       Social History     Socioeconomic History    Marital status:       Spouse name: Not on file    Number of children: 2    Years of education: 5    Highest education level: Not on file   Occupational History    Occupation: retired   Social Needs    Financial resource strain: Not on file    Food insecurity     Worry: Not on file     Inability: Not on file   [employer] needs     Medical: Not on file     Non-medical: Not on file   Tobacco Use    Smoking status: Current Every Day Smoker     Packs/day: 0.75     Years: 58.00     Pack years: 43.50     Types: Cigarettes    Smokeless tobacco: Never Used    Tobacco comment: 3/4 of a pack daily   Substance and Sexual Activity    Alcohol use: No    Drug use: No    Sexual activity: Never   Lifestyle    Physical activity     Days per week: Not on file     Minutes per session: Not on file    Stress: Not on file   Relationships    Social connections     Talks on phone: Not on file     Gets together: Not on file     Attends [employer] service: Not on file     Active member of club or organization: Not on file     Attends meetings of clubs or organizations: Not on file     Relationship status: Not on file    Intimate partner violence     Fear of current or ex partner: Not on file     Emotionally abused: Not on file     Physically abused: Not on file     Forced sexual activity: Not on file   Other Topics Concern    Not on file   Social History Narrative    Not on file       SCREENINGS    [place] Coma Scale  Eye Opening: Spontaneous  Best Verbal Response: Oriented  Best Motor Response: Obeys commands  [place] Coma Scale Score: 15 @FLOW(30011807)@      PHYSICAL EXAM    (up to 7 for level 4, 8 or more for level 5)     ED Triage Vitals [04/03/21 1038]   BP Temp Temp Source Pulse Resp SpO2 Height Weight   (!) 177/56 97.9 °F (36.6 °C) Oral 76 21 90 % -- 144 lb (65.3 kg)       Physical Exam  Vitals signs and nursing note reviewed. Constitutional:       Appearance: She is well-developed. HENT:      Head: Normocephalic and atraumatic. Eyes:      General: No scleral icterus. Right eye: No discharge. Left eye: No discharge. Neck:      Musculoskeletal: Normal range of motion and neck supple. Cardiovascular:      Rate and Rhythm: Normal rate and regular rhythm. Pulmonary:      Effort: Pulmonary effort is normal. No respiratory distress. Breath sounds: Wheezing present. Skin:     General: Skin is warm and dry. Capillary Refill: Capillary refill takes less than 2 seconds. Neurological:      General: No focal deficit present. Mental Status: She is alert and oriented to person, place, and time.    Psychiatric:         Behavior: Behavior normal.         DIAGNOSTIC RESULTS     EKG: All EKG's are interpreted by the Emergency Department Physician who either signs or Co-signsthis chart in the absence of a cardiologist.  69 sinus rhythm no T wave changes read at 1104 by Dr. Sammie Laws  63 sinus rhythm unchanged from previous read at 1316 by Dr. Low Thompson:   Toure Hamburger such as CT, Ultrasound and MRI are read by the radiologist. Plain radiographic images are visualized and preliminarily interpreted by the emergency physician with the below findings:      Interpretation per the Radiologist below, if available at the time of this note:    XR CHEST PORTABLE   Final Result   1. COPD no acute cardiopulmonary process. Signed by Dr Marko Kruse on 4/3/2021 11:34 AM            ED BEDSIDEULTRASOUND:   Performed by ED Physician -none    LABS:  Labs Reviewed   CBC WITH AUTO DIFFERENTIAL - Abnormal; Notable for the following components:       Result Value    MCHC 31.0 (*)     Neutrophils % 66.5 (*)     Lymphocytes % 16.6 (*)     Monocytes % 11.9 (*)     All other components within normal limits   COMPREHENSIVE METABOLIC PANEL W/ REFLEX TO MG FOR LOW K - Abnormal; Notable for the following components:    Sodium 135 (*)     Chloride 94 (*)     CO2 35 (*)     Anion Gap 6 (*)     Glucose 138 (*)     GFR Non-African American 59 (*)     Alkaline Phosphatase 105 (*)     All other components within normal limits   BLOOD GAS, ARTERIAL - Abnormal; Notable for the following components:    pCO2, Arterial 57.0 (*)     pO2, Arterial 55.0 (*)     HCO3, Arterial 36.1 (*)     Base Excess, Arterial 9.5 (*)     All other components within normal limits   BRAIN NATRIURETIC PEPTIDE - Abnormal; Notable for the following components:    Pro-BNP 2,561 (*)     All other components within normal limits   COVID-19, RAPID   CULTURE, BLOOD 2   CULTURE, BLOOD 1   TROPONIN   LACTIC ACID, PLASMA   POTASSIUM, WHOLE BLOOD   TROPONIN       All other labs were within normal range or not returned as of this dictation. EMERGENCY DEPARTMENT COURSE and DIFFERENTIALDIAGNOSIS/MDM:   Vitals:    Vitals:    04/03/21 1038 04/03/21 1058   BP: (!) 177/56    Pulse: 76 80   Resp: 21    Temp: 97.9 °F (36.6 °C)    TempSrc: Oral    SpO2: 90%    Weight: 144 lb (65.3 kg)            MDM      CONSULTS:  None    PROCEDURES:  Unless otherwise noted below, none     Procedures    FINAL IMPRESSION      1. COPD exacerbation (HCC)    2.  Acute congestive heart failure, unspecified heart failure type ([facility]        DISPOSITION/PLAN   DISPOSITION        PATIENT REFERRED TO:  Samantha HELM MD [place]  [address] [facility] [address] [address]  [phone]    Schedule an appointment as soon as possible for a visit   pulmonology      DISCHARGE MEDICATIONS:  New Prescriptions    PREDNISONE (DELTASONE) 20 MG TABLET    Take 1 tablet by mouth 2 times daily for 5 days          (Please note that portions of this note were completed with a voice recognitionprogram.  Efforts were made to edit the dictations but occasionally words are mis-transcribed.)    PRAVEEN Nagel (electronically signed)          PRAVEEN Nagel  04/03/21 8633

## 2021-04-03 NOTE — ED NOTES
Pt ambulatory about 20ft with continuous pulse ox on room air. Pt sat stayed above 88%, respirations up to 42.  Pt back to bed and as getting on bed sat dropped to 86, respirations [address], [address], RN  04/03/21 1237

## 2021-04-03 NOTE — ED TRIAGE NOTES
Pt here with sob for the past 10 day's  Pt states she got her last covid shot 10 days ago and this began.   Pt has hx of COPD, started on abx by PCP

## 2021-04-05 ENCOUNTER — CARE COORDINATION (OUTPATIENT)
Dept: CARE COORDINATION | Age: 86
End: 2021-04-05

## 2021-04-05 NOTE — CARE COORDINATION
Patient contacted regarding recent discharge and COVID-19 risk. Discussed COVID-19 related testing which was available at this time. Test results were negative. Patient informed of results, if available? Yes     Care Transition Nurse/ Ambulatory Care Manager contacted the patient by telephone to perform post discharge assessment. Verified name and  with patient as identifiers. Patient has following risk factors of: COPD. CTN/ACM reviewed discharge instructions, medical action plan and red flags related to discharge diagnosis. Reviewed and educated them on any new and changed medications related to discharge diagnosis. Advised obtaining a 90-day supply of all daily and as-needed medications. Education provided regarding infection prevention, and signs and symptoms of COVID-19 and when to seek medical attention with patient who verbalized understanding. Discussed exposure protocols and quarantine from [address] you at higher risk for severe illness  and given an opportunity for questions and concerns. The patient agrees to contact PCP office for questions related to their healthcare. CTN/ACM provided contact information for future reference. From CDC: Are you at higher risk for severe illness?  Wash your hands often.  Avoid close contact (6 feet, which is about two arm lengths) with people who are sick.  Put distance between yourself and other people if COVID-19 is spreading in your community.  Clean and disinfect frequently touched surfaces.  Avoid all cruise travel and non-essential air travel.  Call your healthcare professional if you have concerns about COVID-19 and your underlying condition or if you are sick. For more information on steps you can take to protect yourself, see CDC's How to [address] [place] [address] for follow-up call in 7-14 days based on severity of symptoms and risk factors.     Patient states that she continues with shortness of breath, however she

## 2021-04-06 LAB
EKG P AXIS: 51 DEGREES
EKG P-R INTERVAL: 232 MS
EKG Q-T INTERVAL: 414 MS
EKG QRS DURATION: 88 MS
EKG QTC CALCULATION (BAZETT): 428 MS
EKG T AXIS: 74 DEGREES

## 2021-04-06 PROCEDURE — 93010 ELECTROCARDIOGRAM REPORT: CPT | Performed by: INTERNAL MEDICINE

## 2021-04-08 LAB
BLOOD CULTURE, ROUTINE: NORMAL
CULTURE, BLOOD 2: NORMAL

## 2021-04-11 LAB
EKG P AXIS: 85 DEGREES
EKG P-R INTERVAL: 182 MS
EKG Q-T INTERVAL: 452 MS
EKG QRS DURATION: 88 MS
EKG QTC CALCULATION (BAZETT): 455 MS
EKG T AXIS: 85 DEGREES

## 2021-04-16 NOTE — TELEPHONE ENCOUNTER
Johnathan Bailey called requesting a refill of the below medication which has been pended for you:     Requested Prescriptions     Pending Prescriptions Disp Refills    budesonide-formoterol (SYMBICORT) 160-4.5 MCG/ACT AERO [Pharmacy Med Name: SYMBICORT 160-4.5 MCG/ACT Aerosol] 1 Inhaler 1     Sig: INHALE 2 PUFFS TWICE DAILY. RINSE MOUTH AFTER USE.        Last Appointment Date: 3/31/2021  Next Appointment Date: 4/19/2021    Allergies   Allergen Reactions    Codeine Nausea And Vomiting    Valium Nausea Only Glycopyrrolate Pregnancy And Lactation Text: This medication is Pregnancy Category B and is considered safe during pregnancy. It is unknown if it is excreted breast milk.

## 2021-04-19 ENCOUNTER — OFFICE VISIT (OUTPATIENT)
Dept: INTERNAL MEDICINE | Age: 86
End: 2021-04-19
Payer: MEDICARE

## 2021-04-19 VITALS
WEIGHT: 144 LBS | SYSTOLIC BLOOD PRESSURE: 138 MMHG | HEART RATE: 72 BPM | BODY MASS INDEX: 23.99 KG/M2 | DIASTOLIC BLOOD PRESSURE: 76 MMHG | HEIGHT: 65 IN

## 2021-04-19 DIAGNOSIS — J43.9 PULMONARY EMPHYSEMA, UNSPECIFIED EMPHYSEMA TYPE (HCC): ICD-10-CM

## 2021-04-19 DIAGNOSIS — Z00.00 ROUTINE GENERAL MEDICAL EXAMINATION AT A HEALTH CARE FACILITY: ICD-10-CM

## 2021-04-19 DIAGNOSIS — I70.213 ATHEROSCLEROSIS OF NATIVE ARTERY OF BOTH LOWER EXTREMITIES WITH INTERMITTENT CLAUDICATION (HCC): ICD-10-CM

## 2021-04-19 DIAGNOSIS — I10 ESSENTIAL HYPERTENSION: Chronic | ICD-10-CM

## 2021-04-19 DIAGNOSIS — E55.9 VITAMIN D DEFICIENCY: Chronic | ICD-10-CM

## 2021-04-19 DIAGNOSIS — E78.2 MIXED HYPERLIPIDEMIA: Chronic | ICD-10-CM

## 2021-04-19 DIAGNOSIS — K21.9 GASTROESOPHAGEAL REFLUX DISEASE WITHOUT ESOPHAGITIS: Primary | ICD-10-CM

## 2021-04-19 LAB
ALBUMIN SERPL-MCNC: 4.1 G/DL (ref 3.5–5.2)
ALP BLD-CCNC: 92 U/L (ref 35–104)
ALT SERPL-CCNC: 12 U/L (ref 5–33)
ANION GAP SERPL CALCULATED.3IONS-SCNC: 10 MMOL/L (ref 7–19)
AST SERPL-CCNC: 14 U/L (ref 5–32)
BASOPHILS ABSOLUTE: 0.1 K/UL (ref 0–0.2)
BASOPHILS RELATIVE PERCENT: 0.6 % (ref 0–1)
BILIRUB SERPL-MCNC: 0.4 MG/DL (ref 0.2–1.2)
BILIRUBIN URINE: NEGATIVE
BLOOD, URINE: NEGATIVE
BUN BLDV-MCNC: 21 MG/DL (ref 8–23)
CALCIUM SERPL-MCNC: 9.5 MG/DL (ref 8.8–10.2)
CHLORIDE BLD-SCNC: 97 MMOL/L (ref 98–111)
CHOLESTEROL, TOTAL: 261 MG/DL (ref 160–199)
CLARITY: CLEAR
CO2: 31 MMOL/L (ref 22–29)
COLOR: YELLOW
CREAT SERPL-MCNC: 1.1 MG/DL (ref 0.5–0.9)
EOSINOPHILS ABSOLUTE: 0.2 K/UL (ref 0–0.6)
EOSINOPHILS RELATIVE PERCENT: 2.1 % (ref 0–5)
GFR AFRICAN AMERICAN: 57
GFR NON-AFRICAN AMERICAN: 47
GLUCOSE BLD-MCNC: 95 MG/DL (ref 74–109)
GLUCOSE URINE: NEGATIVE MG/DL
HCT VFR BLD CALC: 42 % (ref 37–47)
HDLC SERPL-MCNC: 67 MG/DL (ref 65–121)
HEMOGLOBIN: 13 G/DL (ref 12–16)
IMMATURE GRANULOCYTES #: 0 K/UL
KETONES, URINE: NEGATIVE MG/DL
LDL CHOLESTEROL CALCULATED: 169 MG/DL
LEUKOCYTE ESTERASE, URINE: NEGATIVE
LYMPHOCYTES ABSOLUTE: 1.5 K/UL (ref 1.1–4.5)
LYMPHOCYTES RELATIVE PERCENT: 14.2 % (ref 20–40)
MCH RBC QN AUTO: 28.9 PG (ref 27–31)
MCHC RBC AUTO-ENTMCNC: 31 G/DL (ref 33–37)
MCV RBC AUTO: 93.3 FL (ref 81–99)
MONOCYTES ABSOLUTE: 0.8 K/UL (ref 0–0.9)
MONOCYTES RELATIVE PERCENT: 7.6 % (ref 0–10)
NEUTROPHILS ABSOLUTE: 7.7 K/UL (ref 1.5–7.5)
NEUTROPHILS RELATIVE PERCENT: 75.2 % (ref 50–65)
NITRITE, URINE: NEGATIVE
PDW BLD-RTO: 14 % (ref 11.5–14.5)
PH UA: 7.5 (ref 5–8)
PLATELET # BLD: 287 K/UL (ref 130–400)
PMV BLD AUTO: 9.6 FL (ref 9.4–12.3)
POTASSIUM SERPL-SCNC: 4.5 MMOL/L (ref 3.5–5)
PROTEIN UA: NEGATIVE MG/DL
RBC # BLD: 4.5 M/UL (ref 4.2–5.4)
SODIUM BLD-SCNC: 138 MMOL/L (ref 136–145)
SPECIFIC GRAVITY UA: 1.01 (ref 1–1.03)
TOTAL PROTEIN: 7 G/DL (ref 6.6–8.7)
TRIGL SERPL-MCNC: 126 MG/DL (ref 0–149)
TSH SERPL DL<=0.05 MIU/L-ACNC: 2.43 UIU/ML (ref 0.27–4.2)
UROBILINOGEN, URINE: 0.2 E.U./DL
VITAMIN D 25-HYDROXY: 59.1 NG/ML
WBC # BLD: 10.2 K/UL (ref 4.8–10.8)

## 2021-04-19 PROCEDURE — 1123F ACP DISCUSS/DSCN MKR DOCD: CPT | Performed by: NURSE PRACTITIONER

## 2021-04-19 PROCEDURE — G0439 PPPS, SUBSEQ VISIT: HCPCS | Performed by: NURSE PRACTITIONER

## 2021-04-19 PROCEDURE — 4040F PNEUMOC VAC/ADMIN/RCVD: CPT | Performed by: NURSE PRACTITIONER

## 2021-04-19 RX ORDER — BUDESONIDE AND FORMOTEROL FUMARATE DIHYDRATE 160; 4.5 UG/1; UG/1
AEROSOL RESPIRATORY (INHALATION)
Qty: 1 INHALER | Refills: 1 | Status: SHIPPED | OUTPATIENT
Start: 2021-04-19 | End: 2021-04-19 | Stop reason: SDUPTHER

## 2021-04-19 RX ORDER — BUDESONIDE AND FORMOTEROL FUMARATE DIHYDRATE 160; 4.5 UG/1; UG/1
AEROSOL RESPIRATORY (INHALATION)
Qty: 3 INHALER | Refills: 3 | Status: SHIPPED | OUTPATIENT
Start: 2021-04-19

## 2021-04-19 ASSESSMENT — PATIENT HEALTH QUESTIONNAIRE - PHQ9
1. LITTLE INTEREST OR PLEASURE IN DOING THINGS: 0
2. FEELING DOWN, DEPRESSED OR HOPELESS: 0

## 2021-04-19 ASSESSMENT — LIFESTYLE VARIABLES: HOW OFTEN DO YOU HAVE A DRINK CONTAINING ALCOHOL: 0

## 2021-04-19 ASSESSMENT — ENCOUNTER SYMPTOMS
BLOOD IN STOOL: 0
STRIDOR: 0
COLOR CHANGE: 0
EYE ITCHING: 0
EYE DISCHARGE: 0
DIARRHEA: 0
NAUSEA: 0
SHORTNESS OF BREATH: 0
ABDOMINAL PAIN: 0
TROUBLE SWALLOWING: 0
SORE THROAT: 0
ABDOMINAL DISTENTION: 0
VOMITING: 0
CHOKING: 0
CONSTIPATION: 0
COUGH: 1
WHEEZING: 0

## 2021-04-19 NOTE — PATIENT INSTRUCTIONS
1.  Hypertension; The current medical regimen is effective;  continue present plan and medications. 2.  COPD  The current medical regimen is effective;  continue present plan and medications. 3.  GERD:  Stable The current medical regimen is effective;  continue present plan and medications. 4.  PVD BHP vascular  5. Hyperlipidemia; The current medical regimen is effective;  continue present plan and medications. Personalized Preventive Plan for [place] - 4/19/2021  Medicare offers a range of preventive health benefits. Some of the tests and screenings are paid in full while other may be subject to a deductible, co-insurance, and/or copay. Some of these benefits include a comprehensive review of your medical history including lifestyle, illnesses that may run in your family, and various assessments and screenings as appropriate. After reviewing your medical record and screening and assessments performed today your provider may have ordered immunizations, labs, imaging, and/or referrals for you. A list of these orders (if applicable) as well as your Preventive Care list are included within your After Visit Summary for your review. Other Preventive Recommendations:    · A preventive eye exam performed by an eye specialist is recommended every 1-2 years to screen for glaucoma; cataracts, macular degeneration, and other eye disorders. · A preventive dental visit is recommended every 6 months. · Try to get at least 150 minutes of exercise per week or 10,000 steps per day on a pedometer . · Order or download the FREE \"Exercise & Physical Activity: Your Everyday Guide\" from [employer]. Call 3-577.303.1602 or search [employer] online. · You need 9047-6290 mg of calcium and 4884-2702 IU of vitamin D per day.  It is possible to meet your calcium requirement with diet alone, but a vitamin D supplement is usually necessary to meet this goal.  · When exposed

## 2021-04-19 NOTE — PROGRESS NOTES
[address] [facility]  [address] [address]  [address] [address]  Dept: [phone]  Dept Fax: [phone] Randi Adkins (:  1935) is a 80 y.o. female,Established patient, here for evaluation of the following chief complaint(s): Medicare Shon Doughty is a 80 y.o. female who presents today for her medical conditions/complaints as noted below. Abundio Santamaria is c/arlyn Medicare AWV        HPI:     Chief Complaint   Patient presents with    Medicare AW     HPI   1. Hypertension amlodipine 5 mg daily   2. COPD;  Stable no recent exacerbations  3. GERD:  Stable on protonix; and carafate  QID   No recent exacerbations; 4. PVD; She is followed by Dr Marvin Washington at [facility]   5.   Hyperlipidemia; she taking lipitor 80 daily  But she feels it is constipating        Past Medical History:   Diagnosis Date    Actinic keratosis     Atherosclerosis of native arteries of the extremities with intermittent claudication 2013    Benign fundic gland polyps of stomach     Blocked artery     Carotid artery bruit     right    Carotid artery stenosis 2013    Cerebral artery occlusion with cerebral infarction (HCC)     Chronic cough     Emphysema of lung (HCC)     Hyperlipemia     Hypertension     Irritable bowel syndrome     Lower back pain     Macular degeneration     Need for prophylactic vaccination with Streptococcus pneumoniae (Pneumococcus) and Influenza vaccines     Osteoarthritis     Osteoporosis 2017    PONV (postoperative nausea and vomiting)     Prediabetes     Pulmonary emphysema ([address] 2017    PVD (peripheral vascular disease) ([address]     Restless legs syndrome     Transient ischemic attack     Vitamin D deficiency       Past Surgical History:   Procedure Laterality Date    CATARACT REMOVAL Bilateral     COLONOSCOPY  03/15/2013    Shiben:  mild diverticulosis, HP, small internal hemorrhoids,  3yr recall    COLONOSCOPY  10/02/2018    Shiben:  Tubular AP x3, negative for evidence of high-grade dysplasia    FEMORAL ENDARTERECTOMY Left 8/13/2019    LEFT COMMON FEMORAL ENDARTERECTOMY WITH BOVINE PATCH ANGIOPLASTY AND COMPLETION ANGIOGRAM performed by Jabier Wilcox MD at [address]      cyst on breast    UPPER GASTROINTESTINAL ENDOSCOPY  09/13/2019    Dr Jarrell Elder based antral ulcer (4mm)    UPPER GASTROINTESTINAL ENDOSCOPY Left 9/13/2019    EGD DIAGNOSTIC ONLY 9-13-19 Dr. Digna Fields performed by Baldomero Dey DO at [address] [facility] [facility]    VASCULAR SURGERY  9-25-13 [facility]    Fluroscopic guidance for access tor R femoral artery after attempted access of L femoral artery. Aortoiliofemoral arteriogram with bilateral lower extremity runoff. R proximal common iliac artery angioplasty X2 with 8mm x 40 mm balloon. L superficial femoral artery angioplasty x1 with 4 mm x 40 mm balloon, then x1 with 5mm x 40 mm balloon. L superficial femoral artery SUPERA stent 4mm x 40mm and     VASCULAR SURGERY  cont. ..    a second SUPERA stent 4 mm x 60 mm Completion arteriograms Mynx closure R groin access site.  VASCULAR SURGERY  01/25/2017    SLC-AIF with runoff angioplasty L SFA with 5x20 cutting balloon, 5x150 lutonix DCB, 5x40 [address] balloon stent proximal L SFA with 5x80 Innova stent kissing stents B SHE with 9x59 atrium stents extension R SHE/EIA with 9x57 express stent extension L SHE with 9x37 express stent completion arteriograms Mynx closure B CFA    VASCULAR SURGERY  05/29/2018    DJM. Angio, SFA PTA       Vitals 4/19/2021 4/3/2021 4/3/2021 1/11/2021 1/11/2021 1/38/1201   SYSTOLIC 579 - 652 628 919 456   DIASTOLIC 76 - 56 72 73 77   Pulse 72 80 76 - - 70   Temp - - 97.9 - - -   Resp - - 21 - - 20   SpO2 - - 90 - - 97   Weight 144 lb - 144 lb - - 141 lb 1.6 oz   Height 5' 5\" - - - - 5' 5.5\"   Body mass index 23.96 kg/m2 - - - - 23.12 kg/m2   Pain Level - - - - - - the lungs 4 times daily as needed for Wheezing 3 Inhaler 1    ipratropium-albuterol (DUONEB) 0.5-2.5 (3) MG/3ML SOLN nebulizer solution Inhale 3 mLs into the lungs every 6 hours 360 mL 3    Nebulizers (COMPRESSOR/NEBULIZER) MISC Use to administer neb treatments 4 times a day as needed 1 each 3    furosemide (LASIX) 20 MG tablet Take 10 mg by mouth daily      aspirin 81 MG tablet Take 81 mg by mouth daily. No current facility-administered medications for this visit.       Allergies   Allergen Reactions    Codeine Nausea And Vomiting    Valium Nausea Only       Health Maintenance   Topic Date Due    Annual Wellness Visit (AWV)  Never done    DTaP/Tdap/Td vaccine (1 - Tdap) 05/03/2021 (Originally 10/19/1954)    Shingles Vaccine (1 of 2) 04/18/2028 (Originally 10/19/1985)    DEXA (modify frequency per FRAX score)  04/19/2040 (Originally 6/20/2020)    Breast cancer screen  07/25/2021    Colon cancer screen colonoscopy  10/03/2021    Lipid screen  04/19/2022    Potassium monitoring  04/19/2022    Creatinine monitoring  04/19/2022    Flu vaccine  Completed    Pneumococcal 65+ years Vaccine  Completed    COVID-19 Vaccine  Completed    Hepatitis A vaccine  Aged Out    Hepatitis B vaccine  Aged Out    Hib vaccine  Aged Out    Meningococcal (ACWY) vaccine  Aged Out       No results found for: LABA1C  No results found for: PSA, PSADIA  TSH   Date Value Ref Range Status   02/18/2020 2.610 0.270 - 4.200 uIU/mL Final   ]  Lab Results   Component Value Date     04/19/2021    K 4.5 04/19/2021    CL 97 (L) 04/19/2021    CO2 31 (H) 04/19/2021    BUN 21 04/19/2021    CREATININE 1.1 (H) 04/19/2021    GLUCOSE 95 04/19/2021    CALCIUM 9.5 04/19/2021    PROT 7.0 04/19/2021    LABALBU 4.1 04/19/2021    BILITOT 0.4 04/19/2021    ALKPHOS 92 04/19/2021    AST 14 04/19/2021    ALT 12 04/19/2021    LABGLOM 47 (A) 04/19/2021    GFRAA 57 (L) 04/19/2021    GLOB 2.7 05/15/2017     Lab Results   Component Value Date for food allergies and immunocompromised state. Neurological: Negative for dizziness, tremors, syncope, speech difficulty, weakness and headaches. Hematological: Negative for adenopathy. Does not bruise/bleed easily. Psychiatric/Behavioral: Negative for confusion and hallucinations. Objective:     Physical Exam  Constitutional:       General: She is not in acute distress. Appearance: She is well-developed. HENT:      Head: Normocephalic and atraumatic. Eyes:      General: No scleral icterus. Right eye: No discharge. Left eye: No discharge. Pupils: Pupils are equal, round, and reactive to light. Neck:      Musculoskeletal: Normal range of motion and neck supple. Thyroid: No thyromegaly. Vascular: No JVD. Cardiovascular:      Rate and Rhythm: Normal rate and regular rhythm. Heart sounds: Normal heart sounds. No murmur. Pulmonary:      Effort: Pulmonary effort is normal. No respiratory distress. Breath sounds: Normal breath sounds. No wheezing or rales. Comments: Coarse cough   Abdominal:      General: Bowel sounds are normal. There is no distension. Palpations: Abdomen is soft. There is no mass. Tenderness: There is no abdominal tenderness. There is no guarding or rebound. Musculoskeletal: Normal range of motion. General: No tenderness. Skin:     General: Skin is warm and dry. Findings: No erythema or rash. Neurological:      Mental Status: She is alert and oriented to person, place, and time. Cranial Nerves: No cranial nerve deficit. Coordination: Coordination normal.      Deep Tendon Reflexes: Reflexes are normal and symmetric. Reflexes normal.   Psychiatric:         Mood and Affect: Mood is not depressed. Behavior: Behavior normal.         Thought Content:  Thought content normal.         Judgment: Judgment normal.       /76   Pulse 72   Ht 5' 5\" (1.651 m)   Wt 144 lb (65.3 kg)   BMI 23.96 kg/m²           Assessment:       Diagnosis Orders   1. Gastroesophageal reflux disease without esophagitis     2. Pulmonary emphysema, unspecified emphysema type ([facility]     3. Atherosclerosis of native artery of both lower extremities with intermittent claudication ([facility]     4. Essential hypertension  CBC Auto Differential    Comprehensive Metabolic Panel    Urinalysis Reflex to Culture    TSH without Reflex   5. Mixed hyperlipidemia  Lipid Panel   6. Vitamin D deficiency  Vitamin D 25 Hydroxy       Plan:        Patient given educational materials - see patient instructions. Discussed use, benefit, and side effects of prescribed medications. Allpatient questions answered. Pt voiced understanding. Reviewed health maintenance. Instructed to continue current medications, diet and exercise. Patient agreed with treatment plan. Follow up as directed. MEDICATIONS:  Orders Placed This Encounter   Medications    budesonide-formoterol (SYMBICORT) 160-4.5 MCG/ACT AERO     Sig: INHALE 2 PUFFS TWICE DAILY. RINSE MOUTH AFTER USE. Dispense:  3 Inhaler     Refill:  3         ORDERS:  Orders Placed This Encounter   Procedures    CBC Auto Differential    Comprehensive Metabolic Panel    Vitamin D 25 Hydroxy    Urinalysis Reflex to Culture    Lipid Panel    TSH without Reflex       Follow-up:  Return in about 6 months (around 10/19/2021) for have labs done prior to appt. PATIENT INSTRUCTIONS:  Patient Instructions   1. Hypertension; The current medical regimen is effective;  continue present plan and medications. 2.  COPD  The current medical regimen is effective;  continue present plan and medications. 3.  GERD:  Stable The current medical regimen is effective;  continue present plan and medications. 4.  PVD BHP vascular  5. Hyperlipidemia; The current medical regimen is effective;  continue present plan and medications.       Electronically signed by PRAVEEN Palencia on 4/19/2021 at 2:12 PM        EMRDragon/transcription disclaimer:  Much of this encounter note is electronic transcription/translation of spoken language to printed texts. The electronic translation of spoken language may be erroneous, or at times,nonsensical words or phrases may be inadvertently transcribed. Although I have reviewed the note for such errors, some may still exist.  Medicare Annual Wellness Visit  Name: Gigi Poster Date: 2021   MRN: [id number] Sex: Female   Age: 80 y.o. Ethnicity: Non-/Non    : 1935 Race: Liz Moore is here for Medicare AWV    Screenings for behavioral, psychosocial and functional/safety risks, and cognitive dysfunction are all negative except as indicated below. These results, as well as other patient data from the [address] form, are documented in Flowsheets linked to this Encounter. Allergies   Allergen Reactions    Codeine Nausea And Vomiting    Valium Nausea Only         Prior to Visit Medications    Medication Sig Taking? Authorizing Provider   budesonide-formoterol (SYMBICORT) 160-4.5 MCG/ACT AERO INHALE 2 PUFFS TWICE DAILY. RINSE MOUTH AFTER USE.  Yes PRAVEEN Wallace   dicyclomine (BENTYL) 10 MG capsule TAKE 1 CAPSULE BY MOUTH 2 TIMES DAILY AS NEEDED  PRAVEEN Wallace   vitamin D (ERGOCALCIFEROL) 1.25 MG (34439 UT) CAPS capsule TAKE 1 CAPSULE BY MOUTH ONE TIME PER WEEK  PRAVEEN Wallace   hydroCHLOROthiazide (MICROZIDE) 12.5 MG capsule TAKE 1 CAPSULE EVERY MORNING  PRAVEEN Wallace   amLODIPine (NORVASC) 5 MG tablet TAKE 1 TABLET EVERY DAY  PRAVEEN Wallace   OXYGEN Inhale 2 L into the lungs as needed  Historical Provider, MD   pantoprazole (PROTONIX) 40 MG tablet TAKE 1 TABLET BY MOUTH TWICE A DAY  PRAVEEN Wallace   lisinopril (PRINIVIL;ZESTRIL) 40 MG tablet TAKE 1 TABLET EVERY DAY  PRAVEEN Wallace   pramipexole (MIRAPEX) 0.25 MG tablet TAKE 1 TO 2 TABLETS BY MOUTH AT BEDTIME AS NEEDED Elezain Duarte APRN   sucralfate (CARAFATE) 1 GM tablet Take 1 tablet by mouth 4 times daily  Patient taking differently: Take 1 g by mouth 4 times daily PRN  Elezain Duarte APRN   atorvastatin (LIPITOR) 80 MG tablet TAKE 1 TABLET EVERY DAY  Elezain Duarte APRN   albuterol (PROVENTIL) (2.5 MG/3ML) 0.083% nebulizer solution INHALE 1 VIAL BY NEBULIZER EVERY 4 HOURS AS NEEDED FOR WHEEZING OR SHORTNESS OF BREATH  Eleonore Felicia APRN   albuterol sulfate  (90 Base) MCG/ACT inhaler Inhale 2 puffs into the lungs 4 times daily as needed for Wheezing  Eleonore Felicia APRN   ipratropium-albuterol (DUONEB) 0.5-2.5 (3) MG/3ML SOLN nebulizer solution Inhale 3 mLs into the lungs every 6 hours  Eleonore Felicia APRN   Nebulizers (COMPRESSOR/NEBULIZER) MISC Use to administer neb treatments 4 times a day as needed  Elezain Duarte APRRISSA   furosemide (LASIX) 20 MG tablet Take 10 mg by mouth daily  Historical Provider, MD   aspirin 81 MG tablet Take 81 mg by mouth daily.   Historical Provider, MD         Past Medical History:   Diagnosis Date    Actinic keratosis     Atherosclerosis of native arteries of the extremities with intermittent claudication 9/19/2013    Benign fundic gland polyps of stomach     Blocked artery     Carotid artery bruit     right    Carotid artery stenosis 9/19/2013    Cerebral artery occlusion with cerebral infarction ([address]     Chronic cough     Emphysema of lung ([address]     Hyperlipemia     Hypertension     Irritable bowel syndrome     Lower back pain     Macular degeneration     Need for prophylactic vaccination with Streptococcus pneumoniae (Pneumococcus) and Influenza vaccines     Osteoarthritis     Osteoporosis 9/17/2017    PONV (postoperative nausea and vomiting)     Prediabetes     Pulmonary emphysema ([address] 9/17/2017    PVD (peripheral vascular disease) (HCC)     Restless legs syndrome     Transient ischemic attack     Vitamin D deficiency        Past Surgical History: Procedure Laterality Date    CATARACT REMOVAL Bilateral     COLONOSCOPY  03/15/2013    Shiben:  mild diverticulosis, HP, small internal hemorrhoids,  3yr recall    COLONOSCOPY  10/02/2018    Shiben:  Tubular AP x3, negative for evidence of high-grade dysplasia    FEMORAL ENDARTERECTOMY Left 8/13/2019    LEFT COMMON FEMORAL ENDARTERECTOMY WITH BOVINE PATCH ANGIOPLASTY AND COMPLETION ANGIOGRAM performed by Salazar Vee MD at [address]      cyst on breast    UPPER GASTROINTESTINAL ENDOSCOPY  09/13/2019    Dr Norma Tiwari based antral ulcer (4mm)    UPPER GASTROINTESTINAL ENDOSCOPY Left 9/13/2019    EGD DIAGNOSTIC ONLY 9-13-19 Dr. Christia Schaumann performed by Rosibel Thomas DO at [address] [facility]    VASCULAR SURGERY  9-25-13 [facility]    Fluroscopic guidance for access tor R femoral artery after attempted access of L femoral artery. Aortoiliofemoral arteriogram with bilateral lower extremity runoff. R proximal common iliac artery angioplasty X2 with 8mm x 40 mm balloon. L superficial femoral artery angioplasty x1 with 4 mm x 40 mm balloon, then x1 with 5mm x 40 mm balloon. L superficial femoral artery SUPERA stent 4mm x 40mm and     VASCULAR SURGERY  cont. ..    a second SUPERA stent 4 mm x 60 mm Completion arteriograms Mynx closure R groin access site.  VASCULAR SURGERY  01/25/2017    SLC-AIF with runoff angioplasty L SFA with 5x20 cutting balloon, 5x150 lutonix DCB, 5x40 [address] balloon stent proximal L SFA with 5x80 Innova stent kissing stents B SHE with 9x59 atrium stents extension R SHE/EIA with 9x57 express stent extension L SHE with 9x37 express stent completion arteriograms Mynx closure B CFA    VASCULAR SURGERY  05/29/2018    DJM. Angio, SFA PTA         Family History   Problem Relation Age of Onset    Cancer Mother     Cancer Father     High Blood Pressure Son     High Cholesterol Son     High Blood Pressure Daughter     High Cholesterol Daughter     Heart Attack Daughter     Heart Attack Brother        Skip (Including outside providers/suppliers regularly involved in providing care):   Patient Care Team:  PRAVEEN Goff as PCP - General (Nurse Practitioner Acute Care)  PRAVEEN Goff as PCP [facility]  Jonh Mccracken RN as Care Transitions Nurse  Ghassan Hsu MD as Consulting Physician (Pulmonary Disease)    Wt Readings from Last 3 Encounters:   04/19/21 144 lb (65.3 kg)   04/03/21 144 lb (65.3 kg)   01/11/21 141 lb 1.6 oz (64 kg)     Vitals:    04/19/21 1340   BP: 138/76   Pulse: 72   Weight: 144 lb (65.3 kg)   Height: 5' 5\" (1.651 m)     Body mass index is 23.96 kg/m². Based upon direct observation of the patient, evaluation of cognition reveals recent and remote memory intact. General Appearance: alert and oriented to person, place and time, well developed and well- nourished, in no acute distress  Skin: warm and dry, no rash or erythema  Pulmonary/Chest: Fine crackles in both bases and she has a coarse cough but there is no wheezing respiratory distress  Cardiovascular: normal rate, regular rhythm, normal S1 and S2, no murmurs, rubs, clicks, or gallops, distal pulses intact, no carotid bruits  Abdomen: soft, non-tender, non-distended, normal bowel sounds, no masses or organomegaly  Extremities: no cyanosis, clubbing or edema  Musculoskeletal: normal range of motion, no joint swelling, deformity or tenderness  Neurologic: reflexes normal and symmetric, no cranial nerve deficit, gait, coordination and speech normal    Patient's complete Health Risk Assessment and screening values have been reviewed and are found in Flowsheets. The following problems were reviewed today and where indicated follow up appointments were made and/or referrals ordered.     Positive Risk Factor Screenings with Interventions:         Substance History:  Social History     Tobacco History     Smoking Status  Current Every Day Smoker Smoking Frequency  0.75 packs/day for 58 years (43.5 pk yrs) Smoking Tobacco Type  Cigarettes    Smokeless Tobacco Use  Never Used    Tobacco Comment  3/4 of a pack daily          Alcohol History     Alcohol Use Status  No          Drug Use     Drug Use Status  No          Sexual Activity     Sexually Active  Never               Alcohol Screening:       A score of 8 or more is associated with harmful or hazardous drinking. A score of 13 or more in women, and 15 or more in men, is likely to indicate alcohol dependence. Substance Abuse Interventions:  · na    General Health and ACP:  General  In general, how would you say your health is?: Fair  In the past 7 days, have you experienced any of the following?  New or Increased Pain, New or Increased Fatigue, Loneliness, Social Isolation, Stress or Anger?: None of These  Do you get the social and emotional support that you need?: Yes  Do you have a Living Will?: (!) No  Advance Directives     Power of  Living Will ACP-Advance Directive ACP-Power of     Not on File Not on File Not on File Not on File      General Health Risk Interventions:  · No Living Will: declines        Personalized Preventive Plan   Current Health Maintenance Status  Immunization History   Administered Date(s) Administered    COVID-19, Moderna, PF, 100mcg/0.5mL 02/19/2021, 03/19/2021    Influenza, High Dose (Fluzone 65 yrs and older) 03/12/2018, 10/16/2018    Influenza, Quadv, adjuvanted, 65 yrs +, IM, PF (Fluad) 01/11/2021    Influenza, Triv, inactivated, subunit, adjuvanted, IM (Fluad 65 yrs and older) 11/04/2019    Pneumococcal Conjugate 13-valent ([id number]) 12/21/2015    Pneumococcal Polysaccharide ([id number]) 10/08/2009        Health Maintenance   Topic Date Due    Annual Wellness Visit (AWV)  Never done    DTaP/Tdap/Td vaccine (1 - Tdap) 05/03/2021 (Originally 10/19/1954)    Shingles Vaccine (1 of 2) 04/18/2028 (Originally 10/19/1985)    DEXA (modify frequency per [facility]

## 2021-04-27 DIAGNOSIS — R06.02 SHORTNESS OF BREATH: ICD-10-CM

## 2021-04-27 DIAGNOSIS — J43.0 UNILATERAL EMPHYSEMA (HCC): ICD-10-CM

## 2021-04-27 RX ORDER — ALBUTEROL SULFATE 2.5 MG/3ML
SOLUTION RESPIRATORY (INHALATION)
Qty: 150 ML | Refills: 3 | Status: SHIPPED | OUTPATIENT
Start: 2021-04-27

## 2021-06-18 NOTE — PROGRESS NOTES
06/18/2021      Pam Sin APRN  [address] [place] [address] [address]  [place] [address]        Cindy Hicks  1935    Chief Complaint   Patient presents with   • Leg Pain     PT STATES SHE IS HAVING SWELLING AND PAIN DOWN BOTH LEGS. LEFT AND RIGHT LEGS HAVE STINTS IN THEM. PT ALSO STATES NUMBNESS IN RIGHT FOOT. PT IS A SMOKER. PT IS NOT DIABETIC.       Dear Pam Sin APRN:    HPI     I had the pleasure of seeing your patient in the office today for follow up.  As you recall, the patient is a 85 y.o. female who we are currently following for peripheral arterial disease. She had previously undergone a left femoral endarterectomy as well as angiogram at an outside institution however transferred her care here for further follow-up. She was actually just seen here in the office back in March of this year and had an JENNIFER at that time which showed mild arterial insufficiency in the bilateral lower extremities. Clinically she has palpable pedal pulses and her feet are warm. She was due to see me back here in about 3 months however has noted increasing edema of the bilateral lower extremities over the past several weeks, more so on the left than the right. She notes this edema is worse with prolonged standing and improved with leg elevation and lying supine. She has not been wearing compression. She did previously have a venous duplex with insufficiency study about a year and a half ago that at that time showed no significant reflux in the bilateral greater saphenous veins. She otherwise continues to deny any claudication and has no rest pain. She is maintained on aspirin, and a statin. She has no other acute complaints today.    Review of Systems   Constitutional: Negative.  Negative for activity change, appetite change, chills, diaphoresis, fatigue and fever.   HENT: Negative.  Negative for congestion, sneezing, sore throat and trouble swallowing.    Eyes: Negative.  Negative for visual disturbance.  "  Respiratory: Negative.  Negative for chest tightness and shortness of breath.    Cardiovascular: Positive for leg swelling (She notes some increased swelling in the bilateral lower legs from ankle to knee, more so on the left than the right.). Negative for chest pain and palpitations.   Gastrointestinal: Negative.  Negative for abdominal distention, abdominal pain, nausea and vomiting.   Endocrine: Negative.    Genitourinary: Negative.    Musculoskeletal: Negative.    Skin: Negative.    Allergic/Immunologic: Negative.    Neurological: Negative.    Hematological: Negative.    Psychiatric/Behavioral: Negative.        /72 (BP Location: Left arm, Patient Position: Sitting, Cuff Size: Adult)   Pulse 68   Ht 167.6 cm (66\")   Wt 69.4 kg (153 lb)   SpO2 92%   BMI 24.69 kg/m²   Physical Exam  Vitals reviewed.   Constitutional:       Appearance: Normal appearance.   HENT:      Head: Normocephalic and atraumatic.      Right Ear: Tympanic membrane normal.      Left Ear: Tympanic membrane normal.      Nose: Nose normal.      Mouth/Throat:      Mouth: Mucous membranes are moist.   Eyes:      Extraocular Movements: Extraocular movements intact.      Pupils: Pupils are equal, round, and reactive to light.   Cardiovascular:      Rate and Rhythm: Normal rate and regular rhythm.      Pulses:           Carotid pulses are 2+ on the right side and 2+ on the left side.       Radial pulses are 2+ on the right side and 2+ on the left side.        Femoral pulses are 2+ on the right side and 2+ on the left side.       Popliteal pulses are 2+ on the right side and 2+ on the left side.        Dorsalis pedis pulses are 2+ on the right side and 2+ on the left side.        Posterior tibial pulses are detected w/ Doppler on the right side and 1+ on the left side.      Comments: She has bilateral palpable dorsalis pedis pulses, and on the left she has a palpable PT. She also has a Doppler signal of the right PT. Both feet are warm and " appear well-perfused. She has no wounds. She does have some edema of the bilateral lower legs from ankle to knee, slightly more so on the left than the right. She also does have some changes of stasis dermatitis to the bilateral lower legs.  Pulmonary:      Effort: Pulmonary effort is normal. No respiratory distress.   Abdominal:      General: There is no distension.      Palpations: Abdomen is soft. There is no mass.      Tenderness: There is no abdominal tenderness.   Musculoskeletal:         General: Normal range of motion.      Cervical back: Normal range of motion and neck supple.      Right lower leg: No edema.      Left lower leg: No edema.   Skin:     General: Skin is dry.      Capillary Refill: Capillary refill takes less than 2 seconds.   Neurological:      General: No focal deficit present.      Mental Status: She is alert and oriented to person, place, and time.   Psychiatric:         Mood and Affect: Mood normal.         Behavior: Behavior normal.         Thought Content: Thought content normal.         Judgment: Judgment normal.         Patient Active Problem List   Diagnosis   • Family hx colonic polyps   • Family hx of colon cancer   • Hx of colonic polyp   • Melena   • Peptic ulcer disease   • Essential hypertension   • Mixed hyperlipidemia         ICD-10-CM ICD-9-CM   1. Edema of both lower extremities  R60.0 782.3   2. Venous insufficiency of both lower extremities  I87.2 459.81   3. Peripheral arterial disease (CMS/[facility])  I73.9 443.9   4. Essential hypertension  I10 401.9   5. Mixed hyperlipidemia  E78.2 272.2       Lab Frequency Next Occurrence   Follow Anesthesia Guidelines / Standing Orders Once 09/12/2018   Follow Anesthesia Guidelines / Standing Orders Once 10/03/2019   Obtain Informed Consent Once 10/08/2019   US Ankle / Brachial Indices Extremity Complete Once 09/18/2021   US Carotid Bilateral Once 09/18/2021   US Venous Doppler Lower Extremity Bilateral (duplex) Once 07/02/2021       PLAN:  After thoroughly evaluating Cindy Hicks, I believe the best course of action is to obtain further testing. From an arterial standpoint she seems to continue to be doing well with palpable pedal pulses and feet that are warm and no signs of any arterial insufficiency. She had an JENNIFER about 3 months ago that showed basically only some mild arterial insufficiency in the bilateral lower extremities. What she describes today sounds more like potentially venous insufficiency with edema that is worse with prolonged standing and improved with leg elevation and lying supine. As such I recommended that she start wearing compression and gave her a prescription to get a properly fitting pair of compression stockings. I did wrap her legs and Ace wraps today until she can get compression socks. I have also recommended leg elevation exercises much as possible. I ordered a venous duplex with insufficiency study and then will see her back here in the office in the next 2 weeks with that performed to make further recommendations.  The patient is to continue taking their medications as previously discussed.   I did discuss vascular risk factors as they pertain to the progression of vascular disease including controlling hypertension, and hyperlipidemia. These factors remain stable. Patient's Body mass index is 24.69 kg/m². indicating that she is within normal range (BMI 18.5-24.9). No BMI management plan needed. I did  extensively on smoking cessation, and the patient was advised of the continued risks of smoking.  I provided over 10 minutes counseling on this matter. This was all discussed in full with complete understanding.  Thank you for allowing me to participate in the care of your patient.  Please do not hesitate to call with any questions or concerns.  We will keep you aware of any further encounters with Cindy Hicks.      Sincerely Yours,      Jerald Henriquez MD

## 2021-07-01 ENCOUNTER — TELEPHONE (OUTPATIENT)
Dept: INTERNAL MEDICINE | Age: 86
End: 2021-07-01

## 2021-07-01 DIAGNOSIS — J43.9 PULMONARY EMPHYSEMA, UNSPECIFIED EMPHYSEMA TYPE (HCC): Primary | ICD-10-CM

## 2021-07-02 NOTE — TELEPHONE ENCOUNTER
Spoke with patient and advised that Dr. Henriquez will be in surgery on 7/7 and her appointment needed to be rescheduled.  Patient advised that she needed an afternoon and was rescheduled to 7/12 at 315 pm.  Patient expressed understanding for all that was discussed.

## 2021-07-12 NOTE — PATIENT INSTRUCTIONS
"BMI for Adults  What is BMI?  Body mass index (BMI) is a number that is calculated from a person's weight and height. BMI can help estimate how much of a person's weight is composed of fat. BMI does not measure body fat directly. Rather, it is an alternative to procedures that directly measure body fat, which can be difficult and expensive.  BMI can help identify people who may be at higher risk for certain medical problems.  What are BMI measurements used for?  BMI is used as a screening tool to identify possible weight problems. It helps determine whether a person is obese, overweight, a healthy weight, or underweight.  BMI is useful for:  · Identifying a weight problem that may be related to a medical condition or may increase the risk for medical problems.  · Promoting changes, such as changes in diet and exercise, to help reach a healthy weight. BMI screening can be repeated to see if these changes are working.  How is BMI calculated?  BMI involves measuring your weight in relation to your height. Both height and weight are measured, and the BMI is calculated from those numbers. This can be done either in English (U[place] or metric measurements. Note that charts and online BMI calculators are available to help you find your BMI quickly and easily without having to do these calculations yourself.  To calculate your BMI in English (U.S[place] measurements:    1. Measure your weight in pounds (lb).  2. Multiply the number of pounds by 703.  ? For example, for a person who weighs 180 lb, multiply that number by 703, which equals 126,540.  3. Measure your height in inches. Then multiply that number by itself to get a measurement called \"inches squared.\"  ? For example, for a person who is 70 inches tall, the \"inches squared\" measurement is 70 inches x 70 inches, which equals 4,900 inches squared.  4. Divide the total from step 2 (number of lb x 703) by the total from step 3 (inches squared): 126,540 ÷ 4,900 = 25.8. This is " "your BMI.  To calculate your BMI in metric measurements:  1. Measure your weight in kilograms (kg).  2. Measure your height in meters (m). Then multiply that number by itself to get a measurement called \"meters squared.\"  ? For example, for a person who is 1.75 m tall, the \"meters squared\" measurement is 1.75 m x 1.75 m, which is equal to 3.1 meters squared.  3. Divide the number of kilograms (your weight) by the meters squared number. In this example: 70 ÷ 3.1 = 22.6. This is your BMI.  What do the results mean?  BMI charts are used to identify whether you are underweight, normal weight, overweight, or obese. The following guidelines will be used:  · Underweight: BMI less than 18.5.  · Normal weight: BMI between 18.5 and 24.9.  · Overweight: BMI between 25 and 29.9.  · Obese: BMI of 30 or above.  Keep these notes in mind:  · Weight includes both fat and muscle, so someone with a muscular build, such as an athlete, may have a BMI that is higher than 24.9. In cases like these, BMI is not an accurate measure of body fat.  · To determine if excess body fat is the cause of a BMI of 25 or higher, further assessments may need to be done by a health care provider.  · BMI is usually interpreted in the same way for men and women.  Where to find more information  For more information about BMI, including tools to quickly calculate your BMI, go to these websites:  · Centers for Disease Control and Prevention: www.cdc.gov  · American Heart Association: www.heart.org  · National Heart, Lung, and Blood [place]: www.nhlbi.nih.gov  Summary  · Body mass index (BMI) is a number that is calculated from a person's weight and height.  · BMI may help estimate how much of a person's weight is composed of fat. BMI can help identify those who may be at higher risk for certain medical problems.  · BMI can be measured using English measurements or metric measurements.  · BMI charts are used to identify whether you are underweight, normal " weight, overweight, or obese.  This information is not intended to replace advice given to you by your health care provider. Make sure you discuss any questions you have with your health care provider.  Document Revised: 09/09/2020 Document Reviewed: 07/17/2020  Elsevier Patient Education © 2021 [employer]

## 2021-07-12 NOTE — PROGRESS NOTES
06/18/2021      Pam Sin APRN  [address] [place] [address] [address] [place] [address]        Cindy Hicks  1935    Chief Complaint   Patient presents with   • Follow-up     2 week follow up. edema of both lower extremities. pt states she is not diabetic. pt states she does smoke. testing complete 07/06/2021       Dear Pam Sin APRN:    HPI     I had the pleasure of seeing your patient in the office today for follow up.  As you recall, the patient is a 85 y.o. female who we are currently following for peripheral arterial disease. She had previously undergone a left femoral endarterectomy as well as angiogram at an outside institution however transferred her care here for further follow-up. She was actually just seen here in the office back in March of this year and had an JENNIFER at that time which showed mild arterial insufficiency in the bilateral lower extremities. Clinically she has palpable pedal pulses and her feet are warm. She was due to see me back here in September however had noted increasing edema of the bilateral lower extremities over the past several weeks, more so on the left than the right. She notes this edema is worse with prolonged standing and improved with leg elevation and lying supine. She has not been wearing compression. She did previously have a venous duplex with insufficiency study about a year and a half ago that at that time showed no significant reflux in the bilateral greater saphenous veins. She otherwise continues to deny any claudication and has no rest pain. She is maintained on aspirin, and a statin.  She returns today after having undergone venous duplex with insufficiency study for further evaluation.  I did personally review the study today.  It shows no evidence of any significant reflux in the bilateral greater or lesser saphenous veins.  There was no evidence of DVT.  She notes with the institution of compression at our last visit she has had  "improvement in her lower extremity edema and actually does feel better.  She also has been using leg elevation.  She has no other acute complaints at this time.    Review of Systems   Constitutional: Negative.  Negative for activity change, appetite change, chills, diaphoresis, fatigue and fever.   HENT: Negative.  Negative for congestion, sneezing, sore throat and trouble swallowing.    Eyes: Negative.  Negative for visual disturbance.   Respiratory: Negative.  Negative for chest tightness and shortness of breath.    Cardiovascular: Positive for leg swelling (She notes some increased swelling in the bilateral lower legs from ankle to knee, more so on the left than the right.). Negative for chest pain and palpitations.   Gastrointestinal: Negative.  Negative for abdominal distention, abdominal pain, nausea and vomiting.   Endocrine: Negative.    Genitourinary: Negative.    Musculoskeletal: Negative.    Skin: Negative.    Allergic/Immunologic: Negative.    Neurological: Negative.    Hematological: Negative.    Psychiatric/Behavioral: Negative.        /64 (BP Location: Left arm, Patient Position: Sitting, Cuff Size: Adult)   Pulse 70   Ht 165.1 cm (65\")   Wt 70.1 kg (154 lb 9.6 oz)   SpO2 93%   BMI 25.73 kg/m²   Physical Exam  Vitals reviewed.   Constitutional:       Appearance: Normal appearance.   HENT:      Head: Normocephalic and atraumatic.      Right Ear: Tympanic membrane normal.      Left Ear: Tympanic membrane normal.      Nose: Nose normal.      Mouth/Throat:      Mouth: Mucous membranes are moist.   Eyes:      Extraocular Movements: Extraocular movements intact.      Pupils: Pupils are equal, round, and reactive to light.   Cardiovascular:      Rate and Rhythm: Normal rate and regular rhythm.      Pulses:           Carotid pulses are 2+ on the right side and 2+ on the left side.       Radial pulses are 2+ on the right side and 2+ on the left side.        Femoral pulses are 2+ on the right side " and 2+ on the left side.       Popliteal pulses are 2+ on the right side and 2+ on the left side.        Dorsalis pedis pulses are 2+ on the right side and 2+ on the left side.        Posterior tibial pulses are detected w/ Doppler on the right side and 1+ on the left side.      Comments: She has bilateral palpable dorsalis pedis pulses, and on the left she has a palpable PT. She also has a Doppler signal of the right PT. Both feet are warm and appear well-perfused. She has no wounds. She does have some edema of the bilateral lower legs from ankle to knee, slightly more so on the left than the right. She also does have some changes of stasis dermatitis to the bilateral lower legs.  Pulmonary:      Effort: Pulmonary effort is normal. No respiratory distress.   Abdominal:      General: There is no distension.      Palpations: Abdomen is soft. There is no mass.      Tenderness: There is no abdominal tenderness.   Musculoskeletal:         General: Normal range of motion.      Cervical back: Normal range of motion and neck supple.      Right lower leg: No edema.      Left lower leg: No edema.   Skin:     General: Skin is dry.      Capillary Refill: Capillary refill takes less than 2 seconds.   Neurological:      General: No focal deficit present.      Mental Status: She is alert and oriented to person, place, and time.   Psychiatric:         Mood and Affect: Mood normal.         Behavior: Behavior normal.         Thought Content: Thought content normal.         Judgment: Judgment normal.             Patient Active Problem List   Diagnosis   • Family hx colonic polyps   • Family hx of colon cancer   • Hx of colonic polyp   • Melena   • Peptic ulcer disease   • Essential hypertension   • Mixed hyperlipidemia         ICD-10-CM ICD-9-CM   1. Edema of both lower extremities  R60.0 782.3   2. Essential hypertension  I10 401.9   3. Mixed hyperlipidemia  E78.2 272.2   4. Peripheral arterial disease (CMS/[facility])  I73.9 443.9        Lab Frequency Next Occurrence   Follow Anesthesia Guidelines / Standing Orders Once 09/12/2018   Follow Anesthesia Guidelines / Standing Orders Once 10/03/2019   Obtain Informed Consent Once 10/08/2019   US Ankle / Brachial Indices Extremity Complete Once 09/18/2021   US Carotid Bilateral Once 09/18/2021   US Venous Doppler Lower Extremity Bilateral (duplex) Once 07/02/2021       PLAN: After thoroughly evaluating Cindy Hicks, I believe the best course of action is to remain conservative from a vascular standpoint.  With the use of compression since her last visit she notes that her lower extremity edema has improved.  She has also been using leg elevation which has helped.  Venous duplex with insufficiency study done recently which I did personally review in the office today showed no evidence of any significant reflux in the bilateral greater saphenous veins.  As such at this point given her good result with compression I recommended she continue with compression stockings daily as well as leg elevation to further help reduce edema.  From an arterial standpoint again she continues to do well and has palpable pedal pulses.  I will plan to see her back at her regularly scheduled visit in September at which time she will have repeat JENNIFER/PVR as well as a carotid duplex for surveillance.  The patient is to continue taking their medications as previously discussed.   I did discuss vascular risk factors as they pertain to the progression of vascular disease including controlling hypertension, and hyperlipidemia. These factors remain stable. Patient's Body mass index is 25.73 kg/m². indicating that she is within normal range (BMI 18.5-24.9). No BMI management plan needed. I did  extensively on smoking cessation, and the patient was advised of the continued risks of smoking.  I provided over 10 minutes counseling on this matter. This was all discussed in full with complete understanding.  Thank you for  allowing me to participate in the care of your patient.  Please do not hesitate to call with any questions or concerns.  We will keep you aware of any further encounters with Cindy Hicks.      Sincerely Yours,      Jerald Henriquez MD

## 2021-07-14 NOTE — TELEPHONE ENCOUNTER
Simone Feliciano called requesting a refill of the below medication which has been pended for you:     Requested Prescriptions     Pending Prescriptions Disp Refills    atorvastatin (LIPITOR) 80 MG tablet [Pharmacy Med Name: ATORVASTATIN CALCIUM 80 MG Tablet] 90 tablet 1     Sig: TAKE 1 TABLET EVERY DAY    pramipexole (MIRAPEX) 0.25 MG tablet [Pharmacy Med Name: PRAMIPEXOLE DIHYDROCHLORIDE 0.25 MG Tablet] 180 tablet 1     Sig: TAKE 1 TO 2 TABLETS AT BEDTIME AS NEEDED       Last Appointment Date: 4/19/2021  Next Appointment Date: 10/25/2021    Allergies   Allergen Reactions    Codeine Nausea And Vomiting    Valium Nausea Only

## 2021-07-15 RX ORDER — PRAMIPEXOLE DIHYDROCHLORIDE 0.25 MG/1
TABLET ORAL
Qty: 180 TABLET | Refills: 1 | Status: SHIPPED | OUTPATIENT
Start: 2021-07-15

## 2021-07-15 RX ORDER — ATORVASTATIN CALCIUM 80 MG/1
TABLET, FILM COATED ORAL
Qty: 90 TABLET | Refills: 1 | Status: SHIPPED | OUTPATIENT
Start: 2021-07-15

## 2021-07-20 RX ORDER — PANTOPRAZOLE SODIUM 40 MG/1
TABLET, DELAYED RELEASE ORAL
Qty: 180 TABLET | Refills: 1 | Status: SHIPPED | OUTPATIENT
Start: 2021-07-20 | End: 2021-12-20

## 2021-07-20 NOTE — TELEPHONE ENCOUNTER
June Araujo called requesting a refill of the below medication which has been pended for you:     Requested Prescriptions     Pending Prescriptions Disp Refills    pantoprazole (PROTONIX) 40 MG tablet [Pharmacy Med Name: PANTOPRAZOLE SOD DR 40 MG TAB] 180 tablet 1     Sig: TAKE 1 TABLET BY MOUTH TWICE A DAY       Last Appointment Date: 4/19/2021  Next Appointment Date: 10/25/2021    Allergies   Allergen Reactions    Codeine Nausea And Vomiting    Valium Nausea Only

## 2021-07-21 RX ORDER — LISINOPRIL 40 MG/1
TABLET ORAL
Qty: 90 TABLET | Refills: 3 | Status: SHIPPED | OUTPATIENT
Start: 2021-07-21

## 2021-07-29 PROBLEM — J90 PLEURAL EFFUSION ON RIGHT: Status: ACTIVE | Noted: 2021-01-01

## 2021-07-29 PROBLEM — J96.21 ACUTE ON CHRONIC RESPIRATORY FAILURE WITH HYPOXIA AND HYPERCAPNIA: Status: ACTIVE | Noted: 2021-01-01

## 2021-07-29 PROBLEM — J44.1 COPD WITH ACUTE EXACERBATION (HCC): Status: ACTIVE | Noted: 2021-01-01

## 2021-07-29 PROBLEM — J18.9 PNEUMONIA DUE TO INFECTIOUS ORGANISM: Status: ACTIVE | Noted: 2021-01-01

## 2021-07-29 PROBLEM — J96.11 CHRONIC RESPIRATORY FAILURE WITH HYPOXIA (HCC): Status: ACTIVE | Noted: 2021-01-01

## 2021-07-29 PROBLEM — N18.31 STAGE 3A CHRONIC KIDNEY DISEASE: Status: ACTIVE | Noted: 2021-01-01

## 2021-07-29 PROBLEM — E66.3 OVERWEIGHT (BMI 25.0-29.9): Status: ACTIVE | Noted: 2021-01-01

## 2021-07-29 PROBLEM — J96.22 ACUTE ON CHRONIC RESPIRATORY FAILURE WITH HYPOXIA AND HYPERCAPNIA (HCC): Status: ACTIVE | Noted: 2021-01-01

## 2021-08-01 NOTE — PROGRESS NOTES
"      [facility]         Dr Laboy Sayied   LOS: 3 days   Patient Care Team:  Pam Sin APRN as PCP - General (Nurse Practitioner)  Harris Escobar MD as Consulting Physician (Gastroenterology)  Yasmany Campos MD as Consulting Physician (Pulmonary Disease)    Chief Complaint: Acute on chronic hypoxemic respiratory failure with right pleural effusion pneumonia versus atelectasis COPD exacerbation    Interval History: Feeling well on 3 L oxygen nasal cannula.  Wants to go home    REVIEW OF SYSTEMS:   Shortness of breath with activity    Ventilator/Non-Invasive Ventilation Settings (From admission, onward)     Start     Ordered    07/29/21 1650  NIPPV (CPAP or BIPAP)  Until Discontinued     Question Answer Comment   Type: BIPAP    NIPPV Mask Interface: Full Face Mask        07/29/21 1649                  Vital Signs  Temp:  [97.6 °F (36.4 °C)-98.1 °F (36.7 °C)] 98.1 °F (36.7 °C)  Heart Rate:  [65-84] 67  Resp:  [16-22] 18  BP: (142-171)/(53-65) 153/55    Intake/Output Summary (Last 24 hours) at 8/1/2021 1309  Last data filed at 7/31/2021 2156  Gross per 24 hour   Intake 450 ml   Output --   Net 450 ml     Flowsheet Rows      First Filed Value   Admission Height  165.1 cm (65\") Documented at 07/29/2021 1337   Admission Weight  74.8 kg (165 lb) Documented at 07/29/2021 1337          Physical Exam:  Patient is examined using the personal protective equipment as per guidelines from infection control for this particular patient as enacted.  Hand hygiene was performed before and after patient interaction.   General Appearance:   Sleeping wakes up follow simple commands  Neck midline trachea, no thyromegaly   Lungs:    Diminished breath sounds with rhonchi on the basis    Heart:    Regular rhythm and normal rate, normal S1 and S2, no            murmur, no gallop, no rub, no click   Chest Wall:    No abnormalities observed   Abdomen:     Normal bowel sounds, no masses, no organomegaly, soft   "      nontender, nondistended, no guarding, no rebound                tenderness   Extremities:   Moves all extremities well, no edema, no cyanosis, no             redness  CNS no focal neurological deficits normal sensory exam  Skin no rashes no nodules  Musculoskeletal no cyanosis no clubbing normal range of motion     Results Review:        Results from last 7 days   Lab Units 08/01/21  0643 07/30/21  0617 07/30/21  0617 07/29/21  1350 07/29/21  1350   SODIUM mmol/L 128*  --  137  --  134*   POTASSIUM mmol/L 4.5  --  4.6  --  4.3   CHLORIDE mmol/L 90*  --  94*  --  95*   CO2 mmol/L 31.0*  --  34.0*  --  29.0   BUN mg/dL 27*  --  18  --  19   CREATININE mg/dL 0.98  --  1.13*  --  1.10*   GLUCOSE mg/dL 141*   < > 190*   < > 155*   CALCIUM mg/dL 8.6  --  9.4  --  8.8    < > = values in this interval not displayed.     Results from last 7 days   Lab Units 07/29/21  1703 07/29/21  1350   TROPONIN T ng/mL <0.010 0.011     Results from last 7 days   Lab Units 07/30/21  0617 07/29/21  1703 07/29/21  1350   WBC 10*3/mm3 5.20 7.77 7.24   HEMOGLOBIN g/dL 12.5 12.0 11.5*   HEMATOCRIT % 39.7 37.7 36.2   PLATELETS 10*3/mm3 271 258 230     Results from last 7 days   Lab Units 07/29/21  1702   INR  1.04                 Results from last 7 days   Lab Units 07/29/21  1637   PH, ARTERIAL pH units 7.396   PO2 ART mm Hg 35.9*   PCO2, ARTERIAL mm Hg 57.9*   HCO3 ART mmol/L 35.5*       I reviewed the patient's new clinical results.  I personally viewed and interpreted the patient's chest x-ray.        Medication Review:   amLODIPine, 10 mg, Oral, Daily  aspirin, 81 mg, Oral, Daily  atorvastatin, 40 mg, Oral, Nightly  cefTRIAXone, 1 g, Intravenous, Q24H   And  azithromycin, 500 mg, Intravenous, Q24H  budesonide-formoterol, 2 puff, Inhalation, BID - RT  dicyclomine, 10 mg, Oral, TID  enoxaparin, 40 mg, Subcutaneous, Q24H  guaiFENesin, 1,200 mg, Oral, Q12H  ipratropium-albuterol, 3 mL, Nebulization, 4x Daily - RT  methylPREDNISolone sodium  succinate, 40 mg, Intravenous, Q8H  pantoprazole, 40 mg, Oral, BID  pramipexole, 0.25 mg, Oral, TID  senna-docusate sodium, 1 tablet, Oral, Nightly  sodium chloride, 10 mL, Intravenous, Q12H             ASSESSMENT:   Acute on chronic hypoxemic hypercapnic respite failure  Right pleural effusion  Questionable pneumonia versus atelectasis  COPD with exacerbation  Tobacco abuse      PLAN:  Oxygen requirement stable.  At baseline uses 2 L oxygen nasal cannula  Pneumonia versus atelectasis noted on CT chest.  De-escalate antibiotics based on cultures.  Right pleural effusion needs to be watched closely.  With history of tobacco abuse concerns for malignancy is noted.  Needs to have follow-up CT chest in 6 to 8 weeks to ensure complete resolution of the effusion.  Ambulate  Recommend patient to stop smoking long-term.  Monitor clinical course closely  No objections to discharge as long as we can have follow-up as outpatient with pulmonary in 4 to 6 weeks.      Benoit Vaz MD  08/01/21  13:09 CDT

## 2021-08-01 NOTE — PROGRESS NOTES
[facility] Medicine Services  INPATIENT PROGRESS NOTE    Length of Stay: 3  Date of Admission: 7/29/2021  Primary Care Physician: Pam Sin APRN    Subjective   Chief Complaint: Follow-up shortness of breath  HPI   Sitting up in chair.  Oxygen 2 L.  Occasional creamy secretions.  No wheezing heard today.  Patient denies chest pain or palpitations.  Sodium 128 today and patient tells me she drank 3 pitchers of water last night.  Continue Rocephin and azithromycin.  Will complete azithromycin today.  Ambulate.  Transition Solu-Medrol to prednisone.  Ambulate.  Home tomorrow if sodium improved.    Review of Systems   Constitutional: Positive for fatigue. Negative for appetite change, chills and fever.   HENT: Negative for congestion and trouble swallowing.    Eyes: Negative for photophobia and visual disturbance.   Respiratory: Positive for cough and shortness of breath.    Cardiovascular: Negative for chest pain, palpitations and leg swelling.   Gastrointestinal: Negative for diarrhea, nausea and vomiting.   Endocrine: Negative for cold intolerance, heat intolerance and polyuria.   Genitourinary: Negative for dysuria, frequency and urgency.   Musculoskeletal: Negative for back pain and gait problem.   Skin: Negative for pallor, rash and wound.   Allergic/Immunologic: Negative for immunocompromised state.   Neurological: Positive for weakness. Negative for light-headedness.   Hematological: Negative for adenopathy. Does not bruise/bleed easily.   Psychiatric/Behavioral: Negative for agitation, behavioral problems and confusion.  All pertinent negatives and positives are as above. All other systems have been reviewed and are negative unless otherwise stated.     Objective    Temp:  [97.6 °F (36.4 °C)-98.1 °F (36.7 °C)] 98.1 °F (36.7 °C)  Heart Rate:  [65-84] 67  Resp:  [16-22] 18  BP: (142-171)/(53-65) 153/55  Physical Exam  Vitals and nursing note reviewed.    Constitutional:       Comments: Sitting up  in chair.  Oxygen at 2 L.  No family in room.   HENT:      Head: Normocephalic and atraumatic.      Nose: No congestion.      Mouth/Throat:      Pharynx: Oropharynx is clear. No oropharyngeal exudate.   Eyes:      Extraocular Movements: Extraocular movements intact.      Pupils: Pupils are equal, round, and reactive to light.   Cardiovascular:      Rate and Rhythm: Normal rate and regular rhythm.      Heart sounds: No murmur heard.   Pulmonary:      Breath sounds: No wheezing, rhonchi or rales.      Comments: Oxygen 2 L.  Decreased breath sounds throughout.  White creamy sputum production.  Abdominal:      Palpations: Abdomen is soft.      Tenderness: There is no abdominal tenderness.   Genitourinary:     Comments: Voiding.  Musculoskeletal:         General: No swelling or tenderness.      Cervical back: Normal range of motion and neck supple.   Skin:     General: Skin is warm and dry.   Neurological:      General: No focal deficit present.      Mental Status: She is alert and oriented to person, place, and time.   Psychiatric:         Mood and Affect: Mood normal.         Behavior: Behavior normal.         Thought Content: Thought content normal.         Judgment: Judgment normal.      Results Review:  I have reviewed the labs, radiology results, and diagnostic studies.    Laboratory Data:      Results from last 7 days   Lab Units 07/30/21  0617 07/29/21  1703 07/29/21  1350   WBC 10*3/mm3 5.20 7.77 7.24   HEMOGLOBIN g/dL 12.5 12.0 11.5*   HEMATOCRIT % 39.7 37.7 36.2   PLATELETS 10*3/mm3 271 258 230        Results from last 7 days   Lab Units 08/01/21  0643 07/30/21  0617 07/30/21  0617 07/29/21  1350 07/29/21  1350   SODIUM mmol/L 128*  --  137  --  134*   POTASSIUM mmol/L 4.5  --  4.6  --  4.3   CHLORIDE mmol/L 90*  --  94*  --  95*   CO2 mmol/L 31.0*  --  34.0*  --  29.0   BUN mg/dL 27*  --  18  --  19   CREATININE mg/dL 0.98  --  1.13*  --  1.10*   GLUCOSE mg/dL  141*   < > 190*   < > 155*   CALCIUM mg/dL 8.6  --  9.4  --  8.8   ALT (SGPT) U/L  --   --   --   --  12    < > = values in this interval not displayed.     Culture Data:      Blood Culture   Date Value Ref Range Status   07/29/2021 No growth at 2 days  Preliminary   07/29/2021 No growth at 2 days  Preliminary     Respiratory Culture   Date Value Ref Range Status   07/29/2021 Moderate growth (3+) Normal Respiratory Miriam  Preliminary     Radiology Data:   Imaging Results (Last 7 Days)     Procedure Component Value Units Date/Time    CT Angiogram Chest [[id number]] Collected: 07/29/21 1828     Updated: 07/29/21 1839    Narrative:      EXAMINATION: CT ANGIOGRAM CHEST-  7/29/2021 6:28 PM CDT     HISTORY:Dyspnea, shortness of breath     COMPARISON: 7/29/2021 chest radiography     TECHNIQUE:     CT evaluation of the chest was performed with intravenous contrast. 2 mm  transaxial images were obtained.. Coronal reconstruction maximum  intensity projection images of the pulmonary arteries and aorta were  also generated.     Radiation dose equals  mGy-cm.  Automated exposure control dose  reduction technique was implemented.        FINDINGS:     [Pulmonary arteries opacify with iodinated contrast without intraluminal  filling defects. There is no CT angiographic evidence of pulmonary  embolus.     There is ectasia and calcifications of the thoracic aorta without  aneurysm or dissection. There is extensive atherosclerotic  calcifications of the aorta. The left vertebral artery arises from the  aortic arch, normal variant.     There is cardiomegaly with panchamber enlargement. Mitral annular  calcifications observed.     There are calcified right hilar lymph nodes. There are mildly prominent  mediastinal hilar lymph nodes, question reactive.     There is a small to moderate size right pleural effusion layering  posteriorly. There is associated right lower lobe airspace opacities,  atelectasis and/or infiltrate.     There  is mild patchy airspace opacities noted in the left lower lobe as  well as the lingula and right middle lobe inferiorly.     There is peribronchial thickening with endobronchial opacification  appreciated in both lower lobes,, acute infectious/inflammatory change  with mucus plugging considered. Correlate with patient presentation.  Follow-up recommended.     There is pulmonary emphysema with centrilobular and paraseptal changes  involving the upper lobes bilaterally.     There is no axillary lymphadenopathy.     Limited imaging the upper abdomen demonstrate no acute abnormality.     There are nonobstructing calculi in the right kidney. Probable cyst  noted in the lower pole the left kidney.     There is aneurysmal dilatation infrarenal abdominal aorta imaged  incompletely on the measuring 2.8 cm in greatest AP projection.     There are no acute osseous abnormalities identified with degenerative  changes in the thoracic spine..]       Impression:         1. No CT angiographic evidence of pulmonary embolus or acute aortic  pathology.  2. Mild to moderate size right pleural effusion with bilateral lower  lobe airspace opacities. Perihilar bronchial thickening with  peribronchial thickening and endobronchial opacification observed in  both lower lobes. Acute infectious/inflammatory change considered.  Correlate with patient presentation. Follow-up recommended.  3. Pulmonary emphysema.  4. Mildly prominent mediastinal hilar lymph nodes, question reactive.  5. Cardiomegaly.  6. Atherosclerotic calcifications. Aneurysmal dilatation of the  infrarenal abdominal aorta imaged incompletely.  This report was finalized on 07/29/2021 18:35 by Dr. Sanket Barlow MD.    XR Chest 2 View [[id number]] Collected: 07/29/21 1604     Updated: 07/29/21 1608    Narrative:       XR CHEST 2 VW- 7/29/2021 4:01 PM CDT     HISTORY: SOA Triage Protocol       COMPARISON: None.     FINDINGS:   Upright frontal and lateral radiographs of the chest  were obtained.     Chronic interstitial changes noted in the pulmonary parenchyma. This may  be interstitial fibrosis however superimposed interstitial edema cannot  BE excluded. A 12 mm nodules present in the left suprahilar region.  There is hyperinflation flattening diaphragms noted.. Cardiac  silhouettes moderately enlarged.. The osseous structures and surrounding  soft tissues demonstrate no acute abnormality.       Impression:      1. Changes of COPD and chronic interstitial changes present.  Superimposed pulmonary vascular congestion cannot BE excluded.  2. 12 mm nodule in the left suprahilar region. Further evaluation is  suggested. This could possibly be a granuloma however other etiologies  cannot be excluded.        This report was finalized on 07/29/2021 16:05 by Dr. Denton May MD.        Intake/Output    Intake/Output Summary (Last 24 hours) at 8/1/2021 1335  Last data filed at 7/31/2021 2156  Gross per 24 hour   Intake 450 ml   Output --   Net 450 ml     Scheduled Meds  amLODIPine, 10 mg, Oral, Daily  aspirin, 81 mg, Oral, Daily  atorvastatin, 40 mg, Oral, Nightly  cefTRIAXone, 1 g, Intravenous, Q24H   And  azithromycin, 500 mg, Intravenous, Q24H  budesonide-formoterol, 2 puff, Inhalation, BID - RT  dicyclomine, 10 mg, Oral, TID  enoxaparin, 40 mg, Subcutaneous, Q24H  guaiFENesin, 1,200 mg, Oral, Q12H  ipratropium-albuterol, 3 mL, Nebulization, 4x Daily - RT  methylPREDNISolone sodium succinate, 40 mg, Intravenous, Q8H  pantoprazole, 40 mg, Oral, BID  pramipexole, 0.25 mg, Oral, TID  senna-docusate sodium, 1 tablet, Oral, Nightly  sodium chloride, 10 mL, Intravenous, Q12H      I have reviewed the patient current medications.     Assessment/Plan     Active Hospital Problems    Diagnosis    • **Acute on chronic respiratory failure with hypoxia and hypercapnia (CMS/HCC)    • Pneumonia due to infectious organism    • COPD with acute exacerbation (CMS/HCC)    • Stage 3a chronic kidney disease (CMS/HCC)     • Pleural effusion on right    • Essential hypertension      Plan:  1.  To ER 7/29 with worsening shortness of breath for the past 3 to 4 days.  PCO2 57, PO2 35.9 with saturation 70% on room air.  Placed on BiPAP but was intolerant.  She was transitioned to nasal cannula.  DuoNeb, Solu-Medrol, azithromycin, Rocephin, Symbicort, Lasix in ER.  2.  Chest x-ray 7/29 changes of COPD and chronic interstitial changes present.  Superimposed pulmonary vascular congestion cannot be excluded.  12 mm nodule left suprahilar region.  CT angiogram of the chest 7/29 no evidence of pulmonary emboli or aortic pathology.  Mild to moderate right sized pleural effusion with bilateral lower lobe airspace opacities.  Perihilar bronchial thickening with peribronchial thickening and endobronchial opacity in both lower lobes.  Acute infectious/inflammatory changes considered.  Pulmonary emphysema. Calcified right hilar lymph nodes mildly prominent mediastinal hilar nodes questionable reactive.  3.  BiPAP in ER transitioned to oxygen 6 L. Weaned to oxygen 2 L.  4.  Rocephin IV, azithromycin IV day 3. Will complete azithromycin today. Transition to oral Omnicef tomorrow.  5.  Transition Solu-Medrol to tapering dose of prednisone beginning tomorrow.  6.  Continue Symbicort, duo nebs, Mucinex.  7.  Strep pneumonia negative, Legionella negative, sputum culture mixed kel.  Blood cultures no growth at 2 days.  8.  Pulmonary consulted and agreed with antibiotics breathing treatments.  Will need follow-up 4- 6 weeks with chest x-ray.  May need CT scan of the chest 6-8 weeks to ensure resolution of effusion.  9.  Hyponatremia with sodium 128 today.  Sodium 136 yesterday 134 on admission.  Patient tells me she drank 3 pitchers of water last night and today.  Suspect hyponatremia due to excessive water intake.  Check BMP in a.m.  9.  Discussed smoking cessation.  10.  Echo 7/31 ejection fraction 56-60%.  Left-ventricular systolic function normal.   Left ventricular wall mild concentric hypertrophy.  Left ventricular diastolic functions consistent with pseudonormalization.  No valvular dysfunction.  11.  Lovenox for deep vein thrombosis prophylaxis.  12.  Home medications reviewed.    CODE STATUS/Advance Care Pan: Full code  The patient surrogate decision makers are her 2 granddaughters Genevieve and Clara.    The above documentation resulted from a face-to-face encounter by me Kenya VARGHESE, [facility].    Discharge Planning: I expect patient to be discharged to home in 1 days.    Electronically signed by HALIMA Joy, 8/1/2021, 13:35 CDT.

## 2021-08-01 NOTE — PLAN OF CARE
Goal Outcome Evaluation:  Plan of Care Reviewed With: patient        Progress: no change  Outcome Summary: VSS. No change in neuro status this shift. No c/o pain voiced. Continue w/nonproductive cough and very soa w/any activity, O2 sat also drops to 88% and recovers to 90-91%. Safety maintained.

## 2021-08-02 NOTE — PLAN OF CARE
Goal Outcome Evaluation:  Plan of Care Reviewed With: patient        Progress: improving  Outcome Summary: A & O, VSS, 3 L O2 continues, on 3L at home as well, lungs coarse, legally blind, up with SBA, voiding per BSC,  yesterday, fluid restriction 1500 ml /day, safety maintained

## 2021-08-02 NOTE — DISCHARGE SUMMARY
[facility] Medicine Services  DISCHARGE SUMMARY     Date of Admission: 7/29/2021  Date of Discharge:  8/2/2021  Primary Care Physician: Pam Sin APRN    Presenting Problem/History of Present Illness:  Pneumonia due to infectious organism, unspecified laterality, unspecified part of lung [J18.9]     Discharge Diagnoses:  Active Hospital Problems    Diagnosis    • **Acute on chronic respiratory failure with hypoxia and hypercapnia (CMS/HCC)    • Pneumonia due to infectious organism    • COPD with acute exacerbation (CMS/[facility])    • Stage 3a chronic kidney disease (CMS/HCC)    • Pleural effusion on right    • Essential hypertension      Chief Complaint on Day of Discharge: No complaints    History of Present Illness on Day of Discharge:   Sitting up on side of bed.  No complaints.  Oxygen at 3 L.  Denies chest pain, palpitations, shortness of breath.  No wheezing heard.  No sputum production.  Denies nausea, vomiting or abdominal pain.  Results of echocardiogram discussed with patient.  Sodium improved to 133 today.  Patient had drank excessive amount of water drinking 3 pitchers prior day and sodium had decreased to 128.    Consults: Dr. Campos, pulmonology    Procedures Performed: None    Pertinent Test Results:   Results for orders placed during the hospital encounter of 07/29/21    Adult Transthoracic Echo Complete w/ Color, Spectral and Contrast if Necessary Per Protocol    Interpretation Summary  · Left ventricular ejection fraction appears to be 56 - 60%. Left ventricular systolic function is normal.  · Left ventricular wall thickness is consistent with mild concentric hypertrophy.  · Left ventricular diastolic function is consistent with (grade II w/high LAP) pseudonormalization.  · Normal right ventricular cavity size and systolic function noted.  · There is no significant (greater than mild) valvular dysfunction.  · Estimated right ventricular systolic pressure  from tricuspid regurgitation is mildly elevated (35-45 mmHg).    Laboratory Data Last 7 Days:  Results from last 7 days   Lab Units 07/30/21  0617 07/29/21  1703 07/29/21  1350   WBC 10*3/mm3 5.20 7.77 7.24   HEMOGLOBIN g/dL 12.5 12.0 11.5*   HEMATOCRIT % 39.7 37.7 36.2   PLATELETS 10*3/mm3 271 258 230     Results from last 7 days   Lab Units 08/02/21  0633 08/01/21  0643 08/01/21  0643 07/30/21  0617 07/30/21  0617 07/29/21  1350 07/29/21  1350   SODIUM mmol/L 133*  --  128*  --  137  --  134*   POTASSIUM mmol/L 4.2  --  4.5  --  4.6  --  4.3   CHLORIDE mmol/L 95*  --  90*  --  94*  --  95*   CO2 mmol/L 33.0*  --  31.0*  --  34.0*  --  29.0   BUN mg/dL 28*  --  27*  --  18  --  19   CREATININE mg/dL 1.07*  --  0.98  --  1.13*  --  1.10*   GLUCOSE mg/dL 123*   < > 141*   < > 190*   < > 155*   CALCIUM mg/dL 8.5*  --  8.6  --  9.4  --  8.8   ALT (SGPT) U/L  --   --   --   --   --   --  12    < > = values in this interval not displayed.     Laboratory Data Last 7 Days:    Lab Results (last 7 days)     Procedure Component Value Units Date/Time    Basic Metabolic Panel [[id number]]  (Abnormal) Collected: 08/02/21 0633    Specimen: Blood Updated: 08/02/21 0809     Glucose 123 mg/dL      BUN 28 mg/dL      Creatinine 1.07 mg/dL      Sodium 133 mmol/L      Potassium 4.2 mmol/L      Chloride 95 mmol/L      CO2 33.0 mmol/L      Calcium 8.5 mg/dL      eGFR Non African Amer 49 mL/min/1.73      BUN/Creatinine Ratio 26.2     Anion Gap 5.0 mmol/L     Narrative:      GFR Normal >60  Chronic Kidney Disease <60  Kidney Failure <15      Blood Culture - Blood, Arm, Left [[id number]] Collected: 07/29/21 1702    Specimen: Blood from Arm, Left Updated: 08/01/21 1746     Blood Culture No growth at 3 days    Blood Culture - Blood, Arm, Left [[id number]] Collected: 07/29/21 1702    Specimen: Blood from Arm, Left Updated: 08/01/21 1746     Blood Culture No growth at 3 days    Respiratory Culture - Sputum, Cough [[id number]]  (Abnormal) Collected:  07/29/21 2113    Specimen: Sputum from Cough Updated: 08/01/21 1418     Respiratory Culture Moderate growth (3+) Normal Respiratory Kel      Scant growth (1+) Pseudomonas species     Gram Stain Greater than 25 WBCs per low power field      Greater than 25 Epithelial cells per low power field      Many (4+) Mixed bacterial kel    Basic Metabolic Panel [[id number]]  (Abnormal) Collected: 08/01/21 0643    Specimen: Blood Updated: 08/01/21 0801     Glucose 141 mg/dL      BUN 27 mg/dL      Creatinine 0.98 mg/dL      Sodium 128 mmol/L      Potassium 4.5 mmol/L      Chloride 90 mmol/L      CO2 31.0 mmol/L      Calcium 8.6 mg/dL      eGFR Non African Amer 54 mL/min/1.73      BUN/Creatinine Ratio 27.6     Anion Gap 7.0 mmol/L     Narrative:      GFR Normal >60  Chronic Kidney Disease <60  Kidney Failure <15      Basic Metabolic Panel [[id number]]  (Abnormal) Collected: 07/30/21 0617    Specimen: Blood Updated: 07/30/21 0719     Glucose 190 mg/dL      BUN 18 mg/dL      Creatinine 1.13 mg/dL      Sodium 137 mmol/L      Potassium 4.6 mmol/L      Chloride 94 mmol/L      CO2 34.0 mmol/L      Calcium 9.4 mg/dL      eGFR Non African Amer 46 mL/min/1.73      BUN/Creatinine Ratio 15.9     Anion Gap 9.0 mmol/L     Narrative:      GFR Normal >60  Chronic Kidney Disease <60  Kidney Failure <15      CBC (No Diff) [[id number]]  (Normal) Collected: 07/30/21 0617    Specimen: Blood Updated: 07/30/21 0646     WBC 5.20 10*3/mm3      RBC 4.39 10*6/mm3      Hemoglobin 12.5 g/dL      Hematocrit 39.7 %      MCV 90.4 fL      MCH 28.5 pg      MCHC 31.5 g/dL      RDW 14.3 %      RDW-SD 47.3 fl      MPV 9.8 fL      Platelets 271 10*3/mm3     [place] Draw [[id number]] Collected: 07/29/21 1702    Specimen: Blood Updated: 07/29/21 2115    Narrative:      The following orders were created for panel order [place] Draw.  Procedure                               Abnormality         Status                     ---------                                -----------         ------                     Green Top (Gel)[[id number]]                                                             Lavender Top[[id number]]                                                                Red Top[[id number]]                                                                     [place] Top[[id number]]                                         Final result                 Please view results for these tests on the individual orders.    Gray Top [[id number]] Collected: 07/29/21 1702    Specimen: Blood Updated: 07/29/21 2115     Extra Tube Hold for add-ons.     Comment: Auto resulted.       Legionella Antigen, Urine - Urine, Urine, Clean Catch [[id number]]  (Normal) Collected: 07/29/21 1554    Specimen: Urine, Clean Catch Updated: 07/29/21 2042     LEGIONELLA ANTIGEN, URINE Negative    S. Pneumo Ag Urine or CSF - Urine, Urine, Clean Catch [[id number]]  (Normal) Collected: 07/29/21 1554    Specimen: Urine, Clean Catch Updated: 07/29/21 2042     Strep Pneumo Ag Negative    [place] Draw [[id number]] Collected: 07/29/21 1350    Specimen: Blood Updated: 07/29/21 1800    Narrative:      The following orders were created for panel order [place] Draw.  Procedure                               Abnormality         Status                     ---------                               -----------         ------                     Green Top (Gel)[[id number]]                                  Final result               Lavender Top[[id number]]                                     Final result               Red Top[[id number]]                                          Final result               [place] Blood Culture Doni...[[id number]]                      Final result               Gray Top[[id number]]                                         Final result                 Please view results for these tests on the individual orders.    Villegas Top [[id number]] Collected: 07/29/21 1350    Specimen: Blood Updated: 07/29/21 1800      Extra Tube Hold for add-ons.     Comment: Auto resulted.       COVID PRE-OP / PRE-PROCEDURE SCREENING ORDER (NO ISOLATION) - Swab, Nasal Cavity [[id number]]  (Normal) Collected: 07/29/21 1655    Specimen: Swab from Nasal Cavity Updated: 07/29/21 1753    Narrative:      The following orders were created for panel order COVID PRE-OP / PRE-PROCEDURE SCREENING ORDER (NO ISOLATION) - Swab, Nasal Cavity.  Procedure                               Abnormality         Status                     ---------                               -----------         ------                     COVID-19,[employer] IN-THIEN...[[id number]]  Normal              Final result                 Please view results for these tests on the individual orders.    COVID-19,[facility] [employer] IN-HOUSE,Nasal Swab No Transport Media 3-4 HR TAT - Swab, Nasal Cavity [[id number]]  (Normal) Collected: 07/29/21 1655    Specimen: Swab from Nasal Cavity Updated: 07/29/21 1753     COVID19 Not Detected    Narrative:      Fact sheet for providers: https://www.fda.gov/media/822012/download     Fact sheet for patients: https://www.fda.gov/media/089965/download    Test performed by PCR.    Consider negative results in combination with clinical observations, patient history, and epidemiological information.    Troponin [[id number]]  (Normal) Collected: 07/29/21 1703    Specimen: Blood Updated: 07/29/21 1742     Troponin T <0.010 ng/mL     Narrative:      Troponin T Reference Range:  <= 0.03 ng/mL-   Negative for AMI  >0.03 ng/mL-     Abnormal for myocardial necrosis.  Clinicians would have to utilize clinical acumen, EKG, Troponin and serial changes to determine if it is an Acute Myocardial Infarction or myocardial injury due to an underlying chronic condition.       Results may be falsely decreased if patient taking Biotin.      BNP [[id number]]  (Abnormal) Collected: 07/29/21 1703    Specimen: Blood Updated: 07/29/21 1741     proBNP 1,885.0 pg/mL     Narrative:      Among patients with  dyspnea, NT-proBNP is highly sensitive for the detection of acute congestive heart failure. In addition NT-proBNP of <300 pg/ml effectively rules out acute congestive heart failure with 99% negative predictive value.    Results may be falsely decreased if patient taking Biotin.      Protime-INR [[id number]]  (Normal) Collected: 07/29/21 1702    Specimen: Blood from Arm, Left Updated: 07/29/21 1728     Protime 12.8 Seconds      INR 1.04    D-dimer, Quantitative [[id number]]  (Abnormal) Collected: 07/29/21 1702    Specimen: Blood from Arm, Left Updated: 07/29/21 1728     D-Dimer, Quantitative 1.58 mg/L (FEU)     Narrative:      Reference Range is 0-0.50 mg/L FEU. However, results <0.50 mg/L FEU tends to rule out DVT or PE. Results >0.50 mg/L FEU are not useful in predicting absence or presence of DVT or PE.      CBC & Differential [[id number]]  (Abnormal) Collected: 07/29/21 1703    Specimen: Blood Updated: 07/29/21 1718    Narrative:      The following orders were created for panel order CBC & Differential.  Procedure                               Abnormality         Status                     ---------                               -----------         ------                     CBC Auto Differential[[id number]]        Abnormal            Final result                 Please view results for these tests on the individual orders.    CBC Auto Differential [[id number]]  (Abnormal) Collected: 07/29/21 1703    Specimen: Blood Updated: 07/29/21 1718     WBC 7.77 10*3/mm3      RBC 4.18 10*6/mm3      Hemoglobin 12.0 g/dL      Hematocrit 37.7 %      MCV 90.2 fL      MCH 28.7 pg      MCHC 31.8 g/dL      RDW 14.6 %      RDW-SD 47.8 fl      MPV 9.6 fL      Platelets 258 10*3/mm3      Neutrophil % 68.3 %      Lymphocyte % 13.8 %      Monocyte % 12.7 %      Eosinophil % 4.4 %      Basophil % 0.5 %      Immature Grans % 0.3 %      Neutrophils, Absolute 5.31 10*3/mm3      Lymphocytes, Absolute 1.07 10*3/mm3      Monocytes, Absolute 0.99  10*3/mm3      Eosinophils, Absolute 0.34 10*3/mm3      Basophils, Absolute 0.04 10*3/mm3      Immature Grans, Absolute 0.02 10*3/mm3      nRBC 0.0 /100 WBC     Urinalysis With Culture If Indicated - Urine, Clean Catch [[id number]]  (Abnormal) Collected: 07/29/21 1554    Specimen: Urine, Clean Catch Updated: 07/29/21 1716     Color, UA Yellow     Appearance, UA Clear     pH, UA 6.5     Specific Gravity, UA 1.016     Glucose, UA Negative     Ketones, UA Negative     Bilirubin, UA Negative     Blood, UA Negative     Protein, UA Negative     Leuk Esterase, UA Trace     Nitrite, UA Negative     Urobilinogen, UA 1.0 E.U./dL    Urinalysis, Microscopic Only - Urine, Clean Catch [[id number]]  (Abnormal) Collected: 07/29/21 1554    Specimen: Urine, Clean Catch Updated: 07/29/21 1716     RBC, UA 0-2 /HPF      WBC, UA 0-2 /HPF      Bacteria, UA None Seen /HPF      Squamous Epithelial Cells, UA 0-2 /HPF      Hyaline Casts, UA 0-2 /LPF      Methodology Automated Microscopy    Blood Gas, Arterial - [[id number]]  (Abnormal) Collected: 07/29/21 1637    Specimen: Arterial Blood Updated: 07/29/21 1647     Site Right Brachial     Alexx's Test N/A     pH, Arterial 7.396 pH units      pCO2, Arterial 57.9 mm Hg      Comment: 83 Value above reference range        pO2, Arterial 35.9 mm Hg      Comment: 85 Value below critical limit        HCO3, Arterial 35.5 mmol/L      Comment: 83 Value above reference range        Base Excess, Arterial 8.7 mmol/L      Comment: 83 Value above reference range        O2 Saturation, Arterial 70.4 %      Comment: 84 Value below reference range        Temperature 37.0 C      Barometric Pressure for Blood Gas 749 mmHg      Modality Room Air     Ventilator Mode NA     Notified Who DR ROSE     Notified By [id number]     Notified Time 07/29/2021 16:51     Collected by [id number]     Comment: Meter: [id number]     :  [id number]        pCO2, Temperature Corrected 57.9 mm Hg      pH, Temp Corrected 7.396 pH Units       pO2, Temperature Corrected 35.9 mm Hg     Jimmy Urine - Urine, Clean Catch [[id number]] Collected: 07/29/21 1554    Specimen: Urine, Clean Catch Updated: 07/29/21 1643     Extra Tube .    Jimmy Blood Culture Bottle Set [[id number]] Collected: 07/29/21 1350    Specimen: Blood from Arm, Left Updated: 07/29/21 1500     Extra Tube Hold for add-ons.     Comment: Auto resulted.       Green Top (Gel) [[id number]] Collected: 07/29/21 1350    Specimen: Blood Updated: 07/29/21 1500     Extra Tube Hold for add-ons.     Comment: Auto resulted.       Lavender Top [[id number]] Collected: 07/29/21 1350    Specimen: Blood Updated: 07/29/21 1500     Extra Tube hold for add-on     Comment: Auto resulted       Red Top [[id number]] Collected: 07/29/21 1350    Specimen: Blood Updated: 07/29/21 1500     Extra Tube Hold for add-ons.     Comment: Auto resulted.       Comprehensive Metabolic Panel [[id number]]  (Abnormal) Collected: 07/29/21 1350    Specimen: Blood Updated: 07/29/21 1425     Glucose 155 mg/dL      BUN 19 mg/dL      Creatinine 1.10 mg/dL      Sodium 134 mmol/L      Potassium 4.3 mmol/L      Chloride 95 mmol/L      CO2 29.0 mmol/L      Calcium 8.8 mg/dL      Total Protein 6.7 g/dL      Albumin 4.20 g/dL      ALT (SGPT) 12 U/L      AST (SGOT) 18 U/L      Alkaline Phosphatase 101 U/L      Total Bilirubin 0.4 mg/dL      eGFR Non African Amer 47 mL/min/1.73      Globulin 2.5 gm/dL      A/G Ratio 1.7 g/dL      BUN/Creatinine Ratio 17.3     Anion Gap 10.0 mmol/L     Narrative:      GFR Normal >60  Chronic Kidney Disease <60  Kidney Failure <15      Troponin [[id number]]  (Normal) Collected: 07/29/21 1350    Specimen: Blood Updated: 07/29/21 1423     Troponin T 0.011 ng/mL     Narrative:      Troponin T Reference Range:  <= 0.03 ng/mL-   Negative for AMI  >0.03 ng/mL-     Abnormal for myocardial necrosis.  Clinicians would have to utilize clinical acumen, EKG, Troponin and serial changes to determine if it is an Acute Myocardial  Infarction or myocardial injury due to an underlying chronic condition.       Results may be falsely decreased if patient taking Biotin.      BNP [[id number]]  (Normal) Collected: 07/29/21 1350    Specimen: Blood Updated: 07/29/21 1422     proBNP 1,759.0 pg/mL     Narrative:      Among patients with dyspnea, NT-proBNP is highly sensitive for the detection of acute congestive heart failure. In addition NT-proBNP of <300 pg/ml effectively rules out acute congestive heart failure with 99% negative predictive value.    Results may be falsely decreased if patient taking Biotin.      CBC & Differential [[id number]]  (Abnormal) Collected: 07/29/21 1350    Specimen: Blood Updated: 07/29/21 1401    Narrative:      The following orders were created for panel order CBC & Differential.  Procedure                               Abnormality         Status                     ---------                               -----------         ------                     CBC Auto Differential[[id number]]        Abnormal            Final result                 Please view results for these tests on the individual orders.    CBC Auto Differential [[id number]]  (Abnormal) Collected: 07/29/21 1350    Specimen: Blood Updated: 07/29/21 1401     WBC 7.24 10*3/mm3      RBC 4.00 10*6/mm3      Hemoglobin 11.5 g/dL      Hematocrit 36.2 %      MCV 90.5 fL      MCH 28.8 pg      MCHC 31.8 g/dL      RDW 14.6 %      RDW-SD 48.1 fl      MPV 9.5 fL      Platelets 230 10*3/mm3      Neutrophil % 72.3 %      Lymphocyte % 11.0 %      Monocyte % 12.0 %      Eosinophil % 3.6 %      Basophil % 0.7 %      Immature Grans % 0.4 %      Neutrophils, Absolute 5.23 10*3/mm3      Lymphocytes, Absolute 0.80 10*3/mm3      Monocytes, Absolute 0.87 10*3/mm3      Eosinophils, Absolute 0.26 10*3/mm3      Basophils, Absolute 0.05 10*3/mm3      Immature Grans, Absolute 0.03 10*3/mm3      nRBC 0.0 /100 WBC         Culture Data:   Blood Culture   Date Value Ref Range Status   07/29/2021  No growth at 3 days  Preliminary   07/29/2021 No growth at 3 days  Preliminary    No results found for: URINECX No results found for: WOUNDCX No results found for: MRSACX   Respiratory Culture   Date Value Ref Range Status   07/29/2021 Moderate growth (3+) Normal Respiratory Miriam  Preliminary   07/29/2021 Scant growth (1+) Pseudomonas species (A)  Preliminary    No results found for: STOOLCX     Hospital Course  Patient is a 85 y.o. female presented to [facility] emergency room 7/29/2021 with complaints of worsening shortness of breath over the last 3 to 4 days.  She has a history of chronic obstructive Jeffery disease and chronic respiratory failure wears oxygen 2 to 3 L continuously.  She reported 3 to 4-day history of increasing shortness of breath.  ABG showed PCO2 57.9 and PO2 of 35.9 with saturation of 70% on room air.  Patient was placed on BiPAP and unable to tolerate.  She was transitioned to cannula at 6 L.  She reported sputum production green-yellow.  D-dimer 1.85, WBC 7.7, creatinine 1.1, troponin negative.  Chest x-ray COPD and chronic interstitial changes.  Superimposed pulmonary vascular congestion cannot be excluded.  12 mm nodule left suprahilar region.  CT angiogram of the chest no evidence of pulmonary emboli or acute aortic pathology.  Mild to moderate sized right pleural effusion with bilateral lower lobe airspace opacities.  Perihilar bronchial thickening with peribronchial thickening and endobronchial opacification observed in both lower lobes.  Acute infectious/inflammatory changes considered.  Pulmonary emphysema.  Mild prominent mediastinal hilar lymph nodes, question reactive.  Cardiomegaly.  DuoNeb, Solu-Medrol, azithromycin and Rocephin given in ER.    She was admitted to the medical floor with acute on chronic respiratory failure with hypoxia, suspected pneumonia and COPD exacerbation.  Patient was transitioned to oxygen at 6 L and was able to be weaned down to 2 to 3 L as  "tolerated.  Rocephin and azithromycin continued.  She was placed on Solu-Medrol IV and transitioned to tapering dose of prednisone at discharge.  Mucinex twice daily, Symbicort, DuoNeb treatments continued.  Strep pneumonia negative, Legionella negative and blood cultures remain no growth at 3 days.    Pulmonary consult and she was seen by Dr. Campos who agreed with antibiotics, Solu-Medrol and will need outpatient pulmonary function test.  Patient's oxygen was able to be weaned to 2 to 3 L which is baseline.  Right pleural effusion to be evaluated after discharge.  Follow-up with Dr. Campos 8/31/2021 with PA and lateral chest x-ray.  Sputum culture obtained showing scant growth of Pseudomonas.  Discussed with Dr. Vaz and she was transitioned to Levaquin 500 mg daily for 7 days.    Sodium 134 on admission with repeat sodium 137.  Patient had sodium level of 128 on 8/1/2021 but when questioning patient she indicated she had drank an excessive amount of water and had had through pitchers of water the night prior.  She was advised to decreased water intake and follow-up sodium 133.  Discontinue hydrochlorothiazide.    Creatinine 1.1 on admission 1.07 at discharge.    Echo 7/31 ejection fraction 56-60%.  Left-ventricular systolic function normal.  Left ventricular wall mild concentric hypertrophy.  Left ventricular diastolic functions consistent with pseudonormalization.  No valvular dysfunction.    On 8/2/2021, she is stable for discharge.  She is weaned to oxygen 2 to 3 L, home oxygen level.  Transitioned to Levaquin at discharge for Pseudomonas scant growth in sputum culture.  Follow-up with Dr. Campos 8/31/2021 with PA and lateral chest x-ray.  Patient desires to establish care with Dr. Wade at discharge.    Physical Exam on Discharge:  /59 (BP Location: Right arm, Patient Position: Sitting)   Pulse 68   Temp 97.5 °F (36.4 °C) (Oral)   Resp 18   Ht 165.1 cm (65\")   Wt 74.8 kg (164 lb 14.5 oz)   SpO2 " 95%   BMI 27.44 kg/m²   Physical Exam  Vitals and nursing note reviewed.   Constitutional:       Comments: Sitting up on side of bed.  No distress.  Oxygen in use.   HENT:      Head: Normocephalic and atraumatic.      Nose: No congestion.      Mouth/Throat:      Pharynx: Oropharynx is clear. No oropharyngeal exudate.   Eyes:      Extraocular Movements: Extraocular movements intact.      Pupils: Pupils are equal, round, and reactive to light.   Cardiovascular:      Rate and Rhythm: Normal rate and regular rhythm.   Pulmonary:      Breath sounds: No wheezing, rhonchi or rales.      Comments: Oxygen 3 L.  No sputum production.  No wheezing today.  Abdominal:      Palpations: Abdomen is soft.      Tenderness: There is no abdominal tenderness.   Genitourinary:     Comments: Voiding.  Musculoskeletal:         General: No swelling or tenderness.      Cervical back: Normal range of motion and neck supple.   Skin:     General: Skin is warm and dry.   Neurological:      General: No focal deficit present.      Mental Status: She is alert and oriented to person, place, and time.   Psychiatric:         Mood and Affect: Mood normal.         Behavior: Behavior normal.         Thought Content: Thought content normal.         Judgment: Judgment normal.       Condition on Discharge: Stable    Discharge Disposition: Home    Discharge Diet:   Diet Instructions     Advance Diet As Tolerated        As tolerated    Activity at Discharge:   Activity Instructions     Activity as Tolerated        As tolerated    Discharge Care Plan / Instructions:   1.  For worsening shortness of breath seek medical attention.  2.  Levaquin 500 mg orally x7 days per pulmonology  3.  Tapering dose of prednisone at discharge.  4.  Follow-up with Dr. Campos 8/31/2021 with chest x-ray  5.  Follow-up with Dr. Wade in 1 week.  6.  Discontinue hydrochlorothiazide  7.  Discussed smoking cessation    Discharge Medications:     Discharge Medications      New  Medications      Instructions Start Date   levoFLOXacin 500 MG tablet  Commonly known as: LEVAQUIN   500 mg, Oral, Daily, Begin 8/3/2021   Start Date: August 3, 2021     predniSONE 10 MG tablet  Commonly known as: DELTASONE   2 tablets for 2 days begin 8/3/2021 then 1 tablet for 2 days.         Changes to Medications      Instructions Start Date   atorvastatin 40 MG tablet  Commonly known as: LIPITOR  What changed: Another medication with the same name was removed. Continue taking this medication, and follow the directions you see here.   40 mg, Oral, Daily         Continue These Medications      Instructions Start Date   albuterol (2.5 MG/3ML) 0.083% nebulizer solution  Commonly known as: PROVENTIL   INHALE 1 VIAL BY NEBULIZER EVERY 4 HOURS AS NEEDED FOR WHEEZING OR SHORTNESS OF BREATH      amLODIPine 5 MG tablet  Commonly known as: NORVASC   5 mg, Oral, Daily      aspirin 81 MG EC tablet   81 mg, Oral, Daily      budesonide-formoterol 160-4.5 MCG/ACT inhaler  Commonly known as: SYMBICORT   2 puffs, Inhalation, 2 Times Daily - RT      dicyclomine 10 MG capsule  Commonly known as: BENTYL   10 mg, Oral, 3 Times Daily      lisinopril 40 MG tablet  Commonly known as: PRINIVIL,ZESTRIL   40 mg, Oral, Daily      magnesium hydroxide 400 MG/5ML suspension  Commonly known as: MILK OF MAGNESIA   Oral, Daily PRN      Mucinex 600 MG 12 hr tablet  Generic drug: guaiFENesin   TAKE 1 TABLET BY MOUTH 2 TIMES DAILY FOR 15 DAYS      OXYGEN-HELIUM IN   2 L, Inhalation      pantoprazole 40 MG EC tablet  Commonly known as: PROTONIX   40 mg, Oral, 2 Times Daily      pramipexole 0.25 MG tablet  Commonly known as: MIRAPEX   0.25 mg, Oral, 3 Times Daily      vitamin D 1.25 MG (91605 UT) capsule capsule  Commonly known as: ERGOCALCIFEROL   50,000 Units, Oral, Weekly         Stop These Medications    hydroCHLOROthiazide 12.5 MG capsule  Commonly known as: MICROZIDE          Follow-up Appointments:   Follow-up Information     Yasmany Campos  MD Inderjit Follow up on 8/31/2021.    Specialty: Pulmonary Disease  Why:  1100 with HALIMA Rutledge; with cxr  Contact information:  [address] [facility] [address]  [phone]             Austin Wade, DO Follow up in 1 week(s).    Specialties: Internal Medicine, Emergency Medicine  Contact information:  [address]  [facility] [address]  [facility] [address]  [phone]             Pam Sin APRN .    Specialty: Nurse Practitioner  Contact information:  [address] [place] [address] [address]  [facility] [address]  [phone]                 Future Appointments:  Future Appointments   Date Time Provider Department Center   8/31/2021 11:00 AM Beth Tony APRN [facility] PAD PAD   9/22/2021 10:00 AM PAD US NIVAS CART 3 BH PAD NIVAS PAD   9/22/2021 11:00 AM PAD US [facility] CART 3 [facility] PAD NIVAS PAD   9/22/2021  1:00 PM Jerald Henriquez MD [facility] PAD PAD     Test Results Pending at Discharge: None    The above documentation resulted from a face-to-face encounter by me Kenya VARGHESE, [facility].    Electronically signed by HALIMA Joy, 8/2/2021, 10:10 CDT.    Time: This discharge process required better than 35 minutes for completion.    Plan discussed with Dr. Sigala, Dr. Vaz, and patient.    Time spent in face-to-face evaluation, chart review, planning and education greater than 35 minutes.

## 2021-08-02 NOTE — NURSING NOTE
"When giving pt her pills pt stopped the RN and said \"I can't have ANY water\" as she held a bottle of soda. She continued to explain that she can't drink any water but she can drink her soda. Pt also has about 3 bottles of soda on the window sill. Tried explaining to her that the fluid restriction was all drinks not just water but all fluids. Pt continued to drink her soda.  "

## 2021-08-02 NOTE — PLAN OF CARE
Goal Outcome Evaluation:      Aox4. Levaquin given as ordered. IV d/c'd. Discharge instructions reviewed with patient. Awaiting on her granddaughter for transport home. No acute distress noted.

## 2021-08-02 NOTE — OUTREACH NOTE
Prep Survey      Responses   [facility] patient discharged from?  [place]   Is LACE score < 7 ?  No   Emergency Room discharge w/ pulse ox?  No   Eligibility  [facility]   Date of Admission  07/29/21   Date of Discharge  08/02/21   Discharge Disposition  Home or Self Care   Discharge diagnosis  PN,  a/c resp failure w/ hypoxia,  COPD w/ acute exac   Does the patient have one of the following disease processes/diagnoses(primary or secondary)?  COPD/Pneumonia   Does the patient have Home health ordered?  No   Is there a DME ordered?  No   Prep survey completed?  Yes          Nyla Anderson RN

## 2021-08-02 NOTE — PROGRESS NOTES
"      [facility]         Dr Laboy Sayied   LOS: 4 days   Patient Care Team:  Pam Sin APRN as PCP - General (Nurse Practitioner)  Harris Escobar MD as Consulting Physician (Gastroenterology)  Yasmany Campos MD as Consulting Physician (Pulmonary Disease)    Chief Complaint: Acute on chronic hypoxemic respiratory failure with right pleural effusion pneumonia versus atelectasis COPD exacerbation    Interval History: Feeling well on 3 L oxygen nasal cannula.  Wants to go home    REVIEW OF SYSTEMS:   Shortness of breath with activity    Ventilator/Non-Invasive Ventilation Settings (From admission, onward)     Start     Ordered    07/29/21 1650  NIPPV (CPAP or BIPAP)  Until Discontinued     Question Answer Comment   Type: BIPAP    NIPPV Mask Interface: Full Face Mask        07/29/21 1649                  Vital Signs  Temp:  [97.4 °F (36.3 °C)-98.4 °F (36.9 °C)] 97.5 °F (36.4 °C)  Heart Rate:  [60-81] 60  Resp:  [18-22] 18  BP: (109-162)/(54-88) 162/59    Intake/Output Summary (Last 24 hours) at 8/2/2021 1208  Last data filed at 8/2/2021 0300  Gross per 24 hour   Intake 640 ml   Output --   Net 640 ml     Flowsheet Rows      First Filed Value   Admission Height  165.1 cm (65\") Documented at 07/29/2021 1337   Admission Weight  74.8 kg (165 lb) Documented at 07/29/2021 1337          Physical Exam:  Patient is examined using the personal protective equipment as per guidelines from infection control for this particular patient as enacted.  Hand hygiene was performed before and after patient interaction.   General Appearance:   Sleeping wakes up follow simple commands  Neck midline trachea, no thyromegaly   Lungs:    Diminished breath sounds with rhonchi on the basis    Heart:    Regular rhythm and normal rate, normal S1 and S2, no            murmur, no gallop, no rub, no click   Chest Wall:    No abnormalities observed   Abdomen:     Normal bowel sounds, no masses, no organomegaly, soft    "     nontender, nondistended, no guarding, no rebound                tenderness   Extremities:   Moves all extremities well, no edema, no cyanosis, no             redness  CNS no focal neurological deficits normal sensory exam  Skin no rashes no nodules  Musculoskeletal no cyanosis no clubbing normal range of motion     Results Review:        Results from last 7 days   Lab Units 08/02/21  0633 08/01/21  0643 08/01/21  0643 07/30/21  0617 07/30/21  0617   SODIUM mmol/L 133*  --  128*  --  137   POTASSIUM mmol/L 4.2  --  4.5  --  4.6   CHLORIDE mmol/L 95*  --  90*  --  94*   CO2 mmol/L 33.0*  --  31.0*  --  34.0*   BUN mg/dL 28*  --  27*  --  18   CREATININE mg/dL 1.07*  --  0.98  --  1.13*   GLUCOSE mg/dL 123*   < > 141*   < > 190*   CALCIUM mg/dL 8.5*  --  8.6  --  9.4    < > = values in this interval not displayed.     Results from last 7 days   Lab Units 07/29/21  1703 07/29/21  1350   TROPONIN T ng/mL <0.010 0.011     Results from last 7 days   Lab Units 07/30/21  0617 07/29/21  1703 07/29/21  1350   WBC 10*3/mm3 5.20 7.77 7.24   HEMOGLOBIN g/dL 12.5 12.0 11.5*   HEMATOCRIT % 39.7 37.7 36.2   PLATELETS 10*3/mm3 271 258 230     Results from last 7 days   Lab Units 07/29/21  1702   INR  1.04                 Results from last 7 days   Lab Units 07/29/21  1637   PH, ARTERIAL pH units 7.396   PO2 ART mm Hg 35.9*   PCO2, ARTERIAL mm Hg 57.9*   HCO3 ART mmol/L 35.5*       I reviewed the patient's new clinical results.  I personally viewed and interpreted the patient's chest x-ray.        Medication Review:   amLODIPine, 10 mg, Oral, Daily  aspirin, 81 mg, Oral, Daily  atorvastatin, 40 mg, Oral, Nightly  budesonide-formoterol, 2 puff, Inhalation, BID - RT  dicyclomine, 10 mg, Oral, TID  enoxaparin, 40 mg, Subcutaneous, Q24H  guaiFENesin, 1,200 mg, Oral, Q12H  ipratropium-albuterol, 3 mL, Nebulization, 4x Daily - RT  levoFLOXacin, 500 mg, Oral, Q24H  pantoprazole, 40 mg, Oral, BID  pramipexole, 0.25 mg, Oral,  TID  predniSONE, 40 mg, Oral, Daily With Breakfast  senna-docusate sodium, 1 tablet, Oral, Nightly  sodium chloride, 10 mL, Intravenous, Q12H             ASSESSMENT:   Acute on chronic hypoxemic hypercapnic respite failure  Right pleural effusion  Questionable pneumonia versus atelectasis  COPD with exacerbation  Tobacco abuse      PLAN:  Oxygen requirement stable.  At baseline uses 2 L oxygen nasal cannula  Pneumonia versus atelectasis noted on CT chest.  Sputum culture with Pseudomonas.  Levaquin for 7 days  Right pleural effusion needs to be watched closely.  With history of tobacco abuse concerns for malignancy is noted.  Needs to have follow-up CT chest in 6 to 8 weeks to ensure complete resolution of the effusion.  Ambulate  Recommend patient to stop smoking long-term.  Monitor clinical course closely  No objections to discharge as long as we can have follow-up as outpatient with pulmonary in 4 to 6 weeks.      Benoit Vaz MD  08/02/21  12:08 CDT

## 2021-08-02 NOTE — CASE MANAGEMENT/SOCIAL WORK
Continued Stay Note  [facility]     Patient Name: Cindy Hicks  MRN: [id number]  Today's Date: 8/2/2021    Admit Date: 7/29/2021    Discharge Plan     Row Name 08/02/21 1435       Plan    Plan  Patient offered assistance program PDHD and refused. Information given for PATS to assist with travel to and from MD office visits. Patient requested this information.    Final Discharge Disposition Code  01 - home or self-care    Final Note  Discharged home with family to assist. Denied need for home help servicese including PDHD Copd assist. Refused HH at this time. PATs information given to assist with travel.        Discharge Codes    No documentation.       Expected Discharge Date and Time     Expected Discharge Date Expected Discharge Time    Aug 2, 2021             Cris Caban RN

## 2021-08-03 ENCOUNTER — TELEPHONE (OUTPATIENT)
Dept: INTERNAL MEDICINE | Age: 86
End: 2021-08-03

## 2021-08-03 NOTE — OUTREACH NOTE
Call Center TCM Note      Responses   [facility] patient discharged from?  [place]   Does the patient have one of the following disease processes/diagnoses(primary or secondary)?  COPD/Pneumonia   Was the primary reason for admission:  Pneumonia   TCM attempt successful?  No   Unsuccessful attempts  Attempt 2          Rick Payne RN    8/3/2021, 17:26 EDT

## 2021-08-03 NOTE — TELEPHONE ENCOUNTER
[facility] Trans[facility] Initial Follow Up Call    Outreach made within 2 business days of discharge: Yes    Patient: Joey Fuentes   Patient : 1935  MRN: [id number]    Reason for Admission: Admitted 21 for pneumonia due to infectious organism   Discharge Date: 21     Discharge Diagnoses:   Acute on chronic respiratory failure with hypoxia and hypercapnia (CMS/HCC)    Pneumonia due to infectious organism    COPD with acute exacerbation (CMS/[facility])    Stage 3a chronic kidney disease (CMS/[facility])    Pleural effusion on right    Essential hypertension       Spoke with: patient     Discharge department/facility: [facility] Patient Contact:  Was patient able to fill all prescriptions: Yes  Was patient instructed to bring all medications to the follow-up visit: Yes  Is patient taking all medications as directed in the discharge summary? Yes  Does patient understand their discharge instructions: Yes  Does patient have questions or concerns that need addressed prior to 7-14 day follow up office visit: no    I spoke with Mrs. George. She states she is doing about the same. She is on home oxygen at 2.5L/min. She states she is still very short of air. She is doing her breathing treatments and using her inhalers. She is still very weak. She states she is shaky and fidgeting as well. I offered her an appointment to come see Rosibel Velasco tomorrow for follow up. She declines stating her family has changed her PCP to Dr. Enid Vera. She wanted to darshan Lewis for all her help but states she will no longer be a patient here as her family insisted she see Dr. Enid Vera. All future appointment have been cancelled.      Alexia Mason MA

## 2021-08-03 NOTE — OUTREACH NOTE
Call Center TCM Note      Responses   [facility] patient discharged from?  [place]   Does the patient have one of the following disease processes/diagnoses(primary or secondary)?  COPD/Pneumonia   Was the primary reason for admission:  Pneumonia   TCM attempt successful?  No [Mailbox full, media release has daughter listed and we do not have a number for her. ]   Unsuccessful attempts  Attempt 1          Rick Payne RN    8/3/2021, 17:17 EDT

## 2021-08-04 NOTE — TELEPHONE ENCOUNTER
PATIENT HAS CALLED, SHE HAS JUST GOTTEN OUT OF THE HOSPITAL,  SHE STATED THAT SHE IS HAVING ISSUES SHE DOES NOT UNDERSTAND.    SHE STATED THAT SHE IS SHAKING, HANDS ARE DRAWING, SHE FEELS LIKE SHE HAS SOME SWELLING IN THE BACK OF HER THROAT.  (SHE DOES HAVE THRUSH).    PATIENT DOES NOT HAVE ANY MEDICATION FOR THRUSH.    PATIENT IS A NEW PATIENT AND WILL BE SEEN BY RACHNA REYNOLDS ON  08/05/21  PATIENT DID STATE THAT SHE IS ON STEROIDS AND ANTIBIOTICS.      PLEASE ADVISE.

## 2021-08-04 NOTE — OUTREACH NOTE
Call Center TCM Note      Responses   [facility] patient discharged from?  [place]   Does the patient have one of the following disease processes/diagnoses(primary or secondary)?  COPD/Pneumonia   Was the primary reason for admission:  Pneumonia   TCM attempt successful?  Yes   Call start time  1007   Call end time  1009   Discharge diagnosis  PN,  a/c resp failure w/ hypoxia,  COPD w/ acute exac   Meds reviewed with patient/caregiver?  Yes   Is the patient having any side effects they believe may be caused by any medication additions or changes?  No   Does the patient have all medications ordered at discharge?  Yes   Is the patient taking all medications as directed (includes completed medication regime)?  Yes   Does the patient have a primary care provider?   Yes   Does the patient have an appointment with their PCP or specialist within 7 days of discharge?  Yes   Comments regarding PCP  new patient appt with HALIMA Whitlock tomorrow (8/5)   Has the patient kept scheduled appointments due by today?  N/A   Has home health visited the patient within 72 hours of discharge?  N/A   Psychosocial issues?  No   Did the patient receive a copy of their discharge instructions?  Yes   Nursing interventions  Reviewed instructions with patient   What is the patient's perception of their health status since discharge?  Improving   Nursing Interventions  Nurse provided patient education   Is the patient/caregiver able to teach back the hierarchy of who to call/visit for symptoms/problems? PCP, Specialist, Home health nurse, Urgent Care, ED, 911  Yes   Is the patient/caregiver able to teach back signs and symptoms of worsening condition:  Fever/chills, Shortness of breath, Chest pain   Is the patient/caregiver able to teach back importance of completing antibiotic course of treatment?  Yes   TCM call completed?  Yes   Wrap up additional comments  Says she is doing well, confirmed new patient appt for tomorrow with  HALIMA Whitlock.          Lenore Ward RN    8/4/2021, 10:09 EDT

## 2021-08-04 NOTE — TELEPHONE ENCOUNTER
PATIENT HAS BEEN CALLED, GIVEN MESSAGE AND VOICED UNDERSTANDING.  SHE STATED THAT SHE IS FEELING BETTER SINCE THE INITIAL CALL.

## 2021-08-05 PROBLEM — I70.213 ATHEROSCLEROSIS OF NATIVE ARTERY OF BOTH LOWER EXTREMITIES WITH INTERMITTENT CLAUDICATION: Status: ACTIVE | Noted: 2021-01-01

## 2021-08-05 PROBLEM — G47.09 OTHER INSOMNIA: Status: ACTIVE | Noted: 2019-09-18

## 2021-08-05 PROBLEM — G89.29 CHRONIC PAIN: Status: ACTIVE | Noted: 2017-09-06

## 2021-08-05 PROBLEM — K92.2 GI BLEED: Status: ACTIVE | Noted: 2019-09-11

## 2021-08-05 PROBLEM — D62 ANEMIA DUE TO ACUTE BLOOD LOSS: Status: ACTIVE | Noted: 2019-09-12

## 2021-08-05 PROBLEM — M19.90 OSTEOARTHRITIS: Status: ACTIVE | Noted: 2021-01-01

## 2021-08-05 PROBLEM — E55.9 VITAMIN D DEFICIENCY: Status: ACTIVE | Noted: 2018-06-13

## 2021-08-05 PROBLEM — L57.0 ACTINIC KERATOSIS: Status: ACTIVE | Noted: 2021-01-01

## 2021-08-05 PROBLEM — H35.30 MACULAR DEGENERATION: Status: ACTIVE | Noted: 2021-01-01

## 2021-08-05 PROBLEM — G25.81 RESTLESS LEGS SYNDROME: Status: ACTIVE | Noted: 2017-09-17

## 2021-08-05 PROBLEM — K58.9 IRRITABLE BOWEL SYNDROME: Status: ACTIVE | Noted: 2021-01-01

## 2021-08-05 PROBLEM — M85.89 OSTEOPENIA OF MULTIPLE SITES: Status: ACTIVE | Noted: 2019-04-15

## 2021-08-05 PROBLEM — M81.0 OSTEOPOROSIS: Status: ACTIVE | Noted: 2017-09-17

## 2021-08-05 PROBLEM — D50.8 IRON DEFICIENCY ANEMIA SECONDARY TO INADEQUATE DIETARY IRON INTAKE: Status: ACTIVE | Noted: 2020-02-20

## 2021-08-05 PROBLEM — K21.9 GASTROESOPHAGEAL REFLUX DISEASE WITHOUT ESOPHAGITIS: Status: ACTIVE | Noted: 2021-01-11

## 2021-08-05 NOTE — PATIENT INSTRUCTIONS
Hyponatremia  Hyponatremia is when the amount of salt (sodium) in your blood is too low. When sodium levels are low, your cells absorb extra water, which causes them to swell. The swelling happens throughout the body, but it mostly affects the brain.  What are the causes?  This condition may be caused by:  · Certain medical conditions, such as:  ? Heart, kidney, or liver problems.  ? Thyroid problems.  ? Adrenal gland problems.  ? Metabolic conditions, such as Gianluca disease or syndrome of inappropriate antidiuretic hormone (SIADH).  · Severe vomiting or diarrhea.  · Certain medicines or illegal drugs.  · Dehydration.  · Drinking too much water.  · Eating a diet that is low in sodium.  · Large burns on your body.  · Excessive sweating.  What increases the risk?  You are more likely to develop this condition if you:  · Have long-term (chronic) kidney disease.  · Have heart failure.  · Have a medical condition that causes frequent or excessive diarrhea.  · Participate in intense physical activities, such as marathon running.  · Take certain medicines that affect the sodium and fluid balance in the blood. Some of these medicine types include:  ? Diuretics.  ? NSAIDs.  ? Some opioid pain medicines.  ? Some antidepressants.  ? Some seizure prevention medicines.  What are the signs or symptoms?  Symptoms of this condition include:  · Headache.  · Nausea and vomiting.  · Being very tired (lethargic).  · Muscle weakness and cramping.  · Loss of appetite.  · Feeling weak or light-headed.  Severe symptoms of this condition include:  · Confusion.  · Agitation.  · Having a rapid heart rate.  · Passing out (fainting).  · Seizures.  · Coma.  How is this diagnosed?  This condition is diagnosed based on:  · A physical exam.  · Your medical history.  · Tests, including:  ? Blood tests.  ? Urine tests.  How is this treated?  Treatment for this condition depends on the cause. Treatment may include:  · Getting fluids through an IV  that is inserted into one of your veins.  · Medicines to correct the sodium imbalance. If medicines are causing the condition, the medicines will need to be adjusted.  · Limiting your water or fluid intake to get the correct sodium balance.  · Monitoring in the hospital setting to closely watch your symptoms for improvement.  Follow these instructions at home:    · Take over-the-counter and prescription medicines only as told by your health care provider. Many medicines can make this condition worse. Talk with your health care provider about any medicines that you are currently taking.  · Carefully follow a recommended diet as told by your health care provider.  · Carefully follow instructions from your health care provider about fluid restrictions.  · Do not drink alcohol.  · Keep all follow-up visits as told by your health care provider. This is important.  Contact a health care provider if:  · You develop worsening nausea, fatigue, headache, confusion, or weakness.  · Your symptoms go away and then return.  · You have problems following the recommended diet.  Get help right away if:  · You have a seizure.  · You pass out.  · You have ongoing diarrhea or vomiting.  Summary  · Hyponatremia is when the amount of salt (sodium) in your blood is too low.  · When sodium levels are low, your cells absorb extra water, which causes them to swell.  · The swelling happens throughout the body, but it mostly affects the brain.  · Treatment for this condition depends on the cause. It may include IV fluids, medicines, and limiting your fluid intake.  This information is not intended to replace advice given to you by your health care provider. Make sure you discuss any questions you have with your health care provider.  Document Revised: 11/01/2019 Document Reviewed: 11/01/2019  Elsevier Patient Education © 2021 [employer]

## 2021-08-05 NOTE — PROGRESS NOTES
Transitional Care Follow Up Visit  Subjective     Cinyd Hicks is a 85 y.o. female who presents for a transitional care management visit.    Within 48 business hours after discharge our office contacted her via telephone to coordinate her care and needs.      I reviewed and discussed the details of that call along with the discharge summary, hospital problems, inpatient lab results, inpatient diagnostic studies, and consultation reports with Cindy.     Current outpatient and discharge medications have been reconciled for the patient.  Reviewed by: HALIMA Whitlock      Date of TCM Phone Call 8/2/2021   [facility]   Date of Admission 7/29/2021   Date of Discharge 8/2/2021   Discharge Disposition Home or Self Care     Risk for Readmission (LACE) Score: 12 (8/2/2021  5:02 AM)      History of Present Illness   Course During Hospital Stay:  This is a new patient to our office who was discharged from [facility] on 8/2/21 after a stay for COPD exacerbation, acute on chronic respiratory failure.  She was treated with antibiotics and steroids, Pseudomonas identified in sputum (currently finishing po Levaquin for this.) She also had hypotnatremia during the hospital stay, but sodium had returned to 133 prior to discharge.      She lives alone, has macular degeneration with severe visual impairment.  CKD stage 3, PVD BLE followed by Dr. Henriquez, as well as carotid occlusive disease.         The following portions of the patient's history were reviewed and updated as appropriate: allergies, current medications, past family history, past social history, past surgical history and problem list.    Review of Systems   Thinks she may have thrush.  Since hospital, taste is altered and has white in mouth that is peeling off, throat hurts.  History of GI bleed, no signs of bleeding currently.  Using 2L O2 at home regularly, but did not wear to office (sat 92% on RA).  States she is chronically  short of breath with exertion but does ok when at rest.  States she does have chronic BLE edema which is usually ok when she wears her compression stockings from Dr. Henriquez; however, since being in hospital, she has been too weak to get her stockings on daily.  HCTZ was discontinued in the hospital due to hyponatremia.    Objective   Physical Exam  Vitals and nursing note reviewed.   Constitutional:       General: She is not in acute distress.     Appearance: Normal appearance. She is not ill-appearing, toxic-appearing or diaphoretic.      Comments: Very pleasant.  Daughter here with her.  Patient in wheelchair.   HENT:      Head: Normocephalic and atraumatic.      Mouth/Throat:      Mouth: Mucous membranes are moist.      Comments: She has thrush, with white patches that scrape easily on the posterior buccal mucosa.  Eyes:      General: No scleral icterus.        Right eye: No discharge.         Left eye: No discharge.      Pupils: Pupils are equal, round, and reactive to light.   Neck:      Vascular: Carotid bruit (right side bruit, says her follow up caroltid US from Dr. Henriquez is next month) present.   Cardiovascular:      Rate and Rhythm: Normal rate and regular rhythm.      Heart sounds: Normal heart sounds. No murmur heard.     Pulmonary:      Effort: Pulmonary effort is normal. No respiratory distress.      Breath sounds: Normal breath sounds. No stridor. No wheezing, rhonchi or rales.      Comments: Slightly decreased breath sounds bilaterally, no congestion noted.  Abdominal:      General: There is no distension.      Palpations: Abdomen is soft.      Tenderness: There is no abdominal tenderness.   Musculoskeletal:         General: Normal range of motion.      Cervical back: Neck supple. No rigidity or tenderness.      Right lower leg: Edema (trace pitting right ankle ) present.      Left lower leg: Edema (trace pitting left ankle) present.   Lymphadenopathy:      Cervical: No cervical adenopathy.    Skin:     General: Skin is warm and dry.      Coloration: Skin is not jaundiced or pale.      Findings: No bruising, erythema, lesion or rash.   Neurological:      Mental Status: She is alert and oriented to person, place, and time.   Psychiatric:         Mood and Affect: Mood normal.         Behavior: Behavior normal.         Thought Content: Thought content normal.         Judgment: Judgment normal.     Basic Metabolic Panel  Order: [id number]  Status:  Final result   Visible to patient:  No (not released) Next appt:  08/31/2021 at 11:00 AM in Pulmonology (Beth Duarte Cecily, APRN)  Specimen Information: Blood         0 Result Notes  Component   Ref Range & Units 3 d ago 4 d ago 6 d ago   Glucose   65 - 99 mg/dL 123High   141High   190High     BUN   8 - 23 mg/dL 28High   27High   18    Creatinine   0.57 - 1.00 mg/dL 1.07High   0.98  1.13High     Sodium   136 - 145 mmol/L 133Low   128Low   137    Potassium   3.5 - 5.2 mmol/L 4.2  4.5  4.6    Chloride   98 - 107 mmol/L 95Low   90Low   94Low     CO2   22.0 - 29.0 mmol/L 33.0High   31.0High   34.0High     Calcium   8.6 - 10.5 mg/dL 8.5Low   8.6  9.4    eGFR Non African Amer   >60 mL/min/1.73 49Low   54Low   46Low     BUN/Creatinine Ratio   7.0 - 25.0 26.2High   27.6High   15.9    Anion Gap   5.0 - 15.0 mmol/L 5.0  7.0  9.0    Resulting Agency  [facility]  [facility]  [facility]      Narrative  Performed by:  [facility]  GFR Normal >60   Chronic Kidney Disease <60   Kidney Failure <15       Specimen Collected: 08/02/21 06:33 Last Resulted: 08/02/21 08:09                        Assessment/Plan   Diagnoses and all orders for this visit:    1. Acute on chronic respiratory failure with hypoxia and hypercapnia (CMS/HCC) (Primary)  -     Ambulatory Referral to Home Health    2. COPD with acute exacerbation (CMS/HCC)  -     Ambulatory Referral to Home Health    3. Macular degeneration of both eyes, unspecified type  -     Ambulatory Referral to Home Health    4. Stage 3a  chronic kidney disease (CMS/HCC)    5. Essential hypertension    6. Atherosclerosis of native artery of both lower extremities with intermittent claudication (CMS/HCC)    7. Hyponatremia    8. Thrush  -     nystatin (MYCOSTATIN) 589016 UNIT/ML suspension; Swish and swallow 5 mL 3 (Three) Times a Day for 14 days.  Dispense: 210 mL; Refill: 0      Stable today, but I do think she will benefit from Home Health evaluation for PT, OT, and home safety standpoint.      Treat thrush.    Follow up here in 1 month or sooner if needed.

## 2021-08-09 NOTE — PROGRESS NOTES
August 9, 2021    Hello, may I speak with Cindy Hicks?    My name is Tamara     I am  with [facility]  [address], [address]  [place] [id number] [phone].    Before we get started may I verify your date of birth? 1935    I am calling to officially welcome you to our practice and ask about your recent visit. Is this a good time to talk? yes    Tell me about your visit with us. What things went well?  very satisfied,very sweet       We're always looking for ways to make our patients' experiences even better. Do you have recommendations on ways we may improve?  no    Overall were you satisfied with your first visit to our practice? yes       I appreciate you taking the time to speak with me today. Is there anything else I can do for you? no      Thank you, and have a great day.

## 2021-08-12 NOTE — OUTREACH NOTE
COPD/PN Week 2 Survey      Responses   [facility] patient discharged from?  [place]   Does the patient have one of the following disease processes/diagnoses(primary or secondary)?  COPD/Pneumonia   Was the primary reason for admission:  Pneumonia   Week 2 attempt successful?  Yes   Call start time  0756   Call end time  0801   Discharge diagnosis  PN,  a/c resp failure w/ hypoxia,  COPD w/ acute exac   Meds reviewed with patient/caregiver?  Yes   Is the patient taking all medications as directed (includes completed medication regime)?  Yes   Medication comments  Completed antibiotic and steriods   Has the patient kept scheduled appointments due by today?  Yes   Comments  Has seen PCP Appt with pulmonology 08/13/2021   DME comments  Wears O2   Pulse Ox monitoring  Intermittent   Pulse Ox device source  Patient   O2 Sat: education provided  Sat levels   O2 Sat education comments  >92%   What is the patient's perception of their health status since discharge?  Improving   Are the patient's immunizations up to date?   -- [Has had the COVID immunization]   Week 2 call completed?  Yes          Glenis Melgoza RN   No

## 2021-08-19 NOTE — OUTREACH NOTE
"COPD/PN Week 3 Survey      Responses   [facility] patient discharged from?  [place]   Does the patient have one of the following disease processes/diagnoses(primary or secondary)?  COPD/Pneumonia   Was the primary reason for admission:  Pneumonia   Week 3 attempt successful?  Yes   Call start time  1233   Call end time  1237   Discharge diagnosis  PN,  a/c resp failure w/ hypoxia,  COPD w/ acute exac   Is the patient taking all medications as directed (includes completed medication regime)?  Yes   Medication comments  Completed antibiotic and steriods   DME comments  Wears O2   Pulse Ox monitoring  Intermittent   Pulse Ox device source  Patient   O2 Sat: education provided  Sat levels, Monitoring frequency, When to seek care   O2 Sat education comments  Sats at 92-93%   Psychosocial issues?  No   What is the patient's perception of their health status since discharge?  Improving   Is the patient/caregiver able to teach back the hierarchy of who to call/visit for symptoms/problems? PCP, Specialist, Home health nurse, Urgent Care, ED, 911  Yes   Additional teach back comments  States she is doing \"pretty good\".  She has appt with her PCP on 8/31.  She uses oxygen at night and prn during the day but is unsure how much.     Patient reports what zone on this call?  Green Zone   Green Zone  Reports doing well, Breathing without shortness of breath   Green Zone interventions:  Take daily medications, Use oxygen as prescribed   Is the patient/caregiver able to teach back signs and symptoms of worsening condition:  Fever/chills, Shortness of breath, Chest pain   Is the patient/caregiver able to teach back importance of completing antibiotic course of treatment?  Yes   Week 3 call completed?  Yes   Revoked  No further contact(revokes)-requires comment   Graduated/Revoked comments  Denies questions or needs at this time          Jill Louis LPN  "

## 2021-08-30 NOTE — TELEPHONE ENCOUNTER
PT'S GRANDDAUGHTER CALLED, WANTED TO KNOW IF PT COULD BE PRESCRIBED A SMALLER, CARRIABLE OXYGEN TANK; PT HAS A LARGE ONE FOR HOME USE BUT NEEDS A SMALL ONE THAT SHE CAN TAKE WITH HER TO DOCTOR APPOINTMENTS AND OTHER SHORT TRIPS    PT IS SUPPLIED THROUGH [employer]    CALLBACK [phone]

## 2021-08-31 NOTE — TELEPHONE ENCOUNTER
PATIENT HAS BEEN CALLED, SHE STATED THAT SHE HAS AN APPOINTMENT WITH DR WILLINGHAM'S PA TODAY.  SHE WILL DISCUSS THIS NEED FOR SMALLER O2 TANKS WITH THEM.

## 2021-09-01 PROBLEM — J96.11 CHRONIC RESPIRATORY FAILURE WITH HYPOXIA AND HYPERCAPNIA (HCC): Status: ACTIVE | Noted: 2021-01-01

## 2021-09-01 PROBLEM — K21.9 GASTROESOPHAGEAL REFLUX DISEASE: Chronic | Status: ACTIVE | Noted: 2021-01-11

## 2021-09-01 PROBLEM — Z87.891 PERSONAL HISTORY OF NICOTINE DEPENDENCE: Status: ACTIVE | Noted: 2021-01-01

## 2021-09-01 PROBLEM — J96.12 CHRONIC RESPIRATORY FAILURE WITH HYPOXIA AND HYPERCAPNIA: Chronic | Status: ACTIVE | Noted: 2021-01-01

## 2021-09-01 PROBLEM — J43.9 PULMONARY EMPHYSEMA (HCC): Chronic | Status: ACTIVE | Noted: 2021-01-01

## 2021-09-01 PROBLEM — J96.12 CHRONIC RESPIRATORY FAILURE WITH HYPOXIA AND HYPERCAPNIA (HCC): Status: ACTIVE | Noted: 2021-01-01

## 2021-09-01 PROBLEM — J43.9 PULMONARY EMPHYSEMA (HCC): Status: ACTIVE | Noted: 2021-01-01

## 2021-09-01 PROBLEM — J96.11 CHRONIC RESPIRATORY FAILURE WITH HYPOXIA AND HYPERCAPNIA: Chronic | Status: ACTIVE | Noted: 2021-01-01

## 2021-09-03 PROBLEM — M81.0 OSTEOPOROSIS: Status: RESOLVED | Noted: 2017-09-17 | Resolved: 2021-01-01

## 2021-09-03 PROBLEM — F17.210 CIGARETTE SMOKER: Status: ACTIVE | Noted: 2021-01-01

## 2021-09-03 PROBLEM — J18.9 PNEUMONIA DUE TO INFECTIOUS ORGANISM: Status: RESOLVED | Noted: 2021-01-01 | Resolved: 2021-01-01

## 2021-09-03 PROBLEM — D62 ANEMIA DUE TO ACUTE BLOOD LOSS: Status: RESOLVED | Noted: 2019-09-12 | Resolved: 2021-01-01

## 2021-09-03 PROBLEM — J96.21 ACUTE ON CHRONIC RESPIRATORY FAILURE WITH HYPOXIA AND HYPERCAPNIA (HCC): Status: RESOLVED | Noted: 2021-01-01 | Resolved: 2021-01-01

## 2021-09-03 PROBLEM — J96.22 ACUTE ON CHRONIC RESPIRATORY FAILURE WITH HYPOXIA AND HYPERCAPNIA (HCC): Status: RESOLVED | Noted: 2021-01-01 | Resolved: 2021-01-01

## 2021-09-03 NOTE — PROGRESS NOTES
CC: f/u COPD    History:  Cindy Hicks is a 85 y.o. female   She presents today with her granddaughter and notes that her breathing has not been doing well.  She was given a sample of Breo stringy and feels this has improved things over the last couple of days, but she did have some episodes over the last 2 days of feeling she could not get her breath and requiring increase in her oxygen.  She has chronic cough without increase in frequency nor production of sputum.  She has had increase in dyspnea, but no obviously worsening wheezing.  She did have effusion on her CT in the hospital last month and there was no work-up to date.       ROS:  Review of Systems   Constitutional: Positive for fatigue. Negative for chills and fever.   Respiratory: Positive for cough, chest tightness, shortness of breath and wheezing.    Cardiovascular: Negative for chest pain and palpitations.   Gastrointestinal: Negative for abdominal pain and constipation.   Genitourinary: Negative for difficulty urinating and dysuria.        reports that she has been smoking cigarettes. She has a 35.00 pack-year smoking history. She has never used smokeless tobacco. She reports that she does not drink alcohol and does not use drugs.      Current Outpatient Medications:   •  albuterol (PROVENTIL) (2.5 MG/3ML) 0.083% nebulizer solution, INHALE 1 VIAL BY NEBULIZER EVERY 4 HOURS AS NEEDED FOR WHEEZING OR SHORTNESS OF BREATH, Disp: , Rfl:   •  albuterol sulfate  (90 Base) MCG/ACT inhaler, Inhale 2 puffs Every 4 (Four) Hours As Needed for Wheezing or Shortness of Air for up to 90 days., Disp: 54 g, Rfl: 3  •  amLODIPine (NORVASC) 5 MG tablet, Take 5 mg by mouth Daily., Disp: , Rfl:   •  aspirin 81 MG EC tablet, Take 81 mg by mouth Daily., Disp: , Rfl:   •  atorvastatin (LIPITOR) 40 MG tablet, Take 40 mg by mouth Daily., Disp: , Rfl:   •  Budeson-Glycopyrrol-Formoterol (Breztri Aerosphere) 160-9-4.8 MCG/ACT aerosol inhaler, Inhale 2 puffs 2 (Two)  "Times a Day for 2 days., Disp: 2 each, Rfl: 0  •  dicyclomine (BENTYL) 10 MG capsule, Take 10 mg by mouth 3 (Three) Times a Day., Disp: , Rfl:   •  lisinopril (PRINIVIL,ZESTRIL) 40 MG tablet, Take 40 mg by mouth Daily., Disp: , Rfl:   •  magnesium hydroxide (MILK OF MAGNESIA) 400 MG/5ML suspension, Take  by mouth Daily As Needed for Constipation., Disp: , Rfl:   •  Mucinex 600 MG 12 hr tablet, TAKE 1 TABLET BY MOUTH 2 TIMES DAILY FOR 15 DAYS, Disp: , Rfl:   •  multivitamin with minerals (VITRUM 50+ ADULT-MULTI PO), Take 1 tablet by mouth Daily., Disp: , Rfl:   •  OXYGEN-HELIUM IN, Inhale 2 L., Disp: , Rfl:   •  pantoprazole (PROTONIX) 40 MG EC tablet, Take 40 mg by mouth 2 (Two) Times a Day., Disp: , Rfl:   •  pramipexole (MIRAPEX) 0.25 MG tablet, Take 0.25 mg by mouth Daily As Needed. 1-2 tab at bedtime, Disp: , Rfl:   •  predniSONE (DELTASONE) 10 MG tablet, 2 tablets for 2 days begin 8/3/2021 then 1 tablet for 2 days., Disp: 6 tablet, Rfl: 0  •  vitamin D (ERGOCALCIFEROL) 54764 units capsule capsule, Take 50,000 Units by mouth 1 (One) Time Per Week., Disp: , Rfl:     OBJECTIVE:  /74 (BP Location: Left arm, Patient Position: Sitting, Cuff Size: Adult)   Pulse 64   Temp 97.8 °F (36.6 °C)   Resp 16   Ht 165.1 cm (65\")   Wt 71.9 kg (158 lb 8 oz)   SpO2 94%   Breastfeeding No   BMI 26.38 kg/m²    Physical Exam  Constitutional:       General: She is not in acute distress.  Cardiovascular:      Rate and Rhythm: Normal rate and regular rhythm.      Heart sounds: Normal heart sounds. No murmur heard.     Pulmonary:      Effort: Pulmonary effort is normal.      Breath sounds: Decreased breath sounds (but symmetric) present. No wheezing.   Neurological:      Mental Status: She is alert and oriented to person, place, and time.      Gait: Gait normal.   Psychiatric:         Mood and Affect: Mood normal.         Behavior: Behavior normal.         Assessment/Plan     Diagnoses and all orders for this visit:    1. " "COPD with acute exacerbation (CMS/HCC) (Primary)  -     predniSONE (DELTASONE) 10 MG tablet; Take 2 tablets by mouth Daily for 3 days, THEN 1 tablet Daily for 3 days. 2 tablets for 2 days begin 8/3/2021 then 1 tablet for 2 days.  Dispense: 6 tablet; Refill: 0  She has persistent shortness of breath and is in mild distress with speech today.  We will treat with a short course of low-dose prednisone given her increased anxiety with prednisone.  She has recently changed her breast tree.  This does not represent a true exacerbation and she does not have symptoms of superimposed bacterial pneumonia, so we will not treat with antibiotic at this time, but we could consider this if not improving.  She has PFTs and CT scheduled for later this month.    2. Chronic respiratory failure with hypoxia and hypercapnia (CMS/HCC)  Stable on 2-3L O2 via NC.     3. Pleural effusion on right  This was present on previous CT imaging.  She has symmetric breath sounds not suggestive of a large effusion on auscultation of her lungs today.  However, upcoming CT will further characterize.  It is possible she had a parapneumonic effusion, but if this remains present, work-up would be indicated.    4. Cigarette smoker  Patient was counseled on and understood the many dangers of continuing to use tobacco. Despite this, Ms. Hicks states quitting is not an immediate priority at this time. I reminded the patient that if quitting becomes an increased priority to contact us for help with quitting including pharmacologic & nonpharmacologic options or any additional resources.  She has cut down, but is still smoking \"a puff here and a puff there.\"    5. Stage 3a chronic kidney disease (CMS/HCC)  She has dark urine subjectively. Continue RAAS blockade and encourage increase in PO intake.     6. Overweight (BMI 25.0-29.9)  Recommended attention to portion control and being careful about the types and timing of meals for the purpose of weight " management.    7. Essential hypertension  Well controlled, BP goal for age is <140/90 per JNC 8 guidelines and continue current medications    8. Osteopenia of multiple sites  -     DEXA Bone Density Axial; Future  DEXA for surveillance.     9. Vitamin D deficiency  -     vitamin D (ERGOCALCIFEROL) 1.25 MG (02262 UT) capsule capsule; Take 1 capsule by mouth 1 (One) Time Per Week.  Dispense: 12 capsule; Refill: 1  Weekly Vitamin D refilled.       An After Visit Summary was printed and given to the patient at discharge.  Return in about 3 months (around 12/3/2021) for Medicare Wellness.          Austin Wade D.O. 9/3/2021   Electronically signed.

## 2021-09-06 NOTE — ED PROVIDER NOTES
Emergency Medicine Provider Note    Subjective:    HISTORY OF PRESENT ILLNESS     This is a very pleasant 85 y.o. female with a past medical history of COPD who presents to the emergency department today with a chief complaint of head injury.  Just prior to arrival patient was was trying to put on her pants when she became tripped up and fell striking the right side of her head.  She denies loss of consciousness.  She complains of a right-sided headache that is dull.  Mild.  Nonradiating.  No exacerbating relieving factors.  Denies any vision changes.  Has no neck pain, no back pain.  No chest pain.  She has chronic shortness of breath but states this is unchanged.  She denies any abdominal pain, nausea, vomiting.  She denies any extremity pain.  She has no numbness, weakness, or paresthesias.          History is obtained from the patient.       Review of Systems: All other systems are reviewed and are negative other than noted in the HPI.    Past Medical History:  Past Medical History:   Diagnosis Date   • Actinic keratosis    • Arthritis    • Atherosclerosis    • Benign fundic gland polyps of stomach    • Bleeding ulcer    • Blocked artery    • Carotid artery bruit    • Carotid artery stenosis    • COPD (chronic obstructive pulmonary disease) (CMS/[facility])    • Diverticulosis    • Emphysema of lung (CMS/[facility])    • History of colon polyps    • Hyperlipidemia    • Hypertension    • IBS (irritable bowel syndrome)    • Macular degeneration    • Osteoporosis    • Prediabetes    • PVD (peripheral vascular disease) (CMS/[facility])    • TIA (transient ischemic attack)    • Vitamin D deficiency        Allergies:  Allergies   Allergen Reactions   • Codeine Nausea And Vomiting   • Valium [Diazepam] Nausea Only       Past Surgical History:  Past Surgical History:   Procedure Laterality Date   • CATARACT EXTRACTION     • COLONOSCOPY  03/15/2013    polyp, hyperplastic   • COLONOSCOPY N/A 10/2/2018    Procedure: COLONOSCOPY WITH  ANESTHESIA;  Surgeon: Harris Escobar MD;  Location: [facility] ENDOSCOPY;  Service: Gastroenterology   • CYST REMOVAL     • ENDOSCOPY  2019   • ENDOSCOPY N/A 10/4/2019    Procedure: ESOPHAGOGASTRODUODENOSCOPY WITH ANESTHESIA;  Surgeon: Harris Escobar MD;  Location: [facility] ENDOSCOPY;  Service: Gastroenterology   • FEMORAL ARTERY STENT     • HYSTERECTOMY     • VASCULAR SURGERY      multiple       Family History:  Family History   Problem Relation Age of Onset   • Colon cancer Sister    • Cancer Sister    • Colon polyps Brother    • Heart disease Daughter    • Heart disease Son    • Cancer Mother    • Cancer Father        Social History:  Social History     Socioeconomic History   • Marital status:      Spouse name: Not on file   • Number of children: Not on file   • Years of education: Not on file   • Highest education level: Not on file   Tobacco Use   • Smoking status: Current Every Day Smoker     Packs/day: 0.50     Years: 70.00     Pack years: 35.00     Types: Cigarettes   • Smokeless tobacco: Never Used   • Tobacco comment: down to 2 cigs a day    Vaping Use   • Vaping Use: Never used   Substance and Sexual Activity   • Alcohol use: No   • Drug use: No   • Sexual activity: Not Currently       Home Medications:  Prior to Admission medications    Medication Sig Start Date End Date Taking? Authorizing Provider   albuterol (PROVENTIL) (2.5 MG/3ML) 0.083% nebulizer solution INHALE 1 VIAL BY NEBULIZER EVERY 4 HOURS AS NEEDED FOR WHEEZING OR SHORTNESS OF BREATH 4/27/21   ProviderLainey MD   albuterol sulfate  (90 Base) MCG/ACT inhaler Inhale 2 puffs Every 4 (Four) Hours As Needed for Wheezing or Shortness of Air for up to 90 days. 8/31/21 11/29/21  CecilyBeth APRN   amLODIPine (NORVASC) 5 MG tablet Take 5 mg by mouth Daily.    Provider, MD Lainey   aspirin 81 MG EC tablet Take 81 mg by mouth Daily.    ProviderLainey MD   atorvastatin (LIPITOR) 40 MG tablet Take 40 mg by  mouth Daily.    Lainey Goodwin MD   dicyclomine (BENTYL) 10 MG capsule Take 10 mg by mouth 3 (Three) Times a Day.    Lainey Goodwin MD   lisinopril (PRINIVIL,ZESTRIL) 40 MG tablet Take 40 mg by mouth Daily.    Lainey Goodwin MD   magnesium hydroxide (MILK OF MAGNESIA) 400 MG/5ML suspension Take  by mouth Daily As Needed for Constipation.    Lainey Goodwin MD   Mucinex 600 MG 12 hr tablet TAKE 1 TABLET BY MOUTH 2 TIMES DAILY FOR 15 DAYS 3/31/21   Lainey Goodwin MD   multivitamin with minerals (VITRUM 50+ ADULT-MULTI PO) Take 1 tablet by mouth Daily.    Lainey Goodwin MD   OXYGEN-HELIUM IN Inhale 2 L.    Lainey Goodwin MD   pantoprazole (PROTONIX) 40 MG EC tablet Take 40 mg by mouth 2 (Two) Times a Day.    Lainey Goodwin MD   pramipexole (MIRAPEX) 0.25 MG tablet Take 0.25 mg by mouth Daily As Needed. 1-2 tab at bedtime    Lainey Goodwin MD   predniSONE (DELTASONE) 10 MG tablet Take 2 tablets by mouth Daily for 3 days, THEN 1 tablet Daily for 3 days. 2 tablets for 2 days begin 8/3/2021 then 1 tablet for 2 days. 9/3/21 9/9/21  Austin Wade,    vitamin D (ERGOCALCIFEROL) 1.25 MG (79329 UT) capsule capsule Take 1 capsule by mouth 1 (One) Time Per Week. 9/3/21   Austin Wade DO         Objective:    PHYSICAL EXAM     Vitals:   Vitals:    09/06/21 0855   BP: 142/68   Pulse: 67   Resp: 20   Temp: 98.2 °F (36.8 °C)   SpO2: 96%     GENERAL: Well appearing, in no acute distress.   HEENT: Moist mucous membranes, oropharynx clear without lesions, exudates, thrush.   EYES: No scleral icterus, conjunctivae clear.    NECK: No cervical lymphadenopathy, no stiffness.  No tenderness.  CARDIAC: Normal rate, regular rhythm, no murmurs, 2+ peripheral pulses in all four extremities, normal capillary refill.   PULMONARY: Normal work of breathing on NC, lungs are clear to auscultation bilaterally without wheezes, crackles, rhonchi.  ABDOMINAL: Normal bowel  "sounds, abdomen is soft, non-tender, non-distended, no hepatomegaly or splenomegaly.   MUSCULOSKELETAL: Normal range of motion, no lower extremity edema.  NEUROLOGIC: Alert and oriented x 3, EOM grossly intact and moves all four extremities with normal strength.  SKIN: Warm and dry without rashes.   PSYCHIATRIC: Mood and affect are normal.     PROCEDURES     Procedures    LAB AND RADIOLOGY RESULTS     Lab Results (last 24 hours)     ** No results found for the last 24 hours. **          Walking Oximetry    Result Date: 8/31/2021  Narrative: GaliciaDaly Rhoda AndiMadonna Rep     8/31/2021  3:41 PM Walking Oximetry Performed by: Beth Tony APRN Authorized by: Beth Tony APRN Rest on O2 @ Liters:  2L SAT %:  94 Exercise on O2 @ Liters:  4L SAT %:  90    CT Head Without Contrast    Addendum Date: 9/6/2021 Addendum:   Addendum: There is a misprint in the third impression of the report. The word \"no\" is missing before the description of depressed skull fracture. There is no depressed skull fracture. This report was finalized on 09/06/2021 08:51 by Dr. Leisa Linton MD.    Result Date: 9/6/2021  Narrative: CT HEAD WO CONTRAST- 9/6/2021 8:12 AM CDT  HISTORY: Head trauma, mod-severe; S09.90XA-Unspecified injury of head, initial encounter  COMPARISON: None  DOSE LENGTH PRODUCT: 648 mGy cm. Automated exposure control was also utilized to decrease patient radiation dose.  TECHNIQUE: Axial images of the brain obtained without contrast  FINDINGS:  There is mild cerebral volume loss is. Mild chronic small vessel ischemic change. No intracranial hemorrhage or mass effect. No acute signs of ischemia. No extra-axial hematoma or subarachnoid hemorrhage.  Mild mucosal thickening of the left sphenoid sinus. Mastoid air cells well-aerated. No depressed skull fracture. Mild soft tissue swelling of the right scalp.      Impression: 1. No acute intracranial abnormality. 2. Mild cerebral volume loss with chronic " small vessel ischemia. 3. Soft tissue swelling/small hematoma of the right scalp with depressed skull fracture. This report was finalized on 09/06/2021 08:30 by Dr. Leisa Linton MD.    CT Cervical Spine Without Contrast    Result Date: 9/6/2021  Narrative: CT CERVICAL SPINE WO CONTRAST- 9/6/2021 8:12 AM CDT  HISTORY: Neck trauma, midline tenderness (Age 16-64y); S09.90XA-Unspecified injury of head, initial encounter  COMPARISON: None  DOSE LENGTH PRODUCT: 283 mGy cm. Automated exposure control was also utilized to decrease patient radiation dose.  TECHNIQUE: Axial images of cervical spine obtained with sagittal coronal reconstructions  FINDINGS:  There is straightening of the normal cervical lordosis with moderate to advanced degenerative disc change C5/C6. There is uncinate process hypertrophy at C5-C7 with mild left-sided facet osteoarthropathy. No acute cervical vertebral fracture identified. Only mild cervical spinal canal stenosis. Mild neural foramen narrowing.  Emphysematous changes within the visible lung apices. Moderate carotid artery calcification.      Impression: 1. Straightening of the normal cervical lordosis may relate to degenerative change and head positioning. Musculoskeletal strain considered. Advanced degenerative disc change at C5/C6 with left greater than right facet arthropathy. No acute cervical vertebral fracture or traumatic subluxation. This report was finalized on 09/06/2021 08:32 by Dr. Leisa Linton MD.      ED course:    Medications   acetaminophen (TYLENOL) tablet 1,000 mg (1,000 mg Oral Given 9/6/21 0828)          Amount and/or complexity of data reviewed:      • Tests in the radiology section ordered and reviewed.        Risk of significant complications, morbidity, and/or mortality.    •  Presenting problem: high  •  Diagnostic procedures: moderate  •  Management options: high        MEDICAL DECISION MAKING     Patient presents with right-sided headache after a fall. Upon  arrival to the Emergency Department the patient is in no acute distress and her vital signs are reassuring.  She is on her home nasal cannula.  Denies any changes in breathing.  Low concern for syncope with a history that does not suggest this.  Low concern for occult spinal cord injury with no neurologic deficits on examination.  She is evaluated with CT scan of the head and C-spine due to her advanced age and the [place] CT head and C-spine guidelines.  These are both reassuring.  I have low concern for chest injury with no chest pain, tenderness, or any shortness of breath.  Low concern for spinal injury with no pain, no tenderness, no neurologic deficits.  Low concern for intra-abdominal injury with no abdominal pain, tenderness, nausea, or vomiting.  Low concern for extremity injury with no complaints of pain, no signs of trauma, no tenderness.  I discussed the findings with her patient and her family member at bedside.  They are comfortable with discharge home.  She is discharged in good condition with reassuring vital signs and she is given commonsense return precautions which she verbalizes understanding of.        Diagnosis:    Final diagnoses:   Closed head injury, initial encounter         ED Disposition:     ED Disposition     ED Disposition Condition Comment    Discharge Stable           Austin Wade DO  [address] [facility] [address] [facility] [address]  [facility] [address]  [phone]    Schedule an appointment as soon as possible for a visit in 2 days           Medication List      No changes were made to your prescriptions during this visit.              Twin Amezcua MD  09/06/21 7912

## 2021-09-06 NOTE — TELEPHONE ENCOUNTER
"Caller states grandmother had fall as she is vision impaired and on oxygen and was changing brief. Caller states headache and lump is getting larger over the hour since the fall took place. Caller states grandmother on ASA. No bleeding noted from lump area. Advised per guideline.           Reason for Disposition  • Sounds like a serious injury to the triager    Additional Information  • Negative: Major injury from dangerous force (e.g., fall > 10 feet or 3 meters)  • Negative: [1] Major bleeding (e.g., actively dripping or spurting) AND [2] can't be stopped  • Negative: Shock suspected (e.g., cold/pale/clammy skin, too weak to stand)  • Negative: Difficult to awaken or acting confused (e.g., disoriented, slurred speech)  • Negative: [1] SEVERE weakness (i.e., unable to walk or barely able to walk, requires support) AND [2] new-onset or worsening  • Negative: [1] Can't stand (bear weight) or walk AND [2] new-onset after fall  • Negative: Sounds like a life-threatening emergency to the triager  • Negative: Fainted (passed out)  • Negative: New-onset or worsening weakness of the face, arm or leg on one side of the body  • Negative: [1] New-onset or worsening dizziness AND [2] described as spinning or off balance (i.e., vertigo)  • Negative: [1] New-onset or worsening dizziness AND [2] NO spinning sensation or trouble with balance  • Negative: New-onset or worsening pale skin (pallor)  • Negative: Pregnant and fall  • Negative: Patient has a concerning injury to a specific part of the body (e.g., chest, leg, head)  • Negative: Patient has a wound (abrasion, cut, puncture, other skin injury or tear)  • Negative: Injury (or injuries) that need emergency care    Answer Assessment - Initial Assessment Questions  1. MECHANISM: \"How did the fall happen?\"      Hit coffee table   2. DOMESTIC VIOLENCE AND ELDER ABUSE SCREENING: \"Did you fall because someone pushed you or tried to hurt you?\" If Yes, ask: \"Are you safe now?\"      " "  3. ONSET: \"When did the fall happen?\" (e.g., minutes, hours, or days ago)     States about hour ago   4. LOCATION: \"What part of the body hit the ground?\" (e.g., back, buttocks, head, hips, knees, hands, head, stomach)      Hit head and knot side right back   5. INJURY: \"Did you hurt (injure) yourself when you fell?\" If Yes, ask: \"What did you injure? Tell me more about this?\" (e.g., body area; type of injury; pain severity)\"      Yes   6. PAIN: \"Is there any pain?\" If Yes, ask: \"How bad is the pain?\" (e.g., Scale 1-10; or mild,   moderate, severe)    - NONE (0): no pain    - MILD (1-3): doesn't interfere with normal activities     - MODERATE (4-7): interferes with normal activities or awakens from sleep     - SEVERE (8-10): excruciating pain, unable to do any normal activities       Bad headache about eight or nine   7. SIZE: For cuts, bruises, or swelling, ask: \"How large is it?\" (e.g., inches or centimeters)         8. PREGNANCY: \"Is there any chance you are pregnant?\" \"When was your last menstrual period?\"        9. OTHER SYMPTOMS: \"Do you have any other symptoms?\" (e.g., dizziness, fever, weakness; new onset or worsening).       Headache and swelling   10. CAUSE: \"What do you think caused the fall (or falling)?\" (e.g., tripped, dizzy spell)        Blind on oxygen and attempting to change brief and lost balance    Protocols used: FALLS AND FALLING-ADULT-AH      "

## 2021-09-10 NOTE — PROGRESS NOTES
Chief Complaint   Patient presents with   • Cough     c/o   • Nasal Congestion     c/o, and chest fullness   • Ear Problem         History:  Cindy Hicks is a 85 y.o. female who presents today for evaluation of the above problems.          Covid vaccine completed in March.  Negative Covid test 7/29/21. Says has felt bad since then.  Was in ER here 9/6/21 after she fell and hit her head, CT normal.  She feels fine, no neurological problems since fall.    Has COPD. Has had a cough for about 2 weeks. Has been on steroids from pulmonary, finished last week. Not on any antibiotics since late July. Yesterday noticed a significant increase in her cough.  Has been more SOB.  Increased home oxygen to 3 L and pulse ox is staying 98%.  Does not feel she has been in distress.  Does not feel she can get mucous up easily, using Mucinex.  When she does get it up, it is thick and yellow.  Throat and ears hurt, sinus congestion. No fever.    Has a CT chest scheduled to follow up on a nodule on 9/30/21, ordered by pulmonary.    [phone] is granddaughter's number who is with her today.      ROS:  Review of Systems  As above    Allergies   Allergen Reactions   • Codeine Nausea And Vomiting   • Valium [Diazepam] Nausea Only     Past Medical History:   Diagnosis Date   • Actinic keratosis    • Arthritis    • Atherosclerosis    • Benign fundic gland polyps of stomach    • Bleeding ulcer    • Blocked artery    • Carotid artery bruit    • Carotid artery stenosis    • COPD (chronic obstructive pulmonary disease) (CMS/[facility])    • Diverticulosis    • Emphysema of lung (CMS/[facility])    • History of colon polyps    • Hyperlipidemia    • Hypertension    • IBS (irritable bowel syndrome)    • Macular degeneration    • Osteoporosis    • Prediabetes    • PVD (peripheral vascular disease) (CMS/[facility])    • TIA (transient ischemic attack)    • Vitamin D deficiency      Past Surgical History:   Procedure Laterality Date   • CATARACT EXTRACTION     •  COLONOSCOPY  03/15/2013    polyp, hyperplastic   • COLONOSCOPY N/A 10/2/2018    Procedure: COLONOSCOPY WITH ANESTHESIA;  Surgeon: Harris Escobar MD;  Location: [facility] ENDOSCOPY;  Service: Gastroenterology   • CYST REMOVAL     • ENDOSCOPY  2019   • ENDOSCOPY N/A 10/4/2019    Procedure: ESOPHAGOGASTRODUODENOSCOPY WITH ANESTHESIA;  Surgeon: Harris Escobar MD;  Location: [facility] ENDOSCOPY;  Service: Gastroenterology   • FEMORAL ARTERY STENT     • HYSTERECTOMY     • VASCULAR SURGERY      multiple     Family History   Problem Relation Age of Onset   • Colon cancer Sister    • Cancer Sister    • Colon polyps Brother    • Heart disease Daughter    • Heart disease Son    • Cancer Mother    • Cancer Father      Cindy  reports that she has been smoking cigarettes. She has a 35.00 pack-year smoking history. She has never used smokeless tobacco. She reports that she does not drink alcohol and does not use drugs.    I have reviewed and updated the above documentation (if necessary) including but not limited to chief complaint, ROS, PFSH, allergies and medications        Current Outpatient Medications:   •  albuterol (PROVENTIL) (2.5 MG/3ML) 0.083% nebulizer solution, INHALE 1 VIAL BY NEBULIZER EVERY 4 HOURS AS NEEDED FOR WHEEZING OR SHORTNESS OF BREATH, Disp: , Rfl:   •  albuterol sulfate  (90 Base) MCG/ACT inhaler, Inhale 2 puffs Every 4 (Four) Hours As Needed for Wheezing or Shortness of Air for up to 90 days., Disp: 54 g, Rfl: 3  •  amLODIPine (NORVASC) 5 MG tablet, Take 5 mg by mouth Daily., Disp: , Rfl:   •  aspirin 81 MG EC tablet, Take 81 mg by mouth Daily., Disp: , Rfl:   •  atorvastatin (LIPITOR) 40 MG tablet, Take 40 mg by mouth Daily., Disp: , Rfl:   •  dicyclomine (BENTYL) 10 MG capsule, Take 10 mg by mouth 3 (Three) Times a Day., Disp: , Rfl:   •  lisinopril (PRINIVIL,ZESTRIL) 40 MG tablet, Take 40 mg by mouth Daily., Disp: , Rfl:   •  magnesium hydroxide (MILK OF MAGNESIA) 400 MG/5ML suspension,  "Take  by mouth Daily As Needed for Constipation., Disp: , Rfl:   •  Mucinex 600 MG 12 hr tablet, TAKE 1 TABLET BY MOUTH 2 TIMES DAILY FOR 15 DAYS, Disp: , Rfl:   •  multivitamin with minerals (VITRUM 50+ ADULT-MULTI PO), Take 1 tablet by mouth Daily., Disp: , Rfl:   •  OXYGEN-HELIUM IN, Inhale 2 L., Disp: , Rfl:   •  pantoprazole (PROTONIX) 40 MG EC tablet, Take 40 mg by mouth 2 (Two) Times a Day., Disp: , Rfl:   •  pramipexole (MIRAPEX) 0.25 MG tablet, Take 0.25 mg by mouth Daily As Needed. 1-2 tab at bedtime, Disp: , Rfl:   •  vitamin D (ERGOCALCIFEROL) 1.25 MG (85403 UT) capsule capsule, Take 1 capsule by mouth 1 (One) Time Per Week., Disp: 12 capsule, Rfl: 1  •  amoxicillin-clavulanate (Augmentin) 875-125 MG per tablet, Take 1 tablet by mouth 2 (Two) Times a Day., Disp: 20 tablet, Rfl: 0  •  benzonatate (Tessalon Perles) 100 MG capsule, Take 1 capsule by mouth 3 (Three) Times a Day As Needed for Cough., Disp: 30 capsule, Rfl: 0      PHQ-9 Total Score:      OBJECTIVE:  Visit Vitals  /88 (BP Location: Right arm, Patient Position: Sitting, Cuff Size: Adult)   Pulse 74   Ht 165.1 cm (65\")   Wt 70.1 kg (154 lb 8 oz)   SpO2 97%   BMI 25.71 kg/m²      Physical Exam  Vitals and nursing note reviewed.   Constitutional:       General: She is not in acute distress.     Appearance: Normal appearance. She is not ill-appearing, toxic-appearing or diaphoretic.   HENT:      Head: Normocephalic and atraumatic.      Comments: No hematoma right scalp in site of head contusion from last week.     Right Ear: Tympanic membrane, ear canal and external ear normal. There is no impacted cerumen.      Left Ear: Tympanic membrane, ear canal and external ear normal. There is no impacted cerumen.      Nose: Nose normal. No congestion or rhinorrhea.      Mouth/Throat:      Mouth: Mucous membranes are moist.      Pharynx: Oropharynx is clear. No oropharyngeal exudate or posterior oropharyngeal erythema.   Eyes:      General: No scleral " icterus.        Right eye: No discharge.         Left eye: No discharge.      Extraocular Movements: Extraocular movements intact.      Conjunctiva/sclera: Conjunctivae normal.      Pupils: Pupils are equal, round, and reactive to light.   Cardiovascular:      Rate and Rhythm: Normal rate and regular rhythm.      Heart sounds: Normal heart sounds. No murmur heard.     Pulmonary:      Effort: Pulmonary effort is normal. No respiratory distress.      Breath sounds: No stridor. No wheezing, rhonchi or rales.      Comments: Decreased breath sounds bilaterally  Abdominal:      General: There is no distension.      Palpations: Abdomen is soft.      Tenderness: There is no abdominal tenderness.   Musculoskeletal:         General: Deformity: trace.      Cervical back: Neck supple.      Right lower leg: Edema (trace) present.      Left lower leg: Edema present.      Comments: States edema in ankles is chronic, can't take diuretics because of kidneys. Usually wears compression stockings but doesn't have them on today.   Lymphadenopathy:      Cervical: No cervical adenopathy.   Skin:     General: Skin is warm and dry.      Coloration: Skin is not jaundiced or pale.      Findings: No bruising, erythema, lesion or rash.   Neurological:      Mental Status: She is alert and oriented to person, place, and time.   Psychiatric:         Mood and Affect: Mood normal.         Behavior: Behavior normal.         Thought Content: Thought content normal.         Judgment: Judgment normal.     Reviewed 2-D echo from July hospitalization, essentially normal.  REviewed ER note and CT head from ER visit 9/6/21  [facility]    Assessment/Plan    Diagnoses and all orders for this visit:    1. Bronchitis (Primary)  -     amoxicillin-clavulanate (Augmentin) 875-125 MG per tablet; Take 1 tablet by mouth 2 (Two) Times a Day.  Dispense: 20 tablet; Refill: 0  -     benzonatate (Tessalon Perles) 100 MG capsule; Take 1 capsule by mouth 3 (Three) Times a Day As  Needed for Cough.  Dispense: 30 capsule; Refill: 0  -     XR Chest PA & Lateral; Future  -     COVID PRE-OP / PRE-PROCEDURE SCREENING ORDER (NO ISOLATION) - Swab, Nasal Cavity; Future    2. COPD with acute exacerbation (CMS/[facility])  -     amoxicillin-clavulanate (Augmentin) 875-125 MG per tablet; Take 1 tablet by mouth 2 (Two) Times a Day.  Dispense: 20 tablet; Refill: 0  -     XR Chest PA & Lateral; Future      I do think she should be tested for Covid today since she has had a worsening of her baseline COPD symptoms.  I would also like to check chest xray. I am going to go ahead and start the Augmentin since she has already been coughing for a couple of weeks and is having thick yellow mucous.    Education materials and an After Visit Summary were printed and given to the patient at discharge.  Return for Next scheduled follow up.         HALIMA Whitlock   09:17 CDT  9/10/2021

## 2021-09-16 PROBLEM — J44.1 COPD EXACERBATION: Status: ACTIVE | Noted: 2021-01-01

## 2021-09-17 NOTE — PLAN OF CARE
Goal Outcome Evaluation:  Plan of Care Reviewed With: patient        Progress: no change   New admit from ER to room 431. VSS. No C/O pain. AAOX4. Continues on 4L of O2 nasal cannula with O2 saturation in the 90's. Sinus 64-87 with pvc, an increase in heart rate to 180 on one occurrence on tele. Continues on IV diuretic. Medication education provided. Safety maintained.

## 2021-09-17 NOTE — H&P
[facility] Medicine Services  HISTORY AND PHYSICAL    Date of Admission: 9/16/2021  Primary Care Physician: Yasmany Campos MD    Subjective     Chief Complaint: Shortness of breathing    History of Present Illness  Cindy Hicks is an 85-year-old female currently followed by Dr. Wade for primary care.  She was recently admitted to this facility 7/29-8/2, 2021 for acute on chronic respiratory failure with hypoxia and hypercapnia, pneumonia, COPD exacerbation.  She was discharged on levofloxacin and prednisone.  She is also also followed by [facility] pulmonary.  She has been seen by PCP and pulmonary since discharge.  She returned to emergency department 9/6/2021 after fall hitting her head, discharged home in stable condition.  She was seen by PCP on 9/10 for complaints of cough, nasal congestion, chest fullness and ear problem.  She was started on Augmentin.  Due to lack of improvement with worsening shortness of breathing, reports of exertional home oxygen saturation in the 80s, chest x-ray was completed yesterday.  Patient did receive a call from primary care today to present to [facility] emergency department for admission due to pulmonary edema.  Patient has received 40 mg of Lasix IV.  She states she is feeling some better.  She is currently on 3.5 L nasal cannula with 94% oxygen saturation.  She reports ongoing cough with scant sputum reduction.  She denies fever or chills.  BNP noted to be greater than 3000.  Repeat chest x-ray reveals bilateral pleural effusions and possible left basilar pneumonia.  She has no chest pain.  She is an extremely poor historian.  She reports antibiotic gave her diarrhea.  She took her last dose this morning.  She is admitted for further evaluation treatment.    Review of Systems   A 10 point review of systems was completed, all negative except for those discussed in HPI    Past Medical History:   Past Medical History:   Diagnosis  Date   • Actinic keratosis    • Arthritis    • Atherosclerosis    • Benign fundic gland polyps of stomach    • Bleeding ulcer    • Carotid artery bruit    • Carotid artery stenosis    • COPD (chronic obstructive pulmonary disease) (CMS/HCC)    • Diverticulosis    • Emphysema of lung (CMS/HCC)    • History of colon polyps    • Hyperlipidemia    • Hypertension    • IBS (irritable bowel syndrome)    • Macular degeneration    • Osteoporosis    • Prediabetes    • PVD (peripheral vascular disease) (CMS/HCC)    • TIA (transient ischemic attack)    • Vitamin D deficiency        Past Surgical History:   Past Surgical History:   Procedure Laterality Date   • CATARACT EXTRACTION     • COLONOSCOPY  03/15/2013    polyp, hyperplastic   • COLONOSCOPY N/A 10/2/2018    Procedure: COLONOSCOPY WITH ANESTHESIA;  Surgeon: Harris Escobar MD;  Location: [facility] ENDOSCOPY;  Service: Gastroenterology   • CYST REMOVAL     • ENDOSCOPY  2019   • ENDOSCOPY N/A 10/4/2019    Procedure: ESOPHAGOGASTRODUODENOSCOPY WITH ANESTHESIA;  Surgeon: Harris Escobar MD;  Location: [facility] ENDOSCOPY;  Service: Gastroenterology   • FEMORAL ARTERY STENT     • HYSTERECTOMY     • VASCULAR SURGERY      multiple       Family History: family history includes Cancer in her father, mother, and sister; Colon cancer in her sister; Colon polyps in her brother; Heart disease in her daughter and son.    Social History:  reports that she has been smoking cigarettes. She has a 35.00 pack-year smoking history. She has never used smokeless tobacco. She reports that she does not drink alcohol and does not use drugs.    Code Status: Limited, if unable to speak for herself her granddaughters Genevieve Strickland or Clara Krause will speak for her      Allergies:  Allergies   Allergen Reactions   • Codeine Nausea And Vomiting   • Valium [Diazepam] Nausea Only       Medications:  Prior to Admission medications    Medication Sig Start Date End Date Taking? Authorizing Provider    albuterol (PROVENTIL) (2.5 MG/3ML) 0.083% nebulizer solution INHALE 1 VIAL BY NEBULIZER EVERY 4 HOURS AS NEEDED FOR WHEEZING OR SHORTNESS OF BREATH 4/27/21   Lainey Goodwin MD   albuterol sulfate  (90 Base) MCG/ACT inhaler Inhale 2 puffs Every 4 (Four) Hours As Needed for Wheezing or Shortness of Air for up to 90 days. 8/31/21 11/29/21  CecilyBeth APRN   amLODIPine (NORVASC) 5 MG tablet Take 5 mg by mouth Daily.    Lainey Goodwin MD   amoxicillin-clavulanate (Augmentin) 875-125 MG per tablet Take 1 tablet by mouth 2 (Two) Times a Day. 9/10/21   Ирина Hernandez APRN   aspirin 81 MG EC tablet Take 81 mg by mouth Daily.    Lainey Goodwin MD   atorvastatin (LIPITOR) 40 MG tablet Take 40 mg by mouth Daily.    Lainey Goodwin MD   benzonatate (Tessalon Perles) 100 MG capsule Take 1 capsule by mouth 3 (Three) Times a Day As Needed for Cough. 9/10/21   Ирина Hernandez APRN   dicyclomine (BENTYL) 10 MG capsule Take 10 mg by mouth 3 (Three) Times a Day.    Lainey Goodwin MD   lisinopril (PRINIVIL,ZESTRIL) 40 MG tablet Take 40 mg by mouth Daily.    Lainey Goodwin MD   magnesium hydroxide (MILK OF MAGNESIA) 400 MG/5ML suspension Take  by mouth Daily As Needed for Constipation.    Lainey Goodwin MD   Mucinex 600 MG 12 hr tablet TAKE 1 TABLET BY MOUTH 2 TIMES DAILY FOR 15 DAYS 3/31/21   Lainey Goodwin MD   multivitamin with minerals (VITRUM 50+ ADULT-MULTI PO) Take 1 tablet by mouth Daily.    Lainey Goodwin MD   OXYGEN-HELIUM IN Inhale 2 L.    Lainey Goodwin MD   pantoprazole (PROTONIX) 40 MG EC tablet Take 40 mg by mouth 2 (Two) Times a Day.    Lainey Goodwin MD   pramipexole (MIRAPEX) 0.25 MG tablet Take 0.25 mg by mouth Daily As Needed. 1-2 tab at bedtime    Lainey Goodwin MD   vitamin D (ERGOCALCIFEROL) 1.25 MG (12640 UT) capsule capsule Take 1 capsule by mouth 1 (One) Time Per Week. 9/3/21   Austni Wade, DO  "      Objective     /88   Pulse 68   Temp 98.1 °F (36.7 °C)   Resp 18   Ht 166.4 cm (65.5\")   Wt 71.7 kg (158 lb)   SpO2 95%   BMI 25.89 kg/m²   Physical Exam  Constitutional:       Appearance: She is ill-appearing.      Comments: Advanced age   HENT:      Head: Normocephalic and atraumatic.      Mouth/Throat:      Mouth: Mucous membranes are moist.      Pharynx: Oropharynx is clear.   Eyes:      Extraocular Movements: Extraocular movements intact.      Pupils: Pupils are equal, round, and reactive to light.   Cardiovascular:      Rate and Rhythm: Normal rate and regular rhythm.   Pulmonary:      Effort: Pulmonary effort is normal.      Comments: Diminished right lower lobe  Abdominal:      General: Bowel sounds are normal.      Palpations: Abdomen is soft.   Musculoskeletal:         General: Normal range of motion.      Cervical back: Normal range of motion and neck supple.      Right lower leg: No edema.      Left lower leg: No edema.      Comments: Generalized weakness and debility   Skin:     General: Skin is warm and dry.   Neurological:      General: No focal deficit present.      Mental Status: She is alert and oriented to person, place, and time.   Psychiatric:         Mood and Affect: Mood normal.         Behavior: Behavior normal.           Pertinent Data:   Lab Results (last 72 hours)     Procedure Component Value Units Date/Time    COVID-19,[facility] IN-HOUSE,Nasal Swab No Transport Media 3-4 HR TAT - Swab, Nasal Cavity [[id number]]  (Normal) Collected: 09/16/21 1927    Specimen: Swab from Nasal Cavity Updated: 09/16/21 2011     COVID19 Not Detected    CBC Auto Differential [[id number]]  (Abnormal) Collected: 09/16/21 1706    Specimen: Blood from Arm, Left Updated: 09/16/21 1837     WBC 8.91 10*3/mm3      RBC 4.43 10*6/mm3      Hemoglobin 12.5 g/dL      Hematocrit 38.5 %      MCV 86.9 fL      MCH 28.2 pg      MCHC 32.5 g/dL      RDW 14.8 %      RDW-SD 47.5 fl      MPV 9.4 fL      Platelets 244 " 10*3/mm3      Neutrophil % 71.3 %      Lymphocyte % 11.0 %      Monocyte % 12.7 %      Eosinophil % 3.6 %      Basophil % 0.6 %      Immature Grans % 0.8 %      Neutrophils, Absolute 6.36 10*3/mm3      Lymphocytes, Absolute 0.98 10*3/mm3      Monocytes, Absolute 1.13 10*3/mm3      Eosinophils, Absolute 0.32 10*3/mm3      Basophils, Absolute 0.05 10*3/mm3      Immature Grans, Absolute 0.07 10*3/mm3      nRBC 0.0 /100 WBC     Comprehensive Metabolic Panel [[id number]]  (Abnormal) Collected: 09/16/21 1706    Specimen: Blood from Arm, Left Updated: 09/16/21 1757     Glucose 110 mg/dL      BUN 11 mg/dL      Creatinine 0.87 mg/dL      Sodium 138 mmol/L      Potassium 4.4 mmol/L      Chloride 100 mmol/L      CO2 30.0 mmol/L      Calcium 9.2 mg/dL      Total Protein 6.4 g/dL      Albumin 4.00 g/dL      ALT (SGPT) 12 U/L      AST (SGOT) 17 U/L      Alkaline Phosphatase 95 U/L      Total Bilirubin 0.3 mg/dL      eGFR Non African Amer 62 mL/min/1.73      Globulin 2.4 gm/dL      A/G Ratio 1.7 g/dL      BUN/Creatinine Ratio 12.6     Anion Gap 8.0 mmol/L     Troponin [[id number]]  (Normal) Collected: 09/16/21 1706    Specimen: Blood from Arm, Left Updated: 09/16/21 1751     Troponin T <0.010 ng/mL     BNP [[id number]]  (Abnormal) Collected: 09/16/21 1706    Specimen: Blood from Arm, Left Updated: 09/16/21 1751     proBNP 3,192.0 pg/mL      Imaging Results (Last 24 Hours)     Procedure Component Value Units Date/Time    XR Chest 1 View [[id number]] Collected: 09/16/21 1753     Updated: 09/16/21 1758    Narrative:      EXAMINATION: XR CHEST 1 VW- 9/16/2021 5:53 PM CDT     HISTORY: CHF/COPD Protocol.     REPORT: A frontal view of the chest was obtained.     COMPARISON: Chest x-rays 9/15/2021.     The heart remains enlarged, there is mild central vascular prominence  and bilateral pleural effusions are noted as before. There is increased  opacity over the left base, which may reflect increasing atelectasis and  effusion, though  developing pneumonia cannot be excluded. No  pneumothorax is identified. No acute osseous abnormality is seen.       Impression:      Increased opacification of the left lung base which may  represent increased effusion and atelectasis though underlying pneumonia  cannot be excluded. There is also small right pleural effusion and the  heart is enlarged. No overt changes of CHF are identified.  This report was finalized on 09/16/2021 17:55 by Dr. Jhon Caruso MD.          7/31/2021 ECHO  Interpretation Summary    · Left ventricular ejection fraction appears to be 56 - 60%. Left ventricular systolic function is normal.  · Left ventricular wall thickness is consistent with mild concentric hypertrophy.  · Left ventricular diastolic function is consistent with (grade II w/high LAP) pseudonormalization.  · Normal right ventricular cavity size and systolic function noted.  · There is no significant (greater than mild) valvular dysfunction.  · Estimated right ventricular systolic pressure from tricuspid regurgitation is mildly elevated (35-45 mmHg).       I have personally reviewed and interpreted the radiology studies and ECG obtained at time of admission.     Assessment / Plan     Assessment:   Active Hospital Problems    Diagnosis    • **COPD exacerbation (CMS/HCC)    • Pulmonary emphysema (CMS/HCC)    • Chronic respiratory failure with hypoxia and hypercapnia (CMS/HCC)    • Bilateral pleural effusion    • Left lower lobe pneumonia    • Essential hypertension        Plan:   1.  Admit as inpatient  2.  Start azithromycin oral  3.  Start prednisone 5-day course 20 mg daily  4.  Continue supplemental oxygen 3.5 L, incentive spirometry, continuous pulse oximetry, duo nebs, Mucinex  5.  Home medications reviewed and restarted as appropriate  6.  DVT prophylaxis with Lovenox  7.  Lasix 20 mg IV every 12 hours, daily weights, repeat chest x-ray PA and lateral in a.m.  8.  Labs in a.m.    I discussed the patient's findings  and my recommendations with: Crispin Collins MD  Time spent: 40 minutes    Patient seen and examined by me on 9/16/2021 at 8:17 PM.    Electronically signed by HALIMA Garrison, 09/16/21, 21:08 CDT.

## 2021-09-17 NOTE — ED PROVIDER NOTES
Subjective   Ms. Kate is an 85-year-old female with a history of emphysema and COPD who of last few days has been getting increasingly short of breath.  Her MD did a x-ray yesterday which seem to show fluid overload so she/he referred her to the ED.  The patient does confirm that she is more short of breath than usual although baseline she does struggle to breathe she says.  She does not think she is had a fever and has had no shaking chills.  She has some chest discomfort that she relates to the work of breathing.          Review of Systems   Respiratory: Positive for chest tightness and shortness of breath.    All other systems reviewed and are negative.      Past Medical History:   Diagnosis Date   • Actinic keratosis    • Arthritis    • Atherosclerosis    • Benign fundic gland polyps of stomach    • Bleeding ulcer    • Blocked artery    • Carotid artery bruit    • Carotid artery stenosis    • COPD (chronic obstructive pulmonary disease) (CMS/[facility])    • Diverticulosis    • Emphysema of lung (CMS/[facility])    • History of colon polyps    • Hyperlipidemia    • Hypertension    • IBS (irritable bowel syndrome)    • Macular degeneration    • Osteoporosis    • Prediabetes    • PVD (peripheral vascular disease) (CMS/[facility])    • TIA (transient ischemic attack)    • Vitamin D deficiency        Allergies   Allergen Reactions   • Codeine Nausea And Vomiting   • Valium [Diazepam] Nausea Only       Past Surgical History:   Procedure Laterality Date   • CATARACT EXTRACTION     • COLONOSCOPY  03/15/2013    polyp, hyperplastic   • COLONOSCOPY N/A 10/2/2018    Procedure: COLONOSCOPY WITH ANESTHESIA;  Surgeon: Harris Escobar MD;  Location: [facility] ENDOSCOPY;  Service: Gastroenterology   • CYST REMOVAL     • ENDOSCOPY  2019   • ENDOSCOPY N/A 10/4/2019    Procedure: ESOPHAGOGASTRODUODENOSCOPY WITH ANESTHESIA;  Surgeon: Harris Escobar MD;  Location: [facility] ENDOSCOPY;  Service: Gastroenterology   • FEMORAL ARTERY STENT     •  HYSTERECTOMY     • VASCULAR SURGERY      multiple       Family History   Problem Relation Age of Onset   • Colon cancer Sister    • Cancer Sister    • Colon polyps Brother    • Heart disease Daughter    • Heart disease Son    • Cancer Mother    • Cancer Father        Social History     Socioeconomic History   • Marital status:      Spouse name: Not on file   • Number of children: Not on file   • Years of education: Not on file   • Highest education level: Not on file   Tobacco Use   • Smoking status: Current Every Day Smoker     Packs/day: 0.50     Years: 70.00     Pack years: 35.00     Types: Cigarettes   • Smokeless tobacco: Never Used   • Tobacco comment: down to 2 cigs a day    Vaping Use   • Vaping Use: Never used   Substance and Sexual Activity   • Alcohol use: No   • Drug use: No   • Sexual activity: Not Currently           Objective   Physical Exam  Vitals and nursing note reviewed.   Constitutional:       Appearance: She is well-developed.   HENT:      Head: Normocephalic and atraumatic.      Right Ear: External ear normal.      Left Ear: External ear normal.      Nose: Nose normal.      Mouth/Throat:      Mouth: Mucous membranes are moist.      Pharynx: Oropharynx is clear.   Eyes:      Conjunctiva/sclera: Conjunctivae normal.   Cardiovascular:      Rate and Rhythm: Regular rhythm. Tachycardia present.      Heart sounds: Normal heart sounds.   Pulmonary:      Effort: Tachypnea present.      Breath sounds: Rhonchi and rales present.   Abdominal:      General: Bowel sounds are normal.      Palpations: Abdomen is soft.   Musculoskeletal:         General: Normal range of motion.      Cervical back: Normal range of motion and neck supple.   Skin:     General: Skin is warm and dry.      Capillary Refill: Capillary refill takes less than 2 seconds.   Neurological:      General: No focal deficit present.      Mental Status: She is alert and oriented to person, place, and time.   Psychiatric:          Behavior: Behavior normal.         Thought Content: Thought content normal.         Judgment: Judgment normal.         Procedures           ED Course                                           MDM  Number of Diagnoses or Management Options     Amount and/or Complexity of Data Reviewed  Clinical lab tests: ordered and reviewed  Tests in the radiology section of CPT®: ordered and reviewed  Decide to obtain previous medical records or to obtain history from someone other than the patient: yes    Patient Progress  Patient progress: stable      Final diagnoses:   COPD exacerbation (CMS/HCC)   Acute on chronic congestive heart failure, unspecified heart failure type (CMS/HCC)       ED Disposition  ED Disposition     ED Disposition Condition Comment    Decision to Admit  Level of Care: Med/Surg [1]   Diagnosis: COPD exacerbation (CMS/HCC) [891122]   Admitting Physician: CANDIDA CHAN [[id number]]   Attending Physician: CANDIDA CHAN [[id number]]   Certification: I Certify That Inpatient Hospital Services Are Medically Necessary For Greater Than 2 Midnights            No follow-up provider specified.       Medication List      No changes were made to your prescriptions during this visit.       The patient's work-up was significant for an elevated BNP which is doubled compared to a month ago and a chest x-ray that does suggest cardiomegaly and the possibility of some increased effusion.  I am not sure if this is CHF, COPD or accommodation of both which is causing the patient's dyspnea.  After treatment in the ED she was somewhat improved but still working hard to breathe with even minimal effort and she desaturated down to 90%.  I discussed case with Dr. Chan who kindly agreed to admit the patient under his care.  The patient is currently somewhat improved and stable in the ED.     Bruno Macias MD  09/16/21 1923

## 2021-09-17 NOTE — CASE MANAGEMENT/SOCIAL WORK
"Discharge Planning Assessment  [facility]     Patient Name: Cindy Hicks  MRN: [id number]  Today's Date: 9/16/2021    Admit Date: 9/16/2021    Discharge Needs Assessment     Row Name 09/16/21 2016       Living Environment    Lives With  alone    Unique Family Situation  grandchildren help pt when needed.    Current Living Arrangements  home/apartment/condo    Duration at Residence  senior apartment    Primary Care Provided by  self    Provides Primary Care For  no one    Family Caregiver if Needed  grandchild(gonzalo), adult    Quality of Family Relationships  supportive    Able to Return to Prior Arrangements  yes       Resource/Environmental Concerns    Resource/Environmental Concerns  none       Transition Planning    Patient/Family Anticipates Transition to  home    Patient/Family Anticipated Services at Transition  home health care    Transportation Anticipated  family or friend will provide       Discharge Needs Assessment    Readmission Within the Last 30 Days  no previous admission in last 30 days    Equipment Currently Used at Home  walker, rolling;shower chair;oxygen    Concerns to be Addressed  basic needs    Anticipated Changes Related to Illness  none    Equipment Needed After Discharge  commode    Outpatient/Agency/Support Group Needs  homecare agency    Discharge Facility/Level of Care Needs  home with home health    Current Discharge Risk  lives alone    Discharge Coordination/Progress  PT HAS PCP AND RX PLAN.  [facility]  Lives alone in senior apartment.  Grandchildren nearby and assist her.  Home oxygen by [employer].  \"Blind\".  Potty chair needed.  Interested in home health for PT.        Discharge Plan    No documentation.       Continued Care and Services - Admitted Since 9/16/2021    Coordination has not been started for this encounter.         Demographic Summary    No documentation.       Functional Status    No documentation.       Psychosocial    No documentation.   "     Abuse/Neglect    No documentation.       Legal    No documentation.       Substance Abuse    No documentation.       Patient Forms    No documentation.           Summer Orellana RN

## 2021-09-17 NOTE — PROGRESS NOTES
"    [facility] Medicine Services  INPATIENT PROGRESS NOTE    Length of Stay: 1  Date of Admission: 9/16/2021  Primary Care Physician: Yasmany Campos MD    Subjective   Chief Complaint: Follow-up shortness of breath  HPI   Patient resting in bed.  She states compared to last evening when she came to the emergency department she feels \"100% better\".  She does have some pain in her chest with deep inspiration and cough.  She states she has not really been able to cough up much sputum, but does feel her cough is loosening.  She denies abdominal pain, nausea, or vomiting.  Does complain of heartburn.  She typically wears 3 to 3.5 L of oxygen at home continuously.    Review of Systems   All pertinent negatives and positives are as above. All other systems have been reviewed and are negative unless otherwise stated.     Objective    Temp:  [97.6 °F (36.4 °C)-98.4 °F (36.9 °C)] 97.7 °F (36.5 °C)  Heart Rate:  [62-86] 79  Resp:  [18-24] 18  BP: (135-190)/(51-88) 159/66  Physical Exam  Vitals and nursing note reviewed.   Constitutional:       General: She is not in acute distress.     Appearance: She is ill-appearing (chronically).   HENT:      Head: Normocephalic and atraumatic.   Cardiovascular:      Rate and Rhythm: Normal rate and regular rhythm.      Comments: Sinus 72-81, heart rate up to 180 briefly overnight.  Pulmonary:      Comments: Tachypnea with conversation.  Diminished breath sounds, especially in bilateral bases.  Abdominal:      General: Bowel sounds are normal. There is no distension.      Palpations: Abdomen is soft.      Tenderness: There is no abdominal tenderness.   Musculoskeletal:         General: No swelling or tenderness. Normal range of motion.      Cervical back: Normal range of motion and neck supple.   Skin:     General: Skin is warm and dry.      Findings: No erythema or rash.   Neurological:      General: No focal deficit present.      Mental Status: She " is alert and oriented to person, place, and time.   Psychiatric:         Mood and Affect: Mood normal.         Behavior: Behavior normal.         Thought Content: Thought content normal.         Judgment: Judgment normal.     Results Review:  I have reviewed the labs, radiology results, and diagnostic studies.    Laboratory Data:   Results from last 7 days   Lab Units 09/17/21  0435 09/16/21  1706   WBC 10*3/mm3 5.45 8.91   HEMOGLOBIN g/dL 11.9* 12.5   HEMATOCRIT % 38.5 38.5   PLATELETS 10*3/mm3 239 244      Results from last 7 days   Lab Units 09/17/21  0435 09/16/21  1706   SODIUM mmol/L 135* 138   POTASSIUM mmol/L 4.3 4.4   CHLORIDE mmol/L 96* 100   CO2 mmol/L 31.0* 30.0*   BUN mg/dL 16 11   CREATININE mg/dL 0.95 0.87   CALCIUM mg/dL 9.0 9.2   BILIRUBIN mg/dL  --  0.3   ALK PHOS U/L  --  95   ALT (SGPT) U/L  --  12   AST (SGOT) U/L  --  17   GLUCOSE mg/dL 185* 110*     Radiology Data:   Imaging Results (Last 24 Hours)     Procedure Component Value Units Date/Time    XR Chest PA & Lateral [[id number]] Collected: 09/17/21 1018     Updated: 09/17/21 1022    Narrative:      Exam:   XR CHEST PA AND LATERAL-       Date:  9/17/2021      History:  Female, age  85 years;shortness of breathing; J44.1-Chronic  obstructive pulmonary disease with (acute) exacerbation; I50.9-Heart  failure, unspecified     COMPARISON:  Chest x-ray dated 9/16/2021.     Findings :     The heart and mediastinum are unchanged in size. Small to moderate  bilateral pleural effusions, unchanged. Interstitial edema and chronic  interstitial lung changes, grossly similar.  The bones show no acute  pathology.         Impression:      Impression:     1.  No interval changes.  2.  Small-to-moderate bilateral pleural effusions and associated  opacities.     This report was finalized on 09/17/2021 10:19 by Dr. Ronna Vega MD.        I have reviewed the patient current medications.     Assessment/Plan     Active Hospital Problems    Diagnosis    • **COPD  exacerbation (CMS/HCC)    • Pulmonary emphysema (CMS/HCC)    • Chronic respiratory failure with hypoxia and hypercapnia (CMS/HCC)    • Bilateral pleural effusion    • Left lower lobe pneumonia    • Essential hypertension      Plan:  1.  Patient presented to the emergency department 9/16/2021 with complaints of shortness of breath.  She was recently admitted to our facility from 7/29 through 8/2 with pneumonia, sputum culture positive for Pseudomonas.  She was discharged on Levaquin and prednisone taper.  2.  Chest x-ray this admission shows increased opacification of the left lung base which may represent increased effusion and atelectasis though underlying pneumonia cannot be excluded.  Small right pleural effusion and cardiomegaly noted.  Repeat PA and lateral chest x-ray today shows no changes, small to moderate bilateral pleural effusions and associated opacities.  3.  Continue Duonebs, Mucinex, oral Prednisone.  Add MetaNeb device and flutter valve.  Consider pulmonology consult for any worsening.  Patient sees HALIMA Rutledge on outpatient basis.  She does have pulmonary function testing scheduled for 10/7.  4.  Continue IV Lasix today, consider transition to oral therapy tomorrow.  BNP elevated on admission at 3192.  Last echocardiogram in July noted grade 2 diastolic dysfunction.  Could also consider thoracentesis if patient symptoms do not improve with diuresis and pleural fluid is felt amenable to thoracentesis.  5.  Wean supplemental oxygen as tolerated.  Patient had been wearing 3 to 3.5 L at home, was instructed by pulmonology on recent office visit 8/31 to wear 2 L at rest and 3 L with exertion.  6.  Lovenox for DVT prophylaxis  7.  We will continue azithromycin for now.  Patient sputum culture from July was positive for Pseudomonas.  She was treated with levofloxacin at that time which was appropriate based on susceptibilities.  Repeat sputum culture.  Consider changing antibiotic therapy for any  worsening.  8.  Lab holiday in a.m.    Discharge Planning: I expect the patient to be discharged to home in 1-2 days.    Electronically signed by HALIMA Ivy, 9/17/2021, 13:30 CDT.

## 2021-09-18 NOTE — DISCHARGE SUMMARY
[facility] Medicine Services  DISCHARGE SUMMARY       Date of Admission: 9/16/2021  Date of Discharge:  9/18/2021  Primary Care Physician: Ирина Hernandez APRN    Presenting Problem/History of Present Illness:  Shortness of breath    Final Discharge Diagnoses:  Active Hospital Problems    Diagnosis     **COPD exacerbation (CMS/HCC)     Pulmonary emphysema (CMS/HCC)     Chronic respiratory failure with hypoxia and hypercapnia (CMS/HCC)     Bilateral pleural effusion     Left lower lobe pneumonia     Essential hypertension      Consults: None    Procedures Performed: None    Pertinent Test Results:   Lab Results (all)       Procedure Component Value Units Date/Time    Basic Metabolic Panel [[id number]]  (Abnormal) Collected: 09/18/21 0644    Specimen: Blood Updated: 09/18/21 0749     Glucose 122 mg/dL      BUN 19 mg/dL      Creatinine 1.01 mg/dL      Sodium 139 mmol/L      Potassium 3.7 mmol/L      Chloride 96 mmol/L      CO2 35.0 mmol/L      Calcium 9.4 mg/dL      eGFR Non African Amer 52 mL/min/1.73      BUN/Creatinine Ratio 18.8     Anion Gap 8.0 mmol/L     Basic Metabolic Panel [[id number]]  (Abnormal) Collected: 09/17/21 0435    Specimen: Blood Updated: 09/17/21 0555     Glucose 185 mg/dL      BUN 16 mg/dL      Creatinine 0.95 mg/dL      Sodium 135 mmol/L      Potassium 4.3 mmol/L      Chloride 96 mmol/L      CO2 31.0 mmol/L      Calcium 9.0 mg/dL      eGFR Non African Amer 56 mL/min/1.73      BUN/Creatinine Ratio 16.8     Anion Gap 8.0 mmol/L     CBC (No Diff) [[id number]]  (Abnormal) Collected: 09/17/21 0435    Specimen: Blood Updated: 09/17/21 0532     WBC 5.45 10*3/mm3      RBC 4.26 10*6/mm3      Hemoglobin 11.9 g/dL      Hematocrit 38.5 %      MCV 90.4 fL      MCH 27.9 pg      MCHC 30.9 g/dL      RDW 14.9 %      RDW-SD 49.2 fl      MPV 9.8 fL      Platelets 239 10*3/mm3     COVID-19,[facility] IN-HOUSE,Nasal Swab No Transport Media 3-4 HR TAT - Swab, Nasal Cavity [[id number]]   (Normal) Collected: 09/16/21 1927    Specimen: Swab from Nasal Cavity Updated: 09/16/21 2011     COVID19 Not Detected    CBC Auto Differential [[id number]]  (Abnormal) Collected: 09/16/21 1706    Specimen: Blood from Arm, Left Updated: 09/16/21 1837     WBC 8.91 10*3/mm3      RBC 4.43 10*6/mm3      Hemoglobin 12.5 g/dL      Hematocrit 38.5 %      MCV 86.9 fL      MCH 28.2 pg      MCHC 32.5 g/dL      RDW 14.8 %      RDW-SD 47.5 fl      MPV 9.4 fL      Platelets 244 10*3/mm3      Neutrophil % 71.3 %      Lymphocyte % 11.0 %      Monocyte % 12.7 %      Eosinophil % 3.6 %      Basophil % 0.6 %      Immature Grans % 0.8 %      Neutrophils, Absolute 6.36 10*3/mm3      Lymphocytes, Absolute 0.98 10*3/mm3      Monocytes, Absolute 1.13 10*3/mm3      Eosinophils, Absolute 0.32 10*3/mm3      Basophils, Absolute 0.05 10*3/mm3      Immature Grans, Absolute 0.07 10*3/mm3      nRBC 0.0 /100 WBC     Comprehensive Metabolic Panel [[id number]]  (Abnormal) Collected: 09/16/21 1706    Specimen: Blood from Arm, Left Updated: 09/16/21 1757     Glucose 110 mg/dL      BUN 11 mg/dL      Creatinine 0.87 mg/dL      Sodium 138 mmol/L      Potassium 4.4 mmol/L      Chloride 100 mmol/L      CO2 30.0 mmol/L      Calcium 9.2 mg/dL      Total Protein 6.4 g/dL      Albumin 4.00 g/dL      ALT (SGPT) 12 U/L      AST (SGOT) 17 U/L      Alkaline Phosphatase 95 U/L      Total Bilirubin 0.3 mg/dL      eGFR Non African Amer 62 mL/min/1.73      Globulin 2.4 gm/dL      A/G Ratio 1.7 g/dL      BUN/Creatinine Ratio 12.6     Anion Gap 8.0 mmol/L     Troponin [[id number]]  (Normal) Collected: 09/16/21 1706    Specimen: Blood from Arm, Left Updated: 09/16/21 1751     Troponin T <0.010 ng/mL     BNP [[id number]]  (Abnormal) Collected: 09/16/21 1706    Specimen: Blood from Arm, Left Updated: 09/16/21 1751     proBNP 3,192.0 pg/mL         Imaging Results (All)       Procedure Component Value Units Date/Time    XR Chest PA & Lateral [[id number]] Collected: 09/17/21 1018      Updated: 09/17/21 1022    Narrative:      Exam:   XR CHEST PA AND LATERAL-       Date:  9/17/2021      History:  Female, age  85 years;shortness of breathing; J44.1-Chronic  obstructive pulmonary disease with (acute) exacerbation; I50.9-Heart  failure, unspecified     COMPARISON:  Chest x-ray dated 9/16/2021.     Findings :     The heart and mediastinum are unchanged in size. Small to moderate  bilateral pleural effusions, unchanged. Interstitial edema and chronic  interstitial lung changes, grossly similar.  The bones show no acute  pathology.         Impression:      Impression:     1.  No interval changes.  2.  Small-to-moderate bilateral pleural effusions and associated  opacities.     This report was finalized on 09/17/2021 10:19 by Dr. Ronna Vega MD.    XR Chest 1 View [[id number]] Collected: 09/16/21 1753     Updated: 09/16/21 1758    Narrative:      EXAMINATION: XR CHEST 1 VW- 9/16/2021 5:53 PM CDT     HISTORY: CHF/COPD Protocol.     REPORT: A frontal view of the chest was obtained.     COMPARISON: Chest x-rays 9/15/2021.     The heart remains enlarged, there is mild central vascular prominence  and bilateral pleural effusions are noted as before. There is increased  opacity over the left base, which may reflect increasing atelectasis and  effusion, though developing pneumonia cannot be excluded. No  pneumothorax is identified. No acute osseous abnormality is seen.       Impression:      Increased opacification of the left lung base which may  represent increased effusion and atelectasis though underlying pneumonia  cannot be excluded. There is also small right pleural effusion and the  heart is enlarged. No overt changes of CHF are identified.  This report was finalized on 09/16/2021 17:55 by Dr. Jhon Caruso MD.          History of Present Illness on Day of Discharge: Patient states she is feeling much better in comparison to admission and feels she is back at her baseline.  She is eager for  discharge home.    Hospital Course:  Ms. Kate is an 85-year-old  female who follows Dr. Austin Wade for primary care.  She has a medical history significant for chronic obstructive pulmonary disease, chronic respiratory failure with hypoxia requiring continuous oxygen, hypertension, TIA, peripheral vascular disease.  The patient was recently admitted to our facility from 7/29 through 8/2 with pneumonia with positive sputum culture for Pseudomonas.  She was discharged on levofloxacin and course of prednisone.  She has been seen by the pulmonology group since time of discharge.  She presented once again to the [facility] emergency department on 9/16/2021 with complaints of shortness of breath.  Chest x-ray this admission shows increased opacification of the left lung base which may represent increased effusion and atelectasis though underlying pneumonia cannot be excluded.  Small right pleural effusion and cardiomegaly noted.  The patient's BNP level was elevated on admission at 3192.  The patient was admitted to the hospitalist service for further evaluation and management.    The patient was treated for acute exacerbation of chronic obstructive pulmonary disease.  She was placed on oral azithromycin.  She was given DuoNebs, Mucinex, and oral prednisone.  She was given flutter valve for pulmonary toileting efforts.  Her supplemental oxygen has been weaned back to her home setting between 2 and 3 L.  The patient unfortunately was not able to produce sputum culture on this admission.    The patient was also given diuresis for acute on chronic diastolic heart failure.  She typically uses HCTZ as needed for swelling, we will change this to oral Lasix to use as needed for weight gain, shortness of breath, or edema.  Patient will be given a scale at time of discharge.    Overall, the patient is hemodynamically stable and appropriate for discharge home today.  She has upcoming pulmonary function  "testing and appointment with pulmonology on 10/7.  She will need to follow-up with her primary care provider in 1 week.  She indicates her granddaughter will be staying with her continuously for the next several days.    Condition on Discharge:  Medically stable    Physical Exam on Discharge:  /54 (BP Location: Right arm, Patient Position: Sitting)   Pulse 66   Temp 97.9 °F (36.6 °C) (Oral)   Resp 16   Ht 166.4 cm (65.51\")   Wt 71.5 kg (157 lb 9.6 oz)   SpO2 93%   BMI 25.82 kg/m²   Physical Exam  Vitals and nursing note reviewed.   Constitutional:       General: She is not in acute distress.     Appearance: She is ill-appearing (chronically).   HENT:      Head: Normocephalic and atraumatic.   Cardiovascular:      Rate and Rhythm: Normal rate and regular rhythm.      Comments: Sinus   Pulmonary:      Comments: Tachypnea with conversation.  Diminished breath sounds, especially in bilateral bases.  Abdominal:      General: Bowel sounds are normal. There is no distension.      Palpations: Abdomen is soft.      Tenderness: There is no abdominal tenderness.   Musculoskeletal:         General: No swelling or tenderness. Normal range of motion.      Cervical back: Normal range of motion and neck supple.   Skin:     General: Skin is warm and dry.      Findings: No erythema or rash.   Neurological:      General: No focal deficit present.      Mental Status: She is alert and oriented to person, place, and time.   Psychiatric:         Mood and Affect: Mood normal.         Behavior: Behavior normal.         Thought Content: Thought content normal.         Judgment: Judgment normal.     Discharge Disposition:  Home or Self Care    Discharge Medications:     Discharge Medications        New Medications        Instructions Start Date   azithromycin 250 MG tablet  Commonly known as: ZITHROMAX   250 mg, Oral, Daily      furosemide 20 MG tablet  Commonly known as: Lasix   20 mg, Oral, Daily PRN      predniSONE 10 MG " tablet  Commonly known as: DELTASONE   Take 2 tablets by mouth Daily for 3 days, THEN 1 tablet Daily for 3 days.   Start Date: September 18, 2021            Changes to Medications        Instructions Start Date   amLODIPine 5 MG tablet  Commonly known as: NORVASC  What changed: how much to take   10 mg, Oral, Daily      Mucinex 600 MG 12 hr tablet  Generic drug: guaiFENesin  What changed: how much to take   1,200 mg, Oral, 2 Times Daily             Continue These Medications        Instructions Start Date   albuterol (2.5 MG/3ML) 0.083% nebulizer solution  Commonly known as: PROVENTIL   2.5 mg, Nebulization, Every 4 Hours PRN      albuterol sulfate  (90 Base) MCG/ACT inhaler  Commonly known as: PROVENTIL HFA;VENTOLIN HFA;PROAIR HFA   2 puffs, Inhalation, Every 4 Hours PRN      aspirin 81 MG EC tablet   81 mg, Oral, Daily      atorvastatin 80 MG tablet  Commonly known as: LIPITOR   80 mg, Oral, Daily      benzonatate 100 MG capsule  Commonly known as: TESSALON   100 mg, Oral, 3 Times Daily PRN      budesonide-formoterol 160-4.5 MCG/ACT inhaler  Commonly known as: SYMBICORT   2 puffs, Inhalation, 2 Times Daily - RT      dicyclomine 10 MG capsule  Commonly known as: BENTYL   10 mg, Oral, 2 Times Daily PRN      lisinopril 40 MG tablet  Commonly known as: PRINIVIL,ZESTRIL   40 mg, Oral, Daily      magnesium hydroxide 400 MG/5ML suspension  Commonly known as: MILK OF MAGNESIA   Oral, Daily PRN      multivitamin with minerals tablet tablet   1 tablet, Oral, Daily      pantoprazole 40 MG EC tablet  Commonly known as: PROTONIX   40 mg, Oral, 2 Times Daily      pramipexole 0.25 MG tablet  Commonly known as: MIRAPEX   0.25-0.5 mg, Oral, Nightly PRN      vitamin D 1.25 MG (57869 UT) capsule capsule  Commonly known as: ERGOCALCIFEROL   50,000 Units, Oral, Weekly             Stop These Medications      amoxicillin-clavulanate 875-125 MG per tablet  Commonly known as: AUGMENTIN     hydroCHLOROthiazide 25 MG tablet  Commonly  known as: HYDRODIURIL            Discharge Diet:   Diet Instructions       Diet: Regular, Cardiac; Thin      Discharge Diet:  Regular  Cardiac       Fluid Consistency: Thin          Activity at Discharge:   Activity Instructions       Activity as Tolerated      Measure Weight      Measure weight daily at the same time each day.  Take Lasix as needed for weight gain greater than 2 pounds overnight/3 pounds in 2 days, or if you are experiencing worsening shortness of breath or swelling.          Discharge Care Plan/Instructions:   1.  Return for any acute or worsening symptoms.  2.  Measure daily weights.  Take Lasix as needed for weight gain greater than 2 pounds overnight/3 pounds in 2 days, or worsening shortness of breath/edema.  3.  Complete course of azithromycin and prednisone taper.  4.  Continue use of incentive spirometer/flutter valve.    Follow-up Appointments:   1.  Primary care provider in 1 week.  2.  Pulmonology as below.  Future Appointments   Date Time Provider Department Center   9/22/2021 10:00 AM PAD US NIVAS CART 3 BH PAD NIVAS PAD   9/22/2021 11:00 AM PAD US NIVAS CART 3  PAD NIVAS PAD   9/22/2021  1:00 PM Jerald Henriquez MD [facility] PAD   9/30/2021  2:30 PM PAD BIC CT 1 BH PAD CT BI PAD   10/7/2021  9:30 AM THERAPIST RESPIRATORY DI PAD [facility] PAD PAD   10/7/2021 10:00 AM Beth Tony APRN [facility] PAD PAD   12/10/2021  3:15 PM Ирина Hernandez APRN [facility] PAD PAD     Test Results Pending at Discharge: None    Electronically signed by HALIMA Ivy, 9/18/2021, 12:34 CDT.    Time: 35 minutes      I personally evaluated and examined the patient in conjunction with HALIMA Hoffman and agree with the assessment, treatment plan, and disposition of the patient as recorded by her. My history, exam, and further recommendations are:   Patient seen and examined at time of discharge.  Agree with plans as outlined above.        Electronically signed by Ketan Miner MD,  9/18/2021, 15:38 CDT.

## 2021-09-18 NOTE — PLAN OF CARE
Goal Outcome Evaluation:  Plan of Care Reviewed With: patient        Progress: improving  Outcome Summary: VSS, c/o restless legs, med per mar, up with standby assist, on home O2 of 3L, possible Dc today/tomorrow, montior for changes.

## 2021-09-19 NOTE — OUTREACH NOTE
Prep Survey      Responses   [facility] facility patient discharged from?  [facility]   Is LACE score < 7 ?  No   Emergency Room discharge w/ pulse ox?  No   Eligibility  Readm Mgmt   Discharge diagnosis  •**COPD exacerbation    Does the patient have one of the following disease processes/diagnoses(primary or secondary)?  COPD/Pneumonia   Does the patient have Home health ordered?  No   Is there a DME ordered?  No   Prep survey completed?  Yes          Cathleen Dixon RN

## 2021-09-21 NOTE — TELEPHONE ENCOUNTER
Spoke with Ms Hicks reminding her of her appointments for Wednesday, September 22nd, 2021. Reminded [facility]30 am for 10 o'clock testing and follow up afterwards at 1 pm with Dr Henriquez. Ms Hicks confirmed she would be here.

## 2021-09-22 NOTE — ED PROVIDER NOTES
Subjective   Patient is 85 years old who came the ER complain of chest discomfort and tightness in the chest and shortness of breath no other complaint associate with this at this time.      Chest Pain  Pain location:  R chest and L chest  Pain quality: aching and tightness    Pain radiates to:  Does not radiate  Pain severity:  Moderate  Onset quality:  Gradual  Timing:  Constant  Progression:  Worsening  Chronicity:  Recurrent  Context: breathing    Context: not drug use, not eating, not lifting, not movement, not raising an arm, not at rest and not stress    Relieved by:  Nothing  Worsened by:  Coughing  Ineffective treatments:  None tried  Associated symptoms: cough, nausea, shortness of breath and weakness    Associated symptoms: no abdominal pain, no AICD problem, no anorexia, no anxiety, no back pain, no diaphoresis, no dysphagia, no fatigue, no fever, no headache, no lower extremity edema, no orthopnea, no palpitations, no syncope and no vomiting    Risk factors: no birth control, no coronary artery disease, no hypertension, not male and no smoking        Review of Systems   Constitutional: Negative for diaphoresis, fatigue and fever.   HENT: Negative.  Negative for trouble swallowing.    Eyes: Negative.    Respiratory: Positive for cough and shortness of breath.    Cardiovascular: Positive for chest pain. Negative for palpitations, orthopnea and syncope.   Gastrointestinal: Positive for nausea. Negative for abdominal pain, anorexia and vomiting.   Musculoskeletal: Negative.  Negative for back pain and neck pain.   Skin: Negative.    Neurological: Positive for weakness. Negative for headaches.   All other systems reviewed and are negative.      Past Medical History:   Diagnosis Date   • Actinic keratosis    • Arthritis    • Atherosclerosis    • Benign fundic gland polyps of stomach    • Bleeding ulcer    • Carotid artery bruit    • Carotid artery stenosis    • COPD (chronic obstructive pulmonary disease)  (CMS/[facility])    • Diverticulosis    • Emphysema of lung (CMS/[facility])    • History of colon polyps    • Hyperlipidemia    • Hypertension    • IBS (irritable bowel syndrome)    • Macular degeneration    • Osteoporosis    • Prediabetes    • PVD (peripheral vascular disease) (CMS/[facility])    • TIA (transient ischemic attack)    • Vitamin D deficiency        Allergies   Allergen Reactions   • Codeine Nausea And Vomiting   • Valium [Diazepam] Nausea Only       Past Surgical History:   Procedure Laterality Date   • CATARACT EXTRACTION     • COLONOSCOPY  03/15/2013    polyp, hyperplastic   • COLONOSCOPY N/A 10/2/2018    Procedure: COLONOSCOPY WITH ANESTHESIA;  Surgeon: Harris Escobar MD;  Location: [facility] ENDOSCOPY;  Service: Gastroenterology   • CYST REMOVAL     • ENDOSCOPY  2019   • ENDOSCOPY N/A 10/4/2019    Procedure: ESOPHAGOGASTRODUODENOSCOPY WITH ANESTHESIA;  Surgeon: Harris Escobar MD;  Location: [facility] ENDOSCOPY;  Service: Gastroenterology   • FEMORAL ARTERY STENT     • HYSTERECTOMY     • VASCULAR SURGERY      multiple       Family History   Problem Relation Age of Onset   • Colon cancer Sister    • Cancer Sister    • Colon polyps Brother    • Heart disease Daughter    • Heart disease Son    • Cancer Mother    • Cancer Father        Social History     Socioeconomic History   • Marital status:      Spouse name: Not on file   • Number of children: Not on file   • Years of education: Not on file   • Highest education level: Not on file   Tobacco Use   • Smoking status: Current Every Day Smoker     Packs/day: 0.50     Years: 70.00     Pack years: 35.00     Types: Cigarettes   • Smokeless tobacco: Never Used   • Tobacco comment: down to 2 cigs a day    Vaping Use   • Vaping Use: Never used   Substance and Sexual Activity   • Alcohol use: No   • Drug use: No   • Sexual activity: Not Currently           Objective   Physical Exam  Vitals and nursing note reviewed. Exam conducted with a chaperone present.    Constitutional:       General: She is not in acute distress.     Appearance: Normal appearance. She is well-developed. She is not toxic-appearing or diaphoretic.   HENT:      Head: Normocephalic and atraumatic.      Right Ear: External ear normal.      Nose: Nose normal.      Mouth/Throat:      Mouth: Mucous membranes are moist.   Eyes:      Conjunctiva/sclera: Conjunctivae normal.      Pupils: Pupils are equal, round, and reactive to light.   Cardiovascular:      Rate and Rhythm: Normal rate and regular rhythm.      Chest Wall: PMI is not displaced.      Pulses: Normal pulses. No decreased pulses.      Heart sounds: Normal heart sounds. No murmur heard.     Pulmonary:      Effort: Tachypnea and respiratory distress present. No accessory muscle usage.      Breath sounds: No stridor. Examination of the right-middle field reveals decreased breath sounds. Examination of the left-middle field reveals decreased breath sounds. Examination of the right-lower field reveals decreased breath sounds and wheezing. Examination of the left-lower field reveals decreased breath sounds and wheezing. Decreased breath sounds and wheezing present. No rhonchi or rales.   Chest:      Chest wall: No tenderness or crepitus.   Abdominal:      General: Bowel sounds are normal. There is no distension.      Palpations: Abdomen is soft.      Tenderness: There is no abdominal tenderness.   Musculoskeletal:         General: No swelling or tenderness. Normal range of motion.      Cervical back: Normal range of motion and neck supple. No rigidity.      Comments: Lower extremity exam bilaterally is unremarkable.  There is no right or left calf tenderness .  There is no palpable venous cord.  No obvious difference in the size of the legs.  No pitting edema.  The dorsalis pedis and posterior tibial femoral and popliteal pulses are palpable and +2 bilaterally.  Homans sign is negative   Skin:     General: Skin is warm.      Capillary Refill:  Capillary refill takes less than 2 seconds.      Coloration: Skin is not jaundiced.      Findings: No erythema or rash.   Neurological:      General: No focal deficit present.      Mental Status: She is alert and oriented to person, place, and time. Mental status is at baseline.      Cranial Nerves: No cranial nerve deficit.      Motor: No weakness.      Coordination: Coordination normal.      Deep Tendon Reflexes: Reflexes are normal and symmetric. Reflexes normal.   Psychiatric:         Mood and Affect: Mood normal.         Procedures           ED Course  ED Course as of Sep 22 1545   Wed Sep 22, 2021   1153 EKG shows normal atrial fibrillation with low rate    [TS]   1511 No pulmonary embolism.  2. Left greater than right bibasilar pneumonia with small amounts of  pleural fluid.  These findings discussed with patient    [TS]   1533 This patient was recently admitted to the hospital was discharged home and came back again with shortness of breath and pleuritic chest pain and chest tightness her CT of the chest is -2 sets of bedside markers are negative.  I have discussed the CT findings with the patient and need for reevaluation reassessment at a later time.  I have offered her a stress test but she does not want to do that but she would rather going home the possibly increased morbidity mortality has been explained.  The patient will be discharged home continue with antibiotic regimen that she is on already and follow with the primary MD.  Early she is not in any hemodynamic compromise heart rate is normal sats are normal she is not in respiratory distress and she has got home oxygen.    [TS]   1538 Risks and benefits of treatments given and alternative treatment options discussed with patient/family. I answered all the questions in simple, plain language, and there was voiced understanding and agreement with plan of care. There were no further questions. Differential diagnosis discussed. Patient/family was  advised that the practice of medicine is not always an exact science, and sometimes tests, physical exam, or history may not show the underlying conditions with certainty. Additionally, the condition may change or show itself later after initial presentation. There was also expressed understanding and agreement with this limitation of emergency medicine practice. Patient/family was asked to return to ED if any problem or issues or if condition worsens or does not improved. Patient/family agreed to follow up with PCP/specialist as advised, or return to ED if unable to see a provider in a timely fashion for continued symptoms.     [TS]      ED Course User Index  [TS] Fabian Adams MD                                         HEART Score (for prediction of 6-week risk of major adverse cardiac event) reviewed and/or performed as part of the patient evaluation and treatment planning process.  The result associated with this review/performance is: 1    Wells' Criteria (for pulmonary embolism) reviewed and/or performed as part of the patient evaluation and treatment planning process.  The result associated with this review/performance is: 3       MDM  Number of Diagnoses or Management Options  Diagnosis management comments: Differential Diagnosis:  I considered chest wall pain, muscle strain, costochondritis, pleurisy, rib fracture, herpes zoster, cardiovascular etiology, myocardial infarction, intermediate coronary syndrome, unstable angina, angina, aortic dissection, pericarditis, pulmonary etiology, pulmonary embolism, pneumonia, pneumothorax, lung cancer, gastroesophageal reflux disease, esophagitis, esophageal spasm and gastrointestinal etiology as a possible cause of chest pain in this patient. This is a partial list of diagnoses considered.         Amount and/or Complexity of Data Reviewed  Clinical lab tests: ordered and reviewed  Tests in the radiology section of CPT®: ordered and reviewed  Tests in the medicine  section of CPT®: reviewed and ordered    Risk of Complications, Morbidity, and/or Mortality  Presenting problems: moderate  Diagnostic procedures: moderate  Management options: moderate        Final diagnoses:   COPD exacerbation (HCC)   Pneumonia due to infectious organism, unspecified laterality, unspecified part of lung   Chest wall pain       ED Disposition  ED Disposition     ED Disposition Condition Comment    Discharge Stable           Austin Wade,   [address]  [address] [facility] [address]  [facility] [address]  [phone]    Schedule an appointment as soon as possible for a visit in 2 days           Medication List      No changes were made to your prescriptions during this visit.          Fabian Adams MD  09/22/21 1548

## 2021-09-24 NOTE — TELEPHONE ENCOUNTER
I recommend for patient to use Benadryl for itching as needed. If not improved we will further address her hospital follow-up. If she experiences any shortness of breath or swelling of her lips, tongue, or throat she will need to report to the emergency room

## 2021-09-24 NOTE — TELEPHONE ENCOUNTER
ATTEMPTED TO CALL PATIENT, NO VM TO LEAVE MESSAGE  ATTEMPTED TO CALL PATIENTS DAUGHTER.  VM HAS BEEN LEFT FOR RETURN CALL.    Standing

## 2021-09-24 NOTE — TELEPHONE ENCOUNTER
Has she tried Benadryl or other antihistamine? Any rash? Does she have a history of allergic reaction with contrast? Also patient is scheduled with Lizzie for hospital follow up. Can that please be changed to Dr. Wade or myself. Thanks

## 2021-09-24 NOTE — TELEPHONE ENCOUNTER
PATIENT HAS BEEN CALLED, SHE STATED THAT SHE WAS THINKING ABOUT TRYING BENADRYL.  SHE HAS NO RASH.    PATIENT FEELS THAT SHE HAS NOT URINATED VERY MUCH.  SHE IS ASKING IF SHE NEEDS TO DRINK MORE WATER OR HER WATER PILL.  PATIENT STATED THAT SHE WAS ALSO GIVEN AN ANTIBIOTIC AND IS WONDERING IF THAT COULD BE IT.

## 2021-09-24 NOTE — TELEPHONE ENCOUNTER
PATIENTS GRANDDAUGHTER HAS CALLED, SHE STATED THAT SHE HAD A CT SCAN DONE WITH CONTRAST ON WEDNESDAY.  SHE STATED THAT MRS PALACIO IS STILL ITCHING.   PATIENT DOES NOT SEEM TO BE SHORT OF BREATH.   PLEASE ADVISE..  [phone]  OR CALL [phone]

## 2021-09-24 NOTE — OUTREACH NOTE
"COPD/PN Week 1 Survey      Responses   [facility] patient discharged from?  [place]   Does the patient have one of the following disease processes/diagnoses(primary or secondary)?  COPD/Pneumonia   Was the primary reason for admission:  Pneumonia   Week 1 attempt successful?  Yes   Call start time  1447   Call end time  1459   Discharge diagnosis  •**COPD exacerbation    Person spoke with today (if not patient) and relationship  patient   Meds reviewed with patient/caregiver?  Yes   Is the patient having any side effects they believe may be caused by any medication additions or changes?  No   Does the patient have all medications ordered at discharge?  Yes   Is the patient taking all medications as directed (includes completed medication regime)?  Yes   Does the patient have a primary care provider?   Yes   Does the patient have an appointment with their PCP or specialist within 7 days of discharge?  Yes   Comments regarding PCP  Pt had it moved to Sept. 27th (she wanted to be sooner)   Has the patient kept scheduled appointments due by today?  Yes   DME comments  Wears O2 2L when \"up and active im supposed to be on 3L\"   Pulse Ox monitoring  Intermittent   Pulse Ox device source  Patient   O2 Sat: education provided  Sat levels, Monitoring frequency, When to seek care   Psychosocial issues?  No   Did the patient receive a copy of their discharge instructions?  Yes   Nursing interventions  Reviewed instructions with patient   What is the patient's perception of their health status since discharge?  Improving   If the patient is a current smoker, are they able to teach back resources for cessation?  Not a smoker [pt states she has been a smoker for 70 years but she has \"laid them down\".  ]   Is the patient/caregiver able to teach back the hierarchy of who to call/visit for symptoms/problems? PCP, Specialist, Home health nurse, Urgent Care, ED, 911  Yes   Additional teach back comments  Pt is having itching " after CT.  She called office and was instructed to take Benadryl.   Is the patient able to teach back COPD zones?  Yes   Nursing interventions  Education provided on various zones   Patient reports what zone on this call?  Green Zone   Green Zone  Reports doing well, Breathing without shortness of breath, Appetite is good   Green Zone interventions:  Take daily medications, Use oxygen as prescribed   Week 1 call completed?  Yes   Wrap up additional comments  Pt reports she is doing well at this time.  She is itching but it is tolerable at this time.  She has f/u on 09/27.  Denies any further needs.           Aditi Coffey RN

## 2021-09-27 NOTE — PROGRESS NOTES
Chief Complaint   Patient presents with   • Hospital Follow Up Visit     Patient would like to know if she should continue taking Lasix (prescribed by ED) because it had been stopped more than six months ago because of kidney labs     Date of Admission: 9/16/2021  Date of Discharge:  9/18/2021  Primary Care Physician: Dr. Wade  NO TCM CALL ATTEMPTED    ER visit on 09/22/2021 for SOB and chest pain      Presenting Problem/History of Present Illness:  Shortness of breath     Final Discharge Diagnoses:       Active Hospital Problems     Diagnosis     • **COPD exacerbation (CMS/HCC)     • Pulmonary emphysema (CMS/HCC)     • Chronic respiratory failure with hypoxia and hypercapnia (CMS/HCC)     • Bilateral pleural effusion     • Left lower lobe pneumonia     • Essential hypertension        History:  Cindy Hicks is a 85 y.o. female who presents today for follow-up for evaluation of the above:    HPI     Patient presents today for hospital follow-up for COPD exacerbation and  heart failure. She reports that she remains short of breath but she has returned to her baseline before her hospital stay.  She is on oxygen  2 L per nasal cannula  She reports that her shortness of breath and lower extremity swelling has been improved with the use of daily Lasix.  She has this visit as needed prescription.  Patient reports compliance with blood pressure medications without adverse side effects.   She is wearing compression stockings daily and has seen improvement in her lower extremity swelling as well.        ROS:  Review of Systems   Constitutional: Negative for fatigue and unexpected weight change.   HENT: Negative.    Eyes: Negative.    Respiratory: Positive for shortness of breath.    Cardiovascular: Positive for leg swelling.   Gastrointestinal: Negative for abdominal pain, constipation and diarrhea.   Endocrine: Negative.    Genitourinary: Negative for difficulty urinating, dyspareunia, genital sores, menstrual  problem, pelvic pain, vaginal bleeding, vaginal discharge and vaginal pain.   Musculoskeletal: Negative.    Skin: Negative.    Neurological: Negative.    Psychiatric/Behavioral: Negative.        Ms. Hicks  reports that she has been smoking cigarettes. She has a 35.00 pack-year smoking history. She has never used smokeless tobacco. She reports that she does not drink alcohol and does not use drugs.      Current Outpatient Medications:   •  albuterol (PROVENTIL) (2.5 MG/3ML) 0.083% nebulizer solution, Take 2.5 mg by nebulization Every 4 (Four) Hours As Needed for Wheezing or Shortness of Air., Disp: , Rfl:   •  albuterol sulfate  (90 Base) MCG/ACT inhaler, Inhale 2 puffs Every 4 (Four) Hours As Needed for Wheezing or Shortness of Air for up to 90 days., Disp: 54 g, Rfl: 3  •  amLODIPine (NORVASC) 5 MG tablet, Take 2 tablets by mouth Daily., Disp: 30 tablet, Rfl: 3  •  aspirin 81 MG EC tablet, Take 81 mg by mouth Daily., Disp: , Rfl:   •  atorvastatin (LIPITOR) 80 MG tablet, Take 80 mg by mouth Daily., Disp: , Rfl:   •  budesonide-formoterol (SYMBICORT) 160-4.5 MCG/ACT inhaler, Inhale 2 puffs 2 (Two) Times a Day., Disp: , Rfl:   •  dicyclomine (BENTYL) 10 MG capsule, Take 10 mg by mouth 2 (Two) Times a Day As Needed., Disp: , Rfl:   •  furosemide (Lasix) 20 MG tablet, Take 1 tablet by mouth Daily As Needed (Weight gain, shortness of breath, or swelling)., Disp: 30 tablet, Rfl: 2  •  lisinopril (PRINIVIL,ZESTRIL) 40 MG tablet, Take 40 mg by mouth Daily., Disp: , Rfl:   •  magnesium hydroxide (MILK OF MAGNESIA) 400 MG/5ML suspension, Take  by mouth Daily As Needed for Constipation., Disp: , Rfl:   •  Mucinex 600 MG 12 hr tablet, Take 2 tablets by mouth 2 (Two) Times a Day., Disp: , Rfl:   •  multivitamin with minerals (VITRUM 50+ ADULT-MULTI PO), Take 1 tablet by mouth Daily., Disp: , Rfl:   •  pantoprazole (PROTONIX) 40 MG EC tablet, Take 40 mg by mouth 2 (Two) Times a Day., Disp: , Rfl:   •  pramipexole  "(MIRAPEX) 0.25 MG tablet, Take 0.25-0.5 mg by mouth At Night As Needed., Disp: , Rfl:   •  vitamin D (ERGOCALCIFEROL) 1.25 MG (71950 UT) capsule capsule, Take 1 capsule by mouth 1 (One) Time Per Week., Disp: 12 capsule, Rfl: 1  •  benzonatate (TESSALON) 100 MG capsule, Take 100 mg by mouth 3 (Three) Times a Day As Needed for Cough., Disp: , Rfl:       OBJECTIVE:  /60 (BP Location: Left arm, Patient Position: Sitting, Cuff Size: Adult)   Pulse 62   Temp 98 °F (36.7 °C) (Temporal)   Resp 16   Ht 166.4 cm (65.5\")   Wt 65.8 kg (145 lb)   SpO2 97% Comment: with 2 L oxygen  BMI 23.76 kg/m²    Physical Exam  Constitutional:       General: She is not in acute distress.  Cardiovascular:      Rate and Rhythm: Normal rate.   Pulmonary:      Breath sounds: Examination of the right-lower field reveals rhonchi. Examination of the left-lower field reveals rhonchi. Rhonchi present.   Neurological:      Mental Status: She is oriented to person, place, and time.   Psychiatric:         Behavior: Behavior normal.         Assessment/Plan    Diagnoses and all orders for this visit:    1. COPD exacerbation (CMS/HCC) (Primary)  Stable at this time.  She is to continue to follow-up with pulmonary outpatient.  She continues on oxygen at 2 L per nasal cannula.  She will continue on Lasix daily as needed    2. Long term current use of diuretic  -     Comprehensive metabolic panel; Future  Labs to monitor potassium.    3. Need for influenza vaccination  -     Fluzone High-Dose 65+yrs (4737-3880)    4. Essential hypertension  Well controlled, BP goal for age is <150/90 per JNC 8 guidelines and continue current medications    5. Stage 3a chronic kidney disease (CMS/HCC)  CMP as above for monitoring.       An After Visit Summary was printed and given to the patient at discharge.  Return in about 1 month (around 10/27/2021) for Annual physical. Sooner if problems arise.          Shira VARGHESE. 9/27/2021   Electronically Signed  "

## 2021-09-30 NOTE — TELEPHONE ENCOUNTER
Caller: kait torres    Relationship to patient: Emergency Contact    Best call back number: [phone]    Patient is needing the office to call the pharmacy with the diagnosis code for the albuterol solution for her nebulizer. Medicare plan B is needing this in order to cover medication. Please advise.       [facility]/pharmacy #[id number] - [place] - [address] [address] [facility] [phone] [facility] [phone] FX

## 2021-09-30 NOTE — TELEPHONE ENCOUNTER
Caller: kait torres    Relationship: Emergency Contact      Medication requested (name and dosage): albuterol (PROVENTIL) (2.5 MG/3ML) 0.083% nebulizer solution    Pharmacy where request should be sent: [facility]/pharmacy #[id number] - [place], [place] - [address] - [phone] [facility] [phone]   [phone]    Best call back number: [phone]    Does the patient have less than a 3 day supply:  [x] Yes  [] No    Cristal Arroyo Rep   09/30/21 11:14 CDT

## 2021-09-30 NOTE — TELEPHONE ENCOUNTER
SPOKE TO THE PHARMACIST AT [facility].  SHE STATED THAT I COULD NOT CALL IN A DIAGNOSIS CODE. IT HAS TO BE ON THE PRESCRIPTION ESCRIBED.

## 2021-10-01 NOTE — OUTREACH NOTE
COPD/PN Week 2 Survey      Responses   [facility] patient discharged from?  [place]   Does the patient have one of the following disease processes/diagnoses(primary or secondary)?  COPD/Pneumonia   Week 2 attempt successful?  Yes   Call start time  0831   Call end time  0843   Person spoke with today (if not patient) and relationship  patient   Meds reviewed with patient/caregiver?  Yes   Is the patient taking all medications as directed (includes completed medication regime)?  Yes   Has the patient kept scheduled appointments due by today?  Yes   Has home health visited the patient within 72 hours of discharge?  N/A   Pulse Ox monitoring  Intermittent   Pulse Ox device source  Patient   O2 Sat: education provided  Sat levels   O2 Sat education comments  Sats at 95% or more most of times   Comments  oxygen at 2 iters at rest with activity ups to 3 liters if needed.    What is the patient's perception of their health status since discharge?  Improving   Is the patient able to teach back COPD zones?  Yes   Patient reports what zone on this call?  Yellow Zone [PCP was called yesterday told of SOA that has increased, no distress but soa more at times.  ]   Yellow Zone  Increased shortness of air, Unable to complete daily activities, Increased or thicker phlegm/mucus, Using quick relief inhaler/nebulizer more often, Medication is not helping relieve symptoms, Increased swelling of ankles, Fever or feeling like have a chest cold, Poor sleep and symptoms work patient up   Yellow interventions  Continue to use daily medications, Use quick relief inhaler as ordered, Use oxygen as ordered, Use other meds such as steroids or antibiotics as ordered, Do not smoke, Get plenty of rest, Call provider immediatly if symptoms do not improve   Week 2 call completed?  Yes   Wrap up additional comments  Pt. says is feeling ok, still having SOA, called her PCP yesterday nebulizer refilled, no distress but soa continues all time, she  says not really new issues.  Will see Dr. Campos next week, had CT Scan yesterday waiting on results.  She is looking for someone to take her to her appts.  other than PATS, she hates for her grand daughter to miss a days work to take her.           Lucia Vo RN

## 2021-10-05 NOTE — PROGRESS NOTES
HALIMA Lemus  [facility]   Respiratory Disease Clinic  [address] [place] [address]  Phone: [phone]  Fax: [phone]       Chief Complaint  Pneumonia and COPD    Subjective    History of Present Illness    Cindy Hicks presents to [facility] PULMONARY [facility]ITICAL [facility]ARE MEDICINE   History of Present Illness   Patient with history of Pseudomonas pneumonia.  She has known chronic respiratory failure with hypoxemia/hypercapnia, pulmonary emphysema, GERD, and current everyday smoker.  Since the patient's last office visit her alpha-1 showed an MM phenotype.  Overnight pulse ox from September 1, 2021 showed hypoxia despite 2 L of oxygen.  The patient was also seen in the emergency department on September 22, 2021 for COPD exacerbation.  CTA from September 22, 2021 showed no pulmonary embolism, left greater than right bibasilar pneumonia with small amounts of pleural fluid.  The patient was also noted to have an elevated proBNP of 2,341.  The patient does have known diastolic dysfunction grade 2 noted from 2D echo July 31, 2021.  She states that she is now taking Lasix daily.  We discussed obtaining daily weights and low salt diet.  ABG from September 22, 2021 showed a pH of 7.42, PCO2 49.9, PO2 98.9, HCO3 32.7.  The patient may possibly qualify for a trilogy device.  The patient is accompanied by her daughter today.  The patient states that she still feels like she has some congestion and is coughing up green sputum.  Sputum culture and AFB was ordered.  The patient states that she did benefit from Breztri versus Symbicort.  She wishes to try and stay on Breztri.  The patient is using chronic oxygen with 3 L exertion and 2 L with rest.  He denies fevers or chills.  No other aggravating or alleviating factors.  The patient did have an episode of nausea today in the office.  She did not vomit.    Objective   Vital Signs:   /60   Pulse 65   Ht 162.6 cm  "(64\")   Wt 68 kg (150 lb)   SpO2 98% Comment: 2 lt pulse  BMI 25.75 kg/m²     Physical Exam  Vitals reviewed.   Constitutional:       General: She is not in acute distress.     Appearance: She is well-developed and overweight.      Interventions: Nasal cannula and face mask in place.   HENT:      Head: Normocephalic and atraumatic.   Eyes:      General: No scleral icterus.     Conjunctiva/sclera: Conjunctivae normal.      Pupils: Pupils are equal, round, and reactive to light.   Cardiovascular:      Rate and Rhythm: Normal rate.   Pulmonary:      Effort: Pulmonary effort is normal. No respiratory distress.      Breath sounds: Rhonchi (few) present. No wheezing or rales.   Musculoskeletal:         General: Normal range of motion.      Cervical back: Normal range of motion and neck supple.   Skin:     General: Skin is warm and dry.   Neurological:      Mental Status: She is alert and oriented to person, place, and time.   Psychiatric:         Behavior: Behavior normal.         Thought Content: Thought content normal.         Judgment: Judgment normal.        Result Review :  The following data was reviewed by: HALIMA Lemus on 10/07/2021:  Alpha - 1 - Antitrypsin (09/24/2021 13:24)  CT Angiogram Chest (09/22/2021 13:46)  BNP (09/22/2021 11:58)  Blood Gas, Arterial - (09/22/2021 12:11)  Comprehensive metabolic panel (09/27/2021 15:05)  PULMONARY RESULTS - SCAN - PULSE OXIMETRY, OVERNIGHT-09/01/21 (09/01/2021)   Adult Transthoracic Echo Complete w/ Color, Spectral and Contrast if Necessary Per Protocol (07/31/2021 09:17)    Rest/Exercise Pulse Ox Values        Some values may be hidden. Unless noted otherwise, only the newest values recorded on each date are displayed.         Rest/Exercise Pulse Ox Results 8/31/21   Rest on O2 @ Liters 2L   Rest on O2 SAT % 94   Exercise on O2 @ Liters 4L   Exercise on O2 SAT % 90           PFT Values        Some values may be hidden. Unless noted otherwise, only the " newest values recorded on each date are displayed.         Old Values PFT Results 10/7/21   No data to display.      Pre Drug PFT Results 10/7/21   FVC 61   FEV1 38   FEF 25-75% 18   FEV1/FVC 46.65      Post Drug PFT Results 10/7/21   No data to display.      Other Tests PFT Results 10/7/21   TLC 93      DLCO 41   D/VAsb 67             Results for orders placed in visit on 10/07/21    Pulmonary Function Test    Narrative  Pulmonary Function Test  Performed by: Beth Crawford, RRT  Authorized by: Beth Tony APRN    Pre Drug % Predicted  FVC: 61%  FEV1: 38%  FEF 25-75%: 18%  FEV1/FVC: 46.65%  T%  RV: 138%  DLCO: 41%  D/VAsb: 67%    Interpretation  Spirometry  Spirometry shows severe obstruction. There is reduced midflow suggesting small airway/airflow obstruction.  Review of FVL curve  Patient's effort is normal.  Lung Volume Measurements  Measurements show elevated residual volume consistent with gas trapping.  Diffusion Capacity  The patient's diffusion capacity is severely reduced.  Diffusion capacity is moderately reduced when corrected for alveolar volume.           Assessment and Plan  Diagnoses and all orders for this visit:    1. Pneumonia due to Pseudomonas species, unspecified laterality, unspecified part of lung (HCC) (Primary)  Comments:  The patient has had a recent hospitalization for treatment for Pseudomonas pneumonia.  Obtain follow-up CT of the chest to ensure this is improving.  Orders:  -     CT Chest Without Contrast; Future    2. Cough  Comments:  Patient states that she is still coughing up discolored sputum.  Will obtain sputum culture and AFB.  Orders:  -     Respiratory Culture - Sputum, Cough; Future  -     Gram Stain With / Sputum Cult Rflx ([facility]) - Sputum, Cough; Future  -     AFB Culture - Sputum, Cough; Future    3. COPD with acute exacerbation (HCC)  -     doxycycline (VIBRAMYICN) 100 MG tablet; Take 1 tablet by mouth 2 (Two) Times a Day for 10 days.   Dispense: 20 tablet; Refill: 0    4. Stage 3 severe COPD by GOLD classification ([facility])  Comments:  Continue Breztri.  Stop Symbicort.  Continue albuterol HFA and nebs as needed.  Orders:  -     Budeson-Glycopyrrol-Formoterol (Breztri Aerosphere) 160-9-4.8 MCG/ACT aerosol inhaler; Inhale 2 puffs 2 (Two) Times a Day for 30 days.  Dispense: 1 each; Refill: 11    5. Pulmonary emphysema, unspecified emphysema type ([facility])  Comments:  Continue Breztri, albuterol HFA, albuterol nebs as needed.    6. Chronic respiratory failure with hypoxia and hypercapnia ([facility])  Comments:  Continue chronic oxygen therapy.  The patient is benefiting from it and wishes to continue it.  The patient may qualify for a home trilogy device.     7. Cigarette smoker  Comments:  Recommend smoking cessation.    8. Gastroesophageal reflux disease, unspecified whether esophagitis present  Comments:  Continue Protonix.    9. Diastolic dysfunction  Comments:  The patient has grade 2 diastolic dysfunction noted per 2D echo in July 2021.  Discussed importance of controlling her blood pressure, daily weights, and low-salt diet.  The patient states that she has been placed on Lasix daily.  May consider referral to cardiology.      Health maintenance:   Influenza vaccine: yes  Pneumovax 23: yes  Prevnar 13: yes  Covid 19: yes   Follow Up  HALIMA Lemus  10/7/2021  14:11 CDT  Return in about 4 weeks (around 11/4/2021) for CT.  Patient was given instructions and counseling regarding her condition or for health maintenance advice. Please see specific information pulled into the AVS if appropriate.   Please note that portions of this note were completed with a voice recognition program.

## 2021-10-07 PROBLEM — J44.9 STAGE 3 SEVERE COPD BY GOLD CLASSIFICATION (HCC): Status: ACTIVE | Noted: 2021-01-01

## 2021-10-07 PROBLEM — J44.9 STAGE 3 SEVERE COPD BY GOLD CLASSIFICATION (HCC): Chronic | Status: ACTIVE | Noted: 2021-01-01

## 2021-10-07 PROBLEM — I51.89 DIASTOLIC DYSFUNCTION: Status: ACTIVE | Noted: 2021-01-01

## 2021-10-07 PROBLEM — I51.89 DIASTOLIC DYSFUNCTION: Chronic | Status: ACTIVE | Noted: 2021-01-01

## 2021-10-07 PROBLEM — F17.210 CIGARETTE SMOKER: Chronic | Status: ACTIVE | Noted: 2021-01-01

## 2021-10-07 NOTE — PROCEDURES
Pulmonary Function Test  Performed by: Beth Crawford, RRT  Authorized by: Beth Tony APRN      Pre Drug % Predicted    FVC: 61%   FEV1: 38%   FEF 25-75%: 18%   FEV1/FVC: 46.65%   T%   RV: 138%   DLCO: 41%   D/VAsb: 67%    Interpretation   Spirometry   Spirometry shows severe obstruction. There is reduced midflow suggesting small airway/airflow obstruction.   Review of FVL curve   Patient's effort is normal.   Lung Volume Measurements  Measurements show elevated residual volume consistent with gas trapping.   Diffusion Capacity  The patient's diffusion capacity is severely reduced.  Diffusion capacity is moderately reduced when corrected for alveolar volume.

## 2021-10-07 NOTE — PATIENT INSTRUCTIONS
Chronic Obstructive Pulmonary Disease  Chronic obstructive pulmonary disease (COPD) is a long-term (chronic) lung problem. When you have COPD, it is hard for air to get in and out of your lungs. Usually the condition gets worse over time, and your lungs will never return to normal. There are things you can do to keep yourself as healthy as possible.  · Your doctor may treat your condition with:  ? Medicines.  ? Oxygen.  ? Lung surgery.  · Your doctor may also recommend:  ? Rehabilitation. This includes steps to make your body work better. It may involve a team of specialists.  ? Quitting smoking, if you smoke.  ? Exercise and changes to your diet.  ? Comfort measures (palliative care).  Follow these instructions at home:  Medicines  · Take over-the-counter and prescription medicines only as told by your doctor.  · Talk to your doctor before taking any cough or allergy medicines. You may need to avoid medicines that cause your lungs to be dry.  Lifestyle  · If you smoke, stop. Smoking makes the problem worse. If you need help quitting, ask your doctor.  · Avoid being around things that make your breathing worse. This may include smoke, chemicals, and fumes.  · Stay active, but remember to rest as well.  · Learn and use tips on how to relax.  · Make sure you get enough sleep. Most adults need at least 7 hours of sleep every night.  · Eat healthy foods. Eat smaller meals more often. Rest before meals.  Controlled breathing  Learn and use tips on how to control your breathing as told by your doctor. Try:  · Breathing in (inhaling) through your nose for 1 second. Then, pucker your lips and breath out (exhale) through your lips for 2 seconds.  · Putting one hand on your belly (abdomen). Breathe in slowly through your nose for 1 second. Your hand on your belly should move out. Pucker your lips and breathe out slowly through your lips. Your hand on your belly should move in as you breathe out.    Controlled coughing  Learn  and use controlled coughing to clear mucus from your lungs. Follow these steps:  1. Lean your head a little forward.  2. Breathe in deeply.  3. Try to hold your breath for 3 seconds.  4. Keep your mouth slightly open while coughing 2 times.  5. Spit any mucus out into a tissue.  6. Rest and do the steps again 1 or 2 times as needed.  General instructions  · Make sure you get all the shots (vaccines) that your doctor recommends. Ask your doctor about a flu shot and a pneumonia shot.  · Use oxygen therapy and pulmonary rehabilitation if told by your doctor. If you need home oxygen therapy, ask your doctor if you should buy a tool to measure your oxygen level (oximeter).  · Make a COPD action plan with your doctor. This helps you to know what to do if you feel worse than usual.  · Manage any other conditions you have as told by your doctor.  · Avoid going outside when it is very hot, cold, or humid.  · Avoid people who have a sickness you can catch (contagious).  · Keep all follow-up visits as told by your doctor. This is important.  Contact a doctor if:  · You cough up more mucus than usual.  · There is a change in the color or thickness of the mucus.  · It is harder to breathe than usual.  · Your breathing is faster than usual.  · You have trouble sleeping.  · You need to use your medicines more often than usual.  · You have trouble doing your normal activities such as getting dressed or walking around the house.  Get help right away if:  · You have shortness of breath while resting.  · You have shortness of breath that stops you from:  ? Being able to talk.  ? Doing normal activities.  · Your chest hurts for longer than 5 minutes.  · Your skin color is more blue than usual.  · Your pulse oximeter shows that you have low oxygen for longer than 5 minutes.  · You have a fever.  · You feel too tired to breathe normally.  Summary  · Chronic obstructive pulmonary disease (COPD) is a long-term lung problem.  · The way your  lungs work will never return to normal. Usually the condition gets worse over time. There are things you can do to keep yourself as healthy as possible.  · Take over-the-counter and prescription medicines only as told by your doctor.  · If you smoke, stop. Smoking makes the problem worse.  This information is not intended to replace advice given to you by your health care provider. Make sure you discuss any questions you have with your health care provider.  Document Revised: 11/30/2018 Document Reviewed: 01/22/2018  Elsevier Patient Education © 2021 [employer]

## 2021-10-11 NOTE — OUTREACH NOTE
COPD/PN Week 3 Survey      Responses   [facility] patient discharged from? [place]   Does the patient have one of the following disease processes/diagnoses(primary or secondary)? COPD/Pneumonia   Was the primary reason for admission: Pneumonia   Week 3 attempt successful? Yes   Call start time 1609   Call end time 1617   Discharge diagnosis •**COPD exacerbation    Person spoke with today (if not patient) and relationship patient   Meds reviewed with patient/caregiver? Yes   Does the patient have all medications ordered at discharge? Yes   Prescription comments Pt continues to use breathing tx.   Is the patient taking all medications as directed (includes completed medication regime)? Yes   Does the patient have a primary care provider?  Yes   DME comments Wears O2 2L at rest.  3L when active   Pulse Ox monitoring Intermittent   Pulse Ox device source Patient   O2 Sat education comments Sats at 95% or more most of times   Psychosocial issues? No   Did the patient receive a copy of their discharge instructions? Yes   Nursing interventions Reviewed instructions with patient   What is the patient's perception of their health status since discharge? Improving   If the patient is a current smoker, are they able to teach back resources for cessation? Not a smoker   Is the patient/caregiver able to teach back the hierarchy of who to call/visit for symptoms/problems? PCP, Specialist, Home health nurse, Urgent Care, ED, 911 Yes   Is the patient able to teach back COPD zones? Yes   Patient reports what zone on this call? Yellow Zone   Yellow Zone Increased shortness of air,  Unable to complete daily activities   Yellow interventions Continue to use daily medications,  Use quick relief inhaler as ordered,  Use oxygen as ordered,  Call provider immediatly if symptoms do not improve   Week 3 call completed? Yes   Wrap up additional comments Pt is feeling ok.  She states she is SOA and has been in communications with  provider.  She states they are adjusting some of her medications and has new scripts at pharmacy.  Granddaughter will p/u after work tonight.           Aditi Coffey RN

## 2021-10-11 NOTE — TELEPHONE ENCOUNTER
Patient called stating that the breztri is not helping get rid of the stuff in her chest and making it worse.   She gets lightheaded and weak and can barely walk across the floor. She has stopped the breztri.  She also was wondering about her sputum.  I told her it was not back yet but as soon as you looked at it then we would call her back.    She is wanting to know what to do about the inhaler?

## 2021-10-11 NOTE — TELEPHONE ENCOUNTER
I just sent a result note regarding her sputum to [facility].  There was nothing growing at this time.    I ordered an AFB as well.  Can you please find out why was not done?      She can resume the Symbicort since she did not tolerate breztri.  Stop breztri.     Start mucinex 1200mg twice a day with full glass of water.     I will send in duonebs that she can use every 4 hours as needed up to 4 times per day and she will need to get a neb machine from her [employer] company ([employer]) if she does not already have one. Please fax order to [employer].     What is her O2 level when she is walking across the floor?  Is she using her oxygen?     I will order another CXR.      Would she be interested in using a home Trilogy machine?     If worse then I recommend ER for further evaluation.

## 2021-10-11 NOTE — TELEPHONE ENCOUNTER
Please call and let the patient know that lab missed part of her sputum culture.  She can come here to get another cup.

## 2021-10-11 NOTE — TELEPHONE ENCOUNTER
She is taking mucinex 1 time a day due to having to drink and it keeps her up at night to pee.  She don't want to use the trilogy due to it being something on her face.  Per daughter she is using her oxygen when she walks across the floor.  I tried to call but voicemail is not setup. She does have a nebulizer at home.      Patient notified of results.

## 2021-10-11 NOTE — TELEPHONE ENCOUNTER
Called lab and they said it was missed and they can't do the test, she will need to get another sputum.

## 2021-10-12 NOTE — TELEPHONE ENCOUNTER
Patient's granddaughter notifed to take the antibiotic and she is going to take her to get cxr today.

## 2021-10-13 NOTE — ED PROVIDER NOTES
Subjective   soa came via ems       Shortness of Breath  Severity:  Moderate  Onset quality:  Gradual  Timing:  Constant  Progression:  Worsening  Chronicity:  Chronic  Context: activity    Context: not animal exposure, not emotional upset, not fumes, not occupational exposure, not pollens, not strong odors and not URI    Relieved by:  Nothing  Worsened by:  Exertion  Ineffective treatments:  None tried  Associated symptoms: cough, sputum production and wheezing    Associated symptoms: no abdominal pain, no chest pain, no claudication, no diaphoresis, no ear pain, no fever, no headaches, no hemoptysis, no neck pain, no PND, no rash, no sore throat, no syncope, no swollen glands and no vomiting    Risk factors: no recent alcohol use, no family hx of DVT, no obesity and no recent surgery        Review of Systems   Constitutional: Negative.  Negative for activity change, appetite change, chills, diaphoresis, fatigue and fever.   HENT: Negative for congestion, drooling, ear pain, facial swelling, hearing loss, sinus pressure and sore throat.    Eyes: Negative.  Negative for discharge.   Respiratory: Positive for cough, sputum production, shortness of breath and wheezing. Negative for hemoptysis.    Cardiovascular: Negative for chest pain, claudication, syncope and PND.   Gastrointestinal: Negative for abdominal distention, abdominal pain, blood in stool, diarrhea, nausea and vomiting.   Endocrine: Negative.  Negative for cold intolerance, heat intolerance, polydipsia, polyphagia and polyuria.   Genitourinary: Negative.  Negative for dysuria, flank pain and urgency.   Musculoskeletal: Negative.  Negative for arthralgias, back pain, myalgias, neck pain and neck stiffness.   Skin: Negative.  Negative for color change, pallor and rash.   Allergic/Immunologic: Negative.    Neurological: Negative.  Negative for dizziness, seizures, speech difficulty, weakness, numbness and headaches.   Hematological: Negative.  Negative for  adenopathy.   All other systems reviewed and are negative.      Past Medical History:   Diagnosis Date   • Actinic keratosis    • Arthritis    • Atherosclerosis    • Benign fundic gland polyps of stomach    • Bleeding ulcer    • Carotid artery bruit    • Carotid artery stenosis    • COPD (chronic obstructive pulmonary disease) ([facility])    • Diverticulosis    • Emphysema of lung (HCC)    • History of colon polyps    • Hyperlipidemia    • Hypertension    • IBS (irritable bowel syndrome)    • Macular degeneration    • Osteoporosis    • Prediabetes    • PVD (peripheral vascular disease) ([facility])    • TIA (transient ischemic attack)    • Vitamin D deficiency        Allergies   Allergen Reactions   • Codeine Nausea And Vomiting   • Valium [Diazepam] Nausea Only       Past Surgical History:   Procedure Laterality Date   • CATARACT EXTRACTION     • COLONOSCOPY  03/15/2013    polyp, hyperplastic   • COLONOSCOPY N/A 10/2/2018    Procedure: COLONOSCOPY WITH ANESTHESIA;  Surgeon: Harris Escobar MD;  Location: [facility] ENDOSCOPY;  Service: Gastroenterology   • CYST REMOVAL     • ENDOSCOPY  2019   • ENDOSCOPY N/A 10/4/2019    Procedure: ESOPHAGOGASTRODUODENOSCOPY WITH ANESTHESIA;  Surgeon: Harris Escobar MD;  Location: [facility] ENDOSCOPY;  Service: Gastroenterology   • FEMORAL ARTERY STENT     • HYSTERECTOMY     • VASCULAR SURGERY      multiple       Family History   Problem Relation Age of Onset   • Colon cancer Sister    • Cancer Sister    • Colon polyps Brother    • Heart disease Daughter    • Heart disease Son    • Cancer Mother    • Cancer Father        Social History     Socioeconomic History   • Marital status:    Tobacco Use   • Smoking status: Current Every Day Smoker     Packs/day: 0.50     Years: 70.00     Pack years: 35.00     Types: Cigarettes   • Smokeless tobacco: Never Used   • Tobacco comment: down to 2 cigs a day    Vaping Use   • Vaping Use: Never used   Substance and Sexual Activity   • Alcohol use: No    • Drug use: No   • Sexual activity: Not Currently           Objective   Physical Exam  Vitals and nursing note reviewed. Exam conducted with a chaperone present.   Constitutional:       General: She is not in acute distress.     Appearance: Normal appearance. She is well-developed. She is not toxic-appearing or diaphoretic.   HENT:      Head: Normocephalic and atraumatic.      Right Ear: External ear normal.      Nose: Nose normal.      Mouth/Throat:      Mouth: Mucous membranes are moist.   Eyes:      Conjunctiva/sclera: Conjunctivae normal.      Pupils: Pupils are equal, round, and reactive to light.   Cardiovascular:      Rate and Rhythm: Normal rate and regular rhythm.      Chest Wall: PMI is not displaced.      Pulses: Normal pulses. No decreased pulses.      Heart sounds: Normal heart sounds. No murmur heard.      Pulmonary:      Effort: Pulmonary effort is normal. Tachypnea present. No accessory muscle usage or respiratory distress.      Breath sounds: No stridor. Examination of the right-upper field reveals decreased breath sounds. Examination of the left-upper field reveals decreased breath sounds. Examination of the right-middle field reveals rhonchi. Examination of the left-middle field reveals rhonchi. Examination of the right-lower field reveals rhonchi. Examination of the left-lower field reveals rhonchi. Decreased breath sounds and rhonchi present. No wheezing or rales.   Chest:      Chest wall: No tenderness or crepitus.   Abdominal:      General: Bowel sounds are normal. There is no distension.      Palpations: Abdomen is soft.      Tenderness: There is no abdominal tenderness.   Musculoskeletal:         General: No swelling or tenderness. Normal range of motion.      Cervical back: Normal range of motion and neck supple. No rigidity.      Comments: Lower extremity exam bilaterally is unremarkable.  There is no right or left calf tenderness .  There is no palpable venous cord.  No obvious  difference in the size of the legs.  No pitting edema.  The dorsalis pedis and posterior tibial femoral and popliteal pulses are palpable and +2 bilaterally.  Homans sign is negative   Skin:     General: Skin is warm.      Capillary Refill: Capillary refill takes less than 2 seconds.      Coloration: Skin is not jaundiced.      Findings: No erythema or rash.   Neurological:      General: No focal deficit present.      Mental Status: She is alert and oriented to person, place, and time. Mental status is at baseline.      Cranial Nerves: No cranial nerve deficit.      Motor: No weakness.      Coordination: Coordination normal.      Deep Tendon Reflexes: Reflexes are normal and symmetric. Reflexes normal.   Psychiatric:         Mood and Affect: Mood normal.         Procedures           ED Course  ED Course as of 10/13/21 0948   Wed Oct 13, 2021   0857 afib [TS]   0925 Age-adjusted D-dimer will be normal [TS]   0932 Patient came the ER with shortness of breath was given magnesium albuterol he is got history of proximal A. fib which is not new she is on any anticoagulants for that.  Her lab work-up essentially is within normal limits at his baseline pro-Pardeep lactic acid are negative Covid is negative [TS]   0933 Currently she is not in any distress at this time breathing normally hemodynamic stable sats are normal at above 95% on her home oxygen she is got home oxygen machine and nebulizer at home I do not think there is any clinical indication of admitting this patient to the hospital will discharge home on steroids antibiotics continue with the neb treatments at home and follow the primary MD return the ER for any worsening symptoms. [TS]   0934 Risks and benefits of treatments given and alternative treatment options discussed with patient/family. I answered all the questions in simple, plain language, and there was voiced understanding and agreement with plan of care. There were no further questions. Differential  diagnosis discussed. Patient/family was advised that the practice of medicine is not always an exact science, and sometimes tests, physical exam, or history may not show the underlying conditions with certainty. Additionally, the condition may change or show itself later after initial presentation. There was also expressed understanding and agreement with this limitation of emergency medicine practice. Patient/family was asked to return to ED if any problem or issues or if condition worsens or does not improved. Patient/family agreed to follow up with PCP/specialist as advised, or return to ED if unable to see a provider in a timely fashion for continued symptoms.  [TS]   0946 Went back and talked with this patient she is somewhat apprehensive about going home and I have told her I can go ahead and admit her to the hospital that is what she wants but clinically with a normal pH PO2 86 with oxygen at home normal metabolic profile and a relatively benign looking chest x-ray clinically admission is not warranted but can be arranged if that is what she wants patient at this time has decided to go home I have told her that she can come back to the ER immediately if she feels worse [TS]      ED Course User Index  [TS] Fabian Adams MD                                           MDM  Number of Diagnoses or Management Options  Diagnosis management comments: Differential Diagnosis:  I considered pulmonary etiology, asthma, chronic obstructive pulmonary disease, pneumonia, pulmonary embolism, adult respiratory distress syndrome, pneumothorax, pleural effusion, pulmonary fibrosis, cardiac etiology, congestive heart failure, myocardial infarction, metabolic etiology, diabetic ketoacidosis, uremia, acidosis, sepsis, anemia, drug related etiology, hyperventilation and CNS disease as a possible cause of dyspnea in this patient. This is a partial list of diagnoses considered.            Amount and/or Complexity of Data  Reviewed  Clinical lab tests: ordered and reviewed  Tests in the radiology section of CPT®: ordered and reviewed  Tests in the medicine section of CPT®: reviewed and ordered    Risk of Complications, Morbidity, and/or Mortality  Presenting problems: moderate  Diagnostic procedures: moderate  Management options: moderate        Final diagnoses:   Acute exacerbation of chronic obstructive pulmonary disease (COPD) (HCC)   Paroxysmal atrial fibrillation (HCC)       ED Disposition  ED Disposition     ED Disposition Condition Comment    Discharge Stable           Austin Wade,   [address] [facility] [address] [facility] [address]  [place] [id number] [phone]               Medication List      New Prescriptions    azithromycin 250 MG tablet  Commonly known as: ZITHROMAX  Take 2 tablets the first day, then 1 tablet daily for 4 days.     methylPREDNISolone 4 MG dose pack  Commonly known as: MEDROL  Take as directed on package instructions.           Where to Get Your Medications      These medications were sent to [facility]/pharmacy #[id number] - [place], [place] - [address] - [phone]  - [phone]   [address] [place] [id number]    Phone: [phone]   · azithromycin 250 MG tablet  · methylPREDNISolone 4 MG dose pack          Fabian Adams MD  10/13/21 0984       Fabian Adams MD  10/13/21 0934

## 2021-10-19 NOTE — TELEPHONE ENCOUNTER
Rx Refill Note  Requested Prescriptions     Pending Prescriptions Disp Refills   • ipratropium-albuterol (DUO-NEB) 0.5-2.5 mg/3 ml nebulizer 120 mL 11     Sig: Take 3 mL by nebulization 4 (Four) Times a Day.      Last office visit with prescribing clinician: 10/7/2021      Next office visit with prescribing clinician: 11/9/2021            Beth Hannah CMA  10/19/21, 14:45 CDT       Needed to resend with DX code.

## 2021-10-21 NOTE — TELEPHONE ENCOUNTER
Patient called stating she has gained 8 pounds since yesterday and is wanting to know if she should take a lasix.   I asked Beth Tony and she said she didn't prescribe to call her PCP.  Message relayed.   I told her to go to ER if she couldn't get ahold of PCP.

## 2021-10-21 NOTE — OUTREACH NOTE
COPD/PN Week 4 Survey      Responses   [facility] patient discharged from? [facility]   Does the patient have one of the following disease processes/diagnoses(primary or secondary)? COPD/Pneumonia   Was the primary reason for admission: Pneumonia   Week 4 attempt successful? Yes   Call start time 1747   Call end time 1753   Discharge diagnosis •**COPD exacerbation    Meds reviewed with patient/caregiver? Yes   Is the patient having any side effects they believe may be caused by any medication additions or changes? No   Is the patient taking all medications as directed (includes completed medication regime)? Yes   Has the patient kept scheduled appointments due by today? Yes   Comments Has been back to ED for breathing difficulties on 10/13/2021   DME comments Wears O2 2L at rest.  3L when active   Pulse Ox monitoring Intermittent   Pulse Ox device source Patient   O2 Sat: education provided Sat levels   O2 Sat education comments Sats at 93%    Psychosocial issues? No   What is the patient's perception of their health status since discharge? Worsening  [Patient is retaining alot of fluid. Has 8 lb weight gain in past couple of days. I encouraged her to call MD asap for guidance. She verbalized understanding. ]   Nursing Interventions Nurse provided patient education   If the patient is a current smoker, are they able to teach back resources for cessation? [id number]   Is the patient/caregiver able to teach back the hierarchy of who to call/visit for symptoms/problems? PCP, Specialist, Home health nurse, Urgent Care, ED, 911 Yes   Is the patient able to teach back COPD zones? Yes   Nursing interventions Education provided on various zones   Patient reports what zone on this call? Yellow Zone   Yellow Zone Increased swelling of ankles,  Medication is not helping relieve symptoms,  Increased shortness of air,  Unable to complete daily activities,  Increased or thicker phlegm/mucus   Yellow interventions Continue  "to use daily medications,  Use quick relief inhaler as ordered,  Use oxygen as ordered,  Use other meds such as steroids or antibiotics as ordered,  Do not smoke,  Get plenty of rest,  Call provider immediatly if symptoms do not improve   Week 4 call completed? Yes   Would the patient like one additional call? Yes   Wrap up additional comments Patient is retaining alot of fluid. She is also having \"alittle more SOB.\"She will contact MD today          Rick Payne RN  "

## 2021-10-29 PROBLEM — J18.9 LEFT LOWER LOBE PNEUMONIA: Status: RESOLVED | Noted: 2021-01-01 | Resolved: 2021-01-01

## 2021-10-29 PROBLEM — I50.32 CHRONIC DIASTOLIC (CONGESTIVE) HEART FAILURE (HCC): Status: ACTIVE | Noted: 2021-01-01

## 2021-10-29 NOTE — ACP (ADVANCE CARE PLANNING)
Date of [facility] ACP interview: 10/29/2021  Location of interview: Doctor's Office  [facility] ACP Facilitator: Jhon Villareal Jr.  Referral Source: Patient request  Present for facilitation: Patient and Healthcare Agent    SUMMARY OF ADVANCE CARE PLANNING DISCUSSION:  Cindy prepared the purpose and goals for advance care planning with the assistance of her physician prior to this encounter.    Cindy had selected Genevieve Strickland and Clara Krause as her healthcare agents.     Each section of the advance care/living will document was completed and initialed.    Advance care/living will document finished during this facilitation? yes    Time spent on preparation, facilitation and documentation was under 30 minutes.    RECOMMENDATIONS/FOLLOW-UP:  None at this time    CONSULT/NOTE ROUTED  Copy faxed to HIM    Jhon Villareal Jr.

## 2021-11-01 NOTE — OUTREACH NOTE
COPD/PN Week 5 Survey      Responses   [facility] patient discharged from? [place]   Does the patient have one of the following disease processes/diagnoses(primary or secondary)? COPD/Pneumonia   Was the primary reason for admission: Pneumonia   Week 5 attempt successful? No          Tran Resendez LPN

## 2021-11-02 NOTE — PROGRESS NOTES
CC: f/u CHF    History:  Cindy Hicks is a 86 y.o. female   She notes she has been doing reasonably well, but has had some swelling in the lower extremities. She had Echo in July that showed normal EF, but with diastolic dysfunction.        ROS:  Review of Systems   Constitutional: Negative for chills and fever.   Respiratory: Positive for shortness of breath. Negative for cough.    Cardiovascular: Positive for leg swelling. Negative for chest pain and palpitations.   Gastrointestinal: Negative for abdominal pain and constipation.   Genitourinary: Negative for difficulty urinating and dysuria.        reports that she has been smoking cigarettes. She has a 35.00 pack-year smoking history. She has never used smokeless tobacco. She reports that she does not drink alcohol and does not use drugs.      Current Outpatient Medications:   •  albuterol (PROVENTIL) (2.5 MG/3ML) 0.083% nebulizer solution, Take 2.5 mg by nebulization Every 4 (Four) Hours As Needed for Wheezing or Shortness of Air., Disp: 180 mL, Rfl: 3  •  albuterol sulfate  (90 Base) MCG/ACT inhaler, Inhale 2 puffs Every 4 (Four) Hours As Needed for Wheezing or Shortness of Air for up to 90 days., Disp: 54 g, Rfl: 3  •  amLODIPine (NORVASC) 10 MG tablet, Take 1 tablet by mouth Daily., Disp: 90 tablet, Rfl: 1  •  aspirin 81 MG EC tablet, Take 81 mg by mouth Daily., Disp: , Rfl:   •  atorvastatin (LIPITOR) 40 MG tablet, Take 1 tablet by mouth Daily., Disp: 90 tablet, Rfl: 1  •  benzonatate (TESSALON) 100 MG capsule, Take 100 mg by mouth 3 (Three) Times a Day As Needed for Cough., Disp: , Rfl:   •  Budeson-Glycopyrrol-Formoterol (Breztri Aerosphere) 160-9-4.8 MCG/ACT aerosol inhaler, Inhale 2 puffs 2 (Two) Times a Day for 30 days., Disp: 1 each, Rfl: 11  •  dicyclomine (BENTYL) 10 MG capsule, Take 10 mg by mouth 2 (Two) Times a Day As Needed., Disp: , Rfl:   •  furosemide (Lasix) 20 MG tablet, Take 1 tablet by mouth Daily As Needed (Weight gain,  "shortness of breath, or swelling)., Disp: 30 tablet, Rfl: 2  •  ipratropium-albuterol (DUO-NEB) 0.5-2.5 mg/3 ml nebulizer, Take 3 mL by nebulization 4 (Four) Times a Day., Disp: 120 mL, Rfl: 11  •  lisinopril (PRINIVIL,ZESTRIL) 40 MG tablet, Take 1 tablet by mouth Daily., Disp: 90 tablet, Rfl: 1  •  magnesium hydroxide (MILK OF MAGNESIA) 400 MG/5ML suspension, Take  by mouth Daily As Needed for Constipation., Disp: , Rfl:   •  Mucinex 600 MG 12 hr tablet, Take 2 tablets by mouth 2 (Two) Times a Day., Disp: , Rfl:   •  multivitamin with minerals (VITRUM 50+ ADULT-MULTI PO), Take 1 tablet by mouth Daily., Disp: , Rfl:   •  pantoprazole (PROTONIX) 40 MG EC tablet, Take 40 mg by mouth 2 (Two) Times a Day., Disp: , Rfl:   •  pramipexole (MIRAPEX) 0.25 MG tablet, Take 0.25-0.5 mg by mouth At Night As Needed., Disp: , Rfl:   •  vitamin D (ERGOCALCIFEROL) 1.25 MG (88411 UT) capsule capsule, Take 1 capsule by mouth 1 (One) Time Per Week., Disp: 12 capsule, Rfl: 1  •  metoprolol succinate XL (Toprol XL) 25 MG 24 hr tablet, Take 1 tablet by mouth Daily. For heart and BP, Disp: 90 tablet, Rfl: 0    OBJECTIVE:  /60 (BP Location: Left arm, Patient Position: Sitting, Cuff Size: Adult)   Pulse 73   Temp 98 °F (36.7 °C)   Resp 16   Ht 165.1 cm (65\")   Wt 69.6 kg (153 lb 6.4 oz)   SpO2 93%   Breastfeeding No   BMI 25.53 kg/m²    Physical Exam  Constitutional:       General: She is not in acute distress.  Cardiovascular:      Rate and Rhythm: Normal rate and regular rhythm.      Heart sounds: Normal heart sounds. No murmur heard.      Pulmonary:      Effort: Pulmonary effort is normal.      Breath sounds: Normal breath sounds. No wheezing.   Neurological:      Mental Status: She is alert and oriented to person, place, and time.      Gait: Gait normal.   Psychiatric:         Mood and Affect: Mood normal.         Behavior: Behavior normal.         Assessment/Plan     Diagnoses and all orders for this visit:    1. Stage 3 " severe COPD by GOLD classification (HCC) (Primary)  Stable on inhaled therapies without worsening.     2. Stage 3a chronic kidney disease (HCC)  Stable on RAAS blockade without worsening urinary symptoms.     3. Essential hypertension  -     lisinopril (PRINIVIL,ZESTRIL) 40 MG tablet; Take 1 tablet by mouth Daily.  Dispense: 90 tablet; Refill: 1  -     amLODIPine (NORVASC) 10 MG tablet; Take 1 tablet by mouth Daily.  Dispense: 90 tablet; Refill: 1  Well controlled, BP goal for age is <140/90 per JNC 8 guidelines and continue current medications    4. Cigarette smoker  Patient was counseled on and understood the many dangers of continuing to use tobacco. Despite this, Ms. Hicks states quitting is not an immediate priority at this time. I reminded the patient that if quitting becomes an increased priority to contact us for help with quitting including pharmacologic & nonpharmacologic options or any additional resources.    5. Chronic respiratory failure with hypoxia and hypercapnia (HCC)  Stable without worsening.     6. Chronic diastolic (congestive) heart failure (HCC)  -     metoprolol succinate XL (Toprol XL) 25 MG 24 hr tablet; Take 1 tablet by mouth Daily. For heart and BP  Dispense: 90 tablet; Refill: 0  Initiate beta blockade and continue loop diuretic to control swelling.     7. Atherosclerosis of native artery of both lower extremities with intermittent claudication (HCC)  -     atorvastatin (LIPITOR) 40 MG tablet; Take 1 tablet by mouth Daily.  Dispense: 90 tablet; Refill: 1  Stable on ASA & lipitor without worsening symptoms.     8. Mixed hyperlipidemia  -     atorvastatin (LIPITOR) 40 MG tablet; Take 1 tablet by mouth Daily.  Dispense: 90 tablet; Refill: 1  Stable on high intensity statin therapy per ACC/AHA guidelines.    9. Overweight (BMI 25.0-29.9)  Recommended attention to portion control and being careful about the types and timing of meals for the purpose of weight management.    An After Visit  Summary was printed and given to the patient at discharge.  Return in about 3 months (around 1/29/2022) for Recheck.          Austin Wade D.O. 11/1/2021   Electronically signed.

## 2021-11-02 NOTE — PROGRESS NOTES
Spoke with patient's granddaughter and relayed test results. She voiced understanding and will inform the patient.

## 2021-11-02 NOTE — TELEPHONE ENCOUNTER
Patient could not tolerate the Breztri. She is needing a prescription for Symbicort.   Rx Refill Note  Requested Prescriptions     Pending Prescriptions Disp Refills   • budesonide-formoterol (SYMBICORT) 160-4.5 MCG/ACT inhaler 30.6 g 3     Sig: Inhale 2 puffs 2 (Two) Times a Day.      Last office visit with prescribing clinician: 10/7/2021      Next office visit with prescribing clinician: 11/9/2021            Stevie Nava CMA  11/02/21, 10:44 CDT

## 2021-11-09 PROBLEM — J90 PLEURAL EFFUSION, RIGHT: Chronic | Status: ACTIVE | Noted: 2021-01-01

## 2021-11-09 PROBLEM — Z87.891 PERSONAL HISTORY OF NICOTINE DEPENDENCE: Chronic | Status: ACTIVE | Noted: 2021-01-01

## 2021-11-09 PROBLEM — I50.32 CHRONIC DIASTOLIC (CONGESTIVE) HEART FAILURE (HCC): Chronic | Status: ACTIVE | Noted: 2021-01-01

## 2021-11-09 NOTE — PROGRESS NOTES
" HALIMA Lemus  [facility]   Respiratory Disease Clinic  [address] [place] [address]  Phone: [phone]  Fax: [phone]       Chief Complaint  Pneumonia due to pneudomonas, unspecified laterality or part    Subjective    History of Present Illness    Cindy Hicks presents to [facility]MON[facility]   History of Present Illness   Patient with known chronic respiratory failure with hypoxemia/hypercapnia, pulmonary emphysema, GERD, and current everyday smoker.  Hx of pneumonias pneumonia.  The patient is accompanied by her granddaughter today.  The patient will qualify for a home trilogy device; however the patient does not think that she would wear it as she would not be able to stand anything on her face.  The patient now states that she does not benefit from Breztri and has resumed Symbicort.  She is using chronic oxygen at 3 L with exertion and 2 L with rest.  She presents today in a wheelchair.  CT of the chest without contrast reviewed with the patient and her granddaughter.  This was completed on November 2, 2021.  The impression shows cardiomegaly and severe chronic lung changes, persistent peripheral consolidation within the posterior right lower lobe and persistent trace amount of right pleural fluid, no new lung disease is seen.  The patient states that she has chronic shortness of breath with exertion.  She denies fevers, chills, or purulent sputum production today.  The patient states that she feels the best when she is on steroids.  The patient's granddaughter states that she is \"grumpy\" while she is on the steroids.  The patient tells me that she has had about an 8 pound weight gain this week.  She has known grade II diastolic dysfunction.  She has not discussed this yet with her PCP whom is managing her diuretic.  I encouraged her to contact her PCP.  She states that she understands.    Objective   Vital Signs:   BP " "156/76   Pulse 73   Ht 165.1 cm (65\")   Wt 68 kg (150 lb)   SpO2 98% Comment: 2 L  BMI 24.96 kg/m²     Physical Exam  Vitals reviewed.   Constitutional:       General: She is not in acute distress.     Appearance: She is well-developed.      Interventions: Nasal cannula and face mask in place.   HENT:      Head: Normocephalic and atraumatic.   Eyes:      General: No scleral icterus.     Conjunctiva/sclera: Conjunctivae normal.      Pupils: Pupils are equal, round, and reactive to light.   Cardiovascular:      Rate and Rhythm: Normal rate.   Pulmonary:      Effort: Pulmonary effort is normal. No respiratory distress.      Breath sounds: Normal breath sounds. No wheezing or rales.   Musculoskeletal:         General: Normal range of motion.      Cervical back: Normal range of motion and neck supple.      Comments: Wheelchair    Skin:     General: Skin is warm and dry.   Neurological:      Mental Status: She is alert and oriented to person, place, and time.   Psychiatric:         Behavior: Behavior normal.         Thought Content: Thought content normal.         Judgment: Judgment normal.        Result Review :  The following data was reviewed by: HALIMA Lemus on 11/09/2021:  CT Chest Without Contrast Diagnostic (11/02/2021 09:00)    Rest/Exercise Pulse Ox Values        Some values may be hidden. Unless noted otherwise, only the newest values recorded on each date are displayed.         Rest/Exercise Pulse Ox Results 8/31/21   Rest on O2 @ Liters 2L   Rest on O2 SAT % 94   Exercise on O2 @ Liters 4L   Exercise on O2 SAT % 90           PFT Values        Some values may be hidden. Unless noted otherwise, only the newest values recorded on each date are displayed.         Old Values PFT Results 10/7/21   No data to display.      Pre Drug PFT Results 10/7/21   FVC 61   FEV1 38   FEF 25-75% 18   FEV1/FVC 46.65      Post Drug PFT Results 10/7/21   No data to display.      Other Tests PFT Results 10/7/21 "   TLC 93      DLCO 41   D/VAsb 67             Results for orders placed in visit on 10/07/21    Pulmonary Function Test    Narrative  Pulmonary Function Test  Performed by: Beth Crawford, RRT  Authorized by: Beth Tony APRN    Pre Drug % Predicted  FVC: 61%  FEV1: 38%  FEF 25-75%: 18%  FEV1/FVC: 46.65%  T%  RV: 138%  DLCO: 41%  D/VAsb: 67%    Interpretation  Spirometry  Spirometry shows severe obstruction. There is reduced midflow suggesting small airway/airflow obstruction.  Review of FVL curve  Patient's effort is normal.  Lung Volume Measurements  Measurements show elevated residual volume consistent with gas trapping.  Diffusion Capacity  The patient's diffusion capacity is severely reduced.  Diffusion capacity is moderately reduced when corrected for alveolar volume.           Assessment and Plan  Diagnoses and all orders for this visit:    1. Stage 3 severe COPD by GOLD classification ([facility]) (Primary)  Comments:  Continue Symbicort.  Trial Spiriva.  Inhaler demonstration provided.  Hold DuoNeb while trialing Spiriva.  Okay to use albuterol neb and albuterol HFA as needed  Orders:  -     tiotropium bromide monohydrate (Spiriva Respimat) 2.5 MCG/ACT aerosol solution inhaler; Inhale 2 puffs Daily for 30 days.  Dispense: 2 each; Refill: 0    2. Pulmonary emphysema, unspecified emphysema type ([facility])  Comments:  Continue bronchodilators as noted above    3. Chronic respiratory failure with hypoxia and hypercapnia ([facility])  Comments:  Continue chronic oxygen therapy.  3 L with exertion and 2 L with rest    4. Gastroesophageal reflux disease, unspecified whether esophagitis present  Comments:  Continue Protonix    5. Cigarette smoker  Comments:  Recommend smoking cessation    6. Chronic diastolic (congestive) heart failure ([facility])  Comments:  The patient reports an 8 pound weight gain this week.  Would recommend that she follow-up with her PCP.  May need to consider referral to  cardiology.    7. Pleural effusion, right  Comments:  Chronic right-sided pleural effusion.  This has not worsened.    8. Consolidation of right lower lobe of lung (HCC)  Comments:  Continue to monitor for symptoms of infection.  The patient denies fevers, chills, cough with purulent sputum today.  If the patient develops s/s of infection then may need to consider bronchoscopy.  This has been discussed with Dr. Campos.      Health maintenance:   Influenza vaccine: Yes  Pneumovax 23: Yes  Prevnar 13: Yes  Covid 19: Yes  Follow Up  Beth Tony, APRN  11/9/2021  14:22 CST  Return in about 3 months (around 2/9/2022).  Patient was given instructions and counseling regarding her condition or for health maintenance advice. Please see specific information pulled into the AVS if appropriate.   Please note that portions of this note were completed with a voice recognition program.

## 2021-11-09 NOTE — PATIENT INSTRUCTIONS
Chronic Obstructive Pulmonary Disease  Chronic obstructive pulmonary disease (COPD) is a long-term (chronic) lung problem. When you have COPD, it is hard for air to get in and out of your lungs. Usually the condition gets worse over time, and your lungs will never return to normal. There are things you can do to keep yourself as healthy as possible.  · Your doctor may treat your condition with:  ? Medicines.  ? Oxygen.  ? Lung surgery.  · Your doctor may also recommend:  ? Rehabilitation. This includes steps to make your body work better. It may involve a team of specialists.  ? Quitting smoking, if you smoke.  ? Exercise and changes to your diet.  ? Comfort measures (palliative care).  Follow these instructions at home:  Medicines  · Take over-the-counter and prescription medicines only as told by your doctor.  · Talk to your doctor before taking any cough or allergy medicines. You may need to avoid medicines that cause your lungs to be dry.  Lifestyle  · If you smoke, stop. Smoking makes the problem worse. If you need help quitting, ask your doctor.  · Avoid being around things that make your breathing worse. This may include smoke, chemicals, and fumes.  · Stay active, but remember to rest as well.  · Learn and use tips on how to relax.  · Make sure you get enough sleep. Most adults need at least 7 hours of sleep every night.  · Eat healthy foods. Eat smaller meals more often. Rest before meals.  Controlled breathing  Learn and use tips on how to control your breathing as told by your doctor. Try:  · Breathing in (inhaling) through your nose for 1 second. Then, pucker your lips and breath out (exhale) through your lips for 2 seconds.  · Putting one hand on your belly (abdomen). Breathe in slowly through your nose for 1 second. Your hand on your belly should move out. Pucker your lips and breathe out slowly through your lips. Your hand on your belly should move in as you breathe out.    Controlled coughing  Learn  and use controlled coughing to clear mucus from your lungs. Follow these steps:  1. Lean your head a little forward.  2. Breathe in deeply.  3. Try to hold your breath for 3 seconds.  4. Keep your mouth slightly open while coughing 2 times.  5. Spit any mucus out into a tissue.  6. Rest and do the steps again 1 or 2 times as needed.  General instructions  · Make sure you get all the shots (vaccines) that your doctor recommends. Ask your doctor about a flu shot and a pneumonia shot.  · Use oxygen therapy and pulmonary rehabilitation if told by your doctor. If you need home oxygen therapy, ask your doctor if you should buy a tool to measure your oxygen level (oximeter).  · Make a COPD action plan with your doctor. This helps you to know what to do if you feel worse than usual.  · Manage any other conditions you have as told by your doctor.  · Avoid going outside when it is very hot, cold, or humid.  · Avoid people who have a sickness you can catch (contagious).  · Keep all follow-up visits as told by your doctor. This is important.  Contact a doctor if:  · You cough up more mucus than usual.  · There is a change in the color or thickness of the mucus.  · It is harder to breathe than usual.  · Your breathing is faster than usual.  · You have trouble sleeping.  · You need to use your medicines more often than usual.  · You have trouble doing your normal activities such as getting dressed or walking around the house.  Get help right away if:  · You have shortness of breath while resting.  · You have shortness of breath that stops you from:  ? Being able to talk.  ? Doing normal activities.  · Your chest hurts for longer than 5 minutes.  · Your skin color is more blue than usual.  · Your pulse oximeter shows that you have low oxygen for longer than 5 minutes.  · You have a fever.  · You feel too tired to breathe normally.  Summary  · Chronic obstructive pulmonary disease (COPD) is a long-term lung problem.  · The way your  lungs work will never return to normal. Usually the condition gets worse over time. There are things you can do to keep yourself as healthy as possible.  · Take over-the-counter and prescription medicines only as told by your doctor.  · If you smoke, stop. Smoking makes the problem worse.  This information is not intended to replace advice given to you by your health care provider. Make sure you discuss any questions you have with your health care provider.  Document Revised: 11/30/2018 Document Reviewed: 01/22/2018  [employer] Patient Education © 2021 [employer]      Gastroesophageal Reflux Disease, Adult    Gastroesophageal reflux (CAHNDNI) happens when acid from the stomach flows up into the tube that connects the mouth and the stomach (esophagus). Normally, food travels down the esophagus and stays in the stomach to be digested. With CHANDNI, food and stomach acid sometimes move back up into the esophagus.  You may have a disease called gastroesophageal reflux disease (GERD) if the reflux:  · Happens often.  · Causes frequent or very bad symptoms.  · Causes problems such as damage to the esophagus.  When this happens, the esophagus becomes sore and swollen. Over time, GERD can make small holes (ulcers) in the lining of the esophagus.  What are the causes?  This condition is caused by a problem with the muscle between the esophagus and the stomach. When this muscle is weak or not normal, it does not close properly to keep food and acid from coming back up from the stomach.  The muscle can be weak because of:  · Tobacco use.  · Pregnancy.  · Having a certain type of hernia (hiatal hernia).  · Alcohol use.  · Certain foods and drinks, such as coffee, chocolate, onions, and peppermint.  What increases the risk?  · Being overweight.  · Having a disease that affects your connective tissue.  · Taking NSAIDs, such a ibuprofen.  What are the signs or symptoms?  · Heartburn.  · Difficult or painful swallowing.  · The feeling of  having a lump in the throat.  · A bitter taste in the mouth.  · Bad breath.  · Having a lot of saliva.  · Having an upset or bloated stomach.  · Burping.  · Chest pain. Different conditions can cause chest pain. Make sure you see your doctor if you have chest pain.  · Shortness of breath or wheezing.  · A long-term cough or a cough at night.  · Wearing away of the surface of teeth (tooth enamel).  · Weight loss.  How is this treated?  · Making changes to your diet.  · Taking medicine.  · Having surgery.  Treatment will depend on how bad your symptoms are.  Follow these instructions at home:  Eating and drinking    · Follow a diet as told by your doctor. You may need to avoid foods and drinks such as:  ? Coffee and tea, with or without caffeine.  ? Drinks that contain alcohol.  ? Energy drinks and sports drinks.  ? Bubbly (carbonated) drinks or sodas.  ? Chocolate and cocoa.  ? Peppermint and mint flavorings.  ? Garlic and onions.  ? Horseradish.  ? Spicy and acidic foods. These include peppers, chili powder, lovell powder, vinegar, hot sauces, and BBQ sauce.  ? Citrus fruit juices and citrus fruits, such as oranges, aline, and limes.  ? Tomato-based foods. These include red sauce, chili, salsa, and pizza with red sauce.  ? Fried and fatty foods. These include donuts, french fries, potato chips, and high-fat dressings.  ? High-fat meats. These include hot dogs, rib eye steak, sausage, ham, and dillard.  ? High-fat dairy items, such as whole milk, butter, and cream cheese.  · Eat small meals often. Avoid eating large meals.  · Avoid drinking large amounts of liquid with your meals.  · Avoid eating meals during the 2-3 hours before bedtime.  · Avoid lying down right after you eat.  · Do not exercise right after you eat.    Lifestyle    · Do not smoke or use any products that contain nicotine or tobacco. If you need help quitting, ask your doctor.  · Try to lower your stress. If you need help doing this, ask your  doctor.  · If you are overweight, lose an amount of weight that is healthy for you. Ask your doctor about a safe weight loss goal.    General instructions  · Pay attention to any changes in your symptoms.  · Take over-the-counter and prescription medicines only as told by your doctor.  · Do not take aspirin, ibuprofen, or other NSAIDs unless your doctor says it is okay.  · Wear loose clothes. Do not wear anything tight around your waist.  · Raise (elevate) the head of your bed about 6 inches (15 cm). You may need to use a wedge to do this.  · Avoid bending over if this makes your symptoms worse.  · Keep all follow-up visits.  Contact a doctor if:  · You have new symptoms.  · You lose weight and you do not know why.  · You have trouble swallowing or it hurts to swallow.  · You have wheezing or a cough that keeps happening.  · You have a hoarse voice.  · Your symptoms do not get better with treatment.  Get help right away if:  · You have sudden pain in your arms, neck, jaw, teeth, or back.  · You suddenly feel sweaty, dizzy, or light-headed.  · You have chest pain or shortness of breath.  · You vomit and the vomit is green, yellow, or black, or it looks like blood or coffee grounds.  · You faint.  · Your poop (stool) is red, bloody, or black.  · You cannot swallow, drink, or eat.  These symptoms may represent a serious problem that is an emergency. Do not wait to see if the symptoms will go away. Get medical help right away. Call your local emergency services (911 in the [place] Do not drive yourself to the hospital.  Summary  · If a person has gastroesophageal reflux disease (GERD), food and stomach acid move back up into the esophagus and cause symptoms or problems such as damage to the esophagus.  · Treatment will depend on how bad your symptoms are.  · Follow a diet as told by your doctor.  · Take all medicines only as told by your doctor.  This information is not intended to replace advice given to you by your  health care provider. Make sure you discuss any questions you have with your health care provider.  Document Revised: 06/28/2021 Document Reviewed: 06/28/2021  [employer] Patient Education © 2021 [employer]

## 2021-11-19 NOTE — TELEPHONE ENCOUNTER
Need to submit sputum sample. Will place order.  Try to produce the sputum sample prior to starting the antibiotic if able to do so.      As previously discussed about a week ago if she were to be sick again then may need to consider bronchoscopy with Dr. Campos.  Will reach out to him as well.  She will need office follow up in approximately 2 weeks to further discuss.  If any worsening then between now and then I recommend urgent care or ER.      Dr. Campos, sending to you as well as we have previously discussed possible bronch.

## 2021-11-19 NOTE — TELEPHONE ENCOUNTER
Patient called stating that she was short of breath and coughing up some yellow/green phlegm. She states of no fever. She would like to know if you would send something to [facility] [address] for her.

## 2021-11-19 NOTE — TELEPHONE ENCOUNTER
Patient notified of message. She said she wouldn't be able to get sputum cup until Monday due to her granddaughter being her  transportation and at work till after we close. She will be able to get the antibiotic after her granddaughter gets off tonight. She was going to call the lab at the hospital to see if they would supply her a sputum cup there.  She stated that if she got worse that she would go to the hospital.

## 2021-11-19 NOTE — TELEPHONE ENCOUNTER
Notified granddaughter that if she wasn't able to  the sputum cup that Ms Hicks could go ahead and start antibiotic per Beth    Sputum cup and instructions are up front in case she is able to get before we close

## 2021-11-28 NOTE — TELEPHONE ENCOUNTER
Caller wanting to know side effects of medications that were prescribed in the ED yesterday;  Teaching provided    Reason for Disposition  • Caller has medicine question only, adult not sick, AND triager answers question    Additional Information  • Negative: [1] Intentional drug overdose AND [2] suicidal thoughts or ideas  • Negative: Drug overdose and triager unable to answer question  • Negative: Caller requesting information unrelated to medicine  • Negative: Caller requesting information about COVID-19 Vaccine  • Negative: Caller requesting information about Emergency Contraception  • Negative: Caller requesting information about Combined Birth Control Pills  • Negative: Caller requesting information about Progestin Birth Control Pills  • Negative: Caller requesting information about Post-Op pain or medicines  • Negative: Caller requesting a prescription antibiotic (such as Penicillin) for Strep throat and has a positive culture result  • Negative: Caller requesting a prescription anti-viral med (such as Tamiflu) and has influenza (flu)  symptoms  • Negative: Immunization reaction suspected  • Negative: Rash while taking a medicine or within 3 days of stopping it  • Negative: [1] Asthma and [2] having symptoms of asthma (cough, wheezing, etc.)  • Negative: [1] Symptom of illness (e.g., headache, abdominal pain, earache, vomiting) AND [2] more than mild  • Negative: Breastfeeding questions about mother's medicines and diet  • Negative: MORE THAN A DOUBLE DOSE of a prescription or over-the-counter (OTC) drug  • Negative: [1] DOUBLE DOSE (an extra dose or lesser amount) of prescription drug AND [2] any symptoms (e.g., dizziness, nausea, pain, sleepiness)  • Negative: [1] DOUBLE DOSE (an extra dose or lesser amount) of over-the-counter (OTC) drug AND [2] any symptoms (e.g., dizziness, nausea, pain, sleepiness)  • Negative: Took another person's prescription drug  • Negative: [1] DOUBLE DOSE (an extra dose or lesser  amount) of prescription drug AND [2] NO symptoms (Exception: a double dose of antibiotics)  • Negative: Diabetes drug error or overdose (e.g., took wrong type of insulin or took extra dose)  • Negative: [1] Prescription refill request for ESSENTIAL medicine (i.e., likelihood of harm to patient if not taken) AND [2] triager unable to refill per department policy  • Negative: [1] Prescription not at pharmacy AND [2] was prescribed by PCP recently (Exception: triager has access to EMR and prescription is recorded there. Go to Home Care and confirm for pharmacy.)  • Negative: [1] Pharmacy calling with prescription question AND [2] triager unable to answer question  • Negative: [1] Caller has URGENT medicine question about med that PCP or specialist prescribed AND [2] triager unable to answer question  • Negative: Medicine patch causing local rash or itching  • Negative: [1] Caller has medicine question about med NOT prescribed by PCP AND [2] triager unable to answer question (e.g., compatibility with other med, storage)  • Negative: Prescription request for new medicine (not a refill)  • Negative: Prescription refill request for a CONTROLLED substance (e.g., narcotics, ADHD medicines)  • Negative: [1] Prescription refill request for NON-ESSENTIAL medicine (i.e., no harm to patient if med not taken) AND [2] triager unable to refill per department policy  • Negative: [1] Caller has NON-URGENT medicine question about med that PCP prescribed AND [2] triager unable to answer question  • Negative: Caller wants to use a complementary or alternative medicine  • Negative: [1] Prescription prescribed recently is not at pharmacy AND [2] triager has access to patient's EMR AND [3] prescription is recorded in the EMR  • Negative: [1] DOUBLE DOSE (an extra dose or lesser amount) of over-the-counter (OTC) drug AND [2] NO symptoms  • Negative: [1] DOUBLE DOSE (an extra dose or lesser amount) of antibiotic drug AND [2] NO  "symptoms    Answer Assessment - Initial Assessment Questions  1. NAME of MEDICATION: \"What medicine are you calling about?\"      Medrol pack  2. QUESTION: \"What is your question?\" (e.g., medication refill, side effect)      Side effects of medication  3. PRESCRIBING HCP: \"Who prescribed it?\" Reason: if prescribed by specialist, call should be referred to that group.      ED MD  4. SYMPTOMS: \"Do you have any symptoms?\"      Filling as if she is having trouble breathing and heaviness  5. SEVERITY: If symptoms are present, ask \"Are they mild, moderate or severe?\"      mild  6. PREGNANCY:  \"Is there any chance that you are pregnant?\" \"When was your last menstrual period?\"      NA    Protocols used: MEDICATION QUESTION CALL-ADULT-      "

## 2021-11-29 NOTE — TELEPHONE ENCOUNTER
Caller: Cindy Hicks    Relationship: Self    Best call back number: [phone]    What is the best time to reach you:ANY    Who are you requesting to speak with (clinical staff, provider,  specific staff member):CLINICAL      What was the call regarding: PATIENT STATES SHE WAS TOLD TO CALL BACK IF SHE STARTS TAKING MORE LASIK. PATIENT . PATIENT SAYS HER EARS AND NOSE ARE CLOGGED AND SHE NEEDS TO GET THAT TAKEN CARE OF. PATIENT WOULD LIKE A CALL BACK CONCERNING HER CONDITION SHE SAYS    Do you require a callback: YES

## 2021-11-29 NOTE — TELEPHONE ENCOUNTER
PATIENT HAS CALLED, SHE STATED HE NOSE AND HEAD ARE CLOGGED, SHE STATED THAT SHE HASNT HARDLY SLEPT IN THE LAST 3 NIGHTS.   PATIENT STATED THAT SHE HAS PINCHED NERVES IN HER BACK AND IS ASKING FOR TYLENO # 3.  SHE STATED THAT SHE CANNOT TAKE PAIN PILLS.

## 2021-11-30 NOTE — TELEPHONE ENCOUNTER
PATIENT HAS BEEN CALLED, GIVEN MESSAGE AND UNDERSTANDING VOICED.    SHE WILL CALL BACK TO MAKE AN APPOINTMENT, SHE HAS TO GET A RIDE FROM HER GRANDDAUGHTER.

## 2021-12-01 NOTE — TELEPHONE ENCOUNTER
PATIENT HAS CALLED, SHE STATED THAT SHE DOES NOT HAVE INTERNET OR ANY WAY TO DO A VIRTUAL VISIT.  SHE ASK IF THE VISIT COULD BE DONE BY TELEPHONE.  IT WAS EXPLAINED TO PATIENT THAT THIS OFFICE NOT LONGER DOES TELEPHONE VISITS AND PER CODEY VARGHESE IT WOULD BE HARD TO ASSESS PAIN OVER THE PHONE.   PATIENT VOICED UNDERSTANDING.  PATIENT STATED THAT SHE COULD GET HER GRANDDAUGHTER TO BRING HER BUT SHE WOULD HAVE TO HAVE A LATE DAY APPOINTMENT AT 4pm.  PATIENT IS SCHEDULED WITH EMMETT VARGHESE ON 12/02/21

## 2021-12-02 NOTE — PROGRESS NOTES
Chief Complaint   Patient presents with   • Follow-up   • Back Pain   • Hip Pain         History:  Cindy Hicks is a 86 y.o. female who presents today for evaluation of the above problems.          She went to ER on 11/26/21 severe right-sided low back pain and right hip pain.  She was given Norco, and this made her sick.  She has just finished a medrol dose pack as well. States can take Tylenol #3 and would like to try this.  States she hasn't slept in week due to pain. Hurts to move her hip in any way and to weight bear.    CT lumbar spine showed multi-level disc disease including bulging.  She has not tried PT yet.  Has no way to get to PT regularly due to granddaughter living in [facility].  She does have a grandson living with her right now since she has been hurting.      There has been no new injury or exact moment of pain starting. She had been up on her feet a lot cooking the few days before the pain began.      ROS:  Review of Systems  As above    Allergies   Allergen Reactions   • Codeine Nausea And Vomiting     12/2/21 States can take Tylenol #3   • Valium [Diazepam] Nausea Only     Past Medical History:   Diagnosis Date   • Actinic keratosis    • Arthritis    • Atherosclerosis    • Benign fundic gland polyps of stomach    • Bleeding ulcer    • Carotid artery bruit    • Carotid artery stenosis    • COPD (chronic obstructive pulmonary disease) ([facility])    • Diverticulosis    • Emphysema of lung ([facility])    • History of colon polyps    • Hyperlipidemia    • Hypertension    • IBS (irritable bowel syndrome)    • Macular degeneration    • Osteoporosis    • Prediabetes    • PVD (peripheral vascular disease) ([facility])    • TIA (transient ischemic attack)    • Vitamin D deficiency      Past Surgical History:   Procedure Laterality Date   • CATARACT EXTRACTION     • COLONOSCOPY  03/15/2013    polyp, hyperplastic   • COLONOSCOPY N/A 10/2/2018    Procedure: COLONOSCOPY WITH ANESTHESIA;  Surgeon: Harris Escobar  MD;  Location: [facility] ENDOSCOPY;  Service: Gastroenterology   • CYST REMOVAL     • ENDOSCOPY  2019   • ENDOSCOPY N/A 10/4/2019    Procedure: ESOPHAGOGASTRODUODENOSCOPY WITH ANESTHESIA;  Surgeon: Harris Escobar MD;  Location: [facility] ENDOSCOPY;  Service: Gastroenterology   • FEMORAL ARTERY STENT     • HYSTERECTOMY     • VASCULAR SURGERY      multiple     Family History   Problem Relation Age of Onset   • Colon cancer Sister    • Cancer Sister    • Colon polyps Brother    • Heart disease Daughter    • Heart disease Son    • Cancer Mother    • Cancer Father      Cindy  reports that she has quit smoking. Her smoking use included cigarettes. She has a 35.00 pack-year smoking history. She has never used smokeless tobacco. She reports that she does not drink alcohol and does not use drugs.    I have reviewed and updated the above documentation (if necessary) including but not limited to chief complaint, ROS, PFSH, allergies and medications        Current Outpatient Medications:   •  albuterol (PROVENTIL) (2.5 MG/3ML) 0.083% nebulizer solution, Take 2.5 mg by nebulization Every 4 (Four) Hours As Needed for Wheezing or Shortness of Air., Disp: 180 mL, Rfl: 3  •  amLODIPine (NORVASC) 10 MG tablet, Take 1 tablet by mouth Daily., Disp: 90 tablet, Rfl: 1  •  aspirin 81 MG EC tablet, Take 81 mg by mouth Daily., Disp: , Rfl:   •  atorvastatin (LIPITOR) 40 MG tablet, Take 1 tablet by mouth Daily., Disp: 90 tablet, Rfl: 1  •  benzonatate (TESSALON) 100 MG capsule, Take 100 mg by mouth 3 (Three) Times a Day As Needed for Cough., Disp: , Rfl:   •  budesonide-formoterol (SYMBICORT) 160-4.5 MCG/ACT inhaler, Inhale 2 puffs 2 (Two) Times a Day., Disp: 30.6 g, Rfl: 3  •  dicyclomine (BENTYL) 10 MG capsule, Take 10 mg by mouth 2 (Two) Times a Day As Needed., Disp: , Rfl:   •  furosemide (Lasix) 20 MG tablet, Take 1 tablet by mouth Daily As Needed (Weight gain, shortness of breath, or swelling)., Disp: 30 tablet, Rfl: 2  •   "ipratropium-albuterol (DUO-NEB) 0.5-2.5 mg/3 ml nebulizer, Take 3 mL by nebulization 4 (Four) Times a Day., Disp: 120 mL, Rfl: 11  •  lidocaine (LIDODERM) 5 %, Place 1 patch on the skin as directed by provider Daily. Remove & Discard patch within 12 hours or as directed by MD, Disp: 15 each, Rfl: 0  •  lisinopril (PRINIVIL,ZESTRIL) 40 MG tablet, Take 1 tablet by mouth Daily., Disp: 90 tablet, Rfl: 1  •  magnesium hydroxide (MILK OF MAGNESIA) 400 MG/5ML suspension, Take  by mouth Daily As Needed for Constipation., Disp: , Rfl:   •  Mucinex 600 MG 12 hr tablet, Take 2 tablets by mouth 2 (Two) Times a Day., Disp: , Rfl:   •  multivitamin with minerals (VITRUM 50+ ADULT-MULTI PO), Take 1 tablet by mouth Daily., Disp: , Rfl:   •  O2 (OXYGEN), Inhale 2 L/min 1 (One) Time., Disp: , Rfl:   •  ondansetron ODT (ZOFRAN-ODT) 4 MG disintegrating tablet, Place 1 tablet on the tongue Every 8 (Eight) Hours As Needed for Nausea or Vomiting., Disp: 12 tablet, Rfl: 0  •  pantoprazole (PROTONIX) 40 MG EC tablet, Take 40 mg by mouth 2 (Two) Times a Day., Disp: , Rfl:   •  pramipexole (MIRAPEX) 0.25 MG tablet, Take 0.25-0.5 mg by mouth At Night As Needed., Disp: , Rfl:   •  tiotropium bromide monohydrate (Spiriva Respimat) 2.5 MCG/ACT aerosol solution inhaler, Inhale 2 puffs Daily for 30 days., Disp: 2 each, Rfl: 0  •  vitamin D (ERGOCALCIFEROL) 1.25 MG (54205 UT) capsule capsule, Take 1 capsule by mouth 1 (One) Time Per Week., Disp: 12 capsule, Rfl: 1  •  acetaminophen-codeine (TYLENOL #3) 300-30 MG per tablet, Take 1 tablet by mouth Every 6 (Six) Hours As Needed for Moderate Pain ., Disp: 10 tablet, Rfl: 0  •  docusate sodium (COLACE) 100 MG capsule, Take 1 capsule by mouth 2 (Two) Times a Day., Disp: 20 capsule, Rfl: 0    OBJECTIVE:  Visit Vitals  /58 (BP Location: Right arm, Patient Position: Sitting, Cuff Size: Adult)   Pulse 76   Ht 165.1 cm (65\")   Wt 67.1 kg (148 lb) Comment: pt reported   SpO2 95%   BMI 24.63 kg/m²    "   Physical Exam  Vitals and nursing note reviewed.   Constitutional:       General: She is not in acute distress.     Appearance: Normal appearance. She is not ill-appearing, toxic-appearing or diaphoretic.      Comments: In wheelchair, very pleasant and appropriate.   HENT:      Head: Normocephalic and atraumatic.   Cardiovascular:      Rate and Rhythm: Regular rhythm.      Heart sounds: Normal heart sounds.   Pulmonary:      Breath sounds: Normal breath sounds.   Abdominal:      Palpations: Abdomen is soft.   Musculoskeletal:         General: Tenderness present.      Right lower leg: No edema.      Left lower leg: No edema.      Comments: Tender right SI region, winces.  Also winces with palpation of right hip bursa region.  Most concerning is the amount of pain she has when I just tried to lift her foot to put it on the wheelchair foot rest.  Just this slight lifting of the foot, moving the hip, caused her to cry out in pain.    Both calves are soft and non-tender.   Skin:     General: Skin is warm and dry.      Findings: No rash (no rash in area of pain).   Neurological:      Mental Status: She is alert.   Psychiatric:         Mood and Affect: Mood normal.         Behavior: Behavior normal.         Thought Content: Thought content normal.         Judgment: Judgment normal.     Study Result    Narrative & Impression   XR HIP W OR WO PELVIS 2-3 VIEW RIGHT- 11/26/2021 1:23 PM CST     HISTORY: r hip pain     COMPARISON: None      FINDINGS:      Frontal and lateral views of the right hip were obtained.  Additional AP  pelvis.     There is no fracture or joint subluxation. Bilateral mild hip joint  osteoarthritis change.      Pelvic ring is intact. Sacral arches are intact. Atheromatous vascular  calcification.      IMPRESSION:  1. No acute osseous injury or malalignment at the hip joint. Pelvic ring  is intact.           This report was finalized on 11/26/2021 13:40 by Dr Slim Cancino,     Study Result    Narrative  & Impression   EXAMINATION:  CT LUMBAR SPINE WO CONTRAST-  11/26/2021 1:02 PM CST     HISTORY: Low back pain radiating down the right leg. No x-rays were  obtained. No trauma is reported.     TECHNIQUE: Spiral CT was performed of the lumbar spine. Sagittal and  coronal images were reconstructed.     DLP: 389 mGy-cm. Automated dosage control was utilized.     COMPARISON: No comparison study.     FINDINGS: There is atheromatous disease of the aortoiliac vessels. There  are bilateral common iliac artery stents. There is a 2.6 cm probable  cyst arising from the lower pole left kidney seen best on the coronal  images. There are nonobstructive renal stones in the upper pole right  kidney measuring up to 5.7 mm. 2 stones are identified. There is no  visible lumbar spine fracture.     At T12-L1, there is minimal disc narrowing. There is no disc herniation  or significant disc bulging.     At L1-2, there is moderate disc narrowing. There is mild disc bulging.  There is vacuum disc phenomenon. There is minimal inferior recess  foraminal stenosis bilaterally.     At L2-3, there is moderate disc narrowing. There is vacuum disc  phenomena. There is mild disc bulging. There is disc annulus  calcification. There is mild inferior recess foraminal narrowing  bilaterally.     At L3-4, there is mild disc narrowing and mild to moderate disc bulging.  There is mild thickening of ligamentum flavum. There is mild narrowing  of the central spinal canal due to these factors. There is mild to  moderate inferior recess foraminal narrowing right greater than left.     At L4-5, the disc is fairly well-maintained in height. There is mild to  moderate disc bulging. There is thickening of ligamentum flavum. There  appears to be moderate narrowing of the central canal/dural sac. There  is moderate bilateral inferior recess foraminal stenosis.     At L5-S1, there is mild to moderate disc narrowing. There is vacuum disc  phenomena. There is mild disc  bulging. There is mild facet arthropathy.  No significant central spinal canal narrowing is seen. There is moderate  inferior recess foraminal narrowing bilaterally.     IMPRESSION:  1. No acute fracture is seen.  2. Degenerative changes throughout the lumbar spine, as described.  3. Atheromatous disease of the aortoiliac vessels. Bilateral common  iliac artery stents.  4. Nonobstructive renal stones on the right measuring up to 5.7 mm.  Probable cyst arising from the lower pole left kidney measuring 2.6 cm  that is not fully characterized on this exam.     The full report of this exam was immediately signed and available to the  emergency room. The patient is currently in the emergency room.  This report was finalized on 11/26/2021 13:46 by Dr. Collin So MD.         [facility]    Assessment/Plan    Diagnoses and all orders for this visit:    1. Lumbar disc disease with radiculopathy (Primary)  -     Ambulatory Referral to Home Health    2. Right hip pain  -     Ambulatory Referral to Home Health  -     acetaminophen-codeine (TYLENOL #3) 300-30 MG per tablet; Take 1 tablet by mouth Every 6 (Six) Hours As Needed for Moderate Pain .  Dispense: 10 tablet; Refill: 0  -     MRI hip right wo contrast; Future    Would like to have home health work with her on PT for the low back and the hip pain.  She's just finished a steroid pack.  She has both renal failure and history of bleeding peptic ulcers, so I am not going to prescribe an NSAID.  She has someone living with her, so I will try the Tylenol #3 to see if she can get some relief and some sleep.    When I tried to put the order in for MRI, it says she has implant that may not be MRI compatible.  I have reviewed chart; she has femoral artery stent, but no other known implant.  I have checked with vascular surgery, who says these should be MRI compatible.  Have reviewed with patient and granddaughter to determine if there is any other implant or any reason they have been  told not to have MRI. Patient states she has had one in the past.  Have also reviewed with Dr. Wade.  Feel MRI is best for evaluation of hip pain to rule out hairline fracture or other reason for debilitating pain.    Education materials and an After Visit Summary were printed and given to the patient at discharge.  No follow-ups on file.         Ирина Hernandez, HALIMA   17:07 CST  12/3/2021

## 2021-12-06 NOTE — TELEPHONE ENCOUNTER
Needs COVID test done, which is ordered. Will then need OV or video visit tomorrow to address what, if anything, we need to do from a medication standpoint. OTC measures such as cough syrup, symptom management, etc are appropriate for now.

## 2021-12-06 NOTE — TELEPHONE ENCOUNTER
COVID scheduling has been contacted and they will be reaching out to patient to schedule the test.

## 2021-12-06 NOTE — TELEPHONE ENCOUNTER
Caller: Cindy Hicks    Relationship: Self    Best call back number:[phone]    What medication are you requesting: MEDICATION     What are your current symptoms: SINUSES ISSUES     How long have you been experiencing symptoms: COUGH, CONGESTION     Have you had these symptoms before:    [x] Yes  [] No    Have you been treated for these symptoms before:   [x] Yes  [] No    If a prescription is needed, what is your preferred pharmacy and phone number: [id number] - [place] - [address] [phone] [facility] [phone]

## 2021-12-07 NOTE — HOME HEALTH
Pt has long O2 line @ 2lpm/nc. Explained to her she probably is not getting 2L O2 with the long line and has macular degeneration which multiplies her falls risk. She refuses to change anything in her home to decrease falls risk by moving concentrator closer to her. PT to continue to educate.

## 2021-12-07 NOTE — PROGRESS NOTES
Documentation of Medical Director/Physician Face to Face Attestation    Name Cindy Hicks   1935  MRN [id number]    Benefit Period: No benefit period information found      Hospice Diagnosis: No Hospice terminal diagnosis.    I confirm that I had a face to face encounter with Cindy Hicks on ***. I used the clinical findings from my face to face encounter to determine eligibility for hospice care. Those clinical findings include ***    I confirm that I composed this narrative statement and that it is based on my review of the patient's medical record and/or examination of the patient.

## 2021-12-08 NOTE — TELEPHONE ENCOUNTER
Would you please check with patient to be sure MRI hip has been scheduled.  Thank you! If not, please call scheduling and see if we can get it done urgently this week.  Thank you!

## 2021-12-08 NOTE — TELEPHONE ENCOUNTER
PATIENT HAS BEEN CALLED, SHE IS SCHEDULED FOR MRI ON 12-15-21.    PATIENT IS OUT OF THE TYLENOL #3 AND STATED THAT SHE NEEDS THE ONDANSETRON.

## 2021-12-09 RX ORDER — ATORVASTATIN CALCIUM 80 MG/1
TABLET, FILM COATED ORAL
Qty: 90 TABLET | Refills: 1 | OUTPATIENT
Start: 2021-12-09

## 2021-12-09 RX ORDER — PRAMIPEXOLE DIHYDROCHLORIDE 0.25 MG/1
TABLET ORAL
Qty: 180 TABLET | Refills: 1 | OUTPATIENT
Start: 2021-12-09

## 2021-12-09 NOTE — HOME HEALTH
Pt/cg agreeable to SN evaluation under the care of Dr. Austin Wade. Medication reconciliation completed and no issues found. No recent falls and no upcoming insurance changes. Pt had no further questions regarding medication and/or plan of care. A&O X 4. Pt c/o severe back pain. The pt's BP was elevated at 170/50 at the beginning of the visit, and increased more to 180/60 at the end of the visit. The pt wasn't having a headache. The pt stated she was feeling very anxious and she was in pain. The pt took her scheduled lisinopril, 40 mg, at the end of this SNV. The pt is supposed to take her lasix as needed, but the pt stated she has had to take it everyday due to swelling and weight gain. The pt's grandaughter stated that the pt has kidney issues and the MD wants her to be careful with how much lasix she takes. The pt is going to talk with Dr. Wade about this in her televisit. All other VSS. The pt's O2 on 2.5 L dropped to 91% when she was walking. I educated the pt and her grandaughter on fall and pressure injury precautions, bleeding precautions, medications, pulmonary hygiene, the importance of staying hydrated and maintaining a nutritional diet, S&S of COPD exacerbation, S&S of CHF exacerbation, weighing daily, taking BP 2X daily, and pain management via teachback. Pt and grandauter v/u. I am going to send Dr. Wade a message about the pt and her condition during this SNV.

## 2021-12-09 NOTE — PROGRESS NOTES
Chief Complaint   Patient presents with   • Cough   • Shortness of Breath   • Depression   • Anxiety   • Leg Swelling     You have chosen to receive care through a telehealth visit.  Do you consent to use a video/audio connection for your medical care today? Yes    History:  Cindy Hicks is a 86 y.o. female who presents today for follow-up for evaluation of the above:    Illness  This is a chronic problem. The current episode started more than 1 month ago. The problem occurs daily. The problem has been gradually worsening. Associated symptoms include congestion, coughing and a sore throat. The symptoms are aggravated by coughing. The treatment provided no relief.   her only inhaled therapy at this time is symbicort and duo neb.     Home health nurse asks about changing her Lasix to Bumex.         ROS:  Review of Systems   HENT: Positive for congestion and sore throat.    Respiratory: Positive for cough and shortness of breath.        Ms. Hicks  reports that she has quit smoking. Her smoking use included cigarettes. She has a 35.00 pack-year smoking history. She has never used smokeless tobacco. She reports that she does not drink alcohol and does not use drugs.      Current Outpatient Medications:   •  acetaminophen-codeine (TYLENOL #3) 300-30 MG per tablet, Take 1 tablet by mouth 2 (Two) Times a Day. Last dose 12/6/21 in PM., Disp: 10 tablet, Rfl: 0  •  amLODIPine (NORVASC) 10 MG tablet, Take 1 tablet by mouth Daily., Disp: 90 tablet, Rfl: 1  •  aspirin 81 MG EC tablet, Take 81 mg by mouth Daily., Disp: , Rfl:   •  atorvastatin (LIPITOR) 40 MG tablet, Take 1 tablet by mouth Daily., Disp: 90 tablet, Rfl: 1  •  benzonatate (TESSALON) 100 MG capsule, Take 100 mg by mouth 3 (Three) Times a Day As Needed for Cough., Disp: , Rfl:   •  budesonide-formoterol (SYMBICORT) 160-4.5 MCG/ACT inhaler, Inhale 2 puffs 2 (Two) Times a Day., Disp: 30.6 g, Rfl: 3  •  dicyclomine (BENTYL) 10 MG capsule, Take 10 mg by mouth 2  (Two) Times a Day As Needed., Disp: , Rfl:   •  docusate sodium (COLACE) 100 MG capsule, Take 1 capsule by mouth 2 (Two) Times a Day., Disp: 20 capsule, Rfl: 0  •  furosemide (Lasix) 20 MG tablet, Take 1 tablet by mouth Daily As Needed (Weight gain, shortness of breath, or swelling)., Disp: 30 tablet, Rfl: 2  •  ipratropium-albuterol (DUO-NEB) 0.5-2.5 mg/3 ml nebulizer, Take 3 mL by nebulization 4 (Four) Times a Day., Disp: 120 mL, Rfl: 11  •  lisinopril (PRINIVIL,ZESTRIL) 40 MG tablet, Take 1 tablet by mouth Daily., Disp: 90 tablet, Rfl: 1  •  magnesium hydroxide (MILK OF MAGNESIA) 400 MG/5ML suspension, Take  by mouth Daily As Needed for Constipation., Disp: , Rfl:   •  Mucinex 600 MG 12 hr tablet, Take 2 tablets by mouth 2 (Two) Times a Day. (Patient taking differently: Take 2 tablets by mouth Daily.), Disp: , Rfl:   •  multivitamin with minerals (VITRUM 50+ ADULT-MULTI PO), Take 1 tablet by mouth Daily., Disp: , Rfl:   •  O2 (OXYGEN), Inhale 2 L/min 1 (One) Time., Disp: , Rfl:   •  ondansetron ODT (ZOFRAN-ODT) 4 MG disintegrating tablet, Place 1 tablet on the tongue Every 8 (Eight) Hours As Needed for Nausea or Vomiting., Disp: 12 tablet, Rfl: 0  •  pantoprazole (PROTONIX) 40 MG EC tablet, Take 40 mg by mouth 2 (Two) Times a Day., Disp: , Rfl:   •  pramipexole (MIRAPEX) 0.25 MG tablet, Take 0.25-0.5 mg by mouth At Night As Needed., Disp: , Rfl:   •  vitamin D (ERGOCALCIFEROL) 1.25 MG (67455 UT) capsule capsule, Take 1 capsule by mouth 1 (One) Time Per Week., Disp: 12 capsule, Rfl: 1  •  levocetirizine (Xyzal) 5 MG tablet, Take 1 tablet by mouth Every Evening., Disp: 30 tablet, Rfl: 3  •  sertraline (Zoloft) 25 MG tablet, Take 1 tablet by mouth Daily., Disp: 30 tablet, Rfl: 3      OBJECTIVE:  /60    Physical Exam  Constitutional:       General: She is not in acute distress.  Pulmonary:      Effort: No respiratory distress.      Breath sounds: Wheezing present.      Comments: Can hear wheezing and a  congested sounding cough on video. I am only able to see her face from the nose to forehead for most of the visit as she is not familiar with video exam.     Neurological:      Mental Status: She is oriented to person, place, and time.   Psychiatric:         Behavior: Behavior normal.         Assessment/Plan    Diagnoses and all orders for this visit:    1. Stage 3a chronic kidney disease (HCC) (Primary)  -     Comprehensive metabolic panel; Future  2. Cough  -     levocetirizine (Xyzal) 5 MG tablet; Take 1 tablet by mouth Every Evening.  Dispense: 30 tablet; Refill: 3  3. Moderate episode of recurrent major depressive disorder (HCC)  -     sertraline (Zoloft) 25 MG tablet; Take 1 tablet by mouth Daily.  Dispense: 30 tablet; Refill: 3  4. Essential hypertension  5. Chronic diastolic (congestive) heart failure (HCC)    Patient has SOB and cough and I suspect may be more related to CHF and fluid than acute infection. Labs to further evaluate and discussed that we need to evaluate her in the office as her exam virtual is very limited.   I will order her Zoloft for depression.  I advised that it will take 3-4 weeks to see some effect and 6-8 weeks to see full effect.  If the dose is ineffective or suboptimal, the patient should notify the clinic for a consideration of a dose increase.  The patient was advised that starting this medication could worsen symptoms of SI/HI. We discussed this as an emergency and reason to seek care immediately. The patient expressed understanding.   Close f/u with HTN in the office. Was given Rx for Tylenol 3 that she has not yet filled. MRI for further evaluation pending.          An After Visit Summary was printed and given to the patient at discharge.  Return if symptoms worsen or fail to improve, for in office. Sooner if problems arise.          Shira Gabriel APRN. 12/10/2021   Electronically Signed

## 2021-12-09 NOTE — HOME HEALTH
"SUBJECTIVE:reports she is \"losing Medicaid at end of month' and concerned about home health being covered.  DX: intervertebral disc disorders with radiculopathy, lumbar region    PMH: ER visit on 11-26 with back pain and hip pain, right side sciatica; hx COPD, macular degenerations  SURGICAL PROCEDURE: none  PRECAUTIONS: oxygen,   OXYGEN USE: 2 liters  EQUIPMENT: rollator  PRIOR LEVEL OF FUNCTION: lives alone, grandson staying with her short term but was independent with ADLs, granddaughter assisted with grocery shopping and housework  MEDICAL NECESSITY:  skilled OT recommended to increase safety and independence with ADLs, educate on adaptive techniques, AE use for LE ADLs, set up and train on UE HEP for ROM, educate on compensatory techniques for low vision  PATIENT GOALS: to be as strong and independent as possible  COMMUNICATION / CARE COORDINATION: office regarding change in insurance   CURRENT INSURANCE:  reports she is losing her Medicaid at the end of the month  DATE OF NEXT APPOINTMENT WITH DOCTOR: 4:00 televiist today, Monday 2:45 MRI  ANTICIPATED DISCHARGE PLAN: to self/family care  PATIENT/CAREGIVER AGREE WITH DISCHARGE PLAN: yes  SPECIFIC INTERVENTIONS AND GOALS TO ADDRESS ON NEXT VISIT: education on AE use for LE dressing, ADL transfer training  FREQUENCY AND DURATION: 1 wk 1, 2 wk 1, 1 wk 1,   OT ORDER END: 12-24-21      PATIENT HAS RECEIVED EDUCATION ON COVID-19, PREVENTION, HANDWASHING, SOCIAL DISTANCING PER CDC"

## 2021-12-09 NOTE — CASE COMMUNICATION
I saw this pt today. Her BP was elevated at 170/50 and increased more to 180/60 at the end of my visit. The pt stated she was having 10/10 pain and was feeling anxious. The pt's grandaughter stated that the pt gets more anxious with healthcare personnel. The pt gets SOA with minimal exertion. Her O2 on 2.5L dropped to 91% while she was walking. The pt's lungs are clear and she has been taking her nebulizer and symbicort as prescribed. T he pt stated that she has alot to be depressed about. I spoke with her about maybe trying a medication and seeing if that can help with her anxiety and depression. The pt seemed like she might want to try. The pt has +1 pitting edema in her BLE. The pt stated she has been having to take her lasix every day. The pt's grandughter was concerned, because of the pt's kidney disease. The pt isn't taking any potassium with the lasix.     Thank  you!

## 2021-12-13 NOTE — HOME HEALTH
Pt states c/o increased SOA and was attempting to dress but required assistance. Pt denies falls or changes to insurance or medication Plan for next visit is to improve gt enduance and knowledge of HEP.

## 2021-12-13 NOTE — HOME HEALTH
SUBJECTIVE: reports her grandson is only staying with her a few more days.    MEDICATION CHANGES: no    FALLS SINCE LAST VISIT: no reported falls    EDEMA: none noted    WOUND / SKIN CONDITION: no wounds reported     CURRENT INSURANCE:  reports losing [employer] at end of the year  DATE OF NEXT APPOINTMENT WITH DOCTOR: Wed for MRI at 2:45 and then to be scheduled  ANTICIPATED DISCHARGE PLAN: to self/family care  PATIENT/CAREGIVER AGREE WITH DISCHARGE PLAN: yes  NOTICE OF MEDICARE NON-COVERAGE GIVEN:  N/A  HOME HEALTH CHANGE OF CARE NOTICE GIVEN: no

## 2021-12-14 NOTE — TELEPHONE ENCOUNTER
PATIENT HAS CALLED, SHE STATED THAT SHE WAS JUST ON AN ANTIBIOTIC AND IS WONDERING IF IT IS TO SOON TO BE ON ANOTHER ONE?

## 2021-12-14 NOTE — CASE COMMUNICATION
RONALD - I saw this pt today and below is part of my assessment. Please take a look.  The pt had coarse crackles in her lungs (the pt's lungs were clear on 12/9), the pt said she is feeling congested, her BP was 140/44, the pt is coughing up thick yellow sputum (same as my visit on 12/9), the pt is SOA with minimal exertion (same as my visit on 12/9), O2 - 96% on 2.5L, and the pt has +1 pitting edema in her BLE (same as my visit on 12/9.)   Thank you!

## 2021-12-14 NOTE — HOME HEALTH
No recent falls, no insurance changes, and no recent medication changes. Pt/cg had no further questions or concerns regarding the pt's medications or plan of care. A&O X 4. Pt c/o back pain. VSS. I educated the pt on fall and pressure injury precautions, bleeding precautions, medications, pulmonary hygiene, the importance of staying hydrated and maintaining a nutritional diet, S&S of COPD exacerbation, S&S of CHF exacerbation, weighing daily, taking BP 2X daily, and pain management via teachback. Pt  v/u. I am going to send Dr. Wade a message about the pt's coarse crackles in her lungs, the pt feeling congested, BP of 140/44, coughing up thick yellow sputum (same as 12/9), SOA with minimal exertion (same as 12/9), O2 - 96% on 2.5L, and +1 pitting edema in her BLE (same as 12/9.)

## 2021-12-15 NOTE — TELEPHONE ENCOUNTER
If she is having more wheezing, shortness of breath and productive couhg, reasonable to do again.

## 2021-12-15 NOTE — HOME HEALTH
SUBJECTIVE: reports not feeling well today, feeling nauseated  MEDICATION CHANGES: no   FALLS SINCE LAST VISIT: no reported falls   PLAN FOR NEXT VISIT: Complete HEP training, ADL training on safety with kitchen tasksl  EDEMA: none noted   WOUND / SKIN CONDITION: no wounds reported   CURRENT INSURANCE: reports losing [employer] at end of the year - office notified  DATE OF NEXT APPOINTMENT WITH DOCTOR: today MRI at 2:45 and then to be scheduled   ANTICIPATED DISCHARGE PLAN: to self/family care   PATIENT/CAREGIVER AGREE WITH DISCHARGE PLAN: yes   NOTICE OF MEDICARE NON-COVERAGE GIVEN: N/A   HOME HEALTH CHANGE OF CARE NOTICE GIVEN: no

## 2021-12-15 NOTE — TELEPHONE ENCOUNTER
PATIENT HAS BEEN CALLED, SHE STATED THAT DR WEATHERS HAD PRESCRIBED HER ANTIBIOTICS AND Z-PAC IN November.

## 2021-12-16 NOTE — PROGRESS NOTES
Thanks, please let me know when she calls back with a name, and I will get referral in.  I can't promise anything about the date, as I don't know their schedules. Thanks!

## 2021-12-16 NOTE — TELEPHONE ENCOUNTER
PATIENT HAS RETURNED CALL, GIVEN MESSAGE  AND UNDERSTANDING VOICED.     PATIENT ALSO ASK ABOUT HER MRI RESULTS FROM 12/15/2021

## 2021-12-16 NOTE — TELEPHONE ENCOUNTER
Pharmacy sent a request for refills on DuoNeb. She is not on Spiriva at this time.   Rx Refill Note  Requested Prescriptions     Pending Prescriptions Disp Refills   • ipratropium-albuterol (DUO-NEB) 0.5-2.5 mg/3 ml nebulizer [Pharmacy Med Name: IPRAT-ALBUT 0.5-3(2.5) MG/3 ML] 90 mL 5     Sig: INHALE 1 VIAL BY NEBULIZER 4 TIMES A DAY AS NEEDED FOR WHEEZING OR SHORTNESS OF AIR      Last office visit with prescribing clinician: 11/9/2021      Next office visit with prescribing clinician: 2/10/2022            Stevie Nava CMA  12/16/21, 15:58 CST

## 2021-12-17 NOTE — TELEPHONE ENCOUNTER
PATIENT HAS BEEN CALLED, SHE STATED THAT DR MCKENZIE WAS GOING TO TRY TO GET SOME LOW DOSE PAIN MEDICATION.  SHE IS NEEDING IT .  SHE HAS 2 TYLEON #3 TABLETS LEFT.   PATIENT STATED THAT SHE IS WILLING TO TRY IT.

## 2021-12-20 RX ORDER — PANTOPRAZOLE SODIUM 40 MG/1
TABLET, DELAYED RELEASE ORAL
Qty: 180 TABLET | Refills: 1 | Status: SHIPPED | OUTPATIENT
Start: 2021-12-20

## 2021-12-20 NOTE — TELEPHONE ENCOUNTER
Dorian Rae called requesting a refill of the below medication which has been pended for you:     Requested Prescriptions     Pending Prescriptions Disp Refills    pantoprazole (PROTONIX) 40 MG tablet [Pharmacy Med Name: PANTOPRAZOLE SOD DR 40 MG TAB] 180 tablet 1     Sig: TAKE 1 TABLET BY MOUTH TWICE A DAY       Last Appointment Date: 4/19/2021  Next Appointment Date: Visit date not found    Allergies   Allergen Reactions    Codeine Nausea And Vomiting    Valium Nausea Only

## 2021-12-20 NOTE — HOME HEALTH
SUBJECTIVE: reports MRI indicated a 'tear' in her hip and has been referred to orthopedic institute. Reports increased pain over the weekend and not being able to sleep. Has call in to her primary physican Dr. Mancilla this am.    MEDICATION CHANGES:none reported    FALLS SINCE LAST VISIT: no reported falls    WOUND / SKIN CONDITION: no wounds    OXYGEN SAFETY COMPLIANCE: N/A    EXPLANATION OF UNMET GOALS: goals met    DISCHARGE STATUS     TO SELF/  TO FAMILY   DISCHARGE CONDITION- FAIR       DISCHARGE REASON -   GOALS MET       INSTRUCTIONS HOME PROGRAM PROVIDED TO  (Ms Hicks)          CONTENT- UE ROM           PATIENT CAREGIVER LEVEL OF COMPLIANCE- fair  UNMET NEEDS- N/A  SERVICES CONTINUING- PT, SN      PATIENT AGREEABLE WITH DISCHARGE PLAN- yes

## 2021-12-20 NOTE — TELEPHONE ENCOUNTER
Caller: Cindy Hicks    Relationship: Self    Best call back number: [phone]    Requested Prescriptions:   Requested Prescriptions     Pending Prescriptions Disp Refills   • ondansetron ODT (ZOFRAN-ODT) 4 MG disintegrating tablet 12 tablet 0     Sig: Place 1 tablet on the tongue Every 8 (Eight) Hours As Needed for Nausea or Vomiting.   • furosemide (Lasix) 20 MG tablet 30 tablet 2     Sig: Take 1 tablet by mouth Daily As Needed (Weight gain, shortness of breath, or swelling).        Pharmacy where request should be sent:  [facility] [phone]    Additional details provided by patient: PATIENT NEEDS THESE MEDICATION REFILLED ALSO WOULD LIKE A STRONGER PAIN PILL. THE TYLENOL 3'S AREN'T HELPING AND SHE HAS BEEN UP FOR 2 DAYS CRYING IN PAIN    Does the patient have less than a 3 day supply:  [x] Yes  [] No    Cristal Majano Rep   12/20/21 09:01 CST

## 2021-12-22 NOTE — TELEPHONE ENCOUNTER
PATIENT IS NEEDING A REFILL.  SHE STATED THAT SHE WILL BE OUT OF PAIN MEDICATION ON Friday 12/24/21.      SHE IS ASKING IF THERE IS SOMETHING STRONGER THAT SHE COULD BE GIVEN.   SHE STATED THAT SHE COULD ONLY TAKE 5mg OF A STRONGER MEDICATION.   SHE WILL SEE DR VILLANUEVA ON 12/28/21 AT 130pm.   PLEASE ADVISE.

## 2021-12-22 NOTE — TELEPHONE ENCOUNTER
SHE STATED THAT IF SHE WAS GIVEN HYDROCODONE THEN IT NEEDED TO BE THE 5mg AND SHE MAY CUT THAT IN HALF.

## 2021-12-22 NOTE — TELEPHONE ENCOUNTER
We had offered her hydrocodone/oxycodone previously and she declined. That would be the next step.

## 2021-12-22 NOTE — HOME HEALTH
Pt states c/o diarrhea and back pain. Pt denies falls or changes to insurance or medication. Plan for next visit is to educate pt on progressive HEP, pain management, and attempt amb outdoors if pt can tolerate.

## 2021-12-23 NOTE — HOME HEALTH
Pt denies falls or changes to medication or insurance. Pt states MD has ordered a new pain med but she has not received it yet. Discussed d/c plans.

## 2021-12-30 NOTE — TELEPHONE ENCOUNTER
Caller: Cindy Hicks    Relationship: Self    Best call back number: [phone]    Requested Prescriptions:   Requested Prescriptions     Pending Prescriptions Disp Refills   • ondansetron ODT (ZOFRAN-ODT) 4 MG disintegrating tablet 12 tablet 0     Sig: Place 1 tablet on the tongue Every 8 (Eight) Hours As Needed for Nausea or Vomiting.        Pharmacy where request should be sent: [facility] #[id number] - [place], [place] - [address] [phone] [facility] [phone]      Additional details provided by patient: PATIENT ASKING FOR MORE THAN 10 AT A TIME PATIENT STATES SHE HAS TO TAKE WITH HER PAIN MEDICATION AND DOESN'T WANT TO HAVE TO KEEP HAVING TO ASK PROVIDER FOR MORE    Does the patient have less than a 3 day supply:  [x] Yes  [] No    Cristal Conway Rep   12/30/21 11:37 CST

## 2022-01-01 ENCOUNTER — HOSPITAL ENCOUNTER (INPATIENT)
Facility: HOSPITAL | Age: 87
LOS: 5 days | Discharge: HOSPICE | End: 2022-03-14
Attending: EMERGENCY MEDICINE | Admitting: FAMILY MEDICINE

## 2022-01-01 ENCOUNTER — OFFICE VISIT (OUTPATIENT)
Dept: INTERNAL MEDICINE | Facility: CLINIC | Age: 87
End: 2022-01-01

## 2022-01-01 ENCOUNTER — HOME CARE VISIT (OUTPATIENT)
Dept: HOME HEALTH SERVICES | Facility: CLINIC | Age: 87
End: 2022-01-01

## 2022-01-01 ENCOUNTER — APPOINTMENT (OUTPATIENT)
Dept: GENERAL RADIOLOGY | Facility: HOSPITAL | Age: 87
End: 2022-01-01

## 2022-01-01 ENCOUNTER — READMISSION MANAGEMENT (OUTPATIENT)
Dept: CALL CENTER | Facility: HOSPITAL | Age: 87
End: 2022-01-01

## 2022-01-01 ENCOUNTER — TRANSITIONAL CARE MANAGEMENT TELEPHONE ENCOUNTER (OUTPATIENT)
Dept: CALL CENTER | Facility: HOSPITAL | Age: 87
End: 2022-01-01

## 2022-01-01 ENCOUNTER — HOSPITAL ENCOUNTER (INPATIENT)
Facility: HOSPITAL | Age: 87
LOS: 7 days | Discharge: HOME HEALTH | End: 2022-01-29
Attending: INTERNAL MEDICINE | Admitting: INTERNAL MEDICINE

## 2022-01-01 ENCOUNTER — APPOINTMENT (OUTPATIENT)
Dept: NUCLEAR MEDICINE | Facility: HOSPITAL | Age: 87
End: 2022-01-01

## 2022-01-01 ENCOUNTER — HOSPITAL ENCOUNTER (OUTPATIENT)
Dept: BONE DENSITY | Facility: HOSPITAL | Age: 87
Discharge: HOME | End: 2022-02-11

## 2022-01-01 ENCOUNTER — TELEPHONE (OUTPATIENT)
Dept: INTERNAL MEDICINE | Facility: CLINIC | Age: 87
End: 2022-01-01

## 2022-01-01 ENCOUNTER — LAB (OUTPATIENT)
Dept: LAB | Facility: HOSPITAL | Age: 87
End: 2022-01-01

## 2022-01-01 ENCOUNTER — APPOINTMENT (OUTPATIENT)
Dept: CARDIOLOGY | Facility: HOSPITAL | Age: 87
End: 2022-01-01

## 2022-01-01 ENCOUNTER — TELEPHONE (OUTPATIENT)
Dept: CARDIOLOGY | Facility: CLINIC | Age: 87
End: 2022-01-01

## 2022-01-01 ENCOUNTER — HOME CARE VISIT (OUTPATIENT)
Dept: HOME HEALTH SERVICES | Facility: HOME HEALTHCARE | Age: 87
End: 2022-01-01

## 2022-01-01 ENCOUNTER — HOSPITAL ENCOUNTER (INPATIENT)
Facility: HOSPITAL | Age: 87
LOS: 3 days | Discharge: HOME | End: 2022-02-16
Attending: EMERGENCY MEDICINE | Admitting: INTERNAL MEDICINE

## 2022-01-01 ENCOUNTER — HOSPITAL ENCOUNTER (INPATIENT)
Facility: HOSPITAL | Age: 87
LOS: 4 days | Discharge: HOME HEALTH | End: 2022-03-06
Attending: EMERGENCY MEDICINE | Admitting: FAMILY MEDICINE

## 2022-01-01 ENCOUNTER — APPOINTMENT (OUTPATIENT)
Dept: CT IMAGING | Facility: HOSPITAL | Age: 87
End: 2022-01-01

## 2022-01-01 ENCOUNTER — TRANSCRIBE ORDERS (OUTPATIENT)
Dept: HOME HEALTH SERVICES | Facility: HOME HEALTHCARE | Age: 87
End: 2022-01-01

## 2022-01-01 ENCOUNTER — TELEPHONE (OUTPATIENT)
Dept: PULMONOLOGY | Facility: CLINIC | Age: 87
End: 2022-01-01

## 2022-01-01 ENCOUNTER — HOSPITAL ENCOUNTER (OUTPATIENT)
Dept: GENERAL RADIOLOGY | Facility: HOSPITAL | Age: 87
Discharge: HOME | End: 2022-03-02
Admitting: NURSE PRACTITIONER

## 2022-01-01 ENCOUNTER — HOME HEALTH ADMISSION (OUTPATIENT)
Dept: HOME HEALTH SERVICES | Facility: HOME HEALTHCARE | Age: 87
End: 2022-01-01

## 2022-01-01 ENCOUNTER — HOSPITAL ENCOUNTER (INPATIENT)
Facility: HOSPITAL | Age: 87
LOS: 5 days | Discharge: HOME HEALTH | End: 2022-02-23
Attending: FAMILY MEDICINE | Admitting: FAMILY MEDICINE

## 2022-01-01 ENCOUNTER — OFFICE VISIT (OUTPATIENT)
Dept: PULMONOLOGY | Facility: CLINIC | Age: 87
End: 2022-01-01

## 2022-01-01 VITALS
RESPIRATION RATE: 16 BRPM | TEMPERATURE: 97.8 F | SYSTOLIC BLOOD PRESSURE: 103 MMHG | HEART RATE: 78 BPM | WEIGHT: 160 LBS | BODY MASS INDEX: 26.66 KG/M2 | HEIGHT: 65 IN | OXYGEN SATURATION: 97 % | DIASTOLIC BLOOD PRESSURE: 56 MMHG

## 2022-01-01 VITALS
BODY MASS INDEX: 25.89 KG/M2 | DIASTOLIC BLOOD PRESSURE: 60 MMHG | HEIGHT: 65 IN | WEIGHT: 155.4 LBS | RESPIRATION RATE: 16 BRPM | SYSTOLIC BLOOD PRESSURE: 120 MMHG | HEART RATE: 74 BPM | OXYGEN SATURATION: 94 % | TEMPERATURE: 96.8 F

## 2022-01-01 VITALS
HEIGHT: 65 IN | RESPIRATION RATE: 16 BRPM | SYSTOLIC BLOOD PRESSURE: 145 MMHG | OXYGEN SATURATION: 96 % | DIASTOLIC BLOOD PRESSURE: 64 MMHG | HEART RATE: 78 BPM | WEIGHT: 155.7 LBS | TEMPERATURE: 97.6 F | BODY MASS INDEX: 25.94 KG/M2

## 2022-01-01 VITALS
RESPIRATION RATE: 16 BRPM | TEMPERATURE: 96.6 F | DIASTOLIC BLOOD PRESSURE: 62 MMHG | HEART RATE: 74 BPM | BODY MASS INDEX: 26.99 KG/M2 | OXYGEN SATURATION: 95 % | WEIGHT: 162 LBS | HEIGHT: 65 IN | SYSTOLIC BLOOD PRESSURE: 100 MMHG

## 2022-01-01 VITALS
SYSTOLIC BLOOD PRESSURE: 112 MMHG | TEMPERATURE: 97.6 F | OXYGEN SATURATION: 97 % | HEART RATE: 84 BPM | RESPIRATION RATE: 18 BRPM | DIASTOLIC BLOOD PRESSURE: 62 MMHG

## 2022-01-01 VITALS
WEIGHT: 159 LBS | HEART RATE: 98 BPM | OXYGEN SATURATION: 98 % | RESPIRATION RATE: 18 BRPM | TEMPERATURE: 97.2 F | HEIGHT: 65 IN | BODY MASS INDEX: 26.49 KG/M2

## 2022-01-01 VITALS
SYSTOLIC BLOOD PRESSURE: 116 MMHG | DIASTOLIC BLOOD PRESSURE: 64 MMHG | HEART RATE: 98 BPM | TEMPERATURE: 97.2 F | OXYGEN SATURATION: 98 % | RESPIRATION RATE: 20 BRPM

## 2022-01-01 VITALS
SYSTOLIC BLOOD PRESSURE: 112 MMHG | HEART RATE: 73 BPM | DIASTOLIC BLOOD PRESSURE: 60 MMHG | HEIGHT: 65 IN | BODY MASS INDEX: 25.66 KG/M2 | WEIGHT: 154 LBS | OXYGEN SATURATION: 99 %

## 2022-01-01 VITALS
RESPIRATION RATE: 16 BRPM | SYSTOLIC BLOOD PRESSURE: 130 MMHG | TEMPERATURE: 97.7 F | HEART RATE: 86 BPM | DIASTOLIC BLOOD PRESSURE: 60 MMHG | OXYGEN SATURATION: 98 %

## 2022-01-01 VITALS
DIASTOLIC BLOOD PRESSURE: 80 MMHG | TEMPERATURE: 97.7 F | HEART RATE: 97 BPM | OXYGEN SATURATION: 95 % | SYSTOLIC BLOOD PRESSURE: 94 MMHG | WEIGHT: 169.3 LBS | BODY MASS INDEX: 28.21 KG/M2 | RESPIRATION RATE: 18 BRPM | HEIGHT: 65 IN

## 2022-01-01 VITALS
OXYGEN SATURATION: 99 % | RESPIRATION RATE: 20 BRPM | SYSTOLIC BLOOD PRESSURE: 114 MMHG | HEART RATE: 79 BPM | HEIGHT: 65 IN | DIASTOLIC BLOOD PRESSURE: 62 MMHG | BODY MASS INDEX: 26.49 KG/M2 | WEIGHT: 159 LBS | TEMPERATURE: 97.6 F

## 2022-01-01 VITALS
HEART RATE: 85 BPM | HEIGHT: 66 IN | DIASTOLIC BLOOD PRESSURE: 47 MMHG | TEMPERATURE: 97.2 F | OXYGEN SATURATION: 97 % | WEIGHT: 152 LBS | SYSTOLIC BLOOD PRESSURE: 117 MMHG | RESPIRATION RATE: 18 BRPM | BODY MASS INDEX: 24.43 KG/M2

## 2022-01-01 VITALS
HEART RATE: 78 BPM | SYSTOLIC BLOOD PRESSURE: 110 MMHG | RESPIRATION RATE: 20 BRPM | TEMPERATURE: 95.9 F | DIASTOLIC BLOOD PRESSURE: 60 MMHG | OXYGEN SATURATION: 98 %

## 2022-01-01 VITALS
TEMPERATURE: 97.3 F | RESPIRATION RATE: 16 BRPM | SYSTOLIC BLOOD PRESSURE: 130 MMHG | HEART RATE: 83 BPM | OXYGEN SATURATION: 95 % | DIASTOLIC BLOOD PRESSURE: 60 MMHG

## 2022-01-01 VITALS
OXYGEN SATURATION: 97 % | TEMPERATURE: 98 F | RESPIRATION RATE: 20 BRPM | SYSTOLIC BLOOD PRESSURE: 120 MMHG | HEART RATE: 98 BPM | DIASTOLIC BLOOD PRESSURE: 58 MMHG

## 2022-01-01 VITALS
OXYGEN SATURATION: 99 % | DIASTOLIC BLOOD PRESSURE: 70 MMHG | TEMPERATURE: 96 F | HEART RATE: 87 BPM | SYSTOLIC BLOOD PRESSURE: 140 MMHG | RESPIRATION RATE: 20 BRPM

## 2022-01-01 VITALS
OXYGEN SATURATION: 98 % | SYSTOLIC BLOOD PRESSURE: 140 MMHG | TEMPERATURE: 96.6 F | HEART RATE: 68 BPM | DIASTOLIC BLOOD PRESSURE: 50 MMHG

## 2022-01-01 DIAGNOSIS — J44.9 STAGE 3 SEVERE COPD BY GOLD CLASSIFICATION: Primary | Chronic | ICD-10-CM

## 2022-01-01 DIAGNOSIS — J96.21 ACUTE ON CHRONIC RESPIRATORY FAILURE WITH HYPOXIA: Primary | ICD-10-CM

## 2022-01-01 DIAGNOSIS — J44.9 STAGE 3 SEVERE COPD BY GOLD CLASSIFICATION: Primary | ICD-10-CM

## 2022-01-01 DIAGNOSIS — G25.81 RESTLESS LEG SYNDROME: ICD-10-CM

## 2022-01-01 DIAGNOSIS — N18.32 STAGE 3B CHRONIC KIDNEY DISEASE: ICD-10-CM

## 2022-01-01 DIAGNOSIS — R06.02 SHORTNESS OF BREATH: ICD-10-CM

## 2022-01-01 DIAGNOSIS — Z78.9 DECREASED ACTIVITIES OF DAILY LIVING (ADL): ICD-10-CM

## 2022-01-01 DIAGNOSIS — J44.9 STAGE 3 SEVERE COPD BY GOLD CLASSIFICATION: Chronic | ICD-10-CM

## 2022-01-01 DIAGNOSIS — Z71.89 COUNSELING REGARDING ADVANCE CARE PLANNING AND GOALS OF CARE: ICD-10-CM

## 2022-01-01 DIAGNOSIS — J90 PLEURAL EFFUSION: ICD-10-CM

## 2022-01-01 DIAGNOSIS — R06.00 DYSPNEA, UNSPECIFIED TYPE: ICD-10-CM

## 2022-01-01 DIAGNOSIS — J44.1 COPD EXACERBATION: ICD-10-CM

## 2022-01-01 DIAGNOSIS — I48.91 ATRIAL FIBRILLATION WITH RVR: Primary | ICD-10-CM

## 2022-01-01 DIAGNOSIS — J96.12 CHRONIC RESPIRATORY FAILURE WITH HYPOXIA AND HYPERCAPNIA: ICD-10-CM

## 2022-01-01 DIAGNOSIS — Z74.09 IMPAIRED MOBILITY: ICD-10-CM

## 2022-01-01 DIAGNOSIS — Z78.9 IMPAIRED MOBILITY AND ADLS: ICD-10-CM

## 2022-01-01 DIAGNOSIS — J96.11 CHRONIC RESPIRATORY FAILURE WITH HYPOXIA AND HYPERCAPNIA: Chronic | ICD-10-CM

## 2022-01-01 DIAGNOSIS — R05.9 COUGH: ICD-10-CM

## 2022-01-01 DIAGNOSIS — J44.9 CHRONIC OBSTRUCTIVE PULMONARY DISEASE, UNSPECIFIED COPD TYPE: ICD-10-CM

## 2022-01-01 DIAGNOSIS — K21.9 GASTROESOPHAGEAL REFLUX DISEASE, UNSPECIFIED WHETHER ESOPHAGITIS PRESENT: Chronic | ICD-10-CM

## 2022-01-01 DIAGNOSIS — E66.3 OVERWEIGHT (BMI 25.0-29.9): ICD-10-CM

## 2022-01-01 DIAGNOSIS — R60.0 EDEMA OF BOTH LOWER LEGS: ICD-10-CM

## 2022-01-01 DIAGNOSIS — I50.32 CHRONIC DIASTOLIC (CONGESTIVE) HEART FAILURE: ICD-10-CM

## 2022-01-01 DIAGNOSIS — I50.32 CHRONIC DIASTOLIC (CONGESTIVE) HEART FAILURE: Primary | ICD-10-CM

## 2022-01-01 DIAGNOSIS — I50.9 CONGESTIVE HEART FAILURE, UNSPECIFIED HF CHRONICITY, UNSPECIFIED HEART FAILURE TYPE: Primary | ICD-10-CM

## 2022-01-01 DIAGNOSIS — J90 PLEURAL EFFUSION, RIGHT: ICD-10-CM

## 2022-01-01 DIAGNOSIS — J18.1 CONSOLIDATION OF RIGHT LOWER LOBE OF LUNG: ICD-10-CM

## 2022-01-01 DIAGNOSIS — I10 ESSENTIAL HYPERTENSION: ICD-10-CM

## 2022-01-01 DIAGNOSIS — E87.1 HYPONATREMIA: ICD-10-CM

## 2022-01-01 DIAGNOSIS — Z87.891 PERSONAL HISTORY OF NICOTINE DEPENDENCE: ICD-10-CM

## 2022-01-01 DIAGNOSIS — I50.9 ACUTE ON CHRONIC CONGESTIVE HEART FAILURE, UNSPECIFIED HEART FAILURE TYPE: Primary | ICD-10-CM

## 2022-01-01 DIAGNOSIS — F41.1 GENERALIZED ANXIETY DISORDER: ICD-10-CM

## 2022-01-01 DIAGNOSIS — F33.1 MODERATE EPISODE OF RECURRENT MAJOR DEPRESSIVE DISORDER: ICD-10-CM

## 2022-01-01 DIAGNOSIS — I50.9 CHRONIC HEART FAILURE, UNSPECIFIED HEART FAILURE TYPE: ICD-10-CM

## 2022-01-01 DIAGNOSIS — J18.9 PNEUMONIA OF RIGHT LUNG DUE TO INFECTIOUS ORGANISM, UNSPECIFIED PART OF LUNG: ICD-10-CM

## 2022-01-01 DIAGNOSIS — F17.210 CIGARETTE SMOKER: Chronic | ICD-10-CM

## 2022-01-01 DIAGNOSIS — J96.11 CHRONIC RESPIRATORY FAILURE WITH HYPOXIA AND HYPERCAPNIA: ICD-10-CM

## 2022-01-01 DIAGNOSIS — J90 PLEURAL EFFUSION, RIGHT: Chronic | ICD-10-CM

## 2022-01-01 DIAGNOSIS — J43.9 PULMONARY EMPHYSEMA, UNSPECIFIED EMPHYSEMA TYPE: Chronic | ICD-10-CM

## 2022-01-01 DIAGNOSIS — M81.0 AGE-RELATED OSTEOPOROSIS WITHOUT CURRENT PATHOLOGICAL FRACTURE: Primary | ICD-10-CM

## 2022-01-01 DIAGNOSIS — R60.0 BILATERAL LOWER EXTREMITY EDEMA: ICD-10-CM

## 2022-01-01 DIAGNOSIS — M85.89 OSTEOPENIA OF MULTIPLE SITES: ICD-10-CM

## 2022-01-01 DIAGNOSIS — N18.9 CHRONIC KIDNEY DISEASE, UNSPECIFIED CKD STAGE: ICD-10-CM

## 2022-01-01 DIAGNOSIS — Z74.09 IMPAIRED MOBILITY AND ADLS: ICD-10-CM

## 2022-01-01 DIAGNOSIS — I48.91 ATRIAL FIBRILLATION WITH RVR: ICD-10-CM

## 2022-01-01 DIAGNOSIS — M25.551 RIGHT HIP PAIN: ICD-10-CM

## 2022-01-01 DIAGNOSIS — N18.30 STAGE 3 CHRONIC KIDNEY DISEASE, UNSPECIFIED WHETHER STAGE 3A OR 3B CKD: ICD-10-CM

## 2022-01-01 DIAGNOSIS — J96.12 CHRONIC RESPIRATORY FAILURE WITH HYPOXIA AND HYPERCAPNIA: Chronic | ICD-10-CM

## 2022-01-01 DIAGNOSIS — I50.21 ACUTE SYSTOLIC CONGESTIVE HEART FAILURE: Primary | ICD-10-CM

## 2022-01-01 DIAGNOSIS — R91.1 LUNG NODULE: ICD-10-CM

## 2022-01-01 DIAGNOSIS — I50.32 CHRONIC DIASTOLIC (CONGESTIVE) HEART FAILURE: Chronic | ICD-10-CM

## 2022-01-01 DIAGNOSIS — R77.8 ELEVATED TROPONIN: ICD-10-CM

## 2022-01-01 DIAGNOSIS — J44.1 COPD EXACERBATION: Primary | ICD-10-CM

## 2022-01-01 DIAGNOSIS — J96.21 ACUTE ON CHRONIC RESPIRATORY FAILURE WITH HYPOXIA: ICD-10-CM

## 2022-01-01 DIAGNOSIS — Z09 HOSPITAL DISCHARGE FOLLOW-UP: Primary | ICD-10-CM

## 2022-01-01 DIAGNOSIS — E55.9 VITAMIN D DEFICIENCY, UNSPECIFIED: ICD-10-CM

## 2022-01-01 LAB
A-A DO2: ABNORMAL
ALBUMIN FLD-MCNC: 1.3 G/DL
ALBUMIN SERPL-MCNC: 3.4 G/DL (ref 3.5–5.2)
ALBUMIN SERPL-MCNC: 3.7 G/DL (ref 3.5–5.2)
ALBUMIN SERPL-MCNC: 3.8 G/DL (ref 3.5–5.2)
ALBUMIN SERPL-MCNC: 4.1 G/DL (ref 3.5–5.2)
ALBUMIN SERPL-MCNC: 4.1 G/DL (ref 3.5–5.2)
ALBUMIN/GLOB SERPL: 1.4 G/DL
ALBUMIN/GLOB SERPL: 1.4 G/DL
ALBUMIN/GLOB SERPL: 1.5 G/DL
ALBUMIN/GLOB SERPL: 1.9 G/DL
ALP SERPL-CCNC: 81 U/L (ref 39–117)
ALP SERPL-CCNC: 82 U/L (ref 39–117)
ALP SERPL-CCNC: 83 U/L (ref 39–117)
ALP SERPL-CCNC: 85 U/L (ref 39–117)
ALP SERPL-CCNC: 89 U/L (ref 39–117)
ALP SERPL-CCNC: 92 U/L (ref 39–117)
ALP SERPL-CCNC: 94 U/L (ref 39–117)
ALT SERPL W P-5'-P-CCNC: 14 U/L (ref 1–33)
ALT SERPL W P-5'-P-CCNC: 15 U/L (ref 1–33)
ALT SERPL W P-5'-P-CCNC: 40 U/L (ref 1–33)
ALT SERPL W P-5'-P-CCNC: 42 U/L (ref 1–33)
ALT SERPL W P-5'-P-CCNC: 47 U/L (ref 1–33)
ALT SERPL W P-5'-P-CCNC: 51 U/L (ref 1–33)
ALT SERPL W P-5'-P-CCNC: 76 U/L (ref 1–33)
ANION GAP SERPL CALCULATED.3IONS-SCNC: 10 MMOL/L (ref 5–15)
ANION GAP SERPL CALCULATED.3IONS-SCNC: 10 MMOL/L (ref 5–15)
ANION GAP SERPL CALCULATED.3IONS-SCNC: 11 MMOL/L (ref 5–15)
ANION GAP SERPL CALCULATED.3IONS-SCNC: 11 MMOL/L (ref 5–15)
ANION GAP SERPL CALCULATED.3IONS-SCNC: 12 MMOL/L (ref 5–15)
ANION GAP SERPL CALCULATED.3IONS-SCNC: 6 MMOL/L (ref 5–15)
ANION GAP SERPL CALCULATED.3IONS-SCNC: 7 MMOL/L (ref 5–15)
ANION GAP SERPL CALCULATED.3IONS-SCNC: 8 MMOL/L (ref 5–15)
ANION GAP SERPL CALCULATED.3IONS-SCNC: 9 MMOL/L (ref 5–15)
APPEARANCE FLD: CLEAR
APTT PPP: 30.3 SECONDS (ref 24.1–35)
APTT PPP: 32.7 SECONDS (ref 24.1–35)
APTT PPP: 34.2 SECONDS (ref 24.1–35)
ARTERIAL PATENCY WRIST A: ABNORMAL
ARTERIAL PATENCY WRIST A: POSITIVE
AST SERPL-CCNC: 23 U/L (ref 1–32)
AST SERPL-CCNC: 31 U/L (ref 1–32)
AST SERPL-CCNC: 31 U/L (ref 1–32)
AST SERPL-CCNC: 33 U/L (ref 1–32)
AST SERPL-CCNC: 41 U/L (ref 1–32)
AST SERPL-CCNC: 42 U/L (ref 1–32)
AST SERPL-CCNC: 46 U/L (ref 1–32)
ATMOSPHERIC PRESS: 751 MMHG
ATMOSPHERIC PRESS: 751 MMHG
ATMOSPHERIC PRESS: 756 MMHG
ATMOSPHERIC PRESS: 756 MMHG
ATMOSPHERIC PRESS: 762 MMHG
ATMOSPHERIC PRESS: 762 MMHG
B PARAPERT DNA SPEC QL NAA+PROBE: NOT DETECTED
B PARAPERT DNA SPEC QL NAA+PROBE: NOT DETECTED
B PERT DNA SPEC QL NAA+PROBE: NOT DETECTED
B PERT DNA SPEC QL NAA+PROBE: NOT DETECTED
BACTERIA SPEC AEROBE CULT: NORMAL
BASE EXCESS BLDA CALC-SCNC: 10.8 MMOL/L (ref 0–2)
BASE EXCESS BLDA CALC-SCNC: 4.6 MMOL/L (ref 0–2)
BASE EXCESS BLDA CALC-SCNC: 4.6 MMOL/L (ref 0–2)
BASE EXCESS BLDA CALC-SCNC: 7.7 MMOL/L (ref 0–2)
BASE EXCESS BLDA CALC-SCNC: 7.9 MMOL/L (ref 0–2)
BASE EXCESS BLDA CALC-SCNC: 9.6 MMOL/L (ref 0–2)
BASOPHILS # BLD AUTO: 0.01 10*3/MM3 (ref 0–0.2)
BASOPHILS # BLD AUTO: 0.02 10*3/MM3 (ref 0–0.2)
BASOPHILS # BLD AUTO: 0.03 10*3/MM3 (ref 0–0.2)
BASOPHILS # BLD AUTO: 0.04 10*3/MM3 (ref 0–0.2)
BASOPHILS NFR BLD AUTO: 0.1 % (ref 0–1.5)
BASOPHILS NFR BLD AUTO: 0.3 % (ref 0–1.5)
BASOPHILS NFR BLD AUTO: 0.4 % (ref 0–1.5)
BASOPHILS NFR BLD AUTO: 0.5 % (ref 0–1.5)
BASOPHILS NFR BLD AUTO: 0.5 % (ref 0–1.5)
BASOPHILS NFR BLD AUTO: 0.6 % (ref 0–1.5)
BASOPHILS NFR BLD AUTO: 0.6 % (ref 0–1.5)
BDY SITE: ABNORMAL
BH CV ECHO MEAS - AO MAX PG (FULL): 2.8 MMHG
BH CV ECHO MEAS - AO MAX PG: 4.5 MMHG
BH CV ECHO MEAS - AO MEAN PG (FULL): 1 MMHG
BH CV ECHO MEAS - AO MEAN PG: 2 MMHG
BH CV ECHO MEAS - AO ROOT AREA (BSA CORRECTED): 1.7
BH CV ECHO MEAS - AO ROOT AREA: 7.5 CM^2
BH CV ECHO MEAS - AO ROOT DIAM: 3.1 CM
BH CV ECHO MEAS - AO V2 MAX: 106 CM/SEC
BH CV ECHO MEAS - AO V2 MEAN: 71.9 CM/SEC
BH CV ECHO MEAS - AO V2 VTI: 22.9 CM
BH CV ECHO MEAS - AVA(I,A): 1.8 CM^2
BH CV ECHO MEAS - AVA(I,D): 1.8 CM^2
BH CV ECHO MEAS - AVA(V,A): 1.9 CM^2
BH CV ECHO MEAS - AVA(V,D): 1.9 CM^2
BH CV ECHO MEAS - BSA(HAYCOCK): 1.8 M^2
BH CV ECHO MEAS - BSA: 1.8 M^2
BH CV ECHO MEAS - BZI_BMI: 26.3 KILOGRAMS/M^2
BH CV ECHO MEAS - BZI_METRIC_HEIGHT: 165.1 CM
BH CV ECHO MEAS - BZI_METRIC_WEIGHT: 71.7 KG
BH CV ECHO MEAS - EDV(CUBED): 152.3 ML
BH CV ECHO MEAS - EDV(MOD-SP4): 67.4 ML
BH CV ECHO MEAS - EDV(TEICH): 137.7 ML
BH CV ECHO MEAS - EF(CUBED): 54.4 %
BH CV ECHO MEAS - EF(MOD-SP4): 51.8 %
BH CV ECHO MEAS - EF(TEICH): 45.8 %
BH CV ECHO MEAS - ESV(CUBED): 69.4 ML
BH CV ECHO MEAS - ESV(MOD-SP4): 32.5 ML
BH CV ECHO MEAS - ESV(TEICH): 74.7 ML
BH CV ECHO MEAS - FS: 23 %
BH CV ECHO MEAS - IVS/LVPW: 0.99
BH CV ECHO MEAS - IVSD: 1.2 CM
BH CV ECHO MEAS - LA DIMENSION: 4.4 CM
BH CV ECHO MEAS - LA/AO: 1.4
BH CV ECHO MEAS - LAT PEAK E' VEL: 10.6 CM/SEC
BH CV ECHO MEAS - LV DIASTOLIC VOL/BSA (35-75): 37.7 ML/M^2
BH CV ECHO MEAS - LV MASS(C)D: 255.2 GRAMS
BH CV ECHO MEAS - LV MASS(C)DI: 142.6 GRAMS/M^2
BH CV ECHO MEAS - LV MAX PG: 1.7 MMHG
BH CV ECHO MEAS - LV MEAN PG: 1 MMHG
BH CV ECHO MEAS - LV SYSTOLIC VOL/BSA (12-30): 18.2 ML/M^2
BH CV ECHO MEAS - LV V1 MAX: 65 CM/SEC
BH CV ECHO MEAS - LV V1 MEAN: 37.9 CM/SEC
BH CV ECHO MEAS - LV V1 VTI: 13.3 CM
BH CV ECHO MEAS - LVIDD: 5.3 CM
BH CV ECHO MEAS - LVIDS: 4.1 CM
BH CV ECHO MEAS - LVLD AP4: 6 CM
BH CV ECHO MEAS - LVLS AP4: 5.3 CM
BH CV ECHO MEAS - LVOT AREA (M): 3.1 CM^2
BH CV ECHO MEAS - LVOT AREA: 3.1 CM^2
BH CV ECHO MEAS - LVOT DIAM: 2 CM
BH CV ECHO MEAS - LVPWD: 1.2 CM
BH CV ECHO MEAS - MED PEAK E' VEL: 4.9 CM/SEC
BH CV ECHO MEAS - MR MAX PG: 75.7 MMHG
BH CV ECHO MEAS - MR MAX VEL: 435 CM/SEC
BH CV ECHO MEAS - MR MEAN PG: 39 MMHG
BH CV ECHO MEAS - MR MEAN VEL: 288 CM/SEC
BH CV ECHO MEAS - MR VTI: 143 CM
BH CV ECHO MEAS - MV A MAX VEL: 68.9 CM/SEC
BH CV ECHO MEAS - MV DEC SLOPE: 834.5 CM/SEC^2
BH CV ECHO MEAS - MV DEC TIME: 0.11 SEC
BH CV ECHO MEAS - MV E MAX VEL: 91.7 CM/SEC
BH CV ECHO MEAS - MV E/A: 1.3
BH CV ECHO MEAS - MV P1/2T MAX VEL: 143 CM/SEC
BH CV ECHO MEAS - MV P1/2T: 50.2 MSEC
BH CV ECHO MEAS - MVA P1/2T LCG: 1.5 CM^2
BH CV ECHO MEAS - MVA(P1/2T): 4.4 CM^2
BH CV ECHO MEAS - PA MAX PG: 2.7 MMHG
BH CV ECHO MEAS - PA V2 MAX: 82.8 CM/SEC
BH CV ECHO MEAS - RAP SYSTOLE: 5 MMHG
BH CV ECHO MEAS - RVSP: 42.7 MMHG
BH CV ECHO MEAS - SI(AO): 96.6 ML/M^2
BH CV ECHO MEAS - SI(CUBED): 46.3 ML/M^2
BH CV ECHO MEAS - SI(LVOT): 23.3 ML/M^2
BH CV ECHO MEAS - SI(MOD-SP4): 19.5 ML/M^2
BH CV ECHO MEAS - SI(TEICH): 35.2 ML/M^2
BH CV ECHO MEAS - SV(AO): 172.8 ML
BH CV ECHO MEAS - SV(CUBED): 82.8 ML
BH CV ECHO MEAS - SV(LVOT): 41.8 ML
BH CV ECHO MEAS - SV(MOD-SP4): 34.9 ML
BH CV ECHO MEAS - SV(TEICH): 63.1 ML
BH CV ECHO MEAS - TR MAX VEL: 307 CM/SEC
BH CV ECHO MEASUREMENTS AVERAGE E/E' RATIO: 11.83
BILIRUB SERPL-MCNC: 0.3 MG/DL (ref 0–1.2)
BILIRUB SERPL-MCNC: 0.4 MG/DL (ref 0–1.2)
BILIRUB SERPL-MCNC: 0.5 MG/DL (ref 0–1.2)
BILIRUB UR QL STRIP: NEGATIVE
BODY TEMPERATURE: 37 C
BUN SERPL-MCNC: 20 MG/DL (ref 8–23)
BUN SERPL-MCNC: 21 MG/DL (ref 8–23)
BUN SERPL-MCNC: 30 MG/DL (ref 8–23)
BUN SERPL-MCNC: 33 MG/DL (ref 8–23)
BUN SERPL-MCNC: 34 MG/DL (ref 8–23)
BUN SERPL-MCNC: 34 MG/DL (ref 8–23)
BUN SERPL-MCNC: 35 MG/DL (ref 8–23)
BUN SERPL-MCNC: 35 MG/DL (ref 8–23)
BUN SERPL-MCNC: 38 MG/DL (ref 8–23)
BUN SERPL-MCNC: 39 MG/DL (ref 8–23)
BUN SERPL-MCNC: 40 MG/DL (ref 8–23)
BUN SERPL-MCNC: 42 MG/DL (ref 8–23)
BUN SERPL-MCNC: 43 MG/DL (ref 8–23)
BUN SERPL-MCNC: 43 MG/DL (ref 8–23)
BUN SERPL-MCNC: 44 MG/DL (ref 8–23)
BUN SERPL-MCNC: 44 MG/DL (ref 8–23)
BUN SERPL-MCNC: 45 MG/DL (ref 8–23)
BUN SERPL-MCNC: 46 MG/DL (ref 8–23)
BUN SERPL-MCNC: 47 MG/DL (ref 8–23)
BUN SERPL-MCNC: 48 MG/DL (ref 8–23)
BUN SERPL-MCNC: 49 MG/DL (ref 8–23)
BUN SERPL-MCNC: 49 MG/DL (ref 8–23)
BUN SERPL-MCNC: 50 MG/DL (ref 8–23)
BUN SERPL-MCNC: 51 MG/DL (ref 8–23)
BUN/CREAT SERPL: 16.4 (ref 7–25)
BUN/CREAT SERPL: 16.9 (ref 7–25)
BUN/CREAT SERPL: 19.2 (ref 7–25)
BUN/CREAT SERPL: 19.6 (ref 7–25)
BUN/CREAT SERPL: 19.8 (ref 7–25)
BUN/CREAT SERPL: 21 (ref 7–25)
BUN/CREAT SERPL: 21.4 (ref 7–25)
BUN/CREAT SERPL: 21.7 (ref 7–25)
BUN/CREAT SERPL: 23.8 (ref 7–25)
BUN/CREAT SERPL: 24.3 (ref 7–25)
BUN/CREAT SERPL: 24.7 (ref 7–25)
BUN/CREAT SERPL: 24.8 (ref 7–25)
BUN/CREAT SERPL: 25.2 (ref 7–25)
BUN/CREAT SERPL: 25.4 (ref 7–25)
BUN/CREAT SERPL: 26.2 (ref 7–25)
BUN/CREAT SERPL: 26.4 (ref 7–25)
BUN/CREAT SERPL: 26.5 (ref 7–25)
BUN/CREAT SERPL: 26.6 (ref 7–25)
BUN/CREAT SERPL: 26.7 (ref 7–25)
BUN/CREAT SERPL: 27 (ref 7–25)
BUN/CREAT SERPL: 27.2 (ref 7–25)
BUN/CREAT SERPL: 27.5 (ref 7–25)
BUN/CREAT SERPL: 28.1 (ref 7–25)
BUN/CREAT SERPL: 28.3 (ref 7–25)
BUN/CREAT SERPL: 28.6 (ref 7–25)
BUN/CREAT SERPL: 29.2 (ref 7–25)
BUN/CREAT SERPL: 31.2 (ref 7–25)
BUN/CREAT SERPL: 32.3 (ref 7–25)
BUN/CREAT SERPL: 32.4 (ref 7–25)
BUN/CREAT SERPL: 33.1 (ref 7–25)
BUN/CREAT SERPL: 33.6 (ref 7–25)
BUN/CREAT SERPL: 36.7 (ref 7–25)
C PNEUM DNA NPH QL NAA+NON-PROBE: NOT DETECTED
C PNEUM DNA NPH QL NAA+NON-PROBE: NOT DETECTED
CALCIUM SPEC-SCNC: 7.8 MG/DL (ref 8.6–10.5)
CALCIUM SPEC-SCNC: 8.2 MG/DL (ref 8.6–10.5)
CALCIUM SPEC-SCNC: 8.4 MG/DL (ref 8.6–10.5)
CALCIUM SPEC-SCNC: 8.5 MG/DL (ref 8.6–10.5)
CALCIUM SPEC-SCNC: 8.6 MG/DL (ref 8.6–10.5)
CALCIUM SPEC-SCNC: 8.7 MG/DL (ref 8.6–10.5)
CALCIUM SPEC-SCNC: 8.8 MG/DL (ref 8.6–10.5)
CALCIUM SPEC-SCNC: 8.9 MG/DL (ref 8.6–10.5)
CALCIUM SPEC-SCNC: 9 MG/DL (ref 8.6–10.5)
CALCIUM SPEC-SCNC: 9 MG/DL (ref 8.6–10.5)
CALCIUM SPEC-SCNC: 9.1 MG/DL (ref 8.6–10.5)
CALCIUM SPEC-SCNC: 9.2 MG/DL (ref 8.6–10.5)
CALCIUM SPEC-SCNC: 9.4 MG/DL (ref 8.6–10.5)
CALCIUM SPEC-SCNC: 9.6 MG/DL (ref 8.6–10.5)
CHLORIDE SERPL-SCNC: 86 MMOL/L (ref 98–107)
CHLORIDE SERPL-SCNC: 86 MMOL/L (ref 98–107)
CHLORIDE SERPL-SCNC: 87 MMOL/L (ref 98–107)
CHLORIDE SERPL-SCNC: 88 MMOL/L (ref 98–107)
CHLORIDE SERPL-SCNC: 89 MMOL/L (ref 98–107)
CHLORIDE SERPL-SCNC: 90 MMOL/L (ref 98–107)
CHLORIDE SERPL-SCNC: 92 MMOL/L (ref 98–107)
CHLORIDE SERPL-SCNC: 93 MMOL/L (ref 98–107)
CHLORIDE SERPL-SCNC: 94 MMOL/L (ref 98–107)
CHLORIDE SERPL-SCNC: 96 MMOL/L (ref 98–107)
CLARITY UR: CLEAR
CO2 SERPL-SCNC: 29 MMOL/L (ref 22–29)
CO2 SERPL-SCNC: 30 MMOL/L (ref 22–29)
CO2 SERPL-SCNC: 32 MMOL/L (ref 22–29)
CO2 SERPL-SCNC: 33 MMOL/L (ref 22–29)
CO2 SERPL-SCNC: 34 MMOL/L (ref 22–29)
CO2 SERPL-SCNC: 35 MMOL/L (ref 22–29)
CO2 SERPL-SCNC: 36 MMOL/L (ref 22–29)
CO2 SERPL-SCNC: 36 MMOL/L (ref 22–29)
CO2 SERPL-SCNC: 37 MMOL/L (ref 22–29)
COHGB MFR BLD: 1.2 % (ref 0–5)
COLOR FLD: YELLOW
COLOR UR: ABNORMAL
CREAT SERPL-MCNC: 1.18 MG/DL (ref 0.57–1)
CREAT SERPL-MCNC: 1.18 MG/DL (ref 0.57–1)
CREAT SERPL-MCNC: 1.26 MG/DL (ref 0.57–1)
CREAT SERPL-MCNC: 1.28 MG/DL (ref 0.57–1)
CREAT SERPL-MCNC: 1.3 MG/DL (ref 0.57–1)
CREAT SERPL-MCNC: 1.35 MG/DL (ref 0.57–1)
CREAT SERPL-MCNC: 1.4 MG/DL (ref 0.57–1)
CREAT SERPL-MCNC: 1.4 MG/DL (ref 0.57–1)
CREAT SERPL-MCNC: 1.43 MG/DL (ref 0.57–1)
CREAT SERPL-MCNC: 1.44 MG/DL (ref 0.57–1)
CREAT SERPL-MCNC: 1.45 MG/DL (ref 0.57–1)
CREAT SERPL-MCNC: 1.5 MG/DL (ref 0.57–1)
CREAT SERPL-MCNC: 1.51 MG/DL (ref 0.57–1)
CREAT SERPL-MCNC: 1.54 MG/DL (ref 0.57–1)
CREAT SERPL-MCNC: 1.56 MG/DL (ref 0.57–1)
CREAT SERPL-MCNC: 1.59 MG/DL (ref 0.57–1)
CREAT SERPL-MCNC: 1.61 MG/DL (ref 0.57–1)
CREAT SERPL-MCNC: 1.61 MG/DL (ref 0.57–1)
CREAT SERPL-MCNC: 1.63 MG/DL (ref 0.57–1)
CREAT SERPL-MCNC: 1.68 MG/DL (ref 0.57–1)
CREAT SERPL-MCNC: 1.68 MG/DL (ref 0.57–1)
CREAT SERPL-MCNC: 1.69 MG/DL (ref 0.57–1)
CREAT SERPL-MCNC: 1.71 MG/DL (ref 0.57–1)
CREAT SERPL-MCNC: 1.71 MG/DL (ref 0.57–1)
CREAT SERPL-MCNC: 1.72 MG/DL (ref 0.57–1)
CREAT SERPL-MCNC: 1.73 MG/DL (ref 0.57–1)
CREAT SERPL-MCNC: 1.84 MG/DL (ref 0.57–1)
CREAT SERPL-MCNC: 1.9 MG/DL (ref 0.57–1)
CREAT SERPL-MCNC: 2.02 MG/DL (ref 0.57–1)
CREAT SERPL-MCNC: 2.06 MG/DL (ref 0.57–1)
CREAT UR-MCNC: 67 MG/DL
CRP SERPL-MCNC: 0.67 MG/DL (ref 0–0.5)
CRP SERPL-MCNC: 0.82 MG/DL (ref 0–0.5)
CRP SERPL-MCNC: <0.3 MG/DL (ref 0–0.5)
CYTO UR: NORMAL
CYTO UR: NORMAL
D DIMER PPP FEU-MCNC: 1.13 MG/L (FEU) (ref 0–0.5)
D DIMER PPP FEU-MCNC: 1.2 MG/L (FEU) (ref 0–0.5)
D DIMER PPP FEU-MCNC: 2.38 MG/L (FEU) (ref 0–0.5)
D DIMER PPP FEU-MCNC: 2.77 MG/L (FEU) (ref 0–0.5)
D-LACTATE SERPL-SCNC: 0.7 MMOL/L (ref 0.5–2)
D-LACTATE SERPL-SCNC: 1 MMOL/L (ref 0.5–2)
D-LACTATE SERPL-SCNC: 1.4 MMOL/L (ref 0.5–2)
DEPRECATED RDW RBC AUTO: 43.9 FL (ref 37–54)
DEPRECATED RDW RBC AUTO: 44.8 FL (ref 37–54)
DEPRECATED RDW RBC AUTO: 45.6 FL (ref 37–54)
DEPRECATED RDW RBC AUTO: 46.5 FL (ref 37–54)
DEPRECATED RDW RBC AUTO: 46.8 FL (ref 37–54)
DEPRECATED RDW RBC AUTO: 47.4 FL (ref 37–54)
DEPRECATED RDW RBC AUTO: 47.8 FL (ref 37–54)
DEPRECATED RDW RBC AUTO: 48.1 FL (ref 37–54)
DEPRECATED RDW RBC AUTO: 48.6 FL (ref 37–54)
DEPRECATED RDW RBC AUTO: 48.8 FL (ref 37–54)
DEPRECATED RDW RBC AUTO: 49.1 FL (ref 37–54)
DEPRECATED RDW RBC AUTO: 49.5 FL (ref 37–54)
EGFRCR SERPLBLD CKD-EPI 2021: 23.1 ML/MIN/1.73
EGFRCR SERPLBLD CKD-EPI 2021: 23.6 ML/MIN/1.73
EGFRCR SERPLBLD CKD-EPI 2021: 25.5 ML/MIN/1.73
EGFRCR SERPLBLD CKD-EPI 2021: 28.5 ML/MIN/1.73
EGFRCR SERPLBLD CKD-EPI 2021: 28.9 ML/MIN/1.73
EGFRCR SERPLBLD CKD-EPI 2021: 29.3 ML/MIN/1.73
EGFRCR SERPLBLD CKD-EPI 2021: 30.6 ML/MIN/1.73
EGFRCR SERPLBLD CKD-EPI 2021: 31 ML/MIN/1.73
EGFRCR SERPLBLD CKD-EPI 2021: 31.5 ML/MIN/1.73
EGFRCR SERPLBLD CKD-EPI 2021: 33.5 ML/MIN/1.73
EGFRCR SERPLBLD CKD-EPI 2021: 33.8 ML/MIN/1.73
EGFRCR SERPLBLD CKD-EPI 2021: 35.2 ML/MIN/1.73
EGFRCR SERPLBLD CKD-EPI 2021: 36.7 ML/MIN/1.73
EOSINOPHIL # BLD AUTO: 0 10*3/MM3 (ref 0–0.4)
EOSINOPHIL # BLD AUTO: 0.03 10*3/MM3 (ref 0–0.4)
EOSINOPHIL # BLD AUTO: 0.04 10*3/MM3 (ref 0–0.4)
EOSINOPHIL # BLD AUTO: 0.04 10*3/MM3 (ref 0–0.4)
EOSINOPHIL # BLD AUTO: 0.06 10*3/MM3 (ref 0–0.4)
EOSINOPHIL # BLD AUTO: 0.09 10*3/MM3 (ref 0–0.4)
EOSINOPHIL # BLD AUTO: 0.14 10*3/MM3 (ref 0–0.4)
EOSINOPHIL # BLD AUTO: 0.32 10*3/MM3 (ref 0–0.4)
EOSINOPHIL # BLD AUTO: 0.82 10*3/MM3 (ref 0–0.4)
EOSINOPHIL NFR BLD AUTO: 0 % (ref 0.3–6.2)
EOSINOPHIL NFR BLD AUTO: 0.4 % (ref 0.3–6.2)
EOSINOPHIL NFR BLD AUTO: 0.4 % (ref 0.3–6.2)
EOSINOPHIL NFR BLD AUTO: 0.5 % (ref 0.3–6.2)
EOSINOPHIL NFR BLD AUTO: 0.7 % (ref 0.3–6.2)
EOSINOPHIL NFR BLD AUTO: 0.8 % (ref 0.3–6.2)
EOSINOPHIL NFR BLD AUTO: 11.2 % (ref 0.3–6.2)
EOSINOPHIL NFR BLD AUTO: 2.3 % (ref 0.3–6.2)
EOSINOPHIL NFR BLD AUTO: 6.4 % (ref 0.3–6.2)
ERYTHROCYTE [DISTWIDTH] IN BLOOD BY AUTOMATED COUNT: 13.6 % (ref 12.3–15.4)
ERYTHROCYTE [DISTWIDTH] IN BLOOD BY AUTOMATED COUNT: 13.6 % (ref 12.3–15.4)
ERYTHROCYTE [DISTWIDTH] IN BLOOD BY AUTOMATED COUNT: 13.9 % (ref 12.3–15.4)
ERYTHROCYTE [DISTWIDTH] IN BLOOD BY AUTOMATED COUNT: 14.3 % (ref 12.3–15.4)
ERYTHROCYTE [DISTWIDTH] IN BLOOD BY AUTOMATED COUNT: 14.6 % (ref 12.3–15.4)
ERYTHROCYTE [DISTWIDTH] IN BLOOD BY AUTOMATED COUNT: 14.6 % (ref 12.3–15.4)
ERYTHROCYTE [DISTWIDTH] IN BLOOD BY AUTOMATED COUNT: 14.7 % (ref 12.3–15.4)
ERYTHROCYTE [DISTWIDTH] IN BLOOD BY AUTOMATED COUNT: 14.8 % (ref 12.3–15.4)
ERYTHROCYTE [DISTWIDTH] IN BLOOD BY AUTOMATED COUNT: 14.9 % (ref 12.3–15.4)
ERYTHROCYTE [DISTWIDTH] IN BLOOD BY AUTOMATED COUNT: 14.9 % (ref 12.3–15.4)
ERYTHROCYTE [DISTWIDTH] IN BLOOD BY AUTOMATED COUNT: 15 % (ref 12.3–15.4)
ERYTHROCYTE [DISTWIDTH] IN BLOOD BY AUTOMATED COUNT: 15.1 % (ref 12.3–15.4)
FLUAV SUBTYP SPEC NAA+PROBE: NOT DETECTED
FLUAV SUBTYP SPEC NAA+PROBE: NOT DETECTED
FLUBV RNA ISLT QL NAA+PROBE: NOT DETECTED
FLUBV RNA ISLT QL NAA+PROBE: NOT DETECTED
GAS FLOW AIRWAY: 2 LPM
GAS FLOW AIRWAY: 2 LPM
GAS FLOW AIRWAY: 3 LPM
GAS FLOW AIRWAY: 3.5 LPM
GAS FLOW AIRWAY: 6 LPM
GAS FLOW AIRWAY: 7 LPM
GFR SERPL CREATININE-BSD FRML MDRD: 26 ML/MIN/1.73
GFR SERPL CREATININE-BSD FRML MDRD: 28 ML/MIN/1.73
GFR SERPL CREATININE-BSD FRML MDRD: 28 ML/MIN/1.73
GFR SERPL CREATININE-BSD FRML MDRD: 29 ML/MIN/1.73
GFR SERPL CREATININE-BSD FRML MDRD: 29 ML/MIN/1.73
GFR SERPL CREATININE-BSD FRML MDRD: 30 ML/MIN/1.73
GFR SERPL CREATININE-BSD FRML MDRD: 31 ML/MIN/1.73
GFR SERPL CREATININE-BSD FRML MDRD: 32 ML/MIN/1.73
GFR SERPL CREATININE-BSD FRML MDRD: 35 ML/MIN/1.73
GFR SERPL CREATININE-BSD FRML MDRD: 35 ML/MIN/1.73
GFR SERPL CREATININE-BSD FRML MDRD: 36 ML/MIN/1.73
GFR SERPL CREATININE-BSD FRML MDRD: 37 ML/MIN/1.73
GFR SERPL CREATININE-BSD FRML MDRD: 39 ML/MIN/1.73
GFR SERPL CREATININE-BSD FRML MDRD: 40 ML/MIN/1.73
GFR SERPL CREATININE-BSD FRML MDRD: 43 ML/MIN/1.73
GFR SERPL CREATININE-BSD FRML MDRD: 43 ML/MIN/1.73
GLOBULIN UR ELPH-MCNC: 2 GM/DL
GLOBULIN UR ELPH-MCNC: 2.4 GM/DL
GLOBULIN UR ELPH-MCNC: 2.5 GM/DL
GLOBULIN UR ELPH-MCNC: 2.5 GM/DL
GLOBULIN UR ELPH-MCNC: 2.7 GM/DL
GLOBULIN UR ELPH-MCNC: 2.8 GM/DL
GLOBULIN UR ELPH-MCNC: 2.8 GM/DL
GLUCOSE SERPL-MCNC: 105 MG/DL (ref 65–99)
GLUCOSE SERPL-MCNC: 106 MG/DL (ref 65–99)
GLUCOSE SERPL-MCNC: 107 MG/DL (ref 65–99)
GLUCOSE SERPL-MCNC: 109 MG/DL (ref 65–99)
GLUCOSE SERPL-MCNC: 110 MG/DL (ref 65–99)
GLUCOSE SERPL-MCNC: 110 MG/DL (ref 65–99)
GLUCOSE SERPL-MCNC: 112 MG/DL (ref 65–99)
GLUCOSE SERPL-MCNC: 115 MG/DL (ref 65–99)
GLUCOSE SERPL-MCNC: 116 MG/DL (ref 65–99)
GLUCOSE SERPL-MCNC: 118 MG/DL (ref 65–99)
GLUCOSE SERPL-MCNC: 119 MG/DL (ref 65–99)
GLUCOSE SERPL-MCNC: 119 MG/DL (ref 65–99)
GLUCOSE SERPL-MCNC: 122 MG/DL (ref 65–99)
GLUCOSE SERPL-MCNC: 129 MG/DL (ref 65–99)
GLUCOSE SERPL-MCNC: 130 MG/DL (ref 65–99)
GLUCOSE SERPL-MCNC: 130 MG/DL (ref 65–99)
GLUCOSE SERPL-MCNC: 131 MG/DL (ref 65–99)
GLUCOSE SERPL-MCNC: 147 MG/DL (ref 65–99)
GLUCOSE SERPL-MCNC: 151 MG/DL (ref 65–99)
GLUCOSE SERPL-MCNC: 152 MG/DL (ref 65–99)
GLUCOSE SERPL-MCNC: 170 MG/DL (ref 65–99)
GLUCOSE SERPL-MCNC: 186 MG/DL (ref 65–99)
GLUCOSE SERPL-MCNC: 204 MG/DL (ref 65–99)
GLUCOSE SERPL-MCNC: 79 MG/DL (ref 65–99)
GLUCOSE SERPL-MCNC: 80 MG/DL (ref 65–99)
GLUCOSE SERPL-MCNC: 91 MG/DL (ref 65–99)
GLUCOSE SERPL-MCNC: 94 MG/DL (ref 65–99)
GLUCOSE SERPL-MCNC: 95 MG/DL (ref 65–99)
GLUCOSE SERPL-MCNC: 96 MG/DL (ref 65–99)
GLUCOSE SERPL-MCNC: 96 MG/DL (ref 65–99)
GLUCOSE SERPL-MCNC: 98 MG/DL (ref 65–99)
GLUCOSE SERPL-MCNC: 98 MG/DL (ref 65–99)
GLUCOSE UR STRIP-MCNC: NEGATIVE MG/DL
HADV DNA SPEC NAA+PROBE: NOT DETECTED
HADV DNA SPEC NAA+PROBE: NOT DETECTED
HCO3 BLDA-SCNC: 29.5 MMOL/L (ref 20–26)
HCO3 BLDA-SCNC: 30.7 MMOL/L (ref 20–26)
HCO3 BLDA-SCNC: 33.8 MMOL/L (ref 20–26)
HCO3 BLDA-SCNC: 33.8 MMOL/L (ref 20–26)
HCO3 BLDA-SCNC: 36.9 MMOL/L (ref 20–26)
HCO3 BLDA-SCNC: 38.1 MMOL/L (ref 20–26)
HCOV 229E RNA SPEC QL NAA+PROBE: NOT DETECTED
HCOV 229E RNA SPEC QL NAA+PROBE: NOT DETECTED
HCOV HKU1 RNA SPEC QL NAA+PROBE: NOT DETECTED
HCOV HKU1 RNA SPEC QL NAA+PROBE: NOT DETECTED
HCOV NL63 RNA SPEC QL NAA+PROBE: NOT DETECTED
HCOV NL63 RNA SPEC QL NAA+PROBE: NOT DETECTED
HCOV OC43 RNA SPEC QL NAA+PROBE: NOT DETECTED
HCOV OC43 RNA SPEC QL NAA+PROBE: NOT DETECTED
HCT VFR BLD AUTO: 31.3 % (ref 34–46.6)
HCT VFR BLD AUTO: 31.3 % (ref 34–46.6)
HCT VFR BLD AUTO: 33 % (ref 34–46.6)
HCT VFR BLD AUTO: 33.1 % (ref 34–46.6)
HCT VFR BLD AUTO: 33.2 % (ref 34–46.6)
HCT VFR BLD AUTO: 33.6 % (ref 34–46.6)
HCT VFR BLD AUTO: 34.1 % (ref 34–46.6)
HCT VFR BLD AUTO: 34.2 % (ref 34–46.6)
HCT VFR BLD AUTO: 34.9 % (ref 34–46.6)
HCT VFR BLD AUTO: 35.6 % (ref 34–46.6)
HCT VFR BLD AUTO: 36 % (ref 34–46.6)
HCT VFR BLD AUTO: 37.6 % (ref 34–46.6)
HCT VFR BLD AUTO: 37.6 % (ref 34–46.6)
HCT VFR BLD AUTO: 37.7 % (ref 34–46.6)
HCT VFR BLD CALC: 35.5 % (ref 38–51)
HGB BLD-MCNC: 10 G/DL (ref 12–15.9)
HGB BLD-MCNC: 10.1 G/DL (ref 12–15.9)
HGB BLD-MCNC: 10.2 G/DL (ref 12–15.9)
HGB BLD-MCNC: 10.3 G/DL (ref 12–15.9)
HGB BLD-MCNC: 10.4 G/DL (ref 12–15.9)
HGB BLD-MCNC: 10.7 G/DL (ref 12–15.9)
HGB BLD-MCNC: 10.8 G/DL (ref 12–15.9)
HGB BLD-MCNC: 10.9 G/DL (ref 12–15.9)
HGB BLD-MCNC: 10.9 G/DL (ref 12–15.9)
HGB BLD-MCNC: 11.1 G/DL (ref 12–15.9)
HGB BLD-MCNC: 11.1 G/DL (ref 12–15.9)
HGB BLD-MCNC: 11.3 G/DL (ref 12–15.9)
HGB BLD-MCNC: 11.5 G/DL (ref 12–15.9)
HGB BLD-MCNC: 11.6 G/DL (ref 12–15.9)
HGB BLD-MCNC: 11.7 G/DL (ref 12–15.9)
HGB BLD-MCNC: 9.9 G/DL (ref 12–15.9)
HGB BLDA-MCNC: 11.6 G/DL (ref 12–16)
HGB UR QL STRIP.AUTO: NEGATIVE
HMPV RNA NPH QL NAA+NON-PROBE: NOT DETECTED
HMPV RNA NPH QL NAA+NON-PROBE: NOT DETECTED
HOLD SPECIMEN: NORMAL
HPIV1 RNA ISLT QL NAA+PROBE: NOT DETECTED
HPIV1 RNA ISLT QL NAA+PROBE: NOT DETECTED
HPIV2 RNA SPEC QL NAA+PROBE: NOT DETECTED
HPIV2 RNA SPEC QL NAA+PROBE: NOT DETECTED
HPIV3 RNA NPH QL NAA+PROBE: NOT DETECTED
HPIV3 RNA NPH QL NAA+PROBE: NOT DETECTED
HPIV4 P GENE NPH QL NAA+PROBE: NOT DETECTED
HPIV4 P GENE NPH QL NAA+PROBE: NOT DETECTED
IMM GRANULOCYTES # BLD AUTO: 0.01 10*3/MM3 (ref 0–0.05)
IMM GRANULOCYTES # BLD AUTO: 0.02 10*3/MM3 (ref 0–0.05)
IMM GRANULOCYTES # BLD AUTO: 0.05 10*3/MM3 (ref 0–0.05)
IMM GRANULOCYTES # BLD AUTO: 0.08 10*3/MM3 (ref 0–0.05)
IMM GRANULOCYTES # BLD AUTO: 0.12 10*3/MM3 (ref 0–0.05)
IMM GRANULOCYTES # BLD AUTO: 0.15 10*3/MM3 (ref 0–0.05)
IMM GRANULOCYTES # BLD AUTO: 0.19 10*3/MM3 (ref 0–0.05)
IMM GRANULOCYTES NFR BLD AUTO: 0.2 % (ref 0–0.5)
IMM GRANULOCYTES NFR BLD AUTO: 0.3 % (ref 0–0.5)
IMM GRANULOCYTES NFR BLD AUTO: 0.6 % (ref 0–0.5)
IMM GRANULOCYTES NFR BLD AUTO: 0.7 % (ref 0–0.5)
IMM GRANULOCYTES NFR BLD AUTO: 1 % (ref 0–0.5)
IMM GRANULOCYTES NFR BLD AUTO: 1.8 % (ref 0–0.5)
IMM GRANULOCYTES NFR BLD AUTO: 1.8 % (ref 0–0.5)
INR PPP: 1.02 (ref 0.91–1.09)
INR PPP: 1.06 (ref 0.91–1.09)
INR PPP: 1.52 (ref 0.91–1.09)
INR PPP: 1.6 (ref 0.91–1.09)
INR PPP: 1.63 (ref 0.91–1.09)
KETONES UR QL STRIP: NEGATIVE
LAB AP CASE REPORT: NORMAL
LAB AP CASE REPORT: NORMAL
LAB AP CLINICAL INFORMATION: NORMAL
LDH FLD-CCNC: 50 U/L
LDH SERPL-CCNC: 164 U/L (ref 135–214)
LEFT ATRIUM VOLUME INDEX: 41 ML/M2
LEUKOCYTE ESTERASE UR QL STRIP.AUTO: NEGATIVE
LYMPHOCYTES # BLD AUTO: 0.5 10*3/MM3 (ref 0.7–3.1)
LYMPHOCYTES # BLD AUTO: 0.7 10*3/MM3 (ref 0.7–3.1)
LYMPHOCYTES # BLD AUTO: 0.78 10*3/MM3 (ref 0.7–3.1)
LYMPHOCYTES # BLD AUTO: 0.83 10*3/MM3 (ref 0.7–3.1)
LYMPHOCYTES # BLD AUTO: 0.96 10*3/MM3 (ref 0.7–3.1)
LYMPHOCYTES # BLD AUTO: 1.03 10*3/MM3 (ref 0.7–3.1)
LYMPHOCYTES # BLD AUTO: 1.14 10*3/MM3 (ref 0.7–3.1)
LYMPHOCYTES # BLD AUTO: 1.47 10*3/MM3 (ref 0.7–3.1)
LYMPHOCYTES # BLD AUTO: 1.53 10*3/MM3 (ref 0.7–3.1)
LYMPHOCYTES NFR BLD AUTO: 13 % (ref 19.6–45.3)
LYMPHOCYTES NFR BLD AUTO: 14 % (ref 19.6–45.3)
LYMPHOCYTES NFR BLD AUTO: 14.4 % (ref 19.6–45.3)
LYMPHOCYTES NFR BLD AUTO: 17.4 % (ref 19.6–45.3)
LYMPHOCYTES NFR BLD AUTO: 22.7 % (ref 19.6–45.3)
LYMPHOCYTES NFR BLD AUTO: 5.7 % (ref 19.6–45.3)
LYMPHOCYTES NFR BLD AUTO: 7.5 % (ref 19.6–45.3)
LYMPHOCYTES NFR BLD AUTO: 8.3 % (ref 19.6–45.3)
LYMPHOCYTES NFR BLD AUTO: 9.4 % (ref 19.6–45.3)
LYMPHOCYTES NFR FLD MANUAL: 89 %
Lab: ABNORMAL
M PNEUMO IGG SER IA-ACNC: NOT DETECTED
M PNEUMO IGG SER IA-ACNC: NOT DETECTED
MAGNESIUM SERPL-MCNC: 1.7 MG/DL (ref 1.6–2.4)
MAGNESIUM SERPL-MCNC: 1.7 MG/DL (ref 1.6–2.4)
MAGNESIUM SERPL-MCNC: 1.9 MG/DL (ref 1.6–2.4)
MAGNESIUM SERPL-MCNC: 2 MG/DL (ref 1.6–2.4)
MAGNESIUM SERPL-MCNC: 2.2 MG/DL (ref 1.6–2.4)
MAGNESIUM SERPL-MCNC: 2.5 MG/DL (ref 1.6–2.4)
MAGNESIUM SERPL-MCNC: 2.6 MG/DL (ref 1.6–2.4)
MAXIMAL PREDICTED HEART RATE: 134 BPM
MCH RBC QN AUTO: 27.4 PG (ref 26.6–33)
MCH RBC QN AUTO: 27.5 PG (ref 26.6–33)
MCH RBC QN AUTO: 27.5 PG (ref 26.6–33)
MCH RBC QN AUTO: 27.6 PG (ref 26.6–33)
MCH RBC QN AUTO: 27.8 PG (ref 26.6–33)
MCH RBC QN AUTO: 27.9 PG (ref 26.6–33)
MCH RBC QN AUTO: 28 PG (ref 26.6–33)
MCH RBC QN AUTO: 28.5 PG (ref 26.6–33)
MCH RBC QN AUTO: 28.7 PG (ref 26.6–33)
MCH RBC QN AUTO: 28.8 PG (ref 26.6–33)
MCH RBC QN AUTO: 28.8 PG (ref 26.6–33)
MCH RBC QN AUTO: 29.1 PG (ref 26.6–33)
MCHC RBC AUTO-ENTMCNC: 30.4 G/DL (ref 31.5–35.7)
MCHC RBC AUTO-ENTMCNC: 30.5 G/DL (ref 31.5–35.7)
MCHC RBC AUTO-ENTMCNC: 30.6 G/DL (ref 31.5–35.7)
MCHC RBC AUTO-ENTMCNC: 30.7 G/DL (ref 31.5–35.7)
MCHC RBC AUTO-ENTMCNC: 30.8 G/DL (ref 31.5–35.7)
MCHC RBC AUTO-ENTMCNC: 30.9 G/DL (ref 31.5–35.7)
MCHC RBC AUTO-ENTMCNC: 30.9 G/DL (ref 31.5–35.7)
MCHC RBC AUTO-ENTMCNC: 31 G/DL (ref 31.5–35.7)
MCHC RBC AUTO-ENTMCNC: 31.2 G/DL (ref 31.5–35.7)
MCHC RBC AUTO-ENTMCNC: 31.2 G/DL (ref 31.5–35.7)
MCHC RBC AUTO-ENTMCNC: 31.3 G/DL (ref 31.5–35.7)
MCHC RBC AUTO-ENTMCNC: 31.6 G/DL (ref 31.5–35.7)
MCHC RBC AUTO-ENTMCNC: 31.7 G/DL (ref 31.5–35.7)
MCHC RBC AUTO-ENTMCNC: 31.9 G/DL (ref 31.5–35.7)
MCHC RBC AUTO-ENTMCNC: 31.9 G/DL (ref 31.5–35.7)
MCHC RBC AUTO-ENTMCNC: 32.6 G/DL (ref 31.5–35.7)
MCV RBC AUTO: 86.8 FL (ref 79–97)
MCV RBC AUTO: 88.1 FL (ref 79–97)
MCV RBC AUTO: 88.3 FL (ref 79–97)
MCV RBC AUTO: 89 FL (ref 79–97)
MCV RBC AUTO: 89.7 FL (ref 79–97)
MCV RBC AUTO: 89.9 FL (ref 79–97)
MCV RBC AUTO: 90 FL (ref 79–97)
MCV RBC AUTO: 90.2 FL (ref 79–97)
MCV RBC AUTO: 90.4 FL (ref 79–97)
MCV RBC AUTO: 90.4 FL (ref 79–97)
MCV RBC AUTO: 90.5 FL (ref 79–97)
MCV RBC AUTO: 90.6 FL (ref 79–97)
MCV RBC AUTO: 90.7 FL (ref 79–97)
MCV RBC AUTO: 90.7 FL (ref 79–97)
MCV RBC AUTO: 91 FL (ref 79–97)
MCV RBC AUTO: 91.4 FL (ref 79–97)
METHGB BLD QL: 0.8 % (ref 0–3)
MODALITY: ABNORMAL
MONOCYTES # BLD AUTO: 0.07 10*3/MM3 (ref 0.1–0.9)
MONOCYTES # BLD AUTO: 0.62 10*3/MM3 (ref 0.1–0.9)
MONOCYTES # BLD AUTO: 0.65 10*3/MM3 (ref 0.1–0.9)
MONOCYTES # BLD AUTO: 0.75 10*3/MM3 (ref 0.1–0.9)
MONOCYTES # BLD AUTO: 0.81 10*3/MM3 (ref 0.1–0.9)
MONOCYTES # BLD AUTO: 0.86 10*3/MM3 (ref 0.1–0.9)
MONOCYTES # BLD AUTO: 0.88 10*3/MM3 (ref 0.1–0.9)
MONOCYTES # BLD AUTO: 1.01 10*3/MM3 (ref 0.1–0.9)
MONOCYTES # BLD AUTO: 1.22 10*3/MM3 (ref 0.1–0.9)
MONOCYTES NFR BLD AUTO: 1 % (ref 5–12)
MONOCYTES NFR BLD AUTO: 10.3 % (ref 5–12)
MONOCYTES NFR BLD AUTO: 10.4 % (ref 5–12)
MONOCYTES NFR BLD AUTO: 11.7 % (ref 5–12)
MONOCYTES NFR BLD AUTO: 12.9 % (ref 5–12)
MONOCYTES NFR BLD AUTO: 6.5 % (ref 5–12)
MONOCYTES NFR BLD AUTO: 9.2 % (ref 5–12)
MONOCYTES NFR BLD AUTO: 9.5 % (ref 5–12)
MONOCYTES NFR BLD AUTO: 9.9 % (ref 5–12)
MONOCYTES NFR FLD: 6 %
NEUTROPHILS NFR BLD AUTO: 10.32 10*3/MM3 (ref 1.7–7)
NEUTROPHILS NFR BLD AUTO: 2.88 10*3/MM3 (ref 1.7–7)
NEUTROPHILS NFR BLD AUTO: 4.42 10*3/MM3 (ref 1.7–7)
NEUTROPHILS NFR BLD AUTO: 4.59 10*3/MM3 (ref 1.7–7)
NEUTROPHILS NFR BLD AUTO: 5.85 10*3/MM3 (ref 1.7–7)
NEUTROPHILS NFR BLD AUTO: 57.2 % (ref 42.7–76)
NEUTROPHILS NFR BLD AUTO: 6.01 10*3/MM3 (ref 1.7–7)
NEUTROPHILS NFR BLD AUTO: 62.4 % (ref 42.7–76)
NEUTROPHILS NFR BLD AUTO: 69.2 % (ref 42.7–76)
NEUTROPHILS NFR BLD AUTO: 7.04 10*3/MM3 (ref 1.7–7)
NEUTROPHILS NFR BLD AUTO: 7.79 10*3/MM3 (ref 1.7–7)
NEUTROPHILS NFR BLD AUTO: 73.6 % (ref 42.7–76)
NEUTROPHILS NFR BLD AUTO: 73.8 % (ref 42.7–76)
NEUTROPHILS NFR BLD AUTO: 79.8 % (ref 42.7–76)
NEUTROPHILS NFR BLD AUTO: 83.3 % (ref 42.7–76)
NEUTROPHILS NFR BLD AUTO: 83.4 % (ref 42.7–76)
NEUTROPHILS NFR BLD AUTO: 9.67 10*3/MM3 (ref 1.7–7)
NEUTROPHILS NFR BLD AUTO: 90.2 % (ref 42.7–76)
NEUTROPHILS NFR FLD MANUAL: 5 %
NITRITE UR QL STRIP: NEGATIVE
NOTE: ABNORMAL
NRBC BLD AUTO-RTO: 0 /100 WBC (ref 0–0.2)
NT-PROBNP SERPL-MCNC: 1310 PG/ML (ref 0–1800)
NT-PROBNP SERPL-MCNC: ABNORMAL PG/ML (ref 0–1800)
OSMOLALITY SERPL: 278 MOSM/KG (ref 280–301)
OSMOLALITY SERPL: 285 MOSM/KG (ref 280–301)
OSMOLALITY UR: 276 MOSM/KG (ref 50–1400)
OSMOLALITY UR: 356 MOSM/KG (ref 50–1400)
OXYHGB MFR BLDV: 97.9 % (ref 94–99)
PATH REPORT.FINAL DX SPEC: NORMAL
PATH REPORT.FINAL DX SPEC: NORMAL
PATH REPORT.GROSS SPEC: NORMAL
PATH REPORT.GROSS SPEC: NORMAL
PCO2 BLDA: 44.2 MM HG (ref 35–45)
PCO2 BLDA: 52.4 MM HG (ref 35–45)
PCO2 BLDA: 52.8 MM HG (ref 35–45)
PCO2 BLDA: 53.7 MM HG (ref 35–45)
PCO2 BLDA: 63.8 MM HG (ref 35–45)
PCO2 BLDA: 64.8 MM HG (ref 35–45)
PCO2 TEMP ADJ BLD: 44.2 MM HG (ref 35–45)
PCO2 TEMP ADJ BLD: 52.4 MM HG (ref 35–45)
PCO2 TEMP ADJ BLD: 52.8 MM HG (ref 35–45)
PCO2 TEMP ADJ BLD: 53.7 MM HG (ref 35–45)
PCO2 TEMP ADJ BLD: 63.8 MM HG (ref 35–45)
PCO2 TEMP ADJ BLD: 64.8 MM HG (ref 35–45)
PH BLDA: 7.37 PH UNITS (ref 7.35–7.45)
PH BLDA: 7.37 PH UNITS (ref 7.35–7.45)
PH BLDA: 7.38 PH UNITS (ref 7.35–7.45)
PH BLDA: 7.41 PH UNITS (ref 7.35–7.45)
PH BLDA: 7.42 PH UNITS (ref 7.35–7.45)
PH BLDA: 7.43 PH UNITS (ref 7.35–7.45)
PH UR STRIP.AUTO: <=5 [PH] (ref 5–8)
PH, TEMP CORRECTED: 7.37 PH UNITS (ref 7.35–7.45)
PH, TEMP CORRECTED: 7.37 PH UNITS (ref 7.35–7.45)
PH, TEMP CORRECTED: 7.38 PH UNITS (ref 7.35–7.45)
PH, TEMP CORRECTED: 7.41 PH UNITS (ref 7.35–7.45)
PH, TEMP CORRECTED: 7.42 PH UNITS (ref 7.35–7.45)
PH, TEMP CORRECTED: 7.43 PH UNITS (ref 7.35–7.45)
PHOSPHATE SERPL-MCNC: 4.5 MG/DL (ref 2.5–4.5)
PLATELET # BLD AUTO: 222 10*3/MM3 (ref 140–450)
PLATELET # BLD AUTO: 247 10*3/MM3 (ref 140–450)
PLATELET # BLD AUTO: 248 10*3/MM3 (ref 140–450)
PLATELET # BLD AUTO: 249 10*3/MM3 (ref 140–450)
PLATELET # BLD AUTO: 263 10*3/MM3 (ref 140–450)
PLATELET # BLD AUTO: 267 10*3/MM3 (ref 140–450)
PLATELET # BLD AUTO: 281 10*3/MM3 (ref 140–450)
PLATELET # BLD AUTO: 291 10*3/MM3 (ref 140–450)
PLATELET # BLD AUTO: 297 10*3/MM3 (ref 140–450)
PLATELET # BLD AUTO: 299 10*3/MM3 (ref 140–450)
PLATELET # BLD AUTO: 318 10*3/MM3 (ref 140–450)
PLATELET # BLD AUTO: 333 10*3/MM3 (ref 140–450)
PLATELET # BLD AUTO: 341 10*3/MM3 (ref 140–450)
PLATELET # BLD AUTO: 345 10*3/MM3 (ref 140–450)
PLATELET # BLD AUTO: 346 10*3/MM3 (ref 140–450)
PLATELET # BLD AUTO: 397 10*3/MM3 (ref 140–450)
PMV BLD AUTO: 10 FL (ref 6–12)
PMV BLD AUTO: 9 FL (ref 6–12)
PMV BLD AUTO: 9.1 FL (ref 6–12)
PMV BLD AUTO: 9.2 FL (ref 6–12)
PMV BLD AUTO: 9.5 FL (ref 6–12)
PMV BLD AUTO: 9.6 FL (ref 6–12)
PMV BLD AUTO: 9.6 FL (ref 6–12)
PMV BLD AUTO: 9.7 FL (ref 6–12)
PMV BLD AUTO: 9.8 FL (ref 6–12)
PMV BLD AUTO: 9.8 FL (ref 6–12)
PMV BLD AUTO: 9.9 FL (ref 6–12)
PMV BLD AUTO: 9.9 FL (ref 6–12)
PO2 BLDA: 130 MM HG (ref 83–108)
PO2 BLDA: 131 MM HG (ref 83–108)
PO2 BLDA: 157 MM HG (ref 83–108)
PO2 BLDA: 73.8 MM HG (ref 83–108)
PO2 BLDA: 95.1 MM HG (ref 83–108)
PO2 BLDA: 96.6 MM HG (ref 83–108)
PO2 TEMP ADJ BLD: 130 MM HG (ref 83–108)
PO2 TEMP ADJ BLD: 131 MM HG (ref 83–108)
PO2 TEMP ADJ BLD: 157 MM HG (ref 83–108)
PO2 TEMP ADJ BLD: 73.8 MM HG (ref 83–108)
PO2 TEMP ADJ BLD: 95.1 MM HG (ref 83–108)
PO2 TEMP ADJ BLD: 96.6 MM HG (ref 83–108)
POTASSIUM BLDA-SCNC: 4.3 MMOL/L (ref 3.5–5.2)
POTASSIUM SERPL-SCNC: 3.7 MMOL/L (ref 3.5–5.2)
POTASSIUM SERPL-SCNC: 3.8 MMOL/L (ref 3.5–5.2)
POTASSIUM SERPL-SCNC: 3.9 MMOL/L (ref 3.5–5.2)
POTASSIUM SERPL-SCNC: 4 MMOL/L (ref 3.5–5.2)
POTASSIUM SERPL-SCNC: 4.1 MMOL/L (ref 3.5–5.2)
POTASSIUM SERPL-SCNC: 4.2 MMOL/L (ref 3.5–5.2)
POTASSIUM SERPL-SCNC: 4.3 MMOL/L (ref 3.5–5.2)
POTASSIUM SERPL-SCNC: 4.3 MMOL/L (ref 3.5–5.2)
POTASSIUM SERPL-SCNC: 4.4 MMOL/L (ref 3.5–5.2)
POTASSIUM SERPL-SCNC: 4.5 MMOL/L (ref 3.5–5.2)
POTASSIUM SERPL-SCNC: 4.6 MMOL/L (ref 3.5–5.2)
POTASSIUM SERPL-SCNC: 4.6 MMOL/L (ref 3.5–5.2)
POTASSIUM SERPL-SCNC: 4.7 MMOL/L (ref 3.5–5.2)
POTASSIUM SERPL-SCNC: 4.7 MMOL/L (ref 3.5–5.2)
POTASSIUM SERPL-SCNC: 4.8 MMOL/L (ref 3.5–5.2)
POTASSIUM SERPL-SCNC: 4.8 MMOL/L (ref 3.5–5.2)
POTASSIUM SERPL-SCNC: 4.9 MMOL/L (ref 3.5–5.2)
POTASSIUM SERPL-SCNC: 4.9 MMOL/L (ref 3.5–5.2)
POTASSIUM SERPL-SCNC: 5 MMOL/L (ref 3.5–5.2)
POTASSIUM SERPL-SCNC: 5.3 MMOL/L (ref 3.5–5.2)
POTASSIUM SERPL-SCNC: 5.5 MMOL/L (ref 3.5–5.2)
POTASSIUM SERPL-SCNC: 5.7 MMOL/L (ref 3.5–5.2)
PROCALCITONIN SERPL-MCNC: 0.07 NG/ML (ref 0–0.25)
PROCALCITONIN SERPL-MCNC: 0.07 NG/ML (ref 0–0.25)
PROCALCITONIN SERPL-MCNC: 0.11 NG/ML (ref 0–0.25)
PROT FLD-MCNC: 2 G/DL
PROT SERPL-MCNC: 5.7 G/DL (ref 6–8.5)
PROT SERPL-MCNC: 5.9 G/DL (ref 6–8.5)
PROT SERPL-MCNC: 6.1 G/DL (ref 6–8.5)
PROT SERPL-MCNC: 6.3 G/DL (ref 6–8.5)
PROT SERPL-MCNC: 6.4 G/DL (ref 6–8.5)
PROT SERPL-MCNC: 6.9 G/DL (ref 6–8.5)
PROT SERPL-MCNC: 6.9 G/DL (ref 6–8.5)
PROT UR QL STRIP: NEGATIVE
PROTHROMBIN TIME: 13 SECONDS (ref 11.9–14.6)
PROTHROMBIN TIME: 13.4 SECONDS (ref 11.9–14.6)
PROTHROMBIN TIME: 17.7 SECONDS (ref 11.9–14.6)
PROTHROMBIN TIME: 18.4 SECONDS (ref 11.9–14.6)
PROTHROMBIN TIME: 18.7 SECONDS (ref 11.9–14.6)
QT INTERVAL: 328 MS
QT INTERVAL: 336 MS
QT INTERVAL: 338 MS
QT INTERVAL: 338 MS
QT INTERVAL: 340 MS
QT INTERVAL: 342 MS
QT INTERVAL: 350 MS
QT INTERVAL: 352 MS
QT INTERVAL: 360 MS
QT INTERVAL: 362 MS
QT INTERVAL: 406 MS
QT INTERVAL: 410 MS
QTC INTERVAL: 418 MS
QTC INTERVAL: 420 MS
QTC INTERVAL: 422 MS
QTC INTERVAL: 422 MS
QTC INTERVAL: 423 MS
QTC INTERVAL: 432 MS
QTC INTERVAL: 438 MS
QTC INTERVAL: 447 MS
QTC INTERVAL: 448 MS
QTC INTERVAL: 471 MS
QTC INTERVAL: 484 MS
QTC INTERVAL: 490 MS
RBC # BLD AUTO: 3.44 10*6/MM3 (ref 3.77–5.28)
RBC # BLD AUTO: 3.45 10*6/MM3 (ref 3.77–5.28)
RBC # BLD AUTO: 3.61 10*6/MM3 (ref 3.77–5.28)
RBC # BLD AUTO: 3.66 10*6/MM3 (ref 3.77–5.28)
RBC # BLD AUTO: 3.71 10*6/MM3 (ref 3.77–5.28)
RBC # BLD AUTO: 3.73 10*6/MM3 (ref 3.77–5.28)
RBC # BLD AUTO: 3.78 10*6/MM3 (ref 3.77–5.28)
RBC # BLD AUTO: 3.86 10*6/MM3 (ref 3.77–5.28)
RBC # BLD AUTO: 3.86 10*6/MM3 (ref 3.77–5.28)
RBC # BLD AUTO: 3.88 10*6/MM3 (ref 3.77–5.28)
RBC # BLD AUTO: 3.96 10*6/MM3 (ref 3.77–5.28)
RBC # BLD AUTO: 3.97 10*6/MM3 (ref 3.77–5.28)
RBC # BLD AUTO: 4.1 10*6/MM3 (ref 3.77–5.28)
RBC # BLD AUTO: 4.18 10*6/MM3 (ref 3.77–5.28)
RBC # BLD AUTO: 4.18 10*6/MM3 (ref 3.77–5.28)
RBC # BLD AUTO: 4.19 10*6/MM3 (ref 3.77–5.28)
RBC # FLD AUTO: 1000 /MM3
RHINOVIRUS RNA SPEC NAA+PROBE: NOT DETECTED
RHINOVIRUS RNA SPEC NAA+PROBE: NOT DETECTED
RSV RNA NPH QL NAA+NON-PROBE: NOT DETECTED
RSV RNA NPH QL NAA+NON-PROBE: NOT DETECTED
SAO2 % BLDCOA: 100 % (ref 94–99)
SAO2 % BLDCOA: 96 % (ref 94–99)
SAO2 % BLDCOA: 98.3 % (ref 94–99)
SAO2 % BLDCOA: 98.4 % (ref 94–99)
SAO2 % BLDCOA: 99.7 % (ref 94–99)
SAO2 % BLDCOA: 99.9 % (ref 94–99)
SARS-COV-2 RNA NPH QL NAA+NON-PROBE: NOT DETECTED
SARS-COV-2 RNA NPH QL NAA+NON-PROBE: NOT DETECTED
SARS-COV-2 RNA PNL SPEC NAA+PROBE: NOT DETECTED
SODIUM BLDA-SCNC: 137 MMOL/L (ref 136–145)
SODIUM SERPL-SCNC: 125 MMOL/L (ref 136–145)
SODIUM SERPL-SCNC: 126 MMOL/L (ref 136–145)
SODIUM SERPL-SCNC: 127 MMOL/L (ref 136–145)
SODIUM SERPL-SCNC: 128 MMOL/L (ref 136–145)
SODIUM SERPL-SCNC: 129 MMOL/L (ref 136–145)
SODIUM SERPL-SCNC: 130 MMOL/L (ref 136–145)
SODIUM SERPL-SCNC: 130 MMOL/L (ref 136–145)
SODIUM SERPL-SCNC: 131 MMOL/L (ref 136–145)
SODIUM SERPL-SCNC: 132 MMOL/L (ref 136–145)
SODIUM SERPL-SCNC: 132 MMOL/L (ref 136–145)
SODIUM SERPL-SCNC: 133 MMOL/L (ref 136–145)
SODIUM SERPL-SCNC: 134 MMOL/L (ref 136–145)
SODIUM SERPL-SCNC: 134 MMOL/L (ref 136–145)
SODIUM SERPL-SCNC: 135 MMOL/L (ref 136–145)
SODIUM SERPL-SCNC: 136 MMOL/L (ref 136–145)
SODIUM SERPL-SCNC: 137 MMOL/L (ref 136–145)
SODIUM UR-SCNC: 22 MMOL/L
SODIUM UR-SCNC: <20 MMOL/L
SP GR UR STRIP: 1.02 (ref 1–1.03)
STRESS TARGET HR: 114 BPM
T4 FREE SERPL-MCNC: 1.37 NG/DL (ref 0.93–1.7)
T4 FREE SERPL-MCNC: 1.53 NG/DL (ref 0.93–1.7)
TROPONIN T SERPL-MCNC: 0.01 NG/ML (ref 0–0.03)
TROPONIN T SERPL-MCNC: 0.02 NG/ML (ref 0–0.03)
TROPONIN T SERPL-MCNC: 0.03 NG/ML (ref 0–0.03)
TROPONIN T SERPL-MCNC: 0.04 NG/ML (ref 0–0.03)
TROPONIN T SERPL-MCNC: 0.04 NG/ML (ref 0–0.03)
TROPONIN T SERPL-MCNC: 0.06 NG/ML (ref 0–0.03)
TSH SERPL DL<=0.05 MIU/L-ACNC: 2.84 UIU/ML (ref 0.27–4.2)
TSH SERPL DL<=0.05 MIU/L-ACNC: 6.87 UIU/ML (ref 0.27–4.2)
UROBILINOGEN UR QL STRIP: ABNORMAL
UUN 24H UR-MCNC: 315 MG/DL
VENTILATOR MODE: ABNORMAL
WBC # FLD AUTO: 195 /MM3
WBC NRBC COR # BLD: 10.59 10*3/MM3 (ref 3.4–10.8)
WBC NRBC COR # BLD: 11.59 10*3/MM3 (ref 3.4–10.8)
WBC NRBC COR # BLD: 12.37 10*3/MM3 (ref 3.4–10.8)
WBC NRBC COR # BLD: 2.93 10*3/MM3 (ref 3.4–10.8)
WBC NRBC COR # BLD: 4.32 10*3/MM3 (ref 3.4–10.8)
WBC NRBC COR # BLD: 5.03 10*3/MM3 (ref 3.4–10.8)
WBC NRBC COR # BLD: 5.62 10*3/MM3 (ref 3.4–10.8)
WBC NRBC COR # BLD: 6 10*3/MM3 (ref 3.4–10.8)
WBC NRBC COR # BLD: 6.07 10*3/MM3 (ref 3.4–10.8)
WBC NRBC COR # BLD: 6.67 10*3/MM3 (ref 3.4–10.8)
WBC NRBC COR # BLD: 7.35 10*3/MM3 (ref 3.4–10.8)
WBC NRBC COR # BLD: 8.45 10*3/MM3 (ref 3.4–10.8)
WBC NRBC COR # BLD: 8.75 10*3/MM3 (ref 3.4–10.8)
WBC NRBC COR # BLD: 8.81 10*3/MM3 (ref 3.4–10.8)
WBC NRBC COR # BLD: 8.83 10*3/MM3 (ref 3.4–10.8)
WBC NRBC COR # BLD: 8.95 10*3/MM3 (ref 3.4–10.8)
WHOLE BLOOD HOLD SPECIMEN: NORMAL

## 2022-01-01 PROCEDURE — 80048 BASIC METABOLIC PNL TOTAL CA: CPT | Performed by: NURSE PRACTITIONER

## 2022-01-01 PROCEDURE — 94799 UNLISTED PULMONARY SVC/PX: CPT

## 2022-01-01 PROCEDURE — 94640 AIRWAY INHALATION TREATMENT: CPT

## 2022-01-01 PROCEDURE — 93010 ELECTROCARDIOGRAM REPORT: CPT | Performed by: EMERGENCY MEDICINE

## 2022-01-01 PROCEDURE — 99232 SBSQ HOSP IP/OBS MODERATE 35: CPT | Performed by: INTERNAL MEDICINE

## 2022-01-01 PROCEDURE — 93005 ELECTROCARDIOGRAM TRACING: CPT | Performed by: INTERNAL MEDICINE

## 2022-01-01 PROCEDURE — 25010000002 LEVOFLOXACIN PER 250 MG: Performed by: INTERNAL MEDICINE

## 2022-01-01 PROCEDURE — 83735 ASSAY OF MAGNESIUM: CPT | Performed by: NURSE PRACTITIONER

## 2022-01-01 PROCEDURE — 82803 BLOOD GASES ANY COMBINATION: CPT

## 2022-01-01 PROCEDURE — 0202U NFCT DS 22 TRGT SARS-COV-2: CPT | Performed by: INTERNAL MEDICINE

## 2022-01-01 PROCEDURE — 25010000002 FUROSEMIDE PER 20 MG: Performed by: FAMILY MEDICINE

## 2022-01-01 PROCEDURE — 25010000002 ONDANSETRON PER 1 MG: Performed by: FAMILY MEDICINE

## 2022-01-01 PROCEDURE — 93005 ELECTROCARDIOGRAM TRACING: CPT

## 2022-01-01 PROCEDURE — 71045 X-RAY EXAM CHEST 1 VIEW: CPT

## 2022-01-01 PROCEDURE — 63710000001 ONDANSETRON PER 8 MG: Performed by: INTERNAL MEDICINE

## 2022-01-01 PROCEDURE — 87040 BLOOD CULTURE FOR BACTERIA: CPT | Performed by: NURSE PRACTITIONER

## 2022-01-01 PROCEDURE — 25010000002 METHYLPREDNISOLONE PER 125 MG: Performed by: INTERNAL MEDICINE

## 2022-01-01 PROCEDURE — 99214 OFFICE O/P EST MOD 30 MIN: CPT | Performed by: NURSE PRACTITIONER

## 2022-01-01 PROCEDURE — 83735 ASSAY OF MAGNESIUM: CPT | Performed by: PHYSICIAN ASSISTANT

## 2022-01-01 PROCEDURE — 71275 CT ANGIOGRAPHY CHEST: CPT

## 2022-01-01 PROCEDURE — 80048 BASIC METABOLIC PNL TOTAL CA: CPT | Performed by: INTERNAL MEDICINE

## 2022-01-01 PROCEDURE — G0180 MD CERTIFICATION HHA PATIENT: HCPCS | Performed by: INTERNAL MEDICINE

## 2022-01-01 PROCEDURE — 83935 ASSAY OF URINE OSMOLALITY: CPT | Performed by: NURSE PRACTITIONER

## 2022-01-01 PROCEDURE — 83880 ASSAY OF NATRIURETIC PEPTIDE: CPT | Performed by: EMERGENCY MEDICINE

## 2022-01-01 PROCEDURE — 97116 GAIT TRAINING THERAPY: CPT

## 2022-01-01 PROCEDURE — 63710000001 ONDANSETRON ODT 4 MG TABLET DISPERSIBLE: Performed by: INTERNAL MEDICINE

## 2022-01-01 PROCEDURE — 97162 PT EVAL MOD COMPLEX 30 MIN: CPT | Performed by: PHYSICAL THERAPIST

## 2022-01-01 PROCEDURE — 83735 ASSAY OF MAGNESIUM: CPT | Performed by: EMERGENCY MEDICINE

## 2022-01-01 PROCEDURE — 84484 ASSAY OF TROPONIN QUANT: CPT | Performed by: PHYSICIAN ASSISTANT

## 2022-01-01 PROCEDURE — 25010000002 ONDANSETRON PER 1 MG: Performed by: INTERNAL MEDICINE

## 2022-01-01 PROCEDURE — 25010000002 FUROSEMIDE PER 20 MG: Performed by: NURSE PRACTITIONER

## 2022-01-01 PROCEDURE — 80048 BASIC METABOLIC PNL TOTAL CA: CPT

## 2022-01-01 PROCEDURE — 94664 DEMO&/EVAL PT USE INHALER: CPT

## 2022-01-01 PROCEDURE — 81003 URINALYSIS AUTO W/O SCOPE: CPT | Performed by: EMERGENCY MEDICINE

## 2022-01-01 PROCEDURE — 25010000002 METHYLPREDNISOLONE PER 40 MG: Performed by: INTERNAL MEDICINE

## 2022-01-01 PROCEDURE — 36600 WITHDRAWAL OF ARTERIAL BLOOD: CPT

## 2022-01-01 PROCEDURE — 25010000002 AMIODARONE IN DEXTROSE 5% 150-4.21 MG/100ML-% SOLUTION: Performed by: EMERGENCY MEDICINE

## 2022-01-01 PROCEDURE — 25010000002 FUROSEMIDE PER 20 MG: Performed by: EMERGENCY MEDICINE

## 2022-01-01 PROCEDURE — 71046 X-RAY EXAM CHEST 2 VIEWS: CPT

## 2022-01-01 PROCEDURE — P9612 CATHETERIZE FOR URINE SPEC: HCPCS

## 2022-01-01 PROCEDURE — 97165 OT EVAL LOW COMPLEX 30 MIN: CPT | Performed by: OCCUPATIONAL THERAPIST

## 2022-01-01 PROCEDURE — 88112 CYTOPATH CELL ENHANCE TECH: CPT | Performed by: SURGERY

## 2022-01-01 PROCEDURE — 85025 COMPLETE CBC W/AUTO DIFF WBC: CPT | Performed by: INTERNAL MEDICINE

## 2022-01-01 PROCEDURE — 25010000002 ENOXAPARIN PER 10 MG: Performed by: INTERNAL MEDICINE

## 2022-01-01 PROCEDURE — 63710000001 PREDNISONE PER 1 MG: Performed by: INTERNAL MEDICINE

## 2022-01-01 PROCEDURE — 84443 ASSAY THYROID STIM HORMONE: CPT | Performed by: NURSE PRACTITIONER

## 2022-01-01 PROCEDURE — 97161 PT EVAL LOW COMPLEX 20 MIN: CPT | Performed by: PHYSICAL THERAPIST

## 2022-01-01 PROCEDURE — 94660 CPAP INITIATION&MGMT: CPT

## 2022-01-01 PROCEDURE — 0W993ZX DRAINAGE OF RIGHT PLEURAL CAVITY, PERCUTANEOUS APPROACH, DIAGNOSTIC: ICD-10-PCS | Performed by: SURGERY

## 2022-01-01 PROCEDURE — 99497 ADVNCD CARE PLAN 30 MIN: CPT

## 2022-01-01 PROCEDURE — G0157 HHC PT ASSISTANT EA 15: HCPCS

## 2022-01-01 PROCEDURE — 83735 ASSAY OF MAGNESIUM: CPT | Performed by: INTERNAL MEDICINE

## 2022-01-01 PROCEDURE — 88112 CYTOPATH CELL ENHANCE TECH: CPT | Performed by: NURSE PRACTITIONER

## 2022-01-01 PROCEDURE — G0299 HHS/HOSPICE OF RN EA 15 MIN: HCPCS

## 2022-01-01 PROCEDURE — 93010 ELECTROCARDIOGRAM REPORT: CPT | Performed by: INTERNAL MEDICINE

## 2022-01-01 PROCEDURE — 85027 COMPLETE CBC AUTOMATED: CPT | Performed by: NURSE PRACTITIONER

## 2022-01-01 PROCEDURE — 86140 C-REACTIVE PROTEIN: CPT | Performed by: INTERNAL MEDICINE

## 2022-01-01 PROCEDURE — 85025 COMPLETE CBC W/AUTO DIFF WBC: CPT | Performed by: PHYSICIAN ASSISTANT

## 2022-01-01 PROCEDURE — 83880 ASSAY OF NATRIURETIC PEPTIDE: CPT | Performed by: FAMILY MEDICINE

## 2022-01-01 PROCEDURE — 84300 ASSAY OF URINE SODIUM: CPT | Performed by: NURSE PRACTITIONER

## 2022-01-01 PROCEDURE — 80053 COMPREHEN METABOLIC PANEL: CPT | Performed by: EMERGENCY MEDICINE

## 2022-01-01 PROCEDURE — 97110 THERAPEUTIC EXERCISES: CPT

## 2022-01-01 PROCEDURE — 84145 PROCALCITONIN (PCT): CPT | Performed by: EMERGENCY MEDICINE

## 2022-01-01 PROCEDURE — 99284 EMERGENCY DEPT VISIT MOD MDM: CPT

## 2022-01-01 PROCEDURE — 25010000002 METHYLPREDNISOLONE PER 125 MG: Performed by: NURSE PRACTITIONER

## 2022-01-01 PROCEDURE — 99223 1ST HOSP IP/OBS HIGH 75: CPT

## 2022-01-01 PROCEDURE — 93306 TTE W/DOPPLER COMPLETE: CPT

## 2022-01-01 PROCEDURE — 99222 1ST HOSP IP/OBS MODERATE 55: CPT | Performed by: SURGERY

## 2022-01-01 PROCEDURE — 85730 THROMBOPLASTIN TIME PARTIAL: CPT | Performed by: EMERGENCY MEDICINE

## 2022-01-01 PROCEDURE — 99285 EMERGENCY DEPT VISIT HI MDM: CPT

## 2022-01-01 PROCEDURE — 84484 ASSAY OF TROPONIN QUANT: CPT | Performed by: FAMILY MEDICINE

## 2022-01-01 PROCEDURE — 83615 LACTATE (LD) (LDH) ENZYME: CPT | Performed by: NURSE PRACTITIONER

## 2022-01-01 PROCEDURE — 99223 1ST HOSP IP/OBS HIGH 75: CPT | Performed by: INTERNAL MEDICINE

## 2022-01-01 PROCEDURE — 29580 STRAPPING UNNA BOOT: CPT | Performed by: PHYSICAL THERAPIST

## 2022-01-01 PROCEDURE — 85610 PROTHROMBIN TIME: CPT | Performed by: PHYSICIAN ASSISTANT

## 2022-01-01 PROCEDURE — 63710000001 ONDANSETRON PER 8 MG: Performed by: NURSE PRACTITIONER

## 2022-01-01 PROCEDURE — 97164 PT RE-EVAL EST PLAN CARE: CPT | Performed by: PHYSICAL THERAPIST

## 2022-01-01 PROCEDURE — 84443 ASSAY THYROID STIM HORMONE: CPT | Performed by: EMERGENCY MEDICINE

## 2022-01-01 PROCEDURE — 93005 ELECTROCARDIOGRAM TRACING: CPT | Performed by: EMERGENCY MEDICINE

## 2022-01-01 PROCEDURE — 25010000002 CEFTRIAXONE PER 250 MG: Performed by: NURSE PRACTITIONER

## 2022-01-01 PROCEDURE — 25010000002 ONDANSETRON PER 1 MG: Performed by: NURSE PRACTITIONER

## 2022-01-01 PROCEDURE — 25010000002 FUROSEMIDE PER 20 MG: Performed by: INTERNAL MEDICINE

## 2022-01-01 PROCEDURE — 25010000002 METHYLPREDNISOLONE PER 40 MG: Performed by: NURSE PRACTITIONER

## 2022-01-01 PROCEDURE — 0W993ZZ DRAINAGE OF RIGHT PLEURAL CAVITY, PERCUTANEOUS APPROACH: ICD-10-PCS | Performed by: INTERNAL MEDICINE

## 2022-01-01 PROCEDURE — 99222 1ST HOSP IP/OBS MODERATE 55: CPT | Performed by: INTERNAL MEDICINE

## 2022-01-01 PROCEDURE — 93005 ELECTROCARDIOGRAM TRACING: CPT | Performed by: FAMILY MEDICINE

## 2022-01-01 PROCEDURE — 85379 FIBRIN DEGRADATION QUANT: CPT | Performed by: EMERGENCY MEDICINE

## 2022-01-01 PROCEDURE — 80053 COMPREHEN METABOLIC PANEL: CPT | Performed by: INTERNAL MEDICINE

## 2022-01-01 PROCEDURE — G0495 RN CARE TRAIN/EDU IN HH: HCPCS

## 2022-01-01 PROCEDURE — 0202U NFCT DS 22 TRGT SARS-COV-2: CPT | Performed by: EMERGENCY MEDICINE

## 2022-01-01 PROCEDURE — 84484 ASSAY OF TROPONIN QUANT: CPT | Performed by: INTERNAL MEDICINE

## 2022-01-01 PROCEDURE — 87635 SARS-COV-2 COVID-19 AMP PRB: CPT | Performed by: EMERGENCY MEDICINE

## 2022-01-01 PROCEDURE — 83050 HGB METHEMOGLOBIN QUAN: CPT

## 2022-01-01 PROCEDURE — 93005 ELECTROCARDIOGRAM TRACING: CPT | Performed by: PHYSICIAN ASSISTANT

## 2022-01-01 PROCEDURE — 83880 ASSAY OF NATRIURETIC PEPTIDE: CPT

## 2022-01-01 PROCEDURE — G0151 HHCP-SERV OF PT,EA 15 MIN: HCPCS

## 2022-01-01 PROCEDURE — 83930 ASSAY OF BLOOD OSMOLALITY: CPT | Performed by: NURSE PRACTITIONER

## 2022-01-01 PROCEDURE — 84439 ASSAY OF FREE THYROXINE: CPT | Performed by: NURSE PRACTITIONER

## 2022-01-01 PROCEDURE — 85379 FIBRIN DEGRADATION QUANT: CPT | Performed by: FAMILY MEDICINE

## 2022-01-01 PROCEDURE — 84157 ASSAY OF PROTEIN OTHER: CPT | Performed by: NURSE PRACTITIONER

## 2022-01-01 PROCEDURE — 85730 THROMBOPLASTIN TIME PARTIAL: CPT | Performed by: PHYSICIAN ASSISTANT

## 2022-01-01 PROCEDURE — 84100 ASSAY OF PHOSPHORUS: CPT | Performed by: INTERNAL MEDICINE

## 2022-01-01 PROCEDURE — 87635 SARS-COV-2 COVID-19 AMP PRB: CPT | Performed by: FAMILY MEDICINE

## 2022-01-01 PROCEDURE — 84145 PROCALCITONIN (PCT): CPT | Performed by: PHYSICIAN ASSISTANT

## 2022-01-01 PROCEDURE — 89051 BODY FLUID CELL COUNT: CPT | Performed by: NURSE PRACTITIONER

## 2022-01-01 PROCEDURE — 99214 OFFICE O/P EST MOD 30 MIN: CPT | Performed by: INTERNAL MEDICINE

## 2022-01-01 PROCEDURE — 97162 PT EVAL MOD COMPLEX 30 MIN: CPT

## 2022-01-01 PROCEDURE — 84484 ASSAY OF TROPONIN QUANT: CPT | Performed by: EMERGENCY MEDICINE

## 2022-01-01 PROCEDURE — 77081 DXA BONE DENSITY APPENDICULR: CPT

## 2022-01-01 PROCEDURE — 85379 FIBRIN DEGRADATION QUANT: CPT | Performed by: PHYSICIAN ASSISTANT

## 2022-01-01 PROCEDURE — 99231 SBSQ HOSP IP/OBS SF/LOW 25: CPT | Performed by: SURGERY

## 2022-01-01 PROCEDURE — 84540 ASSAY OF URINE/UREA-N: CPT | Performed by: NURSE PRACTITIONER

## 2022-01-01 PROCEDURE — 63710000001 PREDNISONE PER 1 MG: Performed by: NURSE PRACTITIONER

## 2022-01-01 PROCEDURE — 82805 BLOOD GASES W/O2 SATURATION: CPT

## 2022-01-01 PROCEDURE — 25010000002 CHLOROTHIAZIDE PER 500 MG: Performed by: EMERGENCY MEDICINE

## 2022-01-01 PROCEDURE — 85025 COMPLETE CBC W/AUTO DIFF WBC: CPT | Performed by: EMERGENCY MEDICINE

## 2022-01-01 PROCEDURE — 83605 ASSAY OF LACTIC ACID: CPT | Performed by: EMERGENCY MEDICINE

## 2022-01-01 PROCEDURE — 1111F DSCHRG MED/CURRENT MED MERGE: CPT | Performed by: NURSE PRACTITIONER

## 2022-01-01 PROCEDURE — G0300 HHS/HOSPICE OF LPN EA 15 MIN: HCPCS

## 2022-01-01 PROCEDURE — 93306 TTE W/DOPPLER COMPLETE: CPT | Performed by: INTERNAL MEDICINE

## 2022-01-01 PROCEDURE — 84439 ASSAY OF FREE THYROXINE: CPT | Performed by: INTERNAL MEDICINE

## 2022-01-01 PROCEDURE — 84484 ASSAY OF TROPONIN QUANT: CPT | Performed by: NURSE PRACTITIONER

## 2022-01-01 PROCEDURE — 97166 OT EVAL MOD COMPLEX 45 MIN: CPT | Performed by: OCCUPATIONAL THERAPIST

## 2022-01-01 PROCEDURE — 25010000002 METHYLPREDNISOLONE PER 125 MG: Performed by: FAMILY MEDICINE

## 2022-01-01 PROCEDURE — 36415 COLL VENOUS BLD VENIPUNCTURE: CPT

## 2022-01-01 PROCEDURE — 1111F DSCHRG MED/CURRENT MED MERGE: CPT | Performed by: INTERNAL MEDICINE

## 2022-01-01 PROCEDURE — 85610 PROTHROMBIN TIME: CPT | Performed by: INTERNAL MEDICINE

## 2022-01-01 PROCEDURE — 0 IOPAMIDOL PER 1 ML: Performed by: PHYSICIAN ASSISTANT

## 2022-01-01 PROCEDURE — 99232 SBSQ HOSP IP/OBS MODERATE 35: CPT | Performed by: NURSE PRACTITIONER

## 2022-01-01 PROCEDURE — 85610 PROTHROMBIN TIME: CPT | Performed by: EMERGENCY MEDICINE

## 2022-01-01 PROCEDURE — 85027 COMPLETE CBC AUTOMATED: CPT | Performed by: INTERNAL MEDICINE

## 2022-01-01 PROCEDURE — 80053 COMPREHEN METABOLIC PANEL: CPT | Performed by: FAMILY MEDICINE

## 2022-01-01 PROCEDURE — G0152 HHCP-SERV OF OT,EA 15 MIN: HCPCS

## 2022-01-01 PROCEDURE — 99233 SBSQ HOSP IP/OBS HIGH 50: CPT | Performed by: INTERNAL MEDICINE

## 2022-01-01 PROCEDURE — 25010000002 MAGNESIUM SULFATE 2 GM/50ML SOLUTION: Performed by: EMERGENCY MEDICINE

## 2022-01-01 PROCEDURE — 86140 C-REACTIVE PROTEIN: CPT | Performed by: EMERGENCY MEDICINE

## 2022-01-01 PROCEDURE — 25010000002 DIPHENHYDRAMINE PER 50 MG: Performed by: PHYSICIAN ASSISTANT

## 2022-01-01 PROCEDURE — 83880 ASSAY OF NATRIURETIC PEPTIDE: CPT | Performed by: INTERNAL MEDICINE

## 2022-01-01 PROCEDURE — 85025 COMPLETE CBC W/AUTO DIFF WBC: CPT | Performed by: FAMILY MEDICINE

## 2022-01-01 PROCEDURE — 80053 COMPREHEN METABOLIC PANEL: CPT | Performed by: PHYSICIAN ASSISTANT

## 2022-01-01 PROCEDURE — 88305 TISSUE EXAM BY PATHOLOGIST: CPT | Performed by: NURSE PRACTITIONER

## 2022-01-01 PROCEDURE — 83880 ASSAY OF NATRIURETIC PEPTIDE: CPT | Performed by: PHYSICIAN ASSISTANT

## 2022-01-01 PROCEDURE — G0155 HHCP-SVS OF CSW,EA 15 MIN: HCPCS

## 2022-01-01 PROCEDURE — 83605 ASSAY OF LACTIC ACID: CPT | Performed by: PHYSICIAN ASSISTANT

## 2022-01-01 PROCEDURE — 99233 SBSQ HOSP IP/OBS HIGH 50: CPT

## 2022-01-01 PROCEDURE — 87635 SARS-COV-2 COVID-19 AMP PRB: CPT | Performed by: PHYSICIAN ASSISTANT

## 2022-01-01 PROCEDURE — 25010000002 METHYLPREDNISOLONE PER 125 MG: Performed by: PHYSICIAN ASSISTANT

## 2022-01-01 PROCEDURE — 87040 BLOOD CULTURE FOR BACTERIA: CPT | Performed by: EMERGENCY MEDICINE

## 2022-01-01 PROCEDURE — 82042 OTHER SOURCE ALBUMIN QUAN EA: CPT | Performed by: NURSE PRACTITIONER

## 2022-01-01 PROCEDURE — 0 HYDROXYZINE PER 25 MG: Performed by: INTERNAL MEDICINE

## 2022-01-01 PROCEDURE — 97165 OT EVAL LOW COMPLEX 30 MIN: CPT

## 2022-01-01 PROCEDURE — 82570 ASSAY OF URINE CREATININE: CPT | Performed by: NURSE PRACTITIONER

## 2022-01-01 PROCEDURE — 99497 ADVNCD CARE PLAN 30 MIN: CPT | Performed by: INTERNAL MEDICINE

## 2022-01-01 PROCEDURE — 82375 ASSAY CARBOXYHB QUANT: CPT

## 2022-01-01 RX ORDER — BUSPIRONE HYDROCHLORIDE 5 MG/1
5 TABLET ORAL 2 TIMES DAILY
Status: DISCONTINUED | OUTPATIENT
Start: 2022-01-01 | End: 2022-01-01 | Stop reason: HOSPADM

## 2022-01-01 RX ORDER — ATORVASTATIN CALCIUM 40 MG/1
40 TABLET, FILM COATED ORAL DAILY
Status: DISCONTINUED | OUTPATIENT
Start: 2022-01-01 | End: 2022-01-01 | Stop reason: HOSPADM

## 2022-01-01 RX ORDER — LEVOCETIRIZINE DIHYDROCHLORIDE 5 MG/1
5 TABLET, FILM COATED ORAL EVERY EVENING
Qty: 90 TABLET | Refills: 1 | Status: SHIPPED | OUTPATIENT
Start: 2022-01-01

## 2022-01-01 RX ORDER — HYDROCODONE BITARTRATE AND ACETAMINOPHEN 5; 325 MG/1; MG/1
0.5 TABLET ORAL 2 TIMES DAILY PRN
Status: DISCONTINUED | OUTPATIENT
Start: 2022-01-01 | End: 2022-01-01 | Stop reason: HOSPADM

## 2022-01-01 RX ORDER — ACETYLCYSTEINE 200 MG/ML
1.5 SOLUTION ORAL; RESPIRATORY (INHALATION)
Status: DISCONTINUED | OUTPATIENT
Start: 2022-01-01 | End: 2022-01-01 | Stop reason: HOSPADM

## 2022-01-01 RX ORDER — SODIUM CHLORIDE 0.9 % (FLUSH) 0.9 %
10 SYRINGE (ML) INJECTION AS NEEDED
Status: DISCONTINUED | OUTPATIENT
Start: 2022-01-01 | End: 2022-01-01 | Stop reason: HOSPADM

## 2022-01-01 RX ORDER — HYDROCODONE BITARTRATE AND ACETAMINOPHEN 5; 325 MG/1; MG/1
0.5 TABLET ORAL 2 TIMES DAILY PRN
Qty: 3 TABLET | Refills: 0 | Status: ON HOLD | OUTPATIENT
Start: 2022-01-01 | End: 2022-01-01

## 2022-01-01 RX ORDER — FUROSEMIDE 20 MG/1
20 TABLET ORAL DAILY PRN
Status: DISCONTINUED | OUTPATIENT
Start: 2022-01-01 | End: 2022-01-01 | Stop reason: HOSPADM

## 2022-01-01 RX ORDER — BUSPIRONE HYDROCHLORIDE 5 MG/1
5 TABLET ORAL 2 TIMES DAILY PRN
Status: DISCONTINUED | OUTPATIENT
Start: 2022-01-01 | End: 2022-01-01 | Stop reason: HOSPADM

## 2022-01-01 RX ORDER — IPRATROPIUM BROMIDE AND ALBUTEROL SULFATE 2.5; .5 MG/3ML; MG/3ML
3 SOLUTION RESPIRATORY (INHALATION) 4 TIMES DAILY PRN
Status: DISCONTINUED | OUTPATIENT
Start: 2022-01-01 | End: 2022-01-01 | Stop reason: SDUPTHER

## 2022-01-01 RX ORDER — METOPROLOL TARTRATE 75 MG/1
75 TABLET, FILM COATED ORAL EVERY 12 HOURS SCHEDULED
Qty: 60 TABLET | Refills: 0 | Status: SHIPPED | OUTPATIENT
Start: 2022-01-01 | End: 2022-01-01

## 2022-01-01 RX ORDER — ACETAMINOPHEN 325 MG/1
650 TABLET ORAL EVERY 4 HOURS PRN
Status: DISCONTINUED | OUTPATIENT
Start: 2022-01-01 | End: 2022-01-01 | Stop reason: HOSPADM

## 2022-01-01 RX ORDER — ONDANSETRON 2 MG/ML
4 INJECTION INTRAMUSCULAR; INTRAVENOUS EVERY 6 HOURS PRN
Status: DISCONTINUED | OUTPATIENT
Start: 2022-01-01 | End: 2022-01-01 | Stop reason: HOSPADM

## 2022-01-01 RX ORDER — SODIUM CHLORIDE 0.9 % (FLUSH) 0.9 %
10 SYRINGE (ML) INJECTION EVERY 12 HOURS SCHEDULED
Status: DISCONTINUED | OUTPATIENT
Start: 2022-01-01 | End: 2022-01-01 | Stop reason: HOSPADM

## 2022-01-01 RX ORDER — ALENDRONATE SODIUM 10 MG/1
10 TABLET ORAL WEEKLY
Qty: 10 TABLET | Refills: 1 | Status: SHIPPED | OUTPATIENT
Start: 2022-01-01

## 2022-01-01 RX ORDER — ONDANSETRON 4 MG/1
4 TABLET, FILM COATED ORAL EVERY 6 HOURS PRN
Status: DISCONTINUED | OUTPATIENT
Start: 2022-01-01 | End: 2022-01-01 | Stop reason: HOSPADM

## 2022-01-01 RX ORDER — DILTIAZEM HYDROCHLORIDE 120 MG/1
120 CAPSULE, COATED, EXTENDED RELEASE ORAL
Qty: 30 CAPSULE | Refills: 1 | Status: SHIPPED | OUTPATIENT
Start: 2022-01-01

## 2022-01-01 RX ORDER — ONDANSETRON 4 MG/1
4 TABLET, ORALLY DISINTEGRATING ORAL EVERY 8 HOURS PRN
Qty: 30 TABLET | Refills: 0 | Status: SHIPPED | OUTPATIENT
Start: 2022-01-01 | End: 2022-01-01 | Stop reason: SDUPTHER

## 2022-01-01 RX ORDER — CETIRIZINE HYDROCHLORIDE 10 MG/1
10 TABLET ORAL DAILY
Refills: 3 | Status: DISCONTINUED | OUTPATIENT
Start: 2022-01-01 | End: 2022-01-01 | Stop reason: HOSPADM

## 2022-01-01 RX ORDER — BENZONATATE 100 MG/1
100 CAPSULE ORAL 3 TIMES DAILY PRN
Status: DISCONTINUED | OUTPATIENT
Start: 2022-01-01 | End: 2022-01-01 | Stop reason: HOSPADM

## 2022-01-01 RX ORDER — HYDROXYZINE HYDROCHLORIDE 25 MG/1
25 TABLET, FILM COATED ORAL 3 TIMES DAILY PRN
Status: DISCONTINUED | OUTPATIENT
Start: 2022-01-01 | End: 2022-01-01 | Stop reason: HOSPADM

## 2022-01-01 RX ORDER — FUROSEMIDE 10 MG/ML
40 INJECTION INTRAMUSCULAR; INTRAVENOUS DAILY
Status: DISCONTINUED | OUTPATIENT
Start: 2022-01-01 | End: 2022-01-01

## 2022-01-01 RX ORDER — METHYLPREDNISOLONE SODIUM SUCCINATE 40 MG/ML
40 INJECTION, POWDER, LYOPHILIZED, FOR SOLUTION INTRAMUSCULAR; INTRAVENOUS EVERY 12 HOURS
Status: DISCONTINUED | OUTPATIENT
Start: 2022-01-01 | End: 2022-01-01

## 2022-01-01 RX ORDER — LISINOPRIL 20 MG/1
40 TABLET ORAL DAILY
Status: DISCONTINUED | OUTPATIENT
Start: 2022-01-01 | End: 2022-01-01 | Stop reason: HOSPADM

## 2022-01-01 RX ORDER — HYDROXYZINE HYDROCHLORIDE 25 MG/1
25 TABLET, FILM COATED ORAL 3 TIMES DAILY PRN
Qty: 30 TABLET | Refills: 0 | Status: SHIPPED | OUTPATIENT
Start: 2022-01-01

## 2022-01-01 RX ORDER — ACETAMINOPHEN 650 MG/1
650 SUPPOSITORY RECTAL EVERY 4 HOURS PRN
Status: DISCONTINUED | OUTPATIENT
Start: 2022-01-01 | End: 2022-01-01 | Stop reason: HOSPADM

## 2022-01-01 RX ORDER — DILTIAZEM HYDROCHLORIDE 5 MG/ML
20 INJECTION INTRAVENOUS ONCE
Status: COMPLETED | OUTPATIENT
Start: 2022-01-01 | End: 2022-01-01

## 2022-01-01 RX ORDER — IPRATROPIUM BROMIDE AND ALBUTEROL SULFATE 2.5; .5 MG/3ML; MG/3ML
3 SOLUTION RESPIRATORY (INHALATION) 4 TIMES DAILY PRN
Status: DISCONTINUED | OUTPATIENT
Start: 2022-01-01 | End: 2022-01-01 | Stop reason: HOSPADM

## 2022-01-01 RX ORDER — DOCUSATE SODIUM 100 MG/1
100 CAPSULE, LIQUID FILLED ORAL 2 TIMES DAILY
Status: DISCONTINUED | OUTPATIENT
Start: 2022-01-01 | End: 2022-01-01 | Stop reason: HOSPADM

## 2022-01-01 RX ORDER — PREDNISONE 20 MG/1
40 TABLET ORAL
Status: DISCONTINUED | OUTPATIENT
Start: 2022-01-01 | End: 2022-01-01 | Stop reason: HOSPADM

## 2022-01-01 RX ORDER — SODIUM CHLORIDE 0.9 % (FLUSH) 0.9 %
10 SYRINGE (ML) INJECTION AS NEEDED
Status: DISCONTINUED | OUTPATIENT
Start: 2022-01-01 | End: 2022-01-01

## 2022-01-01 RX ORDER — ONDANSETRON 4 MG/1
4 TABLET, ORALLY DISINTEGRATING ORAL EVERY 8 HOURS PRN
Status: ON HOLD | COMMUNITY
End: 2022-01-01

## 2022-01-01 RX ORDER — MORPHINE SULFATE 20 MG/ML
5 SOLUTION ORAL 3 TIMES DAILY
Status: DISCONTINUED | OUTPATIENT
Start: 2022-01-01 | End: 2022-01-01

## 2022-01-01 RX ORDER — HYDROXYZINE HYDROCHLORIDE 50 MG/ML
50 INJECTION, SOLUTION INTRAMUSCULAR ONCE
Status: COMPLETED | OUTPATIENT
Start: 2022-01-01 | End: 2022-01-01

## 2022-01-01 RX ORDER — MULTIPLE VITAMINS W/ MINERALS TAB 9MG-400MCG
1 TAB ORAL DAILY
Status: DISCONTINUED | OUTPATIENT
Start: 2022-01-01 | End: 2022-01-01 | Stop reason: HOSPADM

## 2022-01-01 RX ORDER — HYDROCODONE BITARTRATE AND ACETAMINOPHEN 5; 325 MG/1; MG/1
0.5 TABLET ORAL 2 TIMES DAILY PRN
Status: ON HOLD | COMMUNITY
End: 2022-01-01 | Stop reason: SDUPTHER

## 2022-01-01 RX ORDER — LORAZEPAM 0.5 MG/1
0.5 TABLET ORAL ONCE
Status: COMPLETED | OUTPATIENT
Start: 2022-01-01 | End: 2022-01-01

## 2022-01-01 RX ORDER — DILTIAZEM HYDROCHLORIDE 5 MG/ML
5 INJECTION INTRAVENOUS ONCE
Status: COMPLETED | OUTPATIENT
Start: 2022-01-01 | End: 2022-01-01

## 2022-01-01 RX ORDER — BUDESONIDE AND FORMOTEROL FUMARATE DIHYDRATE 160; 4.5 UG/1; UG/1
2 AEROSOL RESPIRATORY (INHALATION) 2 TIMES DAILY
Status: DISCONTINUED | OUTPATIENT
Start: 2022-01-01 | End: 2022-01-01 | Stop reason: HOSPADM

## 2022-01-01 RX ORDER — ACETAMINOPHEN 160 MG/5ML
650 SOLUTION ORAL EVERY 4 HOURS PRN
Status: DISCONTINUED | OUTPATIENT
Start: 2022-01-01 | End: 2022-01-01 | Stop reason: HOSPADM

## 2022-01-01 RX ORDER — PANTOPRAZOLE SODIUM 40 MG/1
40 TABLET, DELAYED RELEASE ORAL 2 TIMES DAILY
Status: DISCONTINUED | OUTPATIENT
Start: 2022-01-01 | End: 2022-01-01 | Stop reason: HOSPADM

## 2022-01-01 RX ORDER — BUSPIRONE HYDROCHLORIDE 5 MG/1
5 TABLET ORAL 2 TIMES DAILY
Qty: 60 TABLET | Refills: 1 | Status: ON HOLD | OUTPATIENT
Start: 2022-01-01 | End: 2022-01-01 | Stop reason: SDUPTHER

## 2022-01-01 RX ORDER — GUAIFENESIN 600 MG/1
1200 TABLET, EXTENDED RELEASE ORAL 2 TIMES DAILY
Status: DISCONTINUED | OUTPATIENT
Start: 2022-01-01 | End: 2022-01-01 | Stop reason: HOSPADM

## 2022-01-01 RX ORDER — LISINOPRIL 10 MG/1
10 TABLET ORAL DAILY
Status: DISCONTINUED | OUTPATIENT
Start: 2022-01-01 | End: 2022-01-01

## 2022-01-01 RX ORDER — CETIRIZINE HYDROCHLORIDE 10 MG/1
5 TABLET ORAL DAILY
Status: DISCONTINUED | OUTPATIENT
Start: 2022-01-01 | End: 2022-01-01 | Stop reason: HOSPADM

## 2022-01-01 RX ORDER — FUROSEMIDE 10 MG/ML
40 INJECTION INTRAMUSCULAR; INTRAVENOUS ONCE
Status: COMPLETED | OUTPATIENT
Start: 2022-01-01 | End: 2022-01-01

## 2022-01-01 RX ORDER — AMOXICILLIN AND CLAVULANATE POTASSIUM 875; 125 MG/1; MG/1
1 TABLET, FILM COATED ORAL 2 TIMES DAILY
Qty: 20 TABLET | Refills: 0 | Status: SHIPPED | OUTPATIENT
Start: 2022-01-01 | End: 2022-01-01 | Stop reason: HOSPADM

## 2022-01-01 RX ORDER — PREDNISONE 10 MG/1
TABLET ORAL
Qty: 20 TABLET | Refills: 0 | Status: SHIPPED | OUTPATIENT
Start: 2022-01-01 | End: 2022-01-01

## 2022-01-01 RX ORDER — PRAMIPEXOLE DIHYDROCHLORIDE 0.25 MG/1
0.25 TABLET ORAL NIGHTLY
Status: DISCONTINUED | OUTPATIENT
Start: 2022-01-01 | End: 2022-01-01 | Stop reason: HOSPADM

## 2022-01-01 RX ORDER — BUSPIRONE HYDROCHLORIDE 10 MG/1
10 TABLET ORAL
Status: DISCONTINUED | OUTPATIENT
Start: 2022-01-01 | End: 2022-01-01 | Stop reason: HOSPADM

## 2022-01-01 RX ORDER — SODIUM CHLORIDE 9 MG/ML
75 INJECTION, SOLUTION INTRAVENOUS CONTINUOUS
Status: DISCONTINUED | OUTPATIENT
Start: 2022-01-01 | End: 2022-01-01

## 2022-01-01 RX ORDER — ATORVASTATIN CALCIUM 40 MG/1
40 TABLET, FILM COATED ORAL NIGHTLY
Status: DISCONTINUED | OUTPATIENT
Start: 2022-01-01 | End: 2022-01-01 | Stop reason: HOSPADM

## 2022-01-01 RX ORDER — IPRATROPIUM BROMIDE AND ALBUTEROL SULFATE 2.5; .5 MG/3ML; MG/3ML
3 SOLUTION RESPIRATORY (INHALATION)
Status: DISCONTINUED | OUTPATIENT
Start: 2022-01-01 | End: 2022-01-01

## 2022-01-01 RX ORDER — METHYLPREDNISOLONE SODIUM SUCCINATE 125 MG/2ML
125 INJECTION, POWDER, LYOPHILIZED, FOR SOLUTION INTRAMUSCULAR; INTRAVENOUS ONCE
Status: COMPLETED | OUTPATIENT
Start: 2022-01-01 | End: 2022-01-01

## 2022-01-01 RX ORDER — AMLODIPINE BESYLATE 10 MG/1
10 TABLET ORAL DAILY
Status: DISCONTINUED | OUTPATIENT
Start: 2022-01-01 | End: 2022-01-01

## 2022-01-01 RX ORDER — NITROGLYCERIN 0.4 MG/1
0.4 TABLET SUBLINGUAL
Status: DISCONTINUED | OUTPATIENT
Start: 2022-01-01 | End: 2022-01-01 | Stop reason: HOSPADM

## 2022-01-01 RX ORDER — TORSEMIDE 20 MG/1
20 TABLET ORAL DAILY
Qty: 30 TABLET | Refills: 3 | Status: SHIPPED | OUTPATIENT
Start: 2022-01-01 | End: 2022-01-01 | Stop reason: HOSPADM

## 2022-01-01 RX ORDER — HYDROCODONE BITARTRATE AND ACETAMINOPHEN 5; 325 MG/1; MG/1
0.5 TABLET ORAL DAILY PRN
Qty: 20 TABLET | Refills: 0 | Status: ON HOLD | OUTPATIENT
Start: 2022-01-01 | End: 2022-01-01

## 2022-01-01 RX ORDER — DILTIAZEM HYDROCHLORIDE 120 MG/1
120 CAPSULE, COATED, EXTENDED RELEASE ORAL
Status: DISCONTINUED | OUTPATIENT
Start: 2022-01-01 | End: 2022-01-01 | Stop reason: HOSPADM

## 2022-01-01 RX ORDER — FUROSEMIDE 10 MG/ML
40 INJECTION INTRAMUSCULAR; INTRAVENOUS EVERY 12 HOURS
Status: COMPLETED | OUTPATIENT
Start: 2022-01-01 | End: 2022-01-01

## 2022-01-01 RX ORDER — CHLOROTHIAZIDE SODIUM 500 MG/1
500 INJECTION INTRAVENOUS ONCE
Status: COMPLETED | OUTPATIENT
Start: 2022-01-01 | End: 2022-01-01

## 2022-01-01 RX ORDER — ONDANSETRON 4 MG/1
4 TABLET, ORALLY DISINTEGRATING ORAL EVERY 8 HOURS PRN
Status: DISCONTINUED | OUTPATIENT
Start: 2022-01-01 | End: 2022-01-01 | Stop reason: HOSPADM

## 2022-01-01 RX ORDER — LISINOPRIL 2.5 MG/1
2.5 TABLET ORAL DAILY
Qty: 30 TABLET | Refills: 1 | Status: SHIPPED | OUTPATIENT
Start: 2022-01-01

## 2022-01-01 RX ORDER — IPRATROPIUM BROMIDE AND ALBUTEROL SULFATE 2.5; .5 MG/3ML; MG/3ML
3 SOLUTION RESPIRATORY (INHALATION) ONCE
Status: COMPLETED | OUTPATIENT
Start: 2022-01-01 | End: 2022-01-01

## 2022-01-01 RX ORDER — MORPHINE SULFATE 20 MG/ML
5 SOLUTION ORAL EVERY 6 HOURS PRN
Status: DISCONTINUED | OUTPATIENT
Start: 2022-01-01 | End: 2022-01-01 | Stop reason: HOSPADM

## 2022-01-01 RX ORDER — PANTOPRAZOLE SODIUM 40 MG/1
40 TABLET, DELAYED RELEASE ORAL
Status: DISCONTINUED | OUTPATIENT
Start: 2022-01-01 | End: 2022-01-01 | Stop reason: HOSPADM

## 2022-01-01 RX ORDER — BENZONATATE 100 MG/1
200 CAPSULE ORAL 3 TIMES DAILY PRN
Status: DISCONTINUED | OUTPATIENT
Start: 2022-01-01 | End: 2022-01-01 | Stop reason: HOSPADM

## 2022-01-01 RX ORDER — LIDOCAINE HYDROCHLORIDE AND EPINEPHRINE 10; 10 MG/ML; UG/ML
10 INJECTION, SOLUTION INFILTRATION; PERINEURAL ONCE
Status: COMPLETED | OUTPATIENT
Start: 2022-01-01 | End: 2022-01-01

## 2022-01-01 RX ORDER — BENZONATATE 200 MG/1
200 CAPSULE ORAL 3 TIMES DAILY PRN
Qty: 30 CAPSULE | Refills: 0 | Status: ON HOLD | OUTPATIENT
Start: 2022-01-01 | End: 2022-01-01

## 2022-01-01 RX ORDER — FUROSEMIDE 10 MG/ML
40 INJECTION INTRAMUSCULAR; INTRAVENOUS
Status: COMPLETED | OUTPATIENT
Start: 2022-01-01 | End: 2022-01-01

## 2022-01-01 RX ORDER — LISINOPRIL 5 MG/1
5 TABLET ORAL DAILY
Status: DISCONTINUED | OUTPATIENT
Start: 2022-01-01 | End: 2022-01-01 | Stop reason: HOSPADM

## 2022-01-01 RX ORDER — AMLODIPINE BESYLATE 10 MG/1
10 TABLET ORAL DAILY
COMMUNITY
End: 2022-01-01 | Stop reason: HOSPADM

## 2022-01-01 RX ORDER — METHYLPREDNISOLONE SODIUM SUCCINATE 125 MG/2ML
60 INJECTION, POWDER, LYOPHILIZED, FOR SOLUTION INTRAMUSCULAR; INTRAVENOUS EVERY 8 HOURS
Status: DISCONTINUED | OUTPATIENT
Start: 2022-01-01 | End: 2022-01-01

## 2022-01-01 RX ORDER — DIPHENHYDRAMINE HYDROCHLORIDE 50 MG/ML
25 INJECTION INTRAMUSCULAR; INTRAVENOUS ONCE
Status: COMPLETED | OUTPATIENT
Start: 2022-01-01 | End: 2022-01-01

## 2022-01-01 RX ORDER — FUROSEMIDE 40 MG/1
40 TABLET ORAL DAILY
Status: DISCONTINUED | OUTPATIENT
Start: 2022-01-01 | End: 2022-01-01

## 2022-01-01 RX ORDER — PREDNISONE 10 MG/1
TABLET ORAL
Qty: 9 TABLET | Refills: 0 | Status: ON HOLD | OUTPATIENT
Start: 2022-01-01 | End: 2022-01-01

## 2022-01-01 RX ORDER — DILTIAZEM HYDROCHLORIDE 60 MG/1
60 TABLET, FILM COATED ORAL ONCE
Status: COMPLETED | OUTPATIENT
Start: 2022-01-01 | End: 2022-01-01

## 2022-01-01 RX ORDER — FUROSEMIDE 10 MG/ML
40 INJECTION INTRAMUSCULAR; INTRAVENOUS EVERY 12 HOURS
Status: DISPENSED | OUTPATIENT
Start: 2022-01-01 | End: 2022-01-01

## 2022-01-01 RX ORDER — METHYLPREDNISOLONE SODIUM SUCCINATE 40 MG/ML
40 INJECTION, POWDER, LYOPHILIZED, FOR SOLUTION INTRAMUSCULAR; INTRAVENOUS EVERY 8 HOURS
Status: DISCONTINUED | OUTPATIENT
Start: 2022-01-01 | End: 2022-01-01

## 2022-01-01 RX ORDER — PRAMIPEXOLE DIHYDROCHLORIDE 0.25 MG/1
0.25 TABLET ORAL NIGHTLY PRN
Status: DISCONTINUED | OUTPATIENT
Start: 2022-01-01 | End: 2022-01-01 | Stop reason: HOSPADM

## 2022-01-01 RX ORDER — ONDANSETRON 2 MG/ML
4 INJECTION INTRAMUSCULAR; INTRAVENOUS ONCE
Status: COMPLETED | OUTPATIENT
Start: 2022-01-01 | End: 2022-01-01

## 2022-01-01 RX ORDER — MORPHINE SULFATE 20 MG/ML
5 SOLUTION ORAL EVERY 4 HOURS
Status: DISCONTINUED | OUTPATIENT
Start: 2022-01-01 | End: 2022-01-01

## 2022-01-01 RX ORDER — ERGOCALCIFEROL 1.25 MG/1
50000 CAPSULE ORAL WEEKLY
Qty: 12 CAPSULE | Refills: 1 | Status: SHIPPED | OUTPATIENT
Start: 2022-01-01

## 2022-01-01 RX ORDER — LISINOPRIL 10 MG/1
10 TABLET ORAL DAILY
Qty: 30 TABLET | Refills: 0 | Status: SHIPPED | OUTPATIENT
Start: 2022-01-01 | End: 2022-01-01 | Stop reason: HOSPADM

## 2022-01-01 RX ORDER — GUAIFENESIN 600 MG/1
1200 TABLET, EXTENDED RELEASE ORAL EVERY 12 HOURS SCHEDULED
Status: DISCONTINUED | OUTPATIENT
Start: 2022-01-01 | End: 2022-01-01 | Stop reason: HOSPADM

## 2022-01-01 RX ORDER — DEXTROMETHORPHAN POLISTIREX 30 MG/5ML
60 SUSPENSION ORAL EVERY 12 HOURS SCHEDULED
Status: DISCONTINUED | OUTPATIENT
Start: 2022-01-01 | End: 2022-01-01 | Stop reason: HOSPADM

## 2022-01-01 RX ORDER — BUMETANIDE 1 MG/1
1 TABLET ORAL DAILY
Status: DISCONTINUED | OUTPATIENT
Start: 2022-01-01 | End: 2022-01-01 | Stop reason: HOSPADM

## 2022-01-01 RX ORDER — HYDROCODONE BITARTRATE AND ACETAMINOPHEN 5; 325 MG/1; MG/1
0.5 TABLET ORAL DAILY PRN
Status: DISCONTINUED | OUTPATIENT
Start: 2022-01-01 | End: 2022-01-01

## 2022-01-01 RX ORDER — BUDESONIDE AND FORMOTEROL FUMARATE DIHYDRATE 160; 4.5 UG/1; UG/1
2 AEROSOL RESPIRATORY (INHALATION)
Status: DISCONTINUED | OUTPATIENT
Start: 2022-01-01 | End: 2022-01-01 | Stop reason: HOSPADM

## 2022-01-01 RX ORDER — ERGOCALCIFEROL 1.25 MG/1
50000 CAPSULE ORAL WEEKLY
COMMUNITY
End: 2022-01-01

## 2022-01-01 RX ORDER — FUROSEMIDE 20 MG/1
20 TABLET ORAL DAILY
Qty: 60 TABLET | Refills: 3 | Status: SHIPPED | OUTPATIENT
Start: 2022-01-01 | End: 2022-01-01 | Stop reason: ALTCHOICE

## 2022-01-01 RX ORDER — MAGNESIUM CARB/ALUMINUM HYDROX 105-160MG
296 TABLET,CHEWABLE ORAL ONCE
Status: COMPLETED | OUTPATIENT
Start: 2022-01-01 | End: 2022-01-01

## 2022-01-01 RX ORDER — DILTIAZEM HYDROCHLORIDE 5 MG/ML
10 INJECTION INTRAVENOUS ONCE
Status: COMPLETED | OUTPATIENT
Start: 2022-01-01 | End: 2022-01-01

## 2022-01-01 RX ORDER — BUSPIRONE HYDROCHLORIDE 10 MG/1
10 TABLET ORAL 3 TIMES DAILY
Qty: 90 TABLET | Refills: 3 | Status: SHIPPED | OUTPATIENT
Start: 2022-01-01

## 2022-01-01 RX ORDER — LEVOFLOXACIN 5 MG/ML
750 INJECTION, SOLUTION INTRAVENOUS
Status: COMPLETED | OUTPATIENT
Start: 2022-01-01 | End: 2022-01-01

## 2022-01-01 RX ORDER — METOPROLOL TARTRATE 75 MG/1
75 TABLET, FILM COATED ORAL EVERY 12 HOURS SCHEDULED
Qty: 60 TABLET | Refills: 0 | Status: SHIPPED | OUTPATIENT
Start: 2022-01-01 | End: 2022-01-01 | Stop reason: HOSPADM

## 2022-01-01 RX ORDER — PRAMIPEXOLE DIHYDROCHLORIDE 0.25 MG/1
0.25 TABLET ORAL
Qty: 90 TABLET | Refills: 1 | Status: SHIPPED | OUTPATIENT
Start: 2022-01-01

## 2022-01-01 RX ORDER — METOPROLOL TARTRATE 50 MG/1
50 TABLET, FILM COATED ORAL EVERY 12 HOURS SCHEDULED
Status: DISCONTINUED | OUTPATIENT
Start: 2022-01-01 | End: 2022-01-01

## 2022-01-01 RX ORDER — SERTRALINE HYDROCHLORIDE 25 MG/1
25 TABLET, FILM COATED ORAL DAILY
Qty: 90 TABLET | Refills: 1 | Status: SHIPPED | OUTPATIENT
Start: 2022-01-01 | End: 2022-01-01

## 2022-01-01 RX ORDER — ONDANSETRON 4 MG/1
4 TABLET, ORALLY DISINTEGRATING ORAL EVERY 8 HOURS PRN
Qty: 30 TABLET | Refills: 0 | Status: ON HOLD | OUTPATIENT
Start: 2022-01-01 | End: 2022-01-01

## 2022-01-01 RX ORDER — BUSPIRONE HYDROCHLORIDE 5 MG/1
5 TABLET ORAL 2 TIMES DAILY
Status: DISCONTINUED | OUTPATIENT
Start: 2022-01-01 | End: 2022-01-01

## 2022-01-01 RX ORDER — IPRATROPIUM BROMIDE AND ALBUTEROL SULFATE 2.5; .5 MG/3ML; MG/3ML
3 SOLUTION RESPIRATORY (INHALATION) EVERY 4 HOURS PRN
Status: DISCONTINUED | OUTPATIENT
Start: 2022-01-01 | End: 2022-01-01 | Stop reason: HOSPADM

## 2022-01-01 RX ORDER — PREDNISONE 10 MG/1
10 TABLET ORAL
Status: DISCONTINUED | OUTPATIENT
Start: 2022-01-01 | End: 2022-01-01

## 2022-01-01 RX ORDER — BUMETANIDE 1 MG/1
1 TABLET ORAL DAILY
Qty: 30 TABLET | Refills: 3 | Status: SHIPPED | OUTPATIENT
Start: 2022-01-01

## 2022-01-01 RX ORDER — MORPHINE SULFATE 15 MG/1
15 TABLET, FILM COATED, EXTENDED RELEASE ORAL EVERY 12 HOURS SCHEDULED
Status: DISCONTINUED | OUTPATIENT
Start: 2022-01-01 | End: 2022-01-01

## 2022-01-01 RX ORDER — HYDROCODONE BITARTRATE AND ACETAMINOPHEN 5; 325 MG/1; MG/1
0.5 TABLET ORAL EVERY 12 HOURS PRN
Status: DISCONTINUED | OUTPATIENT
Start: 2022-01-01 | End: 2022-01-01 | Stop reason: HOSPADM

## 2022-01-01 RX ORDER — BUSPIRONE HYDROCHLORIDE 5 MG/1
5 TABLET ORAL 2 TIMES DAILY WITH MEALS
Status: DISCONTINUED | OUTPATIENT
Start: 2022-01-01 | End: 2022-01-01 | Stop reason: HOSPADM

## 2022-01-01 RX ORDER — AMOXICILLIN AND CLAVULANATE POTASSIUM 875; 125 MG/1; MG/1
1 TABLET, FILM COATED ORAL 2 TIMES DAILY
Status: DISCONTINUED | OUTPATIENT
Start: 2022-01-01 | End: 2022-01-01 | Stop reason: DRUGHIGH

## 2022-01-01 RX ORDER — ASPIRIN 81 MG/1
81 TABLET ORAL DAILY
Status: DISCONTINUED | OUTPATIENT
Start: 2022-01-01 | End: 2022-01-01

## 2022-01-01 RX ORDER — BUMETANIDE 0.25 MG/ML
1 INJECTION INTRAMUSCULAR; INTRAVENOUS EVERY 12 HOURS SCHEDULED
Status: DISCONTINUED | OUTPATIENT
Start: 2022-01-01 | End: 2022-01-01

## 2022-01-01 RX ORDER — BUSPIRONE HYDROCHLORIDE 5 MG/1
5 TABLET ORAL 3 TIMES DAILY
COMMUNITY
End: 2022-01-01 | Stop reason: SDUPTHER

## 2022-01-01 RX ORDER — CETIRIZINE HYDROCHLORIDE 10 MG/1
10 TABLET ORAL DAILY
Status: DISCONTINUED | OUTPATIENT
Start: 2022-01-01 | End: 2022-01-01 | Stop reason: HOSPADM

## 2022-01-01 RX ORDER — AMOXICILLIN AND CLAVULANATE POTASSIUM 500; 125 MG/1; MG/1
1 TABLET, FILM COATED ORAL EVERY 12 HOURS SCHEDULED
Status: DISCONTINUED | OUTPATIENT
Start: 2022-01-01 | End: 2022-01-01 | Stop reason: HOSPADM

## 2022-01-01 RX ORDER — IPRATROPIUM BROMIDE AND ALBUTEROL SULFATE 2.5; .5 MG/3ML; MG/3ML
3 SOLUTION RESPIRATORY (INHALATION) 4 TIMES DAILY PRN
COMMUNITY

## 2022-01-01 RX ORDER — BUSPIRONE HYDROCHLORIDE 10 MG/1
10 TABLET ORAL 3 TIMES DAILY
Status: DISCONTINUED | OUTPATIENT
Start: 2022-01-01 | End: 2022-01-01 | Stop reason: HOSPADM

## 2022-01-01 RX ORDER — PREDNISONE 10 MG/1
TABLET ORAL
Qty: 20 TABLET | Refills: 0 | Status: SHIPPED | OUTPATIENT
Start: 2022-01-01 | End: 2022-01-01 | Stop reason: SDUPTHER

## 2022-01-01 RX ORDER — FUROSEMIDE 20 MG/1
20 TABLET ORAL DAILY PRN
Status: ON HOLD | COMMUNITY
End: 2022-01-01 | Stop reason: SDUPTHER

## 2022-01-01 RX ORDER — DICYCLOMINE HYDROCHLORIDE 10 MG/1
10 CAPSULE ORAL 2 TIMES DAILY PRN
Status: DISCONTINUED | OUTPATIENT
Start: 2022-01-01 | End: 2022-01-01 | Stop reason: HOSPADM

## 2022-01-01 RX ORDER — LISINOPRIL 2.5 MG/1
2.5 TABLET ORAL DAILY
Status: DISCONTINUED | OUTPATIENT
Start: 2022-01-01 | End: 2022-01-01 | Stop reason: HOSPADM

## 2022-01-01 RX ORDER — MAGNESIUM SULFATE HEPTAHYDRATE 40 MG/ML
2 INJECTION, SOLUTION INTRAVENOUS ONCE
Status: COMPLETED | OUTPATIENT
Start: 2022-01-01 | End: 2022-01-01

## 2022-01-01 RX ORDER — IPRATROPIUM BROMIDE AND ALBUTEROL SULFATE 2.5; .5 MG/3ML; MG/3ML
3 SOLUTION RESPIRATORY (INHALATION)
Status: DISCONTINUED | OUTPATIENT
Start: 2022-01-01 | End: 2022-01-01 | Stop reason: HOSPADM

## 2022-01-01 RX ORDER — TORSEMIDE 20 MG/1
20 TABLET ORAL DAILY
Status: DISCONTINUED | OUTPATIENT
Start: 2022-01-01 | End: 2022-01-01

## 2022-01-01 RX ORDER — METOPROLOL SUCCINATE 25 MG/1
25 TABLET, EXTENDED RELEASE ORAL DAILY
COMMUNITY
End: 2022-01-01 | Stop reason: HOSPADM

## 2022-01-01 RX ADMIN — BENZONATATE 100 MG: 100 CAPSULE ORAL at 21:18

## 2022-01-01 RX ADMIN — BENZONATATE 200 MG: 100 CAPSULE ORAL at 20:26

## 2022-01-01 RX ADMIN — METOPROLOL TARTRATE 25 MG: 25 TABLET, FILM COATED ORAL at 10:20

## 2022-01-01 RX ADMIN — GUAIFENESIN 1200 MG: 600 TABLET, EXTENDED RELEASE ORAL at 20:37

## 2022-01-01 RX ADMIN — ATORVASTATIN CALCIUM 40 MG: 40 TABLET, FILM COATED ORAL at 20:58

## 2022-01-01 RX ADMIN — PANTOPRAZOLE SODIUM 40 MG: 40 TABLET, DELAYED RELEASE ORAL at 09:00

## 2022-01-01 RX ADMIN — LISINOPRIL 2.5 MG: 2.5 TABLET ORAL at 09:07

## 2022-01-01 RX ADMIN — PANTOPRAZOLE SODIUM 40 MG: 40 TABLET, DELAYED RELEASE ORAL at 08:49

## 2022-01-01 RX ADMIN — ENOXAPARIN SODIUM 70 MG: 80 INJECTION SUBCUTANEOUS at 06:05

## 2022-01-01 RX ADMIN — METOPROLOL TARTRATE 50 MG: 50 TABLET, FILM COATED ORAL at 08:20

## 2022-01-01 RX ADMIN — LEVOFLOXACIN 750 MG: 750 INJECTION, SOLUTION INTRAVENOUS at 02:16

## 2022-01-01 RX ADMIN — ACETAMINOPHEN 650 MG: 325 TABLET, FILM COATED ORAL at 14:03

## 2022-01-01 RX ADMIN — IPRATROPIUM BROMIDE AND ALBUTEROL SULFATE 3 ML: 2.5; .5 SOLUTION RESPIRATORY (INHALATION) at 09:30

## 2022-01-01 RX ADMIN — IPRATROPIUM BROMIDE 0.5 MG: 0.5 SOLUTION RESPIRATORY (INHALATION) at 10:57

## 2022-01-01 RX ADMIN — BUDESONIDE AND FORMOTEROL FUMARATE DIHYDRATE 2 PUFF: 160; 4.5 AEROSOL RESPIRATORY (INHALATION) at 06:28

## 2022-01-01 RX ADMIN — ACETAMINOPHEN 650 MG: 325 TABLET ORAL at 19:44

## 2022-01-01 RX ADMIN — GUAIFENESIN 1200 MG: 600 TABLET, EXTENDED RELEASE ORAL at 08:41

## 2022-01-01 RX ADMIN — ATORVASTATIN CALCIUM 40 MG: 40 TABLET, FILM COATED ORAL at 10:28

## 2022-01-01 RX ADMIN — PANTOPRAZOLE SODIUM 40 MG: 40 TABLET, DELAYED RELEASE ORAL at 17:15

## 2022-01-01 RX ADMIN — ONDANSETRON 4 MG: 2 INJECTION INTRAMUSCULAR; INTRAVENOUS at 09:35

## 2022-01-01 RX ADMIN — APIXABAN 2.5 MG: 2.5 TABLET, FILM COATED ORAL at 09:56

## 2022-01-01 RX ADMIN — HYDROCODONE BITARTRATE AND ACETAMINOPHEN 0.5 TABLET: 5; 325 TABLET ORAL at 21:16

## 2022-01-01 RX ADMIN — IPRATROPIUM BROMIDE AND ALBUTEROL SULFATE 3 ML: 2.5; .5 SOLUTION RESPIRATORY (INHALATION) at 13:39

## 2022-01-01 RX ADMIN — DOCUSATE SODIUM 100 MG: 100 CAPSULE, LIQUID FILLED ORAL at 08:56

## 2022-01-01 RX ADMIN — IPRATROPIUM BROMIDE AND ALBUTEROL SULFATE 3 ML: 2.5; .5 SOLUTION RESPIRATORY (INHALATION) at 09:46

## 2022-01-01 RX ADMIN — ENOXAPARIN SODIUM 70 MG: 80 INJECTION SUBCUTANEOUS at 17:22

## 2022-01-01 RX ADMIN — DOCUSATE SODIUM 100 MG: 100 CAPSULE, LIQUID FILLED ORAL at 08:40

## 2022-01-01 RX ADMIN — PANTOPRAZOLE SODIUM 40 MG: 40 TABLET, DELAYED RELEASE ORAL at 08:00

## 2022-01-01 RX ADMIN — ASPIRIN 81 MG: 81 TABLET, COATED ORAL at 08:14

## 2022-01-01 RX ADMIN — IPRATROPIUM BROMIDE 0.5 MG: 0.5 SOLUTION RESPIRATORY (INHALATION) at 14:16

## 2022-01-01 RX ADMIN — GUAIFENESIN 1200 MG: 600 TABLET, EXTENDED RELEASE ORAL at 21:18

## 2022-01-01 RX ADMIN — METOPROLOL TARTRATE 75 MG: 50 TABLET, FILM COATED ORAL at 08:44

## 2022-01-01 RX ADMIN — PRAMIPEXOLE DIHYDROCHLORIDE 0.25 MG: 0.25 TABLET ORAL at 21:22

## 2022-01-01 RX ADMIN — GUAIFENESIN 1200 MG: 600 TABLET, EXTENDED RELEASE ORAL at 10:13

## 2022-01-01 RX ADMIN — PANTOPRAZOLE SODIUM 40 MG: 40 TABLET, DELAYED RELEASE ORAL at 08:02

## 2022-01-01 RX ADMIN — IPRATROPIUM BROMIDE 0.5 MG: 0.5 SOLUTION RESPIRATORY (INHALATION) at 20:12

## 2022-01-01 RX ADMIN — BENZONATATE 200 MG: 100 CAPSULE ORAL at 20:38

## 2022-01-01 RX ADMIN — DOCUSATE SODIUM 100 MG: 100 CAPSULE, LIQUID FILLED ORAL at 22:31

## 2022-01-01 RX ADMIN — SODIUM CHLORIDE 1 G: 900 INJECTION INTRAVENOUS at 00:16

## 2022-01-01 RX ADMIN — PRAMIPEXOLE DIHYDROCHLORIDE 0.25 MG: 0.25 TABLET ORAL at 21:17

## 2022-01-01 RX ADMIN — APIXABAN 2.5 MG: 2.5 TABLET, FILM COATED ORAL at 20:26

## 2022-01-01 RX ADMIN — DOCUSATE SODIUM 100 MG: 100 CAPSULE, LIQUID FILLED ORAL at 21:41

## 2022-01-01 RX ADMIN — CHLOROTHIAZIDE SODIUM 500 MG: 500 INJECTION, POWDER, LYOPHILIZED, FOR SOLUTION INTRAVENOUS at 14:03

## 2022-01-01 RX ADMIN — AMLODIPINE BESYLATE 10 MG: 10 TABLET ORAL at 08:26

## 2022-01-01 RX ADMIN — METOPROLOL TARTRATE 25 MG: 25 TABLET, FILM COATED ORAL at 17:24

## 2022-01-01 RX ADMIN — BUDESONIDE AND FORMOTEROL FUMARATE DIHYDRATE 2 PUFF: 160; 4.5 AEROSOL RESPIRATORY (INHALATION) at 06:57

## 2022-01-01 RX ADMIN — GUAIFENESIN 1200 MG: 600 TABLET, EXTENDED RELEASE ORAL at 21:17

## 2022-01-01 RX ADMIN — DOCUSATE SODIUM 100 MG: 100 CAPSULE, LIQUID FILLED ORAL at 08:00

## 2022-01-01 RX ADMIN — BUDESONIDE AND FORMOTEROL FUMARATE DIHYDRATE 2 PUFF: 160; 4.5 AEROSOL RESPIRATORY (INHALATION) at 06:52

## 2022-01-01 RX ADMIN — IPRATROPIUM BROMIDE 0.5 MG: 0.5 SOLUTION RESPIRATORY (INHALATION) at 14:20

## 2022-01-01 RX ADMIN — ATORVASTATIN CALCIUM 40 MG: 40 TABLET, FILM COATED ORAL at 08:02

## 2022-01-01 RX ADMIN — APIXABAN 5 MG: 5 TABLET, FILM COATED ORAL at 08:00

## 2022-01-01 RX ADMIN — PANTOPRAZOLE SODIUM 40 MG: 40 TABLET, DELAYED RELEASE ORAL at 19:51

## 2022-01-01 RX ADMIN — DOCUSATE SODIUM 100 MG: 100 CAPSULE, LIQUID FILLED ORAL at 22:12

## 2022-01-01 RX ADMIN — IPRATROPIUM BROMIDE AND ALBUTEROL SULFATE 3 ML: 2.5; .5 SOLUTION RESPIRATORY (INHALATION) at 10:07

## 2022-01-01 RX ADMIN — PANTOPRAZOLE SODIUM 40 MG: 40 TABLET, DELAYED RELEASE ORAL at 21:22

## 2022-01-01 RX ADMIN — SODIUM CHLORIDE, PRESERVATIVE FREE 10 ML: 5 INJECTION INTRAVENOUS at 09:22

## 2022-01-01 RX ADMIN — BUDESONIDE AND FORMOTEROL FUMARATE DIHYDRATE 2 PUFF: 160; 4.5 AEROSOL RESPIRATORY (INHALATION) at 20:47

## 2022-01-01 RX ADMIN — Medication 10 ML: at 19:46

## 2022-01-01 RX ADMIN — BUDESONIDE AND FORMOTEROL FUMARATE DIHYDRATE 2 PUFF: 160; 4.5 AEROSOL RESPIRATORY (INHALATION) at 06:21

## 2022-01-01 RX ADMIN — BUDESONIDE AND FORMOTEROL FUMARATE DIHYDRATE 2 PUFF: 160; 4.5 AEROSOL RESPIRATORY (INHALATION) at 05:40

## 2022-01-01 RX ADMIN — MUPIROCIN 1 APPLICATION: 20 OINTMENT TOPICAL at 20:38

## 2022-01-01 RX ADMIN — AMOXICILLIN AND CLAVULANATE POTASSIUM 1 TABLET: 875; 125 TABLET, FILM COATED ORAL at 21:54

## 2022-01-01 RX ADMIN — LISINOPRIL 40 MG: 20 TABLET ORAL at 08:02

## 2022-01-01 RX ADMIN — DOCUSATE SODIUM 100 MG: 100 CAPSULE, LIQUID FILLED ORAL at 09:43

## 2022-01-01 RX ADMIN — SODIUM ZIRCONIUM CYCLOSILICATE 10 G: 10 POWDER, FOR SUSPENSION ORAL at 09:17

## 2022-01-01 RX ADMIN — GUAIFENESIN 1200 MG: 600 TABLET, EXTENDED RELEASE ORAL at 03:11

## 2022-01-01 RX ADMIN — DOCUSATE SODIUM 100 MG: 100 CAPSULE, LIQUID FILLED ORAL at 19:45

## 2022-01-01 RX ADMIN — DICYCLOMINE HYDROCHLORIDE 10 MG: 10 CAPSULE ORAL at 14:22

## 2022-01-01 RX ADMIN — BUMETANIDE 1 MG: 0.25 INJECTION INTRAMUSCULAR; INTRAVENOUS at 05:59

## 2022-01-01 RX ADMIN — PANTOPRAZOLE SODIUM 40 MG: 40 TABLET, DELAYED RELEASE ORAL at 08:11

## 2022-01-01 RX ADMIN — ASPIRIN 81 MG: 81 TABLET, COATED ORAL at 08:21

## 2022-01-01 RX ADMIN — BUMETANIDE 1 MG: 1 TABLET ORAL at 08:40

## 2022-01-01 RX ADMIN — IPRATROPIUM BROMIDE 0.5 MG: 0.5 SOLUTION RESPIRATORY (INHALATION) at 10:04

## 2022-01-01 RX ADMIN — METOPROLOL TARTRATE 75 MG: 50 TABLET, FILM COATED ORAL at 21:41

## 2022-01-01 RX ADMIN — PREDNISONE 40 MG: 20 TABLET ORAL at 10:28

## 2022-01-01 RX ADMIN — HYDROXYZINE HYDROCHLORIDE 25 MG: 25 TABLET ORAL at 19:51

## 2022-01-01 RX ADMIN — METHYLPREDNISOLONE SODIUM SUCCINATE 40 MG: 40 INJECTION, POWDER, FOR SOLUTION INTRAMUSCULAR; INTRAVENOUS at 13:04

## 2022-01-01 RX ADMIN — BUSPIRONE HYDROCHLORIDE 5 MG: 5 TABLET ORAL at 18:35

## 2022-01-01 RX ADMIN — HYDROCODONE BITARTRATE AND ACETAMINOPHEN 0.5 TABLET: 5; 325 TABLET ORAL at 21:41

## 2022-01-01 RX ADMIN — DILTIAZEM HYDROCHLORIDE 120 MG: 120 CAPSULE, COATED, EXTENDED RELEASE ORAL at 09:56

## 2022-01-01 RX ADMIN — GUAIFENESIN 1200 MG: 600 TABLET, EXTENDED RELEASE ORAL at 20:59

## 2022-01-01 RX ADMIN — Medication 1 TABLET: at 09:56

## 2022-01-01 RX ADMIN — GUAIFENESIN 1200 MG: 600 TABLET, EXTENDED RELEASE ORAL at 09:19

## 2022-01-01 RX ADMIN — ACETAMINOPHEN 650 MG: 325 TABLET, FILM COATED ORAL at 20:31

## 2022-01-01 RX ADMIN — BUSPIRONE HYDROCHLORIDE 5 MG: 5 TABLET ORAL at 09:18

## 2022-01-01 RX ADMIN — SODIUM CHLORIDE, PRESERVATIVE FREE 10 ML: 5 INJECTION INTRAVENOUS at 20:31

## 2022-01-01 RX ADMIN — APIXABAN 2.5 MG: 2.5 TABLET, FILM COATED ORAL at 08:41

## 2022-01-01 RX ADMIN — SERTRALINE HYDROCHLORIDE 50 MG: 50 TABLET, FILM COATED ORAL at 09:48

## 2022-01-01 RX ADMIN — PANTOPRAZOLE SODIUM 40 MG: 40 TABLET, DELAYED RELEASE ORAL at 17:04

## 2022-01-01 RX ADMIN — GUAIFENESIN 1200 MG: 600 TABLET, EXTENDED RELEASE ORAL at 11:02

## 2022-01-01 RX ADMIN — DOCUSATE SODIUM 100 MG: 100 CAPSULE, LIQUID FILLED ORAL at 21:54

## 2022-01-01 RX ADMIN — PRAMIPEXOLE DIHYDROCHLORIDE 0.25 MG: 0.25 TABLET ORAL at 20:23

## 2022-01-01 RX ADMIN — BENZONATATE 100 MG: 100 CAPSULE ORAL at 21:34

## 2022-01-01 RX ADMIN — GUAIFENESIN 1200 MG: 600 TABLET, EXTENDED RELEASE ORAL at 09:48

## 2022-01-01 RX ADMIN — SODIUM CHLORIDE, PRESERVATIVE FREE 10 ML: 5 INJECTION INTRAVENOUS at 09:00

## 2022-01-01 RX ADMIN — APIXABAN 5 MG: 5 TABLET, FILM COATED ORAL at 21:34

## 2022-01-01 RX ADMIN — BENZONATATE 200 MG: 100 CAPSULE ORAL at 21:25

## 2022-01-01 RX ADMIN — BUMETANIDE 1 MG: 0.25 INJECTION INTRAMUSCULAR; INTRAVENOUS at 17:28

## 2022-01-01 RX ADMIN — METHYLPREDNISOLONE SODIUM SUCCINATE 40 MG: 40 INJECTION, POWDER, FOR SOLUTION INTRAMUSCULAR; INTRAVENOUS at 02:01

## 2022-01-01 RX ADMIN — BUSPIRONE HYDROCHLORIDE 5 MG: 5 TABLET ORAL at 09:13

## 2022-01-01 RX ADMIN — APIXABAN 2.5 MG: 2.5 TABLET, FILM COATED ORAL at 20:50

## 2022-01-01 RX ADMIN — BUMETANIDE 1 MG: 0.25 INJECTION INTRAMUSCULAR; INTRAVENOUS at 06:51

## 2022-01-01 RX ADMIN — PRAMIPEXOLE DIHYDROCHLORIDE 0.25 MG: 0.25 TABLET ORAL at 21:41

## 2022-01-01 RX ADMIN — GUAIFENESIN 1200 MG: 600 TABLET, EXTENDED RELEASE ORAL at 09:09

## 2022-01-01 RX ADMIN — ACETYLCYSTEINE 1.5 ML: 200 SOLUTION ORAL; RESPIRATORY (INHALATION) at 06:11

## 2022-01-01 RX ADMIN — Medication 10 ML: at 10:01

## 2022-01-01 RX ADMIN — DOCUSATE SODIUM 100 MG: 100 CAPSULE, LIQUID FILLED ORAL at 08:02

## 2022-01-01 RX ADMIN — METOPROLOL TARTRATE 75 MG: 50 TABLET, FILM COATED ORAL at 08:40

## 2022-01-01 RX ADMIN — ACETAMINOPHEN 650 MG: 325 TABLET, FILM COATED ORAL at 17:17

## 2022-01-01 RX ADMIN — ATORVASTATIN CALCIUM 40 MG: 40 TABLET, FILM COATED ORAL at 08:56

## 2022-01-01 RX ADMIN — BUSPIRONE HYDROCHLORIDE 10 MG: 10 TABLET ORAL at 16:38

## 2022-01-01 RX ADMIN — BUDESONIDE AND FORMOTEROL FUMARATE DIHYDRATE 2 PUFF: 160; 4.5 AEROSOL RESPIRATORY (INHALATION) at 06:49

## 2022-01-01 RX ADMIN — IPRATROPIUM BROMIDE AND ALBUTEROL SULFATE 3 ML: .5; 3 SOLUTION RESPIRATORY (INHALATION) at 14:11

## 2022-01-01 RX ADMIN — METOPROLOL TARTRATE 75 MG: 50 TABLET, FILM COATED ORAL at 10:06

## 2022-01-01 RX ADMIN — ONDANSETRON 4 MG: 4 TABLET, FILM COATED ORAL at 19:45

## 2022-01-01 RX ADMIN — IPRATROPIUM BROMIDE AND ALBUTEROL SULFATE 3 ML: 2.5; .5 SOLUTION RESPIRATORY (INHALATION) at 14:20

## 2022-01-01 RX ADMIN — PRAMIPEXOLE DIHYDROCHLORIDE 0.25 MG: 0.25 TABLET ORAL at 21:18

## 2022-01-01 RX ADMIN — IPRATROPIUM BROMIDE AND ALBUTEROL SULFATE 3 ML: 2.5; .5 SOLUTION RESPIRATORY (INHALATION) at 06:53

## 2022-01-01 RX ADMIN — SERTRALINE HYDROCHLORIDE 50 MG: 50 TABLET, FILM COATED ORAL at 09:56

## 2022-01-01 RX ADMIN — BUSPIRONE HYDROCHLORIDE 5 MG: 5 TABLET ORAL at 08:44

## 2022-01-01 RX ADMIN — BUDESONIDE AND FORMOTEROL FUMARATE DIHYDRATE 2 PUFF: 160; 4.5 AEROSOL RESPIRATORY (INHALATION) at 19:21

## 2022-01-01 RX ADMIN — HYDROCODONE BITARTRATE AND ACETAMINOPHEN 0.5 TABLET: 5; 325 TABLET ORAL at 21:12

## 2022-01-01 RX ADMIN — PANTOPRAZOLE SODIUM 40 MG: 40 TABLET, DELAYED RELEASE ORAL at 20:26

## 2022-01-01 RX ADMIN — MORPHINE SULFATE 5 MG: 20 SOLUTION ORAL at 18:58

## 2022-01-01 RX ADMIN — Medication 1 TABLET: at 09:45

## 2022-01-01 RX ADMIN — METOPROLOL TARTRATE 75 MG: 50 TABLET, FILM COATED ORAL at 09:45

## 2022-01-01 RX ADMIN — SERTRALINE HYDROCHLORIDE 50 MG: 50 TABLET, FILM COATED ORAL at 09:07

## 2022-01-01 RX ADMIN — LEVOFLOXACIN 750 MG: 750 INJECTION, SOLUTION INTRAVENOUS at 03:11

## 2022-01-01 RX ADMIN — BUMETANIDE 1 MG: 0.25 INJECTION INTRAMUSCULAR; INTRAVENOUS at 05:18

## 2022-01-01 RX ADMIN — APIXABAN 5 MG: 5 TABLET, FILM COATED ORAL at 21:41

## 2022-01-01 RX ADMIN — BUDESONIDE AND FORMOTEROL FUMARATE DIHYDRATE 2 PUFF: 160; 4.5 AEROSOL RESPIRATORY (INHALATION) at 05:44

## 2022-01-01 RX ADMIN — ATORVASTATIN CALCIUM 40 MG: 40 TABLET, FILM COATED ORAL at 09:19

## 2022-01-01 RX ADMIN — ACETAMINOPHEN 650 MG: 325 TABLET, FILM COATED ORAL at 07:27

## 2022-01-01 RX ADMIN — SERTRALINE HYDROCHLORIDE 50 MG: 50 TABLET, FILM COATED ORAL at 09:10

## 2022-01-01 RX ADMIN — BUMETANIDE 1 MG: 1 TABLET ORAL at 09:49

## 2022-01-01 RX ADMIN — SERTRALINE HYDROCHLORIDE 50 MG: 50 TABLET, FILM COATED ORAL at 08:30

## 2022-01-01 RX ADMIN — PANTOPRAZOLE SODIUM 40 MG: 40 TABLET, DELAYED RELEASE ORAL at 20:51

## 2022-01-01 RX ADMIN — APIXABAN 5 MG: 5 TABLET, FILM COATED ORAL at 09:46

## 2022-01-01 RX ADMIN — METOPROLOL TARTRATE 75 MG: 50 TABLET, FILM COATED ORAL at 09:20

## 2022-01-01 RX ADMIN — LISINOPRIL 2.5 MG: 2.5 TABLET ORAL at 09:50

## 2022-01-01 RX ADMIN — IPRATROPIUM BROMIDE AND ALBUTEROL SULFATE 3 ML: 2.5; .5 SOLUTION RESPIRATORY (INHALATION) at 20:10

## 2022-01-01 RX ADMIN — IPRATROPIUM BROMIDE AND ALBUTEROL SULFATE 3 ML: 2.5; .5 SOLUTION RESPIRATORY (INHALATION) at 06:49

## 2022-01-01 RX ADMIN — GUAIFENESIN 1200 MG: 600 TABLET, EXTENDED RELEASE ORAL at 09:43

## 2022-01-01 RX ADMIN — SERTRALINE HYDROCHLORIDE 50 MG: 50 TABLET, FILM COATED ORAL at 10:28

## 2022-01-01 RX ADMIN — PANTOPRAZOLE SODIUM 40 MG: 40 TABLET, DELAYED RELEASE ORAL at 10:12

## 2022-01-01 RX ADMIN — PANTOPRAZOLE SODIUM 40 MG: 40 TABLET, DELAYED RELEASE ORAL at 20:29

## 2022-01-01 RX ADMIN — PANTOPRAZOLE SODIUM 40 MG: 40 TABLET, DELAYED RELEASE ORAL at 17:00

## 2022-01-01 RX ADMIN — HYDROCODONE BITARTRATE AND ACETAMINOPHEN 0.5 TABLET: 5; 325 TABLET ORAL at 23:17

## 2022-01-01 RX ADMIN — PRAMIPEXOLE DIHYDROCHLORIDE 0.25 MG: 0.25 TABLET ORAL at 21:16

## 2022-01-01 RX ADMIN — PANTOPRAZOLE SODIUM 40 MG: 40 TABLET, DELAYED RELEASE ORAL at 20:23

## 2022-01-01 RX ADMIN — METOPROLOL TARTRATE 25 MG: 25 TABLET, FILM COATED ORAL at 09:46

## 2022-01-01 RX ADMIN — HYDROCODONE BITARTRATE AND ACETAMINOPHEN 0.5 TABLET: 5; 325 TABLET ORAL at 20:59

## 2022-01-01 RX ADMIN — HYDROCODONE BITARTRATE AND ACETAMINOPHEN 0.5 TABLET: 5; 325 TABLET ORAL at 09:25

## 2022-01-01 RX ADMIN — APIXABAN 2.5 MG: 2.5 TABLET, FILM COATED ORAL at 09:14

## 2022-01-01 RX ADMIN — SERTRALINE HYDROCHLORIDE 25 MG: 50 TABLET, FILM COATED ORAL at 08:15

## 2022-01-01 RX ADMIN — IPRATROPIUM BROMIDE AND ALBUTEROL SULFATE 3 ML: 2.5; .5 SOLUTION RESPIRATORY (INHALATION) at 10:16

## 2022-01-01 RX ADMIN — LISINOPRIL 40 MG: 20 TABLET ORAL at 08:13

## 2022-01-01 RX ADMIN — CETIRIZINE HYDROCHLORIDE TABLETS 10 MG: 10 TABLET, FILM COATED ORAL at 08:43

## 2022-01-01 RX ADMIN — BENZOCAINE 6 MG-MENTHOL 10 MG LOZENGES 1 LOZENGE: at 23:20

## 2022-01-01 RX ADMIN — Medication 10 ML: at 22:12

## 2022-01-01 RX ADMIN — BUSPIRONE HYDROCHLORIDE 5 MG: 5 TABLET ORAL at 08:43

## 2022-01-01 RX ADMIN — HYDROXYZINE HYDROCHLORIDE 25 MG: 25 TABLET ORAL at 05:21

## 2022-01-01 RX ADMIN — LORAZEPAM 0.5 MG: 0.5 TABLET ORAL at 11:33

## 2022-01-01 RX ADMIN — BENZONATATE 100 MG: 100 CAPSULE ORAL at 18:21

## 2022-01-01 RX ADMIN — PANTOPRAZOLE SODIUM 40 MG: 40 TABLET, DELAYED RELEASE ORAL at 09:17

## 2022-01-01 RX ADMIN — IPRATROPIUM BROMIDE AND ALBUTEROL SULFATE 3 ML: 2.5; .5 SOLUTION RESPIRATORY (INHALATION) at 05:57

## 2022-01-01 RX ADMIN — DOCUSATE SODIUM 100 MG: 100 CAPSULE, LIQUID FILLED ORAL at 08:43

## 2022-01-01 RX ADMIN — DOCUSATE SODIUM 100 MG: 100 CAPSULE, LIQUID FILLED ORAL at 09:10

## 2022-01-01 RX ADMIN — HYDROXYZINE HYDROCHLORIDE 25 MG: 25 TABLET ORAL at 15:03

## 2022-01-01 RX ADMIN — SERTRALINE HYDROCHLORIDE 25 MG: 50 TABLET, FILM COATED ORAL at 08:02

## 2022-01-01 RX ADMIN — PRAMIPEXOLE DIHYDROCHLORIDE 0.25 MG: 0.25 TABLET ORAL at 20:38

## 2022-01-01 RX ADMIN — BUSPIRONE HYDROCHLORIDE 10 MG: 10 TABLET ORAL at 16:50

## 2022-01-01 RX ADMIN — HYDROCODONE BITARTRATE AND ACETAMINOPHEN 0.5 TABLET: 5; 325 TABLET ORAL at 21:54

## 2022-01-01 RX ADMIN — METHYLPREDNISOLONE SODIUM SUCCINATE 40 MG: 40 INJECTION, POWDER, FOR SOLUTION INTRAMUSCULAR; INTRAVENOUS at 05:13

## 2022-01-01 RX ADMIN — ATORVASTATIN CALCIUM 40 MG: 40 TABLET, FILM COATED ORAL at 10:53

## 2022-01-01 RX ADMIN — ACETYLCYSTEINE 1.5 ML: 200 SOLUTION ORAL; RESPIRATORY (INHALATION) at 07:29

## 2022-01-01 RX ADMIN — IPRATROPIUM BROMIDE 0.5 MG: 0.5 SOLUTION RESPIRATORY (INHALATION) at 07:23

## 2022-01-01 RX ADMIN — BUSPIRONE HYDROCHLORIDE 10 MG: 10 TABLET ORAL at 09:48

## 2022-01-01 RX ADMIN — ATORVASTATIN CALCIUM 40 MG: 40 TABLET, FILM COATED ORAL at 08:44

## 2022-01-01 RX ADMIN — BUDESONIDE AND FORMOTEROL FUMARATE DIHYDRATE 2 PUFF: 160; 4.5 AEROSOL RESPIRATORY (INHALATION) at 06:13

## 2022-01-01 RX ADMIN — APIXABAN 2.5 MG: 2.5 TABLET, FILM COATED ORAL at 09:19

## 2022-01-01 RX ADMIN — BUMETANIDE 1 MG: 1 TABLET ORAL at 09:07

## 2022-01-01 RX ADMIN — METHYLPREDNISOLONE SODIUM SUCCINATE 40 MG: 40 INJECTION, POWDER, FOR SOLUTION INTRAMUSCULAR; INTRAVENOUS at 01:27

## 2022-01-01 RX ADMIN — LISINOPRIL 40 MG: 20 TABLET ORAL at 08:00

## 2022-01-01 RX ADMIN — APIXABAN 2.5 MG: 2.5 TABLET, FILM COATED ORAL at 21:17

## 2022-01-01 RX ADMIN — PRAMIPEXOLE DIHYDROCHLORIDE 0.25 MG: 0.25 TABLET ORAL at 20:06

## 2022-01-01 RX ADMIN — Medication 1 TABLET: at 10:55

## 2022-01-01 RX ADMIN — APIXABAN 5 MG: 5 TABLET, FILM COATED ORAL at 09:00

## 2022-01-01 RX ADMIN — BENZONATATE 100 MG: 100 CAPSULE ORAL at 17:21

## 2022-01-01 RX ADMIN — ENOXAPARIN SODIUM 70 MG: 80 INJECTION SUBCUTANEOUS at 05:30

## 2022-01-01 RX ADMIN — Medication 1 LOZENGE: at 09:28

## 2022-01-01 RX ADMIN — IPRATROPIUM BROMIDE AND ALBUTEROL SULFATE 3 ML: 2.5; .5 SOLUTION RESPIRATORY (INHALATION) at 05:40

## 2022-01-01 RX ADMIN — BUSPIRONE HYDROCHLORIDE 10 MG: 10 TABLET ORAL at 08:31

## 2022-01-01 RX ADMIN — BUDESONIDE AND FORMOTEROL FUMARATE DIHYDRATE 2 PUFF: 160; 4.5 AEROSOL RESPIRATORY (INHALATION) at 20:33

## 2022-01-01 RX ADMIN — ACETAMINOPHEN 650 MG: 325 TABLET ORAL at 08:49

## 2022-01-01 RX ADMIN — SERTRALINE 50 MG: 50 TABLET, FILM COATED ORAL at 10:12

## 2022-01-01 RX ADMIN — PREDNISONE 40 MG: 20 TABLET ORAL at 08:20

## 2022-01-01 RX ADMIN — DOCUSATE SODIUM 100 MG: 100 CAPSULE, LIQUID FILLED ORAL at 09:14

## 2022-01-01 RX ADMIN — ATORVASTATIN CALCIUM 40 MG: 40 TABLET, FILM COATED ORAL at 09:56

## 2022-01-01 RX ADMIN — DOCUSATE SODIUM 100 MG: 100 CAPSULE, LIQUID FILLED ORAL at 20:37

## 2022-01-01 RX ADMIN — MUPIROCIN 1 APPLICATION: 20 OINTMENT TOPICAL at 08:44

## 2022-01-01 RX ADMIN — PANTOPRAZOLE SODIUM 40 MG: 40 TABLET, DELAYED RELEASE ORAL at 09:44

## 2022-01-01 RX ADMIN — SODIUM CHLORIDE, PRESERVATIVE FREE 10 ML: 5 INJECTION INTRAVENOUS at 21:15

## 2022-01-01 RX ADMIN — BUDESONIDE AND FORMOTEROL FUMARATE DIHYDRATE 2 PUFF: 160; 4.5 AEROSOL RESPIRATORY (INHALATION) at 06:38

## 2022-01-01 RX ADMIN — APIXABAN 2.5 MG: 5 TABLET, FILM COATED ORAL at 10:12

## 2022-01-01 RX ADMIN — PANTOPRAZOLE SODIUM 40 MG: 40 TABLET, DELAYED RELEASE ORAL at 21:17

## 2022-01-01 RX ADMIN — ONDANSETRON 4 MG: 4 TABLET, FILM COATED ORAL at 15:18

## 2022-01-01 RX ADMIN — Medication 1 TABLET: at 09:49

## 2022-01-01 RX ADMIN — SERTRALINE HYDROCHLORIDE 50 MG: 50 TABLET, FILM COATED ORAL at 09:43

## 2022-01-01 RX ADMIN — SODIUM CHLORIDE, PRESERVATIVE FREE 10 ML: 5 INJECTION INTRAVENOUS at 08:57

## 2022-01-01 RX ADMIN — ACETYLCYSTEINE 1.5 ML: 200 SOLUTION ORAL; RESPIRATORY (INHALATION) at 19:37

## 2022-01-01 RX ADMIN — HYDROXYZINE HYDROCHLORIDE 25 MG: 25 TABLET ORAL at 18:00

## 2022-01-01 RX ADMIN — IPRATROPIUM BROMIDE AND ALBUTEROL SULFATE 3 ML: 2.5; .5 SOLUTION RESPIRATORY (INHALATION) at 09:50

## 2022-01-01 RX ADMIN — HYDROXYZINE HYDROCHLORIDE 50 MG: 50 INJECTION, SOLUTION INTRAMUSCULAR at 21:55

## 2022-01-01 RX ADMIN — APIXABAN 2.5 MG: 2.5 TABLET, FILM COATED ORAL at 21:22

## 2022-01-01 RX ADMIN — BENZONATATE 100 MG: 100 CAPSULE ORAL at 21:16

## 2022-01-01 RX ADMIN — IPRATROPIUM BROMIDE 0.5 MG: 0.5 SOLUTION RESPIRATORY (INHALATION) at 11:44

## 2022-01-01 RX ADMIN — PANTOPRAZOLE SODIUM 40 MG: 40 TABLET, DELAYED RELEASE ORAL at 10:07

## 2022-01-01 RX ADMIN — GUAIFENESIN 1200 MG: 600 TABLET, EXTENDED RELEASE ORAL at 21:34

## 2022-01-01 RX ADMIN — DOCUSATE SODIUM 100 MG: 100 CAPSULE, LIQUID FILLED ORAL at 09:56

## 2022-01-01 RX ADMIN — BUSPIRONE HYDROCHLORIDE 5 MG: 5 TABLET ORAL at 21:16

## 2022-01-01 RX ADMIN — GUAIFENESIN 1200 MG: 600 TABLET, EXTENDED RELEASE ORAL at 08:21

## 2022-01-01 RX ADMIN — ACETYLCYSTEINE 1.5 ML: 200 SOLUTION ORAL; RESPIRATORY (INHALATION) at 20:33

## 2022-01-01 RX ADMIN — ATORVASTATIN CALCIUM 40 MG: 40 TABLET, FILM COATED ORAL at 08:14

## 2022-01-01 RX ADMIN — METOPROLOL TARTRATE 75 MG: 50 TABLET, FILM COATED ORAL at 08:56

## 2022-01-01 RX ADMIN — METOPROLOL TARTRATE 75 MG: 50 TABLET, FILM COATED ORAL at 21:34

## 2022-01-01 RX ADMIN — GUAIFENESIN 1200 MG: 600 TABLET, EXTENDED RELEASE ORAL at 08:40

## 2022-01-01 RX ADMIN — IPRATROPIUM BROMIDE AND ALBUTEROL SULFATE 3 ML: .5; 3 SOLUTION RESPIRATORY (INHALATION) at 20:53

## 2022-01-01 RX ADMIN — BUSPIRONE HYDROCHLORIDE 10 MG: 10 TABLET ORAL at 08:40

## 2022-01-01 RX ADMIN — METHYLPREDNISOLONE SODIUM SUCCINATE 40 MG: 40 INJECTION, POWDER, FOR SOLUTION INTRAMUSCULAR; INTRAVENOUS at 17:20

## 2022-01-01 RX ADMIN — METOPROLOL TARTRATE 75 MG: 50 TABLET, FILM COATED ORAL at 20:22

## 2022-01-01 RX ADMIN — SODIUM CHLORIDE, PRESERVATIVE FREE 10 ML: 5 INJECTION INTRAVENOUS at 08:22

## 2022-01-01 RX ADMIN — BUDESONIDE AND FORMOTEROL FUMARATE DIHYDRATE 2 PUFF: 160; 4.5 AEROSOL RESPIRATORY (INHALATION) at 07:37

## 2022-01-01 RX ADMIN — PREDNISONE 40 MG: 20 TABLET ORAL at 08:44

## 2022-01-01 RX ADMIN — HYDROCODONE BITARTRATE AND ACETAMINOPHEN 0.5 TABLET: 5; 325 TABLET ORAL at 12:32

## 2022-01-01 RX ADMIN — BUSPIRONE HYDROCHLORIDE 5 MG: 5 TABLET ORAL at 10:19

## 2022-01-01 RX ADMIN — METHYLPREDNISOLONE SODIUM SUCCINATE 40 MG: 40 INJECTION, POWDER, FOR SOLUTION INTRAMUSCULAR; INTRAVENOUS at 21:22

## 2022-01-01 RX ADMIN — Medication 10 ML: at 09:17

## 2022-01-01 RX ADMIN — SODIUM CHLORIDE, PRESERVATIVE FREE 10 ML: 5 INJECTION INTRAVENOUS at 20:59

## 2022-01-01 RX ADMIN — ACETYLCYSTEINE 1.5 ML: 200 SOLUTION ORAL; RESPIRATORY (INHALATION) at 05:57

## 2022-01-01 RX ADMIN — DOCUSATE SODIUM 100 MG: 100 CAPSULE, LIQUID FILLED ORAL at 10:13

## 2022-01-01 RX ADMIN — Medication 10 ML: at 10:56

## 2022-01-01 RX ADMIN — IPRATROPIUM BROMIDE 0.5 MG: 0.5 SOLUTION RESPIRATORY (INHALATION) at 10:26

## 2022-01-01 RX ADMIN — HYDROCODONE BITARTRATE AND ACETAMINOPHEN 0.5 TABLET: 5; 325 TABLET ORAL at 20:27

## 2022-01-01 RX ADMIN — ATORVASTATIN CALCIUM 40 MG: 40 TABLET, FILM COATED ORAL at 09:14

## 2022-01-01 RX ADMIN — GUAIFENESIN 1200 MG: 600 TABLET, EXTENDED RELEASE ORAL at 21:41

## 2022-01-01 RX ADMIN — SERTRALINE HYDROCHLORIDE 50 MG: 50 TABLET, FILM COATED ORAL at 08:44

## 2022-01-01 RX ADMIN — ACETAMINOPHEN 650 MG: 325 TABLET, FILM COATED ORAL at 04:42

## 2022-01-01 RX ADMIN — ATORVASTATIN CALCIUM 40 MG: 40 TABLET, FILM COATED ORAL at 21:54

## 2022-01-01 RX ADMIN — BUDESONIDE AND FORMOTEROL FUMARATE DIHYDRATE 2 PUFF: 160; 4.5 AEROSOL RESPIRATORY (INHALATION) at 19:31

## 2022-01-01 RX ADMIN — IPRATROPIUM BROMIDE 0.5 MG: 0.5 SOLUTION RESPIRATORY (INHALATION) at 10:12

## 2022-01-01 RX ADMIN — HYDROXYZINE HYDROCHLORIDE 25 MG: 25 TABLET ORAL at 14:12

## 2022-01-01 RX ADMIN — BUSPIRONE HYDROCHLORIDE 5 MG: 5 TABLET ORAL at 20:06

## 2022-01-01 RX ADMIN — SODIUM CHLORIDE, PRESERVATIVE FREE 10 ML: 5 INJECTION INTRAVENOUS at 08:43

## 2022-01-01 RX ADMIN — AMOXICILLIN AND CLAVULANATE POTASSIUM 500 MG: 500; 125 TABLET, FILM COATED ORAL at 10:13

## 2022-01-01 RX ADMIN — IPRATROPIUM BROMIDE 0.5 MG: 0.5 SOLUTION RESPIRATORY (INHALATION) at 19:04

## 2022-01-01 RX ADMIN — DOCUSATE SODIUM 100 MG: 100 CAPSULE, LIQUID FILLED ORAL at 08:20

## 2022-01-01 RX ADMIN — METOPROLOL TARTRATE 25 MG: 25 TABLET, FILM COATED ORAL at 19:45

## 2022-01-01 RX ADMIN — FUROSEMIDE 40 MG: 10 INJECTION, SOLUTION INTRAMUSCULAR; INTRAVENOUS at 11:31

## 2022-01-01 RX ADMIN — APIXABAN 2.5 MG: 2.5 TABLET, FILM COATED ORAL at 20:37

## 2022-01-01 RX ADMIN — ATORVASTATIN CALCIUM 40 MG: 40 TABLET, FILM COATED ORAL at 08:31

## 2022-01-01 RX ADMIN — IPRATROPIUM BROMIDE AND ALBUTEROL SULFATE 3 ML: .5; 3 SOLUTION RESPIRATORY (INHALATION) at 10:18

## 2022-01-01 RX ADMIN — DOCUSATE SODIUM 100 MG: 100 CAPSULE, LIQUID FILLED ORAL at 21:17

## 2022-01-01 RX ADMIN — BUSPIRONE HYDROCHLORIDE 5 MG: 5 TABLET ORAL at 10:08

## 2022-01-01 RX ADMIN — APIXABAN 2.5 MG: 2.5 TABLET, FILM COATED ORAL at 20:25

## 2022-01-01 RX ADMIN — HYDROCODONE BITARTRATE AND ACETAMINOPHEN 0.5 TABLET: 5; 325 TABLET ORAL at 09:48

## 2022-01-01 RX ADMIN — SODIUM CHLORIDE, PRESERVATIVE FREE 10 ML: 5 INJECTION INTRAVENOUS at 21:41

## 2022-01-01 RX ADMIN — SERTRALINE HYDROCHLORIDE 25 MG: 50 TABLET, FILM COATED ORAL at 08:07

## 2022-01-01 RX ADMIN — CETIRIZINE HYDROCHLORIDE TABLETS 10 MG: 10 TABLET, FILM COATED ORAL at 09:00

## 2022-01-01 RX ADMIN — IPRATROPIUM BROMIDE 0.5 MG: 0.5 SOLUTION RESPIRATORY (INHALATION) at 07:16

## 2022-01-01 RX ADMIN — PANTOPRAZOLE SODIUM 40 MG: 40 TABLET, DELAYED RELEASE ORAL at 17:22

## 2022-01-01 RX ADMIN — BUSPIRONE HYDROCHLORIDE 5 MG: 5 TABLET ORAL at 10:54

## 2022-01-01 RX ADMIN — HYDROCODONE BITARTRATE AND ACETAMINOPHEN 0.5 TABLET: 5; 325 TABLET ORAL at 10:54

## 2022-01-01 RX ADMIN — BUSPIRONE HYDROCHLORIDE 5 MG: 5 TABLET ORAL at 16:52

## 2022-01-01 RX ADMIN — DOCUSATE SODIUM 100 MG: 100 CAPSULE, LIQUID FILLED ORAL at 08:26

## 2022-01-01 RX ADMIN — FUROSEMIDE 40 MG: 10 INJECTION, SOLUTION INTRAVENOUS at 00:06

## 2022-01-01 RX ADMIN — BUSPIRONE HYDROCHLORIDE 10 MG: 10 TABLET ORAL at 09:11

## 2022-01-01 RX ADMIN — PANTOPRAZOLE SODIUM 40 MG: 40 TABLET, DELAYED RELEASE ORAL at 10:27

## 2022-01-01 RX ADMIN — SODIUM CHLORIDE, PRESERVATIVE FREE 10 ML: 5 INJECTION INTRAVENOUS at 12:56

## 2022-01-01 RX ADMIN — PANTOPRAZOLE SODIUM 40 MG: 40 TABLET, DELAYED RELEASE ORAL at 07:45

## 2022-01-01 RX ADMIN — METOPROLOL TARTRATE 75 MG: 50 TABLET, FILM COATED ORAL at 20:25

## 2022-01-01 RX ADMIN — PANTOPRAZOLE SODIUM 40 MG: 40 TABLET, DELAYED RELEASE ORAL at 08:41

## 2022-01-01 RX ADMIN — GUAIFENESIN 1200 MG: 600 TABLET, EXTENDED RELEASE ORAL at 09:45

## 2022-01-01 RX ADMIN — METHYLPREDNISOLONE SODIUM SUCCINATE 60 MG: 125 INJECTION, POWDER, FOR SOLUTION INTRAMUSCULAR; INTRAVENOUS at 13:26

## 2022-01-01 RX ADMIN — IPRATROPIUM BROMIDE AND ALBUTEROL SULFATE 3 ML: 2.5; .5 SOLUTION RESPIRATORY (INHALATION) at 19:37

## 2022-01-01 RX ADMIN — DOCUSATE SODIUM 100 MG: 100 CAPSULE, LIQUID FILLED ORAL at 20:29

## 2022-01-01 RX ADMIN — HYDROXYZINE HYDROCHLORIDE 25 MG: 25 TABLET ORAL at 17:29

## 2022-01-01 RX ADMIN — APIXABAN 2.5 MG: 2.5 TABLET, FILM COATED ORAL at 10:07

## 2022-01-01 RX ADMIN — IPRATROPIUM BROMIDE AND ALBUTEROL SULFATE 3 ML: 2.5; .5 SOLUTION RESPIRATORY (INHALATION) at 10:28

## 2022-01-01 RX ADMIN — MUPIROCIN 1 APPLICATION: 20 OINTMENT TOPICAL at 11:36

## 2022-01-01 RX ADMIN — DILTIAZEM HYDROCHLORIDE 20 MG: 5 INJECTION INTRAVENOUS at 02:29

## 2022-01-01 RX ADMIN — Medication 1 TABLET: at 09:11

## 2022-01-01 RX ADMIN — CETIRIZINE HYDROCHLORIDE TABLETS 10 MG: 10 TABLET, FILM COATED ORAL at 10:08

## 2022-01-01 RX ADMIN — HYDROCODONE BITARTRATE AND ACETAMINOPHEN 0.5 TABLET: 5; 325 TABLET ORAL at 22:39

## 2022-01-01 RX ADMIN — ACETAMINOPHEN 650 MG: 325 TABLET, FILM COATED ORAL at 04:25

## 2022-01-01 RX ADMIN — ACETAMINOPHEN 650 MG: 325 TABLET, FILM COATED ORAL at 10:05

## 2022-01-01 RX ADMIN — Medication 10 ML: at 08:39

## 2022-01-01 RX ADMIN — FUROSEMIDE 40 MG: 10 INJECTION, SOLUTION INTRAMUSCULAR; INTRAVENOUS at 18:20

## 2022-01-01 RX ADMIN — ATORVASTATIN CALCIUM 40 MG: 40 TABLET, FILM COATED ORAL at 08:13

## 2022-01-01 RX ADMIN — METOPROLOL TARTRATE 75 MG: 50 TABLET, FILM COATED ORAL at 09:12

## 2022-01-01 RX ADMIN — CETIRIZINE HYDROCHLORIDE 5 MG: 10 TABLET ORAL at 09:15

## 2022-01-01 RX ADMIN — SODIUM CHLORIDE, PRESERVATIVE FREE 10 ML: 5 INJECTION INTRAVENOUS at 22:31

## 2022-01-01 RX ADMIN — IPRATROPIUM BROMIDE AND ALBUTEROL SULFATE 3 ML: 2.5; .5 SOLUTION RESPIRATORY (INHALATION) at 15:13

## 2022-01-01 RX ADMIN — GUAIFENESIN 1200 MG: 600 TABLET, EXTENDED RELEASE ORAL at 19:45

## 2022-01-01 RX ADMIN — PRAMIPEXOLE DIHYDROCHLORIDE 0.25 MG: 0.25 TABLET ORAL at 22:00

## 2022-01-01 RX ADMIN — SERTRALINE HYDROCHLORIDE 50 MG: 50 TABLET, FILM COATED ORAL at 08:41

## 2022-01-01 RX ADMIN — AMOXICILLIN AND CLAVULANATE POTASSIUM 1 TABLET: 875; 125 TABLET, FILM COATED ORAL at 09:46

## 2022-01-01 RX ADMIN — METHYLPREDNISOLONE SODIUM SUCCINATE 60 MG: 125 INJECTION, POWDER, FOR SOLUTION INTRAMUSCULAR; INTRAVENOUS at 06:10

## 2022-01-01 RX ADMIN — DOCUSATE SODIUM 100 MG: 100 CAPSULE, LIQUID FILLED ORAL at 19:43

## 2022-01-01 RX ADMIN — Medication 1 TABLET: at 08:21

## 2022-01-01 RX ADMIN — PRAMIPEXOLE DIHYDROCHLORIDE 0.25 MG: 0.25 TABLET ORAL at 21:54

## 2022-01-01 RX ADMIN — METOPROLOL TARTRATE 75 MG: 50 TABLET, FILM COATED ORAL at 08:41

## 2022-01-01 RX ADMIN — HYDROCODONE BITARTRATE AND ACETAMINOPHEN 0.5 TABLET: 5; 325 TABLET ORAL at 11:19

## 2022-01-01 RX ADMIN — APIXABAN 5 MG: 5 TABLET, FILM COATED ORAL at 08:02

## 2022-01-01 RX ADMIN — Medication 10 ML: at 20:23

## 2022-01-01 RX ADMIN — GUAIFENESIN 1200 MG: 600 TABLET, EXTENDED RELEASE ORAL at 08:00

## 2022-01-01 RX ADMIN — PRAMIPEXOLE DIHYDROCHLORIDE 0.25 MG: 0.25 TABLET ORAL at 20:26

## 2022-01-01 RX ADMIN — SODIUM CHLORIDE, PRESERVATIVE FREE 10 ML: 5 INJECTION INTRAVENOUS at 09:46

## 2022-01-01 RX ADMIN — GUAIFENESIN 1200 MG: 600 TABLET, EXTENDED RELEASE ORAL at 10:08

## 2022-01-01 RX ADMIN — BUDESONIDE AND FORMOTEROL FUMARATE DIHYDRATE 2 PUFF: 160; 4.5 AEROSOL RESPIRATORY (INHALATION) at 19:11

## 2022-01-01 RX ADMIN — AMLODIPINE BESYLATE 10 MG: 10 TABLET ORAL at 08:15

## 2022-01-01 RX ADMIN — DOCUSATE SODIUM 100 MG: 100 CAPSULE, LIQUID FILLED ORAL at 21:16

## 2022-01-01 RX ADMIN — BUSPIRONE HYDROCHLORIDE 5 MG: 5 TABLET ORAL at 00:49

## 2022-01-01 RX ADMIN — PRAMIPEXOLE DIHYDROCHLORIDE 0.25 MG: 0.25 TABLET ORAL at 21:12

## 2022-01-01 RX ADMIN — BUDESONIDE AND FORMOTEROL FUMARATE DIHYDRATE 2 PUFF: 160; 4.5 AEROSOL RESPIRATORY (INHALATION) at 21:45

## 2022-01-01 RX ADMIN — DILTIAZEM HYDROCHLORIDE 120 MG: 120 CAPSULE, COATED, EXTENDED RELEASE ORAL at 09:47

## 2022-01-01 RX ADMIN — METOPROLOL TARTRATE 25 MG: 25 TABLET, FILM COATED ORAL at 19:51

## 2022-01-01 RX ADMIN — APIXABAN 2.5 MG: 2.5 TABLET, FILM COATED ORAL at 19:45

## 2022-01-01 RX ADMIN — IPRATROPIUM BROMIDE AND ALBUTEROL SULFATE 3 ML: 2.5; .5 SOLUTION RESPIRATORY (INHALATION) at 10:23

## 2022-01-01 RX ADMIN — BUDESONIDE AND FORMOTEROL FUMARATE DIHYDRATE 2 PUFF: 160; 4.5 AEROSOL RESPIRATORY (INHALATION) at 06:55

## 2022-01-01 RX ADMIN — LISINOPRIL 10 MG: 10 TABLET ORAL at 10:06

## 2022-01-01 RX ADMIN — IPRATROPIUM BROMIDE AND ALBUTEROL SULFATE 3 ML: 2.5; .5 SOLUTION RESPIRATORY (INHALATION) at 06:52

## 2022-01-01 RX ADMIN — DEXTROMETHORPHAN POLISTIREX 60 MG: 30 SUSPENSION ORAL at 20:27

## 2022-01-01 RX ADMIN — SODIUM CHLORIDE, PRESERVATIVE FREE 10 ML: 5 INJECTION INTRAVENOUS at 21:23

## 2022-01-01 RX ADMIN — APIXABAN 5 MG: 5 TABLET, FILM COATED ORAL at 21:54

## 2022-01-01 RX ADMIN — BUSPIRONE HYDROCHLORIDE 5 MG: 5 TABLET ORAL at 23:20

## 2022-01-01 RX ADMIN — IPRATROPIUM BROMIDE AND ALBUTEROL SULFATE 3 ML: .5; 3 SOLUTION RESPIRATORY (INHALATION) at 23:56

## 2022-01-01 RX ADMIN — ATORVASTATIN CALCIUM 40 MG: 40 TABLET, FILM COATED ORAL at 09:18

## 2022-01-01 RX ADMIN — ASPIRIN 81 MG: 81 TABLET, COATED ORAL at 08:08

## 2022-01-01 RX ADMIN — DOCUSATE SODIUM 100 MG: 100 CAPSULE, LIQUID FILLED ORAL at 21:34

## 2022-01-01 RX ADMIN — SERTRALINE HYDROCHLORIDE 50 MG: 50 TABLET, FILM COATED ORAL at 09:13

## 2022-01-01 RX ADMIN — FUROSEMIDE 40 MG: 10 INJECTION, SOLUTION INTRAVENOUS at 15:10

## 2022-01-01 RX ADMIN — SERTRALINE HYDROCHLORIDE 25 MG: 50 TABLET, FILM COATED ORAL at 08:00

## 2022-01-01 RX ADMIN — PANTOPRAZOLE SODIUM 40 MG: 40 TABLET, DELAYED RELEASE ORAL at 18:00

## 2022-01-01 RX ADMIN — Medication 1 TABLET: at 08:14

## 2022-01-01 RX ADMIN — IPRATROPIUM BROMIDE AND ALBUTEROL SULFATE 3 ML: 2.5; .5 SOLUTION RESPIRATORY (INHALATION) at 14:55

## 2022-01-01 RX ADMIN — PANTOPRAZOLE SODIUM 40 MG: 40 TABLET, DELAYED RELEASE ORAL at 08:14

## 2022-01-01 RX ADMIN — PRAMIPEXOLE DIHYDROCHLORIDE 0.25 MG: 0.25 TABLET ORAL at 21:34

## 2022-01-01 RX ADMIN — ATORVASTATIN CALCIUM 40 MG: 40 TABLET, FILM COATED ORAL at 10:09

## 2022-01-01 RX ADMIN — LISINOPRIL 40 MG: 20 TABLET ORAL at 08:14

## 2022-01-01 RX ADMIN — SODIUM CHLORIDE 250 ML: 9 INJECTION, SOLUTION INTRAVENOUS at 17:00

## 2022-01-01 RX ADMIN — BUDESONIDE AND FORMOTEROL FUMARATE DIHYDRATE 2 PUFF: 160; 4.5 AEROSOL RESPIRATORY (INHALATION) at 06:58

## 2022-01-01 RX ADMIN — IOPAMIDOL 100 ML: 755 INJECTION, SOLUTION INTRAVENOUS at 22:24

## 2022-01-01 RX ADMIN — IPRATROPIUM BROMIDE 0.5 MG: 0.5 SOLUTION RESPIRATORY (INHALATION) at 18:42

## 2022-01-01 RX ADMIN — BUSPIRONE HYDROCHLORIDE 5 MG: 5 TABLET ORAL at 09:00

## 2022-01-01 RX ADMIN — BUDESONIDE AND FORMOTEROL FUMARATE DIHYDRATE 2 PUFF: 160; 4.5 AEROSOL RESPIRATORY (INHALATION) at 19:37

## 2022-01-01 RX ADMIN — METOPROLOL TARTRATE 75 MG: 50 TABLET, FILM COATED ORAL at 22:39

## 2022-01-01 RX ADMIN — ONDANSETRON 4 MG: 4 TABLET, ORALLY DISINTEGRATING ORAL at 22:08

## 2022-01-01 RX ADMIN — METOPROLOL TARTRATE 75 MG: 50 TABLET, FILM COATED ORAL at 21:17

## 2022-01-01 RX ADMIN — BENZOCAINE 6 MG-MENTHOL 10 MG LOZENGES 1 LOZENGE: at 08:01

## 2022-01-01 RX ADMIN — IPRATROPIUM BROMIDE AND ALBUTEROL SULFATE 3 ML: 2.5; .5 SOLUTION RESPIRATORY (INHALATION) at 16:47

## 2022-01-01 RX ADMIN — PRAMIPEXOLE DIHYDROCHLORIDE 0.25 MG: 0.25 TABLET ORAL at 00:49

## 2022-01-01 RX ADMIN — LISINOPRIL 10 MG: 10 TABLET ORAL at 09:49

## 2022-01-01 RX ADMIN — ACETAMINOPHEN 650 MG: 325 TABLET, FILM COATED ORAL at 10:24

## 2022-01-01 RX ADMIN — DOCUSATE SODIUM 100 MG: 100 CAPSULE, LIQUID FILLED ORAL at 10:08

## 2022-01-01 RX ADMIN — BUSPIRONE HYDROCHLORIDE 10 MG: 10 TABLET ORAL at 16:33

## 2022-01-01 RX ADMIN — ONDANSETRON 4 MG: 4 TABLET, FILM COATED ORAL at 22:39

## 2022-01-01 RX ADMIN — IPRATROPIUM BROMIDE AND ALBUTEROL SULFATE 3 ML: 2.5; .5 SOLUTION RESPIRATORY (INHALATION) at 14:38

## 2022-01-01 RX ADMIN — PANTOPRAZOLE SODIUM 40 MG: 40 TABLET, DELAYED RELEASE ORAL at 20:58

## 2022-01-01 RX ADMIN — DOCUSATE SODIUM 100 MG: 100 CAPSULE, LIQUID FILLED ORAL at 20:24

## 2022-01-01 RX ADMIN — DOCUSATE SODIUM 100 MG: 100 CAPSULE, LIQUID FILLED ORAL at 20:50

## 2022-01-01 RX ADMIN — BUDESONIDE AND FORMOTEROL FUMARATE DIHYDRATE 2 PUFF: 160; 4.5 AEROSOL RESPIRATORY (INHALATION) at 18:42

## 2022-01-01 RX ADMIN — CETIRIZINE HYDROCHLORIDE TABLETS 10 MG: 10 TABLET, FILM COATED ORAL at 09:20

## 2022-01-01 RX ADMIN — SODIUM CHLORIDE, PRESERVATIVE FREE 10 ML: 5 INJECTION INTRAVENOUS at 10:14

## 2022-01-01 RX ADMIN — GUAIFENESIN 1200 MG: 600 TABLET, EXTENDED RELEASE ORAL at 21:54

## 2022-01-01 RX ADMIN — DILTIAZEM HYDROCHLORIDE 120 MG: 120 CAPSULE, COATED, EXTENDED RELEASE ORAL at 09:07

## 2022-01-01 RX ADMIN — IPRATROPIUM BROMIDE 0.5 MG: 0.5 SOLUTION RESPIRATORY (INHALATION) at 07:29

## 2022-01-01 RX ADMIN — BUMETANIDE 1 MG: 1 TABLET ORAL at 11:53

## 2022-01-01 RX ADMIN — ATORVASTATIN CALCIUM 40 MG: 40 TABLET, FILM COATED ORAL at 09:07

## 2022-01-01 RX ADMIN — METOPROLOL TARTRATE 75 MG: 50 TABLET, FILM COATED ORAL at 22:08

## 2022-01-01 RX ADMIN — IPRATROPIUM BROMIDE 0.5 MG: 0.5 SOLUTION RESPIRATORY (INHALATION) at 11:23

## 2022-01-01 RX ADMIN — METHYLPREDNISOLONE SODIUM SUCCINATE 40 MG: 40 INJECTION, POWDER, FOR SOLUTION INTRAMUSCULAR; INTRAVENOUS at 15:32

## 2022-01-01 RX ADMIN — METOPROLOL TARTRATE 75 MG: 50 TABLET, FILM COATED ORAL at 10:12

## 2022-01-01 RX ADMIN — BUSPIRONE HYDROCHLORIDE 10 MG: 10 TABLET ORAL at 18:00

## 2022-01-01 RX ADMIN — IPRATROPIUM BROMIDE AND ALBUTEROL SULFATE 3 ML: 2.5; .5 SOLUTION RESPIRATORY (INHALATION) at 06:55

## 2022-01-01 RX ADMIN — DEXTROMETHORPHAN POLISTIREX 60 MG: 30 SUSPENSION ORAL at 10:27

## 2022-01-01 RX ADMIN — GUAIFENESIN 1200 MG: 600 TABLET, EXTENDED RELEASE ORAL at 22:12

## 2022-01-01 RX ADMIN — ASPIRIN 81 MG: 81 TABLET, COATED ORAL at 08:56

## 2022-01-01 RX ADMIN — METOPROLOL TARTRATE 75 MG: 50 TABLET, FILM COATED ORAL at 20:29

## 2022-01-01 RX ADMIN — LISINOPRIL 40 MG: 20 TABLET ORAL at 10:12

## 2022-01-01 RX ADMIN — DILTIAZEM HYDROCHLORIDE 5 MG/HR: 5 INJECTION INTRAVENOUS at 03:20

## 2022-01-01 RX ADMIN — BUDESONIDE AND FORMOTEROL FUMARATE DIHYDRATE 2 PUFF: 160; 4.5 AEROSOL RESPIRATORY (INHALATION) at 19:54

## 2022-01-01 RX ADMIN — DILTIAZEM HYDROCHLORIDE 15 MG/HR: 5 INJECTION INTRAVENOUS at 11:18

## 2022-01-01 RX ADMIN — GUAIFENESIN 1200 MG: 600 TABLET, EXTENDED RELEASE ORAL at 19:42

## 2022-01-01 RX ADMIN — PANTOPRAZOLE SODIUM 40 MG: 40 TABLET, DELAYED RELEASE ORAL at 22:12

## 2022-01-01 RX ADMIN — PANTOPRAZOLE SODIUM 40 MG: 40 TABLET, DELAYED RELEASE ORAL at 08:42

## 2022-01-01 RX ADMIN — HYDROCODONE BITARTRATE AND ACETAMINOPHEN 0.5 TABLET: 5; 325 TABLET ORAL at 22:06

## 2022-01-01 RX ADMIN — IPRATROPIUM BROMIDE 0.5 MG: 0.5 SOLUTION RESPIRATORY (INHALATION) at 06:22

## 2022-01-01 RX ADMIN — METOPROLOL TARTRATE 25 MG: 25 TABLET, FILM COATED ORAL at 08:07

## 2022-01-01 RX ADMIN — FUROSEMIDE 40 MG: 10 INJECTION, SOLUTION INTRAMUSCULAR; INTRAVENOUS at 19:46

## 2022-01-01 RX ADMIN — LISINOPRIL 40 MG: 20 TABLET ORAL at 08:26

## 2022-01-01 RX ADMIN — LISINOPRIL 40 MG: 20 TABLET ORAL at 08:56

## 2022-01-01 RX ADMIN — Medication 10 ML: at 09:12

## 2022-01-01 RX ADMIN — ACETAMINOPHEN 650 MG: 325 TABLET, FILM COATED ORAL at 05:38

## 2022-01-01 RX ADMIN — SODIUM CHLORIDE, PRESERVATIVE FREE 10 ML: 5 INJECTION INTRAVENOUS at 21:19

## 2022-01-01 RX ADMIN — HYDROCODONE BITARTRATE AND ACETAMINOPHEN 0.5 TABLET: 5; 325 TABLET ORAL at 00:47

## 2022-01-01 RX ADMIN — BUDESONIDE AND FORMOTEROL FUMARATE DIHYDRATE 2 PUFF: 160; 4.5 AEROSOL RESPIRATORY (INHALATION) at 19:19

## 2022-01-01 RX ADMIN — ATORVASTATIN CALCIUM 40 MG: 40 TABLET, FILM COATED ORAL at 08:40

## 2022-01-01 RX ADMIN — SODIUM CHLORIDE, PRESERVATIVE FREE 10 ML: 5 INJECTION INTRAVENOUS at 08:44

## 2022-01-01 RX ADMIN — HYDROXYZINE HYDROCHLORIDE 25 MG: 25 TABLET ORAL at 10:59

## 2022-01-01 RX ADMIN — PANTOPRAZOLE SODIUM 40 MG: 40 TABLET, DELAYED RELEASE ORAL at 17:24

## 2022-01-01 RX ADMIN — BUSPIRONE HYDROCHLORIDE 5 MG: 5 TABLET ORAL at 09:20

## 2022-01-01 RX ADMIN — DILTIAZEM HYDROCHLORIDE 120 MG: 120 CAPSULE, COATED, EXTENDED RELEASE ORAL at 08:31

## 2022-01-01 RX ADMIN — DOCUSATE SODIUM 100 MG: 100 CAPSULE, LIQUID FILLED ORAL at 00:49

## 2022-01-01 RX ADMIN — SODIUM CHLORIDE, PRESERVATIVE FREE 10 ML: 5 INJECTION INTRAVENOUS at 20:38

## 2022-01-01 RX ADMIN — IPRATROPIUM BROMIDE 0.5 MG: 0.5 SOLUTION RESPIRATORY (INHALATION) at 10:16

## 2022-01-01 RX ADMIN — ATORVASTATIN CALCIUM 40 MG: 40 TABLET, FILM COATED ORAL at 08:26

## 2022-01-01 RX ADMIN — GUAIFENESIN 1200 MG: 600 TABLET, EXTENDED RELEASE ORAL at 10:54

## 2022-01-01 RX ADMIN — PANTOPRAZOLE SODIUM 40 MG: 40 TABLET, DELAYED RELEASE ORAL at 22:06

## 2022-01-01 RX ADMIN — DOCUSATE SODIUM 100 MG: 100 CAPSULE, LIQUID FILLED ORAL at 09:17

## 2022-01-01 RX ADMIN — DOCUSATE SODIUM 100 MG: 100 CAPSULE, LIQUID FILLED ORAL at 20:26

## 2022-01-01 RX ADMIN — DOCUSATE SODIUM 100 MG: 100 CAPSULE, LIQUID FILLED ORAL at 20:22

## 2022-01-01 RX ADMIN — HYDROXYZINE HYDROCHLORIDE 25 MG: 25 TABLET ORAL at 10:34

## 2022-01-01 RX ADMIN — HYDROXYZINE HYDROCHLORIDE 25 MG: 25 TABLET ORAL at 19:26

## 2022-01-01 RX ADMIN — IPRATROPIUM BROMIDE AND ALBUTEROL SULFATE 3 ML: 2.5; .5 SOLUTION RESPIRATORY (INHALATION) at 20:21

## 2022-01-01 RX ADMIN — IPRATROPIUM BROMIDE AND ALBUTEROL SULFATE 3 ML: .5; 3 SOLUTION RESPIRATORY (INHALATION) at 22:12

## 2022-01-01 RX ADMIN — BUDESONIDE AND FORMOTEROL FUMARATE DIHYDRATE 2 PUFF: 160; 4.5 AEROSOL RESPIRATORY (INHALATION) at 21:20

## 2022-01-01 RX ADMIN — PANTOPRAZOLE SODIUM 40 MG: 40 TABLET, DELAYED RELEASE ORAL at 09:57

## 2022-01-01 RX ADMIN — DOCUSATE SODIUM 100 MG: 100 CAPSULE, LIQUID FILLED ORAL at 09:19

## 2022-01-01 RX ADMIN — PANTOPRAZOLE SODIUM 40 MG: 40 TABLET, DELAYED RELEASE ORAL at 08:25

## 2022-01-01 RX ADMIN — ACETAMINOPHEN 650 MG: 325 TABLET, FILM COATED ORAL at 05:04

## 2022-01-01 RX ADMIN — METHYLPREDNISOLONE SODIUM SUCCINATE 60 MG: 125 INJECTION, POWDER, FOR SOLUTION INTRAMUSCULAR; INTRAVENOUS at 04:37

## 2022-01-01 RX ADMIN — BUDESONIDE AND FORMOTEROL FUMARATE DIHYDRATE 2 PUFF: 160; 4.5 AEROSOL RESPIRATORY (INHALATION) at 20:10

## 2022-01-01 RX ADMIN — APIXABAN 2.5 MG: 2.5 TABLET, FILM COATED ORAL at 08:43

## 2022-01-01 RX ADMIN — AMLODIPINE BESYLATE 10 MG: 10 TABLET ORAL at 08:13

## 2022-01-01 RX ADMIN — SODIUM CHLORIDE, PRESERVATIVE FREE 10 ML: 5 INJECTION INTRAVENOUS at 08:15

## 2022-01-01 RX ADMIN — Medication 1 TABLET: at 09:19

## 2022-01-01 RX ADMIN — IPRATROPIUM BROMIDE 0.5 MG: 0.5 SOLUTION RESPIRATORY (INHALATION) at 21:19

## 2022-01-01 RX ADMIN — IPRATROPIUM BROMIDE 0.5 MG: 0.5 SOLUTION RESPIRATORY (INHALATION) at 15:36

## 2022-01-01 RX ADMIN — SERTRALINE 50 MG: 50 TABLET, FILM COATED ORAL at 09:45

## 2022-01-01 RX ADMIN — METOPROLOL TARTRATE 75 MG: 50 TABLET, FILM COATED ORAL at 20:38

## 2022-01-01 RX ADMIN — IPRATROPIUM BROMIDE 0.5 MG: 0.5 SOLUTION RESPIRATORY (INHALATION) at 19:47

## 2022-01-01 RX ADMIN — ACETYLCYSTEINE 1.5 ML: 200 SOLUTION ORAL; RESPIRATORY (INHALATION) at 05:44

## 2022-01-01 RX ADMIN — PREDNISONE 40 MG: 20 TABLET ORAL at 08:01

## 2022-01-01 RX ADMIN — GUAIFENESIN 1200 MG: 600 TABLET, EXTENDED RELEASE ORAL at 08:01

## 2022-01-01 RX ADMIN — LIDOCAINE HYDROCHLORIDE,EPINEPHRINE BITARTRATE 10 ML: 10; .01 INJECTION, SOLUTION INFILTRATION; PERINEURAL at 13:43

## 2022-01-01 RX ADMIN — ONDANSETRON 4 MG: 4 TABLET, ORALLY DISINTEGRATING ORAL at 21:16

## 2022-01-01 RX ADMIN — BUSPIRONE HYDROCHLORIDE 10 MG: 10 TABLET ORAL at 19:45

## 2022-01-01 RX ADMIN — DILTIAZEM HYDROCHLORIDE 60 MG: 60 TABLET, FILM COATED ORAL at 20:49

## 2022-01-01 RX ADMIN — GUAIFENESIN 1200 MG: 600 TABLET, EXTENDED RELEASE ORAL at 08:07

## 2022-01-01 RX ADMIN — Medication 10 ML: at 20:06

## 2022-01-01 RX ADMIN — HYDROCODONE BITARTRATE AND ACETAMINOPHEN 0.5 TABLET: 5; 325 TABLET ORAL at 14:33

## 2022-01-01 RX ADMIN — BUSPIRONE HYDROCHLORIDE 10 MG: 10 TABLET ORAL at 09:07

## 2022-01-01 RX ADMIN — PRAMIPEXOLE DIHYDROCHLORIDE 0.25 MG: 0.25 TABLET ORAL at 20:58

## 2022-01-01 RX ADMIN — Medication 10 ML: at 19:45

## 2022-01-01 RX ADMIN — APIXABAN 5 MG: 5 TABLET, FILM COATED ORAL at 20:30

## 2022-01-01 RX ADMIN — ACETYLCYSTEINE 1.5 ML: 200 SOLUTION ORAL; RESPIRATORY (INHALATION) at 06:53

## 2022-01-01 RX ADMIN — Medication 10 ML: at 20:30

## 2022-01-01 RX ADMIN — IPRATROPIUM BROMIDE AND ALBUTEROL SULFATE 3 ML: .5; 3 SOLUTION RESPIRATORY (INHALATION) at 06:33

## 2022-01-01 RX ADMIN — IPRATROPIUM BROMIDE AND ALBUTEROL SULFATE 3 ML: 2.5; .5 SOLUTION RESPIRATORY (INHALATION) at 10:11

## 2022-01-01 RX ADMIN — ACETAMINOPHEN 650 MG: 325 TABLET, FILM COATED ORAL at 23:58

## 2022-01-01 RX ADMIN — Medication 1 TABLET: at 08:13

## 2022-01-01 RX ADMIN — SERTRALINE HYDROCHLORIDE 50 MG: 50 TABLET, FILM COATED ORAL at 10:07

## 2022-01-01 RX ADMIN — DILTIAZEM HYDROCHLORIDE 120 MG: 120 CAPSULE, COATED, EXTENDED RELEASE ORAL at 10:55

## 2022-01-01 RX ADMIN — PANTOPRAZOLE SODIUM 40 MG: 40 TABLET, DELAYED RELEASE ORAL at 09:45

## 2022-01-01 RX ADMIN — AMOXICILLIN AND CLAVULANATE POTASSIUM 1 TABLET: 875; 125 TABLET, FILM COATED ORAL at 20:30

## 2022-01-01 RX ADMIN — FUROSEMIDE 40 MG: 10 INJECTION, SOLUTION INTRAMUSCULAR; INTRAVENOUS at 09:00

## 2022-01-01 RX ADMIN — Medication 10 ML: at 20:51

## 2022-01-01 RX ADMIN — SODIUM CHLORIDE, PRESERVATIVE FREE 10 ML: 5 INJECTION INTRAVENOUS at 20:27

## 2022-01-01 RX ADMIN — METOPROLOL TARTRATE 75 MG: 50 TABLET, FILM COATED ORAL at 09:57

## 2022-01-01 RX ADMIN — Medication 296 ML: at 11:08

## 2022-01-01 RX ADMIN — PANTOPRAZOLE SODIUM 40 MG: 40 TABLET, DELAYED RELEASE ORAL at 08:13

## 2022-01-01 RX ADMIN — ATORVASTATIN CALCIUM 40 MG: 40 TABLET, FILM COATED ORAL at 08:01

## 2022-01-01 RX ADMIN — IPRATROPIUM BROMIDE 0.5 MG: 0.5 SOLUTION RESPIRATORY (INHALATION) at 19:37

## 2022-01-01 RX ADMIN — BUDESONIDE AND FORMOTEROL FUMARATE DIHYDRATE 2 PUFF: 160; 4.5 AEROSOL RESPIRATORY (INHALATION) at 20:46

## 2022-01-01 RX ADMIN — ENOXAPARIN SODIUM 70 MG: 80 INJECTION SUBCUTANEOUS at 03:20

## 2022-01-01 RX ADMIN — IPRATROPIUM BROMIDE AND ALBUTEROL SULFATE 3 ML: 2.5; .5 SOLUTION RESPIRATORY (INHALATION) at 06:18

## 2022-01-01 RX ADMIN — DILTIAZEM HYDROCHLORIDE 5 MG/HR: 5 INJECTION, SOLUTION INTRAVENOUS at 23:35

## 2022-01-01 RX ADMIN — GUAIFENESIN 1200 MG: 600 TABLET, EXTENDED RELEASE ORAL at 08:56

## 2022-01-01 RX ADMIN — PANTOPRAZOLE SODIUM 40 MG: 40 TABLET, DELAYED RELEASE ORAL at 08:32

## 2022-01-01 RX ADMIN — BENZOCAINE 6 MG-MENTHOL 10 MG LOZENGES 1 LOZENGE: at 08:02

## 2022-01-01 RX ADMIN — FUROSEMIDE 40 MG: 10 INJECTION, SOLUTION INTRAMUSCULAR; INTRAVENOUS at 18:17

## 2022-01-01 RX ADMIN — GUAIFENESIN 1200 MG: 600 TABLET, EXTENDED RELEASE ORAL at 21:16

## 2022-01-01 RX ADMIN — BUSPIRONE HYDROCHLORIDE 10 MG: 10 TABLET ORAL at 20:24

## 2022-01-01 RX ADMIN — IPRATROPIUM BROMIDE AND ALBUTEROL SULFATE 3 ML: 2.5; .5 SOLUTION RESPIRATORY (INHALATION) at 10:09

## 2022-01-01 RX ADMIN — DOCUSATE SODIUM 100 MG: 100 CAPSULE, LIQUID FILLED ORAL at 20:59

## 2022-01-01 RX ADMIN — ACETYLCYSTEINE 1.5 ML: 200 SOLUTION ORAL; RESPIRATORY (INHALATION) at 05:40

## 2022-01-01 RX ADMIN — ATORVASTATIN CALCIUM 40 MG: 40 TABLET, FILM COATED ORAL at 08:21

## 2022-01-01 RX ADMIN — PANTOPRAZOLE SODIUM 40 MG: 40 TABLET, DELAYED RELEASE ORAL at 07:27

## 2022-01-01 RX ADMIN — Medication 10 ML: at 09:13

## 2022-01-01 RX ADMIN — DOCUSATE SODIUM 100 MG: 100 CAPSULE, LIQUID FILLED ORAL at 08:14

## 2022-01-01 RX ADMIN — SODIUM CHLORIDE, PRESERVATIVE FREE 10 ML: 5 INJECTION INTRAVENOUS at 21:35

## 2022-01-01 RX ADMIN — PANTOPRAZOLE SODIUM 40 MG: 40 TABLET, DELAYED RELEASE ORAL at 09:19

## 2022-01-01 RX ADMIN — AMIODARONE HYDROCHLORIDE 150 MG: 1.5 INJECTION, SOLUTION INTRAVENOUS at 16:08

## 2022-01-01 RX ADMIN — SODIUM CHLORIDE, PRESERVATIVE FREE 10 ML: 5 INJECTION INTRAVENOUS at 10:29

## 2022-01-01 RX ADMIN — GUAIFENESIN 1200 MG: 600 TABLET, EXTENDED RELEASE ORAL at 08:26

## 2022-01-01 RX ADMIN — METOPROLOL TARTRATE 75 MG: 50 TABLET, FILM COATED ORAL at 08:01

## 2022-01-01 RX ADMIN — BUSPIRONE HYDROCHLORIDE 10 MG: 10 TABLET ORAL at 09:56

## 2022-01-01 RX ADMIN — IPRATROPIUM BROMIDE AND ALBUTEROL SULFATE 3 ML: 2.5; .5 SOLUTION RESPIRATORY (INHALATION) at 15:10

## 2022-01-01 RX ADMIN — HYDROCODONE BITARTRATE AND ACETAMINOPHEN 0.5 TABLET: 5; 325 TABLET ORAL at 09:30

## 2022-01-01 RX ADMIN — ONDANSETRON 4 MG: 2 INJECTION INTRAMUSCULAR; INTRAVENOUS at 21:25

## 2022-01-01 RX ADMIN — ONDANSETRON 4 MG: 4 TABLET, ORALLY DISINTEGRATING ORAL at 11:26

## 2022-01-01 RX ADMIN — SERTRALINE HYDROCHLORIDE 50 MG: 50 TABLET, FILM COATED ORAL at 09:17

## 2022-01-01 RX ADMIN — Medication 1 TABLET: at 08:26

## 2022-01-01 RX ADMIN — BENZONATATE 100 MG: 100 CAPSULE ORAL at 13:04

## 2022-01-01 RX ADMIN — DOCUSATE SODIUM 100 MG: 100 CAPSULE, LIQUID FILLED ORAL at 10:28

## 2022-01-01 RX ADMIN — BUDESONIDE AND FORMOTEROL FUMARATE DIHYDRATE 2 PUFF: 160; 4.5 AEROSOL RESPIRATORY (INHALATION) at 06:53

## 2022-01-01 RX ADMIN — IPRATROPIUM BROMIDE AND ALBUTEROL SULFATE 3 ML: 2.5; .5 SOLUTION RESPIRATORY (INHALATION) at 05:44

## 2022-01-01 RX ADMIN — ASPIRIN 81 MG: 81 TABLET, COATED ORAL at 08:25

## 2022-01-01 RX ADMIN — FUROSEMIDE 40 MG: 10 INJECTION INTRAMUSCULAR; INTRAVENOUS at 08:07

## 2022-01-01 RX ADMIN — BUMETANIDE 1 MG: 0.25 INJECTION INTRAMUSCULAR; INTRAVENOUS at 18:59

## 2022-01-01 RX ADMIN — ONDANSETRON 4 MG: 4 TABLET, ORALLY DISINTEGRATING ORAL at 00:47

## 2022-01-01 RX ADMIN — BUDESONIDE AND FORMOTEROL FUMARATE DIHYDRATE 2 PUFF: 160; 4.5 AEROSOL RESPIRATORY (INHALATION) at 07:56

## 2022-01-01 RX ADMIN — APIXABAN 5 MG: 5 TABLET, FILM COATED ORAL at 08:56

## 2022-01-01 RX ADMIN — ACETYLCYSTEINE 1.5 ML: 200 SOLUTION ORAL; RESPIRATORY (INHALATION) at 18:42

## 2022-01-01 RX ADMIN — ONDANSETRON 4 MG: 4 TABLET, FILM COATED ORAL at 20:27

## 2022-01-01 RX ADMIN — IPRATROPIUM BROMIDE AND ALBUTEROL SULFATE 3 ML: 2.5; .5 SOLUTION RESPIRATORY (INHALATION) at 13:30

## 2022-01-01 RX ADMIN — HYDROCODONE BITARTRATE AND ACETAMINOPHEN 0.5 TABLET: 5; 325 TABLET ORAL at 22:00

## 2022-01-01 RX ADMIN — BUSPIRONE HYDROCHLORIDE 5 MG: 5 TABLET ORAL at 21:42

## 2022-01-01 RX ADMIN — FUROSEMIDE 40 MG: 10 INJECTION, SOLUTION INTRAMUSCULAR; INTRAVENOUS at 07:45

## 2022-01-01 RX ADMIN — APIXABAN 2.5 MG: 5 TABLET, FILM COATED ORAL at 20:58

## 2022-01-01 RX ADMIN — CETIRIZINE HYDROCHLORIDE 5 MG: 10 TABLET ORAL at 09:09

## 2022-01-01 RX ADMIN — IPRATROPIUM BROMIDE AND ALBUTEROL SULFATE 3 ML: 2.5; .5 SOLUTION RESPIRATORY (INHALATION) at 06:12

## 2022-01-01 RX ADMIN — APIXABAN 2.5 MG: 2.5 TABLET, FILM COATED ORAL at 09:46

## 2022-01-01 RX ADMIN — DOCUSATE SODIUM 100 MG: 100 CAPSULE, LIQUID FILLED ORAL at 21:22

## 2022-01-01 RX ADMIN — IPRATROPIUM BROMIDE 0.5 MG: 0.5 SOLUTION RESPIRATORY (INHALATION) at 06:14

## 2022-01-01 RX ADMIN — IPRATROPIUM BROMIDE 0.5 MG: 0.5 SOLUTION RESPIRATORY (INHALATION) at 20:25

## 2022-01-01 RX ADMIN — DOCUSATE SODIUM 100 MG: 100 CAPSULE, LIQUID FILLED ORAL at 09:48

## 2022-01-01 RX ADMIN — DOCUSATE SODIUM 100 MG: 100 CAPSULE, LIQUID FILLED ORAL at 20:06

## 2022-01-01 RX ADMIN — BENZONATATE 100 MG: 100 CAPSULE ORAL at 04:37

## 2022-01-01 RX ADMIN — PANTOPRAZOLE SODIUM 40 MG: 40 TABLET, DELAYED RELEASE ORAL at 17:58

## 2022-01-01 RX ADMIN — LISINOPRIL 10 MG: 10 TABLET ORAL at 09:57

## 2022-01-01 RX ADMIN — BUSPIRONE HYDROCHLORIDE 10 MG: 10 TABLET ORAL at 09:50

## 2022-01-01 RX ADMIN — BUDESONIDE AND FORMOTEROL FUMARATE DIHYDRATE 2 PUFF: 160; 4.5 AEROSOL RESPIRATORY (INHALATION) at 20:58

## 2022-01-01 RX ADMIN — BUDESONIDE AND FORMOTEROL FUMARATE DIHYDRATE 2 PUFF: 160; 4.5 AEROSOL RESPIRATORY (INHALATION) at 06:30

## 2022-01-01 RX ADMIN — FUROSEMIDE 40 MG: 10 INJECTION, SOLUTION INTRAMUSCULAR; INTRAVENOUS at 09:19

## 2022-01-01 RX ADMIN — BUSPIRONE HYDROCHLORIDE 10 MG: 10 TABLET ORAL at 11:54

## 2022-01-01 RX ADMIN — DOCUSATE SODIUM 100 MG: 100 CAPSULE, LIQUID FILLED ORAL at 08:07

## 2022-01-01 RX ADMIN — IPRATROPIUM BROMIDE AND ALBUTEROL SULFATE 3 ML: 2.5; .5 SOLUTION RESPIRATORY (INHALATION) at 19:30

## 2022-01-01 RX ADMIN — FUROSEMIDE 40 MG: 10 INJECTION INTRAMUSCULAR; INTRAVENOUS at 08:22

## 2022-01-01 RX ADMIN — BUDESONIDE AND FORMOTEROL FUMARATE DIHYDRATE 2 PUFF: 160; 4.5 AEROSOL RESPIRATORY (INHALATION) at 06:12

## 2022-01-01 RX ADMIN — GUAIFENESIN 1200 MG: 600 TABLET, EXTENDED RELEASE ORAL at 20:30

## 2022-01-01 RX ADMIN — DIPHENHYDRAMINE HYDROCHLORIDE 25 MG: 50 INJECTION INTRAMUSCULAR; INTRAVENOUS at 21:12

## 2022-01-01 RX ADMIN — GUAIFENESIN 1200 MG: 600 TABLET, EXTENDED RELEASE ORAL at 20:06

## 2022-01-01 RX ADMIN — METHYLPREDNISOLONE SODIUM SUCCINATE 60 MG: 125 INJECTION, POWDER, FOR SOLUTION INTRAMUSCULAR; INTRAVENOUS at 05:34

## 2022-01-01 RX ADMIN — IPRATROPIUM BROMIDE AND ALBUTEROL SULFATE 3 ML: 2.5; .5 SOLUTION RESPIRATORY (INHALATION) at 06:11

## 2022-01-01 RX ADMIN — FUROSEMIDE 40 MG: 10 INJECTION, SOLUTION INTRAMUSCULAR; INTRAVENOUS at 20:06

## 2022-01-01 RX ADMIN — Medication 1 TABLET: at 08:31

## 2022-01-01 RX ADMIN — APIXABAN 2.5 MG: 2.5 TABLET, FILM COATED ORAL at 20:07

## 2022-01-01 RX ADMIN — GUAIFENESIN 1200 MG: 600 TABLET, EXTENDED RELEASE ORAL at 20:51

## 2022-01-01 RX ADMIN — ACETYLCYSTEINE 1.5 ML: 200 SOLUTION ORAL; RESPIRATORY (INHALATION) at 19:56

## 2022-01-01 RX ADMIN — IPRATROPIUM BROMIDE AND ALBUTEROL SULFATE 3 ML: 2.5; .5 SOLUTION RESPIRATORY (INHALATION) at 13:51

## 2022-01-01 RX ADMIN — BUDESONIDE AND FORMOTEROL FUMARATE DIHYDRATE 2 PUFF: 160; 4.5 AEROSOL RESPIRATORY (INHALATION) at 06:41

## 2022-01-01 RX ADMIN — BUDESONIDE AND FORMOTEROL FUMARATE DIHYDRATE 2 PUFF: 160; 4.5 AEROSOL RESPIRATORY (INHALATION) at 21:19

## 2022-01-01 RX ADMIN — BUSPIRONE HYDROCHLORIDE 5 MG: 5 TABLET ORAL at 20:58

## 2022-01-01 RX ADMIN — BUDESONIDE AND FORMOTEROL FUMARATE DIHYDRATE 2 PUFF: 160; 4.5 AEROSOL RESPIRATORY (INHALATION) at 06:31

## 2022-01-01 RX ADMIN — CETIRIZINE HYDROCHLORIDE TABLETS 10 MG: 10 TABLET, FILM COATED ORAL at 10:28

## 2022-01-01 RX ADMIN — BUDESONIDE AND FORMOTEROL FUMARATE DIHYDRATE 2 PUFF: 160; 4.5 AEROSOL RESPIRATORY (INHALATION) at 06:22

## 2022-01-01 RX ADMIN — SERTRALINE HYDROCHLORIDE 50 MG: 50 TABLET, FILM COATED ORAL at 09:20

## 2022-01-01 RX ADMIN — DOCUSATE SODIUM 100 MG: 100 CAPSULE, LIQUID FILLED ORAL at 10:54

## 2022-01-01 RX ADMIN — PRAMIPEXOLE DIHYDROCHLORIDE 0.25 MG: 0.25 TABLET ORAL at 22:06

## 2022-01-01 RX ADMIN — IPRATROPIUM BROMIDE AND ALBUTEROL SULFATE 3 ML: 2.5; .5 SOLUTION RESPIRATORY (INHALATION) at 20:47

## 2022-01-01 RX ADMIN — Medication 1 TABLET: at 08:56

## 2022-01-01 RX ADMIN — PREDNISONE 40 MG: 20 TABLET ORAL at 08:56

## 2022-01-01 RX ADMIN — IPRATROPIUM BROMIDE AND ALBUTEROL SULFATE 3 ML: .5; 3 SOLUTION RESPIRATORY (INHALATION) at 21:43

## 2022-01-01 RX ADMIN — METOPROLOL TARTRATE 75 MG: 50 TABLET, FILM COATED ORAL at 21:22

## 2022-01-01 RX ADMIN — Medication 1 TABLET: at 09:13

## 2022-01-01 RX ADMIN — BUSPIRONE HYDROCHLORIDE 10 MG: 10 TABLET ORAL at 22:12

## 2022-01-01 RX ADMIN — GUAIFENESIN 1200 MG: 600 TABLET, EXTENDED RELEASE ORAL at 20:26

## 2022-01-01 RX ADMIN — BUSPIRONE HYDROCHLORIDE 5 MG: 5 TABLET ORAL at 18:17

## 2022-01-01 RX ADMIN — IPRATROPIUM BROMIDE AND ALBUTEROL SULFATE 3 ML: 2.5; .5 SOLUTION RESPIRATORY (INHALATION) at 20:33

## 2022-01-01 RX ADMIN — PRAMIPEXOLE DIHYDROCHLORIDE 0.25 MG: 0.25 TABLET ORAL at 19:45

## 2022-01-01 RX ADMIN — ONDANSETRON 4 MG: 4 TABLET, FILM COATED ORAL at 09:25

## 2022-01-01 RX ADMIN — METOPROLOL TARTRATE 75 MG: 50 TABLET, FILM COATED ORAL at 09:17

## 2022-01-01 RX ADMIN — IPRATROPIUM BROMIDE 0.5 MG: 0.5 SOLUTION RESPIRATORY (INHALATION) at 15:18

## 2022-01-01 RX ADMIN — APIXABAN 2.5 MG: 2.5 TABLET, FILM COATED ORAL at 08:30

## 2022-01-01 RX ADMIN — PRAMIPEXOLE DIHYDROCHLORIDE 0.25 MG: 0.25 TABLET ORAL at 22:12

## 2022-01-01 RX ADMIN — BUSPIRONE HYDROCHLORIDE 10 MG: 10 TABLET ORAL at 10:54

## 2022-01-01 RX ADMIN — IPRATROPIUM BROMIDE AND ALBUTEROL SULFATE 3 ML: .5; 3 SOLUTION RESPIRATORY (INHALATION) at 06:41

## 2022-01-01 RX ADMIN — BUDESONIDE AND FORMOTEROL FUMARATE DIHYDRATE 2 PUFF: 160; 4.5 AEROSOL RESPIRATORY (INHALATION) at 20:49

## 2022-01-01 RX ADMIN — GUAIFENESIN 1200 MG: 600 TABLET, EXTENDED RELEASE ORAL at 21:22

## 2022-01-01 RX ADMIN — METOPROLOL TARTRATE 50 MG: 50 TABLET, FILM COATED ORAL at 21:16

## 2022-01-01 RX ADMIN — ACETAMINOPHEN 650 MG: 325 TABLET ORAL at 16:31

## 2022-01-01 RX ADMIN — SERTRALINE 50 MG: 50 TABLET, FILM COATED ORAL at 11:04

## 2022-01-01 RX ADMIN — GUAIFENESIN 1200 MG: 600 TABLET, EXTENDED RELEASE ORAL at 09:13

## 2022-01-01 RX ADMIN — GUAIFENESIN 1200 MG: 600 TABLET, EXTENDED RELEASE ORAL at 20:58

## 2022-01-01 RX ADMIN — DILTIAZEM HYDROCHLORIDE 10 MG: 5 INJECTION INTRAVENOUS at 19:38

## 2022-01-01 RX ADMIN — DOCUSATE SODIUM 100 MG: 100 CAPSULE, LIQUID FILLED ORAL at 09:46

## 2022-01-01 RX ADMIN — SODIUM CHLORIDE, PRESERVATIVE FREE 10 ML: 5 INJECTION INTRAVENOUS at 08:08

## 2022-01-01 RX ADMIN — SODIUM CHLORIDE, PRESERVATIVE FREE 10 ML: 5 INJECTION INTRAVENOUS at 21:01

## 2022-01-01 RX ADMIN — IPRATROPIUM BROMIDE AND ALBUTEROL SULFATE 3 ML: 2.5; .5 SOLUTION RESPIRATORY (INHALATION) at 06:50

## 2022-01-01 RX ADMIN — BUSPIRONE HYDROCHLORIDE 5 MG: 5 TABLET ORAL at 09:45

## 2022-01-01 RX ADMIN — METOPROLOL TARTRATE 75 MG: 50 TABLET, FILM COATED ORAL at 21:02

## 2022-01-01 RX ADMIN — HYDROCODONE BITARTRATE AND ACETAMINOPHEN 0.5 TABLET: 5; 325 TABLET ORAL at 15:18

## 2022-01-01 RX ADMIN — BUDESONIDE AND FORMOTEROL FUMARATE DIHYDRATE 2 PUFF: 160; 4.5 AEROSOL RESPIRATORY (INHALATION) at 19:56

## 2022-01-01 RX ADMIN — SERTRALINE HYDROCHLORIDE 25 MG: 50 TABLET, FILM COATED ORAL at 08:25

## 2022-01-01 RX ADMIN — METOPROLOL TARTRATE 75 MG: 50 TABLET, FILM COATED ORAL at 10:28

## 2022-01-01 RX ADMIN — ONDANSETRON 4 MG: 4 TABLET, FILM COATED ORAL at 21:45

## 2022-01-01 RX ADMIN — LISINOPRIL 10 MG: 10 TABLET ORAL at 10:55

## 2022-01-01 RX ADMIN — BUSPIRONE HYDROCHLORIDE 5 MG: 5 TABLET ORAL at 20:25

## 2022-01-01 RX ADMIN — ACETAMINOPHEN 650 MG: 325 TABLET ORAL at 20:41

## 2022-01-01 RX ADMIN — FUROSEMIDE 40 MG: 10 INJECTION, SOLUTION INTRAVENOUS at 19:37

## 2022-01-01 RX ADMIN — ATORVASTATIN CALCIUM 40 MG: 40 TABLET, FILM COATED ORAL at 08:08

## 2022-01-01 RX ADMIN — FUROSEMIDE 40 MG: 10 INJECTION, SOLUTION INTRAMUSCULAR; INTRAVENOUS at 12:56

## 2022-01-01 RX ADMIN — Medication 10 ML: at 09:52

## 2022-01-01 RX ADMIN — ENOXAPARIN SODIUM 70 MG: 80 INJECTION SUBCUTANEOUS at 17:24

## 2022-01-01 RX ADMIN — METHYLPREDNISOLONE SODIUM SUCCINATE 125 MG: 125 INJECTION, POWDER, FOR SOLUTION INTRAMUSCULAR; INTRAVENOUS at 22:12

## 2022-01-01 RX ADMIN — METHYLPREDNISOLONE SODIUM SUCCINATE 60 MG: 125 INJECTION, POWDER, FOR SOLUTION INTRAMUSCULAR; INTRAVENOUS at 13:22

## 2022-01-01 RX ADMIN — CETIRIZINE HYDROCHLORIDE TABLETS 10 MG: 10 TABLET, FILM COATED ORAL at 08:44

## 2022-01-01 RX ADMIN — SERTRALINE HYDROCHLORIDE 25 MG: 50 TABLET, FILM COATED ORAL at 08:13

## 2022-01-01 RX ADMIN — BENZONATATE 200 MG: 100 CAPSULE ORAL at 14:32

## 2022-01-01 RX ADMIN — METOPROLOL TARTRATE 75 MG: 50 TABLET, FILM COATED ORAL at 20:58

## 2022-01-01 RX ADMIN — DOCUSATE SODIUM 100 MG: 100 CAPSULE, LIQUID FILLED ORAL at 21:19

## 2022-01-01 RX ADMIN — BUSPIRONE HYDROCHLORIDE 5 MG: 5 TABLET ORAL at 16:49

## 2022-01-01 RX ADMIN — BUDESONIDE AND FORMOTEROL FUMARATE DIHYDRATE 2 PUFF: 160; 4.5 AEROSOL RESPIRATORY (INHALATION) at 20:22

## 2022-01-01 RX ADMIN — DOCUSATE SODIUM 100 MG: 100 CAPSULE, LIQUID FILLED ORAL at 08:44

## 2022-01-01 RX ADMIN — BUSPIRONE HYDROCHLORIDE 5 MG: 5 TABLET ORAL at 10:28

## 2022-01-01 RX ADMIN — BUSPIRONE HYDROCHLORIDE 5 MG: 5 TABLET ORAL at 21:22

## 2022-01-01 RX ADMIN — ONDANSETRON 4 MG: 4 TABLET, ORALLY DISINTEGRATING ORAL at 03:19

## 2022-01-01 RX ADMIN — IPRATROPIUM BROMIDE AND ALBUTEROL SULFATE 3 ML: 2.5; .5 SOLUTION RESPIRATORY (INHALATION) at 21:13

## 2022-01-01 RX ADMIN — ONDANSETRON 4 MG: 2 INJECTION INTRAMUSCULAR; INTRAVENOUS at 11:19

## 2022-01-01 RX ADMIN — APIXABAN 2.5 MG: 2.5 TABLET, FILM COATED ORAL at 09:47

## 2022-01-01 RX ADMIN — GUAIFENESIN 1200 MG: 600 TABLET, EXTENDED RELEASE ORAL at 10:28

## 2022-01-01 RX ADMIN — DOCUSATE SODIUM 100 MG: 100 CAPSULE, LIQUID FILLED ORAL at 08:30

## 2022-01-01 RX ADMIN — IPRATROPIUM BROMIDE AND ALBUTEROL SULFATE 3 ML: 2.5; .5 SOLUTION RESPIRATORY (INHALATION) at 06:23

## 2022-01-01 RX ADMIN — PRAMIPEXOLE DIHYDROCHLORIDE 0.25 MG: 0.25 TABLET ORAL at 23:20

## 2022-01-01 RX ADMIN — PANTOPRAZOLE SODIUM 40 MG: 40 TABLET, DELAYED RELEASE ORAL at 17:40

## 2022-01-01 RX ADMIN — LISINOPRIL 10 MG: 10 TABLET ORAL at 08:41

## 2022-01-01 RX ADMIN — GUAIFENESIN 1200 MG: 600 TABLET, EXTENDED RELEASE ORAL at 08:13

## 2022-01-01 RX ADMIN — IPRATROPIUM BROMIDE AND ALBUTEROL SULFATE 3 ML: 2.5; .5 SOLUTION RESPIRATORY (INHALATION) at 14:28

## 2022-01-01 RX ADMIN — IPRATROPIUM BROMIDE 0.5 MG: 0.5 SOLUTION RESPIRATORY (INHALATION) at 06:57

## 2022-01-01 RX ADMIN — ATORVASTATIN CALCIUM 40 MG: 40 TABLET, FILM COATED ORAL at 08:41

## 2022-01-01 RX ADMIN — ATORVASTATIN CALCIUM 40 MG: 40 TABLET, FILM COATED ORAL at 09:48

## 2022-01-01 RX ADMIN — BUSPIRONE HYDROCHLORIDE 10 MG: 10 TABLET ORAL at 16:31

## 2022-01-01 RX ADMIN — ASPIRIN 81 MG: 81 TABLET, COATED ORAL at 08:13

## 2022-01-01 RX ADMIN — ACETYLCYSTEINE 1.5 ML: 200 SOLUTION ORAL; RESPIRATORY (INHALATION) at 20:49

## 2022-01-01 RX ADMIN — BUDESONIDE AND FORMOTEROL FUMARATE DIHYDRATE 2 PUFF: 160; 4.5 AEROSOL RESPIRATORY (INHALATION) at 05:57

## 2022-01-01 RX ADMIN — SODIUM CHLORIDE, PRESERVATIVE FREE 10 ML: 5 INJECTION INTRAVENOUS at 22:06

## 2022-01-01 RX ADMIN — PANTOPRAZOLE SODIUM 40 MG: 40 TABLET, DELAYED RELEASE ORAL at 08:21

## 2022-01-01 RX ADMIN — BUDESONIDE AND FORMOTEROL FUMARATE DIHYDRATE 2 PUFF: 160; 4.5 AEROSOL RESPIRATORY (INHALATION) at 06:27

## 2022-01-01 RX ADMIN — METOPROLOL TARTRATE 75 MG: 50 TABLET, FILM COATED ORAL at 09:13

## 2022-01-01 RX ADMIN — DEXTROMETHORPHAN POLISTIREX 60 MG: 30 SUSPENSION ORAL at 21:16

## 2022-01-01 RX ADMIN — Medication 10 ML: at 09:50

## 2022-01-01 RX ADMIN — METHYLPREDNISOLONE SODIUM SUCCINATE 125 MG: 125 INJECTION, POWDER, FOR SOLUTION INTRAMUSCULAR; INTRAVENOUS at 21:12

## 2022-01-01 RX ADMIN — CETIRIZINE HYDROCHLORIDE 5 MG: 10 TABLET ORAL at 08:40

## 2022-01-01 RX ADMIN — ATORVASTATIN CALCIUM 40 MG: 40 TABLET, FILM COATED ORAL at 20:30

## 2022-01-01 RX ADMIN — METOPROLOL TARTRATE 25 MG: 25 TABLET, FILM COATED ORAL at 09:07

## 2022-01-01 RX ADMIN — MAGNESIUM SULFATE HEPTAHYDRATE 2 G: 2 INJECTION, SOLUTION INTRAVENOUS at 16:18

## 2022-01-01 RX ADMIN — PRAMIPEXOLE DIHYDROCHLORIDE 0.25 MG: 0.25 TABLET ORAL at 20:29

## 2022-01-01 RX ADMIN — ONDANSETRON HYDROCHLORIDE 4 MG: 2 SOLUTION INTRAMUSCULAR; INTRAVENOUS at 17:59

## 2022-01-01 RX ADMIN — BUSPIRONE HYDROCHLORIDE 5 MG: 5 TABLET ORAL at 21:34

## 2022-01-01 RX ADMIN — HYDROXYZINE HYDROCHLORIDE 25 MG: 25 TABLET ORAL at 00:45

## 2022-01-01 RX ADMIN — PANTOPRAZOLE SODIUM 40 MG: 40 TABLET, DELAYED RELEASE ORAL at 09:55

## 2022-01-01 RX ADMIN — BUSPIRONE HYDROCHLORIDE 10 MG: 10 TABLET ORAL at 20:51

## 2022-01-01 RX ADMIN — GUAIFENESIN 1200 MG: 600 TABLET, EXTENDED RELEASE ORAL at 08:14

## 2022-01-01 RX ADMIN — PREDNISONE 40 MG: 20 TABLET ORAL at 08:02

## 2022-01-01 RX ADMIN — APIXABAN 2.5 MG: 2.5 TABLET, FILM COATED ORAL at 10:27

## 2022-01-01 RX ADMIN — PANTOPRAZOLE SODIUM 40 MG: 40 TABLET, DELAYED RELEASE ORAL at 17:21

## 2022-01-01 RX ADMIN — GUAIFENESIN 1200 MG: 600 TABLET, EXTENDED RELEASE ORAL at 20:24

## 2022-01-01 RX ADMIN — DILTIAZEM HYDROCHLORIDE 5 MG: 5 INJECTION INTRAVENOUS at 19:38

## 2022-01-01 RX ADMIN — GUAIFENESIN 1200 MG: 600 TABLET, EXTENDED RELEASE ORAL at 09:56

## 2022-01-01 RX ADMIN — ACETYLCYSTEINE 1.5 ML: 200 SOLUTION ORAL; RESPIRATORY (INHALATION) at 06:15

## 2022-01-01 RX ADMIN — DILTIAZEM HYDROCHLORIDE 120 MG: 120 CAPSULE, COATED, EXTENDED RELEASE ORAL at 09:50

## 2022-01-01 RX ADMIN — IPRATROPIUM BROMIDE AND ALBUTEROL SULFATE 3 ML: 2.5; .5 SOLUTION RESPIRATORY (INHALATION) at 10:30

## 2022-01-01 RX ADMIN — FUROSEMIDE 40 MG: 10 INJECTION, SOLUTION INTRAMUSCULAR; INTRAVENOUS at 00:23

## 2022-01-01 RX ADMIN — SERTRALINE HYDROCHLORIDE 25 MG: 50 TABLET, FILM COATED ORAL at 08:56

## 2022-01-01 RX ADMIN — PANTOPRAZOLE SODIUM 40 MG: 40 TABLET, DELAYED RELEASE ORAL at 17:29

## 2022-01-01 RX ADMIN — BENZONATATE 100 MG: 100 CAPSULE ORAL at 20:58

## 2022-01-01 RX ADMIN — PANTOPRAZOLE SODIUM 40 MG: 40 TABLET, DELAYED RELEASE ORAL at 19:45

## 2022-01-01 RX ADMIN — IPRATROPIUM BROMIDE AND ALBUTEROL SULFATE 3 ML: 2.5; .5 SOLUTION RESPIRATORY (INHALATION) at 23:46

## 2022-01-01 RX ADMIN — SODIUM CHLORIDE, PRESERVATIVE FREE 10 ML: 5 INJECTION INTRAVENOUS at 08:26

## 2022-01-01 RX ADMIN — Medication 10 ML: at 21:17

## 2022-01-01 RX ADMIN — IPRATROPIUM BROMIDE AND ALBUTEROL SULFATE 3 ML: 2.5; .5 SOLUTION RESPIRATORY (INHALATION) at 20:49

## 2022-01-01 RX ADMIN — METOPROLOL TARTRATE 75 MG: 50 TABLET, FILM COATED ORAL at 09:47

## 2022-01-01 RX ADMIN — APIXABAN 2.5 MG: 2.5 TABLET, FILM COATED ORAL at 00:49

## 2022-01-01 RX ADMIN — GUAIFENESIN 1200 MG: 600 TABLET, EXTENDED RELEASE ORAL at 08:44

## 2022-01-01 RX ADMIN — AMOXICILLIN AND CLAVULANATE POTASSIUM 500 MG: 500; 125 TABLET, FILM COATED ORAL at 21:59

## 2022-01-01 RX ADMIN — APIXABAN 2.5 MG: 2.5 TABLET, FILM COATED ORAL at 09:10

## 2022-01-01 RX ADMIN — ACETAMINOPHEN 650 MG: 325 TABLET, FILM COATED ORAL at 18:35

## 2022-01-01 RX ADMIN — IPRATROPIUM BROMIDE AND ALBUTEROL SULFATE 3 ML: 2.5; .5 SOLUTION RESPIRATORY (INHALATION) at 19:12

## 2022-01-01 RX ADMIN — HYDROCODONE BITARTRATE AND ACETAMINOPHEN 0.5 TABLET: 5; 325 TABLET ORAL at 21:45

## 2022-01-01 RX ADMIN — BUSPIRONE HYDROCHLORIDE 5 MG: 5 TABLET ORAL at 20:37

## 2022-01-01 RX ADMIN — LISINOPRIL 40 MG: 20 TABLET ORAL at 08:08

## 2022-01-01 RX ADMIN — BUSPIRONE HYDROCHLORIDE 10 MG: 10 TABLET ORAL at 19:46

## 2022-01-01 RX ADMIN — DOCUSATE SODIUM 100 MG: 100 CAPSULE, LIQUID FILLED ORAL at 09:00

## 2022-01-01 RX ADMIN — GUAIFENESIN 1200 MG: 600 TABLET, EXTENDED RELEASE ORAL at 20:23

## 2022-01-01 RX ADMIN — APIXABAN 2.5 MG: 2.5 TABLET, FILM COATED ORAL at 20:23

## 2022-01-01 RX ADMIN — IPRATROPIUM BROMIDE AND ALBUTEROL SULFATE 3 ML: 2.5; .5 SOLUTION RESPIRATORY (INHALATION) at 14:14

## 2022-01-01 RX ADMIN — SODIUM CHLORIDE, PRESERVATIVE FREE 10 ML: 5 INJECTION INTRAVENOUS at 21:17

## 2022-01-01 RX ADMIN — Medication 10 ML: at 08:41

## 2022-01-01 RX ADMIN — GUAIFENESIN 1200 MG: 600 TABLET, EXTENDED RELEASE ORAL at 08:30

## 2022-01-01 RX ADMIN — LEVOFLOXACIN 750 MG: 750 INJECTION, SOLUTION INTRAVENOUS at 02:01

## 2022-01-01 RX ADMIN — ONDANSETRON 4 MG: 4 TABLET, ORALLY DISINTEGRATING ORAL at 21:34

## 2022-01-01 RX ADMIN — METOPROLOL TARTRATE 75 MG: 50 TABLET, FILM COATED ORAL at 10:55

## 2022-01-01 RX ADMIN — HYDROCODONE BITARTRATE AND ACETAMINOPHEN 0.5 TABLET: 5; 325 TABLET ORAL at 21:22

## 2022-01-01 RX ADMIN — CETIRIZINE HYDROCHLORIDE TABLETS 10 MG: 10 TABLET, FILM COATED ORAL at 10:13

## 2022-01-01 RX ADMIN — BUDESONIDE AND FORMOTEROL FUMARATE DIHYDRATE 2 PUFF: 160; 4.5 AEROSOL RESPIRATORY (INHALATION) at 20:12

## 2022-01-01 RX ADMIN — BUMETANIDE 1 MG: 0.25 INJECTION INTRAMUSCULAR; INTRAVENOUS at 05:21

## 2022-01-01 RX ADMIN — IPRATROPIUM BROMIDE AND ALBUTEROL SULFATE 3 ML: 2.5; .5 SOLUTION RESPIRATORY (INHALATION) at 19:56

## 2022-01-01 RX ADMIN — METHYLPREDNISOLONE SODIUM SUCCINATE 60 MG: 125 INJECTION, POWDER, FOR SOLUTION INTRAMUSCULAR; INTRAVENOUS at 20:59

## 2022-01-01 RX ADMIN — BUMETANIDE 1 MG: 0.25 INJECTION INTRAMUSCULAR; INTRAVENOUS at 20:50

## 2022-01-01 RX ADMIN — SERTRALINE HYDROCHLORIDE 50 MG: 50 TABLET, FILM COATED ORAL at 10:55

## 2022-01-01 RX ADMIN — FUROSEMIDE 40 MG: 40 TABLET ORAL at 08:56

## 2022-01-01 RX ADMIN — IPRATROPIUM BROMIDE 0.5 MG: 0.5 SOLUTION RESPIRATORY (INHALATION) at 06:25

## 2022-01-01 RX ADMIN — AMOXICILLIN AND CLAVULANATE POTASSIUM 1 TABLET: 875; 125 TABLET, FILM COATED ORAL at 09:00

## 2022-01-01 RX ADMIN — IPRATROPIUM BROMIDE AND ALBUTEROL SULFATE 3 ML: 2.5; .5 SOLUTION RESPIRATORY (INHALATION) at 06:38

## 2022-01-01 RX ADMIN — BUSPIRONE HYDROCHLORIDE 5 MG: 5 TABLET ORAL at 10:13

## 2022-01-01 RX ADMIN — HYDROCODONE BITARTRATE AND ACETAMINOPHEN 0.5 TABLET: 5; 325 TABLET ORAL at 21:25

## 2022-01-01 RX ADMIN — BENZONATATE 200 MG: 100 CAPSULE ORAL at 11:35

## 2022-01-01 RX ADMIN — METHYLPREDNISOLONE SODIUM SUCCINATE 40 MG: 40 INJECTION, POWDER, FOR SOLUTION INTRAMUSCULAR; INTRAVENOUS at 05:39

## 2022-01-01 RX ADMIN — ATORVASTATIN CALCIUM 40 MG: 40 TABLET, FILM COATED ORAL at 09:49

## 2022-01-01 RX ADMIN — HYDROCODONE BITARTRATE AND ACETAMINOPHEN 0.5 TABLET: 5; 325 TABLET ORAL at 21:34

## 2022-01-01 RX ADMIN — Medication 1 TABLET: at 10:14

## 2022-01-01 RX ADMIN — PRAMIPEXOLE DIHYDROCHLORIDE 0.25 MG: 0.25 TABLET ORAL at 20:51

## 2022-01-01 RX ADMIN — GUAIFENESIN 1200 MG: 600 TABLET, EXTENDED RELEASE ORAL at 00:46

## 2022-01-01 RX ADMIN — IPRATROPIUM BROMIDE 0.5 MG: 0.5 SOLUTION RESPIRATORY (INHALATION) at 15:39

## 2022-01-01 RX ADMIN — IPRATROPIUM BROMIDE AND ALBUTEROL SULFATE 3 ML: 2.5; .5 SOLUTION RESPIRATORY (INHALATION) at 07:22

## 2022-01-01 RX ADMIN — BUDESONIDE AND FORMOTEROL FUMARATE DIHYDRATE 2 PUFF: 160; 4.5 AEROSOL RESPIRATORY (INHALATION) at 07:29

## 2022-01-01 RX ADMIN — CETIRIZINE HYDROCHLORIDE 5 MG: 10 TABLET ORAL at 09:17

## 2022-01-01 RX ADMIN — ONDANSETRON 4 MG: 2 INJECTION INTRAMUSCULAR; INTRAVENOUS at 23:58

## 2022-01-01 RX ADMIN — ACETAMINOPHEN 650 MG: 325 TABLET ORAL at 13:52

## 2022-01-01 RX ADMIN — ATORVASTATIN CALCIUM 40 MG: 40 TABLET, FILM COATED ORAL at 09:11

## 2022-01-01 RX ADMIN — LISINOPRIL 40 MG: 20 TABLET ORAL at 08:21

## 2022-01-01 RX ADMIN — ONDANSETRON 4 MG: 4 TABLET, ORALLY DISINTEGRATING ORAL at 22:01

## 2022-01-01 RX ADMIN — PANTOPRAZOLE SODIUM 40 MG: 40 TABLET, DELAYED RELEASE ORAL at 09:09

## 2022-01-01 RX ADMIN — IPRATROPIUM BROMIDE 0.5 MG: 0.5 SOLUTION RESPIRATORY (INHALATION) at 10:31

## 2022-01-01 RX ADMIN — IPRATROPIUM BROMIDE AND ALBUTEROL SULFATE 3 ML: 2.5; .5 SOLUTION RESPIRATORY (INHALATION) at 11:04

## 2022-01-01 RX ADMIN — IPRATROPIUM BROMIDE AND ALBUTEROL SULFATE 3 ML: 2.5; .5 SOLUTION RESPIRATORY (INHALATION) at 21:07

## 2022-01-01 RX ADMIN — APIXABAN 2.5 MG: 2.5 TABLET, FILM COATED ORAL at 09:07

## 2022-01-01 RX ADMIN — BUSPIRONE HYDROCHLORIDE 5 MG: 5 TABLET ORAL at 06:51

## 2022-01-01 RX ADMIN — FUROSEMIDE 40 MG: 40 TABLET ORAL at 08:02

## 2022-01-01 RX ADMIN — ACETAMINOPHEN 650 MG: 325 TABLET ORAL at 01:38

## 2022-01-01 RX ADMIN — BUDESONIDE AND FORMOTEROL FUMARATE DIHYDRATE 2 PUFF: 160; 4.5 AEROSOL RESPIRATORY (INHALATION) at 20:54

## 2022-01-01 RX ADMIN — IPRATROPIUM BROMIDE AND ALBUTEROL SULFATE 3 ML: 2.5; .5 SOLUTION RESPIRATORY (INHALATION) at 10:08

## 2022-01-01 RX ADMIN — SERTRALINE HYDROCHLORIDE 25 MG: 50 TABLET, FILM COATED ORAL at 08:20

## 2022-01-01 RX ADMIN — ACETYLCYSTEINE 1.5 ML: 200 SOLUTION ORAL; RESPIRATORY (INHALATION) at 20:12

## 2022-01-01 RX ADMIN — PANTOPRAZOLE SODIUM 40 MG: 40 TABLET, DELAYED RELEASE ORAL at 20:37

## 2022-01-01 RX ADMIN — BUSPIRONE HYDROCHLORIDE 10 MG: 10 TABLET ORAL at 16:00

## 2022-01-01 RX ADMIN — BUDESONIDE AND FORMOTEROL FUMARATE DIHYDRATE 2 PUFF: 160; 4.5 AEROSOL RESPIRATORY (INHALATION) at 07:23

## 2022-01-08 NOTE — HOSPICE
Pt a/o x 4 up adlib home bound r/t o2 dependency, impaired vision pain from arthritis weakness. Patient on 2l/per nc. Mostly independent now. Has family that assist her with bathing, med preparation. Nursing  worked with patient on fall prevention keeping floors clean. Disease process of copd  and pain management . No insurance changes no falls pain is address.

## 2022-01-13 NOTE — TELEPHONE ENCOUNTER
Caller: Cindy Hicks    Relationship: Self    Best call back number: [phone]    Requested Prescriptions:   Requested Prescriptions      No prescriptions requested or ordered in this encounter        Pharmacy where request should be sent: [facility]/PH[facility]ACY #[id number] - [place], [place] - [address] [phone] [facility] [phone]      Additional details provided by patient: PATIENT IS NOT SURE OF THE NAME OF THE MEDICATION BUT IT IS HER NAUSEA MEDICATION SHE TAKES BEFORE TAKING PAIN MEDICATION DUE TO IT MAKING HER SICK     Does the patient have less than a 3 day supply:  [x] Yes  [] No    Cristal Bermeo Rep   01/13/22 10:07 CST

## 2022-01-13 NOTE — TELEPHONE ENCOUNTER
Instead of doing morning and evening, she should take 2 tablets together (40mg) every day to improve the swelling.

## 2022-01-13 NOTE — TELEPHONE ENCOUNTER
PATIENT STATES SHE IS TAKING furosemide (LASIX) 20 MG tablet, DAY AND NIGHT AND IT IS NOT HELPING WITH THE WATER. PATIENT STATES THE OFFICE CALLED AND WANTED THIS INFORMATION. IF NEED TO CONTACT PLEASE CALL [phone]

## 2022-01-13 NOTE — TELEPHONE ENCOUNTER
Called and spoke with pt. She v/u. She is asking if there is any way she can get a refill of her NORCO. Please advise..

## 2022-01-13 NOTE — TELEPHONE ENCOUNTER
Patient is requesting refill of her Norco. Last filled on 12/23/2021 for 10 day course. Are you okay with refill?

## 2022-01-21 NOTE — TELEPHONE ENCOUNTER
CONTINUED NOTE------    PATIENT HAS BEEN CALLED, TOLD HER  THAT [facility] WAS CALLED AND THAT A FRIEND OF HERS WILL BE OVER TO HER HOUSE.  PATIENT VOICED UNDERSTANDING.  I DID ASK HER IF SHE HAD CALLED THE AMBULANCE AND SHE  STATED NOT YET THAT SHE WOULD HAVE TO CHANGE CLOTHES.  SHE STATED THAT SHE WAS GOING TO.

## 2022-01-21 NOTE — TELEPHONE ENCOUNTER
PATIENTS GRANDDAUGHTER HAS CALL AND STATED   PATIENT WAS UP AT 4am THIS MORNING AND HAVING ISSUES BREATHING TODAY.  SHE STATED THAT PATIENT HAS A RATTLE SOUND TO HER WHEN TALKING.    SHE STATED THAT EACH TIME THEY TAKE HER TO THE ER SHE IS SENT HOME WITHIN A COUPLE OF HOURS AND TOLD THERE IS NOTHING THEY COULD DO.     IS ASKING IF A PROVIDER COULD CALL HER SO SOMEONE COULD HEAR HOW SHE SOUNDS.    [phone]  MRS PALACIO.     [phone]  KEVIN HAGEN

## 2022-01-21 NOTE — TELEPHONE ENCOUNTER
PATIENT HAS BEEN CALLED, SHE STATED THAT SHE IS NOT WANTING TO GO TO THE ER BUT SHE STATED THAT SHE WILL HAVE TO BECAUSE SHE IS HAVING TROUBLE BREATHING.   SHE ASK ME TO CALL HER GRANDDAUGHTER AB  [phone]    AB HAS BEEN CALLED, GIVEN MESSAGE THAT HER GRANDMOTHER NEEDED TO GO TO ER AND SHE STATED THAT SHE WILL HAVE A FRIEND OF HERS GO OVER AND THAT SHE WILL NOT GET OFF UNTIL 5pm

## 2022-01-21 NOTE — TELEPHONE ENCOUNTER
Could consider video visit, but it is getting late on a Friday. Otherwise, for breathing issues, we'd recommend a visit to the ER.

## 2022-01-23 PROBLEM — J96.20 ACUTE-ON-CHRONIC RESPIRATORY FAILURE: Status: ACTIVE | Noted: 2022-01-01

## 2022-01-24 PROBLEM — J96.21 ACUTE ON CHRONIC RESPIRATORY FAILURE WITH HYPOXIA (HCC): Status: ACTIVE | Noted: 2022-01-01

## 2022-01-24 PROBLEM — I48.91 ATRIAL FIBRILLATION (HCC): Status: ACTIVE | Noted: 2022-01-01

## 2022-01-29 NOTE — OUTREACH NOTE
Prep Survey      Responses   Spiritism facility patient discharged from? [place]   Is LACE score < 7 ? No   Emergency Room discharge w/ pulse ox? No   Eligibility [facility]   Date of Admission 01/21/22   Date of Discharge 01/29/22   Discharge Disposition Home or Self Care   Discharge diagnosis Shortness of breath   Does the patient have one of the following disease processes/diagnoses(primary or secondary)? Other   Does the patient have Home health ordered? Yes   What is the Home health agency?  [facility]   Is there a DME ordered? No   Prep survey completed? Yes          Aditi Coffey RN

## 2022-01-31 NOTE — OUTREACH NOTE
Call Center TCM Note      Responses   [facility] patient discharged from? [place]   Does the patient have one of the following disease processes/diagnoses(primary or secondary)? Other   TCM attempt successful? Yes   Call start time 1427   Call end time 1432   Discharge diagnosis Shortness of breath   Person spoke with today (if not patient) and relationship Patient   Meds reviewed with patient/caregiver? Yes   Is the patient having any side effects they believe may be caused by any medication additions or changes? No   Does the patient have all medications ordered at discharge? Yes   Is the patient taking all medications as directed (includes completed medication regime)? Yes   Does the patient have a primary care provider?  Yes   Does the patient have an appointment with their PCP within 7 days of discharge? Yes   Comments regarding PCP [facility] fu appt on 2/7/22 at 9:30 AM   Has the patient kept scheduled appointments due by today? N/A   What is the Home health agency?  [facility]   Has home health visited the patient within 72 hours of discharge? Yes   Psychosocial issues? No   Did the patient receive a copy of their discharge instructions? Yes   Nursing interventions Reviewed instructions with patient   What is the patient's perception of their health status since discharge? Improving   Is the patient/caregiver able to teach back signs and symptoms related to disease process for when to call PCP? Yes   Is the patient/caregiver able to teach back signs and symptoms related to disease process for when to call 911? Yes   Is the patient/caregiver able to teach back the hierarchy of who to call/visit for symptoms/problems? PCP, Specialist, Home health nurse, Urgent Care, ED, 911 Yes   If the patient is a current smoker, are they able to teach back resources for cessation? [id number]   TCM call completed? Yes   Wrap up additional comments Patient states she is doing ok. Pt verified [facility] fu appt on 2/7/22. No  questions/concerns.          Darlene Evans RN    1/31/2022, 14:33 EST

## 2022-01-31 NOTE — HOME HEALTH
Pt/cg agreeable to homecare admission under the care of Dr. Austin Wade. Admission agreement, oxygen safety, and safety plan reviewed and signed by the pt. Medication reconciliation completed and no issues found. No recent falls and no upcoming insurance changes. Pt had no further questions regarding medication and/or plan of care. A&O X 4. No c/o pain. VSS. I am going to send a message to Dr. Wade about getting MSW for the pt and about the pt's anxiety. The pt refused the current health and stated she didn't need ST at this time for her difficulty swallowing.

## 2022-01-31 NOTE — CASE COMMUNICATION
I admitted Ms. Hicks to  Homecare today. Can I put in an order for LIEN to see the pt?   Also the pt stated that she gets anxious everyday at the same time (4 PM). The pt stated her anxiety gets better later in the evening. The pt said she doesn't feel like her current anxiety medication is working the best, but said the pill she took in the hospital worked. The pt couldn't remember what the name of that pill was. The pt was also wond ering if there was something she could take right before to keep her from getting anxious at 4PM.  Thank you!

## 2022-02-01 NOTE — HOME HEALTH
REASON FOR REFERRAL: Patient referred for skilled OT following hospitalization for 8 days and now home.  Patient may be weaker and needing more assistance with ADL/mobility.  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  DIABETIC TEACHING: Patient reports she is not diabetic and verbalizes understanding of education.  MEDICATION TEACHING: Patient reports understanding of purpose of medication.    FALLS REPORTED: None  MEDICATION CHANGES: None reported  INSURANCE CHANGES: Patient reports will not have medicaid at the end of February 2022.      ASSISTIVE DEVICE/DME: rollator, home O2, shower chair, handheld shower head  ACTIVITIES OF DAILY LIVING MOBILITY/FALLS RISK ASSESSMENT: Patient uses rollator for all mobility in the home; however standing balance is Fair-, making patient a high fall risk if not using AD.      MEDICAL NECESSITY:  Skilled OT evaluation is reasonable and medically necessary to assess safety and independence with ADL, patient education on purse lip breathing.  PATIENT GOAL FOR THIS EPISODE OF CARE: To get stronger  TODAY'S INTERVENTIONS: OT evaluation, including upper body strength testing, activity tolerance testing, toileting, shower chair transfers assessed; patient education regarding fall prevention and purse lip breathing    ASSESSMENT OF ONGOING NEED FOR SKILLED OT: The patient was assessed using clinical observation, strength measurements, and endurance tolerance.  OT evaluation completed.  Patient reports no need for further skilled OT at this time.  Patient reports  doing better with self-care activities now then prior to hospitalization.  Therapist offered UE thera-ex to increase activity tolerance and patient declined.  Therapist discussed ST for breath support, but patient reports would like to wait and decide next week.    DATE OF NEXT APPOINTMENT WITH DOCTOR: Appt with Dr. Wade 2/7/22; 2/10/22 pulmonary  ANTICIPATED DISCHARGE PLAN:  To home with intermittent caregiver assistance  PATIENT / CAREGIVER AGREE WITH DISCHARGE PLAN: Yes  COMMUNICATION / CARE COORDINATION: None this date.

## 2022-02-02 NOTE — HOME HEALTH
SKILLED SERVICES PROVIDED / MEDICAL   ASSESSMENT OF SOCIAL AND EMOTIONAL FACTORS  COUNSELING FOR LONG RANGE PLANNING AND DECISION MAKING  COMMUNITY RESOURCE PLANNING    HOMEBOUND STATUS- falls risk, taxing effort to leave home, uses rollator walker for ambulation, cont 02    HOME/SOCIAL ENVIRONMENT- Patient lives alone in a nice clean duplex    CASE COMMUNICATION with Madai Boston, OT

## 2022-02-03 NOTE — CASE COMMUNICATION
In preparation for the upcoming severe weather, I spoke to the pt and she is going to her [facility] and they have adequate food, water, shelter, and medications to last at least a week. The pt also has back up oxygen tanks if they lose electricity. There are no other circumstances that may be an issue.

## 2022-02-07 NOTE — PROGRESS NOTES
Transitional Care Follow Up Visit  Subjective     Cindy Hicks is a 86 y.o. female who presents for a transitional care management visit.    Within 48 business hours after discharge our office contacted her via telephone to coordinate her care and needs.      I reviewed and discussed the details of that call along with the discharge summary, hospital problems, inpatient lab results, inpatient diagnostic studies, and consultation reports with Cindy.     Current outpatient and discharge medications have been reconciled for the patient.  Reviewed by: Austin Wade DO      Date of TCM Phone Call 1/29/2022   [facility]   Date of Admission 1/21/2022   Date of Discharge 1/29/2022   Discharge Disposition Home or Self Care     Risk for Readmission (LACE) Score: 18 (1/29/2022  5:01 AM)      She reports she has been doing reasonably well after recent hospitalization.  She has had no further issues with her breathing, but has decided that she would not want intubation nor cardiac resuscitation in the event of arrest.  She continues on her inhaled therapies and has not had any worsening.  However, she has had worsening of anxiety that is especially bad in the afternoons and evenings.  BuSpar in the hospital was helpful.     Course During Hospital Stay: She was admitted with COPD exacerbation and worsening oxygen requirement.  She was also found to be in A. fib with RVR, but these were assisted with rate control and she was placed on Eliquis twice daily.  She is scheduled to see cardiology in the next few weeks for chronic management, but she has not had any further symptoms of ongoing RVR.  She is back on her baseline oxygen.  She is working with home health and no new needs have been identified.     The following portions of the patient's history were reviewed and updated as appropriate: allergies, current medications, past family history, past medical history, past social history, past surgical history  and problem list.    Review of Systems   Constitutional: Negative for chills and fever.   Respiratory: Negative for cough and shortness of breath.    Cardiovascular: Negative for chest pain and palpitations.   Gastrointestinal: Negative for abdominal pain and constipation.   Psychiatric/Behavioral: Positive for confusion. Negative for dysphoric mood. The patient is not nervous/anxious.        Objective   Physical Exam  Constitutional:       General: She is not in acute distress.  Cardiovascular:      Rate and Rhythm: Normal rate and regular rhythm.      Heart sounds: Normal heart sounds. No murmur heard.      Pulmonary:      Effort: Pulmonary effort is normal.      Breath sounds: Normal breath sounds. No wheezing.   Neurological:      Mental Status: She is alert and oriented to person, place, and time.      Gait: Gait normal.   Psychiatric:         Mood and Affect: Mood normal.         Behavior: Behavior normal.         Assessment/Plan   Diagnoses and all orders for this visit:    1. Stage 3 severe COPD by GOLD classification (HCC) (Primary)  2. Chronic respiratory failure with hypoxia and hypercapnia ([facility])  Respiratory status has returned to baseline and she denies any exacerbation today.    3. Chronic diastolic (congestive) heart failure ([facility])  No evidence of decompensation she is watching her weight and symptoms.    4. Cigarette smoker  Patient was counseled on and understood the many dangers of continuing to use tobacco. Despite this, Ms. Hicks states quitting is not an immediate priority at this time. I reminded the patient that if quitting becomes an increased priority to contact us for help with quitting including pharmacologic & nonpharmacologic options or any additional resources.    6. Essential hypertension  Well controlled, BP goal for age is <140/90 per JNC 8 guidelines and continue current medications    7. Overweight (BMI 25.0-29.9)  Patient's Body mass index is 25.86 kg/m². indicating that she is  overweight (BMI 25-29.9). Patient's (Body mass index is 25.86 kg/m².) indicates that they are overweight with health conditions that include hypertension . Weight is unchanged. BMI is is above average; BMI management plan is completed. We discussed portion control and increasing exercise. .    8. Generalized anxiety disorder  -     busPIRone (BUSPAR) 5 MG tablet; Take 1 tablet by mouth 2 (Two) Times a Day.  Dispense: 60 tablet; Refill: 1  Add BuSpar to her afternoon regimen and as needed.    9. Restless leg syndrome  -     pramipexole (Mirapex) 0.25 MG tablet; Take 1 tablet by mouth every night at bedtime.  Dispense: 90 tablet; Refill: 1  Restart Mirapex that was stopped inpatient.    10. Osteopenia of multiple sites  -     DEXA Bone Density Axial; Future    11. Counseling regarding advance care planning and goals of care  17 minutes were spent in review of her previous living well, which she desired to update due to a change in her desire regarding cardiopulmonary resuscitation.  She now indicates she would not want any resuscitation nor intubation.   was called and a new living well was notarized to be scanned into her chart.  Additionally, she did wish to complete a MOST form, which was reviewed with her.  Ultimately, she decided to indicate preferences for DNR, limited interventions, but she is okay with hospitalization if she is able to get better.  She is okay with antibiotics, but would not want tube feeding nor IV fluid for life-sustaining treatment.  2 copies of her MOST form were completed and she is given these originals to have on hand at home and this is also scanned into her medical record.       Austin Wade,   Internal Medicine  02/07/2022

## 2022-02-07 NOTE — OUTREACH NOTE
Medical Week 2 Survey      Responses   [facility] patient discharged from? [place]   Does the patient have one of the following disease processes/diagnoses(primary or secondary)? Other   Week 2 attempt successful? Yes   Call start time 1441   Call end time 1445   Meds reviewed with patient/caregiver? Yes   Is the patient taking all medications as directed (includes completed medication regime)? Yes   Comments regarding appointments Pt saw pcp on this day.   Has the patient kept scheduled appointments due by today? Yes   What is the Home health agency?  [facility]   Has home health visited the patient within 72 hours of discharge? Yes   What is the patient's perception of their health status since discharge? Same   Additional teach back comments Pt reports not feeling well on this day. Pt reports increased sob but knows this is expected with COPD. MD aware. Pt continues to use continuous 02.   Week 2 Call Completed? Yes          Beth Mansfield RN

## 2022-02-08 NOTE — TELEPHONE ENCOUNTER
Caller: Luis [facility]     Relationship to patient: Home Health    Best call back number: [phone]  (Luis Maciel, PT)    Patient is needing: Pt was admitted into  yesterday - Luis is requesting and order for manual wheelchair and beside commode to be sent to [facility] as provision to patient

## 2022-02-08 NOTE — HOME HEALTH
87 yo female Primary Dx: Chronic obstructive pulmonary disease with (acute) exacerbation   . Pt reports hospitalized 1-28-22 thru 2-5-22 per dx       Pmhx : COPD - afib - chronic LBP with LE - legally blind ( 2002 )   PLOF : household Amb rollator - community prn with family A / wc   Home environment : lives alone - family A 24/7   DME needs :wc -  BSC - ( sun glasses per visual c/o )   Homebound status assessment : Pt presents with altered gait , balance , strength and endurance per dx

## 2022-02-09 NOTE — PROGRESS NOTES
HALIMA Lemus  [facility]   Respiratory Disease Clinic  [address] [place] [address]  Phone: [phone]  Fax: [phone]       Chief Complaint  stage 3 severe copd and pulmonary emphysema    Subjective    History of Present Illness    Cindy Hicks presents to [facility] PULMONARY [facility]ITICAL [facility]ARE MEDICINE   History of Present Illness   Patient presents today for hospital follow up.  She is accompanied by her daughter.  She was hospitalized at [facility] 1/21/22-1/29/22 for treatment of acute/chronic respiratory failure with hypoxemia/hypercapnia, acute/chronic diastolic heart failure, and atrial fibrillation with RVR.  Patient has severe COPD, chronic respiratory failure with hypoxemia/hypercapnia (qualifies for home Trilogy-pt declines), pulmonary emphysema.  She recently stopped smoking October 2021.  She also has GERD, CKD, and grade II diastolic dysfunction.  CTA from 1/21/22 showed no PE, slight increase in right pleural effusion with bibasilar opacities favorable for PNA, new nodular finding in the LLL, chronic reticulonodular changes in the RUL, moderate emphysema, cardiomegaly.  BNP 1/24/22 12,680.  She is now on eliquis from hospitalization.  The patient states that she is feeling some better since admission.  She describes a cough with some blood streaked sputum.  She denies fevers or chills.  She has shortness of breath with exertion.  She is using 2 L of oxygen with rest and 3 L with exertion.  She is in a wheelchair today.  The patient reports a 5 pound weight gain from yesterday.  She states she is down 1 pound today after taking lasix.  She reports that she is not taking Lasix daily.  She states that her Lasix is only for as needed.  Lower extremity edema is noted.  She has an upcoming appointment with cardiology on February 22, 2022.  I recommend that she keep this appointment.  Her daughter also states she is going to give cardiology a call and  "let them know about her weight gain.  I have offered to obtain a follow-up BMP, CBC, and BMP.  The patient's daughter would like to contact cardiology first and then will let us know if she wants me to place order for the labs.  Given the patient's bloody sputum production I will send in a course of Augmentin.  I also recommend follow-up CT in 3 months to follow-up on the new 10 mm nodular opacity in the left lower lobe and to also ensure the pleural effusion is not worsening.   Objective   Vital Signs:   /60   Pulse 73   Ht 165.1 cm (65\")   Wt 69.9 kg (154 lb)   SpO2 99% Comment: 2.5lbs pulse  BMI 25.63 kg/m²     Physical Exam  Vitals reviewed.   Constitutional:       General: She is not in acute distress.     Appearance: She is well-developed.      Interventions: Nasal cannula and face mask in place.      Comments: Wheelchair    HENT:      Head: Normocephalic and atraumatic.   Eyes:      General: No scleral icterus.     Conjunctiva/sclera: Conjunctivae normal.      Pupils: Pupils are equal, round, and reactive to light.   Cardiovascular:      Rate and Rhythm: Normal rate.   Pulmonary:      Effort: Pulmonary effort is normal. No respiratory distress.      Breath sounds: Decreased breath sounds present. No wheezing or rales.   Musculoskeletal:         General: Normal range of motion.      Cervical back: Normal range of motion and neck supple.      Right lower leg: Edema present.      Left lower leg: Edema present.   Skin:     General: Skin is warm and dry.   Neurological:      Mental Status: She is alert and oriented to person, place, and time.   Psychiatric:         Behavior: Behavior normal.         Thought Content: Thought content normal.         Judgment: Judgment normal.        Result Review :  The following data was reviewed by: HALIMA Lemus on 02/10/2022:  ED to Hosp-Admission (Discharged) with Alexander Thompson DO; Neha Dubon DO (01/21/2022)    CT Angiogram Chest (01/21/2022 " 22:30)  IMPRESSION:  1. No pulmonary emboli.  2. Slightly increasing size small right pleural effusion with bibasilar  opacities favorable for pneumonia. Follow-up CT chest in 6 months  recommended to assure resolution of the new nodular finding in the left  lower lobe. Chronic inflammatory reticulonodular changes the right upper  lobe. Moderate emphysema. No pneumothorax.  3. Cardiomegaly. Diffuse vascular calcification. No thoracic aortic  aneurysm or dissection.    Rest/Exercise Pulse Ox Values        Some values may be hidden. Unless noted otherwise, only the newest values recorded on each date are displayed.         Rest/Exercise Pulse Ox Results 21   Rest on O2 @ Liters 2L   Rest on O2 SAT % 94   Exercise on O2 @ Liters 4L   Exercise on O2 SAT % 90           PFT Values        Some values may be hidden. Unless noted otherwise, only the newest values recorded on each date are displayed.         Old Values PFT Results 10/7/21   No data to display.      Pre Drug PFT Results 10/7/21   FVC 61   FEV1 38   FEF 25-75% 18   FEV1/FVC 46.65      Post Drug PFT Results 10/7/21   No data to display.      Other Tests PFT Results 10/7/21   TLC 93      DLCO 41   D/VAsb 67             Results for orders placed in visit on 10/07/21    Pulmonary Function Test    Narrative  Pulmonary Function Test  Performed by: Beth Crawford, RRT  Authorized by: Beth Tony APRN    Pre Drug % Predicted  FVC: 61%  FEV1: 38%  FEF 25-75%: 18%  FEV1/FVC: 46.65%  T%  RV: 138%  DLCO: 41%  D/VAsb: 67%    Interpretation  Spirometry  Spirometry shows severe obstruction. There is reduced midflow suggesting small airway/airflow obstruction.  Review of FVL curve  Patient's effort is normal.  Lung Volume Measurements  Measurements show elevated residual volume consistent with gas trapping.  Diffusion Capacity  The patient's diffusion capacity is severely reduced.  Diffusion capacity is moderately reduced when corrected for  alveolar volume.           Assessment and Plan  Diagnoses and all orders for this visit:    1. Stage 3 severe COPD by GOLD classification ([facility]) (Primary)  Comments:  Continue Symbicort and DuoNeb    2. Lung nodule  Comments:  CT in 3 months to follow-up on the new 10 mm nodular opacity in the left lower lobe identified 1/21/22.  If no improvement, consider PET scan.  Discussed with patient and her daughter that she is a poor surgical candidate for invasive diagnostics.    Orders:  -     CT Chest Without Contrast; Future    3. Pleural effusion, right  Comments:  Chronic right pleural effusion slightly increased on 1/21/22 CTA. Likely increase 2' recent acute diastolic heart failure requring hospitalization. Keep upcoming appt with cardiology.    Orders:  -     CT Chest Without Contrast; Future    4. Consolidation of right lower lobe of lung (HCC)  Comments:  Pt c/o of some blood streaked sputum in the setting of starting eliquis last month 2' atrial fibrillation.  The patient also had a new posterior left  lower lobe opacities with a new 10 mm nodular opacity of the left lowerlobe likely infectious/inflammatory noted on CTA 1/21/22.  Will give coarse of Augmentin.    Orders:  -     amoxicillin-clavulanate (Augmentin) 875-125 MG per tablet; Take 1 tablet by mouth 2 (Two) Times a Day for 10 days.  Dispense: 20 tablet; Refill: 0    5. Pulmonary emphysema, unspecified emphysema type ([facility])  Comments:  Continue Symbicort and DuoNeb    6. Chronic respiratory failure with hypoxia and hypercapnia ([facility])  Comments:  Continue chronic oxygen therapy.  The patient is benefiting from it and wishes to continue it.    7. Gastroesophageal reflux disease, unspecified whether esophagitis present  Comments:  Continue Protonix    8. Personal history of nicotine dependence  Comments:  Former smoker      Health maintenance:   Influenza vaccine: yes  Pneumovax 23: yes  Prevnar 13: yes  Covid 19: yes x2  Follow Up  Beth Tony  APRN  2/10/2022  13:17 CST  Return in about 11 weeks (around 4/28/2022) for CT.  Patient was given instructions and counseling regarding her condition or for health maintenance advice. Please see specific information pulled into the AVS if appropriate.   Please note that portions of this note were completed with a voice recognition program.

## 2022-02-09 NOTE — HOME HEALTH
Pt states she didn't sleep well last night.  Had SOA and difficulty breathing.  Had to purchase a new nebulizer yesterday because hers quit working.

## 2022-02-10 PROBLEM — J96.21 ACUTE ON CHRONIC RESPIRATORY FAILURE WITH HYPOXIA (HCC): Status: RESOLVED | Noted: 2022-01-01 | Resolved: 2022-01-01

## 2022-02-10 PROBLEM — J44.1 COPD EXACERBATION (HCC): Status: RESOLVED | Noted: 2021-01-01 | Resolved: 2022-01-01

## 2022-02-10 PROBLEM — Z87.891 PERSONAL HISTORY OF NICOTINE DEPENDENCE: Chronic | Status: ACTIVE | Noted: 2022-01-01

## 2022-02-10 PROBLEM — Z87.891 PERSONAL HISTORY OF NICOTINE DEPENDENCE: Status: ACTIVE | Noted: 2022-01-01

## 2022-02-10 NOTE — TELEPHONE ENCOUNTER
I called and spoke with daughter Genevieve.  Labs will be done tomorrow and appt moved to Monday.

## 2022-02-10 NOTE — TELEPHONE ENCOUNTER
This pt grand daughter Genevieve called regarding pt 5 lb weight gain. She has edema on legs and feet. She takes lasix prn but she is not helping with the edema. She has an appt with on 2/22/22. Please advise

## 2022-02-10 NOTE — PATIENT INSTRUCTIONS
Chronic Obstructive Pulmonary Disease  Chronic obstructive pulmonary disease (COPD) is a long-term (chronic) lung problem. When you have COPD, it is hard for air to get in and out of your lungs. Usually the condition gets worse over time, and your lungs will never return to normal. There are things you can do to keep yourself as healthy as possible.  · Your doctor may treat your condition with:  ? Medicines.  ? Oxygen.  ? Lung surgery.  · Your doctor may also recommend:  ? Rehabilitation. This includes steps to make your body work better. It may involve a team of specialists.  ? Quitting smoking, if you smoke.  ? Exercise and changes to your diet.  ? Comfort measures (palliative care).  Follow these instructions at home:  Medicines  · Take over-the-counter and prescription medicines only as told by your doctor.  · Talk to your doctor before taking any cough or allergy medicines. You may need to avoid medicines that cause your lungs to be dry.  Lifestyle  · If you smoke, stop. Smoking makes the problem worse. If you need help quitting, ask your doctor.  · Avoid being around things that make your breathing worse. This may include smoke, chemicals, and fumes.  · Stay active, but remember to rest as well.  · Learn and use tips on how to relax.  · Make sure you get enough sleep. Most adults need at least 7 hours of sleep every night.  · Eat healthy foods. Eat smaller meals more often. Rest before meals.  Controlled breathing  Learn and use tips on how to control your breathing as told by your doctor. Try:  · Breathing in (inhaling) through your nose for 1 second. Then, pucker your lips and breath out (exhale) through your lips for 2 seconds.  · Putting one hand on your belly (abdomen). Breathe in slowly through your nose for 1 second. Your hand on your belly should move out. Pucker your lips and breathe out slowly through your lips. Your hand on your belly should move in as you breathe out.    Controlled coughing  Learn  and use controlled coughing to clear mucus from your lungs. Follow these steps:  1. Lean your head a little forward.  2. Breathe in deeply.  3. Try to hold your breath for 3 seconds.  4. Keep your mouth slightly open while coughing 2 times.  5. Spit any mucus out into a tissue.  6. Rest and do the steps again 1 or 2 times as needed.  General instructions  · Make sure you get all the shots (vaccines) that your doctor recommends. Ask your doctor about a flu shot and a pneumonia shot.  · Use oxygen therapy and pulmonary rehabilitation if told by your doctor. If you need home oxygen therapy, ask your doctor if you should buy a tool to measure your oxygen level (oximeter).  · Make a COPD action plan with your doctor. This helps you to know what to do if you feel worse than usual.  · Manage any other conditions you have as told by your doctor.  · Avoid going outside when it is very hot, cold, or humid.  · Avoid people who have a sickness you can catch (contagious).  · Keep all follow-up visits as told by your doctor. This is important.  Contact a doctor if:  · You cough up more mucus than usual.  · There is a change in the color or thickness of the mucus.  · It is harder to breathe than usual.  · Your breathing is faster than usual.  · You have trouble sleeping.  · You need to use your medicines more often than usual.  · You have trouble doing your normal activities such as getting dressed or walking around the house.  Get help right away if:  · You have shortness of breath while resting.  · You have shortness of breath that stops you from:  ? Being able to talk.  ? Doing normal activities.  · Your chest hurts for longer than 5 minutes.  · Your skin color is more blue than usual.  · Your pulse oximeter shows that you have low oxygen for longer than 5 minutes.  · You have a fever.  · You feel too tired to breathe normally.  Summary  · Chronic obstructive pulmonary disease (COPD) is a long-term lung problem.  · The way your  lungs work will never return to normal. Usually the condition gets worse over time. There are things you can do to keep yourself as healthy as possible.  · Take over-the-counter and prescription medicines only as told by your doctor.  · If you smoke, stop. Smoking makes the problem worse.  This information is not intended to replace advice given to you by your health care provider. Make sure you discuss any questions you have with your health care provider.  Document Revised: 11/30/2018 Document Reviewed: 01/22/2018  [employer] Patient Education © 2021 [employer]      Smoking Tobacco Information, Adult  Smoking tobacco can be harmful to your health. Tobacco contains a poisonous (toxic), colorless chemical called nicotine. Nicotine is addictive. It changes the brain and can make it hard to stop smoking. Tobacco also has other toxic chemicals that can hurt your body and raise your risk of many cancers.  How can smoking tobacco affect me?  Smoking tobacco puts you at risk for:  · Cancer. Smoking is most commonly associated with lung cancer, but can also lead to cancer in other parts of the body.  · Chronic obstructive pulmonary disease (COPD). This is a long-term lung condition that makes it hard to breathe. It also gets worse over time.  · High blood pressure (hypertension), heart disease, stroke, or heart attack.  · Lung infections, such as pneumonia.  · Cataracts. This is when the lenses in the eyes become clouded.  · Digestive problems. This may include peptic ulcers, heartburn, and gastroesophageal reflux disease (GERD).  · Oral health problems, such as gum disease and tooth loss.  · Loss of taste and smell.  Smoking can affect your appearance by causing:  · Wrinkles.  · Yellow or stained teeth, fingers, and fingernails.  Smoking tobacco can also affect your social life, because:  · It may be challenging to find places to smoke when away from home. Many workplaces, restaurants, hotels, and public places are  tobacco-free.  · Smoking is expensive. This is due to the cost of tobacco and the long-term costs of treating health problems from smoking.  · Secondhand smoke may affect those around you. Secondhand smoke can cause lung cancer, breathing problems, and heart disease. Children of smokers have a higher risk for:  ? Sudden infant death syndrome (SIDS).  ? Ear infections.  ? Lung infections.  If you currently smoke tobacco, quitting now can help you:  · Lead a longer and healthier life.  · Look, smell, breathe, and feel better over time.  · Save money.  · Protect others from the harms of secondhand smoke.  What actions can I take to prevent health problems?  Quit smoking    · Do not start smoking. Quit if you already do.  · Make a plan to quit smoking and commit to it. Look for programs to help you and ask your health care provider for recommendations and ideas.  · Set a date and write down all the reasons you want to quit.  · Let your friends and family know you are quitting so they can help and support you. Consider finding friends who also want to quit. It can be easier to quit with someone else, so that you can support each other.  · Talk with your health care provider about using nicotine replacement medicines to help you quit, such as gum, lozenges, patches, sprays, or pills.  · Do not replace cigarette smoking with electronic cigarettes, which are commonly called e-cigarettes. The safety of e-cigarettes is not known, and some may contain harmful chemicals.  · If you try to quit but return to smoking, stay positive. It is common to slip up when you first quit, so take it one day at a time.  · Be prepared for cravings. When you feel the urge to smoke, chew gum or suck on hard candy.    Lifestyle  · Stay busy and take care of your body.  · Drink enough fluid to keep your urine pale yellow.  · Get plenty of exercise and eat a healthy diet. This can help prevent weight gain after quitting.  · Monitor your eating  habits. Quitting smoking can cause you to have a larger appetite than when you smoke.  · Find ways to relax. Go out with friends or family to a movie or a restaurant where people do not smoke.  · Ask your health care provider about having regular tests (screenings) to check for cancer. This may include blood tests, imaging tests, and other tests.  · Find ways to manage your stress, such as meditation, yoga, or exercise.  Where to find support  To get support to quit smoking, consider:  · Asking your health care provider for more information and resources.  · Taking classes to learn more about quitting smoking.  · Looking for local organizations that offer resources about quitting smoking.  · Joining a support group for people who want to quit smoking in your local community.  · Calling the [employer] counselor helpline: 6-044-Quit-Now (1-516.574.1759)  Where to find more information  You may find more information about quitting smoking from:  · HelpGuide.org: www.helpguide.org  · Smokefree.gov: smokefree.gov  · American Lung Association: www.lung.org  Contact a health care provider if you:  · Have problems breathing.  · Notice that your lips, nose, or fingers turn blue.  · Have chest pain.  · Are coughing up blood.  · Feel faint or you pass out.  · Have other health changes that cause you to worry.  Summary  · Smoking tobacco can negatively affect your health, the health of those around you, your finances, and your social life.  · Do not start smoking. Quit if you already do. If you need help quitting, ask your health care provider.  · Think about joining a support group for people who want to quit smoking in your local community. There are many effective programs that will help you to quit this behavior.  This information is not intended to replace advice given to you by your health care provider. Make sure you discuss any questions you have with your health care provider.  Document Revised: 09/11/2020 Document  Reviewed: 01/02/2018  [employer] Patient Education © 2021 [employer]

## 2022-02-11 NOTE — TELEPHONE ENCOUNTER
She was here yesterday to see you, did she get her sputums done that was ordered back in November?    Can we cancel these?

## 2022-02-13 PROBLEM — I50.9 CONGESTIVE HEART FAILURE (HCC): Status: ACTIVE | Noted: 2022-01-01

## 2022-02-13 NOTE — H&P
[facility]  HISTORY AND PHYSICAL    Date of Admission: 2/13/2022  Primary Care Physician: Austin Wade DO    Subjective     Chief Complaint: Shortness of breath  History of Present Illness   She is an 86-year-old woman with recent hospitalization from January 21 to January 29, 2022 for atrial fibrillation, acute on chronic hypoxic respiratory failure, chronic diastolic heart failure.  Patient has known history of stage III COPD.  She was also admitted and managed for COPD exacerbation at that time based on reviewed discharge summary  She presented today with shortness of breath    Diagnostic studies:  Her blood gas showed normal pH at 7.43, PCO2 44 with PaO2 of 130 on 3.5 L nasal cannula.  Record indicates that she normally is on 2 to 3 L nasal cannula at home.  proBNP is improved at 15,154 compared couple of days ago value over 16,000  Procalcitonin is 0.07  CRP 0.67  Lactic acid 1  Troponin is 0.018  She has normal white count without left shift  Sh her chest x-ray showed cardiomegaly with interstitial alveolar pulmonary edema and moderate-sized layering right pleural effusion  has mild which when compared to prior chest x-ray in January appears similar which when compared to prior chest x-ray in January appears similar     From a prior progress note I did to her on January 22, 2022 she has moderate pulmonary emphysema.  She has known right pleural effusion   transaminase and hyponatremia.      Patient had a recent visit on February 10 with Beth Tony (pulmonary clinic).  She mentioned that she has chronic right pleural effusion which was slightly increased on January 21, 2022.  She felt it was secondary to recent acute diastolic dysfunction requiring hospitalization.  Her plan includes CT chest without contrast in the future.  She was started on Augmentin for 10 days during this clinic visit given some blood-streaked sputum and a new posterior left  "lower lobe opacities with a new 10 mm nodular opacity of the left lower lobe.  She is scheduled to return April 28 for CT.      Basically the ER requested hospital admission for right heart failure exacerbation and pulmonary edema.  As mentioned above BNP is not significantly different from previews.  Patient has atrial fibrillation and has known A. fib on anticoagulation.  EKG today showed atrial fibrillation with rate of 88.    Results for orders placed during the hospital encounter of 01/21/22    Adult Transthoracic Echo Complete W/ Cont if Necessary Per Protocol    Interpretation Summary  · Assessment of left ventricular ejection fraction somewhat difficult due to the presence of atrial fibrillation.  · Left ventricular systolic function is low normal. Left ventricular ejection fraction appears to be 51 - 55%.  · Left ventricular wall thickness is consistent with mild to moderate concentric hypertrophy.  · The right ventricular cavity is dilated. Mildly reduced right ventricular systolic function noted.  · Biatrial enlargment is noted.  · No hemodynamically significant valvular abnormalities identified on this study.    Patient so far received amiodarone 150 mg, magnesium sulfate, Diuril 500 mg.  Noted patient's heart rate documented in the ER is anywhere 101-109 her EKG showed 88    Patient told me that she did well after she was discharged from the hospital on January 29.  In the recent past she has some issue with her \"breathing and water\" which she describes as fullness on her belly and into her chest.  She states \"cannot hardly breathe\".  Even after the diuretic she received in the emergency room she has little output.  Claimed to have gained 5 pounds in 2 days.  In the recent past she was advised to take Lasix 2 times daily in the last 3 days  She denies any palpitation, chest pain  She reports nausea but no vomiting  She reports \"sore to breathe\"  She denies any unusual coughing spell.  Her coughing " spells nonproductive.  Denies any fever  Review of Systems     Otherwise complete ROS reviewed and negative except as mentioned in the HPI.    Past Medical History:   Past Medical History:   Diagnosis Date   • Actinic keratosis    • Arthritis    • Atherosclerosis    • Benign fundic gland polyps of stomach    • Bleeding ulcer    • Carotid artery bruit    • Carotid artery stenosis    • COPD (chronic obstructive pulmonary disease) ([facility])    • Diverticulosis    • Emphysema of lung (HCC)    • History of colon polyps    • Hyperlipidemia    • Hypertension    • IBS (irritable bowel syndrome)    • Macular degeneration    • Osteoporosis    • Prediabetes    • PVD (peripheral vascular disease) ([facility])    • TIA (transient ischemic attack)    • Vitamin D deficiency      Past Surgical History:  Past Surgical History:   Procedure Laterality Date   • CATARACT EXTRACTION     • COLONOSCOPY  03/15/2013    polyp, hyperplastic   • COLONOSCOPY N/A 10/2/2018    Procedure: COLONOSCOPY WITH ANESTHESIA;  Surgeon: Harris Escobar MD;  Location: [facility] ENDOSCOPY;  Service: Gastroenterology   • CYST REMOVAL     • ENDOSCOPY  2019   • ENDOSCOPY N/A 10/4/2019    Procedure: ESOPHAGOGASTRODUODENOSCOPY WITH ANESTHESIA;  Surgeon: Harris Escobar MD;  Location: [facility] ENDOSCOPY;  Service: Gastroenterology   • FEMORAL ARTERY STENT     • HYSTERECTOMY     • VASCULAR SURGERY      multiple     Social History:  reports that she quit smoking about 4 months ago. Her smoking use included cigarettes. She has a 35.00 pack-year smoking history. She has never used smokeless tobacco. She reports that she does not drink alcohol and does not use drugs.    Family History: family history includes Cancer in her father, mother, and sister; Colon cancer in her sister; Colon polyps in her brother; Heart disease in her daughter and son.         Allergies:  Allergies   Allergen Reactions   • Valium [Diazepam] Nausea Only   • Contrast Dye Itching       Medications:  Prior to  Admission medications    Medication Sig Start Date End Date Taking? Authorizing Provider   amoxicillin-clavulanate (Augmentin) 875-125 MG per tablet Take 1 tablet by mouth 2 (Two) Times a Day for 10 days. 2/10/22 2/20/22  Beth Tony APRN   apixaban (ELIQUIS) 5 MG tablet tablet Take 1 tablet by mouth Every 12 (Twelve) Hours. Indications: Atrial Fibrillation 1/29/22   Alexander Thompson DO   atorvastatin (LIPITOR) 40 MG tablet Take 1 tablet by mouth Daily. 10/29/21   Austin Wade DO   budesonide-formoterol (SYMBICORT) 160-4.5 MCG/ACT inhaler Inhale 2 puffs 2 (Two) Times a Day. 11/2/21   Beth Tony APRN   busPIRone (BUSPAR) 5 MG tablet Take 1 tablet by mouth 2 (Two) Times a Day. 2/7/22   Austin Wade DO   docusate sodium (COLACE) 100 MG capsule Take 1 capsule by mouth 2 (Two) Times a Day. 11/26/21   Nelson Elizabeth MD   furosemide (LASIX) 20 MG tablet Take 20 mg by mouth Daily As Needed (weight gain, swelling or SOB).    ProviderLainey MD   HYDROcodone-acetaminophen (NORCO) 5-325 MG per tablet Take 0.5 tablets by mouth 2 (Two) Times a Day As Needed for Mild Pain .    ProviderLainey MD   ipratropium-albuterol (DUO-NEB) 0.5-2.5 mg/3 ml nebulizer Take 3 mL by nebulization 4 (Four) Times a Day As Needed for Wheezing or Shortness of Air.    ProviderLainey MD   levocetirizine (Xyzal) 5 MG tablet Take 1 tablet by mouth Every Evening. 12/9/21   Shira Gabriel APRN   lisinopril (PRINIVIL,ZESTRIL) 40 MG tablet Take 1 tablet by mouth Daily. 10/29/21   Austin Wade DO   magnesium hydroxide (MILK OF MAGNESIA) 400 MG/5ML suspension Take 15 mL by mouth Daily As Needed for Constipation.    Lainey Goodwin MD   Metoprolol Tartrate 75 MG tablet Take 75 mg by mouth Every 12 (Twelve) Hours. 1/29/22   Alexander Thompson,    Mucinex 600 MG 12 hr tablet Take 2 tablets by mouth 2 (Two) Times a Day. 9/18/21   Trini Watts, HALIMA   multivitamin with minerals  "(VITRUM 50+ ADULT-MULTI PO) Take 1 tablet by mouth Daily.    Provider, MD Lainey   O2 (OXYGEN) Inhale 2 L/min Continuous. 1/1/21   ProviderLainey MD   pantoprazole (PROTONIX) 40 MG EC tablet Take 40 mg by mouth 2 (Two) Times a Day.    Provider, MD Lainey   pramipexole (Mirapex) 0.25 MG tablet Take 1 tablet by mouth every night at bedtime. 2/7/22   Austin Wade DO   sertraline (Zoloft) 50 MG tablet Take 1 tablet by mouth Daily. 1/31/22   Austin Wade DO   vitamin D (ERGOCALCIFEROL) 1.25 MG (56520 UT) capsule capsule Take 1 capsule by mouth 1 (One) Time Per Week. 9/3/21   Austin Wade DO     I have utilized all available immediate resources to obtain, update, and review the patient's current medications.    Objective     Vital Signs: /84   Pulse 101   Temp 98.6 °F (37 °C) (Oral)   Resp 18   Ht 165.1 cm (65\")   Wt 71.2 kg (157 lb)   SpO2 100%   BMI 26.13 kg/m²   Physical Exam   Patient is pleasant woman  She was seated on the commode when I first came in.  I stood behind the wall as I speak to her but she asked me to come forward.  I sat by the chair face-to-face with her with nurse April standing between ice.  She is not in any apparent distress.  Speaks in full sentences  No accessory muscle use  Normocephalic and atraumatic head  Supple neck  Notable prominent external jugular distention  Anicteric sclera  EOMI  Moist mucosa  Lungs are clear to auscultation without crackles or wheezes.  Diminished breath sounds  S1-S2, irregular, no gross murmur appreciated  Soft abdomen, limited exam as patient is seated  Positive pitting edema bilateral lower extremities  Scab lateral surface of distal leg without any obvious infection  Dry scaly skin lower extremities  Warm dry skin  Good capillary refill      Results Reviewed:  Lab Results (last 24 hours)     Procedure Component Value Units Date/Time    COVID PRE-OP / PRE-PROCEDURE SCREENING ORDER (NO ISOLATION) - Swab, " Nasal Cavity [[id number]]  (Normal) Collected: 02/13/22 1358    Specimen: Swab from Nasal Cavity Updated: 02/13/22 1533    Narrative:      The following orders were created for panel order COVID PRE-OP / PRE-PROCEDURE SCREENING ORDER (NO ISOLATION) - Swab, Nasal Cavity.  Procedure                               Abnormality         Status                     ---------                               -----------         ------                     COVID-19,[facility] [employer] IN-THIEN...[[id number]]  Normal              Final result                 Please view results for these tests on the individual orders.    COVID-19,[facility] [employer] IN-HOUSE,Nasal Swab No Transport Media 3-4 HR TAT - Swab, Nasal Cavity [[id number]]  (Normal) Collected: 02/13/22 1358    Specimen: Swab from Nasal Cavity Updated: 02/13/22 1533     COVID19 Not Detected    Narrative:      Fact sheet for providers: https://www.fda.gov/media/641827/download     Fact sheet for patients: https://www.fda.gov/media/574386/download    Test performed by PCR.    Consider negative results in combination with clinical observations, patient history, and epidemiological information.    C-reactive Protein [[id number]]  (Abnormal) Collected: 02/13/22 1358    Specimen: Blood Updated: 02/13/22 1501     C-Reactive Protein 0.67 mg/dL     Comprehensive Metabolic Panel [[id number]]  (Abnormal) Collected: 02/13/22 1358    Specimen: Blood Updated: 02/13/22 1459     Glucose 105 mg/dL      BUN 30 mg/dL      Creatinine 1.40 mg/dL      Sodium 134 mmol/L      Potassium 4.5 mmol/L      Comment: Slight hemolysis detected by analyzer. Results may be affected.        Chloride 96 mmol/L      CO2 29.0 mmol/L      Calcium 8.8 mg/dL      Total Protein 5.9 g/dL      Albumin 3.40 g/dL      ALT (SGPT) 76 U/L      AST (SGOT) 42 U/L      Comment: Slight hemolysis detected by analyzer. Results may be affected.        Alkaline Phosphatase 83 U/L      Total Bilirubin 0.4 mg/dL      eGFR Non African Amer 36 mL/min/1.73       "Globulin 2.5 gm/dL      A/G Ratio 1.4 g/dL      BUN/Creatinine Ratio 21.4     Anion Gap 9.0 mmol/L     Narrative:      GFR Normal >60  Chronic Kidney Disease <60  Kidney Failure <15      Procalcitonin [[id number]]  (Normal) Collected: 02/13/22 1358    Specimen: Blood Updated: 02/13/22 1459     Procalcitonin 0.07 ng/mL     Narrative:      As a Marker for Sepsis (Non-Neonates):     1. <0.5 ng/mL represents a low risk of severe sepsis and/or septic shock.  2. >2 ng/mL represents a high risk of severe sepsis and/or septic shock.    As a Marker for Lower Respiratory Tract Infections that require antibiotic therapy:  PCT on Admission     Antibiotic Therapy             6-12 Hrs later  >0.5                          Strongly Recommended            >0.25 - <0.5             Recommended  0.1 - 0.25                  Discouraged                       Remeasure/reassess PCT  <0.1                         Strongly Discouraged         Remeasure/reassess PCT      As 28 day mortality risk marker: \"Change in Procalcitonin Result\" (>80% or <=80%) if Day 0 (or Day 1) and Day 4 values are available. Refer to http://www.[facility]-pct-calculator.com/    Change in PCT <=80 %   A decrease of PCT levels below or equal to 80% defines a positive change in PCT test result representing a higher risk for 28-day all-cause mortality of patients diagnosed with severe sepsis or septic shock.    Change in PCT >80 %   A decrease of PCT levels of more than 80% defines a negative change in PCT result representing a lower risk for 28-day all-cause mortality of patients diagnosed with severe sepsis or septic shock.                Troponin [[id number]]  (Normal) Collected: 02/13/22 1358    Specimen: Blood Updated: 02/13/22 1455     Troponin T 0.018 ng/mL     Narrative:      Troponin T Reference Range:  <= 0.03 ng/mL-   Negative for AMI  >0.03 ng/mL-     Abnormal for myocardial necrosis.  Clinicians would have to utilize clinical acumen, EKG, Troponin and serial " changes to determine if it is an Acute Myocardial Infarction or myocardial injury due to an underlying chronic condition.       Results may be falsely decreased if patient taking Biotin.      BNP [[id number]]  (Abnormal) Collected: 02/13/22 1358    Specimen: Blood Updated: 02/13/22 1451     proBNP 15,154.0 pg/mL     Narrative:      Among patients with dyspnea, NT-proBNP is highly sensitive for the detection of acute congestive heart failure. In addition NT-proBNP of <300 pg/ml effectively rules out acute congestive heart failure with 99% negative predictive value.    Results may be falsely decreased if patient taking Biotin.      Protime-INR [[id number]]  (Abnormal) Collected: 02/13/22 1358    Specimen: Blood Updated: 02/13/22 1447     Protime 18.4 Seconds      INR 1.60    D-dimer, Quantitative [[id number]]  (Abnormal) Collected: 02/13/22 1358    Specimen: Blood Updated: 02/13/22 1447     D-Dimer, Quantitative 1.13 mg/L (FEU)     Narrative:      Reference Range is 0-0.50 mg/L FEU. However, results <0.50 mg/L FEU tends to rule out DVT or PE. Results >0.50 mg/L FEU are not useful in predicting absence or presence of DVT or PE.      aPTT [[id number]]  (Normal) Collected: 02/13/22 1358    Specimen: Blood Updated: 02/13/22 1447     PTT 34.2 seconds     Lactic Acid, Plasma [[id number]]  (Normal) Collected: 02/13/22 1358    Specimen: Blood Updated: 02/13/22 1447     Lactate 1.0 mmol/L     CBC & Differential [[id number]]  (Abnormal) Collected: 02/13/22 1358    Specimen: Blood Updated: 02/13/22 1433    Narrative:      The following orders were created for panel order CBC & Differential.  Procedure                               Abnormality         Status                     ---------                               -----------         ------                     CBC Auto Differential[[id number]]        Abnormal            Final result                 Please view results for these tests on the individual orders.    CBC Auto Differential  [[id number]]  (Abnormal) Collected: 02/13/22 1358    Specimen: Blood Updated: 02/13/22 1433     WBC 6.00 10*3/mm3      RBC 3.45 10*6/mm3      Hemoglobin 9.9 g/dL      Hematocrit 31.3 %      MCV 90.7 fL      MCH 28.7 pg      MCHC 31.6 g/dL      RDW 15.1 %      RDW-SD 49.1 fl      MPV 9.8 fL      Platelets 222 10*3/mm3      Neutrophil % 73.8 %      Lymphocyte % 13.0 %      Monocyte % 10.3 %      Eosinophil % 2.3 %      Basophil % 0.3 %      Immature Grans % 0.3 %      Neutrophils, Absolute 4.42 10*3/mm3      Lymphocytes, Absolute 0.78 10*3/mm3      Monocytes, Absolute 0.62 10*3/mm3      Eosinophils, Absolute 0.14 10*3/mm3      Basophils, Absolute 0.02 10*3/mm3      Immature Grans, Absolute 0.02 10*3/mm3      nRBC 0.0 /100 WBC     Blood Gas, Arterial - [[id number]]  (Abnormal) Collected: 02/13/22 1344    Specimen: Arterial Blood Updated: 02/13/22 1347     Site Right Radial     Alexx's Test Positive     pH, Arterial 7.433 pH units      pCO2, Arterial 44.2 mm Hg      pO2, Arterial 130.0 mm Hg      Comment: 83 Value above reference range        HCO3, Arterial 29.5 mmol/L      Comment: 83 Value above reference range        Base Excess, Arterial 4.6 mmol/L      Comment: 83 Value above reference range        O2 Saturation, Arterial 100.0 %      Comment: 83 Value above reference range        Temperature 37.0 C      Barometric Pressure for Blood Gas 756 mmHg      Modality Nasal Cannula     Flow Rate 3.5 lpm      Ventilator Mode NA     Collected by [id number]     Comment: Meter: [id number]     :  [id number]        pCO2, Temperature Corrected 44.2 mm Hg      pH, Temp Corrected 7.433 pH Units      pO2, Temperature Corrected 130 mm Hg         Imaging Results (Last 24 Hours)     Procedure Component Value Units Date/Time    XR Chest 1 View [[id number]] Collected: 02/13/22 1438     Updated: 02/13/22 1442    Narrative:      Frontal upright radiograph of the chest 2/13/2022 2:30 PM CST     HISTORY: Shortness of breath      COMPARISON: Chest exam dated 1/24/2022.     FINDINGS:      Bilateral patchy infiltrates in the mid and lower lung fields with areas  of bibasilar consolidation as well as a layering right pleural effusion.  Cardiomegaly with central pulmonary congestion. No pneumothorax. No  acute bony abnormality.       Impression:      1. Cardiomegaly with interstitial and alveolar pulmonary edema as well  as a moderate sized layering right pleural effusion.        This report was finalized on 02/13/2022 14:39 by Dr Slim Cancino, .        I have personally reviewed and interpreted the radiology studies and ECG obtained at time of admission.     Assessment / Plan     Assessment:   Active Hospital Problems    Diagnosis    • Congestive heart failure (HCC)        Problem list  · Fluid retention as evidenced by edema of lower extremities, chronic pleural effusion   · Shortness of breath/difficulty breathing in patient with known COPD   · COPD stage III/emphysematous lung disease -continue Symbicort/DuoNeb -she does not appear to be in acute exacerbation at time of evaluation  · Acute on chronic hypoxic respiratory failure.  Normally use 2-1/2 to 3 L but recently on 3.5 L from blood gas obtained.  PaO2 though is 130.  Pulmonary following in outpatient setting with plan to follow-up CT  · Atrial fibrillation.  On chronic anticoagulation rate is anywhere from 88 based on EKG and 101-109.  Patient received amiodarone in the emergency room.  · Chronic diastolic heart failure with elevated proBNP  · Patient was recently started on Augmentin on February 10 by Beth Tony for consolidation of right lower lung and reported complaint of blood-streaked sputum in the setting of chronic anticoagulation.  I will continue this  · Probably underlying chronic kidney disease    Plan:   We will continue her on Augmentin  We will continue Symbicort/duo nebs  We will diurese her as it seems that her main issue is fluid retention/overload.  Will follow  chemistry, fluid/volume status and renal function  We will wean down oxygen given PCO2 of 130 on 3-1/2.  Her normal baseline is 2-1/2 to 3 L  I will resume Lopressor 75 twice daily.  No need for amiodarone drip as heart rate is better controlled  Continue anticoagulation  I will review home medication and resume accordingly  Other plans per orders  Discussed with nurse April at bedside    Code Status/Advanced Care Plan full code  The patient's surrogate decision maker is granddaughter      I discussed my findings and recommendations with the patient and nurse April  Estimated length of stay is to be determined  The patient was seen and examined by me on   Electronically signed by Anuj Santos MD, 02/13/22, 16:22 CST.

## 2022-02-13 NOTE — ED PROVIDER NOTES
Subjective   Patient was recently admitted to the hospital early part of January shortness of breath diastolic dysfunction COPD exacerbation came in the ER complaining of shortness of breath again.  Her last CT of the chest were negative.  For any pulmonary embolus.      Shortness of Breath  Severity:  Moderate  Onset quality:  Gradual  Timing:  Constant  Progression:  Worsening  Chronicity:  New  Context: activity    Context: not animal exposure, not emotional upset, not fumes, not occupational exposure and not pollens    Relieved by:  Nothing  Worsened by:  Exertion  Ineffective treatments:  None tried  Associated symptoms: cough, diaphoresis, PND and wheezing    Associated symptoms: no abdominal pain, no ear pain, no fever, no headaches, no neck pain, no rash, no sore throat, no sputum production, no syncope and no vomiting    Risk factors: no recent alcohol use, no family hx of DVT, no obesity, no oral contraceptive use and no recent surgery        Review of Systems   Constitutional: Positive for diaphoresis. Negative for activity change, appetite change, chills, fatigue and fever.   HENT: Negative for congestion, drooling, ear pain, facial swelling, hearing loss, sinus pressure and sore throat.    Eyes: Negative.  Negative for discharge.   Respiratory: Positive for cough, shortness of breath and wheezing. Negative for sputum production.    Cardiovascular: Positive for PND. Negative for syncope.   Gastrointestinal: Negative for abdominal pain, blood in stool, diarrhea, nausea and vomiting.   Endocrine: Negative.  Negative for cold intolerance, heat intolerance, polydipsia, polyphagia and polyuria.   Genitourinary: Negative.  Negative for dysuria, flank pain and urgency.   Musculoskeletal: Negative.  Negative for arthralgias, back pain, myalgias, neck pain and neck stiffness.   Skin: Negative.  Negative for color change, pallor and rash.   Allergic/Immunologic: Negative.    Neurological: Negative.  Negative for  dizziness, seizures, speech difficulty, weakness, numbness and headaches.   Hematological: Negative.  Negative for adenopathy.   All other systems reviewed and are negative.      Past Medical History:   Diagnosis Date   • Actinic keratosis    • Arthritis    • Atherosclerosis    • Benign fundic gland polyps of stomach    • Bleeding ulcer    • Carotid artery bruit    • Carotid artery stenosis    • COPD (chronic obstructive pulmonary disease) ([facility])    • Diverticulosis    • Emphysema of lung ([facility])    • History of colon polyps    • Hyperlipidemia    • Hypertension    • IBS (irritable bowel syndrome)    • Macular degeneration    • Osteoporosis    • Prediabetes    • PVD (peripheral vascular disease) ([facility])    • TIA (transient ischemic attack)    • Vitamin D deficiency        Allergies   Allergen Reactions   • Valium [Diazepam] Nausea Only   • Contrast Dye Itching       Past Surgical History:   Procedure Laterality Date   • CATARACT EXTRACTION     • COLONOSCOPY  03/15/2013    polyp, hyperplastic   • COLONOSCOPY N/A 10/2/2018    Procedure: COLONOSCOPY WITH ANESTHESIA;  Surgeon: Harris Escobar MD;  Location: [facility] ENDOSCOPY;  Service: Gastroenterology   • CYST REMOVAL     • ENDOSCOPY     • ENDOSCOPY N/A 10/4/2019    Procedure: ESOPHAGOGASTRODUODENOSCOPY WITH ANESTHESIA;  Surgeon: Harris Escobar MD;  Location: [facility] ENDOSCOPY;  Service: Gastroenterology   • FEMORAL ARTERY STENT     • HYSTERECTOMY     • VASCULAR SURGERY      multiple       Family History   Problem Relation Age of Onset   • Colon cancer Sister    • Cancer Sister    • Colon polyps Brother    • Heart disease Daughter    • Heart disease Son    • Cancer Mother    • Cancer Father        Social History     Socioeconomic History   • Marital status:    Tobacco Use   • Smoking status: Former Smoker     Packs/day: 0.50     Years: 70.00     Pack years: 35.00     Types: Cigarettes     Quit date: 10/2021     Years since quittin.3   • Smokeless  tobacco: Never Used   Vaping Use   • Vaping Use: Never used   Substance and Sexual Activity   • Alcohol use: No   • Drug use: No   • Sexual activity: Not Currently           Objective   Physical Exam  Vitals and nursing note reviewed. Exam conducted with a chaperone present.   Constitutional:       General: She is not in acute distress.     Appearance: Normal appearance. She is well-developed. She is not toxic-appearing or diaphoretic.   HENT:      Head: Normocephalic and atraumatic.      Right Ear: External ear normal.      Nose: Nose normal.      Mouth/Throat:      Mouth: Mucous membranes are moist.   Eyes:      Conjunctiva/sclera: Conjunctivae normal.      Pupils: Pupils are equal, round, and reactive to light.   Neck:      Thyroid: No thyromegaly.      Vascular: JVD present.      Trachea: No tracheal deviation.   Cardiovascular:      Rate and Rhythm: Tachycardia present. Rhythm irregular.      Chest Wall: PMI is not displaced.      Pulses: Normal pulses. No decreased pulses.      Heart sounds: Normal heart sounds. No murmur heard.      Pulmonary:      Effort: Pulmonary effort is normal. No tachypnea, accessory muscle usage or respiratory distress.      Breath sounds: No stridor. Examination of the right-middle field reveals rales. Examination of the left-middle field reveals rales. Examination of the right-lower field reveals decreased breath sounds, wheezing and rales. Examination of the left-lower field reveals decreased breath sounds, wheezing and rales. Decreased breath sounds, wheezing and rales present. No rhonchi.   Chest:      Chest wall: No tenderness or crepitus.   Abdominal:      General: Bowel sounds are normal. There is no distension.      Palpations: Abdomen is soft.      Tenderness: There is no abdominal tenderness.   Musculoskeletal:         General: No swelling or tenderness. Normal range of motion.      Cervical back: Normal range of motion and neck supple. No rigidity.      Right lower leg:  Edema present.      Left lower leg: Edema present.      Comments: Lower extremity exam bilaterally is unremarkable.  There is no right or left calf tenderness .  There is no palpable venous cord.  No obvious difference in the size of the legs.  No pitting edema.  The dorsalis pedis and posterior tibial femoral and popliteal pulses are palpable and +2 bilaterally.  Homans sign is negative   Skin:     General: Skin is warm.      Capillary Refill: Capillary refill takes less than 2 seconds.      Coloration: Skin is not jaundiced.      Findings: No erythema or rash.   Neurological:      General: No focal deficit present.      Mental Status: She is alert and oriented to person, place, and time. Mental status is at baseline.      Cranial Nerves: No cranial nerve deficit.      Motor: No weakness.      Coordination: Coordination normal.      Deep Tendon Reflexes: Reflexes are normal and symmetric. Reflexes normal.   Psychiatric:         Mood and Affect: Mood normal.         Procedures           ED Course  ED Course as of 02/13/22 1619   Sun Feb 13, 2022   1323 · Assessment of left ventricular ejection fraction somewhat difficult due to the presence of atrial fibrillation.  · Left ventricular systolic function is low normal. Left ventricular ejection fraction appears to be 51 - 55%.  · Left ventricular wall thickness is consistent with mild to moderate concentric hypertrophy.  · The right ventricular cavity is dilated. Mildly reduced right ventricular systolic function noted.  · Biatrial enlargment is noted.  · No hemodynamically significant valvular abnormalities identified on this study.    This is her last echo [TS]   1412 Patient EKG shows A. fib which she had on her echo the last time  diffuse ST segment depression consistent with inferolateral ischemia denies any chest pain but has shortness of breath [TS]   1558 D-dimer is chronically elevated and age adjusted will be negative [TS]   1559 Years Algorithm for Pulmonary  EmbolusTo be used in hemodynamically stable patient >18 years oldNo signs of DVT are present, there is no hemoptysis, PE is not the most likely diagnosis and the D-dimer is not greater or equal to 1000 ng/mL. Therefore PE is excluded . The Years algorithm rules out PE (0.43 % with symptomatic VTE during 3-month follow-up) [TS]   1612  The patient's symptoms are now somewhat improved.  Patient is not having pain.  No chest pain, she has difficulty breathing and, palpitations but no nausea, vomiting   No anxiety or dizziness.  Vital signs have been reviewed and appear to be correct.  Physical exam findings are improved.  Alert.  Oriented X3 .  In mild respiratory distress.  Breath sounds diminished bilaterally with some rales and rhonchi respiratory distress has improved,   Normal heart rate and rhythm.  Heart sounds normal.  .  Abdomen soft and nontender.  No abdominal tenderness or guarding or rebound tenderness.  Skin warm and dry.  No cyanosis or diaphoresis.  Oriented X 3.  Not anxious.  No alteration in mental status or weakness.    Patient was seen in the ED with complaint of progressive worsening shortness of breath and fluid retention she had gained 5 pounds she states....  Patient history and physical examination and assessment were consistent with.. Consistent with congestive heart failure with pedal edema and JVD....  The patient underwent a comprehensive work-up which includes  Labs: Studies CBC cardiac markers obtained on the patient.  Radiology: Chest x-ray was obtained on the patient.  Cardiology: An EKG was performed the patient.          [TS]   1617               Test Results: Prior lab results and testing were discussed and evaluated currently the patient has got renal insufficiency elevated BNP along with some mild anemia.  Patient's EKG shows A. fib which is chronic with some lateral and inferior ischemic changes i.e. T wave inversion which is new.    During the process of evaluation the patient  started getting treatment for his symptoms/condition and received      Medication: Patient was given IV Diuril since p.o. Lasix at home has not been working.  And was given amiodarone and magnesium.  To control her rate        Currently the patient's condition is improved oxygen saturation have improved the patient's heart rate has improved with the magnesium and amiodarone.  Hemodynamics:  Vitals: Are stable   [TS]      ED Course User Index  [TS] aFbian Adams MD                                                 MDM  Number of Diagnoses or Management Options  Diagnosis management comments: Differential Diagnosis:  I considered pulmonary etiology, asthma, chronic obstructive pulmonary disease, pneumonia, pulmonary embolism, adult respiratory distress syndrome, pneumothorax, pleural effusion, pulmonary fibrosis, cardiac etiology, congestive heart failure, myocardial infarction, metabolic etiology, diabetic ketoacidosis, uremia, acidosis, sepsis, anemia, drug related etiology, hyperventilation and CNS disease as a possible cause of dyspnea in this patient. This is a partial list of diagnoses considered.            Amount and/or Complexity of Data Reviewed  Clinical lab tests: ordered and reviewed  Tests in the radiology section of CPT®: ordered  Tests in the medicine section of CPT®: reviewed and ordered    Risk of Complications, Morbidity, and/or Mortality  Presenting problems: moderate  Diagnostic procedures: moderate  Management options: moderate        Final diagnoses:   Congestive heart failure, unspecified HF chronicity, unspecified heart failure type (HCC)   COPD exacerbation (HCC)   Atrial fibrillation with RVR (HCC)       ED Disposition  ED Disposition     ED Disposition Condition Comment    Decision to Admit  Level of Care: Telemetry [5]   Diagnosis: Congestive heart failure, unspecified HF chronicity, unspecified heart failure type (HCC) [[id number]]   Admitting Physician: SYD CARLOS [[id number]]    Attending Physician: SYD CARLOS [[id number]]   Certification: I Certify That Inpatient Hospital Services Are Medically Necessary For Greater Than 2 Midnights            No follow-up provider specified.       Medication List      No changes were made to your prescriptions during this visit.          Fabian Adams MD  02/13/22 1201

## 2022-02-14 NOTE — PROGRESS NOTES
1           [facility] Medicine Services  INPATIENT PROGRESS NOTE    Patient Name: Cindy Hicks  Date of Admission: 2/13/2022  Today's Date: 02/14/22  Length of Stay: 1  Primary Care Physician: [place] Wade DO    Subjective   Chief Complaint: Follow-up  HPI   Admitted yesterday for fluid overload/retention.  Presented with with shortness of breath  Patient has known COPD.  She was recently prescribed Augmentin by Beth Tony on February 10  She normally uses oxygen at home    With diuresis her creatinine slightly up at 1.56 (1.4)  Sodium remains stable at 134  CO2 increased at 35 from 29    The recorded weight does not appear to be accurate but if I am comparing bed scale to bed scale she must have lost 2 pounds since January 27.    She has negative fluid balance of 1200 since yesterday      Echocardiogram from January 26, 2022 showed normal EF details as shown below    Results for orders placed during the hospital encounter of 01/21/22    Adult Transthoracic Echo Complete W/ Cont if Necessary Per Protocol    Interpretation Summary  · Assessment of left ventricular ejection fraction somewhat difficult due to the presence of atrial fibrillation.  · Left ventricular systolic function is low normal. Left ventricular ejection fraction appears to be 51 - 55%.  · Left ventricular wall thickness is consistent with mild to moderate concentric hypertrophy.  · The right ventricular cavity is dilated. Mildly reduced right ventricular systolic function noted.  · Biatrial enlargment is noted.  · No hemodynamically significant valvular abnormalities identified on this study.    States she feels better now  Still has fullness on her abdomen  Still has swelling  States she urinates well and frequently    Review of Systems     All pertinent negatives and positives are as above. All other systems have been reviewed and are negative unless otherwise stated.     Objective    Temp:   [97.4 °F (36.3 °C)-98.5 °F (36.9 °C)] 98.4 °F (36.9 °C)  Heart Rate:  [] 79  Resp:  [16-22] 22  BP: (104-129)/(63-88) 108/63  Physical Exam  Pleasant woman  Not in any distress  Sat on her bed  Speaks in full sentences  Nasal cannula is attached to the regulator however the regulator was on top of the side table.  His saturation ranges anywhere from 88 to 91% of oxygen  Nurse Sofia subsequently came in.  She told me that she readily desaturates with movement  Lungs are clear to auscultation, diminished breath sound no gross wheezes or crackles  S1-S2 irregular, no gross murmur appreciated  Soft abdomen, limited exam as patient is seated  Positive pitting edema bilateral lower extremities  Scab lateral surface of distal leg without any obvious infection  Dry scaly skin lower extremities  Warm dry skin  Good capillary refill      Results Review:  I have reviewed the labs, radiology results, and diagnostic studies.    Laboratory Data:   Results from last 7 days   Lab Units 02/13/22  1358   WBC 10*3/mm3 6.00   HEMOGLOBIN g/dL 9.9*   HEMATOCRIT % 31.3*   PLATELETS 10*3/mm3 222        Results from last 7 days   Lab Units 02/14/22  0506 02/13/22  1732 02/13/22  1358   SODIUM mmol/L 134* 133* 134*   POTASSIUM mmol/L 4.0 4.6 4.5   CHLORIDE mmol/L 92* 94* 96*   CO2 mmol/L 35.0* 29.0 29.0   BUN mg/dL 30* 30* 30*   CREATININE mg/dL 1.56* 1.43* 1.40*   CALCIUM mg/dL 9.2 9.1 8.8   BILIRUBIN mg/dL  --   --  0.4   ALK PHOS U/L  --   --  83   ALT (SGPT) U/L  --   --  76*   AST (SGOT) U/L  --   --  42*   GLUCOSE mg/dL 98 110* 105*       Culture Data:   No results found for: BLOODCX, URINECX, WOUNDCX, MRSACX, RESPCX, STOOLCX    Radiology Data:   Imaging Results (Last 24 Hours)     ** No results found for the last 24 hours. **          I have reviewed the patient's current medications.     Assessment/Plan     Active Hospital Problems    Diagnosis    • Congestive heart failure (HCC)      Problem list  · Fluid retention as evidenced  by edema of lower extremities, chronic pleural effusion   · Shortness of breath/difficulty breathing in patient with known COPD   · COPD stage III/emphysematous lung disease -continue Symbicort/DuoNeb -she does not appear to be in acute exacerbation at time of evaluation  · Acute on chronic hypoxic respiratory failure.  Normally use 2-1/2 to 3 L but recently on 3.5 L from blood gas obtained.  PaO2 though is 130.  Pulmonary following in outpatient setting with plan to follow-up CT  · Atrial fibrillation.  On chronic anticoagulation rate is anywhere from 88 based on EKG and 101-109.  Patient received amiodarone in the emergency room.  · Chronic diastolic heart failure with elevated proBNP  · Patient was recently started on Augmentin on February 10 by Beth Tony for consolidation of right lower lung and reported complaint of blood-streaked sputum in the setting of chronic anticoagulation.  I will continue this  · Probably underlying chronic kidney disease     Plan:  Monitor BMP  Continue diuretic  Monitor fluid status  Regulator attached back to the wall.  O2 saturation improved to 93 to 95% on 4 L  We will wean down as tolerated to lowest FiO2 close to her home requirement  Continue present management  Increase activities as tolerated  Discussed with nurse Black at bedside    amoxicillin-clavulanate, 1 tablet, Oral, BID  apixaban, 5 mg, Oral, Q12H  atorvastatin, 40 mg, Oral, Nightly  budesonide-formoterol, 2 puff, Inhalation, BID  busPIRone, 5 mg, Oral, BID With Meals  cetirizine, 10 mg, Oral, Daily  docusate sodium, 100 mg, Oral, BID  furosemide, 40 mg, Intravenous, BID  guaiFENesin, 1,200 mg, Oral, BID  lisinopril, 40 mg, Oral, Daily  metoprolol tartrate, 75 mg, Oral, Q12H  multivitamin with minerals, 1 tablet, Oral, Daily  pantoprazole, 40 mg, Oral, BID  pramipexole, 0.25 mg, Oral, Nightly  sertraline, 50 mg, Oral, Daily  sodium chloride, 10 mL, Intravenous, Q12H                    Discharge Planning: To be  determined  electronically signed by Anuj Santos MD, 02/14/22, 15:07 CST.

## 2022-02-14 NOTE — OUTREACH NOTE
Medical Week 3 Survey      Responses   [facility] patient discharged from? [place]   Does the patient have one of the following disease processes/diagnoses(primary or secondary)? Other   Week 3 attempt successful? No   Unsuccessful attempts Attempt 1   Rescheduled Revoked   Revoke Readmitted          Linda Sotelo RN

## 2022-02-14 NOTE — PLAN OF CARE
Goal Outcome Evaluation:  Plan of Care Reviewed With: patient        Progress: no change  Outcome Summary: Patient c/o soa, up with assist x1, genealized weakness, medicated for headache with relief, AF  on telemetry, monitor for changes, nicholas HAYWARD as needed.

## 2022-02-14 NOTE — PLAN OF CARE
Goal Outcome Evaluation:  Plan of Care Reviewed With: patient        Progress: no change  Outcome Summary: Admit from ER with CHF. She was SOA and had a weight gain of 5 pounds in 2 days. She is on o2 at 4L per NC. edema to lower ext. Lasix ordered. cont to monitor.

## 2022-02-14 NOTE — NURSING NOTE
Patient is current with [facility] services and services will continue upon hospital discharge if appropriate.

## 2022-02-14 NOTE — CASE MANAGEMENT/SOCIAL WORK
Discharge Planning Assessment  [facility]     Patient Name: Cindy Hicks  MRN: [id number]  Today's Date: 2/14/2022    Admit Date: 2/13/2022     Discharge Needs Assessment     Row Name 02/14/22 1138       Living Environment    Lives With alone    Current Living Arrangements home/apartment/condo    Primary Care Provided by self    Provides Primary Care For no one, unable/limited ability to care for self    Family Caregiver if Needed grandchild(gonzalo), adult    Quality of Family Relationships supportive; involved       Transition Planning    Patient/Family Anticipates Transition to home       Discharge Needs Assessment    Equipment Currently Used at Home walker, standard; scales; oxygen    Equipment Needed After Discharge wheelchair, manual; commode               Discharge Plan     Row Name 02/14/22 6818       Plan    Plan Patient lives alone but states has granddaughter, Genevieve checks on her daily. Also has a great grandson that is very helpful. Has home oxygen supplied by [employer] which she uses 2.5 L continuous. Current with [facility] and wants to continue this service. Patient would like a wheelchair and bedside commode, but she thinks this may have already been ordered through [facility]. Will continue to follow for dc needs.              Continued Care and Services - Admitted Since 2/13/2022    Coordination has not been started for this encounter.          Demographic Summary    No documentation.                Functional Status    No documentation.                Psychosocial    No documentation.                Abuse/Neglect    No documentation.                Legal    No documentation.                Substance Abuse    No documentation.                Patient Forms    No documentation.                   Merlina A Fletcher, RN

## 2022-02-15 NOTE — PLAN OF CARE
Goal Outcome Evaluation:  Plan of Care Reviewed With: patient           Outcome Summary: Resting with HOB up, remains soa with activity, lasix given with adq output. No injury up with assist to bsc. noc/o pain or nausea today. Telel reads AF 84/103. will cont to monitor.

## 2022-02-15 NOTE — PROGRESS NOTES
"    [facility] Medicine Services  INPATIENT PROGRESS NOTE    Patient Name: Cindy Hicks  Date of Admission: 2/13/2022  Today's Date: 02/15/22  Length of Stay: 2  Primary Care Physician: Austin Wade DO    Subjective   Chief Complaint: f/u   HPI   Good urine output but renal function is worsened.   Old record indicates hx of ckd stage 3  Her crea in Jan 2021  Range from 1.18 - 1.28  Today it is 1.68 after diuresing due to fluid retention/fluid overload  EF 51-55%  Mildly reduced rvsf; rvsp is 35-45mm hg  Now on baseline o2 (3 LPM)    Patient states that she is feeling better.  She rubbed her belly and states that there is less fullness.  She feels she is less swollen.  She is urinating well.  Breathing is better.    \"Not quite as full\"  Cough  Review of Systems     All pertinent negatives and positives are as above. All other systems have been reviewed and are negative unless otherwise stated.     Objective    Temp:  [97.4 °F (36.3 °C)-98.5 °F (36.9 °C)] 97.7 °F (36.5 °C)  Heart Rate:  [] 76  Resp:  [18-22] 18  BP: ()/(54-71) 104/62  Physical Exam  Yesterday when I saw her the regulator was not hooked on the wall.  Today I noticed that the nasal cannula is on top of her head.  Her O2 saturation ranged anywhere from 88 to 93%.  It varies as she talks  Pleasant woman  Not in any distress  Sat on her bed  Speaks in full sentences  Lungs are clear to auscultation, diminished breath sound no gross wheezes or crackles  S1-S2 irregular, no gross murmur appreciated  Soft abdomen, limited exam as patient is seated  Decrease edema of lower extremities  Scab lateral surface of distal leg without any obvious infection  Dry scaly skin lower extremities  Warm dry skin  Good capillary refill       Results Review:  I have reviewed the labs, radiology results, and diagnostic studies.    Laboratory Data:   Results from last 7 days   Lab Units 02/13/22  1358   WBC 10*3/mm3 " 6.00   HEMOGLOBIN g/dL 9.9*   HEMATOCRIT % 31.3*   PLATELETS 10*3/mm3 222        Results from last 7 days   Lab Units 02/15/22  0505 02/14/22  0506 02/13/22  1732 02/13/22  1358 02/13/22  1358   SODIUM mmol/L 133* 134* 133*   < > 134*   POTASSIUM mmol/L 4.3 4.0 4.6   < > 4.5   CHLORIDE mmol/L 92* 92* 94*   < > 96*   CO2 mmol/L 35.0* 35.0* 29.0   < > 29.0   BUN mg/dL 33* 30* 30*   < > 30*   CREATININE mg/dL 1.68* 1.56* 1.43*   < > 1.40*   CALCIUM mg/dL 8.8 9.2 9.1   < > 8.8   BILIRUBIN mg/dL  --   --   --   --  0.4   ALK PHOS U/L  --   --   --   --  83   ALT (SGPT) U/L  --   --   --   --  76*   AST (SGOT) U/L  --   --   --   --  42*   GLUCOSE mg/dL 91 98 110*   < > 105*    < > = values in this interval not displayed.       Culture Data:   No results found for: BLOODCX, URINECX, WOUNDCX, MRSACX, RESPCX, STOOLCX    Radiology Data:   Imaging Results (Last 24 Hours)     ** No results found for the last 24 hours. **          I have reviewed the patient's current medications.     Assessment/Plan     Active Hospital Problems    Diagnosis    • Congestive heart failure (HCC)      Problem list  · Fluid retention as evidenced by edema of lower extremities, chronic pleural effusion   · Shortness of breath/difficulty breathing in patient with known COPD   · COPD stage III/emphysematous lung disease -continue Symbicort/DuoNeb -she does not appear to be in acute exacerbation at time of evaluation  · Acute on chronic hypoxic respiratory failure.  Normally use 2-1/2 to 3 L but recently on 3.5 L from blood gas obtained.  PaO2 though is 130.  Pulmonary following in outpatient setting with plan to follow-up CT  · Atrial fibrillation.  On chronic anticoagulation rate is anywhere from 88 based on EKG and 101-109.  Patient received amiodarone in the emergency room.  · Chronic diastolic heart failure with elevated proBNP  · Patient was recently started on Augmentin on February 10 by Beth Tony for consolidation of right lower lung and  reported complaint of blood-streaked sputum in the setting of chronic anticoagulation.  I will continue this  · Probably underlying chronic kidney disease stage III       Discussion.  Fullness previously described by patient on her abdomen including swelling are reportedly improved.  Overall condition improved with diuretic.  However, renal function noted to have increased consequential to diuretic treatment.  Her creatinine since February 11 ranges anywhere from 1.56-1.72   In January it ranges anywhere from 1.18-1.3  This may be progression of her kidney disease which she needs to follow closely with primary care provider and outpatient nephrology.  Monitor stability of creatinine.  If remains stable anticipate that she can be discharged tomorrow  Increase activities as tolerated    I suspect that patient's lung disease causes elevated right ventricular systolic pressure (35 to 45 mm).  This pressure likely transmitted to lower extremity manifesting as fluid retention when she came in.    Discussed low-salt diet  Continue present management    amoxicillin-clavulanate, 1 tablet, Oral, BID  apixaban, 5 mg, Oral, Q12H  atorvastatin, 40 mg, Oral, Nightly  budesonide-formoterol, 2 puff, Inhalation, BID  busPIRone, 5 mg, Oral, BID With Meals  cetirizine, 10 mg, Oral, Daily  docusate sodium, 100 mg, Oral, BID  guaiFENesin, 1,200 mg, Oral, BID  lisinopril, 40 mg, Oral, Daily  metoprolol tartrate, 75 mg, Oral, Q12H  multivitamin with minerals, 1 tablet, Oral, Daily  pantoprazole, 40 mg, Oral, BID  pramipexole, 0.25 mg, Oral, Nightly  sertraline, 50 mg, Oral, Daily  sodium chloride, 10 mL, Intravenous, Q12H              Discharge Planning: Probably home tomorrow.    Electronically signed by Anuj Santos MD, 02/15/22, 07:38 CST.

## 2022-02-16 NOTE — DISCHARGE SUMMARY
[facility] Medicine Services  DISCHARGE SUMMARY       Date of Admission: 2/13/2022  Date of Discharge:  2/16/2022  Primary Care Physician: Austin Wade,     Discharge Diagnoses:    Fluid retention as evidenced by edema of lower extremities, chronic pleural effusion  Shortness of breath/difficulty breathing at baseline patient with known chronic obstructive pulmonary disease  COPD stage III/emphysematous lung disease  Acute on chronic hypoxic respiratory failure  Atrial fibrillation on chronic anticoagulation  Chronic diastolic heart failure without exacerbation  Recent antibiotic prescription by lung specialist for consolidation right lower lung with blood-streaked sputum  Chronic kidney disease stage III probably with progression.    Presenting Problem/History of Present Illness:  Congestive heart failure, unspecified HF chronicity, unspecified heart failure type (HCC) [I50.9]         Hospital Course  She is an 86-year-old woman who I admitted on February 13 for fluid overload/retention.  She presented with shortness of breath.  She has known history of chronic obstructive pulmonary disease and was recently prescribed Augmentin by Beth Tony NP on February 10.  She normally uses oxygen.  With diuresis her creatinine is went up but she feels better were abdominal fullness and swelling has improved.  She is back to normal oxygen requirement from home setting.  Review of echocardiogram dating back January 21, 2022 showed normal EF at 51 to 55%.  Patient also has mild to moderate concentric hypertrophy.  Estimated right ventricular systolic pressure from tricuspid regurgitation is mildly elevated at 35-45.  This is not significantly different from echocardiogram dating back to July 31, 2021.  Noted her creatinine from January 2021 ranges anywhere from 1.18-1.28.  Prior to discharge her creatinine has improved from 1.68 to 1.61.  So far her creatinine ranges anywhere  from 1.56-1.72 through her hospitalization.  I suspect this is progression of her chronic kidney disease stage III which she needs close follow-up with primary care provider in outpatient nephrology to monitor stability of her renal function.    Since her creatinine through her hospitalization appears stable slightly higher compared January, I think is not unreasonable to continue ACE inhibitor at the lower dose (blood pressure 103/56) but would recommend follow-up basic metabolic panel to closely monitor.    Medications were adjusted according to her renal function as per recommendation by  pharmacist.    Patient is feeling better and expressed readiness to go home.  I felt medically stable and appropriate for discharge.    Procedures Performed: None    Consults:   None      Pertinent Test Results:  Lab Results (last 72 hours)     Procedure Component Value Units Date/Time    Basic Metabolic Panel [[id number]]  (Abnormal) Collected: 02/16/22 0525    Specimen: Blood Updated: 02/16/22 0702     Glucose 96 mg/dL      BUN 35 mg/dL      Creatinine 1.61 mg/dL      Sodium 133 mmol/L      Potassium 4.5 mmol/L      Chloride 92 mmol/L      CO2 34.0 mmol/L      Calcium 8.9 mg/dL      eGFR Non African Amer 30 mL/min/1.73      BUN/Creatinine Ratio 21.7     Anion Gap 7.0 mmol/L     Narrative:      GFR Normal >60  Chronic Kidney Disease <60  Kidney Failure <15      Basic Metabolic Panel [[id number]]  (Abnormal) Collected: 02/15/22 0505    Specimen: Blood Updated: 02/15/22 0621     Glucose 91 mg/dL      BUN 33 mg/dL      Creatinine 1.68 mg/dL      Sodium 133 mmol/L      Potassium 4.3 mmol/L      Chloride 92 mmol/L      CO2 35.0 mmol/L      Calcium 8.8 mg/dL      eGFR Non African Amer 29 mL/min/1.73      BUN/Creatinine Ratio 19.6     Anion Gap 6.0 mmol/L     Narrative:      GFR Normal >60  Chronic Kidney Disease <60  Kidney Failure <15      Basic Metabolic Panel [[id number]]  (Abnormal) Collected: 02/14/22 0506    Specimen: Blood  "Updated: 02/14/22 0622     Glucose 98 mg/dL      BUN 30 mg/dL      Creatinine 1.56 mg/dL      Sodium 134 mmol/L      Potassium 4.0 mmol/L      Chloride 92 mmol/L      CO2 35.0 mmol/L      Calcium 9.2 mg/dL      eGFR Non African Amer 31 mL/min/1.73      BUN/Creatinine Ratio 19.2     Anion Gap 7.0 mmol/L     Narrative:      GFR Normal >60  Chronic Kidney Disease <60  Kidney Failure <15      Basic Metabolic Panel [[id number]]  (Abnormal) Collected: 02/13/22 1732    Specimen: Blood Updated: 02/13/22 1823     Glucose 110 mg/dL      BUN 30 mg/dL      Creatinine 1.43 mg/dL      Sodium 133 mmol/L      Potassium 4.6 mmol/L      Chloride 94 mmol/L      CO2 29.0 mmol/L      Calcium 9.1 mg/dL      eGFR Non African Amer 35 mL/min/1.73      BUN/Creatinine Ratio 21.0     Anion Gap 10.0 mmol/L     Narrative:      GFR Normal >60  Chronic Kidney Disease <60  Kidney Failure <15          Imaging Results (Last 7 Days)     Procedure Component Value Units Date/Time    XR Chest 1 View [[id number]] Collected: 02/13/22 1438     Updated: 02/13/22 1442    Narrative:      Frontal upright radiograph of the chest 2/13/2022 2:30 PM CST     HISTORY: Shortness of breath     COMPARISON: Chest exam dated 1/24/2022.     FINDINGS:      Bilateral patchy infiltrates in the mid and lower lung fields with areas  of bibasilar consolidation as well as a layering right pleural effusion.  Cardiomegaly with central pulmonary congestion. No pneumothorax. No  acute bony abnormality.       Impression:      1. Cardiomegaly with interstitial and alveolar pulmonary edema as well  as a moderate sized layering right pleural effusion.        This report was finalized on 02/13/2022 14:39 by Dr Slim Cancino, .          Condition on Discharge:   Stable  Physical Exam on Discharge:  /56 (BP Location: Right arm, Patient Position: Sitting)   Pulse 78   Temp 97.8 °F (36.6 °C) (Oral)   Resp 16   Ht 165.1 cm (65\")   Wt 72.6 kg (160 lb)   SpO2 97%   BMI 26.63 kg/m² "   Physical Exam  Pleasant woman  Not in any distress  Sat on her bed  Speaks in full sentences  Lungs are clear to auscultation, diminished breath sound no gross wheezes or crackles  S1-S2 irregular, no gross murmur appreciated  Soft abdomen, no abdominal fullness, no gross subcutaneous edema, no gross hepatosplenomegaly  Decrease edema of lower extremities  Scab lateral surface of distal leg without any obvious infection  Dry scaly skin lower extremities  Warm dry skin  Good capillary refill    Discharge Disposition:      Discharge Medications:     Discharge Medications      Changes to Medications      Instructions Start Date   apixaban 2.5 MG tablet tablet  Commonly known as: ELIQUIS  What changed:   · medication strength  · how much to take   2.5 mg, Oral, Every 12 Hours Scheduled         Continue These Medications      Instructions Start Date   atorvastatin 40 MG tablet  Commonly known as: LIPITOR   40 mg, Oral, Daily      budesonide-formoterol 160-4.5 MCG/ACT inhaler  Commonly known as: SYMBICORT   2 puffs, Inhalation, 2 Times Daily      busPIRone 5 MG tablet  Commonly known as: BUSPAR   5 mg, Oral, 2 Times Daily      docusate sodium 100 MG capsule  Commonly known as: COLACE   100 mg, Oral, 2 Times Daily      furosemide 20 MG tablet  Commonly known as: LASIX   20 mg, Oral, Daily PRN      HYDROcodone-acetaminophen 5-325 MG per tablet  Commonly known as: NORCO   0.5 tablets, Oral, 2 Times Daily PRN      ipratropium-albuterol 0.5-2.5 mg/3 ml nebulizer  Commonly known as: DUO-NEB   3 mL, Nebulization, 4 Times Daily PRN      levocetirizine 5 MG tablet  Commonly known as: Xyzal   5 mg, Oral, Every Evening      Metoprolol Tartrate 75 MG tablet   75 mg, Oral, Every 12 Hours Scheduled      Mucinex 600 MG 12 hr tablet  Generic drug: guaiFENesin   1,200 mg, Oral, 2 Times Daily      multivitamin with minerals tablet tablet   1 tablet, Oral, Daily      ondansetron ODT 4 MG disintegrating tablet  Commonly known as:  ZOFRAN-ODT   4 mg, Translingual, Every 8 Hours PRN      pantoprazole 40 MG EC tablet  Commonly known as: PROTONIX   40 mg, Oral, 2 Times Daily      pramipexole 0.25 MG tablet  Commonly known as: Mirapex   0.25 mg, Oral, Every Night at Bedtime      sertraline 50 MG tablet  Commonly known as: Zoloft   50 mg, Oral, Daily      vitamin D 1.25 MG (66873 UT) capsule capsule  Commonly known as: ERGOCALCIFEROL   50,000 Units, Oral, Weekly         Stop These Medications    amLODIPine 10 MG tablet  Commonly known as: NORVASC     amoxicillin-clavulanate 875-125 MG per tablet  Commonly known as: Augmentin        ASK your doctor about these medications      Instructions Start Date   lisinopril 40 MG tablet  Commonly known as: PRINIVIL,ZESTRIL   40 mg, Oral, Daily             Discharge Diet:   Diet Instructions     Diet: Cardiac, Renal      Discharge Diet:  Cardiac  Renal             Discharge Care Plan / Instructions:  Daily weight  Monitor blood pressure  Follow-up closely with primary care provider with basic metabolic panel    Activity at Discharge:   Activity Instructions     Gradually Increase Activity Until at Pre-Hospitalization Level      Measure Blood Pressure      Measure Weight            Follow-up Appointments:   PCP 1 wk - with [facility]  Cardiology  (Ryan Garcia NP) within 1 wk  Keep f/u Beth Cecily 4/28/22 with plan for CT- sooner as needed    Test Results Pending at Discharge:      Electronically signed by Anuj Santos MD, 2/16/2022, 15:53 CST.    Time: > 30 mins        Part of this note may be an electronic transcription/translation of spoken language to printed text using the Dragon Dictation System.

## 2022-02-16 NOTE — PLAN OF CARE
Goal Outcome Evaluation:      Pt oxygen has been wnl on 3L. Increased oxygen for comfort during the night with pt complaining of some soa. PRN norco given at beginning of shift for neck and back pain. AF  on tele. Safety maintained

## 2022-02-17 NOTE — OUTREACH NOTE
Prep Survey      Responses   [facility] [place] patient discharged from? [place]   Is LACE score < 7 ? No   Emergency Room discharge w/ pulse ox? No   Eligibility [facility]   Date of Admission 02/13/22   Date of Discharge 02/16/22   Discharge Disposition Home or Self Care   Discharge diagnosis Fluid retention    Does the patient have one of the following disease processes/diagnoses(primary or secondary)? Other   Does the patient have Home health ordered? No   Is there a DME ordered? No   Prep survey completed? Yes          Lizzie Aparicio RN

## 2022-02-17 NOTE — CASE COMMUNICATION
Patient missed a PT visit from [facility] on 12/17/21    Reason: Patient request      For your records only.   As per home health protocol, MD must be notified of missed/cancelled visits; therefore the prescribed frequency was not met.

## 2022-02-17 NOTE — CASE MANAGEMENT/SOCIAL WORK
Continued Stay Note  [facility]     Patient Name: Cindy Hicks  MRN: [id number]  Today's Date: 2/17/2022    Admit Date: 2/13/2022     Discharge Plan     Row Name 02/17/22 1516       Plan    Plan Home and resume [facility]    Patient/Family in Agreement with Plan yes    Final Discharge Disposition Code 06 - home with home health care    Final Note  has informed Jill at [facility] of discharge.  [facility] will resume services.               Discharge Codes    No documentation.               Expected Discharge Date and Time     Expected Discharge Date Expected Discharge Time    Feb 16, 2022             VIRGILIO Marin

## 2022-02-17 NOTE — TELEPHONE ENCOUNTER
Patient called she states that she keeps gaining weight she is up to 159 lbs she has taken a water pill this morning and she states that its not helping, she knows that she has kidneys issues and the water pill can hurt if she takes to many, she is having a hard time breathing, I mentioned she needs to go to the ER she stated she just came from there and the sent her home. She was wondering what to do.   Patient also requested a wheel chair and beside commode please

## 2022-02-17 NOTE — OUTREACH NOTE
"Call Center TCM Note      Responses   [facility] patient discharged from? [place]   Does the patient have one of the following disease processes/diagnoses(primary or secondary)? Other   TCM attempt successful? Yes   Call start time 1601   Call end time 1608   Discharge diagnosis Fluid retention    Is patient permission given to speak with other caregiver? Yes   List who call center can speak with Alexander chahal    Person spoke with today (if not patient) and relationship Alexander chahal    Meds reviewed with patient/caregiver? Yes   Is the patient having any side effects they believe may be caused by any medication additions or changes? No   Does the patient have all medications ordered at discharge? N/A   Is the patient taking all medications as directed (includes completed medication regime)? Yes   Does the patient have a primary care provider?  Yes   Does the patient have an appointment with their PCP within 7 days of discharge? Yes   Comments regarding PCP hospital fu appt on 2/23/22   What is the Home health agency?  [facility]   Has home health visited the patient within 72 hours of discharge? Yes  [on the way per pt ]   DME comments 3L O2 \"high 90's\" when at rest    Psychosocial issues? No   Did the patient receive a copy of their discharge instructions? Yes   Nursing interventions Reviewed instructions with patient   What is the patient's perception of their health status since discharge? Same  [SOB and fluid retention ]   Is the patient/caregiver able to teach back signs and symptoms related to disease process for when to call PCP? Yes   Is the patient/caregiver able to teach back signs and symptoms related to disease process for when to call 911? Yes   Is the patient/caregiver able to teach back the hierarchy of who to call/visit for symptoms/problems? PCP, Specialist, Home health nurse, Urgent Care, ED, 911 Yes   If the patient is a current smoker, are they able to teach back resources for " cessation? Not a smoker   TCM call completed? Yes   Wrap up additional comments pt reached out to PCP per [facility] notes re: SOB- encouraged pt to call 911/go to ED if worsens - pt aware and did not want to go to ER at this time           Idalia Vargas RN    2/17/2022, 16:08 EST

## 2022-02-17 NOTE — TELEPHONE ENCOUNTER
She was discharged yesterday from being admitted. She has a hospital follow up scheduled. If she is having severe SOB she will need to return to the ER. Otherwise she may see if her follow up can be moved up.   Wheelchair and bedside commode order was placed on 02/08/2022

## 2022-02-17 NOTE — TELEPHONE ENCOUNTER
SPOKE WITH CHRISTOPHER MONTESINOS, OSMANY STATED THAT IT HAS TO BE MENTIONED IN THE OFFICE NOTES WHY PATIENT NEEDS WC  HOW LONG SHE NEEDS IT, CAN SHE USE IT HERSELF, WHERE SHE USES IT AND WHEN SHE NEEDS IT BASED ON HER ADL'S.    PLEASE ADDEND THE NOTE IF POSSIBLE.  OSMANY WITH CHRISTOPHER MONTESINOS STATED THAT THEY WOULD ALSO NEED DX CODES.

## 2022-02-17 NOTE — TELEPHONE ENCOUNTER
PATIENT HAS BEEN CALLED, SHE STATED THAT SHE HAS SOME FLUID BUILD UP AND SHE FEELS IF SHE COULD GET SOME OF IT OFF SHE WOULD BE ABLE TO BREATH BETTER.

## 2022-02-17 NOTE — TELEPHONE ENCOUNTER
I unfortunately cannot make addendum to her last note since the visit was with Dr. Wade. This will require a visit.

## 2022-02-18 PROBLEM — N18.32 STAGE 3B CHRONIC KIDNEY DISEASE (HCC): Status: ACTIVE | Noted: 2022-01-01

## 2022-02-18 PROBLEM — E87.70 VOLUME OVERLOAD: Status: ACTIVE | Noted: 2022-01-01

## 2022-02-18 PROBLEM — E87.1 HYPONATREMIA: Status: ACTIVE | Noted: 2022-01-01

## 2022-02-18 PROBLEM — Z79.01 CHRONIC ANTICOAGULATION: Status: ACTIVE | Noted: 2022-01-01

## 2022-02-18 NOTE — TELEPHONE ENCOUNTER
PATIENT HAS BEEN CALLED, SHE STATED THAT SHE IS NEEDING THE WHEELCHAIR BECAUSE ITS GETTING HARDER FOR HER TO MOVE AROUND IN THE HOUSE.  SHE STATED THAT SHE GETS MORE OUT OF BREATH.   SHE DID STATE THAT SHE MAY HAVE TO GO BACK TO THE ER BECAUSE OF HER BREATHING BUT DID STATE THAT SHE IS BETTER TODAY THAN YESTERDAY.    SHE WILL DISCUSS THE WHEELCHAIR WITH DR MCKENZIE AT HER NEXT APPT WHICH IS ON 02/23/2022

## 2022-02-18 NOTE — HOME HEALTH
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      Plan for next visit:   Gait training, endurance training, transfer training to bedside toilet

## 2022-02-19 NOTE — OUTREACH NOTE
Medical Week 2 Survey      Responses   [facility] patient discharged from? [place]   Does the patient have one of the following disease processes/diagnoses(primary or secondary)? Other   Week 2 attempt successful? No   Revoke Readmitted          Lizzie Aparicio RN

## 2022-02-23 NOTE — OUTREACH NOTE
Prep Survey      Responses   [facility] patient discharged from? [place]   Is LACE score < 7 ? No   Emergency Room discharge w/ pulse ox? No   Eligibility [facility]   Date of Admission 02/18/22   Date of Discharge 02/23/22   Discharge Disposition Home or Self Care   Discharge diagnosis CHF,   right-sided thoracentesis at bedside    Does the patient have one of the following disease processes/diagnoses(primary or secondary)? CHF   Does the patient have Home health ordered? Yes   What is the Home health agency?  [facility]    Is there a DME ordered? Yes   What DME was ordered? Wheelchair via [employer]   Prep survey completed? Yes          Lizzie Aparicio RN

## 2022-02-24 NOTE — OUTREACH NOTE
"    Call Center TCM Note      Responses   [facility] patient discharged from? [place]   Does the patient have one of the following disease processes/diagnoses(primary or secondary)? CHF   TCM attempt successful? Yes  [Genevieve & Clara, christos]   Call start time 1219   Call end time 1224   Discharge diagnosis CHF,   right-sided thoracentesis at bedside    Person spoke with today (if not patient) and relationship Patient   Meds reviewed with patient/caregiver? Yes   Is the patient having any side effects they believe may be caused by any medication additions or changes? No   Does the patient have all medications ordered at discharge? Yes   Is the patient taking all medications as directed (includes completed medication regime)? Yes   Does the patient have a primary care provider?  Yes   Does the patient have an appointment with their PCP within 7 days of discharge? Yes   Comments regarding PCP hospital fu appt on 3/2/22 at 10:00 AM   Has the patient kept scheduled appointments due by today? N/A   What is the Home health agency?  [facility]    Has home health visited the patient within 72 hours of discharge? Call prior to 72 hours   What DME was ordered? Wheelchair via [employer]   DME comments 3L O2 \"high 90's\" when at rest    Pulse Ox monitoring --  [Lost connection with pt,  think her phone went dead]   Psychosocial issues? No   Did the patient receive a copy of their discharge instructions? Yes   Nursing interventions Reviewed instructions with patient   What is the patient's perception of their health status since discharge? Improving   Nursing interventions Nurse provided patient education   Is the patient weighing daily? Yes   Does the patient have scales? Yes   Daily weight interventions Education provided on importance of daily weight   Is the patient able to teach back Heart Failure diet management? No   Is the patient able to teach back Heart Failure Zones? No   Is the patient able to " teach back signs and symptoms of worsening condition? (i.e. weight gain, shortness of air, etc.) No   If the patient is a current smoker, are they able to teach back resources for cessation? Not a smoker   Is the patient/caregiver able to teach back the hierarchy of who to call/visit for symptoms/problems? PCP, Specialist, Home health nurse, Urgent Care, ED, 911 Yes   TCM call completed? Yes   Wrap up additional comments Pt states she is doing better,  pt was very SOB when talking. Pt verified [facility] fu appt on 3/2/22. Lost connection with pt,  think her phone went dead,  tried calling back but unsuccessful.            Darlene Evans, RN    2/24/2022, 12:26 EST      Pt states she is doing better;  pt was very SOB when talking. Pt verified [facility] fu appt on 3/2/22. Lost connection with pt;  think her phone went dead;  tried calling back but unsuccessful.

## 2022-03-01 NOTE — HOME HEALTH
FOCUS OF CARE/SKILLED NEED: Medication education, daily weights and log, home management of edema, home safety/fall prevention.    TEACHING/INTERVENTIONS: Pulmonary appliance teaching, pressure wound education, oxygen safety, emergency plans and pain management    PROGRESS TOWARD GOALS: Patient     PHYSICIAN CONTACT:  yes, dr molina, patient having exacerbation of SOA    FALLS SINCE LAST VISIT?  none    MEDICATION CHANGES SINCE LAST VISIT? none    PLAN FOR NEXT VISIT:Addl education on medications, daily weights and log, edema management, fall prevention.

## 2022-03-02 NOTE — PROGRESS NOTES
Chief Complaint   Patient presents with   • Hospital Follow Up Visit        Date of Admission: 2/18/2022  Date of Discharge:  2/23/2022  Primary Care Physician: Austin Wade DO   TCM: Darlene Evans RN  Presenting Problem/History of Present Illness:  Shortness of breath         Final Discharge Diagnoses:       Active Hospital Problems     Diagnosis     • **COPD with acute exacerbation (HCC)     • Chronic anticoagulation     • Stage 3b chronic kidney disease (HCC)     • Volume overload     • Hyponatremia     • Congestive heart failure (HCC)     • Atrial fibrillation (HCC)     • Chronic respiratory failure with hypoxia and hypercapnia (HCC)     • Essential hypertension        History:  Cindy Hicks is a 86 y.o. female who presents today for follow-up for evaluation of the above:    HPI     Patient presents today for hospital follow up visit for CHF and COPD exacerbation  She reports she has not been feeling well since discharge home. She continues to feel SOB and today is experiencing nausea.  She does have return of lower extremity swelling.  Not currently tolerating compression stockings and her granddaughter who is with her today states that just going to the bathroom at home causing her to feel respiratory distress  Not tolerating activity          ROS:  Review of Systems   Constitutional: Positive for unexpected weight change. Negative for fever.   Respiratory: Positive for cough, shortness of breath and wheezing.    Cardiovascular: Positive for leg swelling.   Gastrointestinal: Positive for nausea.       Ms. Hicks  reports that she quit smoking about 5 months ago. Her smoking use included cigarettes. She has a 35.00 pack-year smoking history. She has never used smokeless tobacco. She reports that she does not drink alcohol and does not use drugs.      Current Outpatient Medications:   •  alendronate (FOSAMAX) 10 MG tablet, Take 1 tablet by mouth 1 (One) Time Per Week., Disp: 10 tablet, Rfl:  1  •  apixaban (ELIQUIS) 2.5 MG tablet tablet, Take 1 tablet by mouth Every 12 (Twelve) Hours. Indications: Atrial Fibrillation, Disp: 60 tablet, Rfl: 0  •  atorvastatin (LIPITOR) 40 MG tablet, Take 1 tablet by mouth Daily., Disp: 90 tablet, Rfl: 1  •  benzonatate (TESSALON) 200 MG capsule, Take 1 capsule by mouth 3 (Three) Times a Day As Needed for Cough., Disp: 30 capsule, Rfl: 0  •  budesonide-formoterol (SYMBICORT) 160-4.5 MCG/ACT inhaler, Inhale 2 puffs 2 (Two) Times a Day., Disp: 30.6 g, Rfl: 3  •  busPIRone (BUSPAR) 5 MG tablet, Take 1 tablet by mouth 2 (Two) Times a Day., Disp: 60 tablet, Rfl: 1  •  docusate sodium (COLACE) 100 MG capsule, Take 1 capsule by mouth 2 (Two) Times a Day., Disp: 20 capsule, Rfl: 0  •  HYDROcodone-acetaminophen (NORCO) 5-325 MG per tablet, Take 0.5 tablets by mouth 2 (Two) Times a Day As Needed for Mild Pain ., Disp: 3 tablet, Rfl: 0  •  ipratropium-albuterol (DUO-NEB) 0.5-2.5 mg/3 ml nebulizer, Take 3 mL by nebulization 4 (Four) Times a Day As Needed for Wheezing or Shortness of Air., Disp: , Rfl:   •  levocetirizine (XYZAL) 5 MG tablet, Take 1 tablet by mouth Every Evening., Disp: 90 tablet, Rfl: 1  •  lisinopril (PRINIVIL,ZESTRIL) 10 MG tablet, Take 1 tablet by mouth Daily., Disp: 30 tablet, Rfl: 0  •  Metoprolol Tartrate 75 MG tablet, Take 75 mg by mouth Every 12 (Twelve) Hours., Disp: 60 tablet, Rfl: 0  •  Mucinex 600 MG 12 hr tablet, Take 2 tablets by mouth 2 (Two) Times a Day., Disp: , Rfl:   •  multivitamin with minerals (VITRUM 50+ ADULT-MULTI PO), Take 1 tablet by mouth Daily., Disp: , Rfl:   •  ondansetron ODT (ZOFRAN-ODT) 4 MG disintegrating tablet, Place 4 mg on the tongue Every 8 (Eight) Hours As Needed for Nausea or Vomiting., Disp: , Rfl:   •  pantoprazole (PROTONIX) 40 MG EC tablet, Take 40 mg by mouth 2 (Two) Times a Day., Disp: , Rfl:   •  pramipexole (Mirapex) 0.25 MG tablet, Take 1 tablet by mouth every night at bedtime., Disp: 90 tablet, Rfl: 1  •  sertraline  "(Zoloft) 50 MG tablet, Take 1 tablet by mouth Daily., Disp: 90 tablet, Rfl: 1  •  vitamin D (ERGOCALCIFEROL) 1.25 MG (36815 UT) capsule capsule, Take 50,000 Units by mouth 1 (One) Time Per Week. On Friday, Disp: , Rfl:   •  torsemide (Demadex) 20 MG tablet, Take 1 tablet by mouth Daily., Disp: 30 tablet, Rfl: 3      OBJECTIVE:  /62 (BP Location: Left arm, Patient Position: Sitting, Cuff Size: Adult)   Pulse 74   Temp 96.6 °F (35.9 °C) (Temporal)   Resp 16   Ht 165.1 cm (65\")   Wt 73.5 kg (162 lb)   SpO2 95%   BMI 26.96 kg/m²    Physical Exam  Pulmonary:      Breath sounds: Decreased air movement present. Examination of the right-upper field reveals decreased breath sounds. Examination of the left-upper field reveals rhonchi. Examination of the right-lower field reveals decreased breath sounds. Examination of the left-lower field reveals decreased breath sounds. Decreased breath sounds and rhonchi present.   Musculoskeletal:      Right lower leg: Edema (2+) present.      Left lower leg: Edema (2+) present.         Assessment/Plan    Diagnoses and all orders for this visit:    1. Hospital discharge follow-up (Primary)  2. Stage 3 severe COPD by GOLD classification (HCC)  -     XR Chest PA & Lateral; Future  -     Basic metabolic panel; Future  She continues on oxygen at 3L per NC.   Increase SOB since discharge home with audible wheezing  She has seen a >3 pound weight gain over last couple days.   Imaging to further evaluate.     3. Essential hypertension  Well controlled, BP goal for age is <140/90 per JNC 8 guidelines and continue current medications    4. Shortness of breath  -     XR Chest PA & Lateral; Future  Imaging to further evaluate. Discussed that if she experiencing respiratory distress that the ER is the best choice.     5. Chronic diastolic (congestive) heart failure (HCC)  -     torsemide (Demadex) 20 MG tablet; Take 1 tablet by mouth Daily.  Dispense: 30 tablet; Refill: 3  Change lasix to " torsemide in the setting of CKD.   She does have f/u with cardiology tomorrow     6. Bilateral lower extremity edema  -     torsemide (Demadex) 20 MG tablet; Take 1 tablet by mouth Daily.  Dispense: 30 tablet; Refill: 3        home health would like orders for blood work when needed.   Cannot tolerate outpatient rehab.   Patient's grand daughter is with her today and states that she does have 24 hours care at home             An After Visit Summary was printed and given to the patient at discharge.  Return in about 1 month (around 4/2/2022). Sooner if problems arise.          Shira VARGHESE. 3/2/2022   Electronically Signed

## 2022-03-02 NOTE — OUTREACH NOTE
CHF Week 2 Survey      Responses   [facility] patient discharged from? [place]   Does the patient have one of the following disease processes/diagnoses(primary or secondary)? CHF   Week 2 attempt successful? Yes   Call start time 1604   Call end time 1612   Meds reviewed with patient/caregiver? Yes   Is the patient having any side effects they believe may be caused by any medication additions or changes? No   Does the patient have all medications ordered at discharge? Yes   Is the patient taking all medications as directed (includes completed medication regime)? Yes   Does the patient have a primary care provider?  Yes   Does the patient have an appointment with their PCP within 7 days of discharge? Yes   Has the patient kept scheduled appointments due by today? Yes   Comments States had chest xray today, and will be taking new diuretic this evening.    Has home health visited the patient within 72 hours of discharge? Yes   Has all DME been delivered? Yes   Pulse Ox monitoring Intermittent   Pulse Ox device source Patient   O2 Sat comments States O2 sats running in the high 90s with 3L O2.   O2 Sat: education provided Sat levels   Psychosocial issues? No   What is the patient's perception of their health status since discharge? Worsening   Nursing interventions Nurse provided patient education   Is the patient weighing daily? No   Does the patient have scales? Yes   Daily weight interventions Education provided on importance of daily weight   Is the patient/caregiver able to teach back the hierarchy of who to call/visit for symptoms/problems? PCP, Specialist, Home health nurse, Urgent Care, ED, 911 Yes   CHF Week 2 call completed? Yes   Wrap up additional comments  has been weaker over past 3 days-was evaluated by PCP this morning and had repeat chest xray.  will be trying new diuretic this evening, and was told to go to ER by PCP if no output from new diuretic.  still has some SOA,  "occasional \"wet\" cough noted during conversation. States still has pedal/lower leg edema. Denies any needs at this point-states will call if any concerns.          Lazara Nance RN  "

## 2022-03-03 PROBLEM — I48.0 PAROXYSMAL ATRIAL FIBRILLATION (HCC): Status: ACTIVE | Noted: 2022-01-01

## 2022-03-03 PROBLEM — I50.31 ACUTE DIASTOLIC CONGESTIVE HEART FAILURE (HCC): Status: ACTIVE | Noted: 2022-01-01

## 2022-03-03 PROBLEM — I50.21 ACUTE SYSTOLIC CONGESTIVE HEART FAILURE: Status: ACTIVE | Noted: 2022-01-01

## 2022-03-03 NOTE — OUTREACH NOTE
CHF Week 3 Survey      Responses   [facility] patient discharged from? [place]   Does the patient have one of the following disease processes/diagnoses(primary or secondary)? CHF   Week 3 attempt successful? No   Revoke Readmitted          Lizzie Aparicio RN

## 2022-03-03 NOTE — PLAN OF CARE
Goal Outcome Evaluation:  Plan of Care Reviewed With: patient           Outcome Summary: Pt admited from ER to room 408. AAOX4, HR MV70-320, on 3L/NC, get SOB easily with minimal movement such as just turning side to side in the bed, lung coarse, insp wheeze, external catheter in place, did sleep several hours last night, had prn pain pill for chronic back pain. decreased vision d/t hx mac deg, bed alarm in use, pt declined to get OOB at this time to get orthostatic BP, she stated she is too weak and is very SOB with any movement

## 2022-03-03 NOTE — PLAN OF CARE
"  Problem: Adult Inpatient Plan of Care  Goal: Plan of Care Review  Recent Flowsheet Documentation  Taken 3/3/2022 1337 by La John, OTR/L  Progress: no change  Plan of Care Reviewed With: patient  Outcome Summary: OT nikki completed.  Pt. is AxO x 3 & pleasant.  Ms. Hicks is legally blind & states she has \"about 20% of my vision because of macular degeneration.\" She was able to come to EOB with Min A, required Max A for LBD, & Min A/CGA x 2 for sit to stand.  Ms. Humble bobby's significant deficits in act cecilia & generalized weakness.  She reports that after her preivous hospitalization she spent most of her time in her recliner and rarely moved.  Because of this I rec pt. go to SNF for rehab to increase her act cecilia to max her fxn & reduce a risk of a readmit.    "

## 2022-03-03 NOTE — PROGRESS NOTES
Discharge Planning Assessment  [facility]     Patient Name: Cindy Hicks  MRN: [id number]  Today's Date: 3/3/2022    Admit Date: 3/2/2022     Discharge Needs Assessment     Row Name 03/03/22 1002       Living Environment    Lives With alone    Current Living Arrangements home/apartment/condo    Primary Care Provided by self    Family Caregiver if Needed grandchild(gonzalo), adult    Family Caregiver Names Genevieve, Granddaughter    Quality of Family Relationships helpful; involved    Able to Return to Prior Arrangements yes       Resource/Environmental Concerns    Transportation Concerns car, none       Transition Planning    Patient/Family Anticipates Transition to home with help/services    Patient/Family Anticipated Services at Transition home health care    Transportation Anticipated family or friend will provide       Discharge Needs Assessment    Readmission Within the Last 30 Days unable to assess    Current Outpatient/Agency/Support Group homecare agency    Equipment Currently Used at Home walker, rolling; walker, standard; commode; oxygen; wheelchair    Concerns to be Addressed denies needs/concerns at this time; discharge planning    Patient's Choice of Community Agency(s) [facility]    Discharge Coordination/Progress Patient lives alone and says she plans to return home at discharge. Patient says her granddaughter, Genevieve, helps out when needed. Patient has all needed DME (listed above), Rx coverage, and PCP. Patient is current with [facility] (confirmed with Jill Vincent RN) and services will continue at time of discharge. Will notify [facility] when patient is discharged.                   Selected Continued Care - Prior Encounters Includes selections from prior encounters from 12/2/2021 to 3/3/2022    Discharged on 2/23/2022 Admission date: 2/18/2022 - [place] [facility] [place]     Service Provider Selected Services Address Phone Fax Patient  Preferred    [facility]  [facility] [address], [place] [id number] [phone] --               HUSSEIN TobarW

## 2022-03-03 NOTE — PLAN OF CARE
Goal Outcome Evaluation:  Plan of Care Reviewed With: patient           Outcome Summary: PT eval complete. She is alert and oriented x 4. She was recently d.c from [facility] back home. She reports she mostly stays in her recliner and her grandson will assist with mobility when needed. Today she gets OOB and stands with CGA x 2 and RW use. She demos generalized weakness and decreased activity tolerance. She becomes SOB with activity and needs extended time sitting to recover. PT will cont to progress mobliity, strength and activity tolerance/endurance. Due to recent admits, consider placement at d. for cont recovery and rehab.

## 2022-03-03 NOTE — CONSULTS
"[facility] HEART GROUP CONSULT NOTE    Referring Provider: Austin Wade DO    Reason for Consultation: \"acute on chronic diastolic heart failure. Multiple recent Hospital Admissions\"    Chief Complaint   Patient presents with   • Shortness of Breath       Subjective .     History of present illness:  Cindy Hicks is a 86 y.o. female with a known PMH significant for chronic respiratory failure with hypoxia on home oxygen, COPD, chronic kidney disease, essential hypertension, mixed hyperlipidemia, paroxysmal atrial fibrillation on long-term anticoagulation, diastolic heart failure who presented to the emergency department yesterday for ongoing shortness of breath.    The patient was seen by her primary care provider office yesterday.  She had complained of recent weight gain and had been advised to switch from furosemide to torsemide.  The patient also reports that she had been advised if her symptoms worsen to present to the emergency department.  She never went and picked up her new prescription however felt more short of breath later that afternoon and was brought by family to the emergency department for further evaluation.    The patient reports recent shortness of breath and weight gain outside her baseline for the past 2 to 3 days.  She denies any palpitations and reports, historically, she has been asymptomatic to her arrhythmia.  She has been hospitalized multiple times over the past few months for the same problem.    She reports shortness of breath with persistent cough and sputum production.  She denies any hemoptysis.  She has been taking her Lasix at home daily in the morning.  She reports her dose to be 40 mg daily.  She has a slight bump in her creatinine on arrival she is hyperkalemic today.    During a previous hospitalization in January she was in sinus rhythm.  She has been in and out of rhythm as she was atrial fibrillation back in December and is in atrial fibrillation " this presentation.  She denies any chest pain, chest pressure, chest discomfort.  She does have a mildly elevated, flat trending troponin which was drawn in the emergency department.  Her BNP is significantly elevated.  She has normal procalcitonin and lactate levels.  Chest x-ray reveals moderate right-sided effusion with opacity perhaps representing a developing infiltrate.    Upon my examination of the patient, she is sitting in bed eating lunch.  She reports that her breathing is near back to her baseline.  She is on her baseline oxygen dose at home which is 3 L per nasal cannula.  She reports yesterday she felt worse because she also had nausea all day.  She did not feel well.  She denies any nausea currently.  She tells me that later in the afternoon, yesterday, her family noticed that she seemed and more distressed in regards to her breathing and at that point chose to present to the emergency department for further evaluation.  She also feels that her lower extremities are more swollen than her usual.     She has been seen by cardiology in the past she has had multiple outpatient appointments with our office however has not been able to make them.  She was due to see HALIMA Garza in the office today.  The patient also tells me that she has an appointment with nephrology scheduled for tomorrow.    History  Past Medical History:   Diagnosis Date   • Actinic keratosis    • Arthritis    • Atherosclerosis    • Benign fundic gland polyps of stomach    • Bleeding ulcer    • Carotid artery bruit    • Carotid artery stenosis    • COPD (chronic obstructive pulmonary disease) ([facility])    • Diverticulosis    • Emphysema of lung ([facility])    • History of colon polyps    • Hyperlipidemia    • Hypertension    • IBS (irritable bowel syndrome)    • Macular degeneration    • Osteoporosis    • Prediabetes    • PVD (peripheral vascular disease) ([facility])    • TIA (transient ischemic attack)    • Vitamin D deficiency    ,   Past  Surgical History:   Procedure Laterality Date   • CATARACT EXTRACTION     • COLONOSCOPY  03/15/2013    polyp, hyperplastic   • COLONOSCOPY N/A 10/2/2018    Procedure: COLONOSCOPY WITH ANESTHESIA;  Surgeon: Harris Escobar MD;  Location: [facility] ENDOSCOPY;  Service: Gastroenterology   • CYST REMOVAL     • ENDOSCOPY  2019   • ENDOSCOPY N/A 10/4/2019    Procedure: ESOPHAGOGASTRODUODENOSCOPY WITH ANESTHESIA;  Surgeon: Harris Escobar MD;  Location: [facility] ENDOSCOPY;  Service: Gastroenterology   • FEMORAL ARTERY STENT     • HYSTERECTOMY     • VASCULAR SURGERY      multiple   ,   Family History   Problem Relation Age of Onset   • Colon cancer Sister    • Cancer Sister    • Colon polyps Brother    • Heart disease Daughter    • Heart disease Son    • Cancer Mother    • Cancer Father    ,   Social History     Tobacco Use   • Smoking status: Former Smoker     Packs/day: 0.50     Years: 70.00     Pack years: 35.00     Types: Cigarettes     Quit date: 10/2021     Years since quittin.4   • Smokeless tobacco: Never Used   Vaping Use   • Vaping Use: Never used   Substance Use Topics   • Alcohol use: No   • Drug use: No   ,     Medications  Current Facility-Administered Medications   Medication Dose Route Frequency Provider Last Rate Last Admin   • acetaminophen (TYLENOL) tablet 650 mg  650 mg Oral Q4H PRN Ketan Miner MD        Or   • acetaminophen (TYLENOL) 160 MG/5ML solution 650 mg  650 mg Oral Q4H PRN Ketan Miner MD        Or   • acetaminophen (TYLENOL) suppository 650 mg  650 mg Rectal Q4H PRN Ketan Miner MD       • apixaban (ELIQUIS) tablet 2.5 mg  2.5 mg Oral Q12H Ketan Miner MD   2.5 mg at 2219   • atorvastatin (LIPITOR) tablet 40 mg  40 mg Oral Daily Ketan Miner MD   40 mg at 22 0918   • benzonatate (TESSALON) capsule 200 mg  200 mg Oral TID PRN Ketan Miner MD       • budesonide-formoterol (SYMBICORT) 160-4.5 MCG/ACT inhaler 2 puff  2 puff Inhalation BID -  RT Ketan Miner MD   2 puff at 03/03/22 0641   • busPIRone (BUSPAR) tablet 5 mg  5 mg Oral BID Ketan Miner MD   5 mg at 03/03/22 0918   • cetirizine (zyrTEC) tablet 5 mg  5 mg Oral Daily Ketan Miner MD   5 mg at 03/03/22 0917   • docusate sodium (COLACE) capsule 100 mg  100 mg Oral BID Ketan Miner MD   100 mg at 03/03/22 0917   • furosemide (LASIX) injection 40 mg  40 mg Intravenous Q12H Ketan Miner MD   40 mg at 03/03/22 0919   • guaiFENesin (MUCINEX) 12 hr tablet 1,200 mg  1,200 mg Oral BID Ketan Miner MD   1,200 mg at 03/03/22 0919   • HYDROcodone-acetaminophen (NORCO) 5-325 MG per tablet 0.5 tablet  0.5 tablet Oral BID PRN Ketan Miner MD   0.5 tablet at 03/03/22 0047   • ipratropium-albuterol (DUO-NEB) nebulizer solution 3 mL  3 mL Nebulization Q4H PRN Ketan Miner MD   3 mL at 03/03/22 1018   • metoprolol tartrate (LOPRESSOR) tablet 75 mg  75 mg Oral Q12H Ketan Miner MD   75 mg at 03/03/22 0917   • multivitamin with minerals 1 tablet  1 tablet Oral Daily Ketan Miner MD   1 tablet at 03/03/22 0919   • nitroglycerin (NITROSTAT) SL tablet 0.4 mg  0.4 mg Sublingual Q5 Min PRN Ketan Miner MD       • ondansetron (ZOFRAN) tablet 4 mg  4 mg Oral Q6H PRN Ketan Miner MD        Or   • ondansetron (ZOFRAN) injection 4 mg  4 mg Intravenous Q6H PRN Ketan Miner MD       • ondansetron ODT (ZOFRAN-ODT) disintegrating tablet 4 mg  4 mg Oral Q8H PRN Ketan Miner MD   4 mg at 03/03/22 0047   • pantoprazole (PROTONIX) EC tablet 40 mg  40 mg Oral BID AC Ketan Miner MD   40 mg at 03/03/22 0917   • pramipexole (MIRAPEX) tablet 0.25 mg  0.25 mg Oral Nightly Ketan Miner MD   0.25 mg at 03/03/22 0049   • sertraline (ZOLOFT) tablet 50 mg  50 mg Oral Daily Ketan Miner MD   50 mg at 03/03/22 0917   • sodium chloride 0.9 % flush 10 mL  10 mL Intravenous PRN Ketan Miner MD       • sodium chloride 0.9 % flush 10 mL  10 mL  "Intravenous Q12H Ketan Miner MD   10 mL at 03/03/22 0917   • sodium chloride 0.9 % flush 10 mL  10 mL Intravenous PRN Ketan Miner MD           Allergies:  Valium [diazepam] and Contrast dye    Review of Systems  Review of Systems   Constitutional: Positive for malaise/fatigue and weight gain. Negative for diaphoresis and fever.   HENT: Positive for congestion.    Eyes: Negative for visual disturbance.   Cardiovascular: Positive for dyspnea on exertion, leg swelling and orthopnea. Negative for chest pain, irregular heartbeat, near-syncope, palpitations, paroxysmal nocturnal dyspnea and syncope.   Respiratory: Positive for cough, shortness of breath and sputum production.    Hematologic/Lymphatic: Negative for bleeding problem. Bruises/bleeds easily.   Gastrointestinal: Positive for nausea. Negative for bloating, abdominal pain and vomiting.   Neurological: Negative for dizziness, headaches, light-headedness and loss of balance.   Psychiatric/Behavioral: Negative for altered mental status.       Objective     Physical Exam:  Patient Vitals for the past 24 hrs:   BP Temp Temp src Pulse Resp SpO2 Height Weight   03/03/22 1111 114/61 97.3 °F (36.3 °C) Oral 75 18 97 % -- --   03/03/22 1024 -- -- -- 84 -- -- -- --   03/03/22 1018 -- -- -- 84 18 98 % -- --   03/03/22 0737 116/65 97.6 °F (36.4 °C) Oral 93 18 100 % -- --   03/03/22 0646 -- -- -- 84 -- -- -- --   03/03/22 0641 -- -- -- 84 18 94 % -- --   03/03/22 0407 98/72 97.4 °F (36.3 °C) Oral 60 18 95 % -- --   03/03/22 0031 101/70 97.6 °F (36.4 °C) Oral 56 18 94 % -- --   03/03/22 0001 -- -- -- 85 -- 100 % -- --   03/02/22 2356 -- -- -- 87 14 94 % -- --   03/02/22 2217 95/71 97.3 °F (36.3 °C) Oral 55 20 93 % 167.6 cm (65.98\") 68.6 kg (151 lb 3 oz)   03/02/22 2116 123/89 -- -- 94 -- 92 % -- --   03/02/22 1843 -- 97.6 °F (36.4 °C) Oral -- -- -- -- --   03/02/22 1840 124/88 -- -- 108 20 100 % 167.6 cm (66\") 68.4 kg (150 lb 14.4 oz)     Vitals reviewed. "   Constitutional:       General: Awake. Not in acute distress.     Appearance: Well-developed and well-groomed. Acutely ill-appearing.      Interventions: Nasal cannula in place.   Pulmonary:      Breath sounds: Examination of the right-upper field reveals decreased breath sounds. Examination of the right-middle field reveals decreased breath sounds. Examination of the right-lower field reveals decreased breath sounds. Decreased breath sounds present. Wheezing present.   Cardiovascular:      Normal rate. Irregularly irregular rhythm.   Edema:     Peripheral edema present.     Pretibial: bilateral 1+ edema of the pretibial area.     Ankle: bilateral 1+ edema of the ankle.  Abdominal:      Palpations: Abdomen is soft.   Musculoskeletal:      Cervical back: Normal range of motion and neck supple. Skin:     General: Skin is warm and dry.   Neurological:      Mental Status: Alert, oriented to person, place, and time and oriented to person, place and time.   Psychiatric:         Attention and Perception: Attention normal.         Mood and Affect: Mood normal.         Speech: Speech normal.         Behavior: Behavior is cooperative.         Cognition and Memory: Cognition and memory normal.         Results Review:   I reviewed the patient's new clinical results.    Lab Results (last 72 hours)     Procedure Component Value Units Date/Time    Troponin [[id number]]  (Abnormal) Collected: 03/03/22 0500    Specimen: Blood Updated: 03/03/22 0619     Troponin T 0.035 ng/mL     Narrative:      Troponin T Reference Range:  <= 0.03 ng/mL-   Negative for AMI  >0.03 ng/mL-     Abnormal for myocardial necrosis.  Clinicians would have to utilize clinical acumen, EKG, Troponin and serial changes to determine if it is an Acute Myocardial Infarction or myocardial injury due to an underlying chronic condition.       Results may be falsely decreased if patient taking Biotin.      Basic Metabolic Panel [[id number]]  (Abnormal) Collected:  03/03/22 0500    Specimen: Blood Updated: 03/03/22 0615     Glucose 80 mg/dL      BUN 51 mg/dL      Creatinine 2.02 mg/dL      Sodium 132 mmol/L      Potassium 5.3 mmol/L      Chloride 88 mmol/L      CO2 33.0 mmol/L      Calcium 9.1 mg/dL      BUN/Creatinine Ratio 25.2     Anion Gap 11.0 mmol/L      eGFR 23.6 mL/min/1.73      Comment: National Kidney Foundation and American Society of Nephrology (ASN) Task Force recommended calculation based on the Chronic Kidney Disease Epidemiology Collaboration (CKD-EPI) equation refit without adjustment for race.       Narrative:      GFR Normal >60  Chronic Kidney Disease <60  Kidney Failure <15      CBC Auto Differential [[id number]]  (Abnormal) Collected: 03/03/22 0500    Specimen: Blood Updated: 03/03/22 0548     WBC 8.45 10*3/mm3      RBC 3.88 10*6/mm3      Hemoglobin 10.7 g/dL      Hematocrit 34.9 %      MCV 89.9 fL      MCH 27.6 pg      MCHC 30.7 g/dL      RDW 14.7 %      RDW-SD 48.1 fl      MPV 9.5 fL      Platelets 341 10*3/mm3      Neutrophil % 69.2 %      Lymphocyte % 17.4 %      Monocyte % 10.4 %      Eosinophil % 0.7 %      Basophil % 0.5 %      Immature Grans % 1.8 %      Neutrophils, Absolute 5.85 10*3/mm3      Lymphocytes, Absolute 1.47 10*3/mm3      Monocytes, Absolute 0.88 10*3/mm3      Eosinophils, Absolute 0.06 10*3/mm3      Basophils, Absolute 0.04 10*3/mm3      Immature Grans, Absolute 0.15 10*3/mm3      nRBC 0.0 /100 WBC     T4, Free [[id number]]  (Normal) Collected: 03/02/22 2138    Specimen: Blood Updated: 03/03/22 0509     Free T4 1.53 ng/dL     Narrative:      Results may be falsely increased if patient taking Biotin.      Troponin [[id number]]  (Abnormal) Collected: 03/02/22 2138    Specimen: Blood Updated: 03/02/22 2246     Troponin T 0.040 ng/mL     Narrative:      Troponin T Reference Range:  <= 0.03 ng/mL-   Negative for AMI  >0.03 ng/mL-     Abnormal for myocardial necrosis.  Clinicians would have to utilize clinical acumen, EKG, Troponin and serial  changes to determine if it is an Acute Myocardial Infarction or myocardial injury due to an underlying chronic condition.       Results may be falsely decreased if patient taking Biotin.      Blood Culture - Blood, Arm, Right [[id number]] Collected: 03/02/22 2136    Specimen: Blood from Arm, Right Updated: 03/02/22 2226    Respiratory Panel PCR w/COVID-19(SARS-CoV-2) ALBINA/NEFTALI/ASHLEE/PAD/COR/MAD/SHAYNE In-House, NP Swab in UTM/VTM, 3-4 HR TAT - Swab, Nasopharynx [[id number]]  (Normal) Collected: 03/1935    Specimen: Swab from Nasopharynx Updated: 03/02/22 2055     ADENOVIRUS, PCR Not Detected     Coronavirus 229E Not Detected     Coronavirus HKU1 Not Detected     Coronavirus NL63 Not Detected     Coronavirus OC43 Not Detected     COVID19 Not Detected     Human Metapneumovirus Not Detected     Human Rhinovirus/Enterovirus Not Detected     Influenza A PCR Not Detected     Influenza B PCR Not Detected     Parainfluenza Virus 1 Not Detected     Parainfluenza Virus 2 Not Detected     Parainfluenza Virus 3 Not Detected     Parainfluenza Virus 4 Not Detected     RSV, PCR Not Detected     Bordetella pertussis pcr Not Detected     Bordetella parapertussis PCR Not Detected     Chlamydophila pneumoniae PCR Not Detected     Mycoplasma pneumo by PCR Not Detected    Narrative:      In the setting of a positive respiratory panel with a viral infection PLUS a negative procalcitonin without other underlying concern for bacterial infection, consider observing off antibiotics or discontinuation of antibiotics and continue supportive care. If the respiratory panel is positive for atypical bacterial infection (Bordetella pertussis, Chlamydophila pneumoniae, or Mycoplasma pneumoniae), consider antibiotic de-escalation to target atypical bacterial infection.    Urinalysis With Culture If Indicated - Urine, Catheter In/Out [[id number]]  (Abnormal) Collected: 03/02/22 1934    Specimen: Urine, Catheter In/Out Updated: 03/02/22 1954     Color, UA  "Dark Yellow     Appearance, UA Clear     pH, UA <=5.0     Specific Gravity, UA 1.018     Glucose, UA Negative     Ketones, UA Negative     Bilirubin, UA Negative     Blood, UA Negative     Protein, UA Negative     Leuk Esterase, UA Negative     Nitrite, UA Negative     Urobilinogen, UA 1.0 E.U./dL    Narrative:      Urine microscopic not indicated.    BNP [[id number]]  (Abnormal) Collected: 03/02/22 1851    Specimen: Blood Updated: 03/02/22 1942     proBNP 29,193.0 pg/mL     Narrative:      Among patients with dyspnea, NT-proBNP is highly sensitive for the detection of acute congestive heart failure. In addition NT-proBNP of <300 pg/ml effectively rules out acute congestive heart failure with 99% negative predictive value.    Results may be falsely decreased if patient taking Biotin.      TSH [[id number]]  (Abnormal) Collected: 03/02/22 1851    Specimen: Blood Updated: 03/02/22 1929     TSH 6.870 uIU/mL     Procalcitonin [[id number]]  (Normal) Collected: 03/02/22 1851    Specimen: Blood Updated: 03/02/22 1928     Procalcitonin 0.11 ng/mL     Narrative:      As a Marker for Sepsis (Non-Neonates):    1. <0.5 ng/mL represents a low risk of severe sepsis and/or septic shock.  2. >2 ng/mL represents a high risk of severe sepsis and/or septic shock.    As a Marker for Lower Respiratory Tract Infections that require antibiotic therapy:    PCT on Admission    Antibiotic Therapy       6-12 Hrs later    >0.5                Strongly Recommended  >0.25 - <0.5        Recommended   0.1 - 0.25          Discouraged              Remeasure/reassess PCT  <0.1                Strongly Discouraged     Remeasure/reassess PCT    As 28 day mortality risk marker: \"Change in Procalcitonin Result\" (>80% or <=80%) if Day 0 (or Day 1) and Day 4 values are available. Refer to http://www.[email or web address]-pct-calculator.com    Change in PCT <=80%  A decrease of PCT levels below or equal to 80% defines a positive change in PCT test result representing a higher " risk for 28-day all-cause mortality of patients diagnosed with severe sepsis for septic shock.    Change in PCT >80%  A decrease of PCT levels of more than 80% defines a negative change in PCT result representing a lower risk for 28-day all-cause mortality of patients diagnosed with severe sepsis or septic shock.       C-reactive Protein [[id number]]  (Abnormal) Collected: 03/02/22 1851    Specimen: Blood Updated: 03/02/22 1925     C-Reactive Protein 0.82 mg/dL     Comprehensive Metabolic Panel [[id number]]  (Abnormal) Collected: 03/02/22 1851    Specimen: Blood Updated: 03/02/22 1923     Glucose 119 mg/dL      BUN 49 mg/dL      Creatinine 2.06 mg/dL      Sodium 127 mmol/L      Potassium 5.7 mmol/L      Chloride 87 mmol/L      CO2 30.0 mmol/L      Calcium 9.1 mg/dL      Total Protein 6.1 g/dL      Albumin 3.70 g/dL      ALT (SGPT) 51 U/L      AST (SGOT) 46 U/L      Alkaline Phosphatase 85 U/L      Total Bilirubin 0.4 mg/dL      Globulin 2.4 gm/dL      A/G Ratio 1.5 g/dL      BUN/Creatinine Ratio 23.8     Anion Gap 10.0 mmol/L      eGFR 23.1 mL/min/1.73      Comment: National Kidney Foundation and American Society of Nephrology (ASN) Task Force recommended calculation based on the Chronic Kidney Disease Epidemiology Collaboration (CKD-EPI) equation refit without adjustment for race.       Narrative:      GFR Normal >60  Chronic Kidney Disease <60  Kidney Failure <15      Troponin [[id number]]  (Abnormal) Collected: 03/02/22 1851    Specimen: Blood Updated: 03/02/22 1923     Troponin T 0.033 ng/mL     Narrative:      Troponin T Reference Range:  <= 0.03 ng/mL-   Negative for AMI  >0.03 ng/mL-     Abnormal for myocardial necrosis.  Clinicians would have to utilize clinical acumen, EKG, Troponin and serial changes to determine if it is an Acute Myocardial Infarction or myocardial injury due to an underlying chronic condition.       Results may be falsely decreased if patient taking Biotin.      Lactic Acid, Plasma  [[id number]]  (Normal) Collected: 03/02/22 1851    Specimen: Blood Updated: 03/02/22 1919     Lactate 1.4 mmol/L     Magnesium [[id number]]  (Normal) Collected: 03/02/22 1851    Specimen: Blood Updated: 03/02/22 1917     Magnesium 1.9 mg/dL     Protime-INR [[id number]]  (Abnormal) Collected: 03/02/22 1851    Specimen: Blood Updated: 03/02/22 1916     Protime 17.7 Seconds      INR 1.52    aPTT [[id number]]  (Normal) Collected: 03/02/22 1851    Specimen: Blood Updated: 03/02/22 1916     PTT 32.7 seconds     D-dimer, Quantitative [[id number]]  (Abnormal) Collected: 03/02/22 1851    Specimen: Blood Updated: 03/02/22 1916     D-Dimer, Quantitative 2.38 mg/L (FEU)     Narrative:      Reference Range is 0-0.50 mg/L FEU. However, results <0.50 mg/L FEU tends to rule out DVT or PE. Results >0.50 mg/L FEU are not useful in predicting absence or presence of DVT or PE.      CBC & Differential [[id number]]  (Abnormal) Collected: 03/02/22 1851    Specimen: Blood Updated: 03/02/22 1902    Narrative:      The following orders were created for panel order CBC & Differential.  Procedure                               Abnormality         Status                     ---------                               -----------         ------                     CBC Auto Differential[[id number]]        Abnormal            Final result                 Please view results for these tests on the individual orders.    CBC Auto Differential [[id number]]  (Abnormal) Collected: 03/02/22 1851    Specimen: Blood Updated: 03/02/22 1902     WBC 10.59 10*3/mm3      RBC 4.18 10*6/mm3      Hemoglobin 11.6 g/dL      Hematocrit 37.6 %      MCV 90.0 fL      MCH 27.8 pg      MCHC 30.9 g/dL      RDW 14.6 %      RDW-SD 47.8 fl      MPV 9.5 fL      Platelets 397 10*3/mm3      Neutrophil % 73.6 %      Lymphocyte % 14.4 %      Monocyte % 9.5 %      Eosinophil % 0.4 %      Basophil % 0.3 %      Immature Grans % 1.8 %      Neutrophils, Absolute 7.79 10*3/mm3      Lymphocytes,  Absolute 1.53 10*3/mm3      Monocytes, Absolute 1.01 10*3/mm3      Eosinophils, Absolute 0.04 10*3/mm3      Basophils, Absolute 0.03 10*3/mm3      Immature Grans, Absolute 0.19 10*3/mm3      nRBC 0.0 /100 WBC     Blood Culture - Blood, Arm, Left [[id number]] Collected: 03/02/22 1851    Specimen: Blood from Arm, Left Updated: 03/02/22 1859          Imaging Results (Last 72 Hours)     Procedure Component Value Units Date/Time    XR Chest 1 View [[id number]] Collected: 03/02/22 1934     Updated: 03/02/22 1939    Narrative:      EXAMINATION: Chest 1 view 3/2/2022     HISTORY: Shortness of breath.     FINDINGS: Today's exam is compared to previous study of earlier the same  day. There is a persistent moderate right-sided effusion with  right-sided atelectasis. There is increased right perihilar  consolidation perhaps representing a developing infiltrate. The left  lung remains clear. Mitral annulus calcifications are present.       Impression:      1.. Moderate right-sided effusion with right basilar atelectasis. There  is increased perihilar airspace opacity perhaps representing a  developing infiltrate.  This report was finalized on 03/02/2022 19:35 by Dr. Geraldo Fung MD.        Results for orders placed during the hospital encounter of 01/21/22    Adult Transthoracic Echo Complete W/ Cont if Necessary Per Protocol    Interpretation Summary  · Assessment of left ventricular ejection fraction somewhat difficult due to the presence of atrial fibrillation.  · Left ventricular systolic function is low normal. Left ventricular ejection fraction appears to be 51 - 55%.  · Left ventricular wall thickness is consistent with mild to moderate concentric hypertrophy.  · The right ventricular cavity is dilated. Mildly reduced right ventricular systolic function noted.  · Biatrial enlargment is noted.  · No hemodynamically significant valvular abnormalities identified on this study.      Assessment     1.  Acute respiratory  failure with hypoxia: Sats stable on baseline oxygen.  2.  COPD  3.  Acute diastolic heart failure: On IV diuretics currently with good urinary output response thus far.  3.  Hyperkalemia: She was administered a dose of Lokelma today.  4.  Paroxysmal atrial fibrillation: Rates are well controlled and she is anticoagulated.  Rate controlling medications at home include Lopressor.  5.  Hypertension  6.  Hyperlipidemia  7.  Long-term anticoagulation: She is chronically anticoagulated with Eliquis dosed appropriately based on her age and renal function.  8.  Chronic kidney disease    Plan     -Continue with diuresis for improvement of symptoms.  She is anticoagulated, but thoracentesis could be considered if the patient does not have significant improvement.  She required thoracentesis in February and has had recurrent effusion in short-term.  -Close monitoring of renal function and electrolytes  -Ongoing supportive care with supplemental oxygen, expectorants, breathing treatments if required  -The patient was hesitant to change her type of diuretic which was recommended by her primary care physician as outpatient.  Consider adjustment of diuretic prior to discharge with prescription obtained prior to her leaving.  -Continued appropriate care for known history of COPD.  -Accurate intake and output documentation  -Low-sodium diet    The patient will need close follow-up with cardiology after discharge as she missed her appointment today.  She is difficult in terms of readmission due to her chronic respiratory failure coupled by her arrhythmia which may sometimes prompt her admission however she was admitted back in January with out evidence of A. fib at that time.  I have spent a considerable amount of time with the patient today regarding dietary intake and avoiding sodium at home.  This may be a problem contributing to her presentation.    Continue with diuresis for improvement of symptoms.  Consider adjustment of  Lasix to bumetanide or torsemide prior to discharge.      Electronically signed by HALIMA Arreguin, 03/03/22, 12:54 PM CST.      Please note this cardiology consultation note is the result of a face to face consultation with the patient, in addition to reviewing medical records at length by myself, HALIMA Arreguin.       Time: 45 minutes

## 2022-03-03 NOTE — CASE MANAGEMENT/SOCIAL WORK
Continued Stay Note  [facility]     Patient Name: Cindy Hicks  MRN: [id number]  Today's Date: 3/3/2022    Admit Date: 3/2/2022     Discharge Plan     Row Name 03/03/22 1020       Plan    Plan Comments [facility]; facesheet and [facility]               Discharge Codes    No documentation.                     Beth Neil RN

## 2022-03-03 NOTE — NURSING NOTE
Patient is current with [facility] services and services will continue upon hospital discharge if appropriate.

## 2022-03-03 NOTE — H&P
[facility] Medicine Services  HISTORY AND PHYSICAL    Date of Admission: 3/2/2022  Primary Care Physician: Austin Waed DO    Subjective     Chief Complaint: Shortness of breath swelling    History of Present Illness  Patient is an 86-year-old white female past medical history of COPD, chronic A. fib, CHF who is recently been hospitalized with respiratory failure COPD exacerbation and A. fib and CHF.  She states that she has been out about a week and she is just progressively gotten worse the last 2 to 3 days after discharge.  She presents today with increasing shortness of breath and edema.  She was found to be in A. fib with RVR and more hypoxic than usual.  She has been given IV diltiazem with improvement in her rate.  Given her worsening symptoms we have been asked to admit her.  She also has a sodium of 127 extremely elevated BNP elevated troponins.  She was seen earlier today actually in cardiology office chest x-ray was obtained which showed right lower lobe infiltrate concerning for effusion as well.  Chest x-ray here shows worsening right-sided pleural effusion.  She denies fevers or chills.      Review of Systems   14 point review of systems negative except for as per HPI    Otherwise complete ROS reviewed and negative except as mentioned in the HPI.    Past Medical History:   Past Medical History:   Diagnosis Date   • Actinic keratosis    • Arthritis    • Atherosclerosis    • Benign fundic gland polyps of stomach    • Bleeding ulcer    • Carotid artery bruit    • Carotid artery stenosis    • COPD (chronic obstructive pulmonary disease) ([facility])    • Diverticulosis    • Emphysema of lung ([facility])    • History of colon polyps    • Hyperlipidemia    • Hypertension    • IBS (irritable bowel syndrome)    • Macular degeneration    • Osteoporosis    • Prediabetes    • PVD (peripheral vascular disease) ([facility])    • TIA (transient ischemic attack)    • Vitamin D deficiency       Past Surgical History:  Past Surgical History:   Procedure Laterality Date   • CATARACT EXTRACTION     • COLONOSCOPY  03/15/2013    polyp, hyperplastic   • COLONOSCOPY N/A 10/2/2018    Procedure: COLONOSCOPY WITH ANESTHESIA;  Surgeon: Harris Escobar MD;  Location: [facility] ENDOSCOPY;  Service: Gastroenterology   • CYST REMOVAL     • ENDOSCOPY  2019   • ENDOSCOPY N/A 10/4/2019    Procedure: ESOPHAGOGASTRODUODENOSCOPY WITH ANESTHESIA;  Surgeon: Harris Escobar MD;  Location: [facility] ENDOSCOPY;  Service: Gastroenterology   • FEMORAL ARTERY STENT     • HYSTERECTOMY     • VASCULAR SURGERY      multiple     Social History:  reports that she quit smoking about 5 months ago. Her smoking use included cigarettes. She has a 35.00 pack-year smoking history. She has never used smokeless tobacco. She reports that she does not drink alcohol and does not use drugs.    Family History: family history includes Cancer in her father, mother, and sister; Colon cancer in her sister; Colon polyps in her brother; Heart disease in her daughter and son.       Allergies:  Allergies   Allergen Reactions   • Valium [Diazepam] Nausea Only   • Contrast Dye Itching       Medications:  Prior to Admission medications    Medication Sig Start Date End Date Taking? Authorizing Provider   alendronate (FOSAMAX) 10 MG tablet Take 1 tablet by mouth 1 (One) Time Per Week. 2/17/22   Shira Gabriel APRN   apixaban (ELIQUIS) 2.5 MG tablet tablet Take 1 tablet by mouth Every 12 (Twelve) Hours. Indications: Atrial Fibrillation 2/16/22   Anuj Santos MD   atorvastatin (LIPITOR) 40 MG tablet Take 1 tablet by mouth Daily. 10/29/21   Austin Wade DO   benzonatate (TESSALON) 200 MG capsule Take 1 capsule by mouth 3 (Three) Times a Day As Needed for Cough. 2/23/22   Trini Watts APRN   budesonide-formoterol (SYMBICORT) 160-4.5 MCG/ACT inhaler Inhale 2 puffs 2 (Two) Times a Day. 11/2/21   Beth Tony APRN   busPIRone  (BUSPAR) 5 MG tablet Take 1 tablet by mouth 2 (Two) Times a Day. 2/7/22   Austin Wade DO   docusate sodium (COLACE) 100 MG capsule Take 1 capsule by mouth 2 (Two) Times a Day. 11/26/21   Nelson Elizabeth MD   HYDROcodone-acetaminophen (NORCO) 5-325 MG per tablet Take 0.5 tablets by mouth 2 (Two) Times a Day As Needed for Mild Pain . 2/23/22   Trini Watts APRN   ipratropium-albuterol (DUO-NEB) 0.5-2.5 mg/3 ml nebulizer Take 3 mL by nebulization 4 (Four) Times a Day As Needed for Wheezing or Shortness of Air.    Lainey Goodwin MD   levocetirizine (XYZAL) 5 MG tablet Take 1 tablet by mouth Every Evening. 3/2/22   Austin Wade DO   lisinopril (PRINIVIL,ZESTRIL) 10 MG tablet Take 1 tablet by mouth Daily. 2/16/22   Anuj Santos MD   Metoprolol Tartrate 75 MG tablet Take 75 mg by mouth Every 12 (Twelve) Hours. 1/29/22   Alexander Thompson DO   Mucinex 600 MG 12 hr tablet Take 2 tablets by mouth 2 (Two) Times a Day. 9/18/21   Trini Watts APRN   multivitamin with minerals (VITRUM 50+ ADULT-MULTI PO) Take 1 tablet by mouth Daily.    Lainey Goodwin MD   ondansetron ODT (ZOFRAN-ODT) 4 MG disintegrating tablet Place 4 mg on the tongue Every 8 (Eight) Hours As Needed for Nausea or Vomiting.    Lainey Goodwin MD   pantoprazole (PROTONIX) 40 MG EC tablet Take 40 mg by mouth 2 (Two) Times a Day.    Lainey Goodwin MD   pramipexole (Mirapex) 0.25 MG tablet Take 1 tablet by mouth every night at bedtime. 2/7/22   Austin Wade DO   sertraline (Zoloft) 50 MG tablet Take 1 tablet by mouth Daily. 1/31/22   Austin Wade DO   torsemide (Demadex) 20 MG tablet Take 1 tablet by mouth Daily. 3/2/22   Shira Gabriel APRN   vitamin D (ERGOCALCIFEROL) 1.25 MG (56166 UT) capsule capsule Take 50,000 Units by mouth 1 (One) Time Per Week. On Friday    Provider, MD Lainey     I have utilized all available immediate resources to obtain, update, and  "review the patient's current medications.    Objective     Vital Signs: BP 98/72 (BP Location: Right arm, Patient Position: Sitting)   Pulse 60   Temp 97.4 °F (36.3 °C) (Oral)   Resp 18   Ht 167.6 cm (65.98\")   Wt 68.6 kg (151 lb 3 oz)   SpO2 95%   BMI 24.41 kg/m²   Physical Exam   Gen.: Chronically ill-appearing white female in no acute distress  HEENT: Atraumatic, normocephalic.  Pupils equal, round, and reactive to light. Extraocular movements intact.  Sclerae anicteric.  External ears negative.  Mucous membranes moist.  Neck is supple without lymphadenopathy.  No JVD is noted.  No carotid bruits are auscultated.  Oropharynx is without erythema or exudate.   Chest: Diminished breath sounds in the right lung field  CV: Irregularly irregular rate and rhythm.  Normal S1-S2.  No gallops, murmurs. or rubs.  Abdomen: Soft, nontender, nondistended.  Active bowel sounds,  No hepatosplenomegaly.  No masses.  No hernias.  Extremities: No clubbing, 2-3+ pitting edema, or cyanosis.  Capillary refill is normal.  Pulses are 2+ and symmetric at radial and dorsalis pedis.  Posterior tibial pulses are intact and 2+ palpable.  Neuro: Patient is awake, alert, and oriented ×3.  Cranial nerves II through XII are grossly intact.  Motor is 5 out of 5 bilaterally.  DTRs are 2+ and symmetric bilaterally. Sensory exam is nonfocal  Skin: Warm, dry, and intact.  No evidence of breakdown.  Sensorium: Normal thought and affect    Nursing notes and vital signs reviewed        Results Reviewed:  Lab Results (last 24 hours)     Procedure Component Value Units Date/Time    T4, Free [[id number]]  (Normal) Collected: 03/02/22 2138    Specimen: Blood Updated: 03/03/22 0509     Free T4 1.53 ng/dL     Narrative:      Results may be falsely increased if patient taking Biotin.      Troponin [[id number]]  (Abnormal) Collected: 03/02/22 2138    Specimen: Blood Updated: 03/02/22 2246     Troponin T 0.040 ng/mL     Narrative:      Troponin T Reference " Range:  <= 0.03 ng/mL-   Negative for AMI  >0.03 ng/mL-     Abnormal for myocardial necrosis.  Clinicians would have to utilize clinical acumen, EKG, Troponin and serial changes to determine if it is an Acute Myocardial Infarction or myocardial injury due to an underlying chronic condition.       Results may be falsely decreased if patient taking Biotin.      Blood Culture - Blood, Arm, Right [[id number]] Collected: 03/02/22 2136    Specimen: Blood from Arm, Right Updated: 03/02/22 2226    Respiratory Panel PCR w/COVID-19(SARS-CoV-2) ALBINA/NEFTALI/ASHLEE/PAD/COR/MAD/SHAYNE In-House, NP Swab in UTM/VTM, 3-4 HR TAT - Swab, Nasopharynx [[id number]]  (Normal) Collected: 03/1935    Specimen: Swab from Nasopharynx Updated: 03/02/22 2055     ADENOVIRUS, PCR Not Detected     Coronavirus 229E Not Detected     Coronavirus HKU1 Not Detected     Coronavirus NL63 Not Detected     Coronavirus OC43 Not Detected     COVID19 Not Detected     Human Metapneumovirus Not Detected     Human Rhinovirus/Enterovirus Not Detected     Influenza A PCR Not Detected     Influenza B PCR Not Detected     Parainfluenza Virus 1 Not Detected     Parainfluenza Virus 2 Not Detected     Parainfluenza Virus 3 Not Detected     Parainfluenza Virus 4 Not Detected     RSV, PCR Not Detected     Bordetella pertussis pcr Not Detected     Bordetella parapertussis PCR Not Detected     Chlamydophila pneumoniae PCR Not Detected     Mycoplasma pneumo by PCR Not Detected    Narrative:      In the setting of a positive respiratory panel with a viral infection PLUS a negative procalcitonin without other underlying concern for bacterial infection, consider observing off antibiotics or discontinuation of antibiotics and continue supportive care. If the respiratory panel is positive for atypical bacterial infection (Bordetella pertussis, Chlamydophila pneumoniae, or Mycoplasma pneumoniae), consider antibiotic de-escalation to target atypical bacterial infection.    Urinalysis  "With Culture If Indicated - Urine, Catheter In/Out [[id number]]  (Abnormal) Collected: 03/02/22 1934    Specimen: Urine, Catheter In/Out Updated: 03/02/22 1954     Color, UA Dark Yellow     Appearance, UA Clear     pH, UA <=5.0     Specific Gravity, UA 1.018     Glucose, UA Negative     Ketones, UA Negative     Bilirubin, UA Negative     Blood, UA Negative     Protein, UA Negative     Leuk Esterase, UA Negative     Nitrite, UA Negative     Urobilinogen, UA 1.0 E.U./dL    Narrative:      Urine microscopic not indicated.    BNP [[id number]]  (Abnormal) Collected: 03/02/22 1851    Specimen: Blood Updated: 03/02/22 1942     proBNP 29,193.0 pg/mL     Narrative:      Among patients with dyspnea, NT-proBNP is highly sensitive for the detection of acute congestive heart failure. In addition NT-proBNP of <300 pg/ml effectively rules out acute congestive heart failure with 99% negative predictive value.    Results may be falsely decreased if patient taking Biotin.      TSH [[id number]]  (Abnormal) Collected: 03/02/22 1851    Specimen: Blood Updated: 03/02/22 1929     TSH 6.870 uIU/mL     Procalcitonin [[id number]]  (Normal) Collected: 03/02/22 1851    Specimen: Blood Updated: 03/02/22 1928     Procalcitonin 0.11 ng/mL     Narrative:      As a Marker for Sepsis (Non-Neonates):    1. <0.5 ng/mL represents a low risk of severe sepsis and/or septic shock.  2. >2 ng/mL represents a high risk of severe sepsis and/or septic shock.    As a Marker for Lower Respiratory Tract Infections that require antibiotic therapy:    PCT on Admission    Antibiotic Therapy       6-12 Hrs later    >0.5                Strongly Recommended  >0.25 - <0.5        Recommended   0.1 - 0.25          Discouraged              Remeasure/reassess PCT  <0.1                Strongly Discouraged     Remeasure/reassess PCT    As 28 day mortality risk marker: \"Change in Procalcitonin Result\" (>80% or <=80%) if Day 0 (or Day 1) and Day 4 values are available. Refer to " http://[email or web address]-pct-calculator.com    Change in PCT <=80%  A decrease of PCT levels below or equal to 80% defines a positive change in PCT test result representing a higher risk for 28-day all-cause mortality of patients diagnosed with severe sepsis for septic shock.    Change in PCT >80%  A decrease of PCT levels of more than 80% defines a negative change in PCT result representing a lower risk for 28-day all-cause mortality of patients diagnosed with severe sepsis or septic shock.       C-reactive Protein [[id number]]  (Abnormal) Collected: 03/02/22 1851    Specimen: Blood Updated: 03/02/22 1925     C-Reactive Protein 0.82 mg/dL     Comprehensive Metabolic Panel [[id number]]  (Abnormal) Collected: 03/02/22 1851    Specimen: Blood Updated: 03/02/22 1923     Glucose 119 mg/dL      BUN 49 mg/dL      Creatinine 2.06 mg/dL      Sodium 127 mmol/L      Potassium 5.7 mmol/L      Chloride 87 mmol/L      CO2 30.0 mmol/L      Calcium 9.1 mg/dL      Total Protein 6.1 g/dL      Albumin 3.70 g/dL      ALT (SGPT) 51 U/L      AST (SGOT) 46 U/L      Alkaline Phosphatase 85 U/L      Total Bilirubin 0.4 mg/dL      Globulin 2.4 gm/dL      A/G Ratio 1.5 g/dL      BUN/Creatinine Ratio 23.8     Anion Gap 10.0 mmol/L      eGFR 23.1 mL/min/1.73      Comment: National Kidney Foundation and American Society of Nephrology (ASN) Task Force recommended calculation based on the Chronic Kidney Disease Epidemiology Collaboration (CKD-EPI) equation refit without adjustment for race.       Narrative:      GFR Normal >60  Chronic Kidney Disease <60  Kidney Failure <15      Troponin [[id number]]  (Abnormal) Collected: 03/02/22 1851    Specimen: Blood Updated: 03/02/22 1923     Troponin T 0.033 ng/mL     Narrative:      Troponin T Reference Range:  <= 0.03 ng/mL-   Negative for AMI  >0.03 ng/mL-     Abnormal for myocardial necrosis.  Clinicians would have to utilize clinical acumen, EKG, Troponin and serial changes to determine if it is an Acute  Myocardial Infarction or myocardial injury due to an underlying chronic condition.       Results may be falsely decreased if patient taking Biotin.      Lactic Acid, Plasma [[id number]]  (Normal) Collected: 03/02/22 1851    Specimen: Blood Updated: 03/02/22 1919     Lactate 1.4 mmol/L     Magnesium [[id number]]  (Normal) Collected: 03/02/22 1851    Specimen: Blood Updated: 03/02/22 1917     Magnesium 1.9 mg/dL     Protime-INR [[id number]]  (Abnormal) Collected: 03/02/22 1851    Specimen: Blood Updated: 03/02/22 1916     Protime 17.7 Seconds      INR 1.52    aPTT [[id number]]  (Normal) Collected: 03/02/22 1851    Specimen: Blood Updated: 03/02/22 1916     PTT 32.7 seconds     D-dimer, Quantitative [[id number]]  (Abnormal) Collected: 03/02/22 1851    Specimen: Blood Updated: 03/02/22 1916     D-Dimer, Quantitative 2.38 mg/L (FEU)     Narrative:      Reference Range is 0-0.50 mg/L FEU. However, results <0.50 mg/L FEU tends to rule out DVT or PE. Results >0.50 mg/L FEU are not useful in predicting absence or presence of DVT or PE.      CBC & Differential [[id number]]  (Abnormal) Collected: 03/02/22 1851    Specimen: Blood Updated: 03/02/22 1902    Narrative:      The following orders were created for panel order CBC & Differential.  Procedure                               Abnormality         Status                     ---------                               -----------         ------                     CBC Auto Differential[[id number]]        Abnormal            Final result                 Please view results for these tests on the individual orders.    CBC Auto Differential [[id number]]  (Abnormal) Collected: 03/02/22 1851    Specimen: Blood Updated: 03/02/22 1902     WBC 10.59 10*3/mm3      RBC 4.18 10*6/mm3      Hemoglobin 11.6 g/dL      Hematocrit 37.6 %      MCV 90.0 fL      MCH 27.8 pg      MCHC 30.9 g/dL      RDW 14.6 %      RDW-SD 47.8 fl      MPV 9.5 fL      Platelets 397 10*3/mm3      Neutrophil % 73.6 %       Lymphocyte % 14.4 %      Monocyte % 9.5 %      Eosinophil % 0.4 %      Basophil % 0.3 %      Immature Grans % 1.8 %      Neutrophils, Absolute 7.79 10*3/mm3      Lymphocytes, Absolute 1.53 10*3/mm3      Monocytes, Absolute 1.01 10*3/mm3      Eosinophils, Absolute 0.04 10*3/mm3      Basophils, Absolute 0.03 10*3/mm3      Immature Grans, Absolute 0.19 10*3/mm3      nRBC 0.0 /100 WBC     Blood Culture - Blood, Arm, Left [[id number]] Collected: 03/02/22 1851    Specimen: Blood from Arm, Left Updated: 03/02/22 1859        Imaging Results (Last 24 Hours)     Procedure Component Value Units Date/Time    XR Chest 1 View [[id number]] Collected: 03/02/22 1934     Updated: 03/02/22 1939    Narrative:      EXAMINATION: Chest 1 view 3/2/2022     HISTORY: Shortness of breath.     FINDINGS: Today's exam is compared to previous study of earlier the same  day. There is a persistent moderate right-sided effusion with  right-sided atelectasis. There is increased right perihilar  consolidation perhaps representing a developing infiltrate. The left  lung remains clear. Mitral annulus calcifications are present.       Impression:      1.. Moderate right-sided effusion with right basilar atelectasis. There  is increased perihilar airspace opacity perhaps representing a  developing infiltrate.  This report was finalized on 03/02/2022 19:35 by Dr. Geraldo Fung MD.        I have personally reviewed and interpreted the radiology studies and ECG obtained at time of admission.     Assessment / Plan     Assessment:   Active Hospital Problems    Diagnosis    • Acute systolic congestive heart failure (HCC)    • Volume overload    • Congestive heart failure (HCC)    • Atrial fibrillation (HCC)    • Acute on chronic respiratory failure with hypoxia (HCC)    • Stage 3a chronic kidney disease (HCC)    1.  Acute on chronic systolic congestive heart failure plans admit patient monitor on telemetry work on rate control for her A. fib.  Continue home  medications change her Demadex back over to IV Lasix monitor for I's and O's closely.  Consider cardiology consultation.  She may need a thoracentesis.  Patient also may need to consider SNF placement to work on better control of her fluid intake as well as her heart failure given her multiple admissions.  2.  A. fib with RVR improved rate control continue oral medications monitor on telemetry check TSH free T4.  Continue anticoagulation.  Consider, diltiazem drip if needed.  3.  Acute on chronic respiratory failure hypoxia supplemental O2 work on diuresis.  4.  COPD no current wheezing continue home medications as needed nebs.  5.  Chronic kidney disease stage IIIa diurese IV Lasix monitor renal function closely given her creatinine is up from baseline.  6.  Hyponatremia monitor over diuresis recheck labs.  7.  Medications reviewed and resumed as appropriate.    Patient seen prior to midnight       Code Status/Advanced Care Plan: DNR/DNI    The patient's surrogate decision maker is patient's.     I discussed my findings and recommendations with the patient's granddaughter.    Estimated length of stay is to be determined.     The patient was seen and examined by me on March 3, 2022 953 at p.m.    Electronically signed by Ketan Miner MD, 03/03/22, 05:24 CST.

## 2022-03-03 NOTE — PROGRESS NOTES
[facility] Medicine Services  INPATIENT PROGRESS NOTE    Length of Stay: 1  Date of Admission: 3/2/2022  Primary Care Physician: Austin Wade DO    Subjective   Chief Complaint: Follow-up  HPI   Patient resting in bed.  She is currently tolerating her home setting of oxygen at 3 L.  She was recently discharged from our facility on 2/23 following COPD exacerbation and acute exacerbation of diastolic heart failure.  The patient states she started to feel unwell 2 or 3 days ago and felt that she was retaining water.  She had not been weighing herself as she stated she was too tired to even get up and move.  She denies any chest pain presently.  She states she has been coughing up some dark yellow sputum at home.  She denies abdominal pain but has had some nausea without vomiting.    Review of Systems   All pertinent negatives and positives are as above. All other systems have been reviewed and are negative unless otherwise stated.     Objective    Temp:  [97.3 °F (36.3 °C)-97.6 °F (36.4 °C)] 97.3 °F (36.3 °C)  Heart Rate:  [] 75  Resp:  [14-20] 18  BP: ()/(61-89) 114/61  Physical Exam  Vitals and nursing note reviewed.   Constitutional:       General: She is not in acute distress.     Appearance: She is ill-appearing.   HENT:      Head: Normocephalic and atraumatic.   Cardiovascular:      Rate and Rhythm: Normal rate. Rhythm irregular.      Comments: A. fib   Pulmonary:      Effort: Pulmonary effort is normal.      Breath sounds: Rales (bibasilar. Right base more dull) present. No wheezing or rhonchi.   Abdominal:      General: Bowel sounds are normal. There is no distension.      Palpations: Abdomen is soft.      Tenderness: There is no abdominal tenderness.   Musculoskeletal:      Cervical back: Normal range of motion and neck supple. No tenderness.      Right lower leg: Edema present.      Left lower leg: Edema present.   Skin:     General: Skin is warm  and dry.      Findings: No erythema or rash.      Comments: Chronic venous skin changes of lower extremities.   Neurological:      General: No focal deficit present.      Mental Status: She is alert and oriented to person, place, and time.   Psychiatric:         Mood and Affect: Mood normal.         Behavior: Behavior normal.         Thought Content: Thought content normal.         Judgment: Judgment normal.     Results Review:  I have reviewed the labs, radiology results, and diagnostic studies.    Laboratory Data:   Results from last 7 days   Lab Units 03/03/22  0500 03/02/22  1851   WBC 10*3/mm3 8.45 10.59   HEMOGLOBIN g/dL 10.7* 11.6*   HEMATOCRIT % 34.9 37.6   PLATELETS 10*3/mm3 341 397     Results from last 7 days   Lab Units 03/03/22  0500 03/02/22  1851 02/28/22  1026   SODIUM mmol/L 132* 127* 133*   POTASSIUM mmol/L 5.3* 5.7* 5.5*   CHLORIDE mmol/L 88* 87* 88*   CO2 mmol/L 33.0* 30.0* 36.0*   BUN mg/dL 51* 49* 40*   CREATININE mg/dL 2.02* 2.06* 1.61*   CALCIUM mg/dL 9.1 9.1 9.6   BILIRUBIN mg/dL  --  0.4  --    ALK PHOS U/L  --  85  --    ALT (SGPT) U/L  --  51*  --    AST (SGOT) U/L  --  46*  --    GLUCOSE mg/dL 80 119* 116*     Radiology Data:   Imaging Results (Last 24 Hours)     Procedure Component Value Units Date/Time    XR Chest 1 View [[id number]] Collected: 03/02/22 1934     Updated: 03/02/22 1939    Narrative:      EXAMINATION: Chest 1 view 3/2/2022     HISTORY: Shortness of breath.     FINDINGS: Today's exam is compared to previous study of earlier the same  day. There is a persistent moderate right-sided effusion with  right-sided atelectasis. There is increased right perihilar  consolidation perhaps representing a developing infiltrate. The left  lung remains clear. Mitral annulus calcifications are present.       Impression:      1.. Moderate right-sided effusion with right basilar atelectasis. There  is increased perihilar airspace opacity perhaps representing a  developing infiltrate.  This  report was finalized on 03/02/2022 19:35 by Dr. Geraldo Fung MD.        I have reviewed the patient current medications.     Assessment/Plan     Active Hospital Problems    Diagnosis    • Acute systolic congestive heart failure (HCC)    • Volume overload    • Congestive heart failure (HCC)    • Atrial fibrillation (HCC)    • Acute on chronic respiratory failure with hypoxia (HCC)    • Stage 3a chronic kidney disease (HCC)      Plan:  1.  Patient presented to the emergency department on 3/2/2022 with complaints of shortness of breath.  She was recently admitted to our facility from 2/18 through 2/23 presenting with similar complaints and diagnosed with acute on chronic diastolic heart failure as well as COPD exacerbation.  Note during that hospitalization she did receive a thoracentesis at the bedside for right pleural effusion with removal of 1200 mL of fluid.    2.  Chest x-ray this admission shows chronic lung changes with increased right lower lobe infiltrate and pleural fluid, moderate pleural effusion.  Lights criteria noted that her pleural fluid from previous thoracentesis met criteria for a transudative effusion, likely consistent with heart failure.  Unsure if the benefit outweighs the risk of repeating thoracentesis at this time as this quickly reaccumulated.  Echocardiogram from January of this year shows ejection fraction of 51 to 55%.  She does have previous known diastolic dysfunction.      3.  Continue diuresis with IV Lasix, monitor renal function closely.  Her baseline creatinine is felt to be around 1.2-1.5.  Creatinine 2.0 today.  May even consider consulting nephrology if renal function worsens.  She has never seen a nephrologist on an outpatient basis.  Holding lisinopril today with mild hyperkalemia with potassium level 5.3.  We will give a one-time dose of Lokelma.  Noted the patient was discharged on Lasix 20 mg daily with daily as needed dosage.  She was going to be changed by her  primary care provider to torsemide yesterday during her office visit.    4.  Given patient's repeated admissions for exacerbations of heart failure and permanent atrial fibrillation, will consult cardiology.  Elevated troponin with flat trend noted, likely secondary to CHF exacerbation.  Continue Eliquis for stroke prophylaxis.  She does meet criteria for reduced dosing with age and renal function.    5.  PT/OT consults.  Patient will greatly benefit from skilled nursing facility placement however has not been agreeable in the past as she does have 24/7 care at home.   following.    6.  Labs in a.m.    Discharge Planning: I expect the patient to be discharged to ? in ? days.    Electronically signed by HALIMA Ivy, 3/3/2022, 11:18 CST.

## 2022-03-03 NOTE — THERAPY EVALUATION
Patient Name: Cindy Hicks  : 1935    MRN: [id number]                              Today's Date: 3/3/2022       Admit Date: 3/2/2022    Visit Dx:     ICD-10-CM ICD-9-CM   1. Acute systolic congestive heart failure (HCC)  I50.21 428.21     428.0   2. Impaired mobility  Z74.09 799.89     Patient Active Problem List   Diagnosis   • Family hx colonic polyps   • Family hx of colon cancer   • Hx of colonic polyp   • Melena   • Peptic ulcer disease   • Essential hypertension   • Mixed hyperlipidemia   • Overweight (BMI 25.0-29.9)   • Stage 3a chronic kidney disease (HCC)   • Pleural effusion, right   • Actinic keratosis   • Atherosclerosis of native artery of both lower extremities with intermittent claudication (HCC)   • Carotid artery stenosis   • Chronic pain   • Gastroesophageal reflux disease   • GI bleed   • Iron deficiency anemia secondary to inadequate dietary iron intake   • Irritable bowel syndrome   • Macular degeneration   • Osteoarthritis   • Osteopenia of multiple sites   • Age-related osteoporosis without current pathological fracture   • Other insomnia   • Restless legs syndrome   • Vitamin D deficiency   • Pulmonary emphysema (HCC)   • Chronic respiratory failure with hypoxia and hypercapnia (HCC)   • Cigarette nicotine dependence without complication   • COPD with acute exacerbation (HCC)   • Chronic diastolic (congestive) heart failure (HCC)   • Stage 3 severe COPD by GOLD classification (HCC)   • Acute on chronic respiratory failure with hypoxia (HCC)   • Paroxysmal atrial fibrillation (HCC)   • Personal history of nicotine dependence   • Acute diastolic congestive heart failure (HCC)   • Chronic anticoagulation   • Stage 3b chronic kidney disease (HCC)   • Hyponatremia     Past Medical History:   Diagnosis Date   • Actinic keratosis    • Arthritis    • Atherosclerosis    • Benign fundic gland polyps of stomach    • Bleeding ulcer    • Carotid artery bruit    • Carotid artery stenosis     • COPD (chronic obstructive pulmonary disease) (HCC)    • Diverticulosis    • Emphysema of lung (HCC)    • History of colon polyps    • Hyperlipidemia    • Hypertension    • IBS (irritable bowel syndrome)    • Macular degeneration    • Osteoporosis    • Prediabetes    • PVD (peripheral vascular disease) (HCC)    • TIA (transient ischemic attack)    • Vitamin D deficiency      Past Surgical History:   Procedure Laterality Date   • CATARACT EXTRACTION     • COLONOSCOPY  03/15/2013    polyp, hyperplastic   • COLONOSCOPY N/A 10/2/2018    Procedure: COLONOSCOPY WITH ANESTHESIA;  Surgeon: Harris Escobar MD;  Location: [facility] ENDOSCOPY;  Service: Gastroenterology   • CYST REMOVAL     • ENDOSCOPY  2019   • ENDOSCOPY N/A 10/4/2019    Procedure: ESOPHAGOGASTRODUODENOSCOPY WITH ANESTHESIA;  Surgeon: Harris Escobar MD;  Location: [facility] ENDOSCOPY;  Service: Gastroenterology   • FEMORAL ARTERY STENT     • HYSTERECTOMY     • VASCULAR SURGERY      multiple      General Information     Row Name 03/03/22 1257          Physical Therapy Time and Intention    Document Type evaluation  SOB, edema. dx: a-fib w RVR, a/c systolic CHF, a/c resp failure w hypoxia.  -SHERINE     Mode of Treatment physical therapy  -SHERINE     Row Name 03/03/22 1257          General Information    Patient Profile Reviewed yes  -SHERINE     Prior Level of Function min assist:; bed mobility; transfer; gait  tirso assisted with mobility. Pt reports she was mostly in her recliner at home since d/c.  -SHERINE     Existing Precautions/Restrictions fall; oxygen therapy device and L/min  -SHERINE     Barriers to Rehab medically complex; previous functional deficit  -SHERINE     Row Name 03/03/22 1257          Living Environment    Lives With alone  grandson assists  -SHERINE     Row Name 03/03/22 1257          Home Main Entrance    Number of Stairs, Main Entrance none  walk in shower  -SHERINE     Row Name 03/03/22 1257          Stairs Within Home, Primary    Number of Stairs, Within Home,  Primary none  -SHERINE     Row Name 03/03/22 1257          Cognition    Orientation Status (Cognition) oriented x 4  -SHERINE     Row Name 03/03/22 1257          Safety Issues, Functional Mobility    Impairments Affecting Function (Mobility) balance; endurance/activity tolerance; strength; shortness of breath  -SHERINE           User Key  (r) = Recorded By, (t) = Taken By, (c) = Cosigned By    Initials Name Provider Type    Eldon Ren, PT DPT Physical Therapist               Mobility     Row Name 03/03/22 1257          Bed Mobility    Bed Mobility supine-sit; sit-supine  -SHERINE     Supine-Sit [place] (Bed Mobility) supervision  -SHERINE     Sit-Supine [place] (Bed Mobility) minimum assist (75% patient effort); verbal cues  -SHERINE     Assistive Device (Bed Mobility) head of bed elevated; bed rails  -SHERINE     Row Name 03/03/22 1257          Transfers    Comment (Transfers) stood twice, SOB with activity, decreased activity tolerance.  -SHERINE     Row Name 03/03/22 1257          Sit-Stand Transfer    Sit-Stand Bryan (Transfers) contact guard; 2 person assist  -SHERINE     Assistive Device (Sit-Stand Transfers) walker, front-wheeled  -SHERINE           User Key  (r) = Recorded By, (t) = Taken By, (c) = Cosigned By    Initials Name Provider Type    Eldon Ren, PT DPT Physical Therapist               Obj/Interventions     Row Name 03/03/22 1257          Range of Motion Comprehensive    General Range of Motion bilateral lower extremity ROM WFL  -SHERINE     Row Name 03/03/22 1257          Strength Comprehensive (MMT)    Comment, General Manual Muscle Testing (MMT) Assessment B LE grossly 4-/5  -SHERINE     Row Name 03/03/22 1257          Balance    Balance Assessment sitting static balance; standing dynamic balance  -SHERINE     Static Sitting Balance WFL; unsupported; sitting, edge of bed  -SHERINE     Dynamic Standing Balance mild impairment; supported; standing  w RW  -SHERINE     Row Name 03/03/22 1257          Sensory Assessment (Somatosensory)     Sensory Assessment (Somatosensory) LE sensation intact  B LE redness, signs of decreasing edema with wrinkly skin in LE shin region  -SHERINE           User Key  (r) = Recorded By, (t) = Taken By, (c) = Cosigned By    Initials Name Provider Type    Eldon Ren, PT DPT Physical Therapist               Goals/Plan     Row Name 03/03/22 1257          Bed Mobility Goal 1 (PT)    Activity/Assistive Device (Bed Mobility Goal 1, PT) sit to supine/supine to sit  -SHERINE     [place] Level/Cues Needed (Bed Mobility Goal 1, PT) standby assist  -SHERINE     Time Frame (Bed Mobility Goal 1, PT) long term goal (LTG); 10 days  -SHERINE     Progress/Outcomes (Bed Mobility Goal 1, PT) goal ongoing  -SHERINE     Row Name 03/03/22 1257          Transfer Goal 1 (PT)    Activity/Assistive Device (Transfer Goal 1, PT) sit-to-stand/stand-to-sit; bed-to-chair/chair-to-bed; walker, rolling  -SHERINE     [place] Level/Cues Needed (Transfer Goal 1, PT) standby assist  -SHERINE     Time Frame (Transfer Goal 1, PT) long term goal (LTG); 10 days  -SHERINE     Progress/Outcome (Transfer Goal 1, PT) goal ongoing  -SHERINE     Row Name 03/03/22 1257          Gait Training Goal 1 (PT)    Activity/Assistive Device (Gait Training Goal 1, PT) gait (walking locomotion); assistive device use; decrease fall risk; improve balance and speed; increase endurance/gait distance; increase energy conservation; walker, rolling  -SHERINE     [place] Level (Gait Training Goal 1, PT) standby assist  -SHERINE     Distance (Gait Training Goal 1, PT) 40 ft  -SHERINE     Time Frame (Gait Training Goal 1, PT) long term goal (LTG); 10 days  -SHERINE     Progress/Outcome (Gait Training Goal 1, PT) goal ongoing  -SHERINE           User Key  (r) = Recorded By, (t) = Taken By, (c) = Cosigned By    Initials Name Provider Type    Eldon Ren, PT DPT Physical Therapist               Clinical Impression     Row Name 03/03/22 1257          Pain    Additional Documentation Pain Scale: Numbers Pre/Post-Treatment (Group)   -SHERINE     Row Name 03/03/22 1257          Pain Scale: Numbers Pre/Post-Treatment    Pretreatment Pain Rating 0/10 - no pain  -SHERINE     Row Name 03/03/22 1257          Plan of Care Review    Plan of Care Reviewed With patient  -SHERINE     Outcome Summary PT eval complete. She is alert and oriented x 4. She was recently d.c from [facility] back home. She reports she mostly stays in her recliner and her grandson will assist with mobility when needed. Today she gets OOB and stands with CGA x 2 and RW use. She demos generalized weakness and decreased activity tolerance. She becomes SOB with activity and needs extended time sitting to recover. PT will cont to progress mobliity, strength and activity tolerance/endurance. Due to recent admits, consider placement at d. for cont recovery and rehab.  -SHERINE     Row Name 03/03/22 1257          Therapy Assessment/Plan (PT)    Patient/Family Therapy Goals Statement (PT) go home  -SHERINE     Rehab Potential (PT) good, to achieve stated therapy goals  -SHERINE     Criteria for Skilled Interventions Met (PT) yes; meets criteria; skilled treatment is necessary  -SHERINE     Predicted Duration of Therapy Intervention (PT) unti ld.c  -SHERINE     Row Name 03/03/22 1257          Positioning and Restraints    Pre-Treatment Position in bed  -SHERINE     Post Treatment Position bed  -SHERINE     In Bed notified nsg; fowlers; call light within reach; encouraged to call for assist  -SHERINE           User Key  (r) = Recorded By, (t) = Taken By, (c) = Cosigned By    Initials Name Provider Type    Eldon Ren, PT DPT Physical Therapist               Outcome Measures     Row Name 03/03/22 1257          How much help from another person do you currently need...    Turning from your back to your side while in flat bed without using bedrails? 3  -SHERINE     Moving from lying on back to sitting on the side of a flat bed without bedrails? 3  -SHERINE     Moving to and from a bed to a chair (including a wheelchair)? 3  -SHERINE     Standing up from a  chair using your arms (e.g., wheelchair, bedside chair)? 3  -SHERINE     Climbing 3-5 steps with a railing? 1  -SHERINE     To walk in hospital room? 2  -SHERINE     AM-PAC 6 Clicks Score (PT) 15  -SHERINE     Row Name 03/03/22 1436 03/03/22 1257       Functional Assessment    Outcome Measure Options AM-PAC 6 Clicks Daily Activity (OT)  - AM-PAC 6 Clicks Basic Mobility (PT)  -SHERINE          User Key  (r) = Recorded By, (t) = Taken By, (c) = Cosigned By    Initials Name Provider Type     [place], OTR/L Occupational Therapist    Eldon Ren, PT DPT Physical Therapist                             Physical Therapy Education                 Title: PT OT SLP Therapies (In Progress)     Topic: Physical Therapy (In Progress)     Point: Mobility training (Done)     Learning Progress Summary           Patient Acceptance, E, VU,NR by SHERINE at 3/3/2022 1257    Comment: benefits of activity, progression of PT POC                   Point: Home exercise program (Not Started)     Learner Progress:  Not documented in this visit.          Point: Body mechanics (Not Started)     Learner Progress:  Not documented in this visit.          Point: Precautions (Not Started)     Learner Progress:  Not documented in this visit.                      User Key     Initials Effective Dates Name Provider Type Discipline    SHERINE 08/02/16 -  Eldon Cochran, PT DPT Physical Therapist PT              PT Recommendation and Plan  Planned Therapy Interventions (PT): bed mobility training, transfer training, gait training, balance training, home exercise program, patient/family education, postural re-education, strengthening  Plan of Care Reviewed With: patient  Outcome Summary: PT eval complete. She is alert and oriented x 4. She was recently d.c from [facility] back home. She reports she mostly stays in her recliner and her grandson will assist with mobility when needed. Today she gets OOB and stands with CGA x 2 and RW use. She demos generalized weakness and  decreased activity tolerance. She becomes SOB with activity and needs extended time sitting to recover. PT will cont to progress mobliity, strength and activity tolerance/endurance. Due to recent admits, consider placement at d. for cont recovery and rehab.     Time Calculation:    PT Charges     Row Name 03/03/22 1506             Time Calculation    Start Time 1257  -SHERINE      Stop Time 1357  -SHERINE      Time Calculation (min) 60 min  -SHERINE      PT Received On 03/03/22  -SHERINE      PT Goal Re-Cert Due Date 03/13/22  -SHERINE            User Key  (r) = Recorded By, (t) = Taken By, (c) = Cosigned By    Initials Name Provider Type    Eldon Ren, PT DPT Physical Therapist              Therapy Charges for Today     Code Description Service Date Service Provider Modifiers Qty    [id number] HC PT EVAL MOD COMPLEXITY 4 3/3/2022 Eldon Cochran PT DPT GP 1          PT G-Codes  Outcome Measure Options: AM-PAC 6 Clicks Daily Activity (OT)  AM-PAC 6 Clicks Score (PT): 15  AM-PAC 6 Clicks Score (OT): 14    Eldon Cochran, JEFF DPT  3/3/2022

## 2022-03-03 NOTE — ED PROVIDER NOTES
Subjective   Patient presents emergency department with plaints of shortness of breath.  Patient has history of multiple visits for similar complaints due to congestive heart failure, A. fib and dyspnea.  Patient states she has had worsening shortness of breath over the last 3 to 4 days with lower extremity edema and symptoms that are consistent with previous visits.  She denies vomiting, abdominal pain, chest pain, fever and states she has been nauseous since this morning and review of systems other than noted above is noncontributory.          Review of Systems   All other systems reviewed and are negative.      Past Medical History:   Diagnosis Date   • Actinic keratosis    • Arthritis    • Atherosclerosis    • Benign fundic gland polyps of stomach    • Bleeding ulcer    • Carotid artery bruit    • Carotid artery stenosis    • COPD (chronic obstructive pulmonary disease) ([facility])    • Diverticulosis    • Emphysema of lung ([facility])    • History of colon polyps    • Hyperlipidemia    • Hypertension    • IBS (irritable bowel syndrome)    • Macular degeneration    • Osteoporosis    • Prediabetes    • PVD (peripheral vascular disease) ([facility])    • TIA (transient ischemic attack)    • Vitamin D deficiency        Allergies   Allergen Reactions   • Valium [Diazepam] Nausea Only   • Contrast Dye Itching       Past Surgical History:   Procedure Laterality Date   • CATARACT EXTRACTION     • COLONOSCOPY  03/15/2013    polyp, hyperplastic   • COLONOSCOPY N/A 10/2/2018    Procedure: COLONOSCOPY WITH ANESTHESIA;  Surgeon: Harris Escobar MD;  Location: [facility] ENDOSCOPY;  Service: Gastroenterology   • CYST REMOVAL     • ENDOSCOPY  2019   • ENDOSCOPY N/A 10/4/2019    Procedure: ESOPHAGOGASTRODUODENOSCOPY WITH ANESTHESIA;  Surgeon: Harris Escobar MD;  Location: [facility] ENDOSCOPY;  Service: Gastroenterology   • FEMORAL ARTERY STENT     • HYSTERECTOMY     • VASCULAR SURGERY      multiple       Family History   Problem Relation Age of  Onset   • Colon cancer Sister    • Cancer Sister    • Colon polyps Brother    • Heart disease Daughter    • Heart disease Son    • Cancer Mother    • Cancer Father        Social History     Socioeconomic History   • Marital status:    Tobacco Use   • Smoking status: Former Smoker     Packs/day: 0.50     Years: 70.00     Pack years: 35.00     Types: Cigarettes     Quit date: 10/2021     Years since quittin.4   • Smokeless tobacco: Never Used   Vaping Use   • Vaping Use: Never used   Substance and Sexual Activity   • Alcohol use: No   • Drug use: No   • Sexual activity: Not Currently           Objective   Physical Exam  Vitals and nursing note reviewed.   Constitutional:       General: She is not in acute distress.     Appearance: Normal appearance. She is well-developed and normal weight. She is not ill-appearing, toxic-appearing or diaphoretic.   HENT:      Head: Normocephalic and atraumatic.      Right Ear: External ear normal.      Left Ear: External ear normal.      Nose: Nose normal.   Eyes:      General:         Right eye: No discharge.         Left eye: No discharge.      Conjunctiva/sclera: Conjunctivae normal.   Cardiovascular:      Rate and Rhythm: Normal rate and regular rhythm.      Pulses: Normal pulses.      Heart sounds: Normal heart sounds. No murmur heard.  No friction rub. No gallop.    Pulmonary:      Effort: Pulmonary effort is normal. No tachypnea, accessory muscle usage or respiratory distress.      Breath sounds: No stridor. Examination of the right-upper field reveals rhonchi. Examination of the left-upper field reveals rhonchi. Examination of the right-middle field reveals rhonchi. Examination of the left-middle field reveals rhonchi. Examination of the right-lower field reveals decreased breath sounds and rhonchi. Examination of the left-lower field reveals decreased breath sounds and rhonchi. Decreased breath sounds and rhonchi present. No wheezing or rales.   Chest:      Chest  wall: No tenderness.   Abdominal:      General: Abdomen is flat.      Palpations: Abdomen is soft.      Tenderness: There is no abdominal tenderness.   Musculoskeletal:         General: Normal range of motion.      Cervical back: Neck supple. No rigidity.      Right lower leg: Edema present.      Left lower leg: Edema present.      Comments: 2+ pitting edema bilaterally with venous stasis changes   Skin:     General: Skin is warm and dry.   Neurological:      General: No focal deficit present.      Mental Status: She is alert and oriented to person, place, and time. Mental status is at baseline.   Psychiatric:         Mood and Affect: Mood normal.         Behavior: Behavior normal.         Thought Content: Thought content normal.         Judgment: Judgment normal.         ECG 12 Lead      Date/Time: 3/2/2022 6:58 PM  Performed by: Yusuf Joshi DO  Authorized by: Emergency, Triage Protocol, MD   Interpreted by physician  Comparison: compared with previous ECG from 2/18/2022  Similar to previous ECG  Rhythm: atrial fibrillation  Rate: tachycardic  QRS axis: right  Conduction: incomplete RBBB  ST Depression: V2, V3, V4, V5 and V6  T depression: V2, V3, V4, V5 and V6  Other findings comments: RVH  Clinical impression: abnormal ECG                 ED Course      Patient with multiple admissions for congestive heart failure with very little reserve and an acute failure today likely due to multiple factors to include A. fib RVR.  Patient with better rate control in the ER after Cardizem and will admit for further diuresis.  Feel long-term patient is likely best suited by placement in a nursing home like facility to better monitor her fluid and dietary habits and/or consistent outpatient congestive heart failure nurse visiting patient to ensure compliance as patient has multiple admissions for similar presentations this being about the fourth in a month time.  Discussed case with Dr. Miner who will admit.                                             MDM  Number of Diagnoses or Management Options  Acute systolic congestive heart failure (HCC): established and worsening     Amount and/or Complexity of Data Reviewed  Clinical lab tests: ordered and reviewed  Tests in the radiology section of CPT®: reviewed and ordered  Tests in the medicine section of CPT®: ordered and reviewed    Risk of Complications, Morbidity, and/or Mortality  Presenting problems: moderate  Diagnostic procedures: moderate  Management options: moderate    Critical Care  Total time providing critical care: 30-74 minutes    Patient Progress  Patient progress: stable      Final diagnoses:   Acute systolic congestive heart failure (HCC)       ED Disposition  ED Disposition     ED Disposition Condition Comment    Decision to Admit  Level of Care: Remote Telemetry [26]   Diagnosis: Acute systolic congestive heart failure (HCC) [810405]   Admitting Physician: CHAPO AMEZQUITA [[id number]]   Attending Physician: CHAPO AMEZQUITA [[id number]]   Certification: I Certify That Inpatient Hospital Services Are Medically Necessary For Greater Than 2 Midnights            No follow-up provider specified.       Medication List      ASK your doctor about these medications    predniSONE 10 MG tablet  Commonly known as: DELTASONE  Take 2 tablets by mouth Daily for 3 days, THEN 1 tablet Daily for 3 days.  Start taking on: February 23, 2022  Ask about: Should I take this medication?             Yusuf Joshi,   03/02/22 2019

## 2022-03-03 NOTE — THERAPY EVALUATION
Patient Name: Cindy Hicks  : 1935    MRN: [id number]                              Today's Date: 3/3/2022       Admit Date: 3/2/2022    Visit Dx:     ICD-10-CM ICD-9-CM   1. Acute systolic congestive heart failure (HCC)  I50.21 428.21     428.0     Patient Active Problem List   Diagnosis   • Family hx colonic polyps   • Family hx of colon cancer   • Hx of colonic polyp   • Melena   • Peptic ulcer disease   • Essential hypertension   • Mixed hyperlipidemia   • Overweight (BMI 25.0-29.9)   • Stage 3a chronic kidney disease (HCC)   • Pleural effusion, right   • Actinic keratosis   • Atherosclerosis of native artery of both lower extremities with intermittent claudication (HCC)   • Carotid artery stenosis   • Chronic pain   • Gastroesophageal reflux disease   • GI bleed   • Iron deficiency anemia secondary to inadequate dietary iron intake   • Irritable bowel syndrome   • Macular degeneration   • Osteoarthritis   • Osteopenia of multiple sites   • Age-related osteoporosis without current pathological fracture   • Other insomnia   • Restless legs syndrome   • Vitamin D deficiency   • Pulmonary emphysema (HCC)   • Chronic respiratory failure with hypoxia and hypercapnia (HCC)   • Cigarette nicotine dependence without complication   • COPD with acute exacerbation (HCC)   • Chronic diastolic (congestive) heart failure (HCC)   • Stage 3 severe COPD by GOLD classification (HCC)   • Acute on chronic respiratory failure with hypoxia (HCC)   • Paroxysmal atrial fibrillation (HCC)   • Personal history of nicotine dependence   • Acute diastolic congestive heart failure (HCC)   • Chronic anticoagulation   • Stage 3b chronic kidney disease (HCC)   • Hyponatremia     Past Medical History:   Diagnosis Date   • Actinic keratosis    • Arthritis    • Atherosclerosis    • Benign fundic gland polyps of stomach    • Bleeding ulcer    • Carotid artery bruit    • Carotid artery stenosis    • COPD (chronic obstructive pulmonary  disease) ([facility])    • Diverticulosis    • Emphysema of lung (HCC)    • History of colon polyps    • Hyperlipidemia    • Hypertension    • IBS (irritable bowel syndrome)    • Macular degeneration    • Osteoporosis    • Prediabetes    • PVD (peripheral vascular disease) ([facility])    • TIA (transient ischemic attack)    • Vitamin D deficiency      Past Surgical History:   Procedure Laterality Date   • CATARACT EXTRACTION     • COLONOSCOPY  03/15/2013    polyp, hyperplastic   • COLONOSCOPY N/A 10/2/2018    Procedure: COLONOSCOPY WITH ANESTHESIA;  Surgeon: Harris Escobar MD;  Location: [facility] ENDOSCOPY;  Service: Gastroenterology   • CYST REMOVAL     • ENDOSCOPY  2019   • ENDOSCOPY N/A 10/4/2019    Procedure: ESOPHAGOGASTRODUODENOSCOPY WITH ANESTHESIA;  Surgeon: Harris Escobar MD;  Location: [facility] ENDOSCOPY;  Service: Gastroenterology   • FEMORAL ARTERY STENT     • HYSTERECTOMY     • VASCULAR SURGERY      multiple      General Information     Row Name 03/03/22 1337          OT Time and Intention    Document Type evaluation  -     Mode of Treatment occupational therapy  -     Row Name 03/03/22 1337          General Information    Patient Profile Reviewed yes  -     Prior Level of Function mod assist:; dressing; bathing  -     Existing Precautions/Restrictions fall  -     Barriers to Rehab medically complex  -     Row Name 03/03/22 1337          Occupational Profile    Reason for Services/Referral (Occupational Profile) SOB & edema  -     Environmental Supports and Barriers (Occupational Profile) walk in shower  -     Row Name 03/03/22 1337          Living Environment    Lives With alone  -     Row Name 03/03/22 1337          Home Main Entrance    Number of Stairs, Main Entrance none  -     Row Name 03/03/22 1337          Stairs Within Home, Primary    Number of Stairs, Within Home, Primary none  -     Row Name 03/03/22 1337          Cognition    Orientation Status (Cognition) oriented x 4  -      Row Name 03/03/22 1337          Safety Issues, Functional Mobility    Safety Issues Affecting Function (Mobility) at risk behavior observed; friction/shear risk  -     Impairments Affecting Function (Mobility) balance; endurance/activity tolerance; strength; shortness of breath; visual/perceptual  -           User Key  (r) = Recorded By, (t) = Taken By, (c) = Cosigned By    Initials Name Provider Type     La John, OTR/L Occupational Therapist                 Mobility/ADL's     Row Name 03/03/22 1337          Bed Mobility    Bed Mobility supine-sit; sit-supine  -     Supine-Sit [place] (Bed Mobility) supervision; verbal cues  -     Sit-Supine [place] (Bed Mobility) minimum assist (75% patient effort); verbal cues  -     Bed Mobility, Safety Issues decreased use of legs for bridging/pushing  -     Assistive Device (Bed Mobility) head of bed elevated  -     Row Name 03/03/22 1337          Transfers    Transfers sit-stand transfer  -     Sit-Stand [place] (Transfers) contact guard; 2 person assist  -     Row Name 03/03/22 1337          Sit-Stand Transfer    Assistive Device (Sit-Stand Transfers) walker, front-wheeled  -     Row Name 03/03/22 1337          Activities of Daily Living    BADL Assessment/Intervention lower body dressing; upper body dressing  -     Row Name 03/03/22 1337          Lower Body Dressing Assessment/Training    [place] Level (Lower Body Dressing) don; doff; pants/bottoms; shoes/slippers; maximum assist (25% patient effort)  -     Position (Lower Body Dressing) edge of bed sitting; supported standing  -     Row Name 03/03/22 1337          Upper Body Dressing Assessment/Training    [place] Level (Upper Body Dressing) moderate assist (50% patient effort); don; doff  -     Position (Upper Body Dressing) edge of bed sitting  -     Comment (Upper Body Dressing) hospital gown  -           User Key  (r) = Recorded By, (t) = Taken By, (c) =  Cosigned By    Initials Name Provider Type     La John, OTR/L Occupational Therapist               Obj/Interventions     Row Name 03/03/22 1337          Vision Assessment/Intervention    Visual Impairment/Limitations legally blind  -     Row Name 03/03/22 1337          Range of Motion Comprehensive    General Range of Motion bilateral upper extremity ROM WNL  -     Row Name 03/03/22 1337          Strength Comprehensive (MMT)    Comment, General Manual Muscle Testing (MMT) Assessment BUEs 4/5  -     Row Name 03/03/22 1337          Motor Skills    Motor Skills functional endurance  -     Functional Endurance fair -  -     Row Name 03/03/22 1337          Balance    Balance Assessment sitting static balance; sit to stand dynamic balance; standing static balance; sitting dynamic balance; standing dynamic balance  -     Static Sitting Balance WFL  -     Dynamic Sitting Balance mild impairment; unsupported  -     Sit to Stand Dynamic Balance mild impairment; supported; standing  -     Static Standing Balance mild impairment; supported; standing  -     Dynamic Standing Balance mild impairment; supported; standing  -     Balance Interventions sitting; standing; sit to stand; supported; static; dynamic  -           User Key  (r) = Recorded By, (t) = Taken By, (c) = Cosigned By    Initials Name Provider Type     La John, OTR/L Occupational Therapist               Goals/Plan     Row Name 03/03/22 1337          Transfer Goal 1 (OT)    Activity/Assistive Device (Transfer Goal 1, OT) sit-to-stand/stand-to-sit; bed-to-chair/chair-to-bed; commode, 3-in-1  -     [place] Level/Cues Needed (Transfer Goal 1, OT) standby assist  -     Time Frame (Transfer Goal 1, OT) long term goal (LTG); by discharge  -     Progress/Outcome (Transfer Goal 1, OT) goal ongoing  -     Row Name 03/03/22 1337          Bathing Goal 1 (OT)    Activity/Device (Bathing Goal 1, OT) bathing skills, all  -   "   [place] Level/Cues Needed (Bathing Goal 1, OT) moderate assist (50-74% patient effort)  -     Time Frame (Bathing Goal 1, OT) long term goal (LTG); by discharge  -     Progress/Outcomes (Bathing Goal 1, OT) goal ongoing  -     Row Name 03/03/22 1337          Toileting Goal 1 (OT)    Activity/Device (Toileting Goal 1, OT) toileting skills, all; commode, 3-in-1  -     [place] Level/Cues Needed (Toileting Goal 1, OT) minimum assist (75% or more patient effort)  -     Time Frame (Toileting Goal 1, OT) long term goal (LTG); by discharge  -     Progress/Outcome (Toileting Goal 1, OT) goal ongoing  -     Row Name 03/03/22 1337          Therapy Assessment/Plan (OT)    Planned Therapy Interventions (OT) activity tolerance training; adaptive equipment training; BADL retraining; patient/caregiver education/training; ROM/therapeutic exercise; occupation/activity based interventions; strengthening exercise; transfer/mobility retraining; functional balance retraining; edema control/reduction  -           User Key  (r) = Recorded By, (t) = Taken By, (c) = Cosigned By    Initials Name Provider Type     La John, OTR/L Occupational Therapist               Clinical Impression     Row Name 03/03/22 1337          Pain Assessment    Additional Documentation Pain Scale: Numbers Pre/Post-Treatment (Group)  -     Row Name 03/03/22 3147          Pain Scale: Numbers Pre/Post-Treatment    Pretreatment Pain Rating 0/10 - no pain  -     Posttreatment Pain Rating 0/10 - no pain  -     Pain Intervention(s) Medication (See MAR); Repositioned; Ambulation/increased activity  -     Row Name 03/03/22 5229          Plan of Care Review    Plan of Care Reviewed With patient  -     Progress no change  -     Outcome Summary OT eval completed.  Pt. is AxO x 3 & pleasant.  Ms. Hicks is legally blind & states she has \"about 20% of my vision because of macular degeneration.\" She was able to come to EOB with Min " A, required Max A for LBD, & Min A/CGA x 2 for sit to stand.  Ms. Humble bobby's significant deficits in act cecilia & generalized weakness.  She reports that after her preivous hospitalization she spent most of her time in her recliner and rarely moved.  Because of this I rec pt. go to SNF for rehab to increase her act cecilia to max her fxn & reduce a risk of a readmit.  -     Row Name 03/03/22 4624          Therapy Assessment/Plan (OT)    Patient/Family Therapy Goal Statement (OT) return home  -     Rehab Potential (OT) good, to achieve stated therapy goals  -     Criteria for Skilled Therapeutic Interventions Met (OT) yes; skilled treatment is necessary  -     Therapy Frequency (OT) 5 times/wk  -     Predicted Duration of Therapy Intervention (OT) until DC from this facility  -     Row Name 03/03/22 4848          Therapy Plan Review/Discharge Plan (OT)    Anticipated Discharge Disposition (OT) skilled nursing facility  -     Row Name 03/03/22 3581          Positioning and Restraints    Pre-Treatment Position in bed  -     Post Treatment Position bed  -     In Bed fowlers; call light within reach; encouraged to call for assist; side rails up x2  -           User Key  (r) = Recorded By, (t) = Taken By, (c) = Cosigned By    Initials Name Provider Type     La John, OTR/L Occupational Therapist               Outcome Measures     Row Name 03/03/22 8286          How much help from another is currently needed...    Putting on and taking off regular lower body clothing? 2  -CH     Bathing (including washing, rinsing, and drying) 2  -CH     Toileting (which includes using toilet bed pan or urinal) 2  -CH     Putting on and taking off regular upper body clothing 2  -CH     Taking care of personal grooming (such as brushing teeth) 3  -CH     Eating meals 3  -CH     AM-PAC 6 Clicks Score (OT) 14  -     Row Name 03/03/22 1436          Functional Assessment    Outcome Measure Options AM-PAC 6 Clicks Daily  Activity (OT)  -           User Key  (r) = Recorded By, (t) = Taken By, (c) = Cosigned By    Initials Name Provider Type     La John OTR/L Occupational Therapist                Occupational Therapy Education                 Title: PT OT SLP Therapies (In Progress)     Topic: Occupational Therapy (In Progress)     Point: ADL training (Done)     Description:   Instruct learner(s) on proper safety adaptation and remediation techniques during self care or transfers.   Instruct in proper use of assistive devices.              Learning Progress Summary           Patient Acceptance, E,D, VU,NR by  at 3/3/2022 1437                   Point: Home exercise program (Not Started)     Description:   Instruct learner(s) on appropriate technique for monitoring, assisting and/or progressing therapeutic exercises/activities.              Learner Progress:  Not documented in this visit.          Point: Precautions (Done)     Description:   Instruct learner(s) on prescribed precautions during self-care and functional transfers.              Learning Progress Summary           Patient Acceptance, E,D, VU,NR by  at 3/3/2022 1437                   Point: Body mechanics (Not Started)     Description:   Instruct learner(s) on proper positioning and spine alignment during self-care, functional mobility activities and/or exercises.              Learner Progress:  Not documented in this visit.                      User Key     Initials Effective Dates Name Provider Type Discipline     06/16/21 -  La John OTR/L Occupational Therapist OT              OT Recommendation and Plan  Planned Therapy Interventions (OT): activity tolerance training, adaptive equipment training, BADL retraining, patient/caregiver education/training, ROM/therapeutic exercise, occupation/activity based interventions, strengthening exercise, transfer/mobility retraining, functional balance retraining, edema control/reduction  Therapy Frequency  "(OT): 5 times/wk  Plan of Care Review  Plan of Care Reviewed With: patient  Progress: no change  Outcome Summary: OT eval completed.  Pt. is AxO x 3 & pleasant.  Ms. Hicks is legally blind & states she has \"about 20% of my vision because of macular degeneration.\" She was able to come to EOB with Min A, required Max A for LBD, & Min A/CGA x 2 for sit to stand.  Ms. Humble bobby's significant deficits in act cecilia & generalized weakness.  She reports that after her preivous hospitalization she spent most of her time in her recliner and rarely moved.  Because of this I rec pt. go to SNF for rehab to increase her act cecilia to max her fxn & reduce a risk of a readmit.     Time Calculation:    Time Calculation- OT     Row Name 03/03/22 1337             Time Calculation- OT    OT Start Time 1337  -      OT Stop Time 1430  -      OT Time Calculation (min) 53 min  -      OT Received On 03/03/22  -      OT Goal Re-Cert Due Date 03/13/22  -            User Key  (r) = Recorded By, (t) = Taken By, (c) = Cosigned By    Initials Name Provider Type     La John, OTR/L Occupational Therapist              Therapy Charges for Today     Code Description Service Date Service Provider Modifiers Qty    [id number]  OT EVAL LOW COMPLEXITY 4 3/3/2022 La John OTR/L GO 1               La John OTR/BARI  3/3/2022  "

## 2022-03-04 NOTE — PROGRESS NOTES
"  Chief Complaint   Patient presents with   • Shortness of Breath     S: No acute events overnight.  The patient feels overall better than she did upon arrival.  Her breathing has improved.  She still has a cough which she is describing as being essentially at her baseline at this time.  She continues to have lower extremity edema that she notes is somewhat improved.  She denies having orthopnea, PND overnight.    Medications: Reviewed    Review of Systems: All pertinent negative and positives as noted above.  Otherwise, all systems reviewed and found to be negative.    Telemetry: Atrial fibrillation with variable heart rate control overnight ranging from 70s to 120 bpm, currently today ranging between 90s and 110 bpm    O:  BP 98/56 (BP Location: Right arm, Patient Position: Lying)   Pulse 91   Temp 97.5 °F (36.4 °C) (Oral)   Resp 18   Ht 167.6 cm (65.98\")   Wt 68.9 kg (152 lb)   SpO2 95%   BMI 24.55 kg/m²   Temp:  [97.2 °F (36.2 °C)-98.1 °F (36.7 °C)] 97.5 °F (36.4 °C)  Heart Rate:  [] 91  Resp:  [16-18] 18  BP: ()/(56-76) 98/56    Intake/Output Summary (Last 24 hours) at 3/4/2022 1237  Last data filed at 3/4/2022 1157  Gross per 24 hour   Intake 420 ml   Output 700 ml   Net -280 ml     General: Chronically ill in appearance, no acute distress, sitting up on the edge of the bed  CV: Irregular, heart rate currently less than 100 bpm  Pulmonary: Decreased breath sounds globally.  Significant decrease at right base, no current wheezing  GI: Soft, nontender, active bowel sounds  Extremities: Edema is present bilateral lower extremities with erythema on the pretibial surfaces and bullous-like lesions, currently nonweeping    Diagnostic Data:    Lab Results   Component Value Date    WBC 8.95 03/04/2022    HGB 10.4 (L) 03/04/2022    HCT 33.2 (L) 03/04/2022    MCV 89.0 03/04/2022     03/04/2022     Lab Results   Component Value Date    GLUCOSE 96 03/04/2022    CALCIUM 8.9 03/04/2022     (L) " 03/04/2022    K 4.0 03/04/2022    CO2 35.0 (H) 03/04/2022    CL 88 (L) 03/04/2022    BUN 47 (H) 03/04/2022    CREATININE 1.90 (H) 03/04/2022    EGFRIFAFRI 57 (L) 04/19/2021    EGFRIFNONA 32 (L) 02/23/2022    BCR 24.7 03/04/2022    ANIONGAP 6.0 03/04/2022     ASSESSMENT/PLAN:    1.  Acute on chronic hypoxemic respiratory failure, clinically improved  2.  Acute on chronic diastolic heart failure, clinically improved  3.  Acute on chronic renal insufficiency, slight improvement in creatinine noted today  4.  Hyperkalemia, resolved at this time  5.  Paroxysmal atrial fibrillation, heart rate remains reasonable  6.  Essential hypertension  7.  Mixed hyperlipidemia    -Overall, some improvement noted with less shortness of breath.  The patient remains on IV diuretics at this time.  Would agree that she would probably be stable for transition to oral diuretics tomorrow.  Continue efforts at fluid and sodium restriction.  -The patient remains chronically anticoagulated due to her atrial fibrillation.  Heart rate is variable but the patient is asymptomatic and otherwise hemodynamically stable.  Continue current dose of beta-blocker therapy.  Outpatient cardioversion may be planned in the future.  -We will see again tomorrow.

## 2022-03-04 NOTE — PLAN OF CARE
Goal Outcome Evaluation:  Plan of Care Reviewed With: patient        Progress: no change  Outcome Summary: The patient presents alert and oriented sitting EOB. B LEs are blistered but her skin seems loose to the touch. The unna boots were placed from her metatarsal heads to the tibual tuberocity. Capillary refill was < 3sec. Unna boots were covered in coban. PT will continue to monitor the unna boots but will plan to keep the unna boots in place for 7 days. Recommend follow up with outpt wound care due to the amount of blisters she has on B LEs.

## 2022-03-04 NOTE — PLAN OF CARE
Goal Outcome Evaluation:  Plan of Care Reviewed With: patient           Outcome Summary: VSS, now on 2L/NC, lung sound much improved, pt less SOB, continues with BLE edmea, HR AF , had bm this shift, fair urine output after lasix, prn pain med given, bed alarm in use

## 2022-03-04 NOTE — PLAN OF CARE
Goal Outcome Evaluation:  Plan of Care Reviewed With: patient        Progress: improving  Outcome Summary: Bed mobility w/ extra time sba. Sat EOB worked on AROM BLEs x10. sit-stand cga. Pt took steps fwd/bkwd several times then sat rested. stood again and pt amb 5'x4 cga/sba standing rest then sat rested. monitored o2 remained in 90s on 3L. Feel pt will would benfit from  upon d/c

## 2022-03-04 NOTE — THERAPY TREATMENT NOTE
Acute Care - Physical Therapy Treatment Note  [facility]     Patient Name: Cindy Hicks  : 1935  MRN: [id number]  Today's Date: 3/4/2022      Visit Dx:     ICD-10-CM ICD-9-CM   1. Acute systolic congestive heart failure (HCC)  I50.21 428.21     428.0   2. Impaired mobility  Z74.09 799.89     Patient Active Problem List   Diagnosis   • Family hx colonic polyps   • Family hx of colon cancer   • Hx of colonic polyp   • Melena   • Peptic ulcer disease   • Essential hypertension   • Mixed hyperlipidemia   • Overweight (BMI 25.0-29.9)   • Stage 3a chronic kidney disease (HCC)   • Pleural effusion, right   • Actinic keratosis   • Atherosclerosis of native artery of both lower extremities with intermittent claudication (HCC)   • Carotid artery stenosis   • Chronic pain   • Gastroesophageal reflux disease   • GI bleed   • Iron deficiency anemia secondary to inadequate dietary iron intake   • Irritable bowel syndrome   • Macular degeneration   • Osteoarthritis   • Osteopenia of multiple sites   • Age-related osteoporosis without current pathological fracture   • Other insomnia   • Restless legs syndrome   • Vitamin D deficiency   • Pulmonary emphysema (HCC)   • Chronic respiratory failure with hypoxia and hypercapnia (HCC)   • Cigarette nicotine dependence without complication   • COPD with acute exacerbation (HCC)   • Chronic diastolic (congestive) heart failure (HCC)   • Stage 3 severe COPD by GOLD classification (HCC)   • Acute on chronic respiratory failure with hypoxia (HCC)   • Paroxysmal atrial fibrillation (HCC)   • Personal history of nicotine dependence   • Acute diastolic congestive heart failure (HCC)   • Chronic anticoagulation   • Stage 3b chronic kidney disease (HCC)   • Hyponatremia     Past Medical History:   Diagnosis Date   • Actinic keratosis    • Arthritis    • Atherosclerosis    • Benign fundic gland polyps of stomach    • Bleeding ulcer    • Carotid artery bruit    • Carotid artery  stenosis    • COPD (chronic obstructive pulmonary disease) ([facility])    • Diverticulosis    • Emphysema of lung (HCC)    • History of colon polyps    • Hyperlipidemia    • Hypertension    • IBS (irritable bowel syndrome)    • Macular degeneration    • Osteoporosis    • Prediabetes    • PVD (peripheral vascular disease) ([facility])    • TIA (transient ischemic attack)    • Vitamin D deficiency      Past Surgical History:   Procedure Laterality Date   • CATARACT EXTRACTION     • COLONOSCOPY  03/15/2013    polyp, hyperplastic   • COLONOSCOPY N/A 10/2/2018    Procedure: COLONOSCOPY WITH ANESTHESIA;  Surgeon: Harris Escobar MD;  Location: [facility] ENDOSCOPY;  Service: Gastroenterology   • CYST REMOVAL     • ENDOSCOPY  2019   • ENDOSCOPY N/A 10/4/2019    Procedure: ESOPHAGOGASTRODUODENOSCOPY WITH ANESTHESIA;  Surgeon: Harris Escobar MD;  Location: [facility] ENDOSCOPY;  Service: Gastroenterology   • FEMORAL ARTERY STENT     • HYSTERECTOMY     • VASCULAR SURGERY      multiple     PT Assessment (last 12 hours)     PT Evaluation and Treatment     Row Name 03/04/22 1119          Physical Therapy Time and Intention    Subjective Information complains of; weakness; dyspnea  -NW     Document Type therapy note (daily note)  -NW     Mode of Treatment physical therapy  -NW     Row Name 03/04/22 1119          General Information    Existing Precautions/Restrictions fall; oxygen therapy device and L/min  -NW     Row Name 03/04/22 1119          Pain Scale: Numbers Pre/Post-Treatment    Pretreatment Pain Rating 0/10 - no pain  -NW     Posttreatment Pain Rating 0/10 - no pain  -NW     Row Name 03/04/22 1119          Bed Mobility    Supine-Sit [place] (Bed Mobility) supervision  -NW     Sit-Supine [place] (Bed Mobility) --  sitting EOB  -NW     Comment (Bed Mobility) extra time  -NW     Row Name 03/04/22 1119          Transfers    Transfers sit-stand transfer; stand-sit transfer  -NW     Sit-Stand [place] (Transfers) verbal cues;  contact guard; standby assist  -NW     Stand-Sit [place] (Transfers) supervision  -     Row Name 03/04/22 1119          Sit-Stand Transfer    Assistive Device (Sit-Stand Transfers) walker, front-wheeled  -NW     Row Name 03/04/22 1119          Gait/Stairs (Locomotion)    [place] Level (Gait) verbal cues; contact guard  -NW     Assistive Device (Gait) walker, front-wheeled  -NW     Distance in Feet (Gait) 5x4  -NW     Comment (Gait/Stairs) Pt sat EOB tolerated AROM BLEs. stood x1 pt amb fwd/bkwd x2 saat rested then stood amb fwd/bkwd then 5' . pt sat rested then amb again 5'x3.   pt SOA But monitored and it stayed in 90s  -     Row Name 03/04/22 1119          Safety Issues, Functional Mobility    Impairments Affecting Function (Mobility) balance; endurance/activity tolerance  -     Row Name 03/04/22 1119          Motor Skills    Therapeutic Exercise aerobic  -     Row Name 03/04/22 1119          Aerobic Exercise    Comment, Aerobic Exercise (Therapeutic Exercise) AROM BLEs x10  -NW     Row Name 03/04/22 1119          Positioning and Restraints    Pre-Treatment Position in bed  -NW     Post Treatment Position bed  -NW     In Bed sitting EOB; call light within reach; encouraged to call for assist; notified [facility]           User Key  (r) = Recorded By, (t) = Taken By, (c) = Cosigned By    Initials Name Provider Type    Ramona Mccullough, PTA Physical Therapy Assistant                Physical Therapy Education                 Title: PT OT SLP Therapies (In Progress)     Topic: Physical Therapy (In Progress)     Point: Mobility training (Done)     Learning Progress Summary           Patient Acceptance, E, VU,NR by SHERINE at 3/3/2022 1257    Comment: benefits of activity, progression of PT POC                   Point: Home exercise program (Not Started)     Learner Progress:  Not documented in this visit.          Point: Body mechanics (Not Started)     Learner Progress:  Not documented in this visit.           Point: Precautions (Not Started)     Learner Progress:  Not documented in this visit.                      User Key     Initials Effective Dates Name Provider Type Discipline    SHERINE 08/02/16 -  Eldon Cochran, PT DPT Physical Therapist PT              PT Recommendation and Plan     Plan of Care Reviewed With: patient  Progress: improving  Outcome Summary: Bed mobility w/ extra time sba. Sat EOB worked on AROM BLEs x10. sit-stand cga. Pt took steps fwd/bkwd several times then sat rested. stood again and pt amb 5'x4 cga/sba standing rest then sat rested. monitored o2 remained in 90s on 3L. Feel pt will would benfit from  upon d/c   Outcome Measures     Row Name 03/04/22 0825             How much help from another is currently needed...    Putting on and taking off regular lower body clothing? 2  -CJ      Bathing (including washing, rinsing, and drying) 2  -CJ      Toileting (which includes using toilet bed pan or urinal) 2  -CJ      Putting on and taking off regular upper body clothing 2  -CJ      Taking care of personal grooming (such as brushing teeth) 3  -CJ      Eating meals 3  -CJ      AM-PAC 6 Clicks Score (OT) 14  -CJ              Functional Assessment    Outcome Measure Options AM-PAC 6 Clicks Daily Activity (OT)  -CJ            User Key  (r) = Recorded By, (t) = Taken By, (c) = Cosigned By    Initials Name Provider Type    CJ Yusuf Barlow COTA Occupational Therapy Assistant                 Time Calculation:    PT Charges     Row Name 03/04/22 1159             Time Calculation    Start Time 1119  -NW      Stop Time 1149  -NW      Time Calculation (min) 30 min  -NW      PT Received On 03/04/22  -NW      PT Goal Re-Cert Due Date 03/13/22  -NW              Time Calculation- PT    Total Timed Code Minutes- PT 30 minute(s)  -NW              Timed Charges    74596 - PT Therapeutic Exercise Minutes 15  -NW      72356 - PT Therapeutic Activity Minutes 15  -NW              Total Minutes    Timed  Charges Total Minutes 30  -NW       Total Minutes 30  -NW            User Key  (r) = Recorded By, (t) = Taken By, (c) = Cosigned By    Initials Name Provider Type    Ramona Mccullough PTA Physical Therapy Assistant                  PT G-Codes  Outcome Measure Options: AM-PAC 6 Clicks Daily Activity (OT)  AM-PAC 6 Clicks Score (PT): 15  AM-PAC 6 Clicks Score (OT): 14    Ramona Clemente PTA  3/4/2022

## 2022-03-04 NOTE — PLAN OF CARE
Problem: Adult Inpatient Plan of Care  Goal: Plan of Care Review  Flowsheets (Taken 3/4/2022 8141)  Progress: improving  Plan of Care Reviewed With: patient  Outcome Summary: PtNabeel martinez in bed, performed ue exs to increase her act. cecilia during adl tasks!   Goal Outcome Evaluation:  Plan of Care Reviewed With: patient        Progress: improving  Outcome Summary: PtNabeel martinez in bed, performed ue exs to increase her act. cecilia during adl tasks!

## 2022-03-04 NOTE — PROGRESS NOTES
[facility] Medicine Services  INPATIENT PROGRESS NOTE    Length of Stay: 2  Date of Admission: 3/2/2022  Primary Care Physician: Austin Wade DO    Subjective   Chief Complaint: Follow-up shortness of breath  HPI   Patient sitting up in bed.  She states she feels better in comparison to yesterday.  She is still tolerating her home setting of oxygen at 3 L.  She denies any chest pain presently.  She states her cough is felt to be at baseline.  She denies abdominal pain, nausea, or vomiting.  When discussing measures to prevent readmission, she states she may be willing to go to a rehab facility for a couple of weeks but wants to talk to her kids first.    Review of Systems   All pertinent negatives and positives are as above. All other systems have been reviewed and are negative unless otherwise stated.     Objective    Temp:  [97.2 °F (36.2 °C)-98.1 °F (36.7 °C)] 97.5 °F (36.4 °C)  Heart Rate:  [] 91  Resp:  [16-18] 18  BP: ()/(56-76) 98/56  Physical Exam  Vitals and nursing note reviewed.   Constitutional:       General: She is not in acute distress.     Appearance: She is ill-appearing.   HENT:      Head: Normocephalic and atraumatic.   Cardiovascular:      Rate and Rhythm: Normal rate. Rhythm irregular.      Comments: A. fib  overnight  Pulmonary:      Effort: Pulmonary effort is normal.      Breath sounds: No wheezing, rales or rhonchi.   Diminished in bilateral bases, right base dull.  Abdominal:      General: Bowel sounds are normal. There is no distension.      Palpations: Abdomen is soft.      Tenderness: There is no abdominal tenderness.   Musculoskeletal:      Cervical back: Normal range of motion and neck supple. No tenderness.      Right lower leg: Edema present.      Left lower leg: Edema present.   Skin:     General: Skin is warm and dry.      Findings: No erythema or rash.      Comments: Chronic venous skin changes of lower extremities.    Neurological:      General: No focal deficit present.      Mental Status: She is alert and oriented to person, place, and time.   Psychiatric:         Mood and Affect: Mood normal.         Behavior: Behavior normal.         Thought Content: Thought content normal.         Judgment: Judgment normal.     Results Review:  I have reviewed the labs, radiology results, and diagnostic studies.    Laboratory Data:   Results from last 7 days   Lab Units 03/04/22  0146 03/03/22  0500 03/02/22  1851   WBC 10*3/mm3 8.95 8.45 10.59   HEMOGLOBIN g/dL 10.4* 10.7* 11.6*   HEMATOCRIT % 33.2* 34.9 37.6   PLATELETS 10*3/mm3 318 341 397     Results from last 7 days   Lab Units 03/04/22  0724 03/03/22  0500 03/02/22  1851   SODIUM mmol/L 129* 132* 127*   POTASSIUM mmol/L 4.0 5.3* 5.7*   CHLORIDE mmol/L 88* 88* 87*   CO2 mmol/L 35.0* 33.0* 30.0*   BUN mg/dL 47* 51* 49*   CREATININE mg/dL 1.90* 2.02* 2.06*   CALCIUM mg/dL 8.9 9.1 9.1   BILIRUBIN mg/dL  --   --  0.4   ALK PHOS U/L  --   --  85   ALT (SGPT) U/L  --   --  51*   AST (SGOT) U/L  --   --  46*   GLUCOSE mg/dL 96 80 119*   Net IO Since Admission: -440 mL [03/04/22 1206]    I have reviewed the patient current medications.     Assessment/Plan     Active Hospital Problems    Diagnosis    • Acute diastolic congestive heart failure (HCC)    • Paroxysmal atrial fibrillation (HCC)    • Acute on chronic respiratory failure with hypoxia (HCC)    • Stage 3a chronic kidney disease (HCC)      Plan:  1.  Patient presented to the emergency department on 3/2/2022 with complaints of shortness of breath.  She was recently admitted to our facility from 2/18 through 2/23 presenting with similar complaints and diagnosed with acute on chronic diastolic heart failure as well as COPD exacerbation.  Note during that hospitalization she did receive a thoracentesis at the bedside for right pleural effusion with removal of 1200 mL of fluid.     2.  Chest x-ray this admission shows chronic lung changes  with increased right lower lobe infiltrate and pleural fluid, moderate pleural effusion.  Lights criteria noted that her pleural fluid from previous thoracentesis met criteria for a transudative effusion, likely consistent with heart failure.  Unsure if the benefit outweighs the risk of repeating thoracentesis at this time as this quickly reaccumulated following her last thoracentesis.  Echocardiogram from January of this year shows ejection fraction of 51 to 55%.  She does have previous known diastolic dysfunction.  Placed on 1500 mL fluid restriction.      3.  Continue diuresis with IV Lasix today and reassess volume status in a.m.   Her negative net output since admission is only 440 mL however she states her symptoms have improved.  Her baseline creatinine is felt to be around 1.2-1.5.  Creatinine 1.9 today, slightly improved compared to yesterday.  She was going to be changed by her primary care provider to torsemide during her most recent office visit, however the patient was hesitant.  Feel that we could likely transition her to oral torsemide or Bumex possibly as early as tomorrow if she continues to improve.  Hyponatremia likely secondary to volume overload, felt to be at or near baseline.  Would continue to hold lisinopril for now, hyperkalemia improved with dose of Lokelma yesterday.  Would benefit from nephrology evaluation as an outpatient.     4.  Continue Eliquis for stroke prophylaxis.  She does meet criteria for reduced dosing with age and renal function.    5.  Appreciate cardiology assistance.  Possible consideration of cardioversion as her A. fib may contribute to worsening diastolic dysfunction and issues with volume overload.       5.  Continue PT/OT.  The patient and I discussed possible rehab placement given her repeated hospital admissions in the last couple of months and she states she will talk to her children about possible placement.   to follow.    6.  Have PT place Unna  boots for compression purposes only.    7.  Labs in a.m.    Discharge Planning: I expect the patient to be discharged to SNF versus home with home health in 2-3 days.    Electronically signed by HALIMA Ivy, 3/4/2022, 11:59 CST.

## 2022-03-04 NOTE — THERAPY WOUND CARE TREATMENT
Acute Care - Wound/Debridement Initial Evaluation  [facility]     Patient Name: Cindy Hicks  : 1935  MRN: [id number]  Today's Date: 3/4/2022                Admit Date: 3/2/2022    Visit Dx:    ICD-10-CM ICD-9-CM   1. Acute systolic congestive heart failure (HCC)  I50.21 428.21     428.0   2. Impaired mobility  Z74.09 799.89   3. Bilateral lower extremity edema  R60.0 782.3       Patient Active Problem List   Diagnosis   • Family hx colonic polyps   • Family hx of colon cancer   • Hx of colonic polyp   • Melena   • Peptic ulcer disease   • Essential hypertension   • Mixed hyperlipidemia   • Overweight (BMI 25.0-29.9)   • Stage 3a chronic kidney disease (HCC)   • Pleural effusion, right   • Actinic keratosis   • Atherosclerosis of native artery of both lower extremities with intermittent claudication ([facility])   • Carotid artery stenosis   • Chronic pain   • Gastroesophageal reflux disease   • GI bleed   • Iron deficiency anemia secondary to inadequate dietary iron intake   • Irritable bowel syndrome   • Macular degeneration   • Osteoarthritis   • Osteopenia of multiple sites   • Age-related osteoporosis without current pathological fracture   • Other insomnia   • Restless legs syndrome   • Vitamin D deficiency   • Pulmonary emphysema (HCC)   • Chronic respiratory failure with hypoxia and hypercapnia (HCC)   • Cigarette nicotine dependence without complication   • COPD with acute exacerbation (HCC)   • Chronic diastolic (congestive) heart failure (HCC)   • Stage 3 severe COPD by GOLD classification (HCC)   • Acute on chronic respiratory failure with hypoxia (HCC)   • Paroxysmal atrial fibrillation (HCC)   • Personal history of nicotine dependence   • Acute diastolic congestive heart failure (HCC)   • Chronic anticoagulation   • Stage 3b chronic kidney disease (HCC)   • Hyponatremia        Past Medical History:   Diagnosis Date   • Actinic keratosis    • Arthritis    • Atherosclerosis    • Benign fundic  gland polyps of stomach    • Bleeding ulcer    • Carotid artery bruit    • Carotid artery stenosis    • COPD (chronic obstructive pulmonary disease) ([facility])    • Diverticulosis    • Emphysema of lung ([facility])    • History of colon polyps    • Hyperlipidemia    • Hypertension    • IBS (irritable bowel syndrome)    • Macular degeneration    • Osteoporosis    • Prediabetes    • PVD (peripheral vascular disease) ([facility])    • TIA (transient ischemic attack)    • Vitamin D deficiency         Past Surgical History:   Procedure Laterality Date   • CATARACT EXTRACTION     • COLONOSCOPY  03/15/2013    polyp, hyperplastic   • COLONOSCOPY N/A 10/2/2018    Procedure: COLONOSCOPY WITH ANESTHESIA;  Surgeon: Harris Escobar MD;  Location: [facility] ENDOSCOPY;  Service: Gastroenterology   • CYST REMOVAL     • ENDOSCOPY  2019   • ENDOSCOPY N/A 10/4/2019    Procedure: ESOPHAGOGASTRODUODENOSCOPY WITH ANESTHESIA;  Surgeon: Harris Escobar MD;  Location: [facility] ENDOSCOPY;  Service: Gastroenterology   • FEMORAL ARTERY STENT     • HYSTERECTOMY     • VASCULAR SURGERY      multiple                  WOUND DEBRIDEMENT                     PT Assessment (last 12 hours)     PT Evaluation and Treatment     Row Name 03/04/22 1305 03/04/22 1119       Physical Therapy Time and Intention    Subjective Information complains of; pain  -MS complains of; weakness; dyspnea  -NW    Document Type evaluation; wound care  -MS therapy note (daily note)  -NW    Mode of Treatment physical therapy; individual therapy  -MS physical therapy  -NW    Row Name 03/04/22 1305 03/04/22 1119       General Information    Patient Profile Reviewed yes  -MS --    Existing Precautions/Restrictions -- fall; oxygen therapy device and L/min  -NW    Barriers to Rehab medically complex; physical barrier  -MS --    Row Name 03/04/22 1305          Cognition    Orientation Status (Cognition) oriented x 4  -MS     Row Name 03/04/22 1305          Pain    Additional Documentation Pain Scale:  Numbers Pre/Post-Treatment (Group)  -MS     Row Name 03/04/22 1305 03/04/22 1119       Pain Scale: Numbers Pre/Post-Treatment    Pretreatment Pain Rating 3/10  -MS 0/10 - no pain  -NW    Posttreatment Pain Rating 3/10  -MS 0/10 - no pain  -NW    Pain Location back  -MS --    Row Name 03/04/22 1305          Pain Scale: Word Pre/Post-Treatment    Pain Intervention(s) Ambulation/increased activity  -MS     Row Name 03/04/22 1119          Bed Mobility    Supine-Sit [place] (Bed Mobility) supervision  -NW     Sit-Supine [place] (Bed Mobility) --  sitting EOB  -NW     Comment (Bed Mobility) extra time  -NW     Row Name 03/04/22 1119          Transfers    Transfers sit-stand transfer; stand-sit transfer  -NW     Sit-Stand [place] (Transfers) verbal cues; contact guard; standby assist  -NW     Stand-Sit [place] (Transfers) supervision  -NW     Row Name 03/04/22 1119          Sit-Stand Transfer    Assistive Device (Sit-Stand Transfers) walker, front-wheeled  -NW     Row Name 03/04/22 1119          Gait/Stairs (Locomotion)    [place] Level (Gait) verbal cues; contact guard  -NW     Assistive Device (Gait) walker, front-wheeled  -NW     Distance in Feet (Gait) 5x4  -NW     Comment (Gait/Stairs) Pt sat EOB tolerated AROM BLEs. stood x1 pt amb fwd/bkwd x2 saat rested then stood amb fwd/bkwd then 5' . pt sat rested then amb again 5'x3.   pt SOA But monitored and it stayed in 90s  -NW     Row Name 03/04/22 1119          Safety Issues, Functional Mobility    Impairments Affecting Function (Mobility) balance; endurance/activity tolerance  -NW     Row Name 03/04/22 1119          Motor Skills    Therapeutic Exercise aerobic  -NW     Row Name 03/04/22 1119          Aerobic Exercise    Comment, Aerobic Exercise (Therapeutic Exercise) AROM BLEs x10  -NW     Row Name 03/04/22 1305          Edema Assessment & Management    Location (Edema) lower extremity, left; lower extremity, right  -MS     Additional  Documentation Edema Symptoms/Interventions (Group); Location (Edema) (Row)  -MS     Row Name 03/04/22 1305          Lower Extremity Edema Measures, Left    Lower Extremity, Left (Edema) other (see comments)  toes to tib tub  -MS     Row Name 03/04/22 1305          Lower Extremity Edema Measures, Right    Lower Extremity, Right (Edema) other (see comments)  toes to mid tub  -MS     Row Name 03/04/22 1305          Edema Symptoms/Interventions    Description (Edema) diffuse  -MS     Associated Symptoms (Edema) hair growth changes; sensory impairments; skin color changes; skin creases diminished; temperature changes; tissue elasticity loss  blisters  -MS     Treatment Interventions (Edema) compression wrapping/taping  zinc unna boots from mets heads to tib tub  -MS     Row Name 03/04/22 1305          Plan of Care Review    Plan of Care Reviewed With patient  -MS     Progress no change  -MS     Outcome Summary The patient presents alert and oriented sitting EOB. B LEs are blistered but her skin seems loose to the touch. The unna boots were placed from her metatarsal heads to the tibual tuberocity. Capillary refill was < 3sec. Unna boots were covered in coban. PT will continue to monitor the unna boots but will plan to keep the unna boots in place for 7 days. Recommend follow up with outpt wound care due to the amount of blisters she has on B LEs.  -MS     Row Name 03/04/22 1305          Physical Therapy Goals    Problem Specific Goal Selection (PT) problem specific goal 1, PT; problem specific goal 2, PT  -MS     Row Name 03/04/22 1305          Problem Specific Goal 1 (PT)    Problem Specific Goal 1 (PT) The R unna boot will stay in place for 7 days to provide optimal compression to allow for edema control  -MS     Time Frame (Problem Specific Goal 1, PT) long-term goal (LTG); by discharge  -MS     Progress/Outcome (Problem Specific Goal 1, PT) goal ongoing  -MS     Row Name 03/04/22 1305          Problem Specific Goal 2  (PT)    Problem Specific Goal 2 (PT) The L unna boot will stay in place for 7 days to provide optimal compression to allow for edema control  -MS     Time Frame (Problem Specific Goal 2, PT) long-term goal (LTG); by discharge  -MS     Progress/Outcome (Problem Specific Goal 2, PT) goal ongoing  -MS     Row Name 03/04/22 1305 03/04/22 1119       Positioning and Restraints    Pre-Treatment Position in bed  -MS in bed  -NW    Post Treatment Position bed  -MS bed  -NW    In Bed fowlers; call light within reach; encouraged to call for assist; side rails up x2  -MS sitting EOB; call light within reach; encouraged to call for assist; notified nsg  -NW    Row Name 03/04/22 1305          Therapy Assessment/Plan (PT)    Patient/Family Therapy Goals Statement (PT) go home  -MS     Rehab Potential (PT) good, to achieve stated therapy goals  -MS     Criteria for Skilled Interventions Met (PT) yes; meets criteria; skilled treatment is necessary  -MS     Predicted Duration of Therapy Intervention (PT) until discharge  -MS           User Key  (r) = Recorded By, (t) = Taken By, (c) = Cosigned By    Initials Name Provider Type    MS Nam Jazmine R, PT, DPT, NCS Physical Therapist    NW Ramona Clemente, PTA Physical Therapy Assistant              Physical Therapy Education                 Title: PT OT SLP Therapies (In Progress)     Topic: Physical Therapy (In Progress)     Point: Mobility training (Done)     Learning Progress Summary           Patient Acceptance, E, VU,NR by SHERINE at 3/3/2022 1257    Comment: benefits of activity, progression of PT POC                   Point: Home exercise program (Not Started)     Learner Progress:  Not documented in this visit.          Point: Body mechanics (Not Started)     Learner Progress:  Not documented in this visit.          Point: Precautions (Not Started)     Learner Progress:  Not documented in this visit.                      User Key     Initials Effective Dates Name Provider Type  Discipline    SHERINE 08/02/16 -  Eldon Cochran, PT DPT Physical Therapist PT                Recommendation and Plan  Anticipated Discharge Disposition (PT): skilled nursing facility, sub acute care setting  Planned Therapy Interventions (PT): wound care, patient/family education  Therapy Frequency (PT): daily  Plan of Care Reviewed With: patient   Progress: no change       Progress: no change  Outcome Summary: The patient presents alert and oriented sitting EOB. B LEs are blistered but her skin seems loose to the touch. The unna boots were placed from her metatarsal heads to the tibual tuberocity. Capillary refill was < 3sec. Unna boots were covered in coban. PT will continue to monitor the unna boots but will plan to keep the unna boots in place for 7 days. Recommend follow up with outpt wound care due to the amount of blisters she has on B LEs.  Plan of Care Reviewed With: patient       Outcome Measures     Row Name 03/04/22 0825             How much help from another is currently needed...    Putting on and taking off regular lower body clothing? 2  -CJ      Bathing (including washing, rinsing, and drying) 2  -CJ      Toileting (which includes using toilet bed pan or urinal) 2  -CJ      Putting on and taking off regular upper body clothing 2  -CJ      Taking care of personal grooming (such as brushing teeth) 3  -CJ      Eating meals 3  -CJ      AM-PAC 6 Clicks Score (OT) 14  -CJ              Functional Assessment    Outcome Measure Options AM-PAC 6 Clicks Daily Activity (OT)  -            User Key  (r) = Recorded By, (t) = Taken By, (c) = Cosigned By    Initials Name Provider Type    Yusuf Cramer COTA Occupational Therapy Assistant                  Time Calculation   PT Charges     Row Name 03/04/22 1437 03/04/22 1159          Time Calculation    Start Time 1305  -MS 1119  -NW     Stop Time 1400  -MS 1149  -NW     Time Calculation (min) 55 min  -MS 30 min  -NW     PT Received On 03/04/22  -MS 03/04/22   -NW     PT Goal Re-Cert Due Date 03/10/22  -MS 03/13/22  -NW            Time Calculation- PT    Total Timed Code Minutes- PT -- 30 minute(s)  -NW            Timed Charges    48563 - PT Therapeutic Exercise Minutes -- 15  -NW     92845 - PT Therapeutic Activity Minutes -- 15  -NW            Untimed Charges    PT Eval/Re-eval Minutes 55  -MS --            Total Minutes    Timed Charges Total Minutes -- 30  -NW     Untimed Charges Total Minutes 55  -MS --      Total Minutes 55  -MS 30  -NW           User Key  (r) = Recorded By, (t) = Taken By, (c) = Cosigned By    Initials Name Provider Type    MS Jazmine Browning, PT, DPT, NCS Physical Therapist    NW Ramona Clemente, PTA Physical Therapy Assistant                  Therapy Charges for Today     Code Description Service Date Service Provider Modifiers Qty    66761707996 HC PT RE-EVAL ESTABLISHED PLAN 2 3/4/2022 Jazmine Browning, PT, DPT, NCS GP 1    81166982248 HC PT STAPPING UNNA BOOT 3/4/2022 Jazmine Browning, PT, DPT, NCS GP 2            PT G-Codes  Outcome Measure Options: AM-PAC 6 Clicks Daily Activity (OT)  AM-PAC 6 Clicks Score (PT): 15  AM-PAC 6 Clicks Score (OT): 14       Jazmine Browning, PT, DPT, NCS  3/4/2022

## 2022-03-04 NOTE — THERAPY TREATMENT NOTE
Acute Care - Occupational Therapy Treatment Note  [facility]     Patient Name: Cindy Hicks  : 1935  MRN: [id number]  Today's Date: 3/4/2022             Admit Date: 3/2/2022       ICD-10-CM ICD-9-CM   1. Acute systolic congestive heart failure (HCC)  I50.21 428.21     428.0   2. Impaired mobility  Z74.09 799.89     Patient Active Problem List   Diagnosis   • Family hx colonic polyps   • Family hx of colon cancer   • Hx of colonic polyp   • Melena   • Peptic ulcer disease   • Essential hypertension   • Mixed hyperlipidemia   • Overweight (BMI 25.0-29.9)   • Stage 3a chronic kidney disease (HCC)   • Pleural effusion, right   • Actinic keratosis   • Atherosclerosis of native artery of both lower extremities with intermittent claudication (HCC)   • Carotid artery stenosis   • Chronic pain   • Gastroesophageal reflux disease   • GI bleed   • Iron deficiency anemia secondary to inadequate dietary iron intake   • Irritable bowel syndrome   • Macular degeneration   • Osteoarthritis   • Osteopenia of multiple sites   • Age-related osteoporosis without current pathological fracture   • Other insomnia   • Restless legs syndrome   • Vitamin D deficiency   • Pulmonary emphysema (HCC)   • Chronic respiratory failure with hypoxia and hypercapnia (HCC)   • Cigarette nicotine dependence without complication   • COPD with acute exacerbation (HCC)   • Chronic diastolic (congestive) heart failure (HCC)   • Stage 3 severe COPD by GOLD classification (HCC)   • Acute on chronic respiratory failure with hypoxia (HCC)   • Paroxysmal atrial fibrillation (HCC)   • Personal history of nicotine dependence   • Acute diastolic congestive heart failure (HCC)   • Chronic anticoagulation   • Stage 3b chronic kidney disease (HCC)   • Hyponatremia     Past Medical History:   Diagnosis Date   • Actinic keratosis    • Arthritis    • Atherosclerosis    • Benign fundic gland polyps of stomach    • Bleeding ulcer    • Carotid artery bruit     • Carotid artery stenosis    • COPD (chronic obstructive pulmonary disease) ([facility])    • Diverticulosis    • Emphysema of lung ([facility])    • History of colon polyps    • Hyperlipidemia    • Hypertension    • IBS (irritable bowel syndrome)    • Macular degeneration    • Osteoporosis    • Prediabetes    • PVD (peripheral vascular disease) ([facility])    • TIA (transient ischemic attack)    • Vitamin D deficiency      Past Surgical History:   Procedure Laterality Date   • CATARACT EXTRACTION     • COLONOSCOPY  03/15/2013    polyp, hyperplastic   • COLONOSCOPY N/A 10/2/2018    Procedure: COLONOSCOPY WITH ANESTHESIA;  Surgeon: Harris Escobar MD;  Location: [facility] ENDOSCOPY;  Service: Gastroenterology   • CYST REMOVAL     • ENDOSCOPY  2019   • ENDOSCOPY N/A 10/4/2019    Procedure: ESOPHAGOGASTRODUODENOSCOPY WITH ANESTHESIA;  Surgeon: Harris Escobar MD;  Location: [facility] ENDOSCOPY;  Service: Gastroenterology   • FEMORAL ARTERY STENT     • HYSTERECTOMY     • VASCULAR SURGERY      multiple         OT ASSESSMENT FLOWSHEET (last 12 hours)     OT Evaluation and Treatment     Row Name 03/04/22 1100                   OT Time and Intention    Subjective Information complains of; weakness; fatigue; dyspnea  -        Document Type therapy note (daily note)  -        Mode of Treatment occupational therapy  -        Patient Effort adequate  -                  General Information    Existing Precautions/Restrictions fall; oxygen therapy device and L/min  -                  Pain Assessment    Additional Documentation Pain Scale: Word Pre/Post-Treatment (Group)  -                  Pain Scale: Numbers Pre/Post-Treatment    Pretreatment Pain Rating 0/10 - no pain  -CJ        Posttreatment Pain Rating 0/10 - no pain  -        Pain Intervention(s) Rest  -                  Bed Mobility    Comment (Bed Mobility) fowlers in bed!  -                  Motor Skills    Motor Skills therapeutic exercise  -        Therapeutic  Exercise shoulder; elbow/forearm  -CJ                  Shoulder (Therapeutic Exercise)    Shoulder (Therapeutic Exercise) AROM (active range of motion); strengthening exercise  -CJ        Shoulder AROM (Therapeutic Exercise) bilateral; flexion; extension; sitting; 10 repetitions; 2 sets  -CJ        Shoulder Strengthening (Therapeutic Exercise) bilateral; flexion; extension; sitting; 2 lb free weight; 3 lb free weight; 10 repetitions; 2 sets  -CJ                  Elbow/Forearm (Therapeutic Exercise)    Elbow/Forearm (Therapeutic Exercise) AROM (active range of motion); strengthening exercise  -CJ        Elbow/Forearm AROM (Therapeutic Exercise) bilateral; flexion; extension; sitting; 10 repetitions; 2 sets  -CJ        Elbow/Forearm Strengthening (Therapeutic Exercise) bilateral; flexion; extension; sitting; 2 lb free weight; 3 lb free weight; 10 repetitions; 2 sets  -CJ                  Positioning and Restraints    Pre-Treatment Position in bed  -CJ        Post Treatment Position bed  -CJ        In Bed fowlers; call light within reach; encouraged to call for assist; side rails up x2  -CJ                  Progress Summary (OT)    Progress Toward Functional Goals (OT) progress toward functional goals is fair  -CJ        Barriers to Overall Progress (OT) Fall/o2!  -CJ        Impairments Still Limiting Function (OT) continue with ot poc!  -CJ              User Key  (r) = Recorded By, (t) = Taken By, (c) = Cosigned By    Initials Name Effective Dates    CJ Yusuf Barlow COTA 06/16/21 -                  Occupational Therapy Education                 Title: PT OT SLP Therapies (In Progress)     Topic: Occupational Therapy (Done)     Point: ADL training (Done)     Description:   Instruct learner(s) on proper safety adaptation and remediation techniques during self care or transfers.   Instruct in proper use of assistive devices.              Learning Progress Summary           Patient Acceptance, E,D, VU,NR by  at  3/3/2022 1437                   Point: Home exercise program (Done)     Description:   Instruct learner(s) on appropriate technique for monitoring, assisting and/or progressing therapeutic exercises/activities.              Learning Progress Summary           Patient Acceptance, E,TB, VU,DU,NR by  at 3/4/2022 0825    Comment: Pt. refuses adl shower/dressing! Perfomed ue exs to increase her act. cecilia with adl tasks!                   Point: Precautions (Done)     Description:   Instruct learner(s) on prescribed precautions during self-care and functional transfers.              Learning Progress Summary           Patient Acceptance, E,TB, VU,DU,NR by  at 3/4/2022 0825    Comment: Pt. refuses adl shower/dressing! Perfomed ue exs to increase her act. cecilia with adl tasks!    Acceptance, E,D, VU,NR by  at 3/3/2022 1437                   Point: Body mechanics (Done)     Description:   Instruct learner(s) on proper positioning and spine alignment during self-care, functional mobility activities and/or exercises.              Learning Progress Summary           Patient Acceptance, E,TB, VU,DU,NR by  at 3/4/2022 0825    Comment: Pt. refuses adl shower/dressing! Perfomed ue exs to increase her act. cecilia with adl tasks!                               User Key     Initials Effective Dates Name Provider Type Discipline     06/16/21 -  [place], OTR/L Occupational Therapist OT     06/16/21 -  Yusuf Barlow COTA Occupational Therapy Assistant OT                  OT Recommendation and Plan     Progress Toward Functional Goals (OT): progress toward functional goals is fair  Plan of Care Review  Plan of Care Reviewed With: patient  Progress: improving  Outcome Summary: Pt. juan in bed, performed ue exs to increase her act. cecilia during adl tasks!  Plan of Care Reviewed With: patient  Outcome Summary: Pt. juan in bed, performed ue exs to increase her act. cecilia during adl tasks!     Outcome Measures     Row Name  03/04/22 0825             How much help from another is currently needed...    Putting on and taking off regular lower body clothing? 2  -CJ      Bathing (including washing, rinsing, and drying) 2  -CJ      Toileting (which includes using toilet bed pan or urinal) 2  -CJ      Putting on and taking off regular upper body clothing 2  -CJ      Taking care of personal grooming (such as brushing teeth) 3  -CJ      Eating meals 3  -CJ      AM-[facility] 6 Clicks Score (OT) 14  -CJ              Functional Assessment    Outcome Measure Options AM-[facility] 6 Clicks Daily Activity (OT)  -CJ            User Key  (r) = Recorded By, (t) = Taken By, (c) = Cosigned By    Initials Name Provider Type    Yusuf Cramer COTA Occupational Therapy Assistant                Time Calculation:    Time Calculation- OT     Row Name 03/04/22 0825             Time Calculation- OT    OT Start Time 0825  -      OT Stop Time 0851  -      OT Time Calculation (min) 26 min  -      Total Timed Code Minutes- OT 26 minute(s)  -      OT Received On 03/04/22  -              Timed Charges    40440 - OT Therapeutic Exercise Minutes 26  -CJ              Total Minutes    Timed Charges Total Minutes 26  -CJ       Total Minutes 26  -CJ            User Key  (r) = Recorded By, (t) = Taken By, (c) = Cosigned By    Initials Name Provider Type    Yusuf Cramer COTA Occupational Therapy Assistant              Therapy Charges for Today     Code Description Service Date Service Provider Modifiers Qty    19293927061  OT THER PROC EA 15 MIN 3/4/2022 Yusuf Barlow COTA GO 2               NORA Weathers  3/4/2022

## 2022-03-05 NOTE — PROGRESS NOTES
"  Chief Complaint   Patient presents with   • Shortness of Breath     S: No acute events noted overnight.  The patient says that she is nearly back to baseline as far as her breathing is concerned.  She does still have a cough which she says is chronic and intermittent.  Unchanged from baseline at this time.  She denies current orthopnea, PND.  Unna boots placed yesterday.  No significant pain in the lower extremities at this time.  The patient is on 3 L of oxygen, which she says is chronic for her at home.  No palpitations, lightheadedness, dizziness or syncope.    Medications: Reviewed    Review of Systems: All pertinent negative and positives as noted above.  Otherwise, all systems reviewed and found to be negative.    Telemetry: Atrial fibrillation with heart rates varying between  bpm    O:  /61 (BP Location: Right arm, Patient Position: Lying)   Pulse 76   Temp 98.6 °F (37 °C) (Oral)   Resp 16   Ht 167.6 cm (65.98\")   Wt 68.9 kg (152 lb)   SpO2 97%   BMI 24.55 kg/m²   Temp:  [97.5 °F (36.4 °C)-98.6 °F (37 °C)] 98.6 °F (37 °C)  Heart Rate:  [73-95] 76  Resp:  [16-18] 16  BP: ()/(55-69) 111/61    Intake/Output Summary (Last 24 hours) at 3/5/2022 1031  Last data filed at 3/5/2022 0528  Gross per 24 hour   Intake 800 ml   Output 2150 ml   Net -1350 ml     General: No acute distress, chronically ill in appearance, sitting up in bed  CV: Irregularly irregular, currently with heart rate in the 70s  Pulmonary: Decreased breath sounds at right base, otherwise global decrease in breath sounds in bilateral lung fields with no significant wheezing noted at this time  GI: Soft, nontender, nondistended, active bowel sounds  Extremities: Unna boots in place    Diagnostic Data:    Lab Results   Component Value Date    GLUCOSE 98 03/05/2022    CALCIUM 9.2 03/05/2022     (L) 03/05/2022    K 4.0 03/05/2022    CO2 37.0 (H) 03/05/2022    CL 90 (L) 03/05/2022    BUN 44 (H) 03/05/2022    CREATININE " 1.73 (H) 03/05/2022    EGFRIFAFRI 57 (L) 04/19/2021    EGFRIFNONA 32 (L) 02/23/2022    BCR 25.4 (H) 03/05/2022    ANIONGAP 8.0 03/05/2022     ASSESSMENT/PLAN:    1.  Acute on chronic hypoxemic respiratory failure:  the patient reports essentially back at baseline at this time  2.  Acute on chronic diastolic heart failure: Overall improved, potentially back at baseline  3.  Acute on chronic renal insufficiency: Continued improvement noted on today's labs  4.  Hyperkalemia, resolved at this time  5.  Paroxysmal atrial fibrillation: Heart rate remains for the most part reasonably well controlled  6.  Essential hypertension  7.  Mixed hyperlipidemia    -The patient reports being back at baseline at this time.  As she has presented multiple times with similar symptoms, it is unclear to me how to prevent readmissions for the patient other than continuing efforts at fluid and sodium restriction, monitoring daily weights, etc.  The patient is in poor health in general, therefore, even under the best of circumstances, it may be difficult to keep her out of the hospital.  Certainly, as an outpatient, perhaps even a CardioMEMS device would not be unreasonable.  However, given this patient's advanced age and multiple comorbid medical conditions, this may not be ideal.  Also, as an outpatient, a cardioversion would not be unreasonable to try to restore sinus rhythm which may be marginally helpful as well in terms of managing volume status long-term.  -The patient has been transitioned to oral diuretic therapy today which is reasonable.  Continue to monitor output on oral diuretics.  -Agree that if renal function continues to improve, restarting low-dose ACE inhibitor therapy would be reasonable.  Blood pressure is relatively stable at this time.  -We will evaluate again tomorrow.

## 2022-03-05 NOTE — PROGRESS NOTES
[facility] Medicine Services  INPATIENT PROGRESS NOTE    Length of Stay: 3  Date of Admission: 3/2/2022  Primary Care Physician: Austin Wade DO    Subjective   Chief Complaint: Follow-up  HPI   Patient sitting up in bed.  She states she continues to feel better in comparison to admission and feels that she is nearly back to her baseline in terms of her shortness of breath..  She states her cough is at baseline she denies any chest pain or palpitations.  She denies dizziness or lightheadedness.  She did have some difficulty with anxiety yesterday, states Atarax helped quite a bit.  She denies abdominal pain, nausea, or vomiting.  She is tolerating Unna boots without difficulty.  After discussing with her family, she does not want to discharge to a skilled nursing facility.  She had a very bad experience in a skilled nursing facility with her  before he passed away, and she does not feel she would do well in a facility.  She may be going to live with her granddaughter temporarily at discharge.    Review of Systems   All pertinent negatives and positives are as above. All other systems have been reviewed and are negative unless otherwise stated.     Objective    Temp:  [97.5 °F (36.4 °C)-98.6 °F (37 °C)] 98.6 °F (37 °C)  Heart Rate:  [73-95] 76  Resp:  [16-18] 16  BP: ()/(55-69) 111/61  Physical Exam  Vitals and nursing note reviewed.   Constitutional:       General: She is not in acute distress.     Appearance: She is ill-appearing.   HENT:      Head: Normocephalic and atraumatic.   Cardiovascular:      Rate and Rhythm: Normal rate. Rhythm irregular.      Comments: A. Fib  overnight  Pulmonary:      Effort: Pulmonary effort is normal.      Breath sounds: No wheezing, rales or rhonchi.   Diminished in bilateral bases, right base dull.  Abdominal:      General: Bowel sounds are normal. There is no distension.      Palpations: Abdomen is soft.       Tenderness: There is no abdominal tenderness.   Musculoskeletal:      Cervical back: Normal range of motion and neck supple. No tenderness.      Right lower leg: Edema present.      Left lower leg: Edema present.   Skin:     General: Skin is warm and dry.      Findings: No erythema or rash.      Comments: Chronic venous skin changes of lower extremities.  Unna boots intact, edema improved with application of Unna boots.  Neurological:      General: No focal deficit present.      Mental Status: She is alert and oriented to person, place, and time.   Psychiatric:         Mood and Affect: Mood normal.         Behavior: Behavior normal.         Thought Content: Thought content normal.         Judgment: Judgment normal.     Results Review:  I have reviewed the labs, radiology results, and diagnostic studies.    Laboratory Data:   Results from last 7 days   Lab Units 03/04/22  0146 03/03/22  0500 03/02/22  1851   WBC 10*3/mm3 8.95 8.45 10.59   HEMOGLOBIN g/dL 10.4* 10.7* 11.6*   HEMATOCRIT % 33.2* 34.9 37.6   PLATELETS 10*3/mm3 318 341 397     Results from last 7 days   Lab Units 03/05/22  0726 03/04/22  0724 03/03/22  0500 03/02/22  1851   SODIUM mmol/L 135* 129* 132* 127*   POTASSIUM mmol/L 4.0 4.0 5.3* 5.7*   CHLORIDE mmol/L 90* 88* 88* 87*   CO2 mmol/L 37.0* 35.0* 33.0* 30.0*   BUN mg/dL 44* 47* 51* 49*   CREATININE mg/dL 1.73* 1.90* 2.02* 2.06*   CALCIUM mg/dL 9.2 8.9 9.1 9.1   BILIRUBIN mg/dL  --   --   --  0.4   ALK PHOS U/L  --   --   --  85   ALT (SGPT) U/L  --   --   --  51*   AST (SGOT) U/L  --   --   --  46*   GLUCOSE mg/dL 98 96 80 119*   Net IO Since Admission: -1,090 mL [03/05/22 0920]    I have reviewed the patient current medications.     Assessment/Plan     Active Hospital Problems    Diagnosis    • Acute diastolic congestive heart failure (HCC)    • Paroxysmal atrial fibrillation (HCC)    • Acute on chronic respiratory failure with hypoxia (HCC)    • Stage 3a chronic kidney disease (HCC)       Plan:  1.  Patient presented to the emergency department on 3/2/2022 with complaints of shortness of breath.  She was recently admitted to our facility from 2/18 through 2/23 presenting with similar complaints and diagnosed with acute on chronic diastolic heart failure as well as COPD exacerbation.  Note during that hospitalization she did receive a thoracentesis at the bedside for right pleural effusion with removal of 1200 mL of fluid.     2.  Chest x-ray this admission shows chronic lung changes with increased right lower lobe infiltrate and pleural fluid, moderate pleural effusion.  Lights criteria noted that her pleural fluid from previous thoracentesis met criteria for a transudative effusion, likely consistent with heart failure.  Unsure if the benefit outweighs the risk of repeating thoracentesis at this time as this quickly reaccumulated following her last thoracentesis.  Echocardiogram from January of this year shows ejection fraction of 51 to 55%.  She does have previous known diastolic dysfunction.  Continue 1500 mL fluid restriction.      3.  Patient's negative net output since admission is only 1L however she states her symptoms have improved.  Her baseline creatinine is felt to be around 1.2-1.5.  Creatinine  2.0 on admission, continues to improve down to 1.7 today.  She was going to be changed by her primary care provider to torsemide during her most recent office visit, however the patient was hesitant.    We will transition to oral diuretic therapy with Bumex today. Hyponatremia likely secondary to volume overload, improved today after further diuresis.  Would continue to hold lisinopril for now, hyperkalemia improved with dose of Lokelma on admission.  If renal function remains stable/improving, will resume lisinopril tomorrow if blood pressure will tolerate. Would benefit from nephrology evaluation as an outpatient.     4.  Continue Eliquis for stroke prophylaxis.  She does meet criteria for  reduced dosing with age and renal function.     5.  Appreciate cardiology assistance.  Possible consideration of cardioversion as her A. fib may contribute to worsening diastolic dysfunction and issues with volume overload, to be considered as an outpatient.       5.  Continue PT/OT.  Patient now has decided against rehab placement and will be either going to live with her granddaughter temporarily at time of discharge or continue home health services.     6.  BuSpar dosage increased yesterday.  Continue Atarax as needed.    7.  Labs in a.m.    Discharge Planning: I expect the patient to be discharged to home with home health in 1-2 days.    Electronically signed by HALIMA Ivy, 3/5/2022, 09:18 CST.

## 2022-03-05 NOTE — PLAN OF CARE
Goal Outcome Evaluation:  Plan of Care Reviewed With: patient        Progress: improving  Outcome Evaluation: Pt. agreeable and eagere for therapy. Pt. was Modified Independent for bed mobility. She actively participated with LE ex's. Pt. then walked 12' x 2 with one sitting rest in room. Pt. c/o SOA with activity. Her O2 sat was 93% while on 3L and HR was 80s-low 100's bpm. Pt's B LE unna boots were dry and intact. Will continue to work with pt. on strengthening. She would benefit from home health following discharge.

## 2022-03-05 NOTE — THERAPY TREATMENT NOTE
Acute Care - Physical Therapy Treatment Note  [facility]     Patient Name: Cindy Hicks  : 1935  MRN: [id number]  Today's Date: 3/5/2022      Visit Dx:     ICD-10-CM ICD-9-CM   1. Acute systolic congestive heart failure (HCC)  I50.21 428.21     428.0   2. Impaired mobility  Z74.09 799.89   3. Bilateral lower extremity edema  R60.0 782.3     Patient Active Problem List   Diagnosis   • Family hx colonic polyps   • Family hx of colon cancer   • Hx of colonic polyp   • Melena   • Peptic ulcer disease   • Essential hypertension   • Mixed hyperlipidemia   • Overweight (BMI 25.0-29.9)   • Stage 3a chronic kidney disease (HCC)   • Pleural effusion, right   • Actinic keratosis   • Atherosclerosis of native artery of both lower extremities with intermittent claudication (HCC)   • Carotid artery stenosis   • Chronic pain   • Gastroesophageal reflux disease   • GI bleed   • Iron deficiency anemia secondary to inadequate dietary iron intake   • Irritable bowel syndrome   • Macular degeneration   • Osteoarthritis   • Osteopenia of multiple sites   • Age-related osteoporosis without current pathological fracture   • Other insomnia   • Restless legs syndrome   • Vitamin D deficiency   • Pulmonary emphysema (HCC)   • Chronic respiratory failure with hypoxia and hypercapnia (HCC)   • Cigarette nicotine dependence without complication   • COPD with acute exacerbation (HCC)   • Chronic diastolic (congestive) heart failure (HCC)   • Stage 3 severe COPD by GOLD classification (HCC)   • Acute on chronic respiratory failure with hypoxia (HCC)   • Paroxysmal atrial fibrillation (HCC)   • Personal history of nicotine dependence   • Acute diastolic congestive heart failure (HCC)   • Chronic anticoagulation   • Stage 3b chronic kidney disease (HCC)   • Hyponatremia     Past Medical History:   Diagnosis Date   • Actinic keratosis    • Arthritis    • Atherosclerosis    • Benign fundic gland polyps of stomach    • Bleeding ulcer     • Carotid artery bruit    • Carotid artery stenosis    • COPD (chronic obstructive pulmonary disease) ([facility])    • Diverticulosis    • Emphysema of lung ([facility])    • History of colon polyps    • Hyperlipidemia    • Hypertension    • IBS (irritable bowel syndrome)    • Macular degeneration    • Osteoporosis    • Prediabetes    • PVD (peripheral vascular disease) ([facility])    • TIA (transient ischemic attack)    • Vitamin D deficiency      Past Surgical History:   Procedure Laterality Date   • CATARACT EXTRACTION     • COLONOSCOPY  03/15/2013    polyp, hyperplastic   • COLONOSCOPY N/A 10/2/2018    Procedure: COLONOSCOPY WITH ANESTHESIA;  Surgeon: Harris Escobar MD;  Location: [facility] ENDOSCOPY;  Service: Gastroenterology   • CYST REMOVAL     • ENDOSCOPY  2019   • ENDOSCOPY N/A 10/4/2019    Procedure: ESOPHAGOGASTRODUODENOSCOPY WITH ANESTHESIA;  Surgeon: Harris Escobar MD;  Location: [facility] ENDOSCOPY;  Service: Gastroenterology   • FEMORAL ARTERY STENT     • HYSTERECTOMY     • VASCULAR SURGERY      multiple     PT Assessment (last 12 hours)     PT Evaluation and Treatment     Row Name 03/05/22 1054 03/05/22 1050       Physical Therapy Time and Intention    Subjective Information complains of;dyspnea  - --    Document Type therapy note (daily note)  - --  -    Mode of Treatment physical therapy  - --    Row Name 03/05/22 1054          General Information    Existing Precautions/Restrictions fall;oxygen therapy device and L/min  -     Row Name 03/05/22 1054          Bed Mobility    Supine-Sit Gove (Bed Mobility) modified independence  -     Assistive Device (Bed Mobility) head of bed elevated;bed rails  -     Row Name 03/05/22 1054          Transfers    Comment, (Transfers) stood x 2  -     Sit-Stand Gove (Transfers) verbal cues;contact guard  -     Stand-Sit Gove (Transfers) contact guard  -     Row Name 03/05/22 1054          Gait/Stairs (Locomotion)    Gove Level  (Gait) verbal cues;contact guard  -     Assistive Device (Gait) walker, front-wheeled  -     Distance in Feet (Gait) 12' x 2 with sitting rest  -     Deviations/Abnormal Patterns (Gait) stride length decreased;armando decreased  -     Row Name 03/05/22 1054          Aerobic Exercise    Comment, Aerobic Exercise (Therapeutic Exercise) AROM B LEs 10-20 reps  -     Row Name 03/05/22 1054          Plan of Care Review    Plan of Care Reviewed With patient  -MF     Progress improving  -     Outcome Evaluation Pt. agreeable and eagere for therapy. Pt. was Modified Independent for bed mobility. She actively participated with LE ex's. Pt. then walked 12' x 2 with one sitting rest in room. Pt. c/o SOA with activity. Her O2 sat was 93% while on 3L and HR was 80s-low 100's bpm. Pt's B LE unna boots were dry and intact. Will continue to work with pt. on strengthening. She would benefit from home health following discharge.  -     Row Name 03/05/22 1054          Vital Signs    Intratreatment Heart Rate (beats/min) 108  -MF     Posttreatment Heart Rate (beats/min) 95  -MF     Pre SpO2 (%) 93  -MF     O2 Delivery Pre Treatment supplemental O2  3l  -MF     Intra SpO2 (%) 93  -MF     O2 Delivery Intra Treatment supplemental O2  -MF     Post SpO2 (%) 93  -MF     O2 Delivery Post Treatment supplemental O2  -MF     Pre Patient Position Sitting  -MF     Intra Patient Position Standing  -MF     Post Patient Position Sitting  -     Row Name 03/05/22 1054          Positioning and Restraints    Pre-Treatment Position in bed  -     Post Treatment Position bed  -MF     In Bed sitting EOB;call light within reach;encouraged to call for assist;side rails up x2  -           User Key  (r) = Recorded By, (t) = Taken By, (c) = Cosigned By    Initials Name Provider Type    Taylor De Guzman PTA Physical Therapy Assistant                Physical Therapy Education                 Title: PT OT SLP Therapies (In Progress)      Topic: Physical Therapy (In Progress)     Point: Mobility training (Done)     Learning Progress Summary           Patient Acceptance, E, VU,NR by SHERINE at 3/3/2022 1257    Comment: benefits of activity, progression of PT POC                   Point: Home exercise program (Not Started)     Learner Progress:  Not documented in this visit.          Point: Body mechanics (Not Started)     Learner Progress:  Not documented in this visit.          Point: Precautions (Not Started)     Learner Progress:  Not documented in this visit.                      User Key     Initials Effective Dates Name Provider Type Discipline    SHERINE 08/02/16 -  Eldon Cochran, PT DPT Physical Therapist PT              PT Recommendation and Plan     Plan of Care Reviewed With: patient  Progress: improving  Outcome Evaluation: Pt. agreeable and eagere for therapy. Pt. was Modified Independent for bed mobility. She actively participated with LE ex's. Pt. then walked 12' x 2 with one sitting rest in room. Pt. c/o SOA with activity. Her O2 sat was 93% while on 3L and HR was 80s-low 100's bpm. Pt's B LE unna boots were dry and intact. Will continue to work with pt. on strengthening. She would benefit from home health following discharge.   Outcome Measures     Row Name 03/04/22 0825             How much help from another is currently needed...    Putting on and taking off regular lower body clothing? 2  -CJ      Bathing (including washing, rinsing, and drying) 2  -CJ      Toileting (which includes using toilet bed pan or urinal) 2  -CJ      Putting on and taking off regular upper body clothing 2  -CJ      Taking care of personal grooming (such as brushing teeth) 3  -CJ      Eating meals 3  -CJ      AM-PAC 6 Clicks Score (OT) 14  -CJ              Functional Assessment    Outcome Measure Options AM-PAC 6 Clicks Daily Activity (OT)  -            User Key  (r) = Recorded By, (t) = Taken By, (c) = Cosigned By    Initials Name Provider Type    FELICIA Barlow  NORA Monae Occupational Therapy Assistant                 Time Calculation:    PT Charges     Row Name 03/05/22 1323             Time Calculation    Start Time 1054  -MF      Stop Time 1127  -MF      Time Calculation (min) 33 min  -MF      PT Non-Billable Time (min) 3 min  -MF      PT Received On 03/05/22  -MF              Time Calculation- PT    Total Timed Code Minutes- PT 30 minute(s)  -MF              Timed Charges    20727 - PT Therapeutic Exercise Minutes 15  -MF      83831 - Gait Training Minutes  15  -MF              Total Minutes    Timed Charges Total Minutes 30  -MF       Total Minutes 30  -MF            User Key  (r) = Recorded By, (t) = Taken By, (c) = Cosigned By    Initials Name Provider Type    Taylor De Guzman PTA Physical Therapy Assistant              Therapy Charges for Today     Code Description Service Date Service Provider Modifiers Qty    54702299844 HC PT THER PROC EA 15 MIN 3/5/2022 Taylor Masters PTA GP 1    83872385265 HC GAIT TRAINING EA 15 MIN 3/5/2022 Taylor Masters PTA GP 1          PT G-Codes  Outcome Measure Options: AM-PAC 6 Clicks Daily Activity (OT)  AM-PAC 6 Clicks Score (PT): 15  AM-PAC 6 Clicks Score (OT): 14    Taylor Masters PTA  3/5/2022

## 2022-03-05 NOTE — PLAN OF CARE
Goal Outcome Evaluation:  Plan of Care Reviewed With: patient           Outcome Evaluation: HR AF  with coup 3R PVC, no c/o of pain, did ask for zofran x1, on 3L/NC, continues to get very SOB with minimal movement, good urine output, BLE with jose boot wraps, elevated on pillow  Problem: Adult Inpatient Plan of Care  Goal: Plan of Care Review  3/5/2022 0640 by Vianey House, RN  Outcome: Ongoing, Progressing  Flowsheets (Taken 3/5/2022 0640)  Plan of Care Reviewed With: patient  Outcome Evaluation: HR AF  with coup 3R PVC, no c/o of pain, did ask for zofran x1, on 3L/NC, continues to get very SOB with minimal movement, good urine output, BLE with jose boot wraps, elevated on pillow  3/5/2022 0639 by Vianey House, RN  Outcome: Ongoing, Progressing  3/5/2022 0258 by Vianey House, RN  Reactivated  3/4/2022 2116 by Vianey House, RN  Outcome: Unable to Meet, Plan Revised  Goal: Patient-Specific Goal (Individualized)  3/5/2022 0640 by Vianey House, RN  Outcome: Ongoing, Progressing  3/5/2022 0258 by Vianey House, RN  Reactivated  3/4/2022 2116 by Vianey House RN  Outcome: Unable to Meet, Plan Revised  Goal: Absence of Hospital-Acquired Illness or Injury  3/5/2022 0640 by Vianey House, YANELI  Outcome: Ongoing, Progressing  3/5/2022 0639 by Vianey House RN  Outcome: Ongoing, Progressing  3/5/2022 0258 by Vianey House, RN  Reactivated  3/4/2022 2116 by Vianey House, RN  Outcome: Unable to Meet, Plan Revised  Goal: Optimal Comfort and Wellbeing  3/5/2022 0640 by Vianey House RN  Outcome: Ongoing, Progressing  3/5/2022 0639 by Vianey House RN  Outcome: Ongoing, Progressing  3/5/2022 0258 by Vianey House, RN  Reactivated  3/4/2022 2116 by Vianey House, RN  Outcome: Unable to Meet, Plan Revised  Goal: Readiness for Transition of Care  3/5/2022 0640 by Vianey House, RN  Outcome: Ongoing, Progressing  3/5/2022 0639 by Vianey House, RN  Outcome: Ongoing, Progressing  3/5/2022 0258 by Vianey House,  RN  Reactivated  3/4/2022 2116 by Vianey House RN  Outcome: Unable to Meet, Plan Revised     Problem: Cardiac Output Decreased  Goal: Effective Cardiac Output  3/5/2022 0640 by Vianey House RN  Outcome: Ongoing, Progressing  3/5/2022 0639 by Vianey House, RN  Outcome: Ongoing, Progressing  3/5/2022 0258 by Vianey House, RN  Reactivated  3/4/2022 2116 by Vianey House, RN  Outcome: Unable to Meet, Plan Revised     Problem: Fall Injury Risk  Goal: Absence of Fall and Fall-Related Injury  3/5/2022 0640 by Vianey House, RN  Outcome: Ongoing, Progressing  3/5/2022 0639 by Vianey House, RN  Outcome: Ongoing, Progressing  3/5/2022 0258 by Vianey House, RN  Reactivated  3/4/2022 2116 by Vianey House, RN  Outcome: Unable to Meet, Plan Revised     Problem: Skin Injury Risk Increased  Goal: Skin Health and Integrity  3/5/2022 0640 by Vianey House, RN  Outcome: Ongoing, Progressing  3/5/2022 0639 by Vianey House, RN  Outcome: Ongoing, Progressing  3/5/2022 0258 by Vianey House, RN  Reactivated  3/4/2022 2116 by Vianey House, RN  Outcome: Unable to Meet, Plan Revised     Problem: Heart Failure Comorbidity  Goal: Maintenance of Heart Failure Symptom Control  3/5/2022 0640 by Vianey House RN  Outcome: Ongoing, Progressing  3/5/2022 0639 by Vianey House, RN  Outcome: Ongoing, Progressing  3/5/2022 0258 by Vianey House, RN  Reactivated  3/4/2022 2116 by Vianey House, RN  Outcome: Unable to Meet, Plan Revised     Problem: Pain Chronic (Persistent) (Comorbidity Management)  Goal: Acceptable Pain Control and Functional Ability  3/5/2022 0640 by Vianey House, RN  Outcome: Ongoing, Progressing  3/5/2022 0639 by Vianey House, RN  Outcome: Ongoing, Progressing  3/5/2022 0258 by Vianey House, RN  Reactivated  3/4/2022 2116 by Vianey House, RN  Outcome: Unable to Meet, Plan Revised

## 2022-03-05 NOTE — PLAN OF CARE
Goal Outcome Evaluation:  Plan of Care Reviewed With: patient        Progress: no change  Outcome Summary: 1500ml fluid restrictions initiated today. Patient needs encouragement and a lot of education concerning fluids intake, salt/diet and their effects on her CHF. Buspar increased in dose and frequency, PRN med added for anxiety. Unna boots ordered by NP and placed due to leg swelling, plan to leave on 7 days. Atrial fib on  tele, .

## 2022-03-05 NOTE — PLAN OF CARE
Goal Outcome Evaluation:  Plan of Care Reviewed With: patient         Problem: Adult Inpatient Plan of Care  Goal: Plan of Care Review  Outcome: Unable to Meet, Plan Revised  Goal: Patient-Specific Goal (Individualized)  Outcome: Unable to Meet, Plan Revised  Goal: Absence of Hospital-Acquired Illness or Injury  Outcome: Unable to Meet, Plan Revised  Goal: Optimal Comfort and Wellbeing  Outcome: Unable to Meet, Plan Revised  Goal: Readiness for Transition of Care  Outcome: Unable to Meet, Plan Revised     Problem: Cardiac Output Decreased  Goal: Effective Cardiac Output  Outcome: Unable to Meet, Plan Revised     Problem: Fall Injury Risk  Goal: Absence of Fall and Fall-Related Injury  Outcome: Unable to Meet, Plan Revised     Problem: Skin Injury Risk Increased  Goal: Skin Health and Integrity  Outcome: Unable to Meet, Plan Revised     Problem: Heart Failure Comorbidity  Goal: Maintenance of Heart Failure Symptom Control  Outcome: Unable to Meet, Plan Revised     Problem: Pain Chronic (Persistent) (Comorbidity Management)  Goal: Acceptable Pain Control and Functional Ability  Outcome: Unable to Meet, Plan Revised

## 2022-03-05 NOTE — PLAN OF CARE
Goal Outcome Evaluation:  Plan of Care Reviewed With: patient           Outcome Evaluation: VSS, now on 2L/NC, lung sound much improved, pt less SOB, continues with BLE edmea, HR AF , had bm this shift, fair urine output after lasix, prn pain med given, bed alarm in use  Problem: Adult Inpatient Plan of Care  Goal: Plan of Care Review  3/5/2022 0258 by Vianey House, RN  Reactivated  3/4/2022 2116 by Vianey House RN  Outcome: Unable to Meet, Plan Revised  Goal: Patient-Specific Goal (Individualized)  3/5/2022 0258 by Vianey House, RN  Reactivated  3/4/2022 2116 by Vianey House RN  Outcome: Unable to Meet, Plan Revised  Goal: Absence of Hospital-Acquired Illness or Injury  3/5/2022 0258 by Vianey House, RN  Reactivated  3/4/2022 2116 by Vianey House, RN  Outcome: Unable to Meet, Plan Revised  Goal: Optimal Comfort and Wellbeing  3/5/2022 0258 by Vianey House, RN  Reactivated  3/4/2022 2116 by Vianey House RN  Outcome: Unable to Meet, Plan Revised  Goal: Readiness for Transition of Care  3/5/2022 0258 by Vianey House, RN  Reactivated  3/4/2022 2116 by Vianey House RN  Outcome: Unable to Meet, Plan Revised     Problem: Cardiac Output Decreased  Goal: Effective Cardiac Output  3/5/2022 0258 by Vianey House, RN  Reactivated  3/4/2022 2116 by Vianey House, RN  Outcome: Unable to Meet, Plan Revised     Problem: Fall Injury Risk  Goal: Absence of Fall and Fall-Related Injury  3/5/2022 0258 by Vianey House, RN  Reactivated  3/4/2022 2116 by Vianey House RN  Outcome: Unable to Meet, Plan Revised     Problem: Skin Injury Risk Increased  Goal: Skin Health and Integrity  3/5/2022 0258 by Vianey House, RN  Reactivated  3/4/2022 2116 by Vianey House, RN  Outcome: Unable to Meet, Plan Revised     Problem: Heart Failure Comorbidity  Goal: Maintenance of Heart Failure Symptom Control  3/5/2022 0258 by Vianey House, RN  Reactivated  3/4/2022 2116 by Vianey House, RN  Outcome: Unable to Meet, Plan Revised     Problem: Pain  Chronic (Persistent) (Comorbidity Management)  Goal: Acceptable Pain Control and Functional Ability  3/5/2022 0258 by Vianey House, RN  Reactivated  3/4/2022 2116 by Vianey House, RN  Outcome: Unable to Meet, Plan Revised

## 2022-03-06 NOTE — OUTREACH NOTE
Prep Survey    Flowsheet Row Responses   [facility] patient discharged from? [place]   Is LACE score < 7 ? No   Emergency Room discharge w/ pulse ox? No   Eligibility [facility]   Date of Admission 03/02/22   Date of Discharge 03/06/22   Discharge Disposition Home or Self Care   Discharge diagnosis Acute diastolic congestive heart failure    Does the patient have one of the following disease processes/diagnoses(primary or secondary)? CHF   Does the patient have Home health ordered? Yes   What is the Home health agency?   [facility]   Is there a DME ordered? No   Prep survey completed? Yes          CHERIE ARRIAZA - Registered Nurse

## 2022-03-06 NOTE — PLAN OF CARE
Goal Outcome Evaluation:           Progress: no change  Outcome Evaluation: Patient resting in bed with no new complaints.  Patient remains on continuous cardiac monitoring with a heart rhythm of afib with PVC's and HR between 78-98.  Unna boots dry and intact to b/l lower extremities.  Will continue to assess and treat patient per plan of care.

## 2022-03-06 NOTE — DISCHARGE SUMMARY
[facility] Medicine Services  DISCHARGE SUMMARY       Date of Admission: 3/2/2022  Date of Discharge:  3/6/2022  Primary Care Physician: Austin Wade DO    Presenting Problem/History of Present Illness:  Shortness of breath    Final Discharge Diagnoses:  Active Hospital Problems    Diagnosis    • Chronic anticoagulation    • Hyponatremia    • Acute diastolic congestive heart failure (HCC)    • Paroxysmal atrial fibrillation (HCC)    • Acute on chronic respiratory failure with hypoxia (HCC)    • Stage 3 severe COPD by GOLD classification (HCC)    • Stage 3a chronic kidney disease (HCC)    • Essential hypertension      Consults:   1.  Cardiology    Procedures Performed: None    Pertinent Test Results:   Lab Results (all)     Procedure Component Value Units Date/Time    Basic Metabolic Panel [[id number]]  (Abnormal) Collected: 03/06/22 0749    Specimen: Blood Updated: 03/06/22 0900     Glucose 119 mg/dL      BUN 40 mg/dL      Creatinine 1.50 mg/dL      Sodium 136 mmol/L      Potassium 4.0 mmol/L      Chloride 92 mmol/L      CO2 37.0 mmol/L      Calcium 8.8 mg/dL      BUN/Creatinine Ratio 26.7     Anion Gap 7.0 mmol/L      eGFR 33.8 mL/min/1.73      Comment: National Kidney Foundation and American Society of Nephrology (ASN) Task Force recommended calculation based on the Chronic Kidney Disease Epidemiology Collaboration (CKD-EPI) equation refit without adjustment for race.       Blood Culture - Blood, Arm, Right [[id number]]  (Normal) Collected: 03/02/22 2136    Specimen: Blood from Arm, Right Updated: 03/05/22 2231     Blood Culture No growth at 3 days    Blood Culture - Blood, Arm, Left [[id number]]  (Normal) Collected: 03/02/22 1851    Specimen: Blood from Arm, Left Updated: 03/05/22 1902     Blood Culture No growth at 3 days    Basic Metabolic Panel [[id number]]  (Abnormal) Collected: 03/05/22 0726    Specimen: Blood Updated: 03/05/22 0752     Glucose 98 mg/dL      BUN 44  mg/dL      Creatinine 1.73 mg/dL      Sodium 135 mmol/L      Potassium 4.0 mmol/L      Chloride 90 mmol/L      CO2 37.0 mmol/L      Calcium 9.2 mg/dL      BUN/Creatinine Ratio 25.4     Anion Gap 8.0 mmol/L      eGFR 28.5 mL/min/1.73      Comment: National Kidney Foundation and American Society of Nephrology (ASN) Task Force recommended calculation based on the Chronic Kidney Disease Epidemiology Collaboration (CKD-EPI) equation refit without adjustment for race.       Basic Metabolic Panel [[id number]]  (Abnormal) Collected: 03/04/22 0724    Specimen: Blood Updated: 03/04/22 0822     Glucose 96 mg/dL      BUN 47 mg/dL      Creatinine 1.90 mg/dL      Sodium 129 mmol/L      Potassium 4.0 mmol/L      Chloride 88 mmol/L      CO2 35.0 mmol/L      Calcium 8.9 mg/dL      BUN/Creatinine Ratio 24.7     Anion Gap 6.0 mmol/L      eGFR 25.5 mL/min/1.73      Comment: National Kidney Foundation and American Society of Nephrology (ASN) Task Force recommended calculation based on the Chronic Kidney Disease Epidemiology Collaboration (CKD-EPI) equation refit without adjustment for race.       CBC (No Diff) [[id number]]  (Abnormal) Collected: 03/04/22 0146    Specimen: Blood Updated: 03/04/22 0242     WBC 8.95 10*3/mm3      RBC 3.73 10*6/mm3      Hemoglobin 10.4 g/dL      Hematocrit 33.2 %      MCV 89.0 fL      MCH 27.9 pg      MCHC 31.3 g/dL      RDW 14.7 %      RDW-SD 46.8 fl      MPV 9.6 fL      Platelets 318 10*3/mm3     Troponin [[id number]]  (Abnormal) Collected: 03/03/22 0500    Specimen: Blood Updated: 03/03/22 0619     Troponin T 0.035 ng/mL     Basic Metabolic Panel [[id number]]  (Abnormal) Collected: 03/03/22 0500    Specimen: Blood Updated: 03/03/22 0615     Glucose 80 mg/dL      BUN 51 mg/dL      Creatinine 2.02 mg/dL      Sodium 132 mmol/L      Potassium 5.3 mmol/L      Chloride 88 mmol/L      CO2 33.0 mmol/L      Calcium 9.1 mg/dL      BUN/Creatinine Ratio 25.2     Anion Gap 11.0 mmol/L      eGFR 23.6 mL/min/1.73       Comment: National Kidney Foundation and American Society of Nephrology (ASN) Task Force recommended calculation based on the Chronic Kidney Disease Epidemiology Collaboration (CKD-EPI) equation refit without adjustment for race.       CBC Auto Differential [[id number]]  (Abnormal) Collected: 03/03/22 0500    Specimen: Blood Updated: 03/03/22 0548     WBC 8.45 10*3/mm3      RBC 3.88 10*6/mm3      Hemoglobin 10.7 g/dL      Hematocrit 34.9 %      MCV 89.9 fL      MCH 27.6 pg      MCHC 30.7 g/dL      RDW 14.7 %      RDW-SD 48.1 fl      MPV 9.5 fL      Platelets 341 10*3/mm3      Neutrophil % 69.2 %      Lymphocyte % 17.4 %      Monocyte % 10.4 %      Eosinophil % 0.7 %      Basophil % 0.5 %      Immature Grans % 1.8 %      Neutrophils, Absolute 5.85 10*3/mm3      Lymphocytes, Absolute 1.47 10*3/mm3      Monocytes, Absolute 0.88 10*3/mm3      Eosinophils, Absolute 0.06 10*3/mm3      Basophils, Absolute 0.04 10*3/mm3      Immature Grans, Absolute 0.15 10*3/mm3      nRBC 0.0 /100 WBC     T4, Free [[id number]]  (Normal) Collected: 03/02/22 2138    Specimen: Blood Updated: 03/03/22 0509     Free T4 1.53 ng/dL     Narrative:      Results may be falsely increased if patient taking Biotin.      Troponin [[id number]]  (Abnormal) Collected: 03/02/22 2138    Specimen: Blood Updated: 03/02/22 2246     Troponin T 0.040 ng/mL     Respiratory Panel PCR w/COVID-19(SARS-CoV-2) ALBINA/NEFTALI/ASHLEE/PAD/COR/MAD/SHAYNE In-House, NP Swab in UTM/VTM, 3-4 HR TAT - Swab, Nasopharynx [[id number]]  (Normal) Collected: 03/1935    Specimen: Swab from Nasopharynx Updated: 03/02/22 2055     ADENOVIRUS, PCR Not Detected     Coronavirus 229E Not Detected     Coronavirus HKU1 Not Detected     Coronavirus NL63 Not Detected     Coronavirus OC43 Not Detected     COVID19 Not Detected     Human Metapneumovirus Not Detected     Human Rhinovirus/Enterovirus Not Detected     Influenza A PCR Not Detected     Influenza B PCR Not Detected     Parainfluenza Virus 1 Not  Detected     Parainfluenza Virus 2 Not Detected     Parainfluenza Virus 3 Not Detected     Parainfluenza Virus 4 Not Detected     RSV, PCR Not Detected     Bordetella pertussis pcr Not Detected     Bordetella parapertussis PCR Not Detected     Chlamydophila pneumoniae PCR Not Detected     Mycoplasma pneumo by PCR Not Detected    Narrative:      In the setting of a positive respiratory panel with a viral infection PLUS a negative procalcitonin without other underlying concern for bacterial infection, consider observing off antibiotics or discontinuation of antibiotics and continue supportive care. If the respiratory panel is positive for atypical bacterial infection (Bordetella pertussis, Chlamydophila pneumoniae, or Mycoplasma pneumoniae), consider antibiotic de-escalation to target atypical bacterial infection.    Urinalysis With Culture If Indicated - Urine, Catheter In/Out [[id number]]  (Abnormal) Collected: 03/02/22 1934    Specimen: Urine, Catheter In/Out Updated: 03/02/22 1954     Color, UA Dark Yellow     Appearance, UA Clear     pH, UA <=5.0     Specific Gravity, UA 1.018     Glucose, UA Negative     Ketones, UA Negative     Bilirubin, UA Negative     Blood, UA Negative     Protein, UA Negative     Leuk Esterase, UA Negative     Nitrite, UA Negative     Urobilinogen, UA 1.0 E.U./dL    Narrative:      Urine microscopic not indicated.    BNP [[id number]]  (Abnormal) Collected: 03/02/22 1851    Specimen: Blood Updated: 03/02/22 1942     proBNP 29,193.0 pg/mL     TSH [[id number]]  (Abnormal) Collected: 03/02/22 1851    Specimen: Blood Updated: 03/02/22 1929     TSH 6.870 uIU/mL     Procalcitonin [[id number]]  (Normal) Collected: 03/02/22 1851    Specimen: Blood Updated: 03/02/22 1928     Procalcitonin 0.11 ng/mL     Narrative:      C-reactive Protein [[id number]]  (Abnormal) Collected: 03/02/22 1851    Specimen: Blood Updated: 03/02/22 1925     C-Reactive Protein 0.82 mg/dL     Comprehensive Metabolic Panel  [[id number]]  (Abnormal) Collected: 03/02/22 1851    Specimen: Blood Updated: 03/02/22 1923     Glucose 119 mg/dL      BUN 49 mg/dL      Creatinine 2.06 mg/dL      Sodium 127 mmol/L      Potassium 5.7 mmol/L      Chloride 87 mmol/L      CO2 30.0 mmol/L      Calcium 9.1 mg/dL      Total Protein 6.1 g/dL      Albumin 3.70 g/dL      ALT (SGPT) 51 U/L      AST (SGOT) 46 U/L      Alkaline Phosphatase 85 U/L      Total Bilirubin 0.4 mg/dL      Globulin 2.4 gm/dL      A/G Ratio 1.5 g/dL      BUN/Creatinine Ratio 23.8     Anion Gap 10.0 mmol/L      eGFR 23.1 mL/min/1.73      Comment: National Kidney Foundation and American Society of Nephrology (ASN) Task Force recommended calculation based on the Chronic Kidney Disease Epidemiology Collaboration (CKD-EPI) equation refit without adjustment for race.       Troponin [[id number]]  (Abnormal) Collected: 03/02/22 1851    Specimen: Blood Updated: 03/02/22 1923     Troponin T 0.033 ng/mL     Lactic Acid, Plasma [[id number]]  (Normal) Collected: 03/02/22 1851    Specimen: Blood Updated: 03/02/22 1919     Lactate 1.4 mmol/L     Magnesium [[id number]]  (Normal) Collected: 03/02/22 1851    Specimen: Blood Updated: 03/02/22 1917     Magnesium 1.9 mg/dL     Protime-INR [[id number]]  (Abnormal) Collected: 03/02/22 1851    Specimen: Blood Updated: 03/02/22 1916     Protime 17.7 Seconds      INR 1.52    aPTT [[id number]]  (Normal) Collected: 03/02/22 1851    Specimen: Blood Updated: 03/02/22 1916     PTT 32.7 seconds     D-dimer, Quantitative [[id number]]  (Abnormal) Collected: 03/02/22 1851    Specimen: Blood Updated: 03/02/22 1916     D-Dimer, Quantitative 2.38 mg/L (FEU)     CBC Auto Differential [[id number]]  (Abnormal) Collected: 03/02/22 1851    Specimen: Blood Updated: 03/02/22 1902     WBC 10.59 10*3/mm3      RBC 4.18 10*6/mm3      Hemoglobin 11.6 g/dL      Hematocrit 37.6 %      MCV 90.0 fL      MCH 27.8 pg      MCHC 30.9 g/dL      RDW 14.6 %      RDW-SD 47.8 fl      MPV 9.5 fL       Platelets 397 10*3/mm3      Neutrophil % 73.6 %      Lymphocyte % 14.4 %      Monocyte % 9.5 %      Eosinophil % 0.4 %      Basophil % 0.3 %      Immature Grans % 1.8 %      Neutrophils, Absolute 7.79 10*3/mm3      Lymphocytes, Absolute 1.53 10*3/mm3      Monocytes, Absolute 1.01 10*3/mm3      Eosinophils, Absolute 0.04 10*3/mm3      Basophils, Absolute 0.03 10*3/mm3      Immature Grans, Absolute 0.19 10*3/mm3      nRBC 0.0 /100 WBC         Imaging Results (All)     Procedure Component Value Units Date/Time    XR Chest 1 View [[id number]] Collected: 03/02/22 1934     Updated: 03/02/22 1939    Narrative:      EXAMINATION: Chest 1 view 3/2/2022     HISTORY: Shortness of breath.     FINDINGS: Today's exam is compared to previous study of earlier the same  day. There is a persistent moderate right-sided effusion with  right-sided atelectasis. There is increased right perihilar  consolidation perhaps representing a developing infiltrate. The left  lung remains clear. Mitral annulus calcifications are present.       Impression:      1.. Moderate right-sided effusion with right basilar atelectasis. There  is increased perihilar airspace opacity perhaps representing a  developing infiltrate.  This report was finalized on 03/02/2022 19:35 by Dr. Geraldo Fung MD.        History of Present Illness on Day of Discharge: Patient resting in bed.  She states she feels actually better than her baseline today.  I did offer for her to stay 1 more night given frequent readmissions and she feels that she is ready to go home with home health today.    Hospital Course:  Ms. Hicks is a pleasant 86-year-old  female who follows Dr. Austin Wade for primary care.  She has a medical history significant for chronic obstructive pulmonary disease, stage IIIb chronic kidney disease, hyponatremia, chronic hypoxic respiratory failure with continuous oxygen use at home, hypertension, hyperlipidemia, TIA.  The patient presented to the  [facility] emergency department on 3/2/2022 with complaints of shortness of breath.  She was recently admitted to our facility from 2/18 through 2/23 presenting with similar complaints and diagnosed with acute on chronic diastolic heart failure as well as COPD exacerbation.  Note during that hospitalization she did receive a thoracentesis at the bedside for right pleural effusion with removal of 1200 mL of fluid.  Chest x-ray on this admission shows chronic lung changes with increased right lower lobe infiltrate and pleural fluid, moderate pleural effusion.  BNP was elevated at 29,193.  Troponin was elevated with a flat trend.  Creatinine on admission was 2.0.  The patient was admitted to the hospitalist service for further evaluation and management.    The patient was placed on diuresis with IV Lasix.  Her lisinopril was held initially on admission and she was given Lokelma for hyperkalemia.  This has resolved and her renal function has improved with diuresis.  Creatinine is 1.5 today which is felt to be at or near her baseline.  We do feel that she would benefit from establishing care with nephrology at time of discharge given chronic kidney disease.  The patient has tolerated her home setting of oxygen at 3 L while hospitalized.  Today, she states she actually feels as though she is breathing better than her baseline.  The patient was able to be transitioned to oral diuretic therapy with Bumex yesterday and she has tolerated this well.  Renal function has continued to improve.  She was recently to be switched from furosemide to torsemide on an outpatient basis, however would like to transition to Bumex as she has tolerated this while hospitalized.      She was evaluated by cardiology given frequent readmissions.  Feel that management moving forward may be difficult, however cardiology will consider cardioversion as an outpatient as atrial fibrillation may be contributory to worsening diastolic  "dysfunction and issues with volume overload.  Possible consideration of CardioMEMS device as well.  She has been educated on a low-salt diet and 1500 mL fluid restriction.    The patient was evaluated by physical and Occupational Therapy who feel she would benefit from skilled nursing facility for rehabilitation purposes.  The patient did consider this initially but now has decided to go back home with home health.  She states that her grandson will be staying with her continuously.  I do feel that she is still high risk for readmission.  We will continue home health services at time of discharge.  Note Unna boots have been placed for compression purposes and will need to be changed and managed by home health.    Overall, the patient is hemodynamically stable and appropriate for discharge home today.  We will have home health reassess a BMP in 1 week.  Close outpatient follow-up with primary care provider and cardiology.    Condition on Discharge: Medically stable.  High risk for readmission.    Physical Exam on Discharge:  /47 (BP Location: Left arm, Patient Position: Sitting)   Pulse 85   Temp 97.2 °F (36.2 °C) (Oral)   Resp 18   Ht 167.6 cm (65.98\")   Wt 68.9 kg (152 lb)   SpO2 97%   BMI 24.55 kg/m²   Physical Exam  Vitals and nursing note reviewed.   Constitutional:       General: She is not in acute distress.     Appearance: She is ill-appearing.   HENT:      Head: Normocephalic and atraumatic.   Cardiovascular:      Rate and Rhythm: Normal rate. Rhythm irregular.      Comments: A. Fib    Pulmonary:      Effort: Pulmonary effort is normal.      Breath sounds: No wheezing, rales or rhonchi.   Diminished in bilateral bases, right base with better air movement today.  Abdominal:      General: Bowel sounds are normal. There is no distension.      Palpations: Abdomen is soft.      Tenderness: There is no abdominal tenderness.   Musculoskeletal:      Cervical back: Normal range of motion and neck " supple. No tenderness.      Right lower leg: Edema present.      Left lower leg: Edema present.   Skin:     General: Skin is warm and dry.      Findings: No erythema or rash.      Comments: Chronic venous skin changes of lower extremities.  Unna boots intact, edema improved with application of Unna boots.  Neurological:      General: No focal deficit present.      Mental Status: She is alert and oriented to person, place, and time.   Psychiatric:         Mood and Affect: Mood normal.         Behavior: Behavior normal.         Thought Content: Thought content normal.         Judgment: Judgment normal.     Discharge Disposition:  Home-Health Care [facility] [place]    Discharge Medications:     Discharge Medications      New Medications      Instructions Start Date   bumetanide 1 MG tablet  Commonly known as: BUMEX   1 mg, Oral, Daily   Start Date: March 7, 2022     hydrOXYzine 25 MG tablet  Commonly known as: ATARAX   25 mg, Oral, 3 Times Daily PRN         Changes to Medications      Instructions Start Date   busPIRone 10 MG tablet  Commonly known as: BUSPAR  What changed:   · medication strength  · how much to take  · when to take this   10 mg, Oral, 3 Times Daily         Continue These Medications      Instructions Start Date   alendronate 10 MG tablet  Commonly known as: FOSAMAX   10 mg, Oral, Weekly      apixaban 2.5 MG tablet tablet  Commonly known as: ELIQUIS   2.5 mg, Oral, Every 12 Hours Scheduled      atorvastatin 40 MG tablet  Commonly known as: LIPITOR   40 mg, Oral, Daily      budesonide-formoterol 160-4.5 MCG/ACT inhaler  Commonly known as: SYMBICORT   2 puffs, Inhalation, 2 Times Daily      docusate sodium 100 MG capsule  Commonly known as: COLACE   100 mg, Oral, 2 Times Daily      ipratropium-albuterol 0.5-2.5 mg/3 ml nebulizer  Commonly known as: DUO-NEB   3 mL, Nebulization, 4 Times Daily PRN      levocetirizine 5 MG tablet  Commonly known as: XYZAL   5 mg, Oral, Every Evening      lisinopril 10 MG tablet  Commonly  known as: PRINIVIL,ZESTRIL   10 mg, Oral, Daily      Metoprolol Tartrate 75 MG tablet   75 mg, Oral, Every 12 Hours Scheduled      Mucinex 600 MG 12 hr tablet  Generic drug: guaiFENesin   1,200 mg, Oral, 2 Times Daily      multivitamin with minerals tablet tablet   1 tablet, Oral, Daily      pantoprazole 40 MG EC tablet  Commonly known as: PROTONIX   40 mg, Oral, 2 Times Daily      pramipexole 0.25 MG tablet  Commonly known as: Mirapex   0.25 mg, Oral, Every Night at Bedtime      sertraline 50 MG tablet  Commonly known as: Zoloft   50 mg, Oral, Daily      vitamin D 1.25 MG (36223 UT) capsule capsule  Commonly known as: ERGOCALCIFEROL   50,000 Units, Oral, Weekly, On Friday         Stop These Medications    torsemide 20 MG tablet  Commonly known as: Demadex          Discharge Diet:   Diet Instructions     Diet: Cardiac, Specialty Diet; Thin Liquids, No Restrictions; Low Sodium      Discharge Diet:  Cardiac  Specialty Diet       Fluid Consistency: Thin Liquids, No Restrictions    Specialty Diets: Low Sodium    Fluid Restriction per day: 1500 mL Fluid        Activity at Discharge:   Activity Instructions     Activity as Tolerated          Discharge Care Plan/Instructions:   1.  Return for any acute or worsening symptoms.  2.  Low-sodium diet with 1500 mL fluid restriction.  3.  Resume home health services for skilled nursing, physical therapy, Occupational Therapy.  Home health to draw a BMP later this week with results to primary care provider.  Home health also to manage bilateral lower extremity Unna boots.  4.  Discontinue torsemide in favor of Bumex.  5.  Home medication BuSpar dosage increased.  Atarax as needed for anxiety.    Follow-up Appointments:   1.  Primary care provider in 1 week.  2.  Cardiology in 2 to 4 weeks.  3.  Recommend to establish care with nephrology in 1 to 2 weeks.  Future Appointments   Date Time Provider Department Center   4/27/2022  3:15 PM [facility] CT 1 BH [facility] CT [facility]   5/2/2022  4:00  PM Beth Tony APRN [facility]   5/13/2022  3:00 PM Austin Wade DO [facility]     Test Results Pending at Discharge: We will follow blood cultures to completion.  No growth to date.    Electronically signed by HALIMA Ivy, 3/6/2022, 09:56 CST.    Time: 45 minutes.

## 2022-03-06 NOTE — PROGRESS NOTES
[facility] -  Progress Note     LOS: 4 days   Patient Care Team:  Austin Wade DO as PCP - General (Internal Medicine)  Harris Escobar MD as Consulting Physician (Gastroenterology)  Cecily, HALIMA Hunter as Nurse Practitioner (Pulmonary Disease)  [facility]    Chief Complaint:CHF/AF follow up    Subjective     Interval History: Patient has discharge orders for today.  She had no acute events noted overnight.  She continues to tell me that she is feeling better in comparison to her presentation.  Her diuretics have been adjusted to bumetanide on discharge.  It previously had been discussed with the patient to change her diuretics by her outpatient primary care nurse practitioner however the patient was reluctant to  this medicine.  We have discussed importance of new medication compliance at discharge.  She denies any chest pain or palpitations.  She remains asymptomatic to her atrial arrhythmia.  Her rates are well controlled.  Lower extremity swelling has improved with jose boot placement.  She is at her baseline oxygen requirement of 3 L.  She denies any palpitations, dizziness, lightheadedness, or syncope.        Review of Systems:     Review of Systems   Respiratory: Positive for shortness of breath. Negative for wheezing.    Cardiovascular: Positive for leg swelling. Negative for chest pain and palpitations.   Gastrointestinal: Negative for abdominal distention and abdominal pain.   Genitourinary: Negative for decreased urine volume and difficulty urinating.   Neurological: Negative for dizziness, syncope and light-headedness.   Psychiatric/Behavioral: Negative for agitation and confusion.     Objective     Vital Sign Min/Max for last 24 hours  Temp  Min: 97.2 °F (36.2 °C)  Max: 98.6 °F (37 °C)   BP  Min: 106/66  Max: 127/73   Pulse  Min: 81  Max: 98   Resp  Min: 14  Max: 22   SpO2  Min: 93 %  Max: 99 %   No data recorded   No data recorded          03/03/22 2102   Weight: 68.9 kg (152 lb)       Physical Exam:    Vitals reviewed.   Constitutional:       General: Awake.      Appearance: Well-developed, well-groomed and not in distress. Ill-appearing and chronically ill-appearing.      Interventions: Nasal cannula in place.   HENT:      Head: Normocephalic and atraumatic.   Pulmonary:      Effort: Pulmonary effort is normal.      Breath sounds: Decreased air movement present. Examination of the right-middle field reveals decreased breath sounds. Examination of the right-lower field reveals decreased breath sounds. Decreased breath sounds present.   Cardiovascular:      Normal rate. Irregularly irregular rhythm.   Edema:     Peripheral edema present.  Musculoskeletal:      Cervical back: Normal range of motion and neck supple. Skin:     General: Skin is warm and dry.   Neurological:      Mental Status: Alert and oriented to person, place, and time.   Psychiatric:         Attention and Perception: Attention normal.         Mood and Affect: Mood normal.         Speech: Speech normal.         Behavior: Behavior is cooperative.         Cognition and Memory: Cognition and memory normal.       Results Review:   Lab Results (last 72 hours)     Procedure Component Value Units Date/Time    Basic Metabolic Panel [[id number]]  (Abnormal) Collected: 03/06/22 0749    Specimen: Blood Updated: 03/06/22 0900     Glucose 119 mg/dL      BUN 40 mg/dL      Creatinine 1.50 mg/dL      Sodium 136 mmol/L      Potassium 4.0 mmol/L      Chloride 92 mmol/L      CO2 37.0 mmol/L      Calcium 8.8 mg/dL      BUN/Creatinine Ratio 26.7     Anion Gap 7.0 mmol/L      eGFR 33.8 mL/min/1.73      Comment: National Kidney Foundation and American Society of Nephrology (ASN) Task Force recommended calculation based on the Chronic Kidney Disease Epidemiology Collaboration (CKD-EPI) equation refit without adjustment for race.       Narrative:      GFR Normal >60  Chronic Kidney Disease <60  Kidney Failure  <15      Blood Culture - Blood, Arm, Right [[id number]]  (Normal) Collected: 03/02/22 2136    Specimen: Blood from Arm, Right Updated: 03/05/22 2231     Blood Culture No growth at 3 days    Blood Culture - Blood, Arm, Left [[id number]]  (Normal) Collected: 03/02/22 1851    Specimen: Blood from Arm, Left Updated: 03/05/22 1902     Blood Culture No growth at 3 days    Basic Metabolic Panel [[id number]]  (Abnormal) Collected: 03/05/22 0726    Specimen: Blood Updated: 03/05/22 0752     Glucose 98 mg/dL      BUN 44 mg/dL      Creatinine 1.73 mg/dL      Sodium 135 mmol/L      Potassium 4.0 mmol/L      Chloride 90 mmol/L      CO2 37.0 mmol/L      Calcium 9.2 mg/dL      BUN/Creatinine Ratio 25.4     Anion Gap 8.0 mmol/L      eGFR 28.5 mL/min/1.73      Comment: National Kidney Foundation and American Society of Nephrology (ASN) Task Force recommended calculation based on the Chronic Kidney Disease Epidemiology Collaboration (CKD-EPI) equation refit without adjustment for race.       Narrative:      GFR Normal >60  Chronic Kidney Disease <60  Kidney Failure <15      Basic Metabolic Panel [[id number]]  (Abnormal) Collected: 03/04/22 0724    Specimen: Blood Updated: 03/04/22 0822     Glucose 96 mg/dL      BUN 47 mg/dL      Creatinine 1.90 mg/dL      Sodium 129 mmol/L      Potassium 4.0 mmol/L      Chloride 88 mmol/L      CO2 35.0 mmol/L      Calcium 8.9 mg/dL      BUN/Creatinine Ratio 24.7     Anion Gap 6.0 mmol/L      eGFR 25.5 mL/min/1.73      Comment: National Kidney Foundation and American Society of Nephrology (ASN) Task Force recommended calculation based on the Chronic Kidney Disease Epidemiology Collaboration (CKD-EPI) equation refit without adjustment for race.       Narrative:      GFR Normal >60  Chronic Kidney Disease <60  Kidney Failure <15      CBC (No Diff) [[id number]]  (Abnormal) Collected: 03/04/22 0146    Specimen: Blood Updated: 03/04/22 0242     WBC 8.95 10*3/mm3      RBC 3.73 10*6/mm3      Hemoglobin 10.4 g/dL       Hematocrit 33.2 %      MCV 89.0 fL      MCH 27.9 pg      MCHC 31.3 g/dL      RDW 14.7 %      RDW-SD 46.8 fl      MPV 9.6 fL      Platelets 318 10*3/mm3           Medication Review: yes  Current Facility-Administered Medications   Medication Dose Route Frequency Provider Last Rate Last Admin   • acetaminophen (TYLENOL) tablet 650 mg  650 mg Oral Q4H PRN Ketan Miner MD        Or   • acetaminophen (TYLENOL) 160 MG/5ML solution 650 mg  650 mg Oral Q4H PRN Ketan Miner MD        Or   • acetaminophen (TYLENOL) suppository 650 mg  650 mg Rectal Q4H PRN Ketan Miner MD       • apixaban (ELIQUIS) tablet 2.5 mg  2.5 mg Oral Q12H Ketan Miner MD   2.5 mg at 03/06/22 0841   • atorvastatin (LIPITOR) tablet 40 mg  40 mg Oral Daily Ketan Miner MD   40 mg at 03/06/22 0840   • benzonatate (TESSALON) capsule 200 mg  200 mg Oral TID PRN Ketan Miner MD       • budesonide-formoterol (SYMBICORT) 160-4.5 MCG/ACT inhaler 2 puff  2 puff Inhalation BID - RT Keatn Miner MD   2 puff at 03/06/22 0658   • bumetanide (BUMEX) tablet 1 mg  1 mg Oral Daily Trini Watts APRN   1 mg at 03/06/22 0840   • busPIRone (BUSPAR) tablet 10 mg  10 mg Oral TID With Meals Rizwan Tong MD   10 mg at 03/06/22 0840   • cetirizine (zyrTEC) tablet 5 mg  5 mg Oral Daily Ketan Miner MD   5 mg at 03/06/22 0840   • docusate sodium (COLACE) capsule 100 mg  100 mg Oral BID Ketan Miner MD   100 mg at 03/06/22 0840   • guaiFENesin (MUCINEX) 12 hr tablet 1,200 mg  1,200 mg Oral BID Ketan Miner MD   1,200 mg at 03/06/22 0840   • HYDROcodone-acetaminophen (NORCO) 5-325 MG per tablet 0.5 tablet  0.5 tablet Oral BID PRN Ketan Miner MD   0.5 tablet at 03/05/22 2145   • hydrOXYzine (ATARAX) tablet 25 mg  25 mg Oral TID PRN Trini Watts APRN   25 mg at 03/06/22 1034   • ipratropium-albuterol (DUO-NEB) nebulizer solution 3 mL  3 mL Nebulization Q4H PRN Ketan Miner MD   3 mL at  03/05/22 2053   • lisinopril (PRINIVIL,ZESTRIL) tablet 5 mg  5 mg Oral Daily Trini Watts APRN       • metoprolol tartrate (LOPRESSOR) tablet 75 mg  75 mg Oral Q12H Ketan Miner MD   75 mg at 03/06/22 0840   • multivitamin with minerals 1 tablet  1 tablet Oral Daily Ketan Miner MD   1 tablet at 03/05/22 0911   • nitroglycerin (NITROSTAT) SL tablet 0.4 mg  0.4 mg Sublingual Q5 Min PRN Ketan Miner MD       • ondansetron (ZOFRAN) tablet 4 mg  4 mg Oral Q6H PRN Ketan Miner MD   4 mg at 03/05/22 2145    Or   • ondansetron (ZOFRAN) injection 4 mg  4 mg Intravenous Q6H PRN Ketan Miner MD       • ondansetron ODT (ZOFRAN-ODT) disintegrating tablet 4 mg  4 mg Oral Q8H PRN Ketan Miner MD   4 mg at 03/03/22 0047   • pantoprazole (PROTONIX) EC tablet 40 mg  40 mg Oral BID AC Ketan Miner MD   40 mg at 03/06/22 0842   • pramipexole (MIRAPEX) tablet 0.25 mg  0.25 mg Oral Nightly Ketan Miner MD   0.25 mg at 03/05/22 2118   • sertraline (ZOLOFT) tablet 50 mg  50 mg Oral Daily Ketan Miner MD   50 mg at 03/06/22 0841   • sodium chloride 0.9 % flush 10 mL  10 mL Intravenous PRN Ketan Miner MD       • sodium chloride 0.9 % flush 10 mL  10 mL Intravenous Q12H Ketan Miner MD   10 mL at 03/06/22 0841   • sodium chloride 0.9 % flush 10 mL  10 mL Intravenous PRN Ketan Miner MD           Assessment/Plan     1.  Acute on chronic hypoxic respiratory failure: The patient is back to her baseline oxygen requirement improved reports her symptoms to be stable  2.  Acute on chronic diastolic heart failure: Overall improved with use of IV diuretics.  She is being discharged today on oral bumetanide change from her home medication.  She will need close follow-up in our office and I have made her an appointment for next week.  I have discussed this with the patient and the importance of follow-up.  Given her multiple hospitalization and has been discussed that she  may benefit from CardioMEMS device which would help to monitor as an outpatient and possibly reduce rehospitalization.  This can be further discussed with the patient as an outpatient by her primary cardiology team.  3.  Paroxysmal atrial fibrillation: The patient remains in atrial fibrillation however has had a previous admission noting sinus rhythm.  This may be contributing to some degree to her heart failure however she remains asymptomatic to the rhythm.  Previously it has been discussed with the patient that she possibly may have some benefit in terms of her breathing with cardioversion.  This can be discussed further with her primary cardiology team.  As previously stated, the patient did present previous this year with complaints of shortness of breath but was found to be in sinus rhythm.  I am not certain how strongly her primary cardiologist would feel about cardioversion.  Continue anticoagulation for stroke risk reduction.  4.  Essential hypertension: Stable  5.  Mixed hyperlipidemia: On statin therapy      Okay for discharge home  Close follow-up which was made for the patient prior to her discharge today.  Compliance with home medications including diuretic therapy.  Low-sodium diet.      Electronically signed by HALIMA Arreguin, 03/06/22, 11:19 AM SILVIA.

## 2022-03-06 NOTE — EXTERNAL PATIENT INSTRUCTIONS
Patient Education   Table of Contents       Heart Failure Exacerbation       Heart Failure Medicines       Heart Failure, Self-Care       Heart-Healthy Eating Plan       Living With Heart Failure     To view videos and all your education online visit,   [email or web address]   or scan this QR code with your smartphone.                  Heart Failure Exacerbation     Heart failure is a condition in which the heart has trouble pumping blood. This may mean that the heart cannot pump enough blood out to the body or that the heart does not fill up with enough blood. When this happens, parts of the body do not get the blood and oxygen they need to function properly. This can cause symptoms such as breathing problems, tiredness (fatigue), swelling, and confusion.   Heart failure exacerbation refers to heart failure symptoms that get worse. The symptoms may get worse suddenly or develop slowly over time. Heart failure exacerbation is a serious medical problem that should be treated right away.   What are the causes?    A heart failure exacerbation can be triggered by:       Not taking your heart failure medicines correctly.       Infections.       Eating an unhealthy diet or a diet that is high in salt (sodium).       Drinking too much fluid.       Drinking alcohol.       Using drugs, such as cocaine or methamphetamine.       Not exercising.      Other causes include:       Other heart conditions such as an irregular heart rhythm (arrhythmia).       Worsening heart valve function.       Low blood counts (anemia).       Other medical problems, such as kidney failure, thyroid problems, or diabetes mellitus.     Sometimes the cause of the exacerbation is not known.   What are the signs or symptoms?    When heart failure symptoms suddenly or slowly get worse, this may be a sign of heart failure exacerbation. Symptoms of heart failure include:       Shortness of breath during activity or exercise.       A cough that  does not go away.       Swelling of the legs, ankles, feet, or abdomen.       Losing or gaining weight for no reason.       Trouble breathing when lying down.       Increased heart rate or irregular heartbeat.       Fatigue.       Feeling light-headed, dizzy, or close to fainting.       Nausea or lack of appetite.     How is this diagnosed?    This condition is diagnosed based on:       Your symptoms and medical history.       A physical exam.      You may also have tests, including:       Electrocardiogram (ECG). This test measures the electrical activity of your heart.       Echocardiogram. This test uses sound waves to take a picture of your heart to see how well it works.       Blood tests.      Imaging tests, such as:       Chest X-ray.       MRI.       Ultrasound.       Stress test. This test examines how well your heart functions while you exercise on a treadmill or exercise bike. If you cannot exercise, medicines may be used to increase your heartbeat in place of exercise.       Cardiac catheterization. During this test, a thin, flexible tube (catheter) is inserted into a blood vessel and threaded up to your heart. This test allows your health care provider to check the arteries that lead to your heart (coronary arteries).       Right heart catheterization. During this test, the pressure in your heart is measured.     How is this treated?    This condition may be treated by:       Adjusting your heart medicines.      Maintaining a healthy lifestyle. This includes:       Eating a heart-healthy diet that is low in sodium.       Not using products that contain nicotine or tobacco.       Regular exercise.       Monitoring your fluid intake.       Monitoring your weight and reporting changes to your health care provider.       Not using alcohol or drugs.       Treating sleep apnea, if you have this condition.      Surgery. This may include:       Placing a pacemaker to improve heart function (cardiac  resynchronization therapy).       Implanting a device that can correct heart rhythm problems (implantable cardioverter defibrillator).       Implanting a pulmonary arterial pressure monitor to monitor your fluid balance.       Connecting a device to your heart to help it pump blood (ventricular assist device).       Heart transplant.     Follow these instructions at home:   Medicines         Take over-the-counter and prescription medicines only as told by your health care provider.      Do not  stop taking your medicines or change the amount you take. If you are having problems or side effects from your medicines, talk to your health care provider.       If you are having difficulty paying for your medicines, contact a  or your clinic. There are many programs to assist with medicine costs.       Talk to your health care provider before starting any new medicines or supplements.       Make sure your health care provider and pharmacist have a list of all the medicines you are taking.     Eating and drinking            Avoid drinking alcohol.      Eat a heart-healthy diet as told by your health care provider. This includes:       Plenty of fruits and vegetables.       Lean proteins.       Low-fat dairy.       Whole grains.       Foods that are low in sodium.     Activity            Exercise regularly as told by your health care provider. Balance exercise with rest.       Ask your health care provider what activities are safe for you. This includes sexual activity, exercise, and daily tasks at home or work.       Lifestyle        Do not  use any products that contain nicotine or tobacco. These products include cigarettes, chewing tobacco, and vaping devices, such as e-cigarettes. If you need help quitting, ask your health care provider.       Maintain a healthy weight. Ask your health care provider what weight is healthy for you.       Consider joining a patient support group. This can help with emotional  problems you may have, such as stress and anxiety.      Do not  use drugs.     General instructions         Stay up to date with vaccines. Talk to your health care provider about flu and pneumonia vaccines.       Keep a list of medicines that you are taking. This may help in emergency situations.       Keep all follow-up visits. This is important.       Contact a health care provider if:         You have questions about your medicines or you miss a dose.       You feel anxious, depressed, or stressed.       You develop swelling in your feet, ankles, legs, or abdomen.       You develop a cough.       You have a fever.       You have trouble sleeping.       You gain 2?3 lb (1?1.4 kg) in 24 hours or 5 lb (2.3 kg) in a week.     Get help right away if:         You have chest pain or pressure.       You have shortness of breath while resting.       You have severe fatigue.       You are confused.       You have severe dizziness.       You have a rapid or irregular heartbeat.       You have nausea or you vomit.       You have a cough that is worse at night or you cannot lie flat.       You have severe depression or sadness.     These symptoms may represent a serious problem that is an emergency. Do not wait to see if the symptoms will go away. Get medical help right away. Call your local emergency services (911 in the [place] Do not drive yourself to the hospital.   Summary         When heart failure symptoms get worse, it is called heart failure exacerbation.       Common causes of this condition include taking medicines incorrectly, infections, and drinking alcohol.       This condition may be treated by adjusting medicines, maintaining a healthy lifestyle, or surgery.      Do not  stop taking your medicines or change the amount you take. If you are having problems or side effects from your medicines, talk to your health care provider.     This information is not intended to replace advice given to you by your health  care provider. Make sure you discuss any questions you have with your health care provider.     Document Released: 05/01/2018Document Revised: 07/10/2021Document Reviewed: 07/10/2021     Elsevier Patient Education ? 2021 [employer]         Heart Failure Medicines        Heart failure is a condition in which the heart cannot pump enough blood through the body. This can cause shortness of breath, coughing, fatigue, and confusion. Swelling in the feet, ankles, and legs is also common.   Medicines for heart failure can strengthen the heart's ability to pump blood and decrease the work it has to do. There is no cure for heart failure. However, taking medicines as directed and living a healthy lifestyle can help you stay active, avoid problems, and live longer.   Talk with your health care provider about all medicines that you are taking, how often you should take them, and possible side effects.     Tell a health care provider about:         Any allergies you have.       All medicines you are taking, including vitamins, herbs, eye drops, creams, and over-the-counter medicines.       Any blood disorders you have.       Any other medical conditions you have.       Whether you are pregnant or may be pregnant.     Types of medicines   The medicines that are prescribed for you will depend on your symptoms, the type of heart failure you have, and the cause of your heart failure. In most cases, you may need to take more than one medicine.   Angiotensin-converting enzyme (ACE) inhibitors, angiotensin receptor blockers (ARBs), and angiotensin receptor neprilysin inhibitor (ARNIs)         These medicines help to dilate arteries and veins. This makes it easier for your heart to pump by lowering blood pressure and reducing the strain on your heart. These medicines can help to lessen the symptoms of heart failure.      Common ACE inhibitors include lisinopril and ramipril. Common ARBs include losartan and valsartan. Common ARNIs  include sacubitril with valsartan.       Only take one of these medicines. Do not  combine ACE inhibitors, ARBs, or ARNIs. The risk of side effects increases if more than one of these medicines is taken at the same time.       Side effects include dry cough, dizziness, low blood pressure, high potassium levels, and kidney problems. You may need regular checkups and blood tests to monitor how the medicine is working.      Do not  take these medicines if you are pregnant or may become pregnant. They can cause serious birth defects.      In rare cases, these medicines can cause a serious side effect called angioedema. Symptoms include swelling of the tongue, lips, mouth, or throat, trouble breathing, and hoarseness. Stomach pain or swelling and vomiting can also happen.       If you have any of these symptoms, stop taking the medicine and contact your health care provider right away. If you have had angioedema in the past, talk with your health care provider before starting one of these medicines.     Beta-blockers         These medicines slow your heart rate. This helps to improve the heart's ability to pump and lessen its workload.       Common beta-blockers for heart failure are bisoprolol, carvedilol, and long-acting metoprolol (extended-release).       Some beta-blockers can worsen lung diseases that cause wheezing, such as asthma. Talk with your health care provider before taking a beta-blocker if you have a lung disease.       These medicines can hide the symptoms of low blood sugar (glucose), also called hypoglycemia. If you have diabetes, check your blood glucose carefully. If you have hypoglycemia, talk with your health care provider about adjusting your medicines.       This medicine may make you feel dizzy or light-headed at first. Do not  drive or use machinery when you first start these medicines. Ask your health care provider when it is safe for you to do this.       Because these medicines slow your  heart rate, it is important not to overdo it with exercise. Talk with your health care provider about what your target heart rate should be while you exercise.      Do not  stop this medicine suddenly unless your health care provider says to do this. Stopping this medicine suddenly can increase the risk of a heart attack or heart rhythm problems. If it is decided that stopping this medicine is right for you, your dose will be lowered slowly to prevent these side effects.     Diuretics         Diuretics help the body get rid of excess sodium and water by increasing the need to urinate more often. This helps to lessen the amount of blood that the heart needs to pump. They also reduce fluid buildup in the lungs, ankles, and feet.       Common diuretics include furosemide, bumetanide, and hydrochlorothiazide. The amount of water or fluid removed from the body depends on which diuretic is used.       You may be asked to weigh yourself to determine if you have too much or too little fluid. Ask your health care provider what your weight should be and when to contact your health care provider if it changes. Also, ask about how much fluid you should be drinking each day.       These medicines can worsen problems with controlling urination (urinary incontinence). Talk to your health care provider about the best time of day to take this medicine if you have trouble with bladder control.       Side effects include dizziness, headaches, muscle cramps, and an upset stomach.       These medicines can cause dehydration. Symptoms include tiredness, increased thirst, confusion, and decreased urination. Contact your health care provider right away if you think you might be dehydrated.       These medicines can cause your electrolyte levels (magnesium and potassium) and kidney function to change. These levels will be monitored by your health care provider.       These medicines can make you more sensitive to the sun. Keep out of the  sun. If you cannot avoid being in the sun, wear protective clothing and use sunscreen. Do not  use sun lamps or tanning beds.     Aldosterone antagonists         These medicines are a type of diuretic. They get rid of excess sodium and water from the body by increasing the need to urinate. This helps to lessen the amount of blood that the heart needs to pump.       Common aldosterone antagonists include spironolactone and eplerenone.       Side effects include dizziness, low blood pressure, increased potassium levels, and kidney problems. You may need regular checkups and blood tests to monitor how this medicine is working.       These medicines can raise the amount of potassium in the blood. Your potassium levels will be monitored regularly by your health care provider.       Spironolactone and eplerenone may increase breast size in males and may cause breast tenderness in all patients.     Digoxin         Digoxin helps the heart pump blood better. It also lowers your heart rate. It is usually added to other medicines if you are still having heart failure symptoms.       Your health care provider will monitor your digoxin levels with blood tests. Too much digoxin can cause serious problems, such as an irregular heart rhythm.       Early signs of digoxin toxicity include nausea, vomiting, loss of appetite, headaches, confusion, weakness, or vision changes, such as blurred vision or seeing more yellow or green than normal. Contact your health care provider right away if you have any of these signs.     Vasodilators         Hydralazine and nitrates relax the blood vessels and lower blood pressure. This helps your heart pump blood better.       Hydralazine and nitrates are usually given with other medicines to control your heart failure symptoms. You may be given one or both of these medicines, depending on your symptoms.       Side effects include headaches, a flushed face or neck, dizziness, and low blood pressure.      If channel blockers         Ivabradine is an I f  channel blocker. It lowers the heart rate and decreases the heart's workload.       This medicine is usually given with beta-blockers if you have symptoms of heart failure and your heart rate is more than 70 beats per minute while taking a beta-blocker.       Side effects include high blood pressure, brightness in your field of vision, slower than normal heart rate (bradycardia), and heart rhythm problems.       Grapefruit juice may increase the risk of side effects. It is recommended to avoid this.      Do not  take this medicine if you are pregnant or may become pregnant. It can cause serious birth defects.     Sodium-glucose cotransporter-2 inhibitors (SGLT-2 inhibitors)         SGLT-2 inhibitors are medicines used to treat type 2 diabetes. They have been shown to improve symptoms and quality of life in people with heart failure, even if they do not have diabetes. They also decrease the rate of hospitalizations and death.       Common SGLT-2 inhibitors include canagliflozin and dapagliflozin.       Side effects include an increase in urinary tract infections, genital fungal infections, increased urination, and kidney problems.       If you have diabetes, there is a small risk of hypoglycemia. There is also a small risk of diabetic ketoacidosis. This happens when your blood sugar is very high and usually requires treatment in the hospital.     Summary         When you have heart failure, taking medicines as directed and living a healthy lifestyle can help you stay active, avoid problems, and live longer.       In most cases, you will need to take more than one medicine.       It is important to talk with your health care provider about how often you should take your medicines. Do not  skip a dose or change your dosage.       Talk to your health care provider about possible side effects of these medicines.     This information is not intended to replace advice  given to you by your health care provider. Make sure you discuss any questions you have with your health care provider.     Document Released: 05/04/2018Document Revised: 08/30/2021Document Reviewed: 08/30/2021     Elsevier Patient Education ? 2021 [employer]         Heart Failure, Self-Care     Heart failure is a serious condition. The following information explains things you need to do to take care of yourself at home. To help you stay as healthy as possible, you may be asked to change your diet, take certain medicines, and make other changes in your life. Your doctor may also give you more specific instructions. If you have problems or questions, call your doctor.   What are the risks?    Having heart failure makes it more likely for you to have some problems. These problems can get worse if you do not take good care of yourself. Problems may include:       Damage to the kidneys, liver, or lungs.       Malnutrition.       Abnormal heart rhythms.       Blood clotting problems that could cause a stroke.     Supplies needed:         Scale for weighing yourself.       Blood pressure monitor.       Notebook.       Medicines.     How to care for yourself when you have heart failure   Medicines    Take over-the-counter and prescription medicines only as told by your doctor. Take your medicines every day.      Do not  stop taking your medicine unless your doctor tells you to do so.      Do not  skip any medicines.       Get your prescriptions refilled before you run out of medicine. This is important.       Talk with your doctor if you cannot afford your medicines.     Eating and drinking            Eat heart-healthy foods. Talk with a diet specialist (dietitian) to create an eating plan.       Limit salt (sodium) if told by your doctor. Ask your diet specialist to tell you which seasonings are healthy for your heart.       Cook in healthy ways instead of frying. Healthy ways of cooking include roasting, grilling,  broiling, baking, poaching, steaming, and stir-frying.      Choose foods that:       Have no trans fat.       Are low in saturated fat and cholesterol.      Choose healthy foods, such as:       Fresh or frozen fruits and vegetables.       Fish.       Low-fat (lean) meats.       Legumes, such as beans, peas, and lentils.       Fat-free or low-fat dairy products.       Whole-grain foods.       High-fiber foods.       Limit how much fluid you drink, if told by your doctor.     Alcohol use        Do not  drink alcohol if:       Your doctor tells you not to drink.       Your heart was damaged by alcohol, or you have very bad heart failure.       You are pregnant, may be pregnant, or are planning to become pregnant.      If you drink alcohol:      Limit how much you have to:       0?1 drink a day for women.       0?2 drinks a day for men.       Know how much alcohol is in your drink. In the [place] one drink equals one 12 oz bottle of beer (355 mL), one 5 oz glass of wine (148 mL), or one 1? oz glass of hard liquor (44 mL).     Lifestyle           Do not  smoke or use any products that contain nicotine or tobacco. If you need help quitting, ask your doctor.      Do not  use nicotine gum or patches before talking to your doctor.      Do not  use illegal drugs.       Lose weight if told by your doctor.      Do physical activity if told by your doctor. Talk to your doctor before you begin an exercise if:       You are an older adult.       You have very bad heart failure.       Learn to manage stress. If you need help, ask your doctor.       Get physical rehab (rehabilitation) to help you stay independent and to help with your quality of life.       Participate in a cardiac rehab program. This program helps you improve your health through exercise, education, and counseling.       Plan time to rest when you get tired.       Check weight and blood pressure           Weigh yourself every day. This will help you to know if fluid  is building up in your body.       Weigh yourself every morning after you pee (urinate) and before you eat breakfast.       Wear the same amount of clothing each time.       Write down your daily weight. Give your record to your doctor.       Check and write down your blood pressure as told by your doctor.       Check your pulse as told by your doctor.       Dealing with very hot and very cold weather        If it is very hot:       Avoid activities that take a lot of energy.       Use air conditioning or fans, or find a cooler place.       Avoid caffeine and alcohol.       Wear clothing that is loose-fitting, lightweight, and light-colored.      If it is very cold:       Avoid activities that take a lot of energy.       Layer your clothes.       Wear mittens or gloves, a hat, and a face covering when you go outside.       Avoid alcohol.       Follow these instructions at home:         Stay up to date with shots (vaccines). Get pneumococcal and flu (influenza) shots.       Keep all follow-up visits.     Contact a doctor if:         You gain 2?3 lb (1?1.4 kg) in 24 hours or 5 lb (2.3 kg) in a week.       You have increasing shortness of breath.       You cannot do your normal activities.       You get tired easily.       You cough a lot.       You do not feel like eating or feel like you may vomit (nauseous).       You have swelling in your hands, feet, ankles, or belly (abdomen).       You cannot sleep well because it is hard to breathe.       You feel like your heart is beating fast (palpitations).       You get dizzy when you stand up.       You feel depressed or sad.     Get help right away if:         You have trouble breathing.       You or someone else notices a change in your behavior, such as having trouble staying awake.       You have chest pain or discomfort.       You pass out (faint).     These symptoms may be an emergency. Get help right away. Call your local emergency services (911 in the [place]       Do not wait to see if the symptoms will go away.      Do not drive yourself to the hospital.     Summary         Heart failure is a serious condition. To care for yourself, you may have to change your diet, take medicines, and make other lifestyle changes.       Take your medicines every day. Do not  stop taking them unless your doctor tells you to do so.       Limit salt and eat heart-healthy foods.       Ask your doctor if you can drink alcohol. You may have to stop alcohol use if you have very bad heart failure.       Contact your doctor if you gain weight quickly or feel that your heart is beating too fast. Get help right away if you pass out or have chest pain or trouble breathing.     This information is not intended to replace advice given to you by your health care provider. Make sure you discuss any questions you have with your health care provider.     Document Released: 04/01/2020Document Revised: 07/10/2021Document Reviewed: 07/10/2021     [employer] Patient Education ? 2021 [employer].         Heart-Healthy Eating Plan     Heart-healthy meal planning includes:       Eating less unhealthy fats.       Eating more healthy fats.       Making other changes in your diet.     Talk with your doctor or a diet specialist (dietitian) to create an eating plan that is right for you.   What is my plan?    Your doctor may recommend an eating plan that includes:       Total fat: ______% or less of total calories a day.       Saturated fat: ______% or less of total calories a day.       Cholesterol: less than _________mg a day.     What are tips for following this plan?   Cooking    Avoid frying your food. Try to bake, boil, grill, or broil it instead. You can also reduce fat by:       Removing the skin from poultry.       Removing all visible fats from meats.       Steaming vegetables in water or broth.     Meal planning           At meals, divide your plate into four equal parts:       Fill one-half of your plate with  vegetables and green salads.       Fill one-fourth of your plate with whole grains.       Fill one-fourth of your plate with lean protein foods.      Eat 4?5 servings of vegetables per day. A serving of vegetables is:       1 cup of raw or cooked vegetables.       2 cups of raw leafy greens.      Eat 4?5 servings of fruit per day. A serving of fruit is:       1 medium whole fruit.       ? cup of dried fruit.       ? cup of fresh, frozen, or canned fruit.       ? cup of 100% fruit juice.       Eat more foods that have soluble fiber. These are apples, broccoli, carrots, beans, peas, and barley. Try to get 20?30 g of fiber per day.      Eat 4?5 servings of nuts, legumes, and seeds per week:       1 serving of dried beans or legumes equals ? cup after being cooked.       1 serving of nuts is ? cup.       1 serving of seeds equals 1 tablespoon.     General information         Eat more home-cooked food. Eat less restaurant, buffet, and fast food.       Limit or avoid alcohol.       Limit foods that are high in starch and sugar.       Avoid fried foods.       Lose weight if you are overweight.       Keep track of how much salt (sodium) you eat. This is important if you have high blood pressure. Ask your doctor to tell you more about this.       Try to add vegetarian meals each week.     Fats         Choose healthy fats. These include olive oil and canola oil, flaxseeds, walnuts, almonds, and seeds.       Eat more omega-3 fats. These include salmon, mackerel, sardines, tuna, flaxseed oil, and ground flaxseeds. Try to eat fish at least 2 times each week.       Check food labels. Avoid foods with trans fats or high amounts of saturated fat.      Limit saturated fats.       These are often found in animal products, such as meats, butter, and cream.       These are also found in plant foods, such as palm oil, palm kernel oil, and coconut oil.       Avoid foods with partially hydrogenated oils in them. These have trans fats.  Examples are stick margarine, some tub margarines, cookies, crackers, and other baked goods.       What foods can I eat?   Fruits   All fresh, canned (in natural juice), or frozen fruits.   Vegetables   Fresh or frozen vegetables (raw, steamed, roasted, or grilled). Green salads.   Grains   Most grains. Choose whole wheat and whole grains most of the time. Rice and pasta, including brown rice and pastas made with whole wheat.   Meats and other proteins   Lean, well-trimmed beef, veal, pork, and lamb. Chicken and turkey without skin. All fish and shellfish. Wild duck, rabbit, pheasant, and venison. Egg whites or low-cholesterol egg substitutes. Dried beans, peas, lentils, and tofu. Seeds and most nuts.   Dairy   Low-fat or nonfat cheeses, including ricotta and mozzarella. Skim or 1% milk that is liquid, powdered, or evaporated. Buttermilk that is made with low-fat milk. Nonfat or low-fat yogurt.   Fats and oils   Non-hydrogenated (trans-free) margarines. Vegetable oils, including soybean, sesame, sunflower, olive, peanut, safflower, corn, canola, and cottonseed. Salad dressings or mayonnaise made with a vegetable oil.   Beverages   Mineral water. Coffee and tea. Diet carbonated beverages.   Sweets and desserts   Sherbet, gelatin, and fruit ice. Small amounts of dark chocolate.   Limit all sweets and desserts.   Seasonings and condiments   All seasonings and condiments.   The items listed above may not be a complete list of foods and drinks you can eat. Contact a dietitian for more options.   What foods should I avoid?   Fruits   Canned fruit in heavy syrup. Fruit in cream or butter sauce. Fried fruit. Limit coconut.   Vegetables   Vegetables cooked in cheese, cream, or butter sauce. Fried vegetables.   Grains   Breads that are made with saturated or trans fats, oils, or whole milk. Croissants. Sweet rolls. Donuts. High-fat crackers, such as cheese crackers.   Meats and other proteins   Fatty meats, such as hot dogs,  ribs, sausage, dillard, rib-eye roast or steak. High-fat deli meats, such as salami and bologna. Caviar. Domestic duck and goose. Organ meats, such as liver.   Dairy   Cream, sour cream, cream cheese, and creamed cottage cheese. Whole-milk cheeses. Whole or 2% milk that is liquid, evaporated, or condensed. Whole buttermilk. Cream sauce or high-fat cheese sauce. Yogurt that is made from whole milk.   Fats and oils   Meat fat, or shortening. Cocoa butter, hydrogenated oils, palm oil, coconut oil, palm kernel oil. Solid fats and shortenings, including dillard fat, salt pork, lard, and butter. Nondairy cream substitutes. Salad dressings with cheese or sour cream.   Beverages   Regular sodas and juice drinks with added sugar.   Sweets and desserts   Frosting. Pudding. Cookies. Cakes. Pies. Milk chocolate or white chocolate. Buttered syrups. Full-fat ice cream or ice cream drinks.   The items listed above may not be a complete list of foods and drinks to avoid. Contact a dietitian for more information.   Summary         Heart-healthy meal planning includes eating less unhealthy fats, eating more healthy fats, and making other changes in your diet.       Eat a balanced diet. This includes fruits and vegetables, low-fat or nonfat dairy, lean protein, nuts and legumes, whole grains, and heart-healthy oils and fats.     This information is not intended to replace advice given to you by your health care provider. Make sure you discuss any questions you have with your health care provider.     Document Released: 06/18/2013Document Revised: 02/21/2019Document Reviewed: 01/25/2019     [email or web address] Patient Education ? 2021 [employer]         Living With Heart Failure     Heart failure is a long-term (chronic) condition in which the heart cannot pump enough blood through the body. When this happens, parts of the body do not get the blood and oxygen they need.   There is no cure for heart failure at this time, so it is important for you  to take good care of yourself and follow the treatment plan you set with your health care provider. If you are living with heart failure, there are ways to help you manage the disease.   How to manage lifestyle changes   Living with heart failure requires you to make changes in your life. Your health care team will teach you about the changes you need to make in order to relieve your symptoms and lower your risk of going to the hospital. Work with your health care provider to develop a treatment plan that works for you.   Activity         Ask your health care provider about attending cardiac rehabilitation. These programs include aerobic physical activity, which provides many benefits for your heart.       If no cardiac rehabilitation program is available, ask your health care provider what aerobic exercises are safe for you to do.       Return to your normal activities as told by your health care provider. Ask your health care provider what activities are safe for you.       Pace your daily activities and allow time for rest as needed.     Managing stress    It is normal to have many emotions about your diagnosis, such as fear, sadness, anger, and loss. If you feel any of these emotions and need help coping, contact your health care provider. Here are some ways to help yourself manage these emotions:       Talk to friends and family members about your condition. They can give you support and guidance. Explain your symptoms to them and, if comfortable, invite them to attend appointments or rehabilitation with you.       Join a support group for people with chronic heart failure. Talking with other people who have the same symptoms may give you new ways of coping with your disease and your emotions.       Accept help from others. Do not  be ashamed if you need help with certain tasks.       Use stress management techniques, such as meditation, breathing exercises, or listening to relaxing music.      Conditions such  as depression and anxiety are common in persons with heart failure. Pay attention to changes in your mood, emotions, and stress levels. Tell your health care provider if you have any of the following symptoms:       Trouble sleeping or a change in your sleeping patterns.       Feeling sad, down, or depressed more often than not, every day for more than 2 weeks.       Losing interest in activities you normally enjoy.       Feeling irritable or crying for no reason.       Finding yourself worrying about the future often.     Work    You may need to develop a plan with your health care provider if heart failure interferes with your ability to work. This may include:       Reducing your work hours.       Finding functions that are less active or require less effort.       Planning rest periods during your work hours.     Travel         Talk with your health care provider if you plan to travel. There may be circumstances in which your health care provider recommends that you do not travel or that you delay travel until your condition is under control.       When you travel, bring your medicine and a list of your medicines. If you are traveling by air, keep your medicines with you in a carry-on bag.       Consider finding a medical facility in the area you will be traveling to and determine what your health insurance will cover.       If you will be traveling by public transportation (airplane, train, bus), contact the company prior to traveling if you have special needs. This may include needs related to diet, oxygen, a wheelchair, a seating request, or help with luggage.       If you use oxygen, make sure to bring enough oxygen with you.       If you have a battery-powered device, bring a fully charged extra battery with you.       If you have a device, bring a note from your health care provider and inform all security screening personnel that you have the device. You may need to go through special screening for  safety.     Sexual activity            Ask your health care provider when it is safe for you to resume sexual activity.       You may need to start slowly and gradually increase intimacy. You can increase intimacy by doing such things as caressing, touching, and holding each other.       Get regular exercise as told by your health care provider. This can benefit your sex life by building strength and endurance.     Sleep       If your condition interferes with your sleep, find ways to improve your sleep quality, such as:       Sleep lying on your side, or sleep with your head elevated by raising the head of your bed or using multiple pillows.       Ask your health care provider about screening for sleep apnea.       Try to go to sleep and wake up at the same times every day.       Sleep in a dark, cool room.      Do not  do any physical activity or eating for a few hours before bedtime.       Plan rest periods during the day, but do not take long naps during the day.         Where to find support        Consider talking with:       Family members.       Close friends.       A mental health professional or therapist.       A member of your [employer], [employer], or community group.      Other sources of support include:       Local support groups. Ask your health care provider about groups near you.       Online support groups, such as those found through the American Heart Association: supportnetwork.heart.org         Local home care agencies, community agencies, or social agencies.       A palliative care specialist. Palliative care can help you manage symptoms, promote comfort, improve quality of life, and maintain dignity.     Where to find more information         American Heart Association: heart.org         National Heart, Lung, and Blood [place]: nhlbi.nih.gov         Centers for Disease Control and Prevention: cdc.gov/heartdisease         Heart Failure Society of [place]: hfsa.org/patient       Contact a [employer]  care provider if:         You have a rapid weight gain.       You have increasing shortness of breath that is unusual for you.       You are unable to participate in your usual physical activities.       You tire easily.      You have difficulty sleeping, such as:       You wake up feeling short of breath.       You have to use more pillows to raise your head in order to sleep.       You cough more than normal, especially with physical activity.       You have any swelling or more swelling in areas such as your hands, feet, ankles, or abdomen.       You become dizzy or light-headed when you stand up.       You have changes to your appetite.       You have symptoms of depression or anxiety.     Get help right away if:         You have difficulty breathing.       You notice or your family notices a change in your awareness, such as having trouble staying awake or having difficulty with concentration.       You have pain or discomfort in your chest.       You have an episode of fainting (syncope).       You feel like your heart is beating quickly (palpitations).       You have extreme feelings of sadness or loss of hope, or you have thoughts about hurting yourself or others.     These symptoms may represent a serious problem that is an emergency. Do not wait to see if the symptoms will go away. Get medical help right away. Call your local emergency services (911 in the [place] Do not drive yourself to the hospital.   Summary         There is no cure for heart failure, so it is important for you to take good care of yourself and follow the treatment plan set by your health care provider.       Ask your health care provider about attending cardiac rehabilitation. These programs include aerobic physical activity, which provides many benefits for your heart.       It is normal to have many emotions about your diagnosis, such as fear, sadness, anger, and loss. If you feel any of these emotions and need help coping, contact  your health care provider.       You may need to develop a plan with your health care provider if heart failure interferes with your ability to work.     This information is not intended to replace advice given to you by your health care provider. Make sure you discuss any questions you have with your health care provider.     Document Released: 05/02/2018Document Revised: 08/02/2021Document Reviewed: 08/02/2021     [employer] Patient Education ? 2021 [employer]

## 2022-03-06 NOTE — EXTERNAL PATIENT INSTRUCTIONS
Patient Education   Table of Contents       Bumetanide Oral Tablets       Hydroxyzine capsules or tablets     To view videos and all your education online visit,   [email or web address]   or scan this QR code with your smartphone.                  Bumetanide Oral Tablets     What is this medicine?   BUMETANIDE (byoo MET a nide) is a diuretic. It helps you make more urine and to lose salt and excess water from your body. It treats swelling from heart, kidney, or liver disease.   This medicine may be used for other purposes; ask your health care provider or pharmacist if you have questions.   COMMON BRAND NAME(S): Bumex   What should I tell my health care provider before I take this medicine?   They need to know if you have any of these conditions:         dehydration       diarrhea       irregular heartbeat or rhythm       kidney problems       liver disease       low levels of electrolytes, like magnesium, potassium, or sodium, in your blood       vomiting       an unusual or allergic reaction to bumetanide, sulfa drugs, other drugs, foods, dyes or preservatives       pregnant or trying to get pregnant       breast-feeding     How should I use this medicine?   Take this drug by mouth. Take it as directed on the prescription label at the same time every day. You can take it with or without food. If it upsets your stomach, take it with food. Keep taking it unless your health care provider tells you to stop.   Talk to your health care provider about the use of this drug in children. Special care may be needed.   Overdosage: If you think you have taken too much of this medicine contact a poison control center or emergency room at once.   NOTE: This medicine is only for you. Do not share this medicine with others.   What if I miss a dose?   If you miss a dose, take it as soon as you can. If it is almost time for your next dose, take only that dose. Do not take double or extra doses.   What may interact with  this medicine?         alcohol       certain antibiotics given by injection       diuretics       heart medicines like digoxin and dofetilide       hormones like cortisone, fludrocortisone, hydrocortisone       lithium       medicines for diabetes       medicines for high blood pressure       medicines for inflammation like indomethacin       OTC supplements like ginseng and ephedra     This list may not describe all possible interactions. Give your health care provider a list of all the medicines, herbs, non-prescription drugs, or dietary supplements you use. Also tell them if you smoke, drink alcohol, or use illegal drugs. Some items may interact with your medicine.   What should I watch for while using this medicine?   Visit your health care provider for regular checks on your progress. Check your blood pressure as directed. Ask your health care provider what your blood pressure should be. Also, find out when you should contact him or her.   You may need to be on a special diet while you are taking this drug. Ask your health care provider. Also, find out how many glasses of fluids you need to drink each day.   Check with your health care provider if you have severe diarrhea, nausea, and vomiting, or if you sweat a lot. The loss of too much body fluid may make it dangerous for you to take this drug.   Be careful brushing or flossing your teeth or using a toothpick because you may get an infection or bleed more easily. If you have any dental work done, tell your dentist you are receiving this drug.   You may get drowsy or dizzy. Do not drive, use machinery, or do anything that needs mental alertness until you know how this drug affects you. Do not stand up or sit up quickly, especially if you are an older patient. This reduces the risk of dizzy or fainting spells.   Do not treat yourself for coughs, colds, or pain while you are using this drug without asking your health care provider for advice. Some drugs may  increase your blood pressure.   What side effects may I notice from receiving this medicine?   Side effects that you should report to your doctor or health care provider as soon as possible:         allergic reactions (skin rash, itching or hives; swelling of the face, lips, or tongue)       decreased hearing, ringing of the ears       kidney injury (trouble passing urine or change in the amount of urine)       low blood pressure (dizziness; feeling faint or lightheaded, falls; unusually weak or tired)       low potassium levels (trouble breathing; chest pain; dizziness; fast, irregular heartbeat; feeling faint or lightheaded, falls; muscle cramps or pain)       severe diarrhea       unusual bruising or bleeding     Side effects that usually do not require medical attention (report to your doctor or health care provider if they continue or are bothersome):         headache       increased thirst       loss of appetite       muscle weakness       nausea       stomach pain       unusual sweating       vomiting     This list may not describe all possible side effects. Call your doctor for medical advice about side effects. You may report side effects to FDA at 9-071-FWU-4973.   Where should I keep my medicine?   Keep out of the reach of children and pets.   Store at room temperature between 15 and 30 degrees C (59 and 86 degrees F). Throw away any unused drug after the expiration date.   NOTE: This sheet is a summary. It may not cover all possible information. If you have questions about this medicine, talk to your doctor, pharmacist, or health care provider.     ? 2021 Elsevier/Gold Standard (2020-10-06 15:20:28)         Hydroxyzine capsules or tablets     What is this medicine?   HYDROXYZINE (en DROX i zeen) is an antihistamine. This medicine is used to treat allergy symptoms. It is also used to treat anxiety and tension. This medicine can be used with other medicines to induce sleep before surgery.   This medicine  may be used for other purposes; ask your health care provider or pharmacist if you have questions.   COMMON BRAND NAME(S): ANX, Atarax, Rezine, Vistaril   What should I tell my health care provider before I take this medicine?   They need to know if you have any of these conditions:         glaucoma       heart disease       history of irregular heartbeat       kidney disease       liver disease       lung or breathing disease, like asthma       stomach or intestine problems       thyroid disease       trouble passing urine       an unusual or allergic reaction to hydroxyzine, cetirizine, other medicines, foods, dyes or preservatives       pregnant or trying to get pregnant       breast-feeding     How should I use this medicine?   Take this medicine by mouth with a full glass of water. Follow the directions on the prescription label. You may take this medicine with food or on an empty stomach. Take your medicine at regular intervals. Do not take your medicine more often than directed.   Talk to your pediatrician regarding the use of this medicine in children. Special care may be needed. While this drug may be prescribed for children as young as 6 years of age for selected conditions, precautions do apply.   Patients over 65 years old may have a stronger reaction and need a smaller dose.   Overdosage: If you think you have taken too much of this medicine contact a poison control center or emergency room at once.   NOTE: This medicine is only for you. Do not share this medicine with others.   What if I miss a dose?   If you miss a dose, take it as soon as you can. If it is almost time for your next dose, take only that dose. Do not take double or extra doses.   What may interact with this medicine?   Do not take this medicine with any of the following medications:         cisapride       dronedarone       pimozide       thioridazine     This medicine may also interact with the following medications:         alcohol        antihistamines for allergy, cough, and cold       atropine       barbiturate medicines for sleep or seizures, like phenobarbital       certain antibiotics like erythromycin or clarithromycin       certain medicines for anxiety or sleep       certain medicines for bladder problems like oxybutynin, tolterodine       certain medicines for depression or psychotic disturbances       certain medicines for irregular heart beat       certain medicines for Parkinson's disease like benztropine, trihexyphenidyl       certain medicines for seizures like phenobarbital, primidone       certain medicines for stomach problems like dicyclomine, hyoscyamine       certain medicines for travel sickness like scopolamine       ipratropium       narcotic medicines for pain       other medicines that prolong the QT interval (an abnormal heart rhythm) like dofetilide     This list may not describe all possible interactions. Give your health care provider a list of all the medicines, herbs, non-prescription drugs, or dietary supplements you use. Also tell them if you smoke, drink alcohol, or use illegal drugs. Some items may interact with your medicine.   What should I watch for while using this medicine?   Tell your doctor or health care professional if your symptoms do not improve.   You may get drowsy or dizzy. Do not drive, use machinery, or do anything that needs mental alertness until you know how this medicine affects you. Do not stand or sit up quickly, especially if you are an older patient. This reduces the risk of dizzy or fainting spells. Alcohol may interfere with the effect of this medicine. Avoid alcoholic drinks.   Your mouth may get dry. Chewing sugarless gum or sucking hard candy, and drinking plenty of water may help. Contact your doctor if the problem does not go away or is severe.   This medicine may cause dry eyes and blurred vision. If you wear contact lenses you may feel some discomfort. Lubricating drops may  help. See your eye doctor if the problem does not go away or is severe.   If you are receiving skin tests for allergies, tell your doctor you are using this medicine.   What side effects may I notice from receiving this medicine?   Side effects that you should report to your doctor or health care professional as soon as possible:         allergic reactions like skin rash, itching or hives, swelling of the face, lips, or tongue       changes in vision       confusion       fast, irregular heartbeat       seizures       tremor       trouble passing urine or change in the amount of urine     Side effects that usually do not require medical attention (report to your doctor or health care professional if they continue or are bothersome):         constipation       drowsiness       dry mouth       headache       tiredness     This list may not describe all possible side effects. Call your doctor for medical advice about side effects. You may report side effects to FDA at 7-746-FDA-2349.   Where should I keep my medicine?   Keep out of the reach of children.   Store at room temperature between 15 and 30 degrees C (59 and 86 degrees F). Keep container tightly closed. Throw away any unused medicine after the expiration date.   NOTE: This sheet is a summary. It may not cover all possible information. If you have questions about this medicine, talk to your doctor, pharmacist, or health care provider.     ? 2021 Elsevier/Gold Standard (2019-12-09 13:19:55)

## 2022-03-06 NOTE — CASE MANAGEMENT/SOCIAL WORK
Continued Stay Note  [facility]     Patient Name: Cindy Hicks  MRN: [id number]  Today's Date: 3/6/2022    Admit Date: 3/2/2022     Discharge Plan     Row Name 03/06/22 1003       Plan    Plan Home with [facility]    Patient/Family in Agreement with Plan yes    Plan Comments Patient was current with [facility] when admitted.  SW has informed [facility] centralized intake of discharge and new/resumption orders.    Final Discharge Disposition Code 06 - home with home health care    Final Note Home with [facility]               Discharge Codes    No documentation.               Expected Discharge Date and Time     Expected Discharge Date Expected Discharge Time    Mar 6, 2022             VIRGILIO Lorenzo

## 2022-03-07 NOTE — THERAPY DISCHARGE NOTE
Acute Care - Occupational Therapy Discharge Summary  [facility]     Patient Name: Cindy Hicks  : 1935  MRN: [id number]    Today's Date: 3/7/2022                 Admit Date: 3/2/2022        OT Recommendation and Plan    Visit Dx:    ICD-10-CM ICD-9-CM   1. Acute systolic congestive heart failure (HCC)  I50.21 428.21     428.0   2. Impaired mobility  Z74.09 799.89   3. Bilateral lower extremity edema  R60.0 782.3   4. Generalized anxiety disorder  F41.1 300.02   5. Stage 3b chronic kidney disease (HCC)  N18.32 585.3                OT Rehab Goals     Row Name 22 0700             Transfer Goal 1 (OT)    Activity/Assistive Device (Transfer Goal 1, OT) sit-to-stand/stand-to-sit;bed-to-chair/chair-to-bed;commode, 3-in-1  -TS      [place] Level/Cues Needed (Transfer Goal 1, OT) standby assist  -TS      Time Frame (Transfer Goal 1, OT) long term goal (LTG);by discharge  -TS      Progress/Outcome (Transfer Goal 1, OT) goal not met  -TS              Bathing Goal 1 (OT)    Activity/Device (Bathing Goal 1, OT) bathing skills, all  -TS      [place] Level/Cues Needed (Bathing Goal 1, OT) moderate assist (50-74% patient effort)  -TS      Time Frame (Bathing Goal 1, OT) long term goal (LTG);by discharge  -TS      Progress/Outcomes (Bathing Goal 1, OT) goal not met  -TS              Toileting Goal 1 (OT)    Activity/Device (Toileting Goal 1, OT) toileting skills, all;commode, 3-in-1  -TS      [place] Level/Cues Needed (Toileting Goal 1, OT) minimum assist (75% or more patient effort)  -TS      Time Frame (Toileting Goal 1, OT) long term goal (LTG);by discharge  -TS      Progress/Outcome (Toileting Goal 1, OT) goal not met  -TS            User Key  (r) = Recorded By, (t) = Taken By, (c) = Cosigned By    Initials Name Provider Type Discipline    TS Cr, Jaylyn N, MELENDEZ Occupational Therapy Assistant OT                 Outcome Measures     Row Name 22 0825             How much help from  another is currently needed...    Putting on and taking off regular lower body clothing? 2  -CJ      Bathing (including washing, rinsing, and drying) 2  -CJ      Toileting (which includes using toilet bed pan or urinal) 2  -CJ      Putting on and taking off regular upper body clothing 2  -CJ      Taking care of personal grooming (such as brushing teeth) 3  -CJ      Eating meals 3  -CJ      AM-PAC 6 Clicks Score (OT) 14  -CJ              Functional Assessment    Outcome Measure Options AM-PAC 6 Clicks Daily Activity (OT)  -CJ            User Key  (r) = Recorded By, (t) = Taken By, (c) = Cosigned By    Initials Name Provider Type    Yusuf Cramer COTA Occupational Therapy Assistant                Timed Therapy Charges  Total Units: 2    Charges  Total Units: 2    Procedure Name Documented Minutes Units Code    HC OT THER PROC EA 15 MIN 26  2    11786 (CPT®)               Documented Minutes  Total Minutes: 26    Therapy Provided Minutes    83388 - OT Therapeutic Exercise Minutes 26                    OT Discharge Summary  Anticipated Discharge Disposition (OT): home with assist  Reason for Discharge: Discharge from facility  Outcomes Achieved: Refer to plan of care for updates on goals achieved  Discharge Destination: Home with assist      NORA Robison  3/7/2022

## 2022-03-07 NOTE — THERAPY DISCHARGE NOTE
Acute Care - Physical Therapy Discharge Summary  [facility]       Patient Name: Cindy Hicks  : 1935  MRN: [id number]    Today's Date: 3/7/2022                 Admit Date: 3/2/2022      PT Recommendation and Plan    Visit Dx:    ICD-10-CM ICD-9-CM   1. Acute systolic congestive heart failure (HCC)  I50.21 428.21     428.0   2. Impaired mobility  Z74.09 799.89   3. Bilateral lower extremity edema  R60.0 782.3   4. Generalized anxiety disorder  F41.1 300.02   5. Stage 3b chronic kidney disease (HCC)  N18.32 585.3                PT Rehab Goals     Row Name 22 1328             Bed Mobility Goal 1 (PT)    Activity/Assistive Device (Bed Mobility Goal 1, PT) sit to supine/supine to sit  -NW      [place] Level/Cues Needed (Bed Mobility Goal 1, PT) standby assist  -NW      Time Frame (Bed Mobility Goal 1, PT) long term goal (LTG);10 days  -NW      Progress/Outcomes (Bed Mobility Goal 1, PT) goal met  -NW              Transfer Goal 1 (PT)    Activity/Assistive Device (Transfer Goal 1, PT) sit-to-stand/stand-to-sit;bed-to-chair/chair-to-bed;walker, rolling  -NW      [place] Level/Cues Needed (Transfer Goal 1, PT) standby assist  -NW      Time Frame (Transfer Goal 1, PT) long term goal (LTG);10 days  -NW      Progress/Outcome (Transfer Goal 1, PT) goal not met  -NW              Gait Training Goal 1 (PT)    Activity/Assistive Device (Gait Training Goal 1, PT) gait (walking locomotion);assistive device use;decrease fall risk;improve balance and speed;increase endurance/gait distance;increase energy conservation;walker, rolling  -NW      [place] Level (Gait Training Goal 1, PT) standby assist  -NW      Distance (Gait Training Goal 1, PT) 40 ft  -NW      Time Frame (Gait Training Goal 1, PT) long term goal (LTG);10 days  -NW      Progress/Outcome (Gait Training Goal 1, PT) goal not met  -NW              Problem Specific Goal 1 (PT)    Problem Specific Goal 1 (PT) The R unna boot will stay in  place for 7 days to provide optimal compression to allow for edema control  -NW      Time Frame (Problem Specific Goal 1, PT) long-term goal (LTG);by discharge  -NW      Progress/Outcome (Problem Specific Goal 1, PT) goal met  -NW              Problem Specific Goal 2 (PT)    Problem Specific Goal 2 (PT) The L unna boot will stay in place for 7 days to provide optimal compression to allow for edema control  -NW      Time Frame (Problem Specific Goal 2, PT) long-term goal (LTG);by discharge  -NW      Progress/Outcome (Problem Specific Goal 2, PT) goal met  -NW            User Key  (r) = Recorded By, (t) = Taken By, (c) = Cosigned By    Initials Name Provider Type Discipline    Ramona Mccullough PTA Physical Therapy Assistant PT                    PT Discharge Summary  Anticipated Discharge Disposition (PT): home with home health  Reason for Discharge: Discharge from facility  Outcomes Achieved: Refer to plan of care for updates on goals achieved  Discharge Destination: Home with home health      Ramona Clemente PTA   3/7/2022

## 2022-03-07 NOTE — OUTREACH NOTE
Call Center TCM Note    Flowsheet Row Responses   [facility] patient discharged from? [place]   Does the patient have one of the following disease processes/diagnoses(primary or secondary)? CHF   TCM attempt successful? Yes   Call start time 1120   Call end time 1137   General alerts for this patient please call patient [phone]   Discharge diagnosis Acute diastolic congestive heart failure    Is patient permission given to speak with other caregiver? Yes   Person spoke with today (if not patient) and relationship Genevieve Grandchild   Meds reviewed with patient/caregiver? Yes   Is the patient having any side effects they believe may be caused by any medication additions or changes? No   Does the patient have all medications ordered at discharge? Yes   Is the patient taking all medications as directed (includes completed medication regime)? Yes   Does the patient have a primary care provider?  Yes   Does the patient have an appointment with their PCP within 7 days of discharge? No   Comments regarding PCP Patient has a followup scheduled with cards on 3/10 and granddaughter states will hold on appt with Dr Wade for now.    What is preventing the patient from scheduling follow up appointments within 7 days of discharge? Haven't had time   Nursing Interventions Educated patient on importance of making appointment, Advised patient to make appointment   Has the patient kept scheduled appointments due by today? N/A   What is the Home health agency?   [facility]   Psychosocial issues? No   Did the patient receive a copy of their discharge instructions? Yes   Nursing interventions Reviewed instructions with patient   What is the patient's perception of their health status since discharge? Improving   Nursing interventions Nurse provided patient education   Is the patient weighing daily? No   Does the patient have scales? Yes   Daily weight interventions --  [educate patient if we can reach on next call. ]   Is the  patient able to teach back Heart Failure diet management? No   Is the patient able to teach back Heart Failure Zones? No   Is the patient able to teach back signs and symptoms of worsening condition? (i.e. weight gain, shortness of air, etc.) No   If the patient is a current smoker, are they able to teach back resources for cessation? Not a smoker   Is the patient/caregiver able to teach back the hierarchy of who to call/visit for symptoms/problems? PCP, Specialist, Home health nurse, Urgent Care, ED, 911 Yes   TCM call completed? Yes   Wrap up additional comments Quick call. Granddaughter at work and cannot talk long. but states patient has meds and declines a PCP appt to be made at present time. She will call later to schedule.            Rick Payne RN    3/7/2022, 11:38 EST

## 2022-03-08 NOTE — TELEPHONE ENCOUNTER
Mrs Hicks's granddaughter called today to see what Cindy needed to do because her Bumex 1mg she took this morning is not working and she has only urinated a small amount and is feeling more short of breath.  I talked with Ryan VARGHESE and she recommended her to take an addition Bumex 1 mg and if she does not urinate in 8 hours to go to the ED.   Patient and granddaughter have been notified and verbalized understanding.  Patient has follow-up appointment with Ryan in Thursday.

## 2022-03-09 PROBLEM — I50.33 ACUTE ON CHRONIC DIASTOLIC CHF (CONGESTIVE HEART FAILURE) (HCC): Status: ACTIVE | Noted: 2021-01-01

## 2022-03-09 PROBLEM — I48.91 ATRIAL FIBRILLATION WITH RVR: Status: ACTIVE | Noted: 2022-01-01

## 2022-03-09 NOTE — ED NOTES
The following fall interventions were initiated:    [x] Patient and/or family given education   [x] Call light within reach and educated on how to use   [x] Bed rails up per protocol    [x] Bed locked and in the lowest position   [x] Bed alarm set and on loudest setting   [x] Fall wrist band applied   [] Non skid footwear applied   [x] Room free of clutter   [x] Patient items within reach   [x] Adequate lighting provided  [] Falls sign present    [] Patient moved closer to nursing station   [] Restraints applied        Kasey Antunez RN  03/09/22 0037

## 2022-03-09 NOTE — NURSING NOTE
Patient has been currently receiving home care services per [facility]. Per , due to frequent re-hospitalizations, [facility] will be unable to resume services at discharge due to inability to care for patient in home setting. Patient notified and MSW notified.

## 2022-03-09 NOTE — H&P
[facility] Medicine Services  HISTORY AND PHYSICAL    Date of Admission: 3/8/2022  Primary Care Physician: Austin Wade DO    Subjective     Chief Complaint: SOA    History of Present Illness  86-year-old female who presents the emergency department with shortness of breath.  She has had several recent admissions for similar issues.  The patient presented in A. fib with RVR.  She also was noted to have a right pleural effusion.  She has had this drained in the past on 2/22/2022.  Patient wears chronic nasal cannula oxygen.  She is placed on a Cardizem drip in the emergency department.  She states she has had decreased urine output and bowel movements in the last several days.  She has had decreased ambulation secondary to shortness of breath.  She states she was not have come to the hospital, but Dr. Summers encouraged her to.  She states she is anxious, and asks for Atarax.  She is wearing bilateral Unna boots.      Cardiac echo on 1/24/2022:  Interpretation Summary    · Assessment of left ventricular ejection fraction somewhat difficult due to the presence of atrial fibrillation.  · Left ventricular systolic function is low normal. Left ventricular ejection fraction appears to be 51 - 55%.  · Left ventricular wall thickness is consistent with mild to moderate concentric hypertrophy.  · The right ventricular cavity is dilated. Mildly reduced right ventricular systolic function noted.  · Biatrial enlargment is noted.  · No hemodynamically significant valvular abnormalities identified on this study.      Review of Systems   SOA    Otherwise complete ROS reviewed and negative except as mentioned in the HPI.    Past Medical History:   Past Medical History:   Diagnosis Date   • Actinic keratosis    • Arthritis    • Atherosclerosis    • Benign fundic gland polyps of stomach    • Bleeding ulcer    • Carotid artery bruit    • Carotid artery stenosis    • COPD (chronic obstructive  pulmonary disease) (HCC)    • Diverticulosis    • Emphysema of lung (HCC)    • History of colon polyps    • Hyperlipidemia    • Hypertension    • IBS (irritable bowel syndrome)    • Macular degeneration    • Osteoporosis    • Prediabetes    • PVD (peripheral vascular disease) (HCC)    • TIA (transient ischemic attack)    • Vitamin D deficiency      Past Surgical History:  Past Surgical History:   Procedure Laterality Date   • CATARACT EXTRACTION     • COLONOSCOPY  03/15/2013    polyp, hyperplastic   • COLONOSCOPY N/A 10/2/2018    Procedure: COLONOSCOPY WITH ANESTHESIA;  Surgeon: Harris Escobar MD;  Location: [facility] ENDOSCOPY;  Service: Gastroenterology   • CYST REMOVAL     • ENDOSCOPY  2019   • ENDOSCOPY N/A 10/4/2019    Procedure: ESOPHAGOGASTRODUODENOSCOPY WITH ANESTHESIA;  Surgeon: Harris Escobar MD;  Location: [facility] ENDOSCOPY;  Service: Gastroenterology   • FEMORAL ARTERY STENT     • HYSTERECTOMY     • VASCULAR SURGERY      multiple     Social History:  reports that she quit smoking about 5 months ago. Her smoking use included cigarettes. She has a 35.00 pack-year smoking history. She has never used smokeless tobacco. She reports that she does not drink alcohol and does not use drugs.    Family History: family history includes Cancer in her father, mother, and sister; Colon cancer in her sister; Colon polyps in her brother; Heart disease in her daughter and son.       Allergies:  Allergies   Allergen Reactions   • Valium [Diazepam] Nausea Only   • Contrast Dye Itching       Medications:  Prior to Admission medications    Medication Sig Start Date End Date Taking? Authorizing Provider   vitamin D (ERGOCALCIFEROL) 1.25 MG (43968 UT) capsule capsule Take 1 capsule by mouth 1 (One) Time Per Week. 3/7/22   Shira Gabriel APRN   alendronate (FOSAMAX) 10 MG tablet Take 1 tablet by mouth 1 (One) Time Per Week. 2/17/22   Shira Gabriel APRN   apixaban (ELIQUIS) 2.5 MG tablet tablet Take 1 tablet by mouth Every 12  (Twelve) Hours. Indications: Atrial Fibrillation 2/16/22   Anuj Santos MD   atorvastatin (LIPITOR) 40 MG tablet Take 1 tablet by mouth Daily. 10/29/21   Austin Wade DO   budesonide-formoterol (SYMBICORT) 160-4.5 MCG/ACT inhaler Inhale 2 puffs 2 (Two) Times a Day. 11/2/21   Beth Tony APRN   bumetanide (BUMEX) 1 MG tablet Take 1 tablet by mouth Daily. 3/7/22   Trini Watts APRN   busPIRone (BUSPAR) 10 MG tablet Take 1 tablet by mouth 3 (Three) Times a Day. 3/6/22   Trini Watts APRN   docusate sodium (COLACE) 100 MG capsule Take 1 capsule by mouth 2 (Two) Times a Day. 11/26/21   Nelson Elizabeth MD   hydrOXYzine (ATARAX) 25 MG tablet Take 1 tablet by mouth 3 (Three) Times a Day As Needed for Anxiety. 3/6/22   Trini Watts APRN   ipratropium-albuterol (DUO-NEB) 0.5-2.5 mg/3 ml nebulizer Take 3 mL by nebulization 4 (Four) Times a Day As Needed for Wheezing or Shortness of Air.    ProviderLainey MD   levocetirizine (XYZAL) 5 MG tablet Take 1 tablet by mouth Every Evening. 3/2/22   Austin Wade DO   lisinopril (PRINIVIL,ZESTRIL) 10 MG tablet Take 1 tablet by mouth Daily. 2/16/22   Anuj Santos MD   Metoprolol Tartrate 75 MG tablet Take 75 mg by mouth Every 12 (Twelve) Hours. 1/29/22   Alexander Thompson DO   Mucinex 600 MG 12 hr tablet Take 2 tablets by mouth 2 (Two) Times a Day. 9/18/21   Trini Watts APRN   multivitamin with minerals (VITRUM 50+ ADULT-MULTI PO) Take 1 tablet by mouth Daily.    ProviderLainey MD   pantoprazole (PROTONIX) 40 MG EC tablet Take 40 mg by mouth 2 (Two) Times a Day.    Provider, MD Lainey   pramipexole (Mirapex) 0.25 MG tablet Take 1 tablet by mouth every night at bedtime. 2/7/22   Austin Wade,    sertraline (Zoloft) 50 MG tablet Take 1 tablet by mouth Daily. 1/31/22   Austin Wade,      I have utilized all available immediate resources to obtain, update, and  "review the patient's current medications.    Objective     Vital Signs: /83   Pulse 86   Temp 97.6 °F (36.4 °C) (Oral)   Resp 21   Ht 165.1 cm (65\")   Wt 79.2 kg (174 lb 11.2 oz)   SpO2 97%   BMI 29.07 kg/m²   Physical Exam  Vitals reviewed.   Constitutional:       Appearance: She is ill-appearing.   HENT:      Head: Normocephalic and atraumatic.      Right Ear: External ear normal.      Left Ear: External ear normal.      Nose: Nose normal.      Mouth/Throat:      Mouth: Mucous membranes are moist.      Pharynx: No oropharyngeal exudate.   Eyes:      General: No scleral icterus.     Conjunctiva/sclera: Conjunctivae normal.   Cardiovascular:      Rate and Rhythm: Tachycardia present. Rhythm irregular.      Heart sounds: Normal heart sounds.   Pulmonary:      Effort: Respiratory distress present.      Breath sounds: Rhonchi present.      Comments: She appears tachypneic with a wet cough.  Abdominal:      General: There is no distension.      Palpations: Abdomen is soft.   Musculoskeletal:         General: No swelling or tenderness.      Cervical back: Normal range of motion and neck supple.   Skin:     General: Skin is warm and dry.   Neurological:      General: No focal deficit present.      Mental Status: She is alert.      Cranial Nerves: No cranial nerve deficit.   Psychiatric:         Mood and Affect: Mood normal.         Behavior: Behavior normal.       Results Reviewed:  Lab Results (last 24 hours)     Procedure Component Value Units Date/Time    [place] Draw [[id number]] Collected: 03/08/22 2249    Specimen: Blood Updated: 03/09/22 0002    Narrative:      The following orders were created for panel order [place] Draw.  Procedure                               Abnormality         Status                     ---------                               -----------         ------                     Green Top (Gel)[[id number]]                                  Final result               Lavender " Top[[id number]]                                     Final result               Red Top[[id number]]                                          Final result               [place] Blood Culture Doni...[[id number]]                      Final result               [place] Top[[id number]]                                         In process                 Light Blue Top[[id number]]                                   Final result                 Please view results for these tests on the individual orders.    Green Top (Gel) [[id number]] Collected: 03/08/22 2249    Specimen: Blood Updated: 03/09/22 0002     Extra Tube Hold for add-ons.     Comment: Auto resulted.       Red Top [[id number]] Collected: 03/08/22 2249    Specimen: Blood Updated: 03/09/22 0002     Extra Tube Hold for add-ons.     Comment: Auto resulted.       Light Blue Top [[id number]] Collected: 03/08/22 2249    Specimen: Blood Updated: 03/09/22 0002     Extra Tube hold for add-on     Comment: Auto resulted       L[place]ender Top [[id number]] Collected: 03/08/22 2249    Specimen: Blood Updated: 03/09/22 0002     Extra Tube hold for add-on     Comment: Auto resulted       [place] Blood Culture Bottle Set [[id number]] Collected: 03/08/22 2249    Specimen: Blood from Arm, Right Updated: 03/09/22 0002     Extra Tube Hold for add-ons.     Comment: Auto resulted.       COVID PRE-OP / PRE-PROCEDURE SCREENING ORDER (NO ISOLATION) - Swab, Nasal Cavity [[id number]]  (Normal) Collected: 03/08/22 2247    Specimen: Swab from Nasal Cavity Updated: 03/08/22 2342    Narrative:      The following orders were created for panel order COVID PRE-OP / PRE-PROCEDURE SCREENING ORDER (NO ISOLATION) - Swab, Nasal Cavity.  Procedure                               Abnormality         Status                     ---------                               -----------         ------                     COVID-19,[facility] [employer] IN-THIEN...[[id number]]  Normal              Final result                 Please view results for these  tests on the individual orders.    COVID-19,[facility] [employer] IN-HOUSE,Nasal Swab No Transport Media 3-4 HR TAT - Swab, Nasal Cavity [[id number]]  (Normal) Collected: 03/08/22 2247    Specimen: Swab from Nasal Cavity Updated: 03/08/22 2342     COVID19 Not Detected    Narrative:      Fact sheet for providers: https://www.fda.gov/media/030956/download     Fact sheet for patients: https://www.fda.gov/media/416252/download    Test performed by PCR.    Consider negative results in combination with clinical observations, patient history, and epidemiological information.    BNP [[id number]]  (Abnormal) Collected: 03/08/22 2249    Specimen: Blood Updated: 03/08/22 2329     proBNP 28,338.0 pg/mL     Narrative:      Among patients with dyspnea, NT-proBNP is highly sensitive for the detection of acute congestive heart failure. In addition NT-proBNP of <300 pg/ml effectively rules out acute congestive heart failure with 99% negative predictive value.    Results may be falsely decreased if patient taking Biotin.      Magnesium [[id number]]  (Normal) Collected: 03/08/22 2249    Specimen: Blood Updated: 03/08/22 2321     Magnesium 2.0 mg/dL     Comprehensive Metabolic Panel [[id number]]  (Abnormal) Collected: 03/08/22 2249    Specimen: Blood Updated: 03/08/22 2321     Glucose 186 mg/dL      BUN 46 mg/dL      Creatinine 1.69 mg/dL      Sodium 131 mmol/L      Potassium 5.0 mmol/L      Chloride 88 mmol/L      CO2 32.0 mmol/L      Calcium 9.2 mg/dL      Total Protein 6.4 g/dL      Albumin 3.70 g/dL      ALT (SGPT) 42 U/L      AST (SGOT) 31 U/L      Alkaline Phosphatase 82 U/L      Total Bilirubin 0.4 mg/dL      Globulin 2.7 gm/dL      A/G Ratio 1.4 g/dL      BUN/Creatinine Ratio 27.2     Anion Gap 11.0 mmol/L      eGFR 29.3 mL/min/1.73      Comment: National Kidney Foundation and American Society of Nephrology (ASN) Task Force recommended calculation based on the Chronic Kidney Disease Epidemiology Collaboration (CKD-EPI) equation refit without  adjustment for race.       Narrative:      GFR Normal >60  Chronic Kidney Disease <60  Kidney Failure <15      Troponin [[id number]]  (Abnormal) Collected: 03/08/22 2249    Specimen: Blood Updated: 03/08/22 2320     Troponin T 0.062 ng/mL     Narrative:      Troponin T Reference Range:  <= 0.03 ng/mL-   Negative for AMI  >0.03 ng/mL-     Abnormal for myocardial necrosis.  Clinicians would have to utilize clinical acumen, EKG, Troponin and serial changes to determine if it is an Acute Myocardial Infarction or myocardial injury due to an underlying chronic condition.       Results may be falsely decreased if patient taking Biotin.      Blood Gas, Arterial - [[id number]]  (Abnormal) Collected: 03/08/22 2308    Specimen: Arterial Blood Updated: 03/08/22 2310     Site Left Radial     Alexx's Test Positive     pH, Arterial 7.408 pH units      pCO2, Arterial 53.7 mm Hg      Comment: 83 Value above reference range        pO2, Arterial 131.0 mm Hg      Comment: 83 Value above reference range        HCO3, Arterial 33.8 mmol/L      Comment: 83 Value above reference range        Base Excess, Arterial 7.7 mmol/L      Comment: 83 Value above reference range        O2 Saturation, Arterial 99.7 %      Comment: 83 Value above reference range        Temperature 37.0 C      Barometric Pressure for Blood Gas 751 mmHg      Modality Nasal Cannula     Flow Rate 2.0 lpm      Ventilator Mode NA     Collected by [id number]     Comment: Meter: [id number]     :  [id number]        pCO2, Temperature Corrected 53.7 mm Hg      pH, Temp Corrected 7.408 pH Units      pO2, Temperature Corrected 131 mm Hg     Protime-INR [[id number]]  (Abnormal) Collected: 03/08/22 2249    Specimen: Blood Updated: 03/08/22 2307     Protime 18.7 Seconds      INR 1.63    CBC & Differential [[id number]]  (Abnormal) Collected: 03/08/22 2249    Specimen: Blood Updated: 03/08/22 2258    Narrative:      The following orders were created for panel order CBC &  Differential.  Procedure                               Abnormality         Status                     ---------                               -----------         ------                     CBC Auto Differential[[id number]]        Abnormal            Final result                 Please view results for these tests on the individual orders.    CBC Auto Differential [[id number]]  (Abnormal) Collected: 03/08/22 2249    Specimen: Blood Updated: 03/08/22 2258     WBC 11.59 10*3/mm3      RBC 4.18 10*6/mm3      Hemoglobin 11.7 g/dL      Hematocrit 37.7 %      MCV 90.2 fL      MCH 28.0 pg      MCHC 31.0 g/dL      RDW 14.9 %      RDW-SD 49.1 fl      MPV 9.6 fL      Platelets 299 10*3/mm3      Neutrophil % 83.4 %      Lymphocyte % 8.3 %      Monocyte % 6.5 %      Eosinophil % 0.8 %      Basophil % 0.3 %      Immature Grans % 0.7 %      Neutrophils, Absolute 9.67 10*3/mm3      Lymphocytes, Absolute 0.96 10*3/mm3      Monocytes, Absolute 0.75 10*3/mm3      Eosinophils, Absolute 0.09 10*3/mm3      Basophils, Absolute 0.04 10*3/mm3      Immature Grans, Absolute 0.08 10*3/mm3      nRBC 0.0 /100 WBC     Gray Top [[id number]] Collected: 03/08/22 2249    Specimen: Blood Updated: 03/08/22 2256        Imaging Results (Last 24 Hours)     Procedure Component Value Units Date/Time    XR Chest 1 View [[id number]] Resulted: 03/08/22 2315     Updated: 03/08/22 2315        I have personally reviewed and interpreted the radiology studies and ECG obtained at time of admission.     Assessment / Plan     Assessment:   Active Hospital Problems    Diagnosis    • Atrial fibrillation with RVR (HCC)      Impression:  1.  A. fib with RVR  2.  Right pleural effusion  3.  Dyspnea  4.  Elevated troponin  5.  Hyponatremia  6.  CKD stage IIIb    Plan:   1.  Admit to the hospital  2.  Continue Cardizem drip   3.  We will consult with primary team in the morning regarding potential thoracentesis.  We will hold anticoagulation at this time and place n.p.o. pending  potential procedure  4.  Bumex IV twice daily  5.  EKG and troponin in the morning   6.  Cardiology consult secondary to difficulty in control atrial fibrillation  7.  Labs in the morning  8.  Resume appropriate home medications       Code Status/Advanced Care Plan: DNR    The patient's surrogate decision maker is family.     I discussed my findings and recommendations with the patient.    Estimated length of stay is extended.     The patient was seen and examined by me on 3/9/2022 at 2.    Electronically signed by Neha Dubon DO, 03/09/22, 02:37 CST.

## 2022-03-09 NOTE — CASE MANAGEMENT/SOCIAL WORK
Discharge Planning Assessment  [facility]     Patient Name: Cindy Hicks  MRN: [id number]  Today's Date: 3/9/2022    Admit Date: 3/8/2022     Discharge Needs Assessment     Row Name 03/09/22 1426       Living Environment    People in Home alone    Current Living Arrangements home    Primary Care Provided by self    Provides Primary Care For no one, unable/limited ability to care for self    Caregiving Concerns respiratoroy disease    Family Caregiver if Needed grandchild(gonzalo), adult    Family Caregiver Names Genevieve Strickland and Clara Krause, grandchildren    Quality of Family Relationships unable to assess    Able to Return to Prior Arrangements no  home health will not take back.  not safe in home alone       Resource/Environmental Concerns    Resource/Environmental Concerns other (see comments)  home health declines to assist anymore d/t unsafe environment       Transition Planning    Patient/Family Anticipates Transition to inpatient rehabilitation facility    Patient/Family Anticipated Services at Transition skilled nursing    Transportation Anticipated family or friend will provide       Discharge Needs Assessment    Readmission Within the Last 30 Days other (see comments)  increased respir needs    Current Outpatient/Agency/Support Group skilled nursing facility    Equipment Currently Used at Home oxygen;respiratory supplies;walker, rolling    Concerns to be Addressed no discharge needs identified    Anticipated Changes Related to Illness inability to care for self    Equipment Needed After Discharge none    Outpatient/Agency/Support Group Needs skilled nursing facility    Discharge Facility/Level of Care Needs nursing facility, skilled    Discharge Coordination/Progress SW informed by [facility] that they would no longer care for her in home environment. Unsafe for patient to be home alone. Pending referral to SNF. Patient having thorocentesis today per Dr Zimmer. SNf not identified during  this interview. SW following for appropriate referral. Pending bed offer. Patient has PCp and rx coverage.               Discharge Plan    No documentation.               Continued Care and Services - Admitted Since 3/8/2022    Coordination has not been started for this encounter.     Selected Continued Care - Prior Encounters Includes selections from prior encounters from 12/8/2021 to 3/9/2022    Discharged on 3/6/2022 Admission date: 3/2/2022 - Discharge disposition: Home-Health Care [facility]     Service Provider Selected Services Address Phone Fax Patient Preferred     [facility]  [facility] Services [address] [facility], [place] [address] [phone] --                Discharged on 2/23/2022 Admission date: 2/18/2022 - Discharge disposition: Home-Health Care [facility]     Service Provider Selected Services Address Phone Fax Patient Preferred    [facility]  Home Health Services [address], [place] [address] [phone] --                Discharged on 2/16/2022 Admission date: 2/13/2022 - Discharge disposition: Home or Self Care    Home Medical Care     Service Provider Selected Services Address Phone Fax Patient Preferred     [facility]  Home Health Services [address] [facility], [place] [address] [phone] --                       Demographic Summary    No documentation.                Functional Status    No documentation.                Psychosocial    No documentation.                Abuse/Neglect    No documentation.                Legal    No documentation.                Substance Abuse    No documentation.                Patient Forms    No documentation.                   Cris Caban RN

## 2022-03-09 NOTE — CASE MANAGEMENT/SOCIAL WORK
Continued Stay Note  [facility]     Patient Name: Cindy Hicks  MRN: [id number]  Today's Date: 3/9/2022    Admit Date: 3/8/2022     Discharge Plan     Row Name 03/09/22 1451       Plan    Plan Patient requesting referral to [facility] . SW updated and will follow with referral. Pending bed offer.    Provided Post Acute Provider List? Yes    Post Acute Provider List Inpatient Rehab    Provided Post Acute Provider Quality & Resource List? Yes    Post Acute Provider Quality and Resource List Inpatient Rehab    Delivered To Patient    Method of Delivery In person    Patient/Family in Agreement with Plan yes    Plan Comments [facility] facility of choice               Discharge Codes    No documentation.                     Cris Caban RN

## 2022-03-09 NOTE — PLAN OF CARE
Goal Outcome Evaluation:  Plan of Care Reviewed With: patient        Progress: no change  Outcome Evaluation: Ms. Hicks was admitted to the floor from the ER.  She generalized edema w/ +3 pitting edema on bilateral legs.  Currently on 3L O2 which she wears @ home.  Pt is becomes extremely SOB with any activity.  Freq congested cough.  Currently NPO per order.  Bed alarms are set, call light in reach, safety maintained.

## 2022-03-09 NOTE — CONSULTS
Referring Provider: Dr. Reveles  Reason for Consultation: Right pleural effusion    Patient Care Team:  Austin Wade DO as PCP - General (Internal Medicine)  Harris Escobar MD as Consulting Physician (Gastroenterology)  Cecily, HALIMA Hunter as Nurse Practitioner (Pulmonary Disease)  [facility] ([facility])    Chief complaint Shortness of breath    Subjective .     History of present illness:  Ms. Hicks is a 86 year old female with an extensive past medical history including hypertension, COPD, hyperlipidemia, a fib on Eliquis, CHF, chronic kidney disease, and TIA.  She presented to [facility] ER on 3/9/2022 with complaints of shortness of breath and decreased urine output.  She does wear 3 liters nasal cannula at home.  She does have several recent admissions related to shortness of breath.  She did undergo right thoracentesis on 2/22 and cytology was negative for malignant cells.  She reports that shortness of breath remained about the same following this procedure.   She was ultimately discharged home.  She states that shortness of breath began to worsen followed by decreased urine output with no void for the 24 hours prior prompting her to seek ER evaluation.  Upon arrival to the ER she was found to be A. Fib with RVR.  She was started on Cardizem gtt and admitted for further evaluation.  Chest xray reveals persistent moderate right and small left pleural effusion favoring volume overload/pulmonary edema.  Cardiothoracic surgery has been consulted for this finding.  The patient is currently resting in bed tolerating 2 liters nasal cannula.  She does have jose boots to BLE which she tells me she is to wear for 7 days.  She reports that her breathing currently is at baseline.  She is being diuresed and has urinated.  Cardiology has evaluated patient as well.  Last dose of Eliquis was yesterday and is currently on hold.  She is a former smoker having quit last year.  She states that she lives alone but her  grandson does come and help her.     History  Code Status and Medical Interventions:   Ordered at: 03/09/22 0237     Medical Intervention Limits:    NO intubation (DNI)    NO cardioversion     Level Of Support Discussed With:    Patient     Code Status (Patient has no pulse and is not breathing):    No CPR (Do Not Attempt to Resuscitate)     Medical Interventions (Patient has pulse or is breathing):    Limited Support         Past Medical History:   Diagnosis Date   • Actinic keratosis    • Arthritis    • Atherosclerosis    • Benign fundic gland polyps of stomach    • Bleeding ulcer    • Carotid artery bruit    • Carotid artery stenosis    • COPD (chronic obstructive pulmonary disease) (HCC)    • Diverticulosis    • Emphysema of lung (HCC)    • History of colon polyps    • Hyperlipidemia    • Hypertension    • IBS (irritable bowel syndrome)    • Macular degeneration    • Osteoporosis    • Prediabetes    • PVD (peripheral vascular disease) ([facility])    • TIA (transient ischemic attack)    • Vitamin D deficiency    ,   Past Surgical History:   Procedure Laterality Date   • CATARACT EXTRACTION     • COLONOSCOPY  03/15/2013    polyp, hyperplastic   • COLONOSCOPY N/A 10/2/2018    Procedure: COLONOSCOPY WITH ANESTHESIA;  Surgeon: Harris Escobar MD;  Location: [facility] ENDOSCOPY;  Service: Gastroenterology   • CYST REMOVAL     • ENDOSCOPY  2019   • ENDOSCOPY N/A 10/4/2019    Procedure: ESOPHAGOGASTRODUODENOSCOPY WITH ANESTHESIA;  Surgeon: Harris Escobar MD;  Location: [facility] ENDOSCOPY;  Service: Gastroenterology   • FEMORAL ARTERY STENT     • HYSTERECTOMY     • VASCULAR SURGERY      multiple   ,   Family History   Problem Relation Age of Onset   • Colon cancer Sister    • Cancer Sister    • Colon polyps Brother    • Heart disease Daughter    • Heart disease Son    • Cancer Mother    • Cancer Father    ,   Social History     Tobacco Use   • Smoking status: Former Smoker     Packs/day: 0.50     Years: 70.00     Pack years:  35.00     Types: Cigarettes     Quit date: 10/2021     Years since quittin.4   • Smokeless tobacco: Never Used   Vaping Use   • Vaping Use: Never used   Substance Use Topics   • Alcohol use: No   • Drug use: No   ,   Medications Prior to Admission   Medication Sig Dispense Refill Last Dose   • alendronate (FOSAMAX) 10 MG tablet Take 1 tablet by mouth 1 (One) Time Per Week. 10 tablet 1 3/8/2022 at Unknown time   • apixaban (ELIQUIS) 2.5 MG tablet tablet Take 1 tablet by mouth Every 12 (Twelve) Hours. Indications: Atrial Fibrillation 60 tablet 0 3/8/2022 at Unknown time   • atorvastatin (LIPITOR) 40 MG tablet Take 1 tablet by mouth Daily. 90 tablet 1 3/8/2022 at Unknown time   • budesonide-formoterol (SYMBICORT) 160-4.5 MCG/ACT inhaler Inhale 2 puffs 2 (Two) Times a Day. 30.6 g 3 3/8/2022 at Unknown time   • bumetanide (BUMEX) 1 MG tablet Take 1 tablet by mouth Daily. 30 tablet 3 3/8/2022 at Unknown time   • busPIRone (BUSPAR) 10 MG tablet Take 1 tablet by mouth 3 (Three) Times a Day. 90 tablet 3 3/8/2022 at Unknown time   • docusate sodium (COLACE) 100 MG capsule Take 1 capsule by mouth 2 (Two) Times a Day. 20 capsule 0 3/8/2022 at Unknown time   • hydrOXYzine (ATARAX) 25 MG tablet Take 1 tablet by mouth 3 (Three) Times a Day As Needed for Anxiety. 30 tablet 0 3/8/2022 at Unknown time   • ipratropium-albuterol (DUO-NEB) 0.5-2.5 mg/3 ml nebulizer Take 3 mL by nebulization 4 (Four) Times a Day As Needed for Wheezing or Shortness of Air.   3/8/2022 at Unknown time   • levocetirizine (XYZAL) 5 MG tablet Take 1 tablet by mouth Every Evening. 90 tablet 1 3/8/2022 at Unknown time   • lisinopril (PRINIVIL,ZESTRIL) 10 MG tablet Take 1 tablet by mouth Daily. 30 tablet 0 3/8/2022 at Unknown time   • Metoprolol Tartrate 75 MG tablet Take 75 mg by mouth Every 12 (Twelve) Hours. 60 tablet 0 3/8/2022 at Unknown time   • Mucinex 600 MG 12 hr tablet Take 2 tablets by mouth 2 (Two) Times a Day.   3/8/2022 at Unknown time   •  "multivitamin with minerals tablet tablet Take 1 tablet by mouth Daily.   3/8/2022 at Unknown time   • pantoprazole (PROTONIX) 40 MG EC tablet Take 40 mg by mouth 2 (Two) Times a Day.   3/8/2022 at Unknown time   • pramipexole (Mirapex) 0.25 MG tablet Take 1 tablet by mouth every night at bedtime. 90 tablet 1 3/8/2022 at Unknown time   • sertraline (Zoloft) 50 MG tablet Take 1 tablet by mouth Daily. 90 tablet 1 3/8/2022 at Unknown time   • vitamin D (ERGOCALCIFEROL) 1.25 MG (17665 UT) capsule capsule Take 1 capsule by mouth 1 (One) Time Per Week. 12 capsule 1 3/8/2022 at Unknown time   , Allergies: Valium [diazepam] and Contrast dye    Review of Systems  Review of Systems   Constitutional: Positive for activity change and fatigue. Negative for diaphoresis and fever.   HENT: Negative for trouble swallowing and voice change.    Eyes: Negative for visual disturbance.   Respiratory: Positive for shortness of breath. Negative for chest tightness.    Cardiovascular: Positive for palpitations and leg swelling. Negative for chest pain.   Gastrointestinal: Negative for abdominal pain, diarrhea, nausea and vomiting.   Genitourinary: Positive for difficulty urinating. Negative for dysuria and hematuria.   Musculoskeletal: Negative for arthralgias and myalgias.   Skin: Negative for color change, pallor, rash and wound.   Allergic/Immunologic: Negative for immunocompromised state.   Neurological: Negative for dizziness, syncope and light-headedness.   Hematological: Bruises/bleeds easily.   Psychiatric/Behavioral: Negative for agitation, confusion and sleep disturbance.        Objective     Vital Signs   Visit Vitals  /59 (BP Location: Left arm, Patient Position: Lying)   Pulse 84   Temp 97.4 °F (36.3 °C) (Oral)   Resp 18   Ht 165.1 cm (65\")   Wt 76.8 kg (169 lb 4.8 oz)   SpO2 91%   BMI 28.17 kg/m²       Physical Exam  Vitals reviewed.   Constitutional:       General: She is not in acute distress.     Appearance: She is " ill-appearing.   HENT:      Head: Normocephalic.   Eyes:      Pupils: Pupils are equal, round, and reactive to light.   Cardiovascular:      Rate and Rhythm: Rhythm irregular.      Heart sounds: Murmur heard.   Pulmonary:      Comments: Diminished breath sounds bilaterally  Abdominal:      General: There is no distension.      Palpations: Abdomen is soft.      Tenderness: There is no abdominal tenderness.   Musculoskeletal:         General: Swelling present.      Right lower leg: Edema present.      Left lower leg: Edema present.   Skin:     General: Skin is warm and dry.   Neurological:      General: No focal deficit present.      Mental Status: She is alert and oriented to person, place, and time.   Psychiatric:         Mood and Affect: Mood normal.         Behavior: Behavior normal.         Thought Content: Thought content normal.         Judgment: Judgment normal.           LAB:            IMAGES:       Imaging Results (Last 24 Hours)     Procedure Component Value Units Date/Time    XR Chest 1 View [[id number]] Collected: 03/09/22 0724     Updated: 03/09/22 0731    Narrative:      EXAM: XR CHEST 1 VW-     INDICATION: Shortness of air     COMPARISON: 3/2/2022     FINDINGS:     Cardiac silhouette is enlarged but stable. No change in moderate RIGHT  pleural effusion and trace LEFT pleural effusion. No significant change  in bilateral interstitial opacity and patchy airspace opacity. No  visible pneumothorax. No acute osseous finding.       Impression:         Similar appearance to 3/2/2022 with cardiomegaly, persistent moderate  RIGHT and small LEFT pleural effusion with overlying atelectasis,  bilateral interstitial opacity, and patchy airspace opacities. Favor  volume overload/pulmonary edema.  This report was finalized on 03/09/2022 07:28 by Dr. Nam Funez MD.        CXR: Bilateral pleural effusions, worse on the right.  Bilateral interstitial opacities and volume overload.                Assessment/Plan         Atrial fibrillation with RVR (HCC)    Volume overload    CHF    Right pleural effusion    Chronic anticoagulation    Chronic kidney disease    Hypertension    Hyperlipidemia           I discussed the patient's findings and my recommendations with the patient. We discussed the finding of a pleural effusion and its significance and the fact that his pleural effusion has reaccumulated over the past several days. We discussed his options for treatment of a pleural effusion to include additional repeat thoracocentesis versus implant of a Pleurx catheter versus placing pigtail catheter to allow effusion to drain while patient is being diuresed.  Briefly reviewed the risks of these procedures as well as the benefits and alternatives. Answered all questions the best of my ability.    Will discuss further with the patient this afternoon.  Likely plan for pigtail catheter placement at bedside by Dr. Zimmer.   Continue to hold anticoagulation      HALIMA Cast  03/09/22  11:06 CST

## 2022-03-09 NOTE — CASE MANAGEMENT/SOCIAL WORK
Elodia from [facility] will follow to see if she stabilizes with pigtail catheter.  Will follow.

## 2022-03-09 NOTE — PLAN OF CARE
Goal Outcome Evaluation:  Plan of Care Reviewed With: patient        Progress: no change  Outcome Evaluation: OT nikki completed. Pt oriented x4 with reported 8/10 generalized pain. Pt is discouraged from the past couple of months, pt has been in and out of the hospital. Pt wears 3L O2 at home and reports that her great grandson has been helping her at home since her last d/c from the hospital on 3/6. Pts BUE ROM and strength are WFL. Pt observed min A for supine to sit. Expected I with s/u of grooming task of brushing teeth while sitting in bed. Pt would benefit from OT to improve endurance, functional mobility, and participation in ADLs. Recommend d/c home with assist and home health vs SNF. Pt is apprehensive towards the idea of being admitted to SNF d/t her  passing away shortly after being admitted to a SNF.

## 2022-03-09 NOTE — OUTREACH NOTE
CHF Week 2 Survey    Flowsheet Row Responses   [facility] facility patient discharged from? [place]   Does the patient have one of the following disease processes/diagnoses(primary or secondary)? CHF   Week 2 attempt successful? No   Revoke Readmitted          ANTONY HAND - Registered Nurse

## 2022-03-09 NOTE — CONSULTS
LOS: 0 days   Patient Care Team:  Austin Wade DO as PCP - General (Internal Medicine)  Harris Escobar MD as Consulting Physician (Gastroenterology)  Cecily, HALIMA Hunter as Nurse Practitioner (Pulmonary Disease)  [facility] (DME Services)    Chief Complaint: Palpitations and shortness of breath     Subjective    Cindy Hicks is a 86 y.o. female who is being seen in consultation.  Patient has presented with palpitation and shortness of breath  Has orthopnea  Has COPD  Prior history of low normal left ventricular ejection fraction with severe left atrial enlargement  Denies any precordial chest pain  Felt some chest stuffiness  Was recently seen by cardiologist  Also has some cough and sputum production  Denies any high-grade fever or chills  Currently on IV Cardizem  Rate currently of  bpm  Overall feels improved  No presyncope  No syncope  No orthopnea  Recent labs shows persistent chronic kidney disease  Current creatinine 1.7 with baseline approximately 2  Has had bipedal edema    Telemetry: no malignant arrhythmia. No significant pauses.  Atrial fibrillation with rapid ventricular rates currently with improved rate control  Review of Systems   Constitutional: No chills   Has fatigue   No fever.   HENT: Negative.    Eyes: Negative.    Respiratory: Has had cough and shortness of breath  Sputum production  Mild wheezing     Cardiovascular: As above  Gastrointestinal: Negative for abdominal distention,  No abdominal pain,   No blood in stool,   No constipation,   No diarrhea,   No nausea   No vomiting.   Endocrine: Negative.    Genitourinary: Negative for difficulty urinating, dysuria, flank pain and hematuria.   Musculoskeletal: Negative.    Skin: Negative for rash and wound.   Allergic/Immunologic: Negative.    Neurological: Negative for dizziness, syncope, weakness,   No light-headedness  No  headaches.   Hematological: Does not bruise/bleed easily.   Psychiatric/Behavioral:  Negative for agitation or behavioral problems,   No confusion,   the patient is  nervous/anxious.       History:   Past Medical History:   Diagnosis Date   • Actinic keratosis    • Arthritis    • Atherosclerosis    • Benign fundic gland polyps of stomach    • Bleeding ulcer    • Carotid artery bruit    • Carotid artery stenosis    • COPD (chronic obstructive pulmonary disease) (HCC)    • Diverticulosis    • Emphysema of lung (HCC)    • History of colon polyps    • Hyperlipidemia    • Hypertension    • IBS (irritable bowel syndrome)    • Macular degeneration    • Osteoporosis    • Prediabetes    • PVD (peripheral vascular disease) (HCC)    • TIA (transient ischemic attack)    • Vitamin D deficiency      Past Surgical History:   Procedure Laterality Date   • CATARACT EXTRACTION     • COLONOSCOPY  03/15/2013    polyp, hyperplastic   • COLONOSCOPY N/A 10/2/2018    Procedure: COLONOSCOPY WITH ANESTHESIA;  Surgeon: Harris Escobar MD;  Location: [facility];  Service: Gastroenterology   • CYST REMOVAL     • ENDOSCOPY  2019   • ENDOSCOPY N/A 10/4/2019    Procedure: ESOPHAGOGASTRODUODENOSCOPY WITH ANESTHESIA;  Surgeon: Harris Escobar MD;  Location: [facility];  Service: Gastroenterology   • FEMORAL ARTERY STENT     • HYSTERECTOMY     • VASCULAR SURGERY      multiple     Social History     Socioeconomic History   • Marital status:    Tobacco Use   • Smoking status: Former Smoker     Packs/day: 0.50     Years: 70.00     Pack years: 35.00     Types: Cigarettes     Quit date: 10/2021     Years since quittin.4   • Smokeless tobacco: Never Used   Vaping Use   • Vaping Use: Never used   Substance and Sexual Activity   • Alcohol use: No   • Drug use: No   • Sexual activity: Not Currently     Family History   Problem Relation Age of Onset   • Colon cancer Sister    • Cancer Sister    • Colon polyps Brother    • Heart disease Daughter    • Heart disease Son    • Cancer Mother    • Cancer Father         Labs:  WBC WBC   Date Value Ref Range Status   03/08/2022 11.59 (H) 3.40 - 10.80 10*3/mm3 Final      HGB Hemoglobin   Date Value Ref Range Status   03/08/2022 11.7 (L) 12.0 - 15.9 g/dL Final      HCT Hematocrit   Date Value Ref Range Status   03/08/2022 37.7 34.0 - 46.6 % Final      Platelets Platelets   Date Value Ref Range Status   03/08/2022 299 140 - 450 10*3/mm3 Final      MCV MCV   Date Value Ref Range Status   03/08/2022 90.2 79.0 - 97.0 fL Final        Results from last 7 days   Lab Units 03/08/22  2249 03/06/22  0749 03/05/22  0726 03/03/22  0500 03/02/22  1851   SODIUM mmol/L 131* 136 135*   < > 127*   POTASSIUM mmol/L 5.0 4.0 4.0   < > 5.7*   CHLORIDE mmol/L 88* 92* 90*   < > 87*   CO2 mmol/L 32.0* 37.0* 37.0*   < > 30.0*   BUN mg/dL 46* 40* 44*   < > 49*   CREATININE mg/dL 1.69* 1.50* 1.73*   < > 2.06*   CALCIUM mg/dL 9.2 8.8 9.2   < > 9.1   BILIRUBIN mg/dL 0.4  --   --   --  0.4   ALK PHOS U/L 82  --   --   --  85   ALT (SGPT) U/L 42*  --   --   --  51*   AST (SGOT) U/L 31  --   --   --  46*   GLUCOSE mg/dL 186* 119* 98   < > 119*    < > = values in this interval not displayed.     Lab Results   Component Value Date    TROPONINI 0.01 04/03/2021    TROPONINT 0.062 (C) 03/08/2022     PT/INR:    Protime   Date Value Ref Range Status   03/08/2022 18.7 (H) 11.9 - 14.6 Seconds Final   /  INR   Date Value Ref Range Status   03/08/2022 1.63 (H) 0.91 - 1.09 Final       Imaging Results (Last 72 Hours)     Procedure Component Value Units Date/Time    XR Chest 1 View [[id number]] Collected: 03/09/22 0724     Updated: 03/09/22 0731    Narrative:      EXAM: XR CHEST 1 VW-     INDICATION: Shortness of air     COMPARISON: 3/2/2022     FINDINGS:     Cardiac silhouette is enlarged but stable. No change in moderate RIGHT  pleural effusion and trace LEFT pleural effusion. No significant change  in bilateral interstitial opacity and patchy airspace opacity. No  visible pneumothorax. No acute osseous finding.        Impression:         Similar appearance to 3/2/2022 with cardiomegaly, persistent moderate  RIGHT and small LEFT pleural effusion with overlying atelectasis,  bilateral interstitial opacity, and patchy airspace opacities. Favor  volume overload/pulmonary edema.  This report was finalized on 03/09/2022 07:28 by Dr. Nam Funez MD.          Objective     Allergies   Allergen Reactions   • Valium [Diazepam] Nausea Only   • Contrast Dye Itching       Medication Review: Performed  Current Facility-Administered Medications   Medication Dose Route Frequency Provider Last Rate Last Admin   • acetaminophen (TYLENOL) tablet 650 mg  650 mg Oral Q4H PRN Neha Dubon DO       • atorvastatin (LIPITOR) tablet 40 mg  40 mg Oral Daily Neha Dubon DO       • budesonide-formoterol (SYMBICORT) 160-4.5 MCG/ACT inhaler 2 puff  2 puff Inhalation BID - RT Neha Dubon DO   2 puff at 03/09/22 0756   • bumetanide (BUMEX) injection 1 mg  1 mg Intravenous Q12H Neha Dubon DO   1 mg at 03/09/22 0521   • busPIRone (BUSPAR) tablet 10 mg  10 mg Oral TID Neha Dubon DO       • dilTIAZem (CARDIZEM) 125 mg in 125 mL NS infusion  5-15 mg/hr Intravenous Titrated Nelson Elizabeth MD 5 mL/hr at 03/08/22 2335 5 mg/hr at 03/08/22 2335   • docusate sodium (COLACE) capsule 100 mg  100 mg Oral BID Neha Dubon DO       • guaiFENesin (MUCINEX) 12 hr tablet 1,200 mg  1,200 mg Oral BID Neha Dubon DO       • hydrOXYzine (ATARAX) tablet 25 mg  25 mg Oral TID PRN Neha Dubon DO   25 mg at 03/09/22 0521   • ipratropium (ATROVENT) nebulizer solution 0.5 mg  0.5 mg Nebulization 4x Daily - RT Trini Watts APRN       • lisinopril (PRINIVIL,ZESTRIL) tablet 10 mg  10 mg Oral Daily Neha Dubon DO       • metoprolol tartrate (LOPRESSOR) tablet 75 mg  75 mg Oral Q12H Neha Dubon,        • multivitamin with minerals 1 tablet  1 tablet Oral Daily Neha Dubon,        •  "ondansetron (ZOFRAN) injection 4 mg  4 mg Intravenous Q6H PRN Neha Dubon DO       • pantoprazole (PROTONIX) EC tablet 40 mg  40 mg Oral BID Neha Dubon DO       • pramipexole (MIRAPEX) tablet 0.25 mg  0.25 mg Oral Nightly Neha Dubon DO       • sertraline (ZOLOFT) tablet 50 mg  50 mg Oral Daily Neha Dubon DO       • sodium chloride 0.9 % flush 10 mL  10 mL Intravenous PRN Nelson Elizabeth MD       • sodium chloride 0.9 % flush 10 mL  10 mL Intravenous Q12H Neha Dubon DO       • sodium chloride 0.9 % flush 10 mL  10 mL Intravenous PRN Neha Dubon DO           Vital Sign Min/Max for last 24 hours  Temp  Min: 97.4 °F (36.3 °C)  Max: 97.6 °F (36.4 °C)   BP  Min: 105/65  Max: 129/68   Pulse  Min: 76  Max: 112   Resp  Min: 16  Max: 22   SpO2  Min: 93 %  Max: 98 %   No data recorded   Weight  Min: 76.8 kg (169 lb 4.8 oz)  Max: 79.2 kg (174 lb 11.2 oz)     Flowsheet Rows    Flowsheet Row First Filed Value   Admission Height 165.1 cm (65\") Documented at 03/08/2022 2244   Admission Weight 79.2 kg (174 lb 11.2 oz) Documented at 03/08/2022 2244          Results for orders placed during the hospital encounter of 01/21/22    Adult Transthoracic Echo Complete W/ Cont if Necessary Per Protocol    Interpretation Summary  · Assessment of left ventricular ejection fraction somewhat difficult due to the presence of atrial fibrillation.  · Left ventricular systolic function is low normal. Left ventricular ejection fraction appears to be 51 - 55%.  · Left ventricular wall thickness is consistent with mild to moderate concentric hypertrophy.  · The right ventricular cavity is dilated. Mildly reduced right ventricular systolic function noted.  · Biatrial enlargment is noted.  · No hemodynamically significant valvular abnormalities identified on this study.      Physical Exam:    General Appearance: Awake, alert, in no acute distress  Eyes: Pupils equal and reactive    Ears: Appear " intact with no abnormalities noted  Nose: Nares normal, no drainage  Neck: supple, trachea midline, no carotid bruit   Has JVD  Back: no kyphosis present,    Lungs: respirations regular, respirations even  Tachypneic  Bilateral basilar crackles  Heart: normal S1, S2,    Tachycardic.  2/6 systolic murmur left sternal border  no rub and no click  Abdomen: normal bowel sounds, no tenderness   Skin: no bleeding, bruising or rash  Extremities: no cyanosis, 2+ lower extremity pitting edema  Psychiatric/Behavioral: Negative for agitation, behavioral problems, confusion, the patient does  appear to be nervous/anxious.       Results Review:   I reviewed the patient's new clinical results.  I reviewed the patient's new imaging results and agree with the interpretation.  I reviewed the patient's other test results and agree with the interpretation  I personally viewed and interpreted the patient's EKG/Telemetry data    Discussed with patient  Updated patient regarding any new or relevant abnormalities on review of records or any new findings on physical exam.   Mentioned to patient about purpose of visit and desirable health short and long term goals and objectives.     Reviewed available prior notes, consults, prior visits, laboratory findings, radiology and cardiology relevant reports.   Updated chart as applicable.   I have reviewed the patient's medical history in detail and updated the computerized patient record as relevant.          Assessment/Plan       Atrial fibrillation with RVR (HCC)  Low normal left ventricular ejection fraction  Diastolic heart failure exacerbated by paroxysmal atrial fibrillation  Mild right ventricular systolic dysfunction  Biatrial enlargement  Severe left atrial enlargement  Pleural effusion    Plan    Agree with diuresis  Anticoagulation  Rate control  Add long-acting Cardizem to existing metoprolol  Anticoagulation is on hold for potential right-sided thoracentesis  Dr. Singh has  discussed regarding outpatient CardioMEMS however given her age and comorbidities this may be difficult  She will follow up with her primary cardiologist 2 weeks after discharge    Telemetry  Deep vein thrombosis prophylaxis/precautions  Appropriate diet, fluid, sodium, caffeine, stimulants intake   Questions were encouraged, asked and answered to the patient's  understanding and satisfaction.  Compliance to diet and medications       Petey Walden MD  03/09/22  08:19 CST    EMR Dragon/Transcription was used to dictate part of this note

## 2022-03-09 NOTE — THERAPY EVALUATION
Patient Name: Cindy Hicks  : 1935    MRN: [id number]                              Today's Date: 3/9/2022       Admit Date: 3/8/2022    Visit Dx:     ICD-10-CM ICD-9-CM   1. Atrial fibrillation with RVR ([facility])  I48.91 427.31   2. Pleural effusion  J90 511.9   3. Dyspnea, unspecified type  R06.00 786.09   4. Chronic obstructive pulmonary disease, unspecified COPD type ([facility])  J44.9 496   5. Chronic kidney disease, unspecified CKD stage  N18.9 585.9   6. Elevated troponin  R77.8 790.6   7. Impaired mobility and ADLs  Z74.09 V49.89    Z78.9      Patient Active Problem List   Diagnosis   • Family hx colonic polyps   • Family hx of colon cancer   • Hx of colonic polyp   • Melena   • Peptic ulcer disease   • Essential hypertension   • Mixed hyperlipidemia   • Overweight (BMI 25.0-29.9)   • Stage 3a chronic kidney disease ([facility])   • Recurrent pleural effusion on right   • Actinic keratosis   • Atherosclerosis of native artery of both lower extremities with intermittent claudication ([facility])   • Carotid artery stenosis   • Chronic pain   • Gastroesophageal reflux disease   • GI bleed   • Iron deficiency anemia secondary to inadequate dietary iron intake   • Irritable bowel syndrome   • Macular degeneration   • Osteoarthritis   • Osteopenia of multiple sites   • Age-related osteoporosis without current pathological fracture   • Other insomnia   • Restless legs syndrome   • Vitamin D deficiency   • Pulmonary emphysema ([facility])   • Chronic respiratory failure with hypoxia and hypercapnia ([facility])   • Cigarette nicotine dependence without complication   • COPD with acute exacerbation ([facility])   • Acute on chronic diastolic CHF (congestive heart failure) ([facility])   • Stage 3 severe COPD by GOLD classification ([facility])   • Acute on chronic respiratory failure with hypoxia ([facility])   • Paroxysmal atrial fibrillation ([facility])   • Personal history of nicotine dependence   • Acute diastolic congestive heart failure ([facility])   • Chronic  anticoagulation   • Stage 3b chronic kidney disease (HCC)   • Hyponatremia   • Atrial fibrillation with RVR (HCC)     Past Medical History:   Diagnosis Date   • Actinic keratosis    • Arthritis    • Atherosclerosis    • Benign fundic gland polyps of stomach    • Bleeding ulcer    • Carotid artery bruit    • Carotid artery stenosis    • COPD (chronic obstructive pulmonary disease) (HCC)    • Diverticulosis    • Emphysema of lung (HCC)    • History of colon polyps    • Hyperlipidemia    • Hypertension    • IBS (irritable bowel syndrome)    • Macular degeneration    • Osteoporosis    • Prediabetes    • PVD (peripheral vascular disease) (HCC)    • TIA (transient ischemic attack)    • Vitamin D deficiency      Past Surgical History:   Procedure Laterality Date   • CATARACT EXTRACTION     • COLONOSCOPY  03/15/2013    polyp, hyperplastic   • COLONOSCOPY N/A 10/2/2018    Procedure: COLONOSCOPY WITH ANESTHESIA;  Surgeon: Harris Escobar MD;  Location: [facility] ENDOSCOPY;  Service: Gastroenterology   • CYST REMOVAL     • ENDOSCOPY  2019   • ENDOSCOPY N/A 10/4/2019    Procedure: ESOPHAGOGASTRODUODENOSCOPY WITH ANESTHESIA;  Surgeon: Harris Escobar MD;  Location: [facility] ENDOSCOPY;  Service: Gastroenterology   • FEMORAL ARTERY STENT     • HYSTERECTOMY     • VASCULAR SURGERY      multiple      General Information     Row Name 03/09/22 0805          OT Time and Intention    Document Type evaluation  Pt admitted with SOA. A-fib with RVR, R pleural effusion, elevated troponin, hyponatremia, CKD stage IIIb, COPD  -MARTIN (marisel) KARTIK (marisol) MARTIN (c)     Mode of Treatment occupational therapy  -MARTIN (r) KARTIK (marisol) MARTIN (c)     Row Name 03/09/22 0805          General Information    Patient Profile Reviewed yes  -MARTIN (r) KARTIK (marisol) MARTIN (c)     Prior Level of Function mod assist:;ADL's;dependent:;driving  -MARTIN (marisel) KARTIK (marisol) MARTIN (c)     Existing Precautions/Restrictions oxygen therapy device and L/min  3L O2 NC at home  -MARTIN (marisel) KARTIK (marisol) MARTIN (c)     Barriers to Rehab  medically complex;previous functional deficit  -JJ (r) MC (t) JJ (c)     Row Name 03/09/22 0805          Living Environment    People in Home grandchild(gonzalo)  Pt lives alone but great grandson was staying with her after her most recent hospitalization. Rollator, w/c, BSC, shower chair, 3L O2  -JJ (r) MC (t) JJ (c)     Row Name 03/09/22 08          Home Main Entrance    Number of Stairs, Main Entrance none  -JJ (r) MC (t) JJ (c)     Stair Railings, Main Entrance none  -JJ (r) MC (t) JJ (c)     Row Name 03/09/22 0805          Stairs Within Home, Primary    Number of Stairs, Within Home, Primary none  -JJ (r) MC (t) JJ (c)     Stair Railings, Within Home, Primary none  -JJ (r) MC (t) JJ (c)     Row Name 03/09/22 08          Cognition    Orientation Status (Cognition) oriented x 4  -JJ (r) MC (t) JJ (c)     Row Name 03/09/22 08          Safety Issues, Functional Mobility    Safety Issues Affecting Function (Mobility) other (see comments)  Decreased participation d/t anxiety  -JJ (r) MC (t) JJ (c)     Impairments Affecting Function (Mobility) endurance/activity tolerance;shortness of breath;pain  -JJ (r) MC (t) JJ (c)           User Key  (r) = Recorded By, (t) = Taken By, (c) = Cosigned By    Initials Name Provider Type    Leisa Roper, OTSHIRA/L, CSRS Occupational Therapist    Phuong Lewis OT Student OT Student                 Mobility/ADL's     Row Name 03/09/22 08          Bed Mobility    Bed Mobility supine-sit  -JJ (r) MC (t) JJ (c)     Supine-Sit [place] (Bed Mobility) minimum assist (75% patient effort)  -JJ (r) KARTIK (t) JJ (c)     Assistive Device (Bed Mobility) head of bed elevated  -JJ (r) MC (t) JJ (c)     Comment, (Bed Mobility) OT observed the pts doctor aid the pt to supine to sit with min A.  -JJ (r) MC (t) JJ (c)     Row Name 03/09/22 0805          Activities of Daily Living    BADL Assessment/Intervention grooming  -MARTIN wallace) KARTIK galan) MARTIN hare)     Row Name 03/09/22 0805           Grooming Assessment/Training    [place] Level (Grooming) hair care, combing/brushing;oral care regimen  -JJ (r) MC (t) JJ (c)     Position (Grooming) sitting up in bed;supported sitting  -JJ (r) MC (t) JJ (c)     Comment, (Grooming) Pt agreed to brushing teeth in bed with s/u but once OT set up the materials pt refused. Expected I with the task with s/u.  -JJ (r) MC (t) JJ (c)           User Key  (r) = Recorded By, (t) = Taken By, (c) = Cosigned By    Initials Name Provider Type    Leisa Roper OTR/L, MAGALIE Occupational Therapist    Phuogn Lewis, OT Student OT Student               Obj/Interventions     Row Name 03/09/22 [facility]          Sensory Assessment (Somatosensory)    Sensory Assessment (Somatosensory) sensation intact  -JJ (r) MC (t) JJ (c)     Row Name 03/09/22 [facility]          Range of Motion Comprehensive    General Range of Motion bilateral upper extremity ROM WFL  -JJ (r) MC (t) JJ (c)     Row Name 03/09/22 [facility]          Strength Comprehensive (MMT)    Comment, General Manual Muscle Testing (MMT) Assessment 5/5 BUE strength  -JJ (r) MC (t) JJ (c)           User Key  (r) = Recorded By, (t) = Taken By, (c) = Cosigned By    Initials Name Provider Type    Leisa Roper OTR/L, MAGALIE Occupational Therapist    Phuong Lewis, OT Student OT Student               Goals/Plan     Row Name 03/09/22 [facility]          Transfer Goal 1 (OT)    Activity/Assistive Device (Transfer Goal 1, OT) sit-to-stand/stand-to-sit;toilet;commode, bedside without drop arms  -JJ (r) MC (t) JJ (c)     [place] Level/Cues Needed (Transfer Goal 1, OT) minimum assist (75% or more patient effort)  -JJ (r) MC (t) JJ (c)     Time Frame (Transfer Goal 1, OT) long term goal (LTG)  -JJ (r) MC (t) JJ (c)     Progress/Outcome (Transfer Goal 1, OT) goal ongoing  -JJ (r) MC (t) JJ (c)     Row Name 03/09/22 0805          Bathing Goal 1 (OT)    Activity/Device (Bathing Goal 1, OT) bathing skills, all  -MARTIN (marisel) KARTIK SALAZAR (t)  (c)     [place] Level/Cues Needed (Bathing Goal 1, OT) minimum assist (75% or more patient effort)  -JJ (r) MC (t) JJ (c)     Time Frame (Bathing Goal 1, OT) long term goal (LTG)  -JJ (r) MC (t) JJ (c)     Progress/Outcomes (Bathing Goal 1, OT) goal ongoing  -JJ (r) MC (t) JJ (c)     Row Name 03/09/22 [facility]          Problem Specific Goal 1 (OT)    Problem Specific Goal 1 (OT) Pt will implement anxiety reduction techiniques when presented with SOA to improve participation in ADLs.  -JJ (r) MC (t) JJ (c)     Time Frame (Problem Specific Goal 1, OT) long term goal (LTG)  -JJ (r) MC (t) JJ (c)     Progress/Outcome (Problem Specific Goal 1, OT) goal ongoing  -JJ (r) MC (t) JJ (c)     Row Name 03/09/22 [facility]          Therapy Assessment/Plan (OT)    Planned Therapy Interventions (OT) activity tolerance training;adaptive equipment training;BADL retraining;functional balance retraining;IADL retraining;occupation/activity based interventions;patient/caregiver education/training;ROM/therapeutic exercise;strengthening exercise;transfer/mobility retraining  -JJ (r) MC (t) JJ (c)           User Key  (r) = Recorded By, (t) = Taken By, (c) = Cosigned By    Initials Name Provider Type    Leisa Roper, OTR/L, CSRS Occupational Therapist    Phuong Lewis, OT Student OT Student               Clinical Impression     Row Name 03/09/22 [facility]          Pain Assessment    Pretreatment Pain Rating 8/10  -JJ (r) MC (t) JJ (c)     Posttreatment Pain Rating 8/10  -JJ (r) MC (t) JJ (c)     Pain Location generalized  -JJ (r) MC (t) JJ (c)     Pre/Posttreatment Pain Comment All over  -JJ (r) MC (t) JJ (c)     Row Name 03/09/22 [facility]          Plan of Care Review    Plan of Care Reviewed With patient  -JJ (r) KARTIK (t) JJ (c)     Progress no change  -JJ (r) MC (t) JJ (c)     Outcome Evaluation OT eval completed. Pt oriented x4 with reported 8/10 generalized pain. Pt is discouraged from the past couple of months, pt has been in and out  of the hospital. Pt wears 3L O2 at home and reports that her great grandson has been helping her at home since her last d/c from the hospital on 3/6. Pts BUE ROM and strength are WFL. Pt observed min A for supine to sit. Expected I with s/u of grooming task of brushing teeth while sitting in bed. Pt would benefit from OT to improve endurance, functional mobility, and participation in ADLs. Recommend d/c home with assist and home health vs SNF. Pt is apprehensive towards the idea of being admitted to SNF d/t her  passing away shortly after being admitted to a SNF.  -JJ (r) KARTIK (t) JJ (c)     Row Name 03/09/22 0805          Therapy Assessment/Plan (OT)    Patient/Family Therapy Goal Statement (OT) Home  -JJ (r) MC (t) JJ (c)     Rehab Potential (OT) fair, will monitor progress closely  -JJ (r) KARTIK (t) JJ (c)     Criteria for Skilled Therapeutic Interventions Met (OT) yes  -JJ (r) MC (t) JJ (c)     Therapy Frequency (OT) 5 times/wk  -JJ (r) MC (t) JJ (c)     Row Name 03/09/22 0805          Therapy Plan Review/Discharge Plan (OT)    Anticipated Discharge Disposition (OT) home with assist;home with home health;skilled nursing facility  -JJ (r) MC (t) JJ (c)     Row Name 03/09/22 0805          Vital Signs    Pre SpO2 (%) 96  -JJ (r) MC (t) JJ (c)     O2 Delivery Pre Treatment supplemental O2  3L O2 NC  -JJ (r) MC (t) JJ (c)     O2 Delivery Intra Treatment supplemental O2  -JJ (r) MC (t) JJ (c)     O2 Delivery Post Treatment supplemental O2  -JJ (r) MC (t) JJ (c)     Pre Patient Position Supine  -JJ (r) MC (t) JJ (c)     Intra Patient Position Supine  -JJ (r) MC (t) JJ (c)     Post Patient Position Supine  -JJ (r) MC (t) JJ (c)     Row Name 03/09/22 0805          Positioning and Restraints    Pre-Treatment Position in bed  -JJ (r) MC (t) JJ (c)     Post Treatment Position bed  -MARTIN (r) MC (t) MARTIN (c)     In Bed notified nsg;fowlers;call light within reach;encouraged to call for assist;exit alarm on;side rails up x2  -JJ  (r) MC (t) JJ (c)           User Key  (r) = Recorded By, (t) = Taken By, (c) = Cosigned By    Initials Name Provider Type    JERARDOLeisa Roman OTR/L, MAGALIE Occupational Therapist    Phuong Lewis, OT Student OT Student               Outcome Measures     Row Name 03/09/22 0805          How much help from another is currently needed...    Putting on and taking off regular lower body clothing? 3  -JJ (r) MC (t) JJ (c)     Bathing (including washing, rinsing, and drying) 3  -JJ (r) MC (t) JJ (c)     Toileting (which includes using toilet bed pan or urinal) 3  -JJ (r) MC (t) JJ (c)     Putting on and taking off regular upper body clothing 3  -JJ (r) MC (t) JJ (c)     Taking care of personal grooming (such as brushing teeth) 3  -JJ (r) MC (t) JJ (c)     Eating meals 4  -JJ (r) MC (t) JJ (c)     AM-PAC 6 Clicks Score (OT) 19  -JJ (r) MC (t)     Row Name 03/09/22 0805          Functional Assessment    Outcome Measure Options AM-PAC 6 Clicks Daily Activity (OT)  -JJ (r) MC (t) JJ (c)           User Key  (r) = Recorded By, (t) = Taken By, (c) = Cosigned By    Initials Name Provider Type    MARTIN Leisa Burger OTR/L, MAGALIE Occupational Therapist    Phuong Lewis, OT Student OT Student                Occupational Therapy Education                 Title: PT OT SLP Therapies (Done)     Topic: Occupational Therapy (Done)     Point: ADL training (Done)     Description:   Instruct learner(s) on proper safety adaptation and remediation techniques during self care or transfers.   Instruct in proper use of assistive devices.              Learning Progress Summary           Patient Acceptance, E, VU,NR by  at 3/9/2022 0856    Comment: OT role, benefit of therapy, saftey awaress, importance of mobility, energy conservation                   Point: Precautions (Done)     Description:   Instruct learner(s) on prescribed precautions during self-care and functional transfers.              Learning Progress Summary            Patient Acceptance, E, VU,NR by  at 3/9/2022 0856    Comment: OT role, benefit of therapy, saftey awaress, importance of mobility, energy conservation                   Point: Body mechanics (Done)     Description:   Instruct learner(s) on proper positioning and spine alignment during self-care, functional mobility activities and/or exercises.              Learning Progress Summary           Patient Acceptance, E, VU,NR by  at 3/9/2022 0856    Comment: OT role, benefit of therapy, saftey awaress, importance of mobility, energy conservation                               User Key     Initials Effective Dates Name Provider Type Discipline     12/27/21 -  Phuong Tucker, OT Student OT Student OT              OT Recommendation and Plan  Planned Therapy Interventions (OT): activity tolerance training, adaptive equipment training, BADL retraining, functional balance retraining, IADL retraining, occupation/activity based interventions, patient/caregiver education/training, ROM/therapeutic exercise, strengthening exercise, transfer/mobility retraining  Therapy Frequency (OT): 5 times/wk  Plan of Care Review  Plan of Care Reviewed With: patient  Progress: no change  Outcome Evaluation: OT eval completed. Pt oriented x4 with reported 8/10 generalized pain. Pt is discouraged from the past couple of months, pt has been in and out of the hospital. Pt wears 3L O2 at home and reports that her great grandson has been helping her at home since her last d/c from the hospital on 3/6. Pts BUE ROM and strength are WFL. Pt observed min A for supine to sit. Expected I with s/u of grooming task of brushing teeth while sitting in bed. Pt would benefit from OT to improve endurance, functional mobility, and participation in ADLs. Recommend d/c home with assist and home health vs SNF. Pt is apprehensive towards the idea of being admitted to SNF d/t her  passing away shortly after being admitted to a SNF.     Time  Calculation:    Time Calculation- OT     Row Name 03/09/22 0805             Time Calculation- OT    OT Start Time 0802  +10 min CR  -MARTIN (r) KARTIK (t) MARTIN (c)      OT Stop Time 0836  -MARTIN (r) KARTIK (t) MARTIN (c)      OT Time Calculation (min) 34 min  -MARTIN (marisel) KARTIK (t)      OT Received On 03/09/22  -MARTIN (r) KARTIK (marisol) MARTIN (c)      OT Goal Re-Cert Due Date 03/19/22  -MARTIN (r) KARTIK (marisol) MARTIN (c)            User Key  (r) = Recorded By, (t) = Taken By, (c) = Cosigned By    Initials Name Provider Type    Leisa Roper, OTR/L, CSRS Occupational Therapist    Vivian Lewis, OT Student OT Student                       VIVIAN CARMONA, OT Student  3/9/2022

## 2022-03-09 NOTE — ED PROVIDER NOTES
Subjective   86-year-old female presents to the emergency department complaint of shortness of breath cough and congestion, dyspnea with exertion, decreased urine output.  She has a history of hypertension, hyperlipidemia, COPD on 3 L home oxygen, A. fib on Eliquis, congestive heart failure, coronary disease, chronic kidney disease.  Patient's been in and out of the hospital numerous times over the past 2 months.  Most recently admitted 3/2 through 3/6 for COPD and A. Fib.  Patient states she has developed worsening shortness of breath and dyspnea with exertion since discharge from the hospital 2 days ago.  States she has had significantly decreased urine output and has not urinated in over 24 hours.  No reports of fever, sore throat, rhinorrhea, changes militates sensation.  Shortness of breath is worse with minimal exertion.  No alleviating factors.  On arrival to the emergency department, patient was in A. fib with RVR.      History provided by:  Patient      Review of Systems   All other systems reviewed and are negative.      Past Medical History:   Diagnosis Date   • Actinic keratosis    • Arthritis    • Atherosclerosis    • Benign fundic gland polyps of stomach    • Bleeding ulcer    • Carotid artery bruit    • Carotid artery stenosis    • COPD (chronic obstructive pulmonary disease) (HCC)    • Diverticulosis    • Emphysema of lung ([facility])    • History of colon polyps    • Hyperlipidemia    • Hypertension    • IBS (irritable bowel syndrome)    • Macular degeneration    • Osteoporosis    • Prediabetes    • PVD (peripheral vascular disease) ([facility])    • TIA (transient ischemic attack)    • Vitamin D deficiency        Allergies   Allergen Reactions   • Valium [Diazepam] Nausea Only   • Contrast Dye Itching       Past Surgical History:   Procedure Laterality Date   • CATARACT EXTRACTION     • COLONOSCOPY  03/15/2013    polyp, hyperplastic   • COLONOSCOPY N/A 10/2/2018    Procedure: COLONOSCOPY WITH ANESTHESIA;   Surgeon: Harris Escobar MD;  Location: [facility] ENDOSCOPY;  Service: Gastroenterology   • CYST REMOVAL     • ENDOSCOPY  2019   • ENDOSCOPY N/A 10/4/2019    Procedure: ESOPHAGOGASTRODUODENOSCOPY WITH ANESTHESIA;  Surgeon: Harris Escobar MD;  Location: [facility] ENDOSCOPY;  Service: Gastroenterology   • FEMORAL ARTERY STENT     • HYSTERECTOMY     • VASCULAR SURGERY      multiple       Family History   Problem Relation Age of Onset   • Colon cancer Sister    • Cancer Sister    • Colon polyps Brother    • Heart disease Daughter    • Heart disease Son    • Cancer Mother    • Cancer Father        Social History     Socioeconomic History   • Marital status:    Tobacco Use   • Smoking status: Former Smoker     Packs/day: 0.50     Years: 70.00     Pack years: 35.00     Types: Cigarettes     Quit date: 10/2021     Years since quittin.4   • Smokeless tobacco: Never Used   Vaping Use   • Vaping Use: Never used   Substance and Sexual Activity   • Alcohol use: No   • Drug use: No   • Sexual activity: Not Currently           Objective   Physical Exam  Vitals and nursing note reviewed.   Constitutional:       Comments: Dyspneic appearing elderly female, appears to be in mild respiratory distress   HENT:      Head: Normocephalic and atraumatic.   Eyes:      Extraocular Movements: Extraocular movements intact.      Pupils: Pupils are equal, round, and reactive to light.   Neck:      Vascular: No JVD.   Cardiovascular:      Rate and Rhythm: Tachycardia present. Rhythm irregular.   Pulmonary:      Effort: Tachypnea and accessory muscle usage present.      Breath sounds: Wheezing and rhonchi present.   Musculoskeletal:      Cervical back: Normal range of motion.      Right lower leg: No edema.      Left lower leg: No edema.   Skin:     General: Skin is warm and dry.      Capillary Refill: Capillary refill takes 2 to 3 seconds.   Neurological:      General: No focal deficit present.      Mental Status: She is alert and  oriented to person, place, and time.         ECG 12 Lead      Date/Time: 3/8/2022 10:44 PM  Performed by: Nelson Elizabeth MD  Authorized by: Nelson Elizabeth MD   Interpreted by physician  Comments: Atrial fibrillation with RVR, rate 108, with posterior fascicular block, inferolateral ST depression T wave version, abnormal EKG               Lab Results (last 24 hours)     Procedure Component Value Units Date/Time    COVID PRE-OP / PRE-PROCEDURE SCREENING ORDER (NO ISOLATION) - Swab, Nasal Cavity [[id number]]  (Normal) Collected: 03/08/22 2247    Specimen: Swab from Nasal Cavity Updated: 03/08/22 2342    Narrative:      The following orders were created for panel order COVID PRE-OP / PRE-PROCEDURE SCREENING ORDER (NO ISOLATION) - Swab, Nasal Cavity.  Procedure                               Abnormality         Status                     ---------                               -----------         ------                     COVID-19,[facility] IN-THIEN...[[id number]]  Normal              Final result                 Please view results for these tests on the individual orders.    COVID-19,[facility] IN-HOUSE,Nasal Swab No Transport Media 3-4 HR TAT - Swab, Nasal Cavity [[id number]]  (Normal) Collected: 03/08/22 2247    Specimen: Swab from Nasal Cavity Updated: 03/08/22 2342     COVID19 Not Detected    Narrative:      Fact sheet for providers: https://www.fda.gov/media/302408/download     Fact sheet for patients: https://www.fda.gov/media/694033/download    Test performed by PCR.    Consider negative results in combination with clinical observations, patient history, and epidemiological information.    CBC & Differential [[id number]]  (Abnormal) Collected: 03/08/22 2249    Specimen: Blood Updated: 03/08/22 2258    Narrative:      The following orders were created for panel order CBC & Differential.  Procedure                               Abnormality         Status                     ---------                                -----------         ------                     CBC Auto Differential[[id number]]        Abnormal            Final result                 Please view results for these tests on the individual orders.    Comprehensive Metabolic Panel [[id number]]  (Abnormal) Collected: 03/08/22 2249    Specimen: Blood Updated: 03/08/22 2321     Glucose 186 mg/dL      BUN 46 mg/dL      Creatinine 1.69 mg/dL      Sodium 131 mmol/L      Potassium 5.0 mmol/L      Chloride 88 mmol/L      CO2 32.0 mmol/L      Calcium 9.2 mg/dL      Total Protein 6.4 g/dL      Albumin 3.70 g/dL      ALT (SGPT) 42 U/L      AST (SGOT) 31 U/L      Alkaline Phosphatase 82 U/L      Total Bilirubin 0.4 mg/dL      Globulin 2.7 gm/dL      A/G Ratio 1.4 g/dL      BUN/Creatinine Ratio 27.2     Anion Gap 11.0 mmol/L      eGFR 29.3 mL/min/1.73      Comment: National Kidney Foundation and American Society of Nephrology (ASN) Task Force recommended calculation based on the Chronic Kidney Disease Epidemiology Collaboration (CKD-EPI) equation refit without adjustment for race.       Narrative:      GFR Normal >60  Chronic Kidney Disease <60  Kidney Failure <15      BNP [[id number]]  (Abnormal) Collected: 03/08/22 2249    Specimen: Blood Updated: 03/08/22 2329     proBNP 28,338.0 pg/mL     Narrative:      Among patients with dyspnea, NT-proBNP is highly sensitive for the detection of acute congestive heart failure. In addition NT-proBNP of <300 pg/ml effectively rules out acute congestive heart failure with 99% negative predictive value.    Results may be falsely decreased if patient taking Biotin.      Troponin [[id number]]  (Abnormal) Collected: 03/08/22 2249    Specimen: Blood Updated: 03/08/22 2320     Troponin T 0.062 ng/mL     Narrative:      Troponin T Reference Range:  <= 0.03 ng/mL-   Negative for AMI  >0.03 ng/mL-     Abnormal for myocardial necrosis.  Clinicians would have to utilize clinical acumen, EKG, Troponin and serial changes to determine if it is an  Acute Myocardial Infarction or myocardial injury due to an underlying chronic condition.       Results may be falsely decreased if patient taking Biotin.      Protime-INR [[id number]]  (Abnormal) Collected: 03/08/22 2249    Specimen: Blood Updated: 03/08/22 2307     Protime 18.7 Seconds      INR 1.63    [facility] Blood Culture Bottle Set [[id number]] Collected: 03/08/22 2249    Specimen: Blood from Arm, Right Updated: 03/09/22 0002     Extra Tube Hold for add-ons.     Comment: Auto resulted.       CBC Auto Differential [[id number]]  (Abnormal) Collected: 03/08/22 2249    Specimen: Blood Updated: 03/08/22 2258     WBC 11.59 10*3/mm3      RBC 4.18 10*6/mm3      Hemoglobin 11.7 g/dL      Hematocrit 37.7 %      MCV 90.2 fL      MCH 28.0 pg      MCHC 31.0 g/dL      RDW 14.9 %      RDW-SD 49.1 fl      MPV 9.6 fL      Platelets 299 10*3/mm3      Neutrophil % 83.4 %      Lymphocyte % 8.3 %      Monocyte % 6.5 %      Eosinophil % 0.8 %      Basophil % 0.3 %      Immature Grans % 0.7 %      Neutrophils, Absolute 9.67 10*3/mm3      Lymphocytes, Absolute 0.96 10*3/mm3      Monocytes, Absolute 0.75 10*3/mm3      Eosinophils, Absolute 0.09 10*3/mm3      Basophils, Absolute 0.04 10*3/mm3      Immature Grans, Absolute 0.08 10*3/mm3      nRBC 0.0 /100 WBC     Magnesium [[id number]]  (Normal) Collected: 03/08/22 2249    Specimen: Blood Updated: 03/08/22 2321     Magnesium 2.0 mg/dL     Blood Gas, Arterial - [[id number]]  (Abnormal) Collected: 03/08/22 2308    Specimen: Arterial Blood Updated: 03/08/22 2310     Site Left Radial     Alexx's Test Positive     pH, Arterial 7.408 pH units      pCO2, Arterial 53.7 mm Hg      Comment: 83 Value above reference range        pO2, Arterial 131.0 mm Hg      Comment: 83 Value above reference range        HCO3, Arterial 33.8 mmol/L      Comment: 83 Value above reference range        Base Excess, Arterial 7.7 mmol/L      Comment: 83 Value above reference range        O2 Saturation, Arterial 99.7 %       Comment: 83 Value above reference range        Temperature 37.0 C      Barometric Pressure for Blood Gas 751 mmHg      Modality Nasal Cannula     Flow Rate 2.0 lpm      Ventilator Mode NA     Collected by [id number]     Comment: Meter: [id number]     :  [id number]        pCO2, Temperature Corrected 53.7 mm Hg      pH, Temp Corrected 7.408 pH Units      pO2, Temperature Corrected 131 mm Hg       No Radiology Exams Resulted Within Past 24 Hours  ED Course  ED Course as of 03/09/22 0107   Wed Mar 09, 2022   0103 86-year-old female with history of COPD on 3 L home oxygen, A. fib on Eliquis, CHF, CAD, CKD, and recurrent pleural effusion presents to the emergency department with worsening shortness of breath, cough congestion, dyspnea with exertion and decreased urine output.  She is maintaining adequate oxygen saturations but she appears extremely dyspneic.  She cannot complete full sentences.  On arrival to the ER she was found to be in A. fib with RVR.  Troponin chronically elevated but slightly higher than baseline.  Placed on a Cardizem drip, heart rate has improved.  Blood pressure is okay.  Chest x-ray reveals pleural effusion.   We will need admission for rate control and would likely benefit from thoracentesis. [AW]      ED Course User Index  [AW] Nelson Elizabeth MD                                                 [facility]    Final diagnoses:   Atrial fibrillation with RVR (HCC)   Pleural effusion   Dyspnea, unspecified type   Chronic obstructive pulmonary disease, unspecified COPD type (HCC)   Chronic kidney disease, unspecified CKD stage   Elevated troponin       ED Disposition  ED Disposition     ED Disposition   Decision to Admit    Condition   --    Comment   Level of Care: Telemetry [5]   Diagnosis: Atrial fibrillation with RVR (HCC) [776194]   Admitting Physician: TERESA GARCIA [[id number]]   Attending Physician: TERESA GARCIA [[id number]]   Isolate for COVID?: No [0]   Certification: I Certify That  Inpatient Hospital Services Are Medically Necessary For Greater Than 2 Midnights               No follow-up provider specified.       Medication List      No changes were made to your prescriptions during this visit.          Nelson Elizabeth MD  03/09/22 0107

## 2022-03-09 NOTE — PROGRESS NOTES
[facility]  INPATIENT PROGRESS NOTE    Length of Stay: 0  Date of Admission: 3/8/2022  Primary Care Physician: Austin Wade DO    Subjective   Chief Complaint: Follow-up  HPI   Patient seen after midnight.    Patient resting in bed receiving a breathing treatment.  She had several recent admissions to the hospital.  I just discharged her from the hospital last on Sunday.  She states she felt relatively well Sunday night and into Monday.  Monday night she started feeling more short of breath.  She states it hurts her chest when she takes a deep breath or coughs.  She denies abdominal pain, nausea, or vomiting.  She and I had a long discussion today about her continued readmissions to the hospital and she is agreeable at this time for skilled nursing facility referral.    Review of Systems   All pertinent negatives and positives are as above. All other systems have been reviewed and are negative unless otherwise stated.     Objective    Temp:  [97.4 °F (36.3 °C)-97.7 °F (36.5 °C)] 97.7 °F (36.5 °C)  Heart Rate:  [] 87  Resp:  [16-22] 18  BP: (105-129)/(59-96) 118/66  Physical Exam  Vitals and nursing note reviewed.   Constitutional:       Appearance: She is ill-appearing.   HENT:      Head: Normocephalic and atraumatic.   Cardiovascular:      Rate and Rhythm: Tachycardia present. Rhythm irregular.      Comments: A. fib   Pulmonary:      Breath sounds: No wheezing, rhonchi or rales.      Comments: Dull right base.  Tachypnea with shallow respirations  Abdominal:      General: Bowel sounds are normal. There is no distension.      Palpations: Abdomen is soft.      Tenderness: There is no abdominal tenderness.   Musculoskeletal:         General: No tenderness. Normal range of motion.      Cervical back: Normal range of motion and neck supple. No tenderness.      Comments: Bilateral lower extremity Unna boots in place   Skin:     General: Skin is  warm and dry.      Findings: No erythema or rash.   Neurological:      General: No focal deficit present.      Mental Status: She is alert and oriented to person, place, and time.   Psychiatric:         Mood and Affect: Mood normal.         Behavior: Behavior normal.         Thought Content: Thought content normal.         Judgment: Judgment normal.       Results Review:  I have reviewed the labs, radiology results, and diagnostic studies.    Laboratory Data:   Results from last 7 days   Lab Units 03/09/22  1149 03/08/22 2249 03/04/22  0146   WBC 10*3/mm3 8.81 11.59* 8.95   HEMOGLOBIN g/dL 10.8* 11.7* 10.4*   HEMATOCRIT % 34.9 37.7 33.2*   PLATELETS 10*3/mm3 247 299 318        Results from last 7 days   Lab Units 03/09/22  1149 03/08/22 2249 03/06/22  0749 03/03/22  0500 03/02/22  1851   SODIUM mmol/L 133* 131* 136   < > 127*   POTASSIUM mmol/L 4.7 5.0 4.0   < > 5.7*   CHLORIDE mmol/L 88* 88* 92*   < > 87*   CO2 mmol/L 33.0* 32.0* 37.0*   < > 30.0*   BUN mg/dL 45* 46* 40*   < > 49*   CREATININE mg/dL 1.59* 1.69* 1.50*   < > 2.06*   CALCIUM mg/dL 9.4 9.2 8.8   < > 9.1   BILIRUBIN mg/dL 0.5 0.4  --   --  0.4   ALK PHOS U/L 81 82  --   --  85   ALT (SGPT) U/L 40* 42*  --   --  51*   AST (SGOT) U/L 31 31  --   --  46*   GLUCOSE mg/dL 94 186* 119*   < > 119*    < > = values in this interval not displayed.     Radiology Data:   Imaging Results (Last 24 Hours)     Procedure Component Value Units Date/Time    XR Chest 1 View [[id number]] Collected: 03/09/22 0724     Updated: 03/09/22 0731    Narrative:      EXAM: XR CHEST 1 VW-     INDICATION: Shortness of air     COMPARISON: 3/2/2022     FINDINGS:     Cardiac silhouette is enlarged but stable. No change in moderate RIGHT  pleural effusion and trace LEFT pleural effusion. No significant change  in bilateral interstitial opacity and patchy airspace opacity. No  visible pneumothorax. No acute osseous finding.       Impression:         Similar appearance to 3/2/2022 with  cardiomegaly, persistent moderate  RIGHT and small LEFT pleural effusion with overlying atelectasis,  bilateral interstitial opacity, and patchy airspace opacities. Favor  volume overload/pulmonary edema.  This report was finalized on 03/09/2022 07:28 by Dr. Nam Funez MD.        I have reviewed the patient current medications.     Assessment/Plan     Active Hospital Problems    Diagnosis    • **Recurrent pleural effusion on right    • Atrial fibrillation with RVR ([facility])    • Stage 3b chronic kidney disease ([facility])    • Stage 3 severe COPD by GOLD classification ([facility])    • Acute on chronic diastolic CHF (congestive heart failure) ([facility])    • Chronic respiratory failure with hypoxia and hypercapnia ([facility])    • Essential hypertension      Plan:  1.  Patient presented to the emergency department on 3/8/2022 with complaints of shortness of breath.  She is well-known to our service from several recent admissions.  She has a history of diastolic heart failure and has had issues with volume overload as of late.  Do feel that her atrial fibrillation is also contributing to issues with volume overload.  She was found to be in atrial fibrillation with rapid ventricular response on arrival.    2.  The patient was placed on Cardizem drip in the emergency department.  Long-acting Cardizem was added by cardiology today.  Discussed with nursing to give Cardizem p.o. then turn off drip 1 hour later if heart rates are stable below 110.  Continue home medication metoprolol.  Cardiology had mentioned during her last hospitalization possible outpatient CardioMEMS device however this may be difficult given her age and comorbidities.  They also mentioned an outpatient cardioversion to help with rate control as this is felt to be contributory to her issues with volume overload.  Last echocardiogram in January noted an ejection fraction of 51 to 55%.  She has had previous diastolic dysfunction noted.  Continue IV Bumex.  Monitor strict  intake and output.  1500 mL fluid restriction.  Daily weights.  Monitor renal function closely.  Baseline creatinine is felt to be around 1.4-1.5.  1.59 today.  Continue home medication lisinopril cautiously, will monitor closely.    3.  Chest x-ray on admission shows similar appearance to 3/2 with cardiomegaly, persistent moderate right and small left pleural effusion with overlying atelectasis and bilateral interstitial opacities and patchy airspace opacities.  Favor pulmonary edema.  The patient last had a thoracentesis on 2/22 per Dr. Salgado at the bedside with removal of 1200 mL of fluid.  Per Light's Criteria, this is a transudative effusion, which would be consistent with heart failure.  Discussed with cardiothoracic surgery today regarding reaccumulation of pleural fluid with treatment options of thoracentesis versus implant of a Pleurx catheter.  Likely planning for pigtail catheter placement, plans to be determined.  Patient does take Eliquis for history of paroxysmal atrial fibrillation, currently on hold in anticipation of possible procedure.    4.  Continue Symbicort, Mucinex.  Change DuoNeb's to plain Atrovent given A. fib with RVR on admission.  Encourage use of incentive parameter.  Patient is currently tolerating her home setting of oxygen at 3 L continuously.    5.  Bilateral lower extremity Unna boots placed on last admission for compression purposes.  These will need to be replaced on 3/11.  PT consulted.    6.   consult for skilled nursing facility placement.  PT/OT consults.    7.  Labs in a.m.    8.  May need to consider palliative care consult, although do not feel patient would be receptive to this conversation at this point.     Discharge Planning: I expect the patient to be discharged to SNF in ? days.    Electronically signed by HALIMA Ivy, 3/9/2022, 13:04 CST.

## 2022-03-10 NOTE — PROGRESS NOTES
LOS: 1 day   Patient Care Team:  Austin Wade DO as PCP - General (Internal Medicine)  Harris Escobar MD as Consulting Physician (Gastroenterology)  Cecily, HALIMA Hunter as Nurse Practitioner (Pulmonary Disease)  [facility] (DME Services)    Chief Complaint: Shortness of breath and palpitations     Subjective    Cindy Hicks is a 86 y.o. female who is being seen in follow-up.  Overnight feels improved  Soreness at the right sided chest tube site  Shortness of breath improved  Orthopnea is improved  No palpitation  No presyncope  No syncope  No orthopnea  No paroxysmal nocturnal dyspnea  Hemodynamically stable  Labs reviewed  No new issues or events  Tolerating current medications well  Satisfactory bowel and bladder activity  Satisfactory oral intake  Resting well     Telemetry: no malignant arrhythmia. No significant pauses.  Atrial fibrillation with rates between 85-98 bpm    Review of Systems   Constitutional: No chills   Has fatigue   No fever.   HENT: Negative.    Eyes: Negative.    Respiratory: Negative for cough,   No chest wall soreness,   Shortness of breath,   no wheezing, no stridor.    Cardiovascular: As above  Gastrointestinal: Negative for abdominal distention,  No abdominal pain,   No blood in stool,   No constipation,   No diarrhea,   No nausea   No vomiting.   Endocrine: Negative.    Genitourinary: Negative for difficulty urinating, dysuria, flank pain and hematuria.   Musculoskeletal: Negative.    Skin: Negative for rash and wound.   Allergic/Immunologic: Negative.    Neurological: Negative for dizziness, syncope, weakness,   No light-headedness  No  headaches.   Hematological: Does not bruise/bleed easily.   Psychiatric/Behavioral: Negative for agitation or behavioral problems,   No confusion,   the patient is  nervous/anxious.       History:   Past Medical History:   Diagnosis Date   • Actinic keratosis    • Arthritis    • Atherosclerosis    • Benign fundic gland polyps  of stomach    • Bleeding ulcer    • Carotid artery bruit    • Carotid artery stenosis    • COPD (chronic obstructive pulmonary disease) (HCC)    • Diverticulosis    • Emphysema of lung (HCC)    • History of colon polyps    • Hyperlipidemia    • Hypertension    • IBS (irritable bowel syndrome)    • Macular degeneration    • Osteoporosis    • Prediabetes    • PVD (peripheral vascular disease) (HCC)    • TIA (transient ischemic attack)    • Vitamin D deficiency      Past Surgical History:   Procedure Laterality Date   • CATARACT EXTRACTION     • COLONOSCOPY  03/15/2013    polyp, hyperplastic   • COLONOSCOPY N/A 10/2/2018    Procedure: COLONOSCOPY WITH ANESTHESIA;  Surgeon: Harris Escobar MD;  Location: [facility] ENDOSCOPY;  Service: Gastroenterology   • CYST REMOVAL     • ENDOSCOPY     • ENDOSCOPY N/A 10/4/2019    Procedure: ESOPHAGOGASTRODUODENOSCOPY WITH ANESTHESIA;  Surgeon: Harris Escobar MD;  Location: [facility] ENDOSCOPY;  Service: Gastroenterology   • FEMORAL ARTERY STENT     • HYSTERECTOMY     • VASCULAR SURGERY      multiple     Social History     Socioeconomic History   • Marital status:    Tobacco Use   • Smoking status: Former Smoker     Packs/day: 0.50     Years: 70.00     Pack years: 35.00     Types: Cigarettes     Quit date: 10/2021     Years since quittin.4   • Smokeless tobacco: Never Used   Vaping Use   • Vaping Use: Never used   Substance and Sexual Activity   • Alcohol use: No   • Drug use: No   • Sexual activity: Not Currently     Family History   Problem Relation Age of Onset   • Colon cancer Sister    • Cancer Sister    • Colon polyps Brother    • Heart disease Daughter    • Heart disease Son    • Cancer Mother    • Cancer Father        Labs:  WBC WBC   Date Value Ref Range Status   03/10/2022 8.83 3.40 - 10.80 10*3/mm3 Final   2022 8.81 3.40 - 10.80 10*3/mm3 Final   2022 11.59 (H) 3.40 - 10.80 10*3/mm3 Final      HGB Hemoglobin   Date Value Ref Range Status    03/10/2022 10.9 (L) 12.0 - 15.9 g/dL Final   03/09/2022 10.8 (L) 12.0 - 15.9 g/dL Final   03/08/2022 11.7 (L) 12.0 - 15.9 g/dL Final      HCT Hematocrit   Date Value Ref Range Status   03/10/2022 34.9 34.0 - 46.6 % Final   03/09/2022 34.9 34.0 - 46.6 % Final   03/08/2022 37.7 34.0 - 46.6 % Final      Platelets Platelets   Date Value Ref Range Status   03/10/2022 248 140 - 450 10*3/mm3 Final   03/09/2022 247 140 - 450 10*3/mm3 Final   03/08/2022 299 140 - 450 10*3/mm3 Final      MCV MCV   Date Value Ref Range Status   03/10/2022 88.1 79.0 - 97.0 fL Final   03/09/2022 90.4 79.0 - 97.0 fL Final   03/08/2022 90.2 79.0 - 97.0 fL Final        Results from last 7 days   Lab Units 03/10/22  0740 03/09/22  1149 03/08/22  2249   SODIUM mmol/L 135* 133* 131*   POTASSIUM mmol/L 4.5 4.7 5.0   CHLORIDE mmol/L 93* 88* 88*   CO2 mmol/L 33.0* 33.0* 32.0*   BUN mg/dL 47* 45* 46*   CREATININE mg/dL 1.45* 1.59* 1.69*   CALCIUM mg/dL 8.7 9.4 9.2   BILIRUBIN mg/dL  --  0.5 0.4   ALK PHOS U/L  --  81 82   ALT (SGPT) U/L  --  40* 42*   AST (SGOT) U/L  --  31 31   GLUCOSE mg/dL 122* 94 186*     Lab Results   Component Value Date    TROPONINI 0.01 04/03/2021    TROPONINT 0.030 03/09/2022     PT/INR:    Protime   Date Value Ref Range Status   03/08/2022 18.7 (H) 11.9 - 14.6 Seconds Final   /  INR   Date Value Ref Range Status   03/08/2022 1.63 (H) 0.91 - 1.09 Final       Imaging Results (Last 72 Hours)     Procedure Component Value Units Date/Time    XR Chest 1 View [[id number]] Collected: 03/10/22 0713     Updated: 03/10/22 0717    Narrative:      EXAM: XR CHEST 1 VW-     INDICATION: RIGHT pleural effusion     COMPARISON: Prior day.     FINDINGS:     Right-sided chest tube is stable in position. No change in RIGHT greater  than LEFT small pleural effusions with overlying atelectasis. No  measurable pneumothorax. Small amount of soft tissue gas in the RIGHT  chest wall. Cardiac silhouette is enlarged but stable. Diffuse  interstitial  coarsening is again noted.       Impression:         No change in appearance of the chest.  This report was finalized on 03/10/2022 07:14 by Dr. Nam Funez MD.    XR Chest 1 View [[id number]] Collected: 03/09/22 1656     Updated: 03/09/22 1701    Narrative:      EXAMINATION: XR CHEST 1 VW- 3/9/2022 4:56 PM CST     HISTORY: Follow up right thoracentesis with drainage tube placement;  I48.91-Unspecified atrial fibrillation; R92-Osijljw effusion, not  elsewhere classified; R06.00-Dyspnea, unspecified; J44.9-Chronic  obstructive pulmonary disease, unspecified; N18.9-Chronic kidney  disease, unspecified; R77.8-Other specified abnormalities of plasma  proteins; Z74.09-Other reduced mobility; Z78.9-Other specified health  status.     REPORT: A frontal view of the chest was obtained.     COMPARISON: Chest x-ray 3/8/2022 2304 hours.     A smallbore right basilar chest tube has been inserted, and appears to  be in satisfactory position, there is reduction in volume of the right  pleural effusion. No pneumothorax is identified. There appears to be a  small stable left pleural effusion. There are moderate volume loss in  the lung bases. The heart is enlarged. No overt CHF is identified.  Underlying chronic lung changes and interstitial disease appears stable.  The osseous structures are unremarkable.       Impression:      Interval insertion of a smallbore right basilar chest tube  in good position with reduction in volume of the right pleural effusion  and no pneumothorax. Continued extensive volume loss in the lung bases  with cardiomegaly.  This report was finalized on 03/09/2022 16:58 by Dr. Jhon Caruso MD.    XR Chest 1 View [[id number]] Collected: 03/09/22 0724     Updated: 03/09/22 0731    Narrative:      EXAM: XR CHEST 1 VW-     INDICATION: Shortness of air     COMPARISON: 3/2/2022     FINDINGS:     Cardiac silhouette is enlarged but stable. No change in moderate RIGHT  pleural effusion and trace LEFT pleural  effusion. No significant change  in bilateral interstitial opacity and patchy airspace opacity. No  visible pneumothorax. No acute osseous finding.       Impression:         Similar appearance to 3/2/2022 with cardiomegaly, persistent moderate  RIGHT and small LEFT pleural effusion with overlying atelectasis,  bilateral interstitial opacity, and patchy airspace opacities. Favor  volume overload/pulmonary edema.  This report was finalized on 03/09/2022 07:28 by Dr. Nam Funez MD.          Objective     Allergies   Allergen Reactions   • Valium [Diazepam] Nausea Only   • Contrast Dye Itching       Medication Review: Performed  Current Facility-Administered Medications   Medication Dose Route Frequency Provider Last Rate Last Admin   • acetaminophen (TYLENOL) tablet 650 mg  650 mg Oral Q4H PRN Neha Dubon DO   650 mg at 03/09/22 1631   • atorvastatin (LIPITOR) tablet 40 mg  40 mg Oral Daily Neha Dubon DO   40 mg at 03/09/22 1053   • budesonide-formoterol (SYMBICORT) 160-4.5 MCG/ACT inhaler 2 puff  2 puff Inhalation BID - RT Neha Dubon DO   2 puff at 03/10/22 0631   • bumetanide (BUMEX) injection 1 mg  1 mg Intravenous Q12H Neha Dubon DO   1 mg at 03/10/22 0559   • busPIRone (BUSPAR) tablet 10 mg  10 mg Oral TID Neha Dubon DO   10 mg at 03/09/22 2212   • dilTIAZem (CARDIZEM) 125 mg in 125 mL NS infusion  5-15 mg/hr Intravenous Titrated Nelson Elizabeth MD 5 mL/hr at 03/08/22 2335 5 mg/hr at 03/08/22 2335   • dilTIAZem CD (CARDIZEM CD) 24 hr capsule 120 mg  120 mg Oral Q24H Petey Walden MD   120 mg at 03/09/22 1055   • docusate sodium (COLACE) capsule 100 mg  100 mg Oral BID Neha Dubon DO   100 mg at 03/09/22 2212   • guaiFENesin (MUCINEX) 12 hr tablet 1,200 mg  1,200 mg Oral BID Neha Dubon DO   1,200 mg at 03/09/22 2212   • hydrOXYzine (ATARAX) tablet 25 mg  25 mg Oral TID PRN Neha Dubon DO   25 mg at 03/09/22 1926   • ipratropium  "(ATROVENT) nebulizer solution 0.5 mg  0.5 mg Nebulization 4x Daily - RT Trini Watts K, APRN   0.5 mg at 03/10/22 0625   • lisinopril (PRINIVIL,ZESTRIL) tablet 10 mg  10 mg Oral Daily Neha Dubon DO   10 mg at 03/09/22 1055   • metoprolol tartrate (LOPRESSOR) tablet 75 mg  75 mg Oral Q12H Neha Dubon DO   75 mg at 03/09/22 1055   • multivitamin with minerals 1 tablet  1 tablet Oral Daily Neha Dubon DO   1 tablet at 03/09/22 1055   • ondansetron (ZOFRAN) injection 4 mg  4 mg Intravenous Q6H PRN Neha Dubon DO       • pantoprazole (PROTONIX) EC tablet 40 mg  40 mg Oral BID Neha Dubon DO   40 mg at 03/09/22 2212   • pramipexole (MIRAPEX) tablet 0.25 mg  0.25 mg Oral Nightly Neha Dubon DO   0.25 mg at 03/09/22 2212   • sertraline (ZOLOFT) tablet 50 mg  50 mg Oral Daily Neha Dubon DO   50 mg at 03/09/22 1055   • sodium chloride 0.9 % flush 10 mL  10 mL Intravenous PRN Nelson Elizabeth MD       • sodium chloride 0.9 % flush 10 mL  10 mL Intravenous Q12H Neha Dubon DO   10 mL at 03/09/22 2212   • sodium chloride 0.9 % flush 10 mL  10 mL Intravenous PRN Neha Dubon DO           Vital Sign Min/Max for last 24 hours  Temp  Min: 97.7 °F (36.5 °C)  Max: 98.3 °F (36.8 °C)   BP  Min: 96/52  Max: 123/64   Pulse  Min: 78  Max: 114   Resp  Min: 18  Max: 20   SpO2  Min: 90 %  Max: 98 %   No data recorded   No data recorded     Flowsheet Rows    Flowsheet Row First Filed Value   Admission Height 165.1 cm (65\") Documented at 03/08/2022 2244   Admission Weight 79.2 kg (174 lb 11.2 oz) Documented at 03/08/2022 8875          Results for orders placed during the hospital encounter of 01/21/22    Adult Transthoracic Echo Complete W/ Cont if Necessary Per Protocol    Interpretation Summary  · Assessment of left ventricular ejection fraction somewhat difficult due to the presence of atrial fibrillation.  · Left ventricular systolic function is low normal. " Left ventricular ejection fraction appears to be 51 - 55%.  · Left ventricular wall thickness is consistent with mild to moderate concentric hypertrophy.  · The right ventricular cavity is dilated. Mildly reduced right ventricular systolic function noted.  · Biatrial enlargment is noted.  · No hemodynamically significant valvular abnormalities identified on this study.      Physical Exam:    General Appearance: Awake, alert, in no acute distress  Eyes: Pupils equal and reactive    Ears: Appear intact with no abnormalities noted  Nose: Nares normal, no drainage  Neck: supple, trachea midline, no carotid bruit and no JVD  Back: no kyphosis present,    Lungs: respirations regular, respirations even and respirations unlabored  Heart: normal S1, S2, no significant murmurs   No gallops or rubs  no rub and no click  Abdomen: normal bowel sounds, no tenderness   Skin: no bleeding, bruising or rash  Extremities: no cyanosis  Psychiatric/Behavioral: Negative for agitation, behavioral problems, confusion, the patient does  appear to be nervous/anxious.       Results Review:   I reviewed the patient's new clinical results.  I reviewed the patient's new imaging results and agree with the interpretation.  I reviewed the patient's other test results and agree with the interpretation  I personally viewed and interpreted the patient's EKG/Telemetry data    Discussed with patient  Updated patient regarding any new or relevant abnormalities on review of records or any new findings on physical exam.   Mentioned to patient about purpose of visit and desirable health short and long term goals and objectives.     Reviewed available prior notes, consults, prior visits, laboratory findings, radiology and cardiology relevant reports.   Updated chart as applicable.   I have reviewed the patient's medical history in detail and updated the computerized patient record as relevant.          Assessment/Plan       Recurrent pleural effusion on  right    Essential hypertension    Chronic respiratory failure with hypoxia and hypercapnia ([facility])    Acute on chronic diastolic CHF (congestive heart failure) ([facility])    Stage 3 severe COPD by GOLD classification ([facility])    Stage 3b chronic kidney disease ([facility])    Atrial fibrillation with RVR ([facility])      Plan    Continue current medication  Overall symptomatically improved  Continue on rate control agent  Anticoagulation as tolerated  Currently tolerating Cardizem well  Uptitrate Cardizem as tolerated and required  Overall ventricular rates have shown significant improvement  Monitor kidney functions  Diuretics as required  Comorbidities are being managed by primary attending  Right-sided chest tube in place  No additional cardiac testing for now  Follow-up with cardiology 2 weeks after discharge  Telemetry  Deep vein thrombosis prophylaxis/precautions  Appropriate diet, fluid, sodium, caffeine, stimulants intake   Questions were encouraged, asked and answered to the patient's  understanding and satisfaction.  Compliance to diet and medications       Petey Walden MD  03/10/22  09:06 CST    EMR Dragon/Transcription was used to dictate part of this note

## 2022-03-10 NOTE — NURSING NOTE
C/o heartburn & L to mid chest pain this afternoon. Dr. Reveles notified. Stat EKG obtained and given to Dr. Reveles at bedside. VSS. AFIB on tele. 90s.  HALIMA Perkins notified & came to see pt at bedside. Awaiting response from MD at this time, pt currently denies pain and verbalized relief from heartburn. LS diminished throughout, Kenya Pickens notified, no need for CXR at this time per NP.

## 2022-03-10 NOTE — PROGRESS NOTES
"Patient name: Cindy Hicks  Patient : 1935  VISIT # [id number]  MR #[id number]    Procedure: Right pigtail catheter placement by Dr. Zimmer  Procedure Date: 3/9/2022  POD: 1    Subjective   Chief Complaint   Patient presents with   • Shortness of Breath     Patient resting in bed tolerating 3 liters nasal cannula.  No overnight events.  Breathing somewhat improved.  Right pigtail catheter remains in place.         Objective     Visit Vitals  /59 (BP Location: Right arm, Patient Position: Lying)   Pulse 114   Temp 98.3 °F (36.8 °C) (Axillary)   Resp 18   Ht 165.1 cm (65\")   Wt 76.8 kg (169 lb 4.8 oz)   SpO2 96%   BMI 28.17 kg/m²       Intake/Output Summary (Last 24 hours) at 3/10/2022 0831  Last data filed at 3/10/2022 0700  Gross per 24 hour   Intake 600 ml   Output 2020 ml   Net -1420 ml     Right pigtail catheter: 1300 ml/24 hours, serous, no air leak    Lab:     CBC:  Results from last 7 days   Lab Units 03/10/22  0740 22  1149 22  2249   WBC 10*3/mm3 8.83 8.81 11.59*   HEMATOCRIT % 34.9 34.9 37.7   PLATELETS 10*3/mm3 248 247 299          BMP:  Results from last 7 days   Lab Units 03/10/22  0740 22  1149 22  2249   SODIUM mmol/L 135* 133* 131*   POTASSIUM mmol/L 4.5 4.7 5.0   CHLORIDE mmol/L 93* 88* 88*   CO2 mmol/L 33.0* 33.0* 32.0*   GLUCOSE mg/dL 122* 94 186*   BUN mg/dL 47* 45* 46*   CREATININE mg/dL 1.45* 1.59* 1.69*          COAG:  Results from last 7 days   Lab Units 22  2249   INR  1.63*       IMAGES:       Imaging Results (Last 24 Hours)     Procedure Component Value Units Date/Time    XR Chest 1 View [[id number]] Collected: 03/10/22 0713     Updated: 03/10/22 0717    Narrative:      EXAM: XR CHEST 1 VW-     INDICATION: RIGHT pleural effusion     COMPARISON: Prior day.     FINDINGS:     Right-sided chest tube is stable in position. No change in RIGHT greater  than LEFT small pleural effusions with overlying atelectasis. No  measurable pneumothorax. Small " amount of soft tissue gas in the RIGHT  chest wall. Cardiac silhouette is enlarged but stable. Diffuse  interstitial coarsening is again noted.       Impression:         No change in appearance of the chest.  This report was finalized on 03/10/2022 07:14 by Dr. Nam Funez MD.    XR Chest 1 View [[id number]] Collected: 03/09/22 1656     Updated: 03/09/22 1701    Narrative:      EXAMINATION: XR CHEST 1 VW- 3/9/2022 4:56 PM CST     HISTORY: Follow up right thoracentesis with drainage tube placement;  I48.91-Unspecified atrial fibrillation; B92-Mxoffew effusion, not  elsewhere classified; R06.00-Dyspnea, unspecified; J44.9-Chronic  obstructive pulmonary disease, unspecified; N18.9-Chronic kidney  disease, unspecified; R77.8-Other specified abnormalities of plasma  proteins; Z74.09-Other reduced mobility; Z78.9-Other specified health  status.     REPORT: A frontal view of the chest was obtained.     COMPARISON: Chest x-ray 3/8/2022 2304 hours.     A smallbore right basilar chest tube has been inserted, and appears to  be in satisfactory position, there is reduction in volume of the right  pleural effusion. No pneumothorax is identified. There appears to be a  small stable left pleural effusion. There are moderate volume loss in  the lung bases. The heart is enlarged. No overt CHF is identified.  Underlying chronic lung changes and interstitial disease appears stable.  The osseous structures are unremarkable.       Impression:      Interval insertion of a smallbore right basilar chest tube  in good position with reduction in volume of the right pleural effusion  and no pneumothorax. Continued extensive volume loss in the lung bases  with cardiomegaly.  This report was finalized on 03/09/2022 16:58 by Dr. Jhon Caruso MD.        CXR: Right chest tube in stable position.  Small left pleural effusion and decreased right pleural effusion.  No pneumothorax.    Physical Exam:  General: Alert, oriented. No apparent  distress. Ill appearing  Cardiovascular: Irregular rhythm without murmur, rubs, or gallops.    Pulmonary: Diminished bilaterally without wheezing, rubs, or rales.  Chest:  Chest tube to 20 cm suction. No air leak. Fluid is serous.   Abdomen: Soft, nondistended, and nontender.  Extremities: Warm, moves all extremities. No edema.   Neurologic:  Grossly intact with no focal deficits.            Impression:  Atrial fibrillation with RVR (HCC)    Volume overload    CHF    Right pleural effusion    Chronic anticoagulation    Chronic kidney disease    Hypertension    Hyperlipidemia        Plan:  Continue right pigtail catheter to suction  Daily chest xray  Possibly remove chest tube in 1-2 days  Continue diuresis  DW patient      Beth Rodriguez, APRN  03/10/22  08:31 CST

## 2022-03-10 NOTE — PLAN OF CARE
Goal Outcome Evaluation:  Plan of Care Reviewed With: patient        Progress: no change  Outcome Evaluation: A&OX4. VSS. Pigtail catheter placed to right side with drainage system by Dr. Zimmer to drain plural effusion, cytology sent. Assist x1 to [facility]. 3L NC and . AFIB on tele. Cardizem drip d/c and PO Cardizem started. Voiding w/o difficulty. Possible SNF vs Rehab at d/c. Angella boots present to BLE from home. Anxious at times. Call light in reach, bed alarm set. Safety maintained and continue to monitor.

## 2022-03-10 NOTE — PROGRESS NOTES
"    [facility]  INPATIENT PROGRESS NOTE    Length of Stay: 1  Date of Admission: 3/8/2022  Primary Care Physician: Austin Wade DO    Subjective   Chief Complaint: Follow-up  HPI   Sitting up in bed.  Oxygen at 3 L with saturation 98%.  Patient was seen earlier this morning and only complaint at that time was pain at the right pigtail insertion site.  Since that time she was eating lunch and she reported \"bad heartburn\" with \"dull ache\" on the left side of her chest.  Initially, she did not tell anyone if she wanted to tough it out.  Eventually she reported to the nurse and EKG was obtained.  She denies nausea or vomiting.  She denies any palpitations.  Remains atrial fibrillation rate controlled.  Cardiology following.  Dr. Zimmer plans to leave pigtail until tomorrow.  If effusion recurs can discuss placing Pleurx catheter as palliative measure although this will result in dehydration as outpatient.  Patient desires to avoid this at this time.    Review of Systems   Constitutional: Positive for fatigue. Negative for appetite change, chills and fever.   HENT: Negative for congestion and trouble swallowing.    Eyes: Negative for photophobia and visual disturbance.   Respiratory: Positive for shortness of breath. Negative for wheezing.    Cardiovascular: Positive for chest pain. Negative for palpitations and leg swelling.   Gastrointestinal: Negative for constipation, diarrhea, nausea and vomiting.   Endocrine: Negative for cold intolerance, heat intolerance and polyuria.   Genitourinary: Negative for dysuria, frequency and urgency.   Musculoskeletal: Positive for gait problem.   Skin: Negative for pallor, rash and wound.   Allergic/Immunologic: Negative for immunocompromised state.   Neurological: Positive for weakness. Negative for light-headedness.   Hematological: Negative for adenopathy. Does not bruise/bleed easily.   Psychiatric/Behavioral: Negative for " agitation, behavioral problems and confusion.      All pertinent negatives and positives are as above. All other systems have been reviewed and are negative unless otherwise stated.     Objective    Temp:  [97.9 °F (36.6 °C)-98.3 °F (36.8 °C)] 98.3 °F (36.8 °C)  Heart Rate:  [] 108  Resp:  [18-20] 20  BP: ()/(51-71) 92/71  Physical Exam  Vitals and nursing note reviewed.   Constitutional:       Comments: Sitting up in bed.  Oxygen at 3 L.  No family in room.   HENT:      Head: Normocephalic and atraumatic.      Nose: No congestion.      Mouth/Throat:      Pharynx: Oropharynx is clear. No oropharyngeal exudate or posterior oropharyngeal erythema.   Eyes:      Extraocular Movements: Extraocular movements intact.      Pupils: Pupils are equal, round, and reactive to light.   Cardiovascular:      Rate and Rhythm: Normal rate. Rhythm irregular.      Heart sounds: No murmur heard.     Comments: Atrial fibrillation 85-96 on telemetry.  Pulmonary:      Breath sounds: No wheezing, rhonchi or rales.      Comments: Oxygen at 3 L.  Right pigtail catheter to Pleur-evac with 1500 mL serous drainage.  Abdominal:      Palpations: Abdomen is soft.   Genitourinary:     Comments: Voiding.  Musculoskeletal:         General: Tenderness (Right pigtail catheter insertion site.) present.      Cervical back: Normal range of motion and neck supple.      Comments: Unna boots bilateral lower extremities.   Skin:     General: Skin is warm and dry.   Neurological:      General: No focal deficit present.      Mental Status: She is alert and oriented to person, place, and time.   Psychiatric:         Mood and Affect: Mood normal.         Behavior: Behavior normal.         Thought Content: Thought content normal.         Judgment: Judgment normal.       Results Review:  I have reviewed the labs, radiology results, and diagnostic studies.    Laboratory Data:    Results from last 7 days   Lab Units 03/10/22  0740 03/09/22  2432  03/08/22 2249 03/04/22  0146   WBC 10*3/mm3 8.83 8.81 11.59* 8.95   HEMOGLOBIN g/dL 10.9* 10.8* 11.7* 10.4*   HEMATOCRIT % 34.9 34.9 37.7 33.2*   PLATELETS 10*3/mm3 248 247 299 318     Results from last 7 days   Lab Units 03/10/22  0740 03/09/22  1149 03/08/22 2249 03/06/22  0749 03/05/22  0726 03/04/22  0724   SODIUM mmol/L 135* 133* 131* 136 135* 129*   POTASSIUM mmol/L 4.5 4.7 5.0 4.0 4.0 4.0   CHLORIDE mmol/L 93* 88* 88* 92* 90* 88*   CO2 mmol/L 33.0* 33.0* 32.0* 37.0* 37.0* 35.0*   BUN mg/dL 47* 45* 46* 40* 44* 47*   CREATININE mg/dL 1.45* 1.59* 1.69* 1.50* 1.73* 1.90*   GLUCOSE mg/dL 122* 94 186* 119* 98 96   CALCIUM mg/dL 8.7 9.4 9.2 8.8 9.2 8.9   ALT (SGPT) U/L  --  40* 42*  --   --   --      Culture Data:    No results found for: BLOODCX, URINECX, WOUNDCX, MRSACX, RESPCX, STOOLCX    Radiology Data:   Imaging Results (Last 7 Days)     Procedure Component Value Units Date/Time    XR Chest 1 View [[id number]] Collected: 03/10/22 0713     Updated: 03/10/22 0717    Narrative:      EXAM: XR CHEST 1 VW-     INDICATION: RIGHT pleural effusion     COMPARISON: Prior day.     FINDINGS:     Right-sided chest tube is stable in position. No change in RIGHT greater  than LEFT small pleural effusions with overlying atelectasis. No  measurable pneumothorax. Small amount of soft tissue gas in the RIGHT  chest wall. Cardiac silhouette is enlarged but stable. Diffuse  interstitial coarsening is again noted.       Impression:         No change in appearance of the chest.  This report was finalized on 03/10/2022 07:14 by Dr. Nam Funez MD.    XR Chest 1 View [[id number]] Collected: 03/09/22 1656     Updated: 03/09/22 1701    Narrative:      EXAMINATION: XR CHEST 1 VW- 3/9/2022 4:56 PM CST     HISTORY: Follow up right thoracentesis with drainage tube placement;  I48.91-Unspecified atrial fibrillation; S64-Afqswpg effusion, not  elsewhere classified; R06.00-Dyspnea, unspecified; J44.9-Chronic  obstructive pulmonary disease,  unspecified; N18.9-Chronic kidney  disease, unspecified; R77.8-Other specified abnormalities of plasma  proteins; Z74.09-Other reduced mobility; Z78.9-Other specified health  status.     REPORT: A frontal view of the chest was obtained.     COMPARISON: Chest x-ray 3/8/2022 2304 hours.     A smallbore right basilar chest tube has been inserted, and appears to  be in satisfactory position, there is reduction in volume of the right  pleural effusion. No pneumothorax is identified. There appears to be a  small stable left pleural effusion. There are moderate volume loss in  the lung bases. The heart is enlarged. No overt CHF is identified.  Underlying chronic lung changes and interstitial disease appears stable.  The osseous structures are unremarkable.       Impression:      Interval insertion of a smallbore right basilar chest tube  in good position with reduction in volume of the right pleural effusion  and no pneumothorax. Continued extensive volume loss in the lung bases  with cardiomegaly.  This report was finalized on 03/09/2022 16:58 by Dr. Jhon Caruso MD.    XR Chest 1 View [[id number]] Collected: 03/09/22 0724     Updated: 03/09/22 0731    Narrative:      EXAM: XR CHEST 1 VW-     INDICATION: Shortness of air     COMPARISON: 3/2/2022     FINDINGS:     Cardiac silhouette is enlarged but stable. No change in moderate RIGHT  pleural effusion and trace LEFT pleural effusion. No significant change  in bilateral interstitial opacity and patchy airspace opacity. No  visible pneumothorax. No acute osseous finding.       Impression:         Similar appearance to 3/2/2022 with cardiomegaly, persistent moderate  RIGHT and small LEFT pleural effusion with overlying atelectasis,  bilateral interstitial opacity, and patchy airspace opacities. Favor  volume overload/pulmonary edema.  This report was finalized on 03/09/2022 07:28 by Dr. Nam Funez MD.        Results for orders placed during the hospital encounter of  01/21/22    Adult Transthoracic Echo Complete W/ Cont if Necessary Per Protocol    Interpretation Summary  · Assessment of left ventricular ejection fraction somewhat difficult due to the presence of atrial fibrillation.  · Left ventricular systolic function is low normal. Left ventricular ejection fraction appears to be 51 - 55%.  · Left ventricular wall thickness is consistent with mild to moderate concentric hypertrophy.  · The right ventricular cavity is dilated. Mildly reduced right ventricular systolic function noted.  · Biatrial enlargment is noted.  · No hemodynamically significant valvular abnormalities identified on this study.    Intake/Output    Intake/Output Summary (Last 24 hours) at 3/10/2022 1407  Last data filed at 3/10/2022 0900  Gross per 24 hour   Intake 840 ml   Output 1520 ml   Net -680 ml     Scheduled Meds  atorvastatin, 40 mg, Oral, Daily  budesonide-formoterol, 2 puff, Inhalation, BID - RT  bumetanide, 1 mg, Intravenous, Q12H  busPIRone, 10 mg, Oral, TID  dilTIAZem CD, 120 mg, Oral, Q24H  docusate sodium, 100 mg, Oral, BID  guaiFENesin, 1,200 mg, Oral, BID  ipratropium, 0.5 mg, Nebulization, 4x Daily - RT  lisinopril, 10 mg, Oral, Daily  metoprolol tartrate, 75 mg, Oral, Q12H  multivitamin with minerals, 1 tablet, Oral, Daily  pantoprazole, 40 mg, Oral, BID  pramipexole, 0.25 mg, Oral, Nightly  sertraline, 50 mg, Oral, Daily  sodium chloride, 10 mL, Intravenous, Q12H      I have reviewed the patient current medications.     Assessment/Plan     Active Hospital Problems    Diagnosis    • **Recurrent pleural effusion on right    • Atrial fibrillation with RVR ([facility])    • Stage 3b chronic kidney disease ([facility])    • Stage 3 severe COPD by GOLD classification ([facility])    • Acute on chronic diastolic CHF (congestive heart failure) ([facility])    • Chronic respiratory failure with hypoxia and hypercapnia ([facility])    • Essential hypertension      Plan:  1.  To ER 3/8/2022 with shortness of breath.  Patient has  had several recent admissions with acute on chronic diastolic heart failure and volume overload with atrial fibrillation and recurrent right pleural effusion.  Found to be in atrial fibrillation with RVR.  Cardizem drip started in ER.    2.  Atrial fibrillation with RVR.  Cardiology following.  Cardizem  mg added yesterday.  Cardizem drip discontinued.  CardioMEMS mentioned with previous hospitalization but may be difficult given patient's age and comorbidities.  Outpatient cardioversion for rate control also mentioned as it is felt that atrial fibrillation with RVR is contributing to volume overload.  Remains atrial fibrillation rate controlled at present.  Eliquis on hold as patient had pigtail catheter inserted and uncertain whether need surgical procedure.    3.  Acute on chronic diastolic congestive heart failure secondary to atrial fibrillation with RVR.  Ejection fraction 51-55% per echo 1/2022.  Continue IV Bumex.  1500 mL fluid restriction.  Daily weights.  Creatinine 1.45 today.  Daily BMP.  Lisinopril (ACE inhibitor),  Metoprolol (beta-blocker) continued    4.  Chest pain.  EKG obtained.  Dr. Walden notified by Dr. Reveles.  ST depression lead II, lead III, aVF, V3 V4 V5 V6.    5.  Recurrent right pleural effusion.  Chest x-ray similar to 3/2 with cardiomegaly, persistent moderate right and small left pleural effusion with overlying atelectasis and bilateral interstitial opacities and patchy airspace opacities.  Favor pulmonary edema.  Patient had thoracentesis 2/22 per Dr. Salgado with 1200 mL fluid removed.  Per lights criteria transudate effusion consistent with CHF.  CTS, Dr. Zimmer following.  Right pigtail catheter placed at bedside.  1500 mL fluid drained.  Breathing better with fluid drained and Hydrophed controlled.  Continue diuresis.  Keep pigtail catheter until tomorrow.  If pleural effusion reoccurs discuss Pleurx catheter as palliative measure but will likely result in dehydration as  outpatient.    6.  COPD stage III.  No exacerbation.  Continue Symbicort and Mucinex.  Plain Atrovent as opposed to DuoNeb's with history of A. fib with RVR.  Incentive spirometry.  Continue oxygen 3 L home setting.    7.  Chronic kidney disease stage IIIb, stable.  Creatinine 1.69 on admission, 1.45 today.  Daily BMP.    8.  Primary hypertension.  Blood pressure 92/71, 109/59.    9.  Physical therapy consulted.  Unna boots bilateral lower extremities placed last admission for compression purposes.    10.   for skilled nursing facility placement.    CODE STATUS/advance care planning: No CPR, no intubation, no cardioversion.  The patient surrogate decision-maker is her granddaughter, Genevieve.    The above documentation resulted from a face-to-face encounter by me Kenya VARGHESE, [facility].    Discharge Planning: I expect patient to be discharged to [facility] if bed offered and insurance approves.    Electronically signed by HALIMA Joy, 3/10/2022, 14:07 CST.

## 2022-03-11 NOTE — CONSULTS
"Palliative Care Initial Consult   Attending Physician: Gerardo Reveles MD  Referring Provider: Kenya Pickens APRN    Reason for Referral: assistance with clarification of goals of care and \"Frequent recurrent hospital admissions due to CHF exacerbation recurrent pleural effusions.\"  Family/Support: extended family    Code Status and Medical Interventions:   Ordered at: 03/09/22 0237     Medical Intervention Limits:    NO intubation (DNI)    NO cardioversion     Level Of Support Discussed With:    Patient     Code Status (Patient has no pulse and is not breathing):    No CPR (Do Not Attempt to Resuscitate)     Medical Interventions (Patient has pulse or is breathing):    Limited Support     Subjective   HPI: 86 y.o. female with past medical history of actinic keratosis, arthritis, atherosclerosis, atrial fibrillation, benign fundic gland polyps of stomach, bleeding ulcer, carotid artery bruit, carotid artery stenosis, chronic kidney disease, congestive heart failure, COPD requiring supplemental oxygen, diverticulosis, emphysema of lung, hyperlipidemia, hypertension, irritable bowel syndrome, macular degeneration, osteoporosis, prediabetes, peripheral vascular disease, TIA and vitamin D deficiency.  According to chart review she has been hospitalized 4 times in the last 2 months related to shortness of breath and COPD exacerbations.  Her most recent admission was from 3/2/2022 to 3/6/2022 with shortness of air, appears she considered skilled nursing facility placement but ultimately decided to go home with home health. Patient presented to [facility] on [facility]/2022 related to shortness of breath, cough and congestion, dyspnea with exertion and decreased urinary output.  ECG completed in ED revealed she was in A. fib with RVR and had elevated troponin that was slightly higher than her baseline. Chart review indicates she wears 3 L of oxygen at home continuously and she was able to maintain " oxygenation while in ED but was dyspneic and unable to complete full sentences.  Further workup in ED revealed WBC 11.59, ALT 42, creatinine 1.69, BUN 46, GFR 29.3, sodium 131 and BNP 28,338.  Chest x-ray revealed cardiomegaly, persistent moderate right and small left pleural effusion with overlying atelectasis and bilateral interstitial opacities favoring volume overload or pulmonary edema. She was placed on a Cardizem drip and admitted to the medical floor for treatment and further workup. Cardiology consulted and recommended continuing diuresis and rate control. Cardiothoracic surgery consulted regarding pleural effusions and a pigtail catheter was placed at bedside and sample sent to cytology on 3/9/2022. Cardiothoracic surgery notes reveal options of Pleurx placement as a palliative measures was discussed if pleural effision continues to recur. During previous admission in February of this year she required thoracentesis with removal of 1200 mL of fluid which was sent to cytology which was negative for malignancy. Therapy has evaluated patient and are recommending home with assist and home health versus skilled nursing facility. Per , [facility] is unable to continue providing service as she is unsafe at home and referrals have been sent to skilled nursing facilities. Reported chest pain on 3/10/2022 and ECG completed which revealed atrial fibrillation and ST depression. Cytology report from pleural fluid collected on 3/9/2022 revealed reactive mesothelial cells and lymphocytes but negative for malignant cells. Labs completed this morning reveal she remains hyponatremic with sodium 133, renal function has improved since admission but remain abnormal with creatinine 1.40, BUN 40 and GFR 36.7. She has experienced increased output from pigtail drain and cardiothoracic surgery have recommended continuing for an additional day. She is currently sitting up in chair, nursing has just given her a dose  "of Atarax for anxiety.  She is alert and oriented, reports her breathing has improved dramatically since admission. Expresses worries regarding pleural effusion \"coming back\" after chest tube removed but states she is hopeful that it does not.     Advance Care Planning   ACP discussion was held with the patient during this visit. Patient has an advance directive in EMR which is still valid.      Advance Care Planning Discussion: Discussed current state of health and treatment options she has been offered for her pleural effusion management. At this time she states she does not wish to pursue any additional aggressive measures for pleural effusion. Discussed interventions regarding CODE STATUS, reports she does not wish to have aggressive measures such as artificial nutrition, vasopressors or dialysis in addition to intubation or cardioversion.  When discussing these interventions she expressed, \"I don't really see any reason to go through with these given the state I am in.\"  Explored discharge plans, she states that she is agreeable to going to nursing facility if they are able to accept her. She inquired about hospice services and if she were able to utilize their service.  She reflected on her experiences with hospice with her daughter who passed away 1 year ago tomorrow stating, \"They allowed my daughter a peaceful death, she didn't have to suffer and this is what I want in the end.\" Explained that since she is able to work with therapy she may benefit from nursing facility placement under skilled/palliative care and if/when she declines or feels her symptoms are no longer managed under palliative care she may benefit from hospice utilization. Expressed she was agreeable to this. Explored options for symptom management in regards to dyspnea including scheduled low dose opiate. She states she has never tried this before but reports she has been taking \"half of a Lortab in the morning and in the evening, I believe " "it's 5 mg\" and \"I have never had to take pain medicine in my life until here recently and it is working but I am scared I am going to get nauseated with some of it.\" Agreeable to starting low dose opiate to determine if this will benefit her dyspnea, reports atarax controls her anxiety well.     The patient receives support from her extended family.  POA/Healthcare Surrogate - She has named her grandchildren, Genevieve Strickland and Clara Krause, as her healthcare surrogates.    Advance Directive Status: Patient has advance directive, copy in chart     Review of Systems   Constitutional: Positive for malaise/fatigue. Negative for fever and weight loss.   HENT: Positive for congestion. Negative for hearing loss and stridor.    Eyes: Positive for vision loss in left eye (reports related to macular degeneration ) and vision loss in right eye (reports related to macular degeneration). Negative for pain.   Cardiovascular: Positive for chest pain (intermittently), dyspnea on exertion and leg swelling.   Respiratory: Positive for cough, shortness of breath and sputum production.    Skin: Negative for flushing, itching and rash.   Musculoskeletal: Positive for back pain (intermittently). Negative for falls and neck pain.   Gastrointestinal: Negative for abdominal pain, nausea and vomiting.   Genitourinary: Negative for bladder incontinence, dysuria and incomplete emptying.   Neurological: Positive for weakness. Negative for dizziness and loss of balance.   Psychiatric/Behavioral: Negative for depression and suicidal ideas. The patient is nervous/anxious.        Pain Assessment  Pain Location: other (see comments) (generalized)  Pain Description: constant      Past Medical History:   Diagnosis Date   • Actinic keratosis    • Arthritis    • Atherosclerosis    • Benign fundic gland polyps of stomach    • Bleeding ulcer    • Carotid artery bruit    • Carotid artery stenosis    • COPD (chronic obstructive pulmonary disease) " (HCC)    • Diverticulosis    • Emphysema of lung (HCC)    • History of colon polyps    • Hyperlipidemia    • Hypertension    • IBS (irritable bowel syndrome)    • Macular degeneration    • Osteoporosis    • Prediabetes    • PVD (peripheral vascular disease) (HCC)    • TIA (transient ischemic attack)    • Vitamin D deficiency       Past Surgical History:   Procedure Laterality Date   • CATARACT EXTRACTION     • COLONOSCOPY  03/15/2013    polyp, hyperplastic   • COLONOSCOPY N/A 10/2/2018    Procedure: COLONOSCOPY WITH ANESTHESIA;  Surgeon: Harris Escobar MD;  Location: [facility] ENDOSCOPY;  Service: Gastroenterology   • CYST REMOVAL     • ENDOSCOPY     • ENDOSCOPY N/A 10/4/2019    Procedure: ESOPHAGOGASTRODUODENOSCOPY WITH ANESTHESIA;  Surgeon: Harris Escobar MD;  Location: [facility] ENDOSCOPY;  Service: Gastroenterology   • FEMORAL ARTERY STENT     • HYSTERECTOMY     • VASCULAR SURGERY      multiple      Social History     Socioeconomic History   • Marital status:    Tobacco Use   • Smoking status: Former Smoker     Packs/day: 0.50     Years: 70.00     Pack years: 35.00     Types: Cigarettes     Quit date: 10/2021     Years since quittin.4   • Smokeless tobacco: Never Used   Vaping Use   • Vaping Use: Never used   Substance and Sexual Activity   • Alcohol use: No   • Drug use: No   • Sexual activity: Not Currently         Allergies   Allergen Reactions   • Valium [Diazepam] Nausea Only   • Contrast Dye Itching       Objective   Diagnostics: Reviewed    Intake/Output Summary (Last 24 hours) at 3/11/2022 1312  Last data filed at 3/11/2022 0500  Gross per 24 hour   Intake --   Output 1325 ml   Net -1325 ml       Current medication reviewed for route, type, dose and frequency and are current per MAR at time of dictation.  Current Facility-Administered Medications   Medication Dose Route Frequency Provider Last Rate Last Admin   • acetaminophen (TYLENOL) tablet 650 mg  650 mg Oral Q4H PRN Neha Dubon  DO Mary   650 mg at 03/09/22 1631   • apixaban (ELIQUIS) tablet 2.5 mg  2.5 mg Oral Q12H Kenya Pickens APRN   2.5 mg at 03/11/22 0947   • atorvastatin (LIPITOR) tablet 40 mg  40 mg Oral Daily Neha Dubon DO   40 mg at 03/11/22 0949   • budesonide-formoterol (SYMBICORT) 160-4.5 MCG/ACT inhaler 2 puff  2 puff Inhalation BID - RT Neha Dubon DO   2 puff at 03/11/22 0628   • bumetanide (BUMEX) injection 1 mg  1 mg Intravenous Q12H Neha Dubon DO   1 mg at 03/11/22 0518   • busPIRone (BUSPAR) tablet 10 mg  10 mg Oral TID Neha Dubon DO   10 mg at 03/11/22 0948   • dilTIAZem (CARDIZEM) 125 mg in 125 mL NS infusion  5-15 mg/hr Intravenous Titrated Nelson Elizabeth MD 5 mL/hr at 03/08/22 2335 5 mg/hr at 03/08/22 2335   • dilTIAZem CD (CARDIZEM CD) 24 hr capsule 120 mg  120 mg Oral Q24H Petey Walden MD   120 mg at 03/11/22 0947   • docusate sodium (COLACE) capsule 100 mg  100 mg Oral BID Neha Dubon DO   100 mg at 03/11/22 0948   • guaiFENesin (MUCINEX) 12 hr tablet 1,200 mg  1,200 mg Oral BID Neha Dubon DO   1,200 mg at 03/11/22 0948   • hydrOXYzine (ATARAX) tablet 25 mg  25 mg Oral TID PRN Neha Dubon DO   25 mg at 03/09/22 1926   • ipratropium (ATROVENT) nebulizer solution 0.5 mg  0.5 mg Nebulization 4x Daily - RT Trini Watts APRN   0.5 mg at 03/11/22 1016   • lisinopril (PRINIVIL,ZESTRIL) tablet 10 mg  10 mg Oral Daily Neha Dubon DO   10 mg at 03/11/22 0949   • metoprolol tartrate (LOPRESSOR) tablet 75 mg  75 mg Oral Q12H Neha Dubon DO   75 mg at 03/11/22 0947   • multivitamin with minerals 1 tablet  1 tablet Oral Daily Neha Dubon DO   1 tablet at 03/11/22 0949   • ondansetron (ZOFRAN) injection 4 mg  4 mg Intravenous Q6H PRN Neha Dubon DO       • pantoprazole (PROTONIX) EC tablet 40 mg  40 mg Oral BID Neha Dubon DO   40 mg at 03/11/22 0955   • pramipexole (MIRAPEX) tablet 0.25 mg  0.25 mg Oral  "Nightly JagdishNeha dawkins DO   0.25 mg at 03/10/22 2051   • sertraline (ZOLOFT) tablet 50 mg  50 mg Oral Daily JagdishNeha mcguire DO   50 mg at 03/11/22 0948   • sodium chloride 0.9 % flush 10 mL  10 mL Intravenous PRN Nelson Elizabeth MD       • sodium chloride 0.9 % flush 10 mL  10 mL Intravenous Q12H Neha Dubon DO   10 mL at 03/11/22 0950   • sodium chloride 0.9 % flush 10 mL  10 mL Intravenous PRN Neha Dubon DO         dilTIAZem, 5-15 mg/hr, Last Rate: 5 mg/hr (03/08/22 2335)      •  acetaminophen  •  hydrOXYzine  •  ondansetron  •  sodium chloride  •  sodium chloride    Assessment   /64 (BP Location: Right arm, Patient Position: Sitting)   Pulse 87   Temp 97.7 °F (36.5 °C) (Oral)   Resp 18   Ht 165.1 cm (65\")   Wt 76.8 kg (169 lb 4.8 oz)   SpO2 98%   BMI 28.17 kg/m²     Physical Exam  Vitals and nursing note reviewed.   Constitutional:       General: She is not in acute distress.     Appearance: She is ill-appearing.      Interventions: Nasal cannula in place.   HENT:      Head: Normocephalic and atraumatic.   Eyes:      General: Lids are normal.   Neck:      Vascular: No JVD.   Cardiovascular:      Rate and Rhythm: Normal rate. Rhythm irregular.      Heart sounds: Normal heart sounds.   Pulmonary:      Effort: Pulmonary effort is normal. No accessory muscle usage or respiratory distress.      Breath sounds: Decreased breath sounds present.   Chest:      Comments: Right pigtail catheter  Abdominal:      General: Abdomen is flat.      Palpations: Abdomen is soft.   Musculoskeletal:      Cervical back: Neck supple.   Skin:     General: Skin is warm and dry.      Comments: Bilateral lower extremity jose boots present   Neurological:      Mental Status: She is alert and oriented to person, place, and time.      Motor: Weakness present.   Psychiatric:         Attention and Perception: Attention normal.         Mood and Affect: Mood is anxious (appears mildly anxious).         " Speech: Speech normal.         Behavior: Behavior is cooperative.         Cognition and Memory: Cognition normal.     Patient status: Disease state: Controlled with current treatments.  Functional status: Palliative Performance Scale Score: Performance 60% based on the following measures: Ambulation: Reduced, Activity and Evidence of Disease: Unable to do hobby or some work, significant evidence of disease, Self-Care: Occasional assist necessary,  Intake: Normal or reduced, LOC: Full or confusion   Nutritional status: Albumin 3.70. Body mass index is 28.17 kg/m².     Impression/Problem List:  1.    Chronic respiratory failure with hypoxia and hypercapnia  2.    Stage 3 severe COPD by GOLD classification  3.    Acute on chronic diastolic CHF  4.    Stage IIIb chronic kidney disease  5.    Atrial fibrillation with RVR  6.    Recurrent pleural effusion on right  7.    ST segment abnormality per ECG 3/10/2022  8.    Essential hypertension  9.    Hyponatremia  10.  Chronic anticoagulation  11.  Volume overload  12.  Hypertension  13.  Hyperlipidemia  14.  Advanced age    Plan/Recommendations     1. Goals of care include CODE STATUS NO CPR with limited support interventions.    2. Palliative care encounter  - Prognosis is guarded long-term secondary to chronic respiratory failure with hypoxia and hypercapnia, severe COPD, acute on chronic diastolic CHF, chronic kidney disease, atrial fibrillation with RVR, chronic anticoagulation, recurrent pleural effusion, ST segment abnormalities on ECG, advanced age, essential hypertension, hyponatremia, hypertension and other comorbidities listed above.   - Discussed current state of health and treatment options she has been offered for her pleural effusion management, expresses she does not want to have to go through more aggressive measures.  - Explored goals of care regarding CODE STATUS, reports she does not wish to have aggressive measures such as artificial nutrition,  "vasopressors or dialysis in addition to intubation or cardioversion. She stated, \"I don't really see any reason to go through with these given the state I am in.\"   - CODE STATUS changes made to reflect wishes.   - Explored discharge plans, reports she is agreeable to SNF placement and inquired about hospice as she reflected on her recent interaction when her daughter passed away around 1 year ago.   - Explained that she might benefit more from skilled/palliative care placement while she is able to work with therapy and symptoms appear to be mostly under control with current management.   - Discussed that hospice services can be utilized at any point if she feels she is declining or that her symptoms are no longer managed under palliative care.   - MOST document completed on 2/7/2022 that reflect current wishes.     3. Dyspnea  - Options for symptom management in regards to dyspnea were discussed including scheduled low dose opiate.   - Reports she has been taking \"half of a Lortab in the morning and in the evening, I believe it's 5 mg\" however do not see this on her home medication list or current hospitalization list.   - Started on scheduled low dose morphine for dyspnea management with assistance of HALIMA Acevedo.   - If she remains hospitalized will plan to follow up with her on Monday or sooner if needed.     Thank you for this consult and allowing us to participate in patient's plan of care. Palliative Care Team will continue to follow patient.     Time spent: 100 minutes spent reviewing medical and medication records, assessing and examining patient, discussing with family, answering questions, providing some guidance about a plan and documentation of care, and coordinating care with other healthcare members, with > 50% time spent face to face.   30 minutes spent on advance care planning.    HALIMA Lundberg  3/11/2022              "

## 2022-03-11 NOTE — PROGRESS NOTES
Continued Stay Note   Michele     Patient Name: Cindy Hicks  MRN: [id number]  Today's Date: 3/11/2022    Admit Date: 3/8/2022     Discharge Plan     Row Name 03/11/22 0924       Plan    Plan Referral to [facility]    Plan Comments Contacted Elodia with [facility] for update on referral. Will await decision.                VIRGILIO Tobar

## 2022-03-11 NOTE — THERAPY EVALUATION
Patient Name: Cindy Hicks  : 1935    MRN: [id number]                              Today's Date: 3/11/2022       Admit Date: 3/8/2022    Visit Dx:     ICD-10-CM ICD-9-CM   1. Atrial fibrillation with RVR ([facility])  I48.91 427.31   2. Pleural effusion  J90 511.9   3. Dyspnea, unspecified type  R06.00 786.09   4. Chronic obstructive pulmonary disease, unspecified COPD type ([facility])  J44.9 496   5. Chronic kidney disease, unspecified CKD stage  N18.9 585.9   6. Elevated troponin  R77.8 790.6   7. Impaired mobility and ADLs  Z74.09 V49.89    Z78.9    8. Edema of both lower legs  R60.0 782.3   9. Impaired mobility  Z74.09 799.89     Patient Active Problem List   Diagnosis   • Family hx colonic polyps   • Family hx of colon cancer   • Hx of colonic polyp   • Melena   • Peptic ulcer disease   • Essential hypertension   • Mixed hyperlipidemia   • Overweight (BMI 25.0-29.9)   • Stage 3a chronic kidney disease (HCC)   • Recurrent pleural effusion on right   • Actinic keratosis   • Atherosclerosis of native artery of both lower extremities with intermittent claudication ([facility])   • Carotid artery stenosis   • Chronic pain   • Gastroesophageal reflux disease   • GI bleed   • Iron deficiency anemia secondary to inadequate dietary iron intake   • Irritable bowel syndrome   • Macular degeneration   • Osteoarthritis   • Osteopenia of multiple sites   • Age-related osteoporosis without current pathological fracture   • Other insomnia   • Restless legs syndrome   • Vitamin D deficiency   • Pulmonary emphysema ([facility])   • Chronic respiratory failure with hypoxia and hypercapnia ([facility])   • Cigarette nicotine dependence without complication   • COPD with acute exacerbation ([facility])   • Acute on chronic diastolic CHF (congestive heart failure) ([facility])   • Stage 3 severe COPD by GOLD classification ([facility])   • Acute on chronic respiratory failure with hypoxia ([facility])   • Paroxysmal atrial fibrillation ([facility])   • Personal history of nicotine  dependence   • Acute diastolic congestive heart failure (HCC)   • Chronic anticoagulation   • Stage 3b chronic kidney disease (HCC)   • Hyponatremia   • Atrial fibrillation with RVR (HCC)     Past Medical History:   Diagnosis Date   • Actinic keratosis    • Arthritis    • Atherosclerosis    • Benign fundic gland polyps of stomach    • Bleeding ulcer    • Carotid artery bruit    • Carotid artery stenosis    • COPD (chronic obstructive pulmonary disease) (HCC)    • Diverticulosis    • Emphysema of lung (HCC)    • History of colon polyps    • Hyperlipidemia    • Hypertension    • IBS (irritable bowel syndrome)    • Macular degeneration    • Osteoporosis    • Prediabetes    • PVD (peripheral vascular disease) (HCC)    • TIA (transient ischemic attack)    • Vitamin D deficiency      Past Surgical History:   Procedure Laterality Date   • CATARACT EXTRACTION     • COLONOSCOPY  03/15/2013    polyp, hyperplastic   • COLONOSCOPY N/A 10/2/2018    Procedure: COLONOSCOPY WITH ANESTHESIA;  Surgeon: Harris Escobar MD;  Location: [facility] ENDOSCOPY;  Service: Gastroenterology   • CYST REMOVAL     • ENDOSCOPY  2019   • ENDOSCOPY N/A 10/4/2019    Procedure: ESOPHAGOGASTRODUODENOSCOPY WITH ANESTHESIA;  Surgeon: Harris Escobar MD;  Location: [facility] ENDOSCOPY;  Service: Gastroenterology   • FEMORAL ARTERY STENT     • HYSTERECTOMY     • VASCULAR SURGERY      multiple      General Information     Row Name 03/11/22 1130          Physical Therapy Time and Intention    Document Type evaluation  R pleural effusion, volume overload, pigtail R chest tube in place, B LE edema  -MS     Mode of Treatment physical therapy;individual therapy  -MS     Row Name 03/11/22 1131          General Information    Patient Profile Reviewed yes  -MS     Prior Level of Function independent:;all household mobility;mod assist:;ADL's  -MS     Existing Precautions/Restrictions oxygen therapy device and L/min;fall  3L at home  -MS     Barriers to Rehab  medically complex;previous functional deficit;physical barrier  -MS     Row Name 03/11/22 1130          Living Environment    People in Home alone  -MS     Row Name 03/11/22 1130          Cognition    Orientation Status (Cognition) oriented x 4  -MS     Row Name 03/11/22 1130          Safety Issues, Functional Mobility    Impairments Affecting Function (Mobility) endurance/activity tolerance;shortness of breath;pain  -MS           User Key  (r) = Recorded By, (t) = Taken By, (c) = Cosigned By    Initials Name Provider Type    Jazmine Hardwick, PT, DPT, NCS Physical Therapist               Mobility     Row Name 03/11/22 1130          Bed Mobility    Bed Mobility supine-sit  -MS     Supine-Sit [place] (Bed Mobility) minimum assist (75% patient effort)  -MS     Assistive Device (Bed Mobility) head of bed elevated  -MS     Row Name 03/11/22 1130          Sit-Stand Transfer    Sit-Stand [place] (Transfers) contact guard;verbal cues;nonverbal cues (demo/gesture)  -MS     Assistive Device (Sit-Stand Transfers) walker, front-wheeled  -MS     Row Name 03/11/22 1130          Gait/Stairs (Locomotion)    [place] Level (Gait) contact guard  -MS     Assistive Device (Gait) walker, front-wheeled  -MS     Distance in Feet (Gait) 20ft with slow cadance due to decreased functional endurance  -MS           User Key  (r) = Recorded By, (t) = Taken By, (c) = Cosigned By    Initials Name Provider Type    Jazmine Hardwick, PT, DPT, NCS Physical Therapist               Obj/Interventions     Row Name 03/11/22 1130          Range of Motion Comprehensive    General Range of Motion no range of motion deficits identified  -MS     Row Name 03/11/22 1130          Strength Comprehensive (MMT)    Comment, General Manual Muscle Testing (MMT) Assessment B LEs 4/5 B UEs 5/5  -MS     Row Name 03/11/22 1130          Balance    Balance Assessment sitting static balance;sitting dynamic balance;standing static balance;standing dynamic  balance  -MS     Static Sitting Balance supervision  -MS     Dynamic Sitting Balance supervision  -MS     Position, Sitting Balance supported  -MS     Static Standing Balance contact guard  -MS     Dynamic Standing Balance contact guard  -MS     Position/Device Used, Standing Balance walker, rolling  -MS     Row Name 03/11/22 1130          Sensory Assessment (Somatosensory)    Sensory Assessment (Somatosensory) sensation intact  -MS           User Key  (r) = Recorded By, (t) = Taken By, (c) = Cosigned By    Initials Name Provider Type    Jazmine Hardwick, PT, DPT, NCS Physical Therapist               Goals/Plan     Row Name 03/11/22 1130          Bed Mobility Goal 1 (PT)    Activity/Assistive Device (Bed Mobility Goal 1, PT) bed mobility activities, all  -MS     [place] Level/Cues Needed (Bed Mobility Goal 1, PT) modified independence  -MS     Time Frame (Bed Mobility Goal 1, PT) long term goal (LTG);by discharge  -MS     Progress/Outcomes (Bed Mobility Goal 1, PT) goal ongoing  -MS     Row Name 03/11/22 1130          Transfer Goal 1 (PT)    Activity/Assistive Device (Transfer Goal 1, PT) sit-to-stand/stand-to-sit;bed-to-chair/chair-to-bed;walker, rolling  -MS     [place] Level/Cues Needed (Transfer Goal 1, PT) modified independence  -MS     Time Frame (Transfer Goal 1, PT) long term goal (LTG);by discharge  -MS     Progress/Outcome (Transfer Goal 1, PT) goal ongoing  -MS     Row Name 03/11/22 1130          Gait Training Goal 1 (PT)    Activity/Assistive Device (Gait Training Goal 1, PT) gait (walking locomotion);assistive device use;walker, rolling;increase endurance/gait distance;improve balance and speed  -MS     [place] Level (Gait Training Goal 1, PT) standby assist  -MS     Distance (Gait Training Goal 1, PT) 50ft with no standing rest breaks  -MS     Time Frame (Gait Training Goal 1, PT) long term goal (LTG);by discharge  -MS     Progress/Outcome (Gait Training Goal 1, PT) goal ongoing  -MS            User Key  (r) = Recorded By, (t) = Taken By, (c) = Cosigned By    Initials Name Provider Type    MS Jazmine Browning R, PT, DPT, NCS Physical Therapist               Clinical Impression     Row Name 03/11/22 1130          Pain    Pretreatment Pain Rating 10/10  -MS     Posttreatment Pain Rating 10/10  -MS     Pain Location generalized  -MS     Pre/Posttreatment Pain Comment all over  -MS     Pain Intervention(s) Repositioned;Ambulation/increased activity  -MS     Row Name 03/11/22 1130          Plan of Care Review    Plan of Care Reviewed With patient  -MS     Progress no change  -MS     Outcome Evaluation The patient presents alert and oriented x4. She demonstrates generalized weakness and decreased activity tolerance. Her R side chest tube is still in place. She is on 3L of supplemental O2 which is her baseline use at home. She has the unna boots in place from her previous admission and I removed these to replace them with new unna boots. Her edema has decreased from the last time I saw her during her previous admission. Her skin is no long blistered. The unna boots were placed B from the metatarsal heads to the anterior tibial tuberocity with capillary refill < 3 sec. The unna boots were covered with coban. PT will continue to work with the patient on activity tolerance and maintain unna boot placement. Recommend discharge to SNF.  -MS     Row Name 03/11/22 1136          Therapy Assessment/Plan (PT)    Patient/Family Therapy Goals Statement (PT) feel better  -MS     Rehab Potential (PT) good, to achieve stated therapy goals  -MS     Criteria for Skilled Interventions Met (PT) yes;meets criteria;skilled treatment is necessary  -MS     Predicted Duration of Therapy Intervention (PT) until discharge  -MS     Row Name 03/11/22 1130          Vital Signs    O2 Delivery Pre Treatment supplemental O2  -MS     O2 Delivery Intra Treatment supplemental O2  -MS     O2 Delivery Post Treatment supplemental O2  -MS      Row Name 03/11/22 1130          Positioning and Restraints    Post Treatment Position chair  -MS     In Chair reclined;call light within reach;encouraged to call for assist;legs elevated  -MS           User Key  (r) = Recorded By, (t) = Taken By, (c) = Cosigned By    Initials Name Provider Type    Jazmine Hardwick SHIRA, PT, DPT, NCS Physical Therapist               Outcome Measures     Row Name 03/11/22 1505          How much help from another person do you currently need...    Turning from your back to your side while in flat bed without using bedrails? 3  -MS     Moving from lying on back to sitting on the side of a flat bed without bedrails? 3  -MS     Moving to and from a bed to a chair (including a wheelchair)? 3  -MS     Standing up from a chair using your arms (e.g., wheelchair, bedside chair)? 3  -MS     Climbing 3-5 steps with a railing? 1  -MS     To walk in hospital room? 2  -MS     AM-PAC 6 Clicks Score (PT) 15  -MS     Row Name 03/11/22 1505          Functional Assessment    Outcome Measure Options AM-PAC 6 Clicks Basic Mobility (PT)  -MS           User Key  (r) = Recorded By, (t) = Taken By, (c) = Cosigned By    Initials Name Provider Type    Jacob Hardwickgalilea SILVA, PT, DPT, NCS Physical Therapist                             Physical Therapy Education                 Title: PT OT SLP Therapies (In Progress)     Topic: Physical Therapy (In Progress)     Point: Mobility training (Done)     Learning Progress Summary           Patient Acceptance, E, VU by MS at 3/11/2022 1508    Comment: role of PT in her care, role of unna boots                   Point: Home exercise program (Not Started)     Learner Progress:  Not documented in this visit.          Point: Body mechanics (Not Started)     Learner Progress:  Not documented in this visit.          Point: Precautions (Done)     Learning Progress Summary           Patient Acceptance, E, VU by MS at 3/11/2022 1508    Comment: role of PT in her care, role of unna  boots                               User Key     Initials Effective Dates Name Provider Type Discipline    MS 06/19/18 -  Jazmine Browning, PT, DPT, NCS Physical Therapist PT              PT Recommendation and Plan  Planned Therapy Interventions (PT): wound care, patient/family education  Plan of Care Reviewed With: patient  Progress: no change  Outcome Evaluation: The patient presents alert and oriented x4. She demonstrates generalized weakness and decreased activity tolerance. Her R side chest tube is still in place. She is on 3L of supplemental O2 which is her baseline use at home. She has the unna boots in place from her previous admission and I removed these to replace them with new unna boots. Her edema has decreased from the last time I saw her during her previous admission. Her skin is no long blistered. The unna boots were placed B from the metatarsal heads to the anterior tibial tuberocity with capillary refill < 3 sec. The unna boots were covered with coban. PT will continue to work with the patient on activity tolerance and maintain unna boot placement. Recommend discharge to SNF.     Time Calculation:    PT Charges     Row Name 03/11/22 1505             Time Calculation    Start Time 1120  30 min for unna boots  -MS      Stop Time 1200  -MS      Time Calculation (min) 40 min  -MS      PT Received On 03/11/22  -MS      PT Goal Re-Cert Due Date 03/21/22  -MS              Untimed Charges    PT Eval/Re-eval Minutes 40  -MS      Wound Care 90594 Unna boot  -MS      29580-Unna Boot 30  -MS              Total Minutes    Untimed Charges Total Minutes 70  -MS       Total Minutes 70  -MS            User Key  (r) = Recorded By, (t) = Taken By, (c) = Cosigned By    Initials Name Provider Type    MS Nam Jazmine SILVA, PT, DPT, NCS Physical Therapist              Therapy Charges for Today     Code Description Service Date Service Provider Modifiers Qty    40519671409 HC PT STAPPING UNNA BOOT 3/11/2022 Jazmine Browning  SHIRA, PT, DPT, NCS GP 2    35920114591 HC PT EVAL MOD COMPLEXITY 3 3/11/2022 Jazmine Browning, PT, DPT, NCS GP 1          PT G-Codes  Outcome Measure Options: AM-PAC 6 Clicks Basic Mobility (PT)  AM-PAC 6 Clicks Score (PT): 15  AM-PAC 6 Clicks Score (OT): 19    Jazmine Browning, PT, DPT, NCS  3/11/2022

## 2022-03-11 NOTE — PROGRESS NOTES
Continued Stay Note   Michele     Patient Name: Cindy Hicks  MRN: [id number]  Today's Date: 3/11/2022    Admit Date: 3/8/2022     Discharge Plan     Row Name 03/11/22 1021       Plan    Plan [facility] Following    Plan Comments Pigtail catheter will stay in until tomorrow, Elodia Cifuentes says she will follow up with patient on Monday to see how she has progressed and will notify  of decision.    Row Name 03/11/22 9947       Plan    Plan Referral to [facility]    Plan Comments Contacted Elodia Cifuentes for update on referral. Will await decision.                 VIRGILIO Tobar

## 2022-03-11 NOTE — PLAN OF CARE
Goal Outcome Evaluation:  Plan of Care Reviewed With: patient        Progress: no change  Outcome Evaluation: The patient presents alert and oriented x4. She demonstrates generalized weakness and decreased activity tolerance. Her R side chest tube is still in place. She is on 3L of supplemental O2 which is her baseline use at home. She has the unna boots in place from her previous admission and I removed these to replace them with new unna boots. Her edema has decreased from the last time I saw her during her previous admission. Her skin is no long blistered. The unna boots were placed B from the metatarsal heads to the anterior tibial tuberocity with capillary refill < 3 sec. The unna boots were covered with coban. PT will continue to work with the patient on activity tolerance and maintain unna boot placement. Recommend discharge to SNF.

## 2022-03-11 NOTE — THERAPY WOUND CARE TREATMENT
Acute Care - Wound/Debridement Initial Evaluation Zeny portillo  [facility]     Patient Name: Cindy Hicks  : 1935  MRN: [id number]  Today's Date: 3/11/2022                Admit Date: 3/8/2022    Visit Dx:    ICD-10-CM ICD-9-CM   1. Atrial fibrillation with RVR ([facility])  I48.91 427.31   2. Pleural effusion  J90 511.9   3. Dyspnea, unspecified type  R06.00 786.09   4. Chronic obstructive pulmonary disease, unspecified COPD type ([facility])  J44.9 496   5. Chronic kidney disease, unspecified CKD stage  N18.9 585.9   6. Elevated troponin  R77.8 790.6   7. Impaired mobility and ADLs  Z74.09 V49.89    Z78.9    8. Edema of both lower legs  R60.0 782.3   9. Impaired mobility  Z74.09 799.89       Patient Active Problem List   Diagnosis   • Family hx colonic polyps   • Family hx of colon cancer   • Hx of colonic polyp   • Melena   • Peptic ulcer disease   • Essential hypertension   • Mixed hyperlipidemia   • Overweight (BMI 25.0-29.9)   • Stage 3a chronic kidney disease ([facility])   • Recurrent pleural effusion on right   • Actinic keratosis   • Atherosclerosis of native artery of both lower extremities with intermittent claudication ([facility])   • Carotid artery stenosis   • Chronic pain   • Gastroesophageal reflux disease   • GI bleed   • Iron deficiency anemia secondary to inadequate dietary iron intake   • Irritable bowel syndrome   • Macular degeneration   • Osteoarthritis   • Osteopenia of multiple sites   • Age-related osteoporosis without current pathological fracture   • Other insomnia   • Restless legs syndrome   • Vitamin D deficiency   • Pulmonary emphysema ([facility])   • Chronic respiratory failure with hypoxia and hypercapnia ([facility])   • Cigarette nicotine dependence without complication   • COPD with acute exacerbation ([facility])   • Acute on chronic diastolic CHF (congestive heart failure) ([facility])   • Stage 3 severe COPD by GOLD classification ([facility])   • Acute on chronic respiratory failure with hypoxia ([facility])   • Paroxysmal  atrial fibrillation (HCC)   • Personal history of nicotine dependence   • Acute diastolic congestive heart failure (HCC)   • Chronic anticoagulation   • Stage 3b chronic kidney disease (HCC)   • Hyponatremia   • Atrial fibrillation with RVR (HCC)        Past Medical History:   Diagnosis Date   • Actinic keratosis    • Arthritis    • Atherosclerosis    • Benign fundic gland polyps of stomach    • Bleeding ulcer    • Carotid artery bruit    • Carotid artery stenosis    • COPD (chronic obstructive pulmonary disease) (HCC)    • Diverticulosis    • Emphysema of lung (HCC)    • History of colon polyps    • Hyperlipidemia    • Hypertension    • IBS (irritable bowel syndrome)    • Macular degeneration    • Osteoporosis    • Prediabetes    • PVD (peripheral vascular disease) (HCC)    • TIA (transient ischemic attack)    • Vitamin D deficiency         Past Surgical History:   Procedure Laterality Date   • CATARACT EXTRACTION     • COLONOSCOPY  03/15/2013    polyp, hyperplastic   • COLONOSCOPY N/A 10/2/2018    Procedure: COLONOSCOPY WITH ANESTHESIA;  Surgeon: Harris Escobar MD;  Location: [facility] ENDOSCOPY;  Service: Gastroenterology   • CYST REMOVAL     • ENDOSCOPY  2019   • ENDOSCOPY N/A 10/4/2019    Procedure: ESOPHAGOGASTRODUODENOSCOPY WITH ANESTHESIA;  Surgeon: Harris Escobar MD;  Location: [facility] ENDOSCOPY;  Service: Gastroenterology   • FEMORAL ARTERY STENT     • HYSTERECTOMY     • VASCULAR SURGERY      multiple                  WOUND DEBRIDEMENT                     PT Assessment (last 12 hours)     PT Evaluation and Treatment     Row Name 03/11/22 1300          Physical Therapy Time and Intention    Subjective Information complains of;pain  -MS     Document Type evaluation;wound care  -MS     Mode of Treatment physical therapy  -MS     Row Name 03/11/22 1300          General Information    Patient Profile Reviewed yes  -MS     Row Name 03/11/22 1300          Pain    Pretreatment Pain Rating 10/10  -MS      Posttreatment Pain Rating 10/10  -MS     Pain Location generalized  -MS     Pre/Posttreatment Pain Comment all over  -MS     Row Name 03/11/22 1300          Cognition    Orientation Status (Cognition) oriented x 4  -MS     Row Name 03/11/22 1300          Edema Assessment & Management    Location (Edema) lower extremity, left;lower extremity, right  -MS     Additional Documentation Edema Symptoms/Interventions (Group);Location (Edema) (Row)  -MS     Row Name 03/11/22 1300          Lower Extremity Edema Measures, Left    Lower Extremity, Left (Edema) other (see comments)  met head to tib tub  -MS     Row Name 03/11/22 1300          Lower Extremity Edema Measures, Right    Lower Extremity, Right (Edema) other (see comments)  met head to tib tub  -MS     Row Name 03/11/22 1300          Edema Symptoms/Interventions    Description (Edema) diffuse  -MS     Associated Symptoms (Edema) hair growth changes;sensory impairments;skin color changes;skin creases diminished;tissue elasticity loss;temperature changes  -MS     Treatment Interventions (Edema) compression wrapping/taping  unna boots with zinc placed and covered with coban  -MS     Row Name 03/11/22 1300          Plan of Care Review    Plan of Care Reviewed With patient  -MS     Progress no change  -MS     Row Name 03/11/22 1300          Positioning and Restraints    Post Treatment Position chair  -MS     In Chair reclined;call light within reach;encouraged to call for assist;legs elevated  -MS     Row Name 03/11/22 1300          Therapy Assessment/Plan (PT)    Patient/Family Therapy Goals Statement (PT) feel better  -MS     Rehab Potential (PT) good, to achieve stated therapy goals  -MS     Criteria for Skilled Interventions Met (PT) yes;meets criteria;skilled treatment is necessary  -MS     Predicted Duration of Therapy Intervention (PT) until discharge  -MS     Row Name 03/11/22 1300          Physical Therapy Goals    Problem Specific Goal Selection (PT) problem  specific goal 1, PT;problem specific goal 2, PT  -MS     Row Name 03/11/22 1300          Problem Specific Goal 1 (PT)    Problem Specific Goal 1 (PT) L unna boot will stay in place for 7 days allow for proper edema control  -MS     Time Frame (Problem Specific Goal 1, PT) long-term goal (LTG);by discharge  -MS     Progress/Outcome (Problem Specific Goal 1, PT) goal ongoing  -MS     Row Name 03/11/22 1300          Problem Specific Goal 2 (PT)    Problem Specific Goal 2 (PT) R unna boot will stay in place for 7 days to allow for proper edema control  -MS     Time Frame (Problem Specific Goal 2, PT) long-term goal (LTG);by discharge  -MS     Progress/Outcome (Problem Specific Goal 2, PT) goal ongoing  -MS           User Key  (r) = Recorded By, (t) = Taken By, (c) = Cosigned By    Initials Name Provider Type    MS Jazmine Browning, PT, DPT, NCS Physical Therapist              Physical Therapy Education                 Title: PT OT SLP Therapies (In Progress)     Topic: Physical Therapy (In Progress)     Point: Mobility training (Done)     Learning Progress Summary           Patient Acceptance, E, VU by MS at 3/11/2022 1508    Comment: role of PT in her care, role of unna boots                   Point: Home exercise program (Not Started)     Learner Progress:  Not documented in this visit.          Point: Body mechanics (Not Started)     Learner Progress:  Not documented in this visit.          Point: Precautions (Done)     Learning Progress Summary           Patient Acceptance, E, VU by MS at 3/11/2022 1508    Comment: role of PT in her care, role of unna boots                               User Key     Initials Effective Dates Name Provider Type Discipline    MS 06/19/18 -  Jazmine Browning, PT, DPT, NCS Physical Therapist PT                Recommendation and Plan  Anticipated Discharge Disposition (PT): skilled nursing facility  Planned Therapy Interventions (PT): wound care, patient/family education  Therapy  Frequency (PT): other (see comments) (check daily, change in 7 days)  Plan of Care Reviewed With: patient   Progress: no change       Progress: no change  Outcome Evaluation: The patient presents alert and oriented x4. She demonstrates generalized weakness and decreased activity tolerance. Her R side chest tube is still in place. She is on 3L of supplemental O2 which is her baseline use at home. She has the unna boots in place from her previous admission and I removed these to replace them with new unna boots. Her edema has decreased from the last time I saw her during her previous admission. Her skin is no long blistered. The unna boots were placed B from the metatarsal heads to the anterior tibial tuberocity with capillary refill < 3 sec. The unna boots were covered with coban. PT will continue to work with the patient on activity tolerance and maintain unna boot placement. Recommend discharge to SNF.  Plan of Care Reviewed With: patient            Time Calculation   PT Charges     Row Name 03/11/22 1505             Time Calculation    Start Time 1120  30 min for unna boots  -MS      Stop Time 1200  -MS      Time Calculation (min) 40 min  -MS      PT Received On 03/11/22  -MS      PT Goal Re-Cert Due Date 03/21/22  -MS              Untimed Charges    PT Eval/Re-eval Minutes 40  -MS      Wound Care 11073 Unna boot  -MS      29580-Unna Boot 30  -MS              Total Minutes    Untimed Charges Total Minutes 70  -MS       Total Minutes 70  -MS            User Key  (r) = Recorded By, (t) = Taken By, (c) = Cosigned By    Initials Name Provider Type    MS Jazmine Browning, PT, DPT, NCS Physical Therapist                  Therapy Charges for Today     Code Description Service Date Service Provider Modifiers Qty    17150884574 HC PT STAPPING UNNA BOOT 3/11/2022 Jazmine Browning, PT, DPT, NCS GP 2    86020669476 HC PT EVAL MOD COMPLEXITY 3 3/11/2022 Jazmine Browning, PT, DPT, NCS GP 1            PT G-Codes  Outcome Measure  Options: AM-PAC 6 Clicks Basic Mobility (PT)  AM-PAC 6 Clicks Score (PT): 15  AM-PAC 6 Clicks Score (OT): 19       Jazmine Browning, PT, DPT, NCS  3/11/2022

## 2022-03-11 NOTE — PROGRESS NOTES
"Patient name: Cindy Hicks  Patient : 1935  VISIT # [id number]  MR #[id number]    Procedure: Right pigtail catheter placement by Dr. Zimmer  Procedure Date: 3/9/2022  POD: 2    Subjective   Chief Complaint   Patient presents with   • Shortness of Breath     Patient resting in bed tolerating 3 liters nasal cannula.  Eating breakfast.  No overnight events.  Breathing improving.  Right pigtail catheter remains in place.         Objective     Visit Vitals  BP 94/51 (BP Location: Right arm, Patient Position: Sitting)   Pulse 95   Temp 97.6 °F (36.4 °C) (Oral)   Resp 18   Ht 165.1 cm (65\")   Wt 76.8 kg (169 lb 4.8 oz)   SpO2 93%   BMI 28.17 kg/m²       Intake/Output Summary (Last 24 hours) at 3/11/2022 0939  Last data filed at 3/11/2022 0500  Gross per 24 hour   Intake --   Output 1325 ml   Net -1325 ml     Right pigtail catheter: 525 ml/24 hours, serous, no air leak    Lab:     CBC:  Results from last 7 days   Lab Units 03/10/22  0740 22  1149 22  2249   WBC 10*3/mm3 8.83 8.81 11.59*   HEMATOCRIT % 34.9 34.9 37.7   PLATELETS 10*3/mm3 248 247 299          BMP:  Results from last 7 days   Lab Units 22  0749 03/10/22  0740 22  1149   SODIUM mmol/L 133* 135* 133*   POTASSIUM mmol/L 3.9 4.5 4.7   CHLORIDE mmol/L 90* 93* 88*   CO2 mmol/L 37.0* 33.0* 33.0*   GLUCOSE mg/dL 152* 122* 94   BUN mg/dL 40* 47* 45*   CREATININE mg/dL 1.40* 1.45* 1.59*          COAG:  Results from last 7 days   Lab Units 22  2249   INR  1.63*       IMAGES:       Imaging Results (Last 24 Hours)     Procedure Component Value Units Date/Time    XR Chest 1 View [[id number]] Collected: 22     Updated: 22    Narrative:      EXAM: XR CHEST 1 VW-     INDICATION: Right pleural effusion     COMPARISON: Prior day     FINDINGS:     Right-sided chest tube is stable in position. No change in small  bilateral pleural effusions and bilateral mid-lower lung zone pulmonary  infiltrates. Emphysema is again " noted. Cardiac silhouette is markedly  enlarged but stable. No measurable pneumothorax.       Impression:         No change in appearance of the chest.  This report was finalized on 03/11/2022 07:34 by Dr. Nam Funez MD.        CXR: Right chest tube in stable position.  Small bilateral pleural effusions.  No pneumothorax.    Physical Exam:  General: Alert, oriented. No apparent distress. Ill appearing  Cardiovascular: Irregular rhythm without murmur, rubs, or gallops.    Pulmonary: Diminished bilaterally without wheezing, rubs, or rales.  Chest:  Chest tube to 20 cm suction. No air leak. Fluid is serous.   Abdomen: Soft, nondistended, and nontender.  Extremities: Warm, moves all extremities. No edema.   Neurologic:  Grossly intact with no focal deficits.            Impression:  Atrial fibrillation with RVR (HCC)    Volume overload    CHF    Right pleural effusion    Chronic anticoagulation    Chronic kidney disease    Hypertension    Hyperlipidemia        Plan:  Keep pigtail catheter one more day due to output.  Continue right pigtail catheter to suction  Daily chest xray  Possibly remove chest tube tomorrow  Continue diuresis  DW patient      Beth Rodriguez, HALIMA  03/11/22  09:39 CST

## 2022-03-11 NOTE — PROGRESS NOTES
"    [facility] Medicine Services  INPATIENT PROGRESS NOTE    Length of Stay: 2  Date of Admission: 3/8/2022  Primary Care Physician: Autsin Wade DO    Subjective   Chief Complaint: Follow-up  HPI   Sitting up in bed.  Oxygen 3 L which is her baseline.  She denies chest pain, palpitations or shortness of breath.  Pigtail catheter remains in right chest.  CTS has decided to leave until tomorrow due to increased drainage.  New Pleur-evac now has 150 mL drainage.  Patient denies nausea, vomiting or abdominal pain.  Have encouraged her to be out of bed and work with physical therapy today.  No further episodes of \"heartburn\".  Skilled nursing facility cannot take patient with pigtail catheter in place.  [facility] office visit for Monday.  Discussed palliative care consult with patient.    Review of Systems   Constitutional: Positive for fatigue. Negative for appetite change, chills and fever.   HENT: Negative for congestion and trouble swallowing.    Eyes: Negative for photophobia and visual disturbance.   Respiratory: Positive for shortness of breath. Negative for wheezing.    Cardiovascular: Positive for chest pain. Negative for palpitations and leg swelling.   Gastrointestinal: Negative for constipation, diarrhea, nausea and vomiting.   Endocrine: Negative for cold intolerance, heat intolerance and polyuria.   Genitourinary: Negative for dysuria, frequency and urgency.   Musculoskeletal: Positive for gait problem.   Skin: Negative for pallor, rash and wound.   Allergic/Immunologic: Negative for immunocompromised state.   Neurological: Positive for weakness. Negative for light-headedness.   Hematological: Negative for adenopathy. Does not bruise/bleed easily.   Psychiatric/Behavioral: Negative for agitation, behavioral problems and confusion    All pertinent negatives and positives are as above. All other systems have been reviewed and are negative unless otherwise stated. "     Objective    Temp:  [97.6 °F (36.4 °C)-97.8 °F (36.6 °C)] 97.7 °F (36.5 °C)  Heart Rate:  [] 87  Resp:  [18-20] 18  BP: ()/(49-64) 105/64  Physical Exam  Vitals and nursing note reviewed.   Constitutional:       Comments: Sitting up in bed.  Oxygen at 3 L.  No family in room.   HENT:      Head: Normocephalic and atraumatic.      Nose: No congestion.      Mouth/Throat:      Pharynx: Oropharynx is clear. No oropharyngeal exudate or posterior oropharyngeal erythema.   Eyes:      Extraocular Movements: Extraocular movements intact.      Pupils: Pupils are equal, round, and reactive to light.   Cardiovascular:      Rate and Rhythm: Normal rate. Rhythm irregular.      Heart sounds: No murmur heard.     Comments: Atrial fibrillation 98 on telemetry.  Pulmonary:      Breath sounds: No wheezing, rhonchi or rales.      Comments: Oxygen at 3 L.  Right pigtail catheter to Pleur-evac with 150 mL serous drainage.  Abdominal:      Palpations: Abdomen is soft.   Genitourinary:     Comments: Voiding.  Musculoskeletal:         General: Tenderness (Right pigtail catheter insertion site.) present.      Cervical back: Normal range of motion and neck supple.      Comments: Unna boots bilateral lower extremities.   Skin:     General: Skin is warm and dry.   Neurological:      General: No focal deficit present.      Mental Status: She is alert and oriented to person, place, and time.   Psychiatric:         Mood and Affect: Mood normal.         Behavior: Behavior normal.         Thought Content: Thought content normal.         Judgment: Judgment normal.      Results Review:  I have reviewed the labs, radiology results, and diagnostic studies.    Laboratory Data:    Results from last 7 days   Lab Units 03/10/22  0740 03/09/22  1149 03/08/22  2249   WBC 10*3/mm3 8.83 8.81 11.59*   HEMOGLOBIN g/dL 10.9* 10.8* 11.7*   HEMATOCRIT % 34.9 34.9 37.7   PLATELETS 10*3/mm3 248 247 299     Results from last 7 days   Lab Units  03/11/22  0749 03/10/22  0740 03/09/22  1149 03/08/22  2249 03/06/22  0749 03/05/22  0726   SODIUM mmol/L 133* 135* 133* 131* 136 135*   POTASSIUM mmol/L 3.9 4.5 4.7 5.0 4.0 4.0   CHLORIDE mmol/L 90* 93* 88* 88* 92* 90*   CO2 mmol/L 37.0* 33.0* 33.0* 32.0* 37.0* 37.0*   BUN mg/dL 40* 47* 45* 46* 40* 44*   CREATININE mg/dL 1.40* 1.45* 1.59* 1.69* 1.50* 1.73*   GLUCOSE mg/dL 152* 122* 94 186* 119* 98   CALCIUM mg/dL 8.6 8.7 9.4 9.2 8.8 9.2   ALT (SGPT) U/L  --   --  40* 42*  --   --      Culture Data:    No results found for: BLOODCX, URINECX, WOUNDCX, MRSACX, RESPCX, STOOLCX    Radiology Data:   Imaging Results (Last 7 Days)     Procedure Component Value Units Date/Time    XR Chest 1 View [[id number]] Collected: 03/11/22 0733     Updated: 03/11/22 0737    Narrative:      EXAM: XR CHEST 1 VW-     INDICATION: Right pleural effusion     COMPARISON: Prior day     FINDINGS:     Right-sided chest tube is stable in position. No change in small  bilateral pleural effusions and bilateral mid-lower lung zone pulmonary  infiltrates. Emphysema is again noted. Cardiac silhouette is markedly  enlarged but stable. No measurable pneumothorax.       Impression:         No change in appearance of the chest.  This report was finalized on 03/11/2022 07:34 by Dr. Nam Funez MD.    XR Chest 1 View [[id number]] Collected: 03/10/22 0713     Updated: 03/10/22 0717    Narrative:      EXAM: XR CHEST 1 VW-     INDICATION: RIGHT pleural effusion     COMPARISON: Prior day.     FINDINGS:     Right-sided chest tube is stable in position. No change in RIGHT greater  than LEFT small pleural effusions with overlying atelectasis. No  measurable pneumothorax. Small amount of soft tissue gas in the RIGHT  chest wall. Cardiac silhouette is enlarged but stable. Diffuse  interstitial coarsening is again noted.       Impression:         No change in appearance of the chest.  This report was finalized on 03/10/2022 07:14 by Dr. Nam Funez MD.    XR  Chest 1 View [[id number]] Collected: 03/09/22 1656     Updated: 03/09/22 1701    Narrative:      EXAMINATION: XR CHEST 1 VW- 3/9/2022 4:56 PM CST     HISTORY: Follow up right thoracentesis with drainage tube placement;  I48.91-Unspecified atrial fibrillation; G43-Bebcsyc effusion, not  elsewhere classified; R06.00-Dyspnea, unspecified; J44.9-Chronic  obstructive pulmonary disease, unspecified; N18.9-Chronic kidney  disease, unspecified; R77.8-Other specified abnormalities of plasma  proteins; Z74.09-Other reduced mobility; Z78.9-Other specified health  status.     REPORT: A frontal view of the chest was obtained.     COMPARISON: Chest x-ray 3/8/2022 2304 hours.     A smallbore right basilar chest tube has been inserted, and appears to  be in satisfactory position, there is reduction in volume of the right  pleural effusion. No pneumothorax is identified. There appears to be a  small stable left pleural effusion. There are moderate volume loss in  the lung bases. The heart is enlarged. No overt CHF is identified.  Underlying chronic lung changes and interstitial disease appears stable.  The osseous structures are unremarkable.       Impression:      Interval insertion of a smallbore right basilar chest tube  in good position with reduction in volume of the right pleural effusion  and no pneumothorax. Continued extensive volume loss in the lung bases  with cardiomegaly.  This report was finalized on 03/09/2022 16:58 by Dr. Jhon Caruso MD.    XR Chest 1 View [[id number]] Collected: 03/09/22 0724     Updated: 03/09/22 0731    Narrative:      EXAM: XR CHEST 1 VW-     INDICATION: Shortness of air     COMPARISON: 3/2/2022     FINDINGS:     Cardiac silhouette is enlarged but stable. No change in moderate RIGHT  pleural effusion and trace LEFT pleural effusion. No significant change  in bilateral interstitial opacity and patchy airspace opacity. No  visible pneumothorax. No acute osseous finding.       Impression:          Similar appearance to 3/2/2022 with cardiomegaly, persistent moderate  RIGHT and small LEFT pleural effusion with overlying atelectasis,  bilateral interstitial opacity, and patchy airspace opacities. Favor  volume overload/pulmonary edema.  This report was finalized on 03/09/2022 07:28 by Dr. Nam Funez MD.      Results for orders placed during the hospital encounter of 01/21/22    Adult Transthoracic Echo Complete W/ Cont if Necessary Per Protocol    Interpretation Summary  · Assessment of left ventricular ejection fraction somewhat difficult due to the presence of atrial fibrillation.  · Left ventricular systolic function is low normal. Left ventricular ejection fraction appears to be 51 - 55%.  · Left ventricular wall thickness is consistent with mild to moderate concentric hypertrophy.  · The right ventricular cavity is dilated. Mildly reduced right ventricular systolic function noted.  · Biatrial enlargment is noted.  · No hemodynamically significant valvular abnormalities identified on this study.      Intake/Output    Intake/Output Summary (Last 24 hours) at 3/11/2022 1218  Last data filed at 3/11/2022 0500  Gross per 24 hour   Intake --   Output 1325 ml   Net -1325 ml     Scheduled Meds  apixaban, 2.5 mg, Oral, Q12H  atorvastatin, 40 mg, Oral, Daily  budesonide-formoterol, 2 puff, Inhalation, BID - RT  bumetanide, 1 mg, Intravenous, Q12H  busPIRone, 10 mg, Oral, TID  dilTIAZem CD, 120 mg, Oral, Q24H  docusate sodium, 100 mg, Oral, BID  guaiFENesin, 1,200 mg, Oral, BID  ipratropium, 0.5 mg, Nebulization, 4x Daily - RT  lisinopril, 10 mg, Oral, Daily  metoprolol tartrate, 75 mg, Oral, Q12H  multivitamin with minerals, 1 tablet, Oral, Daily  pantoprazole, 40 mg, Oral, BID  pramipexole, 0.25 mg, Oral, Nightly  sertraline, 50 mg, Oral, Daily  sodium chloride, 10 mL, Intravenous, Q12H      I have reviewed the patient current medications.     Assessment/Plan     Active Hospital Problems    Diagnosis    •  **Recurrent pleural effusion on right    • Atrial fibrillation with RVR ([facility])    • Stage 3b chronic kidney disease ([facility])    • Stage 3 severe COPD by GOLD classification ([facility])    • Acute on chronic diastolic CHF (congestive heart failure) ([facility])    • Chronic respiratory failure with hypoxia and hypercapnia ([facility])    • Essential hypertension      Plan:  1.  To ER 3/8/2022 with shortness of breath.  Patient has had several recent admissions with acute on chronic diastolic heart failure and volume overload with atrial fibrillation and recurrent right pleural effusion.  Found to be in atrial fibrillation with RVR  In ER.  Cardizem drip started in ER.     2.  Atrial fibrillation with RVR.  Cardiology following.  Cardizem  mg added 3/9.  Cardizem drip discontinued.  CardioMEMS mentioned with previous hospitalization but may be difficult given patient's age and comorbidities.  Outpatient cardioversion for rate control also mentioned as it is felt that atrial fibrillation with RVR is contributing to volume overload.  Remains atrial fibrillation rate controlled at present.   Eliquis initially on hold.  Discussed with HALIMA Cast with CTS yesterday and Eliquis restarted.     3.  Acute on chronic diastolic congestive heart failure secondary to atrial fibrillation with RVR.  Ejection fraction 51-55% per echo 1/2022.  Continue IV Bumex.  1500 mL fluid restriction.  Daily weights.  Creatinine 1.4 today.  Daily BMP.  Lisinopril (ACE inhibitor),  Metoprolol (beta-blocker) continued.     4.  Recurrent right pleural effusion.  Chest x-ray similar to 3/2 with cardiomegaly, persistent moderate right and small left pleural effusion with overlying atelectasis and bilateral interstitial opacities and patchy airspace opacities.  Favor pulmonary edema.  Patient had thoracentesis 2/22 per Dr. Salgado with 1200 mL fluid removed.  Per lights criteria transudate effusion consistent with CHF.  CTS, Dr. Zimmer following.  Right pigtail  catheter placed at bedside.  1500 mL fluid drained.  Breathing better with fluid drained and Hydrophed controlled.  Continue diuresis.  Keep pigtail catheter until tomorrow.  If pleural effusion reoccurs discuss Pleurx catheter as palliative measure but will likely result in dehydration as outpatient.  150 mL output new Pleur-evac.  X-ray today unchanged.     5.  COPD stage III.  No exacerbation.  Continue Symbicort and Mucinex.  Plain Atrovent as opposed to DuoNeb's with history of A. fib with RVR.  Incentive spirometry.  Continue oxygen 3 L home setting.     6.  Chronic kidney disease stage IIIb, stable.  Creatinine 1.69 on admission, 1.4 today.  Daily BMP.     8.  Primary hypertension.  Blood pressure 105/64, 94/51.     9.  Physical therapy consulted.  Unna boots bilateral lower extremities placed last admission for compression purposes.     10.   for skilled nursing facility placement.  [facility] to follow-up on Monday 3/14 to monitor progress.    11.  Consult palliative care for insight into disease process with recurrent frequent admissions.     CODE STATUS/advance care planning: No CPR, no intubation, no cardioversion.  The patient surrogate decision-maker is her granddaughter, Genevieve.    The above documentation resulted from a face-to-face encounter by me Kenya VARGHESE, [facility].    Discharge Planning: I expect patient to be discharged to [facility] when bed offered and insurance approves.    Electronically signed by HALIMA Joy, 3/11/2022, 12:18 CST.

## 2022-03-11 NOTE — PLAN OF CARE
Problem: Adult Inpatient Plan of Care  Goal: Plan of Care Review  Outcome: Ongoing, Progressing  Goal: Patient-Specific Goal (Individualized)  Outcome: Ongoing, Progressing  Goal: Absence of Hospital-Acquired Illness or Injury  Outcome: Ongoing, Progressing  Intervention: Identify and Manage Fall Risk  Recent Flowsheet Documentation  Taken 3/10/2022 1506 by Arelis Chew RN  Safety Promotion/Fall Prevention: safety round/check completed  Taken 3/10/2022 1421 by Arelis Chew RN  Safety Promotion/Fall Prevention:   assistive device/personal items within reach   activity supervised   clutter free environment maintained   fall prevention program maintained   gait belt   muscle strengthening facilitated   nonskid shoes/slippers when out of bed   toileting scheduled   safety round/check completed   room organization consistent  Intervention: Prevent Skin Injury  Recent Flowsheet Documentation  Taken 3/10/2022 0945 by Arelis Chew RN  Skin Protection:   protective footwear used   tubing/devices free from skin contact   skin sealant/moisture barrier applied  Intervention: Prevent and Manage VTE (Venous Thromboembolism) Risk  Recent Flowsheet Documentation  Taken 3/10/2022 1421 by Arelis Cehw RN  Activity Management: (to [facility])   activity adjusted per tolerance   activity minimized  Taken 3/10/2022 0945 by Arelis Chew RN  VTE Prevention/Management:   bilateral   sequential compression devices on  Goal: Optimal Comfort and Wellbeing  Outcome: Ongoing, Progressing  Goal: Readiness for Transition of Care  Outcome: Ongoing, Progressing     Problem: Fall Injury Risk  Goal: Absence of Fall and Fall-Related Injury  Outcome: Ongoing, Progressing  Intervention: Identify and Manage Contributors  Recent Flowsheet Documentation  Taken 3/10/2022 1421 by Arelis Chew RN  Medication Review/Management: medications reviewed  Intervention: Promote Injury-Free Environment  Recent Flowsheet Documentation  Taken 3/10/2022 1506 by  Tevis, Arelis, RN  Safety Promotion/Fall Prevention: safety round/check completed  Taken 3/10/2022 1421 by Arelis Chew RN  Safety Promotion/Fall Prevention:   assistive device/personal items within reach   activity supervised   clutter free environment maintained   fall prevention program maintained   gait belt   muscle strengthening facilitated   nonskid shoes/slippers when out of bed   toileting scheduled   safety round/check completed   room organization consistent  Goal: Absence of Fall and Fall-Related Injury  Outcome: Ongoing, Progressing  Intervention: Identify and Manage Contributors  Recent Flowsheet Documentation  Taken 3/10/2022 1421 by Arelis Chew RN  Medication Review/Management: medications reviewed  Intervention: Promote Injury-Free Environment  Recent Flowsheet Documentation  Taken 3/10/2022 1506 by Arelis Chew RN  Safety Promotion/Fall Prevention: safety round/check completed  Taken 3/10/2022 1421 by Arelis Chew RN  Safety Promotion/Fall Prevention:   assistive device/personal items within reach   activity supervised   clutter free environment maintained   fall prevention program maintained   gait belt   muscle strengthening facilitated   nonskid shoes/slippers when out of bed   toileting scheduled   safety round/check completed   room organization consistent  Goal: Absence of Fall and Fall-Related Injury  Outcome: Ongoing, Progressing  Intervention: Identify and Manage Contributors  Recent Flowsheet Documentation  Taken 3/10/2022 1421 by Arelis Chew RN  Medication Review/Management: medications reviewed  Intervention: Promote Injury-Free Environment  Recent Flowsheet Documentation  Taken 3/10/2022 1506 by Arelis Chew RN  Safety Promotion/Fall Prevention: safety round/check completed  Taken 3/10/2022 1421 by Arelis Chew RN  Safety Promotion/Fall Prevention:   assistive device/personal items within reach   activity supervised   clutter free environment maintained   fall  prevention program maintained   gait belt   muscle strengthening facilitated   nonskid shoes/slippers when out of bed   toileting scheduled   safety round/check completed   room organization consistent  Goal: Absence of Fall and Fall-Related Injury  Outcome: Ongoing, Progressing  Intervention: Identify and Manage Contributors  Recent Flowsheet Documentation  Taken 3/10/2022 1421 by Arelis Chew RN  Medication Review/Management: medications reviewed  Intervention: Promote Injury-Free Environment  Recent Flowsheet Documentation  Taken 3/10/2022 1506 by Arelis Chew RN  Safety Promotion/Fall Prevention: safety round/check completed  Taken 3/10/2022 1421 by Arelis Chew RN  Safety Promotion/Fall Prevention:   assistive device/personal items within reach   activity supervised   clutter free environment maintained   fall prevention program maintained   gait belt   muscle strengthening facilitated   nonskid shoes/slippers when out of bed   toileting scheduled   safety round/check completed   room organization consistent  Goal: Absence of Fall and Fall-Related Injury  Outcome: Ongoing, Progressing  Intervention: Identify and Manage Contributors  Recent Flowsheet Documentation  Taken 3/10/2022 1421 by Arelis Chew RN  Medication Review/Management: medications reviewed  Intervention: Promote Injury-Free Environment  Recent Flowsheet Documentation  Taken 3/10/2022 1506 by Arelis Chew RN  Safety Promotion/Fall Prevention: safety round/check completed  Taken 3/10/2022 1421 by Arelis Chew RN  Safety Promotion/Fall Prevention:   assistive device/personal items within reach   activity supervised   clutter free environment maintained   fall prevention program maintained   gait belt   muscle strengthening facilitated   nonskid shoes/slippers when out of bed   toileting scheduled   safety round/check completed   room organization consistent     Problem: Skin Injury Risk Increased  Goal: Skin Health and  Integrity  Outcome: Ongoing, Progressing  Intervention: Promote and Optimize Oral Intake  Recent Flowsheet Documentation  Taken 3/10/2022 0945 by Arelis Chew RN  Oral Nutrition Promotion: rest periods promoted  Intervention: Optimize Skin Protection  Recent Flowsheet Documentation  Taken 3/10/2022 0945 by Arelis Chew RN  Pressure Reduction Techniques:   frequent weight shift encouraged   heels elevated off bed   pressure points protected   positioned off wounds   weight shift assistance provided  Pressure Reduction Devices: positioning supports utilized  Skin Protection:   protective footwear used   tubing/devices free from skin contact   skin sealant/moisture barrier applied     Problem: COPD (Chronic Obstructive Pulmonary Disease) Comorbidity  Goal: Maintenance of COPD Symptom Control  Outcome: Ongoing, Progressing  Intervention: Maintain COPD-Symptom Control  Recent Flowsheet Documentation  Taken 3/10/2022 1421 by Arelis Chew RN  Medication Review/Management: medications reviewed     Problem: Heart Failure Comorbidity  Goal: Maintenance of Heart Failure Symptom Control  Outcome: Ongoing, Progressing  Intervention: Maintain Heart Failure-Management  Recent Flowsheet Documentation  Taken 3/10/2022 1421 by Arelis Chew RN  Medication Review/Management: medications reviewed     Problem: Hypertension Comorbidity  Goal: Blood Pressure in Desired Range  Outcome: Ongoing, Progressing  Intervention: Maintain Blood Pressure Management  Recent Flowsheet Documentation  Taken 3/10/2022 1421 by Arelis Chew RN  Medication Review/Management: medications reviewed   Goal Outcome Evaluation:    AOX4. 2L NC.

## 2022-03-11 NOTE — PLAN OF CARE
Goal Outcome Evaluation:  Plan of Care Reviewed With: patient        Progress: no change  Outcome Evaluation: Alert and oriented x4.  3L O2 per NC. Pigtail catheter placed to right side with drainage system.  Chest tube containter changed.  Dressing is CDI.  Pt still has a freq congested cough.  New IV started in pt's left hand. She is up to BSC with assist x1.  Call light in reach, safety maintained.

## 2022-03-12 NOTE — PROGRESS NOTES
[facility] Medicine Services  INPATIENT PROGRESS NOTE    Length of Stay: 3  Date of Admission: 3/8/2022  Primary Care Physician: Austin Wade DO    Subjective   Chief Complaint: Follow-up  HPI   Patient seen earlier sitting up on side of bed working with physical therapy.  She was able to sit to stand with physical therapy and get from the bed to the chair with rolling walker.  Oxygen at 3 L.  Pigtail catheter was in place at that time but Dr. Zimmer has since removed pigtail catheter.  Drainage at 400 chest tube prior to removal.  Denies chest pain or palpitations.  Denies nausea, vomiting or abdominal pain.  Blood pressure running lower 92/55.  Metoprolol held today.  Decrease lisinopril dose to 2.5 mg twice daily.  I discussed palliative care with patient yesterday and again today.  It is my understanding Dr. Reveles discussed palliative care with patient and if symptoms worsen consider hospice.  Could consider Pleurx catheter placement but would put her at increased risk for dehydration.     Review of systems  Constitutional: Positive for fatigue. Negative for appetite change, chills and fever.   HENT: Negative for congestion and trouble swallowing.    Eyes: Negative for photophobia and visual disturbance.   Respiratory: Positive for shortness of breath. Negative for wheezing.    Cardiovascular: Negative for palpitations and leg swelling.   Gastrointestinal: Negative for constipation, diarrhea, nausea and vomiting.   Endocrine: Negative for cold intolerance, heat intolerance and polyuria.   Genitourinary: Negative for dysuria, frequency and urgency.   Musculoskeletal: Positive for gait problem.   Skin: Negative for pallor, rash and wound.   Allergic/Immunologic: Negative for immunocompromised state.   Neurological: Positive for weakness. Negative for light-headedness.   Hematological: Negative for adenopathy. Does not bruise/bleed easily.   Psychiatric/Behavioral:  Negative for agitation, behavioral problems and confusion    All pertinent negatives and positives are as above. All other systems have been reviewed and are negative unless otherwise stated.     Objective    Temp:  [97.7 °F (36.5 °C)-97.9 °F (36.6 °C)] 97.8 °F (36.6 °C)  Heart Rate:  [] 110  Resp:  [16-20] 16  BP: ()/(48-80) 92/55  Physical Exam  Vitals and nursing note reviewed.   Constitutional:       Comments: Sitting up on side of bed.  Oxygen at 3 L.  No family in room.   HENT:      Head: Normocephalic and atraumatic.      Nose: No congestion.      Mouth/Throat:      Pharynx: Oropharynx is clear. No oropharyngeal exudate or posterior oropharyngeal erythema.   Eyes:      Extraocular Movements: Extraocular movements intact.      Pupils: Pupils are equal, round, and reactive to light.   Cardiovascular:      Rate and Rhythm: Normal rate. Rhythm irregular.      Heart sounds: No murmur heard.     Comments: Atrial fibrillation  on telemetry.  Pulmonary:      Breath sounds: No wheezing, rhonchi or rales.      Comments: Oxygen at 3 L.  Right pigtail catheter removed per Dr. Zimmer  Abdominal:      Palpations: Abdomen is soft.   Genitourinary:     Comments: Voiding.  Musculoskeletal:         Cervical back: Normal range of motion and neck supple.      Comments: Unna boots bilateral lower extremities.   Skin:     General: Skin is warm and dry.   Neurological:      General: No focal deficit present.      Mental Status: She is alert and oriented to person, place, and time.   Psychiatric:         Mood and Affect: Mood normal.         Behavior: Behavior normal.         Thought Content: Thought content normal.         Judgment: Judgment normal.      Results Review:  I have reviewed the labs, radiology results, and diagnostic studies.    Laboratory Data:      Results from last 7 days   Lab Units 03/10/22  0740 03/09/22  1149 03/08/22  2249   WBC 10*3/mm3 8.83 8.81 11.59*   HEMOGLOBIN g/dL 10.9* 10.8* 11.7*    HEMATOCRIT % 34.9 34.9 37.7   PLATELETS 10*3/mm3 248 247 299     Results from last 7 days   Lab Units 03/12/22  0704 03/11/22  0749 03/10/22  0740 03/09/22  1149 03/08/22  2249 03/06/22  0749   SODIUM mmol/L 130* 133* 135* 133* 131* 136   POTASSIUM mmol/L 4.1 3.9 4.5 4.7 5.0 4.0   CHLORIDE mmol/L 89* 90* 93* 88* 88* 92*   CO2 mmol/L 35.0* 37.0* 33.0* 33.0* 32.0* 37.0*   BUN mg/dL 40* 40* 47* 45* 46* 40*   CREATININE mg/dL 1.51* 1.40* 1.45* 1.59* 1.69* 1.50*   GLUCOSE mg/dL 106* 152* 122* 94 186* 119*   CALCIUM mg/dL 8.7 8.6 8.7 9.4 9.2 8.8   ALT (SGPT) U/L  --   --   --  40* 42*  --      Culture Data:    No results found for: BLOODCX, URINECX, WOUNDCX, MRSACX, RESPCX, STOOLCX    Radiology Data:   Imaging Results (Last 7 Days)     Procedure Component Value Units Date/Time    XR Chest 1 View [[id number]] Collected: 03/12/22 0725     Updated: 03/12/22 0731    Narrative:      EXAMINATION:  XR CHEST 1 VW-  3/12/2022 4:20 AM CST     HISTORY: Right pleural effusion; I48.91-Unspecified atrial fibrillation;  C07-[place] effusion, not elsewhere classified; R06.00-Dyspnea,  unspecified; J44.9-Chronic obstructive pulmonary disease, unspecified;  N18.9-Chronic kidney disease, unspecified; R77.8-Other specified  abnormalities of plasma proteins; Z74.09-Other reduced mobility;  Z78.9-Other specified health status; R60.0-Localized edema; Z74.09-Oth     COMPARISON: 3/11/2022.     FINDINGS:  There is cardiomegaly. There are patchy infiltrates in the  mid and lower lung zones. There is blunting of the costophrenic angles.  There is a right chest tube in place. There is no measurable  pneumothorax. There is atheromatous disease of the thoracic aorta. No  acute bony abnormality is seen.       Impression:      1. Patchy infiltrates in the mid and lower lung zones. No significant  change.  2. Right chest tube in place. No measurable pneumothorax.  3. Cardiomegaly.        This report was finalized on 03/12/2022 07:28 by Dr. Collin So,  MD.    XR Chest 1 View [[id number]] Collected: 03/11/22 0733     Updated: 03/11/22 0737    Narrative:      EXAM: XR CHEST 1 VW-     INDICATION: Right pleural effusion     COMPARISON: Prior day     FINDINGS:     Right-sided chest tube is stable in position. No change in small  bilateral pleural effusions and bilateral mid-lower lung zone pulmonary  infiltrates. Emphysema is again noted. Cardiac silhouette is markedly  enlarged but stable. No measurable pneumothorax.       Impression:         No change in appearance of the chest.  This report was finalized on 03/11/2022 07:34 by Dr. Nam Funez MD.    XR Chest 1 View [[id number]] Collected: 03/10/22 0713     Updated: 03/10/22 0717    Narrative:      EXAM: XR CHEST 1 VW-     INDICATION: RIGHT pleural effusion     COMPARISON: Prior day.     FINDINGS:     Right-sided chest tube is stable in position. No change in RIGHT greater  than LEFT small pleural effusions with overlying atelectasis. No  measurable pneumothorax. Small amount of soft tissue gas in the RIGHT  chest wall. Cardiac silhouette is enlarged but stable. Diffuse  interstitial coarsening is again noted.       Impression:         No change in appearance of the chest.  This report was finalized on 03/10/2022 07:14 by Dr. Nam Funez MD.    XR Chest 1 View [[id number]] Collected: 03/09/22 1656     Updated: 03/09/22 1701    Narrative:      EXAMINATION: XR CHEST 1 VW- 3/9/2022 4:56 PM CST     HISTORY: Follow up right thoracentesis with drainage tube placement;  I48.91-Unspecified atrial fibrillation; E70-[place] effusion, not  elsewhere classified; R06.00-Dyspnea, unspecified; J44.9-Chronic  obstructive pulmonary disease, unspecified; N18.9-Chronic kidney  disease, unspecified; R77.8-Other specified abnormalities of plasma  proteins; Z74.09-Other reduced mobility; Z78.9-Other specified health  status.     REPORT: A frontal view of the chest was obtained.     COMPARISON: Chest x-ray 3/8/2022 2304 hours.     A  smallbore right basilar chest tube has been inserted, and appears to  be in satisfactory position, there is reduction in volume of the right  pleural effusion. No pneumothorax is identified. There appears to be a  small stable left pleural effusion. There are moderate volume loss in  the lung bases. The heart is enlarged. No overt CHF is identified.  Underlying chronic lung changes and interstitial disease appears stable.  The osseous structures are unremarkable.       Impression:      Interval insertion of a smallbore right basilar chest tube  in good position with reduction in volume of the right pleural effusion  and no pneumothorax. Continued extensive volume loss in the lung bases  with cardiomegaly.  This report was finalized on 03/09/2022 16:58 by Dr. Jhon Caruso MD.    XR Chest 1 View [[id number]] Collected: 03/09/22 0724     Updated: 03/09/22 0731    Narrative:      EXAM: XR CHEST 1 VW-     INDICATION: Shortness of air     COMPARISON: 3/2/2022     FINDINGS:     Cardiac silhouette is enlarged but stable. No change in moderate RIGHT  pleural effusion and trace LEFT pleural effusion. No significant change  in bilateral interstitial opacity and patchy airspace opacity. No  visible pneumothorax. No acute osseous finding.       Impression:         Similar appearance to 3/2/2022 with cardiomegaly, persistent moderate  RIGHT and small LEFT pleural effusion with overlying atelectasis,  bilateral interstitial opacity, and patchy airspace opacities. Favor  volume overload/pulmonary edema.  This report was finalized on 03/09/2022 07:28 by Dr. Nam Funez MD.      Results for orders placed during the hospital encounter of 01/21/22    Adult Transthoracic Echo Complete W/ Cont if Necessary Per Protocol    Interpretation Summary  · Assessment of left ventricular ejection fraction somewhat difficult due to the presence of atrial fibrillation.  · Left ventricular systolic function is low normal. Left ventricular  ejection fraction appears to be 51 - 55%.  · Left ventricular wall thickness is consistent with mild to moderate concentric hypertrophy.  · The right ventricular cavity is dilated. Mildly reduced right ventricular systolic function noted.  · Biatrial enlargment is noted.  · No hemodynamically significant valvular abnormalities identified on this study.      Intake/Output    Intake/Output Summary (Last 24 hours) at 3/12/2022 1412  Last data filed at 3/12/2022 0900  Gross per 24 hour   Intake 740 ml   Output 1165 ml   Net -425 ml     Scheduled Meds  apixaban, 2.5 mg, Oral, Q12H  atorvastatin, 40 mg, Oral, Daily  budesonide-formoterol, 2 puff, Inhalation, BID - RT  bumetanide, 1 mg, Intravenous, Q12H  busPIRone, 10 mg, Oral, TID  dilTIAZem CD, 120 mg, Oral, Q24H  docusate sodium, 100 mg, Oral, BID  guaiFENesin, 1,200 mg, Oral, BID  ipratropium, 0.5 mg, Nebulization, 4x Daily - RT  [START ON 3/13/2022] lisinopril, 2.5 mg, Oral, Daily  metoprolol tartrate, 75 mg, Oral, Q12H  morphine, 5 mg, Oral, TID  multivitamin with minerals, 1 tablet, Oral, Daily  pantoprazole, 40 mg, Oral, BID  pramipexole, 0.25 mg, Oral, Nightly  sertraline, 50 mg, Oral, Daily  sodium chloride, 10 mL, Intravenous, Q12H      I have reviewed the patient current medications.     Assessment/Plan     Active Hospital Problems    Diagnosis    • **Recurrent pleural effusion on right    • Atrial fibrillation with RVR ([facility])    • Stage 3b chronic kidney disease ([facility])    • Hyponatremia    • Stage 3 severe COPD by GOLD classification ([facility])    • Acute on chronic diastolic CHF (congestive heart failure) ([facility])    • Chronic respiratory failure with hypoxia and hypercapnia ([facility])    • Essential hypertension      Plan:  1.  To ER 3/8/2022 with shortness of breath.  Patient has had several recent admissions with acute on chronic diastolic heart failure and volume overload with atrial fibrillation and recurrent right pleural effusion.  Found to be in atrial fibrillation  with RVR  In ER.  Cardizem drip started in ER.     2.  Atrial fibrillation with RVR.  Cardiology following.  Cardizem  mg added 3/9.  Cardizem drip discontinued.  CardioMEMS mentioned with previous hospitalization but may be difficult given patient's age and comorbidities.  Outpatient cardioversion for rate control also mentioned as it is felt that atrial fibrillation with RVR is contributing to volume overload.  Remains atrial fibrillation rate controlled at present. Eliquis restarted 3/10 after discussing with CTS.    3.  Acute on chronic diastolic congestive heart failure secondary to atrial fibrillation with RVR.  Ejection fraction 51-55% per echo 1/2022.  1500 mL fluid restriction.  Has been on IV Bumex twice daily.  Transition to oral Bumex once daily.  Creatinine up to 1.51 today, 1.69 on admission.  Sodium trended down 130, 131 on admission.  Lisinopril (ACE) dose decreased to 2.5 mg daily due to low blood pressure.  Metoprolol (beta-blocker) held today.  May need to decrease dose of beta-blocker now that patient is also on Cardizem CD    4.  Recurrent right pleural effusion.  Chest x-ray similar to 3/2 with cardiomegaly, persistent moderate right and small left pleural effusion with overlying atelectasis and bilateral interstitial opacities and patchy airspace opacities.  Favor pulmonary edema.  Patient had thoracentesis 2/22 per Dr. Salgado with 1200 mL fluid removed.  Per lights criteria transudate effusion consistent with CHF.  CTS, Dr. Zimmer following.  Right pigtail catheter placed at bedside.  1500 mL fluid drained.  Breathing better with fluid drained and Hydrophed controlled.  Continue diuresis.  Pigtail catheter removed today per Dr. Zimmer.  He discussed palliative care with patient.  If symptoms worsen consider hospice.  If recurrence of effusion consider palliative placement of Pleurx catheter but would put patient at increased risk for dehydration.     5.  COPD stage III.  No exacerbation.   Continue Symbicort and Mucinex.  Plain Atrovent as opposed to DuoNeb's with history of A. fib with RVR.  Incentive spirometry.  Continue oxygen 3 L home setting.     6.  Chronic kidney disease stage IIIb, stable.  Creatinine 1.69 on admission, 1.51 today.  Daily BMP.  BMP in a.m.     8.  Primary hypertension.  Blood pressure 92/55, 100/48.  Lisinopril dose decreased to 2.5 mg daily.  Dose held today.  Will decrease metoprolol dose to 25 mg twice daily but dose held today due to low blood pressure.     9.  Physical therapy consulted.  Unna boots bilateral lower extremities placed last admission for compression purposes.     10.   for skilled nursing facility placement.  [facility] to follow-up on Monday 3/14 to monitor progress.    11.  Palliative care consulted and saw patient yesterday.  CODE STATUS changed.  MOST form completed.     CODE STATUS/advance care planning: No CPR, no intubation, no cardioversion.  The patient surrogate decision-maker is her granddaughter, Genevieve.    The above documentation resulted from a face-to-face encounter by me Kenya VARGHESE, [facility].    Discharge Planning: I expect patient to be discharged to [facility] when bed offered and insurance approves.    Electronically signed by HALIMA Joy, 3/12/2022, 14:12 CST.

## 2022-03-12 NOTE — THERAPY TREATMENT NOTE
Acute Care - Physical Therapy Treatment Note  [facility]     Patient Name: Cindy Hicks  : 1935  MRN: [id number]  Today's Date: 3/12/2022      Visit Dx:     ICD-10-CM ICD-9-CM   1. Atrial fibrillation with RVR ([facility])  I48.91 427.31   2. Pleural effusion  J90 511.9   3. Dyspnea, unspecified type  R06.00 786.09   4. Chronic obstructive pulmonary disease, unspecified COPD type ([facility])  J44.9 496   5. Chronic kidney disease, unspecified CKD stage  N18.9 585.9   6. Elevated troponin  R77.8 790.6   7. Impaired mobility and ADLs  Z74.09 V49.89    Z78.9    8. Edema of both lower legs  R60.0 782.3   9. Impaired mobility  Z74.09 799.89     Patient Active Problem List   Diagnosis   • Family hx colonic polyps   • Family hx of colon cancer   • Hx of colonic polyp   • Melena   • Peptic ulcer disease   • Essential hypertension   • Mixed hyperlipidemia   • Overweight (BMI 25.0-29.9)   • Stage 3a chronic kidney disease ([facility])   • Recurrent pleural effusion on right   • Actinic keratosis   • Atherosclerosis of native artery of both lower extremities with intermittent claudication ([facility])   • Carotid artery stenosis   • Chronic pain   • Gastroesophageal reflux disease   • GI bleed   • Iron deficiency anemia secondary to inadequate dietary iron intake   • Irritable bowel syndrome   • Macular degeneration   • Osteoarthritis   • Osteopenia of multiple sites   • Age-related osteoporosis without current pathological fracture   • Other insomnia   • Restless legs syndrome   • Vitamin D deficiency   • Pulmonary emphysema ([facility])   • Chronic respiratory failure with hypoxia and hypercapnia ([facility])   • Cigarette nicotine dependence without complication   • COPD with acute exacerbation ([facility])   • Acute on chronic diastolic CHF (congestive heart failure) ([facility])   • Stage 3 severe COPD by GOLD classification ([facility])   • Acute on chronic respiratory failure with hypoxia ([facility])   • Paroxysmal atrial fibrillation ([facility])   • Personal history of  nicotine dependence   • Acute diastolic congestive heart failure ([facility])   • Chronic anticoagulation   • Stage 3b chronic kidney disease ([facility])   • Hyponatremia   • Atrial fibrillation with RVR ([facility])     Past Medical History:   Diagnosis Date   • Actinic keratosis    • Arthritis    • Atherosclerosis    • Benign fundic gland polyps of stomach    • Bleeding ulcer    • Carotid artery bruit    • Carotid artery stenosis    • COPD (chronic obstructive pulmonary disease) ([facility])    • Diverticulosis    • Emphysema of lung ([facility])    • History of colon polyps    • Hyperlipidemia    • Hypertension    • IBS (irritable bowel syndrome)    • Macular degeneration    • Osteoporosis    • Prediabetes    • PVD (peripheral vascular disease) ([facility])    • TIA (transient ischemic attack)    • Vitamin D deficiency      Past Surgical History:   Procedure Laterality Date   • CATARACT EXTRACTION     • COLONOSCOPY  03/15/2013    polyp, hyperplastic   • COLONOSCOPY N/A 10/2/2018    Procedure: COLONOSCOPY WITH ANESTHESIA;  Surgeon: Harris Escobar MD;  Location: [facility] ENDOSCOPY;  Service: Gastroenterology   • CYST REMOVAL     • ENDOSCOPY  2019   • ENDOSCOPY N/A 10/4/2019    Procedure: ESOPHAGOGASTRODUODENOSCOPY WITH ANESTHESIA;  Surgeon: Harris Escobar MD;  Location: [facility] ENDOSCOPY;  Service: Gastroenterology   • FEMORAL ARTERY STENT     • HYSTERECTOMY     • VASCULAR SURGERY      multiple     PT Assessment (last 12 hours)     PT Evaluation and Treatment     Row Name 03/12/22 0815          Physical Therapy Time and Intention    Subjective Information complains of  -DARRIN     Document Type therapy note (daily note)  -DARRIN     Mode of Treatment physical therapy  -DARRIN     Comment pt's BP was low   87/56, pt c/o feeling fatigued and dizzy.  Pt's unna boots were intact.  Pt's erythema in pt's left 5th toe.  -DARRIN     Row Name 03/12/22 0815          General Information    Existing Precautions/Restrictions oxygen therapy device and L/min  chest tube  -DARRIN      Row Name 03/12/22 0815          Pain    Pretreatment Pain Rating 3/10  -DARRIN     Posttreatment Pain Rating 3/10  -DARRIN     Pain Location - buttock  -DARRIN     Pain Intervention(s) Repositioned  -DARRIN     Row Name 03/12/22 0815          Bed Mobility    Bed Mobility sit-supine  -DARRIN     Supine-Sit Boyd (Bed Mobility) verbal cues;minimum assist (75% patient effort)  -DARRIN     Sit-Supine Boyd (Bed Mobility) --  in the chair  -DARRIN     Assistive Device (Bed Mobility) head of bed elevated  -DARRIN     Row Name 03/12/22 0815          Transfers    Sit-Stand Boyd (Transfers) verbal cues;contact guard  -DARRIN     Row Name 03/12/22 0815          Sit-Stand Transfer    Assistive Device (Sit-Stand Transfers) walker, front-wheeled  -DARRIN     Row Name 03/12/22 0815          Gait/Stairs (Locomotion)    Boyd Level (Gait) verbal cues;contact guard  -DARRIN     Assistive Device (Gait) walker, front-wheeled  -DARRIN     Distance in Feet (Gait) few steps from the bed to the chair  -DARRIN     Comment, (Gait/Stairs) did not amb further due to low BP and pt's symptoms  -DARRIN     Row Name 03/12/22 0815          Positioning and Restraints    Pre-Treatment Position in bed  -DARRIN     Post Treatment Position chair  -DARRIN     In Chair sitting;call light within reach;encouraged to call for assist;with nsg  -DARRIN           User Key  (r) = Recorded By, (t) = Taken By, (c) = Cosigned By    Initials Name Provider Type    DARRIN Elvin Mcallister, PTA Physical Therapy Assistant                Physical Therapy Education                 Title: PT OT SLP Therapies (In Progress)     Topic: Physical Therapy (In Progress)     Point: Mobility training (Done)     Learning Progress Summary           Patient CARYN Mae VU by MS at 3/11/2022 7165    Comment: role of PT in her care, role of unna boots                   Point: Home exercise program (Not Started)     Learner Progress:  Not documented in this visit.          Point: Body mechanics (Not Started)     Learner  Progress:  Not documented in this visit.          Point: Precautions (Done)     Learning Progress Summary           Patient Acceptance, E, VU by MS at 3/11/2022 3612    Comment: role of PT in her care, role of unna boots                               User Key     Initials Effective Dates Name Provider Type Discipline    MS 06/19/18 -  Jazmine Browning, PT, DPT, NCS Physical Therapist PT              PT Recommendation and Plan     Plan of Care Reviewed With: patient  Progress: improving  Outcome Evaluation: Pt was in bed, agreed to therapy.  C/o blood pressure being low, checked and it was 87/56.  Performed supine to sitting with min assist.  Transfered sit to stand with min assist. Pt took a few steps from the bed to the chair with RWX and CGA.  did not amb further due to dizziness and low BP.  Will continue to work with pt to increase strength and progress with amb.  Pt's unna boots were intact.       Time Calculation:    PT Charges     Row Name 03/12/22 0815             Time Calculation    Start Time 0815  -DARRIN      Stop Time 0831  -DARRIN      Time Calculation (min) 16 min  -DARRIN      PT Received On 03/12/22  -DARRIN              Time Calculation- PT    Total Timed Code Minutes- PT 16 minute(s)  -DARRIN              Timed Charges    89375 - Gait Training Minutes  16  -DARRIN              Total Minutes    Timed Charges Total Minutes 16  -DARRIN       Total Minutes 16  -DARRIN            User Key  (r) = Recorded By, (t) = Taken By, (c) = Cosigned By    Initials Name Provider Type    Elvin Mata PTA Physical Therapy Assistant              Therapy Charges for Today     Code Description Service Date Service Provider Modifiers Qty    [id number] HC GAIT TRAINING EA 15 MIN 3/12/2022 Elvin Mcallister PTA GP 1          PT G-Codes  Outcome Measure Options: AM-PAC 6 Clicks Basic Mobility (PT)  AM-PAC 6 Clicks Score (PT): 15  AM-PAC 6 Clicks Score (OT): 19    Elvin Mcallister PTA  3/12/2022

## 2022-03-12 NOTE — PLAN OF CARE
Goal Outcome Evaluation:  Plan of Care Reviewed With: patient        Progress: improving  Outcome Evaluation: Pt was in bed, agreed to therapy.  C/o blood pressure being low, checked and it was 87/56.  Performed supine to sitting with min assist.  Transfered sit to stand with min assist. Pt took a few steps from the bed to the chair with RWX and CGA.  did not amb further due to dizziness and low BP.  Will continue to work with pt to increase strength and progress with amb.  Pt's unna boots were intact.

## 2022-03-12 NOTE — PROGRESS NOTES
"    [facility] Cardiothoracic Surgery  PROGRESS NOTE   CC: Recurrent right pleural effusion    Subjective:  Breathing is still improved from admission but still remains short of breath.  No significant pain.    ROS: Continued shortness of breath, no fevers or chills, hemodynamically stable    Objective:      BP 92/55 (BP Location: Left arm, Patient Position: Sitting)   Pulse 110   Temp 97.8 °F (36.6 °C) (Oral)   Resp 16   Ht 165.1 cm (65\")   Wt 76.8 kg (169 lb 4.8 oz)   SpO2 92%   BMI 28.17 kg/m²       Intake/Output Summary (Last 24 hours) at 3/12/2022 1254  Last data filed at 3/12/2022 0900  Gross per 24 hour   Intake 740 ml   Output 1165 ml   Net -425 ml       CT Output: 200 cc serous    PE:  Vitals:    03/12/22 1130   BP:    Pulse: 110   Resp: 16   Temp:    SpO2: 92%     GENERAL: NAD, resting comfortably, normal color, cachectic  CARDIOVASCULAR: regular, regular rate, sinus  PULMONARY: Normal bilateral breath sounds, mildly labored breathing  ABDOMEN: soft, nontender/nondistended  EXTREMITIES: mild peripheral edema, normal pulses, normal ROM        Lab Results (last 72 hours)     Procedure Component Value Units Date/Time    Basic Metabolic Panel [[id number]]  (Abnormal) Collected: 03/12/22 0704    Specimen: Blood Updated: 03/12/22 0744     Glucose 106 mg/dL      BUN 40 mg/dL      Creatinine 1.51 mg/dL      Sodium 130 mmol/L      Potassium 4.1 mmol/L      Chloride 89 mmol/L      CO2 35.0 mmol/L      Calcium 8.7 mg/dL      BUN/Creatinine Ratio 26.5     Anion Gap 6.0 mmol/L      eGFR 33.5 mL/min/1.73      Comment: National Kidney Foundation and American Society of Nephrology (ASN) Task Force recommended calculation based on the Chronic Kidney Disease Epidemiology Collaboration (CKD-EPI) equation refit without adjustment for race.       Narrative:      GFR Normal >60  Chronic Kidney Disease <60  Kidney Failure <15      Non-gynecologic Cytology [[id number]] Collected: 03/09/22 1343    Specimen: " "Body Fluid from Pleural Cavity Updated: 22     Case Report --     Non-gynecologic Cytology                          Case: [id number]                                  Authorizing Provider:  Zac Zimmer MD         Collected:           2022 01:43 PM          Ordering Location:     [address]  Received:            03/10/2022 11:46 AM          Pathologist:           Sergio Carlos MD                                                        Specimen:    Pleural Cavity                                                                              Final Diagnosis --     Pleural fluid, thoracentesis:  Negative for malignant cells.  Reactive mesothelial cells and lymphocytes.       Clinical Information --     Recurrent pleural effusion       Gross Description --     1. Pleural Cavity.  Received fresh labeled with patient name and  designated as \"pleural cavity.\"  60mL clear anisha fluid.  1 ThinPrep slide prepared.           Microscopic Description --     Cytologic examination shows a high cellularity pleural fluid consisting of reactive mesothelial cells and a polymorphous lymphocyte population.      Basic Metabolic Panel [[id number]]  (Abnormal) Collected: 22    Specimen: Blood Updated: 22     Glucose 152 mg/dL      BUN 40 mg/dL      Creatinine 1.40 mg/dL      Sodium 133 mmol/L      Potassium 3.9 mmol/L      Chloride 90 mmol/L      CO2 37.0 mmol/L      Calcium 8.6 mg/dL      BUN/Creatinine Ratio 28.6     Anion Gap 6.0 mmol/L      eGFR 36.7 mL/min/1.73      Comment: National Kidney Foundation and American Society of Nephrology (ASN) Task Force recommended calculation based on the Chronic Kidney Disease Epidemiology Collaboration (CKD-EPI) equation refit without adjustment for race.       Narrative:      GFR Normal >60  Chronic Kidney Disease <60  Kidney Failure <15      Basic Metabolic Panel [[id number]]  (Abnormal) Collected: 03/10/22 0740    Specimen: Blood Updated: " 03/10/22 0818     Glucose 122 mg/dL      BUN 47 mg/dL      Creatinine 1.45 mg/dL      Sodium 135 mmol/L      Potassium 4.5 mmol/L      Chloride 93 mmol/L      CO2 33.0 mmol/L      Calcium 8.7 mg/dL      BUN/Creatinine Ratio 32.4     Anion Gap 9.0 mmol/L      eGFR 35.2 mL/min/1.73      Comment: National Kidney Foundation and American Society of Nephrology (ASN) Task Force recommended calculation based on the Chronic Kidney Disease Epidemiology Collaboration (CKD-EPI) equation refit without adjustment for race.       Narrative:      GFR Normal >60  Chronic Kidney Disease <60  Kidney Failure <15      CBC (No Diff) [[id number]]  (Abnormal) Collected: 03/10/22 0740    Specimen: Blood Updated: 03/10/22 0757     WBC 8.83 10*3/mm3      RBC 3.96 10*6/mm3      Hemoglobin 10.9 g/dL      Hematocrit 34.9 %      MCV 88.1 fL      MCH 27.5 pg      MCHC 31.2 g/dL      RDW 14.8 %      RDW-SD 47.4 fl      MPV 9.7 fL      Platelets 248 10*3/mm3               CXR: Small spaces been stable right lower lateral thorax, stable expansion of right lung    Assessment/Plan     Ms. Kate is a 86-year-old female who presents with recurrent right pleural effusion.  I placed a pigtail catheter and this has been drained, there is a small residual space without full expansion of the right lower lobe.  The drainage is decreased in chest tube was removed this morning.    Yesterday had long discussion with palliative care patient is okay with discharge with the palliative plan of care to the nursing home and if symptoms get worse consider hospice    If has significant recurrence of effusion, can consider palliative placement of a Pleurx catheter.  This is to likely put her at high risk of dehydration.  We discussed this again today.    Repeat chest x-ray this afternoon if stable we will sign off follow-up as needed.    Zac Zimmer M.D.  Cardiothoracic Surgeon

## 2022-03-12 NOTE — PLAN OF CARE
Problem: Adult Inpatient Plan of Care  Goal: Plan of Care Review  Outcome: Ongoing, Progressing  Flowsheets (Taken 3/11/2022 1300 by Jazmine Browning, PT, DPT, NCS)  Plan of Care Reviewed With: patient  Goal: Patient-Specific Goal (Individualized)  Outcome: Ongoing, Progressing  Goal: Absence of Hospital-Acquired Illness or Injury  Outcome: Ongoing, Progressing  Intervention: Identify and Manage Fall Risk  Recent Flowsheet Documentation  Taken 3/11/2022 1817 by Arelis Chew RN  Safety Promotion/Fall Prevention:   safety round/check completed   room organization consistent   assistive device/personal items within reach   activity supervised   fall prevention program maintained   clutter free environment maintained   lighting adjusted   muscle strengthening facilitated   nonskid shoes/slippers when out of bed  Taken 3/11/2022 1412 by Arelis Chew RN  Safety Promotion/Fall Prevention: safety round/check completed  Taken 3/11/2022 1340 by Arelis Chew RN  Safety Promotion/Fall Prevention: safety round/check completed  Taken 3/11/2022 0950 by Arelis Chew RN  Safety Promotion/Fall Prevention:   activity supervised   clutter free environment maintained   assistive device/personal items within reach   fall prevention program maintained   toileting scheduled   safety round/check completed   room organization consistent   nonskid shoes/slippers when out of bed   muscle strengthening facilitated   lighting adjusted  Taken 3/11/2022 0725 by Arelis Chew RN  Safety Promotion/Fall Prevention:   toileting scheduled   safety round/check completed   room organization consistent   nonskid shoes/slippers when out of bed   muscle strengthening facilitated   lighting adjusted   fall prevention program maintained   gait belt   clutter free environment maintained   assistive device/personal items within reach   activity supervised  Intervention: Prevent Skin Injury  Recent Flowsheet Documentation  Taken 3/11/2022 1817 by  Arelis Chew RN  Body Position: (sitting to supine) other (see comments)  Taken 3/11/2022 1340 by Arelis Chew RN  Body Position: (chair) --  Taken 3/11/2022 0950 by Arelis Chew RN  Skin Protection: skin sealant/moisture barrier applied  Taken 3/11/2022 0725 by Arelis Chew RN  Body Position: position changed independently  Intervention: Prevent and Manage VTE (Venous Thromboembolism) Risk  Recent Flowsheet Documentation  Taken 3/11/2022 0950 by Arelis Chew RN  VTE Prevention/Management:   right   left   compression stockings on  Range of Motion: active ROM (range of motion) encouraged  Intervention: Prevent Infection  Recent Flowsheet Documentation  Taken 3/11/2022 0950 by Arelis Chew RN  Infection Prevention:   visitors restricted/screened   single patient room provided   rest/sleep promoted   personal protective equipment utilized   hand hygiene promoted   equipment surfaces disinfected  Taken 3/11/2022 0725 by Arelis Chew RN  Infection Prevention:   visitors restricted/screened   single patient room provided   rest/sleep promoted   hand hygiene promoted   equipment surfaces disinfected  Goal: Optimal Comfort and Wellbeing  Outcome: Ongoing, Progressing  Goal: Readiness for Transition of Care  Outcome: Ongoing, Progressing     Problem: Fall Injury Risk  Goal: Absence of Fall and Fall-Related Injury  Outcome: Ongoing, Progressing  Intervention: Identify and Manage Contributors  Recent Flowsheet Documentation  Taken 3/11/2022 0950 by Arelis Chew RN  Medication Review/Management: medications reviewed  Taken 3/11/2022 0725 by Arelis Chew RN  Medication Review/Management: medications reviewed  Intervention: Promote Injury-Free Environment  Recent Flowsheet Documentation  Taken 3/11/2022 1817 by Arelis Chew RN  Safety Promotion/Fall Prevention:   safety round/check completed   room organization consistent   assistive device/personal items within reach   activity supervised   fall prevention  program maintained   clutter free environment maintained   lighting adjusted   muscle strengthening facilitated   nonskid shoes/slippers when out of bed  Taken 3/11/2022 1412 by Arelis Chew RN  Safety Promotion/Fall Prevention: safety round/check completed  Taken 3/11/2022 1340 by Arelis Chew RN  Safety Promotion/Fall Prevention: safety round/check completed  Taken 3/11/2022 0950 by Arelis Chew RN  Safety Promotion/Fall Prevention:   activity supervised   clutter free environment maintained   assistive device/personal items within reach   fall prevention program maintained   toileting scheduled   safety round/check completed   room organization consistent   nonskid shoes/slippers when out of bed   muscle strengthening facilitated   lighting adjusted  Taken 3/11/2022 0725 by Arelis Chew RN  Safety Promotion/Fall Prevention:   toileting scheduled   safety round/check completed   room organization consistent   nonskid shoes/slippers when out of bed   muscle strengthening facilitated   lighting adjusted   fall prevention program maintained   gait belt   clutter free environment maintained   assistive device/personal items within reach   activity supervised  Goal: Absence of Fall and Fall-Related Injury  Outcome: Ongoing, Progressing  Intervention: Identify and Manage Contributors  Recent Flowsheet Documentation  Taken 3/11/2022 0950 by Arelis Chew RN  Medication Review/Management: medications reviewed  Taken 3/11/2022 0725 by Arelsi Chew RN  Medication Review/Management: medications reviewed  Intervention: Promote Injury-Free Environment  Recent Flowsheet Documentation  Taken 3/11/2022 1817 by Arelis Chew RN  Safety Promotion/Fall Prevention:   safety round/check completed   room organization consistent   assistive device/personal items within reach   activity supervised   fall prevention program maintained   clutter free environment maintained   lighting adjusted   muscle strengthening  facilitated   nonskid shoes/slippers when out of bed  Taken 3/11/2022 1412 by Arelis Chew RN  Safety Promotion/Fall Prevention: safety round/check completed  Taken 3/11/2022 1340 by Arelis Chew RN  Safety Promotion/Fall Prevention: safety round/check completed  Taken 3/11/2022 0950 by Arelis Chew RN  Safety Promotion/Fall Prevention:   activity supervised   clutter free environment maintained   assistive device/personal items within reach   fall prevention program maintained   toileting scheduled   safety round/check completed   room organization consistent   nonskid shoes/slippers when out of bed   muscle strengthening facilitated   lighting adjusted  Taken 3/11/2022 0725 by Arelis Chew RN  Safety Promotion/Fall Prevention:   toileting scheduled   safety round/check completed   room organization consistent   nonskid shoes/slippers when out of bed   muscle strengthening facilitated   lighting adjusted   fall prevention program maintained   gait belt   clutter free environment maintained   assistive device/personal items within reach   activity supervised  Goal: Absence of Fall and Fall-Related Injury  Outcome: Ongoing, Progressing  Intervention: Identify and Manage Contributors  Recent Flowsheet Documentation  Taken 3/11/2022 0950 by Arelis Chew RN  Medication Review/Management: medications reviewed  Taken 3/11/2022 0725 by Arelis Chew RN  Medication Review/Management: medications reviewed  Intervention: Promote Injury-Free Environment  Recent Flowsheet Documentation  Taken 3/11/2022 1817 by Arelis Chew RN  Safety Promotion/Fall Prevention:   safety round/check completed   room organization consistent   assistive device/personal items within reach   activity supervised   fall prevention program maintained   clutter free environment maintained   lighting adjusted   muscle strengthening facilitated   nonskid shoes/slippers when out of bed  Taken 3/11/2022 1412 by Arelis Chew RN  Safety  Promotion/Fall Prevention: safety round/check completed  Taken 3/11/2022 1340 by Arelis Chew RN  Safety Promotion/Fall Prevention: safety round/check completed  Taken 3/11/2022 0950 by Arelis Chew RN  Safety Promotion/Fall Prevention:   activity supervised   clutter free environment maintained   assistive device/personal items within reach   fall prevention program maintained   toileting scheduled   safety round/check completed   room organization consistent   nonskid shoes/slippers when out of bed   muscle strengthening facilitated   lighting adjusted  Taken 3/11/2022 0725 by Arelis Chew RN  Safety Promotion/Fall Prevention:   toileting scheduled   safety round/check completed   room organization consistent   nonskid shoes/slippers when out of bed   muscle strengthening facilitated   lighting adjusted   fall prevention program maintained   gait belt   clutter free environment maintained   assistive device/personal items within reach   activity supervised  Goal: Absence of Fall and Fall-Related Injury  Outcome: Ongoing, Progressing  Intervention: Identify and Manage Contributors  Recent Flowsheet Documentation  Taken 3/11/2022 0950 by Arelis Chew RN  Medication Review/Management: medications reviewed  Taken 3/11/2022 0725 by Arelis Chew RN  Medication Review/Management: medications reviewed  Intervention: Promote Injury-Free Environment  Recent Flowsheet Documentation  Taken 3/11/2022 1817 by Arelis Chew RN  Safety Promotion/Fall Prevention:   safety round/check completed   room organization consistent   assistive device/personal items within reach   activity supervised   fall prevention program maintained   clutter free environment maintained   lighting adjusted   muscle strengthening facilitated   nonskid shoes/slippers when out of bed  Taken 3/11/2022 1412 by Arelis Chew RN  Safety Promotion/Fall Prevention: safety round/check completed  Taken 3/11/2022 1340 by Arelis Chew RN  Safety  Promotion/Fall Prevention: safety round/check completed  Taken 3/11/2022 0950 by Arelis Chew RN  Safety Promotion/Fall Prevention:   activity supervised   clutter free environment maintained   assistive device/personal items within reach   fall prevention program maintained   toileting scheduled   safety round/check completed   room organization consistent   nonskid shoes/slippers when out of bed   muscle strengthening facilitated   lighting adjusted  Taken 3/11/2022 0725 by Arelis Chew RN  Safety Promotion/Fall Prevention:   toileting scheduled   safety round/check completed   room organization consistent   nonskid shoes/slippers when out of bed   muscle strengthening facilitated   lighting adjusted   fall prevention program maintained   gait belt   clutter free environment maintained   assistive device/personal items within reach   activity supervised  Goal: Absence of Fall and Fall-Related Injury  Outcome: Ongoing, Progressing  Intervention: Identify and Manage Contributors  Recent Flowsheet Documentation  Taken 3/11/2022 0950 by Arelis Chew RN  Medication Review/Management: medications reviewed  Taken 3/11/2022 0725 by Arelis Chew RN  Medication Review/Management: medications reviewed  Intervention: Promote Injury-Free Environment  Recent Flowsheet Documentation  Taken 3/11/2022 1817 by Arelis Chew RN  Safety Promotion/Fall Prevention:   safety round/check completed   room organization consistent   assistive device/personal items within reach   activity supervised   fall prevention program maintained   clutter free environment maintained   lighting adjusted   muscle strengthening facilitated   nonskid shoes/slippers when out of bed  Taken 3/11/2022 1412 by Arelis Chew RN  Safety Promotion/Fall Prevention: safety round/check completed  Taken 3/11/2022 1340 by Arelis Chew RN  Safety Promotion/Fall Prevention: safety round/check completed  Taken 3/11/2022 0950 by Arelis Chew RN  Safety  Promotion/Fall Prevention:   activity supervised   clutter free environment maintained   assistive device/personal items within reach   fall prevention program maintained   toileting scheduled   safety round/check completed   room organization consistent   nonskid shoes/slippers when out of bed   muscle strengthening facilitated   lighting adjusted  Taken 3/11/2022 0725 by Arelis Chew RN  Safety Promotion/Fall Prevention:   toileting scheduled   safety round/check completed   room organization consistent   nonskid shoes/slippers when out of bed   muscle strengthening facilitated   lighting adjusted   fall prevention program maintained   gait belt   clutter free environment maintained   assistive device/personal items within reach   activity supervised  Goal: Absence of Fall and Fall-Related Injury  Outcome: Ongoing, Progressing  Intervention: Identify and Manage Contributors  Recent Flowsheet Documentation  Taken 3/11/2022 0950 by Arelis Chew RN  Medication Review/Management: medications reviewed  Taken 3/11/2022 0725 by Arelis Chew RN  Medication Review/Management: medications reviewed  Intervention: Promote Injury-Free Environment  Recent Flowsheet Documentation  Taken 3/11/2022 1817 by Arelis Chew RN  Safety Promotion/Fall Prevention:   safety round/check completed   room organization consistent   assistive device/personal items within reach   activity supervised   fall prevention program maintained   clutter free environment maintained   lighting adjusted   muscle strengthening facilitated   nonskid shoes/slippers when out of bed  Taken 3/11/2022 1412 by Arelis Chew RN  Safety Promotion/Fall Prevention: safety round/check completed  Taken 3/11/2022 1340 by Arelis Chew RN  Safety Promotion/Fall Prevention: safety round/check completed  Taken 3/11/2022 0950 by Arelis Chew RN  Safety Promotion/Fall Prevention:   activity supervised   clutter free environment maintained   assistive  device/personal items within reach   fall prevention program maintained   toileting scheduled   safety round/check completed   room organization consistent   nonskid shoes/slippers when out of bed   muscle strengthening facilitated   lighting adjusted  Taken 3/11/2022 0725 by Arelis Chew RN  Safety Promotion/Fall Prevention:   toileting scheduled   safety round/check completed   room organization consistent   nonskid shoes/slippers when out of bed   muscle strengthening facilitated   lighting adjusted   fall prevention program maintained   gait belt   clutter free environment maintained   assistive device/personal items within reach   activity supervised  Goal: Absence of Fall and Fall-Related Injury  Outcome: Ongoing, Progressing  Intervention: Identify and Manage Contributors  Recent Flowsheet Documentation  Taken 3/11/2022 0950 by Arelis Chew RN  Medication Review/Management: medications reviewed  Taken 3/11/2022 0725 by Arelis Chew RN  Medication Review/Management: medications reviewed  Intervention: Promote Injury-Free Environment  Recent Flowsheet Documentation  Taken 3/11/2022 1817 by Arelis Chew RN  Safety Promotion/Fall Prevention:   safety round/check completed   room organization consistent   assistive device/personal items within [facility]   activity supervised   fall prevention program maintained   clutter free environment maintained   lighting adjusted   muscle strengthening facilitated   nonskid shoes/slippers when out of bed  Taken 3/11/2022 1412 by Arelis Chew RN  Safety Promotion/Fall Prevention: safety round/check completed  Taken 3/11/2022 1340 by Arelis Chew RN  Safety Promotion/Fall Prevention: safety round/check completed  Taken 3/11/2022 0950 by Arelis Chew RN  Safety Promotion/Fall Prevention:   activity supervised   clutter free environment maintained   assistive device/personal items within reach   fall prevention program maintained   toileting scheduled   safety  round/check completed   room organization consistent   nonskid shoes/slippers when out of bed   muscle strengthening facilitated   lighting adjusted  Taken 3/11/2022 0725 by Arelis Chew RN  Safety Promotion/Fall Prevention:   toileting scheduled   safety round/check completed   room organization consistent   nonskid shoes/slippers when out of bed   muscle strengthening facilitated   lighting adjusted   fall prevention program maintained   gait belt   clutter free environment maintained   assistive device/personal items within reach   activity supervised  Goal: Absence of Fall and Fall-Related Injury  Outcome: Ongoing, Progressing  Intervention: Identify and Manage Contributors  Recent Flowsheet Documentation  Taken 3/11/2022 0950 by Arelis Chew RN  Medication Review/Management: medications reviewed  Taken 3/11/2022 0725 by Arelis Chew RN  Medication Review/Management: medications reviewed  Intervention: Promote Injury-Free Environment  Recent Flowsheet Documentation  Taken 3/11/2022 1817 by Arelis Chew RN  Safety Promotion/Fall Prevention:   safety round/check completed   room organization consistent   assistive device/personal items within reach   activity supervised   fall prevention program maintained   clutter free environment maintained   lighting adjusted   muscle strengthening facilitated   nonskid shoes/slippers when out of bed  Taken 3/11/2022 1412 by Arelis Chew RN  Safety Promotion/Fall Prevention: safety round/check completed  Taken 3/11/2022 1340 by Arelis Chew RN  Safety Promotion/Fall Prevention: safety round/check completed  Taken 3/11/2022 0950 by Arelis Chew RN  Safety Promotion/Fall Prevention:   activity supervised   clutter free environment maintained   assistive device/personal items within reach   fall prevention program maintained   toileting scheduled   safety round/check completed   room organization consistent   nonskid shoes/slippers when out of bed   muscle  strengthening facilitated   lighting adjusted  Taken 3/11/2022 0725 by Arelis Chew RN  Safety Promotion/Fall Prevention:   toileting scheduled   safety round/check completed   room organization consistent   nonskid shoes/slippers when out of bed   muscle strengthening facilitated   lighting adjusted   fall prevention program maintained   gait belt   clutter free environment maintained   assistive device/personal items within reach   activity supervised  Goal: Absence of Fall and Fall-Related Injury  Outcome: Ongoing, Progressing  Intervention: Identify and Manage Contributors  Recent Flowsheet Documentation  Taken 3/11/2022 0950 by Arelis Chew RN  Medication Review/Management: medications reviewed  Taken 3/11/2022 0725 by Arelis Chew RN  Medication Review/Management: medications reviewed  Intervention: Promote Injury-Free Environment  Recent Flowsheet Documentation  Taken 3/11/2022 1817 by Arelis Chew RN  Safety Promotion/Fall Prevention:   safety round/check completed   room organization consistent   assistive device/personal items within reach   activity supervised   fall prevention program maintained   clutter free environment maintained   lighting adjusted   muscle strengthening facilitated   nonskid shoes/slippers when out of bed  Taken 3/11/2022 1412 by Arelis Chew RN  Safety Promotion/Fall Prevention: safety round/check completed  Taken 3/11/2022 1340 by Arelis Chew RN  Safety Promotion/Fall Prevention: safety round/check completed  Taken 3/11/2022 0950 by Arelis Chew RN  Safety Promotion/Fall Prevention:   activity supervised   clutter free environment maintained   assistive device/personal items within reach   fall prevention program maintained   toileting scheduled   safety round/check completed   room organization consistent   nonskid shoes/slippers when out of bed   muscle strengthening facilitated   lighting adjusted  Taken 3/11/2022 0725 by Arelis Chew RN  Safety Promotion/Fall  Prevention:   toileting scheduled   safety round/check completed   room organization consistent   nonskid shoes/slippers when out of bed   muscle strengthening facilitated   lighting adjusted   fall prevention program maintained   gait belt   clutter free environment maintained   assistive device/personal items within reach   activity supervised  Goal: Absence of Fall and Fall-Related Injury  Outcome: Ongoing, Progressing  Intervention: Identify and Manage Contributors  Recent Flowsheet Documentation  Taken 3/11/2022 0950 by Arelis Chew RN  Medication Review/Management: medications reviewed  Taken 3/11/2022 0725 by Arelis Chew RN  Medication Review/Management: medications reviewed  Intervention: Promote Injury-Free Environment  Recent Flowsheet Documentation  Taken 3/11/2022 1817 by Arelis Chew RN  Safety Promotion/Fall Prevention:   safety round/check completed   room organization consistent   assistive device/personal items within reach   activity supervised   fall prevention program maintained   clutter free environment maintained   lighting adjusted   muscle strengthening facilitated   nonskid shoes/slippers when out of bed  Taken 3/11/2022 1412 by Arelis Chew RN  Safety Promotion/Fall Prevention: safety round/check completed  Taken 3/11/2022 1340 by Arelis Chew RN  Safety Promotion/Fall Prevention: safety round/check completed  Taken 3/11/2022 0950 by Arelis Chew RN  Safety Promotion/Fall Prevention:   activity supervised   clutter free environment maintained   assistive device/personal items within reach   fall prevention program maintained   toileting scheduled   safety round/check completed   room organization consistent   nonskid shoes/slippers when out of bed   muscle strengthening facilitated   lighting adjusted  Taken 3/11/2022 0725 by Arelis Chew RN  Safety Promotion/Fall Prevention:   toileting scheduled   safety round/check completed   room organization consistent   nonskid  shoes/slippers when out of bed   muscle strengthening facilitated   lighting adjusted   fall prevention program maintained   gait belt   clutter free environment maintained   assistive device/personal items within reach   activity supervised  Goal: Absence of Fall and Fall-Related Injury  Outcome: Ongoing, Progressing  Intervention: Identify and Manage Contributors  Recent Flowsheet Documentation  Taken 3/11/2022 0950 by Arelis Chew RN  Medication Review/Management: medications reviewed  Taken 3/11/2022 0725 by Arelis Chew RN  Medication Review/Management: medications reviewed  Intervention: Promote Injury-Free Environment  Recent Flowsheet Documentation  Taken 3/11/2022 1817 by Arelis Chew RN  Safety Promotion/Fall Prevention:   safety round/check completed   room organization consistent   assistive device/personal items within reach   activity supervised   fall prevention program maintained   clutter free environment maintained   lighting adjusted   muscle strengthening facilitated   nonskid shoes/slippers when out of bed  Taken 3/11/2022 1412 by Arelis Chew RN  Safety Promotion/Fall Prevention: safety round/check completed  Taken 3/11/2022 1340 by Arelis Chew RN  Safety Promotion/Fall Prevention: safety round/check completed  Taken 3/11/2022 0950 by Arelis Chew RN  Safety Promotion/Fall Prevention:   activity supervised   clutter free environment maintained   assistive device/personal items within reach   fall prevention program maintained   toileting scheduled   safety round/check completed   room organization consistent   nonskid shoes/slippers when out of bed   muscle strengthening facilitated   lighting adjusted  Taken 3/11/2022 0725 by Arelis Chew RN  Safety Promotion/Fall Prevention:   toileting scheduled   safety round/check completed   room organization consistent   nonskid shoes/slippers when out of bed   muscle strengthening facilitated   lighting adjusted   fall prevention program  maintained   gait belt   clutter free environment maintained   assistive device/personal items within reach   activity supervised  Goal: Absence of Fall and Fall-Related Injury  Outcome: Ongoing, Progressing  Intervention: Identify and Manage Contributors  Recent Flowsheet Documentation  Taken 3/11/2022 0950 by Arelis Chew RN  Medication Review/Management: medications reviewed  Taken 3/11/2022 0725 by Arelis Chew RN  Medication Review/Management: medications reviewed  Intervention: Promote Injury-Free Environment  Recent Flowsheet Documentation  Taken 3/11/2022 1817 by Arelis Chew RN  Safety Promotion/Fall Prevention:   safety round/check completed   room organization consistent   assistive device/personal items within reach   activity supervised   fall prevention program maintained   clutter free environment maintained   lighting adjusted   muscle strengthening facilitated   nonskid shoes/slippers when out of bed  Taken 3/11/2022 1412 by Arelis Chew RN  Safety Promotion/Fall Prevention: safety round/check completed  Taken 3/11/2022 1340 by Arelis Chew RN  Safety Promotion/Fall Prevention: safety round/check completed  Taken 3/11/2022 0950 by Arelis Chew RN  Safety Promotion/Fall Prevention:   activity supervised   clutter free environment maintained   assistive device/personal items within reach   fall prevention program maintained   toileting scheduled   safety round/check completed   room organization consistent   nonskid shoes/slippers when out of bed   muscle strengthening facilitated   lighting adjusted  Taken 3/11/2022 0725 by Aerlis Chew RN  Safety Promotion/Fall Prevention:   toileting scheduled   safety round/check completed   room organization consistent   nonskid shoes/slippers when out of bed   muscle strengthening facilitated   lighting adjusted   fall prevention program maintained   gait belt   clutter free environment maintained   assistive device/personal items within reach    activity supervised  Goal: Absence of Fall and Fall-Related Injury  Outcome: Ongoing, Progressing  Intervention: Identify and Manage Contributors  Recent Flowsheet Documentation  Taken 3/11/2022 0950 by Arelis Chew RN  Medication Review/Management: medications reviewed  Taken 3/11/2022 0725 by Arelis Chew RN  Medication Review/Management: medications reviewed  Intervention: Promote Injury-Free Environment  Recent Flowsheet Documentation  Taken 3/11/2022 1817 by Arelis Chew RN  Safety Promotion/Fall Prevention:   safety round/check completed   room organization consistent   assistive device/personal items within reach   activity supervised   fall prevention program maintained   clutter free environment maintained   lighting adjusted   muscle strengthening facilitated   nonskid shoes/slippers when out of bed  Taken 3/11/2022 1412 by Arelis Chew RN  Safety Promotion/Fall Prevention: safety round/check completed  Taken 3/11/2022 1340 by Arelis Chew RN  Safety Promotion/Fall Prevention: safety round/check completed  Taken 3/11/2022 0950 by Arelis Chew RN  Safety Promotion/Fall Prevention:   activity supervised   clutter free environment maintained   assistive device/personal items within reach   fall prevention program maintained   toileting scheduled   safety round/check completed   room organization consistent   nonskid shoes/slippers when out of bed   muscle strengthening facilitated   lighting adjusted  Taken 3/11/2022 0725 by Arelis Chew RN  Safety Promotion/Fall Prevention:   toileting scheduled   safety round/check completed   room organization consistent   nonskid shoes/slippers when out of bed   muscle strengthening facilitated   lighting adjusted   fall prevention program maintained   gait belt   clutter free environment maintained   assistive device/personal items within reach   activity supervised  Goal: Absence of Fall and Fall-Related Injury  Outcome: Ongoing, Progressing  Intervention:  Identify and Manage Contributors  Recent Flowsheet Documentation  Taken 3/11/2022 0950 by Arelis Chew RN  Medication Review/Management: medications reviewed  Taken 3/11/2022 0725 by Arelis Chew RN  Medication Review/Management: medications reviewed  Intervention: Promote Injury-Free Environment  Recent Flowsheet Documentation  Taken 3/11/2022 1817 by Arelis Chew RN  Safety Promotion/Fall Prevention:   safety round/check completed   room organization consistent   assistive device/personal items within reach   activity supervised   fall prevention program maintained   clutter free environment maintained   lighting adjusted   muscle strengthening facilitated   nonskid shoes/slippers when out of bed  Taken 3/11/2022 1412 by Arelis Chew RN  Safety Promotion/Fall Prevention: safety round/check completed  Taken 3/11/2022 1340 by Arelis Chew RN  Safety Promotion/Fall Prevention: safety round/check completed  Taken 3/11/2022 0950 by Arelis Chew RN  Safety Promotion/Fall Prevention:   activity supervised   clutter free environment maintained   assistive device/personal items within reach   fall prevention program maintained   toileting scheduled   safety round/check completed   room organization consistent   nonskid shoes/slippers when out of bed   muscle strengthening facilitated   lighting adjusted  Taken 3/11/2022 0725 by Arelis Chew RN  Safety Promotion/Fall Prevention:   toileting scheduled   safety round/check completed   room organization consistent   nonskid shoes/slippers when out of bed   muscle strengthening facilitated   lighting adjusted   fall prevention program maintained   gait belt   clutter free environment maintained   assistive device/personal items within reach   activity supervised  Goal: Absence of Fall and Fall-Related Injury  Outcome: Ongoing, Progressing  Intervention: Identify and Manage Contributors  Recent Flowsheet Documentation  Taken 3/11/2022 0950 by Arelis Chew  RN  Medication Review/Management: medications reviewed  Taken 3/11/2022 0725 by Arelis Chew RN  Medication Review/Management: medications reviewed  Intervention: Promote Injury-Free Environment  Recent Flowsheet Documentation  Taken 3/11/2022 1817 by Arelis Chew RN  Safety Promotion/Fall Prevention:   safety round/check completed   room organization consistent   assistive device/personal items within reach   activity supervised   fall prevention program maintained   clutter free environment maintained   lighting adjusted   muscle strengthening facilitated   nonskid shoes/slippers when out of bed  Taken 3/11/2022 1412 by Arelis Chew RN  Safety Promotion/Fall Prevention: safety round/check completed  Taken 3/11/2022 1340 by Arelis Chew RN  Safety Promotion/Fall Prevention: safety round/check completed  Taken 3/11/2022 0950 by Arelis Chew RN  Safety Promotion/Fall Prevention:   activity supervised   clutter free environment maintained   assistive device/personal items within reach   fall prevention program maintained   toileting scheduled   safety round/check completed   room organization consistent   nonskid shoes/slippers when out of bed   muscle strengthening facilitated   lighting adjusted  Taken 3/11/2022 0725 by Arelis Chew RN  Safety Promotion/Fall Prevention:   toileting scheduled   safety round/check completed   room organization consistent   nonskid shoes/slippers when out of bed   muscle strengthening facilitated   lighting adjusted   fall prevention program maintained   gait belt   clutter free environment maintained   assistive device/personal items within reach   activity supervised  Goal: Absence of Fall and Fall-Related Injury  Outcome: Ongoing, Progressing  Intervention: Identify and Manage Contributors  Recent Flowsheet Documentation  Taken 3/11/2022 0950 by Arelis Chew RN  Medication Review/Management: medications reviewed  Taken 3/11/2022 0725 by Arelis Chew RN  Medication  Review/Management: medications reviewed  Intervention: Promote Injury-Free Environment  Recent Flowsheet Documentation  Taken 3/11/2022 1817 by Arelis Chew RN  Safety Promotion/Fall Prevention:   safety round/check completed   room organization consistent   assistive device/personal items within reach   activity supervised   fall prevention program maintained   clutter free environment maintained   lighting adjusted   muscle strengthening facilitated   nonskid shoes/slippers when out of bed  Taken 3/11/2022 1412 by Arelis Chew RN  Safety Promotion/Fall Prevention: safety round/check completed  Taken 3/11/2022 1340 by Arelis Chew RN  Safety Promotion/Fall Prevention: safety round/check completed  Taken 3/11/2022 0950 by Arelis Chew RN  Safety Promotion/Fall Prevention:   activity supervised   clutter free environment maintained   assistive device/personal items within reach   fall prevention program maintained   toileting scheduled   safety round/check completed   room organization consistent   nonskid shoes/slippers when out of bed   muscle strengthening facilitated   lighting adjusted  Taken 3/11/2022 0725 by Arelis Chew RN  Safety Promotion/Fall Prevention:   toileting scheduled   safety round/check completed   room organization consistent   nonskid shoes/slippers when out of bed   muscle strengthening facilitated   lighting adjusted   fall prevention program maintained   gait belt   clutter free environment maintained   assistive device/personal items within reach   activity supervised     Problem: Skin Injury Risk Increased  Goal: Skin Health and Integrity  Outcome: Ongoing, Progressing  Intervention: Optimize Skin Protection  Recent Flowsheet Documentation  Taken 3/11/2022 1817 by Arelis Chew RN  Head of Bed (HOB) Positioning: HOB at 45 degrees  Taken 3/11/2022 0950 by Arelis Chew RN  Pressure Reduction Techniques:   positioned off wounds   pressure points protected   rest period provided  between sit times   weight shift assistance provided  Skin Protection: skin sealant/moisture barrier applied  Taken 3/11/2022 0725 by Arelis Chew RN  Head of Bed (HOB) Positioning: HOB at 45 degrees     Problem: COPD (Chronic Obstructive Pulmonary Disease) Comorbidity  Goal: Maintenance of COPD Symptom Control  Outcome: Ongoing, Progressing  Intervention: Maintain COPD-Symptom Control  Recent Flowsheet Documentation  Taken 3/11/2022 0950 by Arelis Chew RN  Medication Review/Management: medications reviewed  Taken 3/11/2022 0725 by Arelis Chew RN  Medication Review/Management: medications reviewed     Problem: Heart Failure Comorbidity  Goal: Maintenance of Heart Failure Symptom Control  Outcome: Ongoing, Progressing  Intervention: Maintain Heart Failure-Management  Recent Flowsheet Documentation  Taken 3/11/2022 0950 by Arelis Chew RN  Medication Review/Management: medications reviewed  Taken 3/11/2022 0725 by Arelis Chew RN  Medication Review/Management: medications reviewed     Problem: Hypertension Comorbidity  Goal: Blood Pressure in Desired Range  Outcome: Ongoing, Progressing  Intervention: Maintain Blood Pressure Management  Recent Flowsheet Documentation  Taken 3/11/2022 0950 by Arelis Chew RN  Medication Review/Management: medications reviewed  Taken 3/11/2022 0725 by Arelis Chew RN  Medication Review/Management: medications reviewed   Goal Outcome Evaluation:

## 2022-03-12 NOTE — PLAN OF CARE
"Goal Outcome Evaluation:  Plan of Care Reviewed With: patient           Outcome Evaluation: Patient has had CT pulled per MD at bedside, tolerating up in chair for all meals and up to BSC- has been able to rest inbetween meals and care- actually states \"i'm so sleepy now\"- so patient has refused scheduled morphine during this shift- it is available PRN- also offered tylenol but patient states that repositioning has helped. Requested mepilex to \"sore butt\" area of concern and waffle cushion has been placed in chair for more comfort. Has been able to maintain oxygen sats while on 3L. Respiratory treatments seem to be helping patient and no c/o SOB today.   "

## 2022-03-12 NOTE — PLAN OF CARE
Problem: Adult Inpatient Plan of Care  Goal: Plan of Care Review  Outcome: Ongoing, Progressing  Flowsheets (Taken 3/12/2022 0432)  Progress: no change  Outcome Evaluation: A&Ox4. R sided chest tube maintained, dressing CDI. 1500mL fluid restriction maintained. Tele. Bilat lower leg compression dressings in place. 3L NC, sats running high 90s. Up to BSC x1. New IV started in the right forearm d/t pt pain when flushing. Pt states she feels she can breathe better this AM. Call light in reach. Safety maintained. Will continue to monitor.

## 2022-03-13 NOTE — THERAPY DISCHARGE NOTE
Acute Care - Occupational Therapy Discharge Summary  [facility]     Patient Name: Cindy Hicks  : 1935  MRN: [id number]    Today's Date: 3/13/2022                 Admit Date: 3/8/2022        OT Recommendation and Plan    Visit Dx:    ICD-10-CM ICD-9-CM   1. Atrial fibrillation with RVR ([facility])  I48.91 427.31   2. Pleural effusion  J90 511.9   3. Dyspnea, unspecified type  R06.00 786.09   4. Chronic obstructive pulmonary disease, unspecified COPD type ([facility])  J44.9 496   5. Chronic kidney disease, unspecified CKD stage  N18.9 585.9   6. Elevated troponin  R77.8 790.6   7. Impaired mobility and ADLs  Z74.09 V49.89    Z78.9    8. Edema of both lower legs  R60.0 782.3   9. Impaired mobility  Z74.09 799.89                OT Rehab Goals     Row Name 22 1152             Transfer Goal 1 (OT)    Activity/Assistive Device (Transfer Goal 1, OT) sit-to-stand/stand-to-sit;toilet;commode, bedside without drop arms  -MT      [place] Level/Cues Needed (Transfer Goal 1, OT) minimum assist (75% or more patient effort)  -MT      Time Frame (Transfer Goal 1, OT) long term goal (LTG)  -MT      Progress/Outcome (Transfer Goal 1, OT) goal not met  -MT              Bathing Goal 1 (OT)    Activity/Device (Bathing Goal 1, OT) bathing skills, all  -MT      [place] Level/Cues Needed (Bathing Goal 1, OT) minimum assist (75% or more patient effort)  -MT      Time Frame (Bathing Goal 1, OT) long term goal (LTG)  -MT      Progress/Outcomes (Bathing Goal 1, OT) goal not met  -MT              Problem Specific Goal 1 (OT)    Problem Specific Goal 1 (OT) Pt will implement anxiety reduction techiniques when presented with SOA to improve participation in ADLs.  -MT      Time Frame (Problem Specific Goal 1, OT) long term goal (LTG)  -MT      Progress/Outcome (Problem Specific Goal 1, OT) goal not met  -MT            User Key  (r) = Recorded By, (t) = Taken By, (c) = Cosigned By    Initials Name Provider Type Discipline    MT  Saida Perez COTA Occupational Therapy Assistant OT                            OT Discharge Summary  Reason for Discharge: Patient/Caregiver request  Outcomes Achieved: Refer to plan of care for updates on goals achieved  Discharge Destination: Home with assist, other (comment) (hospice)      NORA Escobar  3/13/2022

## 2022-03-13 NOTE — CASE MANAGEMENT/SOCIAL WORK
Continued Stay Note   [facility]     Patient Name: Cindy Hicks  MRN: [id number]  Today's Date: 3/13/2022    Admit Date: 3/8/2022     Discharge Plan     Row Name 03/13/22 1243       Plan    Plan Comments SW has been consulted to arrange Hospice. [facility] has spoken with PT's granddaughter, Genevieve. She has advised that they no longer wish to place in SNF and now prefer to bring PT home with Hospice services. According to Genevieve, PT will be going to the home of her other granddaughter, Clara Krause. Genevieve states that they will need DME including a hospital bed and wheel chair. [facility] has made a referral to [facility] hospice. SW is awaiting a return call from on call hospice worker.  Update: SW has spoken with on call hospice worker, Tiana. She is awaiting fax to come through and will call family to discuss plans and make admission arrangements. Anticipating PT being able to dc home with hospice tomorrow, 3/14/22.                Discharge Codes    No documentation.                     SHASHI Chakraborty

## 2022-03-13 NOTE — PROGRESS NOTES
"    [facility] Cardiothoracic Surgery  PROGRESS NOTE   CC: Right pleural effusion    Subjective:  Doing well today breathing is much better.  She is been hemodynamically stable.  Chest tubes removed yesterday chest x-ray shows a small persistent space in the right lower thorax as expected.    ROS: No fevers or chills overnight, hemodynamically stable    Objective:      /62 (BP Location: Right arm, Patient Position: Sitting)   Pulse 93   Temp 98.1 °F (36.7 °C) (Oral)   Resp 18   Ht 165.1 cm (65\")   Wt 76.8 kg (169 lb 4.8 oz)   SpO2 91%   BMI 28.17 kg/m²       Intake/Output Summary (Last 24 hours) at 3/13/2022 1335  Last data filed at 3/13/2022 0051  Gross per 24 hour   Intake --   Output 500 ml   Net -500 ml           PE:  Vitals:    03/13/22 1102   BP:    Pulse: 93   Resp: 18   Temp:    SpO2: 91%     GENERAL: NAD, resting comfortably, normal color, cachectic  CARDIOVASCULAR: regular, regular rate, sinus  PULMONARY: Normal bilateral breath sounds, no labored breathing  ABDOMEN: soft, nontender/nondistended  EXTREMITIES: mild peripheral edema, normal pulses, normal ROM        Lab Results (last 72 hours)     Procedure Component Value Units Date/Time    Basic Metabolic Panel [[id number]]  (Abnormal) Collected: 03/13/22 0554    Specimen: Blood Updated: 03/13/22 0653     Glucose 110 mg/dL      BUN 43 mg/dL      Creatinine 1.63 mg/dL      Sodium 131 mmol/L      Potassium 4.2 mmol/L      Chloride 90 mmol/L      CO2 34.0 mmol/L      Calcium 8.6 mg/dL      BUN/Creatinine Ratio 26.4     Anion Gap 7.0 mmol/L      eGFR 30.6 mL/min/1.73      Comment: National Kidney Foundation and American Society of Nephrology (ASN) Task Force recommended calculation based on the Chronic Kidney Disease Epidemiology Collaboration (CKD-EPI) equation refit without adjustment for race.       Narrative:      GFR Normal >60  Chronic Kidney Disease <60  Kidney Failure <15      Basic Metabolic Panel [[id number]]  (Abnormal) " "Collected: 22    Specimen: Blood Updated: 22 07     Glucose 106 mg/dL      BUN 40 mg/dL      Creatinine 1.51 mg/dL      Sodium 130 mmol/L      Potassium 4.1 mmol/L      Chloride 89 mmol/L      CO2 35.0 mmol/L      Calcium 8.7 mg/dL      BUN/Creatinine Ratio 26.5     Anion Gap 6.0 mmol/L      eGFR 33.5 mL/min/1.73      Comment: National Kidney Foundation and American Society of Nephrology (ASN) Task Force recommended calculation based on the Chronic Kidney Disease Epidemiology Collaboration (CKD-EPI) equation refit without adjustment for race.       Narrative:      GFR Normal >60  Chronic Kidney Disease <60  Kidney Failure <15      Non-gynecologic Cytology [[id number]] Collected: 22 1343    Specimen: Body Fluid from Pleural Cavity Updated: 22     Case Report --     Non-gynecologic Cytology                          Case: [id number]                                  Authorizing Provider:  Zac Zimmer MD         Collected:           2022 01:43 PM          Ordering Location:     [address]  Received:            03/10/2022 11:46 AM          Pathologist:           Sergio Carlos MD                                                        Specimen:    Pleural Cavity                                                                              Final Diagnosis --     Pleural fluid, thoracentesis:  Negative for malignant cells.  Reactive mesothelial cells and lymphocytes.       Clinical Information --     Recurrent pleural effusion       Gross Description --     1. Pleural Cavity.  Received fresh labeled with patient name and  designated as \"pleural cavity.\"  60mL clear anisha fluid.  1 ThinPrep slide prepared.           Microscopic Description --     Cytologic examination shows a high cellularity pleural fluid consisting of reactive mesothelial cells and a polymorphous lymphocyte population.      Basic Metabolic Panel [[id number]]  (Abnormal) Collected: 22 " 0749    Specimen: Blood Updated: 03/11/22 0819     Glucose 152 mg/dL      BUN 40 mg/dL      Creatinine 1.40 mg/dL      Sodium 133 mmol/L      Potassium 3.9 mmol/L      Chloride 90 mmol/L      CO2 37.0 mmol/L      Calcium 8.6 mg/dL      BUN/Creatinine Ratio 28.6     Anion Gap 6.0 mmol/L      eGFR 36.7 mL/min/1.73      Comment: National Kidney Foundation and American Society of Nephrology (ASN) Task Force recommended calculation based on the Chronic Kidney Disease Epidemiology Collaboration (CKD-EPI) equation refit without adjustment for race.       Narrative:      GFR Normal >60  Chronic Kidney Disease <60  Kidney Failure <15                CXR: Persistent space right lower chest, small amount of pleural effusion with tenderness, good expansion of right lung    Assessment/Plan     Ms. Kate is a 86-year-old female who presents with recurrent right pleural effusion.  I placed a pigtail catheter and this has been drained, there is a small residual space without full expansion of the right lower lobe.    Chest tube was removed yesterday.    Has had discussions with palliative care, plans for discharge home tomorrow with hospice.  No further chest x-rays needed, will sign off for now please call with any questions or concerns.      Zac Zimmer M.D.  Cardiothoracic Surge

## 2022-03-13 NOTE — PROGRESS NOTES
[facility] Medicine Services  INPATIENT PROGRESS NOTE    Length of Stay: 4  Date of Admission: 3/8/2022  Primary Care Physician: Austin Wade DO    Subjective   Chief Complaint: Follow-up  HPI  Patient seen earlier sitting up in bed.  Oxygen at 3 L.  Long discussion with patient regarding hospice.  This has been mentioned by primary care provider as well as Dr. Zimmer, CTS.  Palliative care saw patient on Friday and also discussed hospic care.  She denies chest pain or increased shortness of breath.  Oxygen at 3 L.  Denies nausea, vomiting or abdominal pain.  Pigtail catheter removed yesterday.  Chest x-ray shows small pneumothorax right base stable.  Stable bilateral infiltrates.  Patient has discussed with her granddaughter Genevieve and has now decided for hospice.  Social service has made referral to [facility] hosp[facility].     Review of systems  Constitutional: Positive for fatigue. Negative for appetite change, chills and fever.   HENT: Negative for congestion and trouble swallowing.    Eyes: Negative for photophobia and visual disturbance.   Respiratory: Positive for shortness of breath. Negative for wheezing.    Cardiovascular: Negative for palpitations and leg swelling.   Gastrointestinal: Negative for constipation, diarrhea, nausea and vomiting.   Endocrine: Negative for cold intolerance, heat intolerance and polyuria.   Genitourinary: Negative for dysuria, frequency and urgency.   Musculoskeletal: Positive for gait problem.   Skin: Negative for pallor, rash and wound.   Allergic/Immunologic: Negative for immunocompromised state.   Neurological: Positive for weakness. Negative for light-headedness.   Hematological: Negative for adenopathy. Does not bruise/bleed easily.   Psychiatric/Behavioral: Negative for agitation, behavioral problems and confusion    All pertinent negatives and positives are as above. All other systems have been reviewed and are negative unless  otherwise stated.     Objective    Temp:  [97.4 °F (36.3 °C)-98.1 °F (36.7 °C)] 98.1 °F (36.7 °C)  Heart Rate:  [] 93  Resp:  [16-20] 18  BP: (100-106)/(51-67) 106/62  Physical Exam  Vitals and nursing note reviewed.   Constitutional:       Comments: Sitting up in bed.  Oxygen at 3 L.  No family in room.   HENT:      Head: Normocephalic and atraumatic.      Nose: No congestion.      Mouth/Throat:      Pharynx: Oropharynx is clear. No oropharyngeal exudate or posterior oropharyngeal erythema.   Eyes:      Extraocular Movements: Extraocular movements intact.      Pupils: Pupils are equal, round, and reactive to light.   Cardiovascular:      Rate and Rhythm: Normal rate. Rhythm irregular.      Heart sounds: No murmur heard.     Comments: Atrial fibrillation 97 on telemetry.  Pulmonary:      Breath sounds: No wheezing, rhonchi or rales.      Comments: Oxygen at 3 L.  Right pigtail catheter removed 3/12  Abdominal:      Palpations: Abdomen is soft.   Genitourinary:     Comments: Voiding.  Musculoskeletal:         Cervical back: Normal range of motion and neck supple.      Comments: Unna boots bilateral lower extremities.   Skin:     General: Skin is warm and dry.   Neurological:      General: No focal deficit present.      Mental Status: She is alert and oriented to person, place, and time.   Psychiatric:         Mood and Affect: Mood normal.         Behavior: Behavior normal.         Thought Content: Thought content normal.         Judgment: Judgment normal.      Results Review:  I have reviewed the labs, radiology results, and diagnostic studies.    Laboratory Data:      Results from last 7 days   Lab Units 03/10/22  0740 03/09/22  1149 03/08/22 2249   WBC 10*3/mm3 8.83 8.81 11.59*   HEMOGLOBIN g/dL 10.9* 10.8* 11.7*   HEMATOCRIT % 34.9 34.9 37.7   PLATELETS 10*3/mm3 248 247 299     Results from last 7 days   Lab Units 03/13/22  0554 03/12/22  0704 03/11/22  0749 03/10/22  0740 03/09/22  1149 03/08/22 2249    SODIUM mmol/L 131* 130* 133* 135* 133* 131*   POTASSIUM mmol/L 4.2 4.1 3.9 4.5 4.7 5.0   CHLORIDE mmol/L 90* 89* 90* 93* 88* 88*   CO2 mmol/L 34.0* 35.0* 37.0* 33.0* 33.0* 32.0*   BUN mg/dL 43* 40* 40* 47* 45* 46*   CREATININE mg/dL 1.63* 1.51* 1.40* 1.45* 1.59* 1.69*   GLUCOSE mg/dL 110* 106* 152* 122* 94 186*   CALCIUM mg/dL 8.6 8.7 8.6 8.7 9.4 9.2   ALT (SGPT) U/L  --   --   --   --  40* 42*     Culture Data:    No results found for: BLOODCX, URINECX, WOUNDCX, MRSACX, RESPCX, STOOLCX    Radiology Data:   Imaging Results (Last 7 Days)     Procedure Component Value Units Date/Time    XR Chest 1 View [[id number]] Collected: 03/13/22 0659     Updated: 03/13/22 0704    Narrative:      EXAMINATION:  XR CHEST 1 VW-  3/13/2022 3:50 AM CDT     HISTORY: I48.91-Unspecified atrial fibrillation; V60-Wkmyqvx effusion,  not elsewhere classified; R06.00-Dyspnea, unspecified; J44.9-Chronic  obstructive pulmonary disease, unspecified.     COMPARISON: 3/12/2022.     FINDINGS:  There is a small pneumothorax in the right costophrenic  angle. There are small bilateral pleural effusions. There is patchy  infiltrate in the mid and lower lung zones. There is cardiomegaly. The  thoracic aorta is atheromatous.       Impression:      1. Small pneumothorax in the right lung base, stable.  2. Stable bilateral infiltrates.  3. Cardiomegaly.        This report was finalized on 03/13/2022 07:01 by Dr. Collin So MD.    XR Chest 2 View [[id number]] Collected: 03/12/22 1724     Updated: 03/12/22 1728    Narrative:      EXAMINATION: Chest 2 views 3/12/2022     HISTORY: Chest tube removal     FINDINGS: Two-view exam of the chest is compared to prior exam of  earlier the same day. A small right basilar hydropneumothorax is present  post removal of the small caliber thoracostomy tube. The pneumothorax is  unchanged in size or appearance from the previous exam. A small left  effusion is present. There is cardiomegaly with mild  vascular  congestion.       Impression:      1.. Small effusions. A small right basilar hydropneumothorax is present  stable in size and appearance post removal of a small caliber  right-sided thoracostomy tube.  This report was finalized on 03/12/2022 17:25 by Dr. Geraldo Fung MD.    XR Chest 1 View [[id number]] Collected: 03/12/22 0725     Updated: 03/12/22 0731    Narrative:      EXAMINATION:  XR CHEST 1 VW-  3/12/2022 4:20 AM CST     HISTORY: Right pleural effusion; I48.91-Unspecified atrial fibrillation;  K46-[place] effusion, not elsewhere classified; R06.00-Dyspnea,  unspecified; J44.9-Chronic obstructive pulmonary disease, unspecified;  N18.9-Chronic kidney disease, unspecified; R77.8-Other specified  abnormalities of plasma proteins; Z74.09-Other reduced mobility;  Z78.9-Other specified health status; R60.0-Localized edema; Z74.09-Oth     COMPARISON: 3/11/2022.     FINDINGS:  There is cardiomegaly. There are patchy infiltrates in the  mid and lower lung zones. There is blunting of the costophrenic angles.  There is a right chest tube in place. There is no measurable  pneumothorax. There is atheromatous disease of the thoracic aorta. No  acute bony abnormality is seen.       Impression:      1. Patchy infiltrates in the mid and lower lung zones. No significant  change.  2. Right chest tube in place. No measurable pneumothorax.  3. Cardiomegaly.        This report was finalized on 03/12/2022 07:28 by Dr. Collin So MD.    XR Chest 1 View [[id number]] Collected: 03/11/22 0733     Updated: 03/11/22 0737    Narrative:      EXAM: XR CHEST 1 VW-     INDICATION: Right pleural effusion     COMPARISON: Prior day     FINDINGS:     Right-sided chest tube is stable in position. No change in small  bilateral pleural effusions and bilateral mid-lower lung zone pulmonary  infiltrates. Emphysema is again noted. Cardiac silhouette is markedly  enlarged but stable. No measurable pneumothorax.       Impression:          No change in appearance of the chest.  This report was finalized on 03/11/2022 07:34 by Dr. Nam Funez MD.    XR Chest 1 View [[id number]] Collected: 03/10/22 0713     Updated: 03/10/22 0717    Narrative:      EXAM: XR CHEST 1 VW-     INDICATION: RIGHT pleural effusion     COMPARISON: Prior day.     FINDINGS:     Right-sided chest tube is stable in position. No change in RIGHT greater  than LEFT small pleural effusions with overlying atelectasis. No  measurable pneumothorax. Small amount of soft tissue gas in the RIGHT  chest wall. Cardiac silhouette is enlarged but stable. Diffuse  interstitial coarsening is again noted.       Impression:         No change in appearance of the chest.  This report was finalized on 03/10/2022 07:14 by Dr. Nam Funez MD.    XR Chest 1 View [[id number]] Collected: 03/09/22 1656     Updated: 03/09/22 1701    Narrative:      EXAMINATION: XR CHEST 1 VW- 3/9/2022 4:56 PM CST     HISTORY: Follow up right thoracentesis with drainage tube placement;  I48.91-Unspecified atrial fibrillation; H65-Iudezpv effusion, not  elsewhere classified; R06.00-Dyspnea, unspecified; J44.9-Chronic  obstructive pulmonary disease, unspecified; N18.9-Chronic kidney  disease, unspecified; R77.8-Other specified abnormalities of plasma  proteins; Z74.09-Other reduced mobility; Z78.9-Other specified health  status.     REPORT: A frontal view of the chest was obtained.     COMPARISON: Chest x-ray 3/8/2022 2304 hours.     A smallbore right basilar chest tube has been inserted, and appears to  be in satisfactory position, there is reduction in volume of the right  pleural effusion. No pneumothorax is identified. There appears to be a  small stable left pleural effusion. There are moderate volume loss in  the lung bases. The heart is enlarged. No overt CHF is identified.  Underlying chronic lung changes and interstitial disease appears stable.  The osseous structures are unremarkable.       Impression:       Interval insertion of a smallbore right basilar chest tube  in good position with reduction in volume of the right pleural effusion  and no pneumothorax. Continued extensive volume loss in the lung bases  with cardiomegaly.  This report was finalized on 03/09/2022 16:58 by Dr. Jhon Caruso MD.    XR Chest 1 View [[id number]] Collected: 03/09/22 0724     Updated: 03/09/22 0731    Narrative:      EXAM: XR CHEST 1 VW-     INDICATION: Shortness of air     COMPARISON: 3/2/2022     FINDINGS:     Cardiac silhouette is enlarged but stable. No change in moderate RIGHT  pleural effusion and trace LEFT pleural effusion. No significant change  in bilateral interstitial opacity and patchy airspace opacity. No  visible pneumothorax. No acute osseous finding.       Impression:         Similar appearance to 3/2/2022 with cardiomegaly, persistent moderate  RIGHT and small LEFT pleural effusion with overlying atelectasis,  bilateral interstitial opacity, and patchy airspace opacities. Favor  volume overload/pulmonary edema.  This report was finalized on 03/09/2022 07:28 by Dr. Nam Funez MD.      Results for orders placed during the hospital encounter of 01/21/22    Adult Transthoracic Echo Complete W/ Cont if Necessary Per Protocol    Interpretation Summary  · Assessment of left ventricular ejection fraction somewhat difficult due to the presence of atrial fibrillation.  · Left ventricular systolic function is low normal. Left ventricular ejection fraction appears to be 51 - 55%.  · Left ventricular wall thickness is consistent with mild to moderate concentric hypertrophy.  · The right ventricular cavity is dilated. Mildly reduced right ventricular systolic function noted.  · Biatrial enlargment is noted.  · No hemodynamically significant valvular abnormalities identified on this study.      Intake/Output    Intake/Output Summary (Last 24 hours) at 3/13/2022 1422  Last data filed at 3/13/2022 0051  Gross per 24 hour   Intake  --   Output 500 ml   Net -500 ml     Scheduled Meds  apixaban, 2.5 mg, Oral, Q12H  atorvastatin, 40 mg, Oral, Daily  budesonide-formoterol, 2 puff, Inhalation, BID - RT  bumetanide, 1 mg, Oral, Daily  busPIRone, 10 mg, Oral, TID  dilTIAZem CD, 120 mg, Oral, Q24H  docusate sodium, 100 mg, Oral, BID  guaiFENesin, 1,200 mg, Oral, BID  ipratropium, 0.5 mg, Nebulization, 4x Daily - RT  lisinopril, 2.5 mg, Oral, Daily  metoprolol tartrate, 25 mg, Oral, Q12H  morphine, 5 mg, Oral, TID  multivitamin with minerals, 1 tablet, Oral, Daily  pantoprazole, 40 mg, Oral, BID  pramipexole, 0.25 mg, Oral, Nightly  sertraline, 50 mg, Oral, Daily  sodium chloride, 10 mL, Intravenous, Q12H      I have reviewed the patient current medications.     Assessment/Plan     Active Hospital Problems    Diagnosis    • **Recurrent pleural effusion on right    • Atrial fibrillation with RVR ([facility])    • Stage 3b chronic kidney disease ([facility])    • Hyponatremia    • Stage 3 severe COPD by GOLD classification ([facility])    • Acute on chronic diastolic CHF (congestive heart failure) ([facility])    • Chronic respiratory failure with hypoxia and hypercapnia ([facility])    • Essential hypertension      Plan:  1.  To ER 3/8/2022 with shortness of breath.  Patient has had several recent admissions with acute on chronic diastolic heart failure and volume overload with atrial fibrillation and recurrent right pleural effusion.  Found to be in atrial fibrillation with RVR  In ER.  Cardizem drip started in ER.     2.  Atrial fibrillation with RVR.  Cardiology following.  Cardizem  mg added 3/9.  Cardizem drip discontinued.  CardioMEMS mentioned with previous hospitalization but may be difficult given patient's age and comorbidities.  Outpatient cardioversion for rate control also mentioned as it is felt that atrial fibrillation with RVR is contributing to volume overload.  Remains atrial fibrillation rate controlled at present. Eliquis restarted 3/10 after discussing with  CTS.    3.  Acute on chronic diastolic congestive heart failure secondary to atrial fibrillation with RVR.  Ejection fraction 51-55% per echo 1/2022.  1500 mL fluid restriction.  Has been on IV Bumex and was transitiond to oral Bumex daily on 3/12.  Creatinine 1.63 today, 1.69 on admission.  Sodium 131 on admission and 131 today.  Lisinopril (ACE) dose decreased to 2.5 mg daily due to low blood pressure.  Metoprolol (beta-blocker) dose decreased to 25 mg twice daily as blood pressure running lower and patient now on Cardizem CD.    4.  Recurrent right pleural effusion.  Chest x-ray similar to 3/2 with cardiomegaly, persistent moderate right and small left pleural effusion with overlying atelectasis and bilateral interstitial opacities and patchy airspace opacities.  Favor pulmonary edema. Thoracentesis 2/22 per Dr. Salgado with 1200 mL fluid removed.  Per lights criteria transudate effusion consistent with CHF.  CTS, Dr. Zimmer placed right pigtail catheter placed at bedside.  1500 mL fluid drained.  Breathing better with fluid drained and Heart rate controlled.  Continue diuresis.  Pigtail catheter removed 3/12 per Dr. Zimmer.  He discussed palliative/hospice care.  Patient at increased risk for dehydration if palliative Pleurx catheter placed.  Patient desires home with hospice     5.  COPD stage III.  No exacerbation.  Continue Symbicort and Mucinex.  Plain Atrovent as opposed to DuoNeb's with history of A. fib with RVR.  Incentive spirometry.  Continue oxygen 3 L home setting.     6.  Chronic kidney disease stage IIIb, stable.  Creatinine 1.69 on admission, 1.63 today.  Daily BMP.      8.  Primary hypertension.  Blood pressure 106/62.  Lisinopril decreased to 2.5 mg daily yesterday and metoprolol decreased to 25 mg twice daily due to lower blood pressure.     9.  Physical therapy consulted.  Unna boots bilateral lower extremities placed last admission for compression purposes.     10.   for skilled  nursing facility placement.  [facility] planned to reevaluate tomorrow.  However, today patient has elected for home with hospice.  Referral to [facility].  Palliative care consulted 3/11.  CODE STATUS changed and MOST form completed.    CODE STATUS/advance care planning: No CPR, no intubation, no cardioversion.  The patient surrogate decision-maker is her granddaughter, Genevieve.    The above documentation resulted from a face-to-face encounter by me Kenya VARGHESE, [facility].    Discharge Planning: I expect patient to be discharged to home with [facility] in 1 day    Electronically signed by HALIMA Joy, 3/13/2022, 14:22 CDT.

## 2022-03-13 NOTE — THERAPY DISCHARGE NOTE
Acute Care - Physical Therapy Discharge Summary  [facility]       Patient Name: Cindy Hicks  : 1935  MRN: [id number]    Today's Date: 3/13/2022                 Admit Date: 3/8/2022      PT Recommendation and Plan    Visit Dx:    ICD-10-CM ICD-9-CM   1. Atrial fibrillation with RVR ([facility])  I48.91 427.31   2. Pleural effusion  J90 511.9   3. Dyspnea, unspecified type  R06.00 786.09   4. Chronic obstructive pulmonary disease, unspecified COPD type ([facility])  J44.9 496   5. Chronic kidney disease, unspecified CKD stage  N18.9 585.9   6. Elevated troponin  R77.8 790.6   7. Impaired mobility and ADLs  Z74.09 V49.89    Z78.9    8. Edema of both lower legs  R60.0 782.3   9. Impaired mobility  Z74.09 799.89                PT Rehab Goals     Row Name 22 1029             Bed Mobility Goal 1 (PT)    Activity/Assistive Device (Bed Mobility Goal 1, PT) bed mobility activities, all  -DARRIN      [place] Level/Cues Needed (Bed Mobility Goal 1, PT) modified independence  -DARRIN      Time Frame (Bed Mobility Goal 1, PT) long term goal (LTG);by discharge  -DARRIN      Progress/Outcomes (Bed Mobility Goal 1, PT) goal not met  -DARRIN              Transfer Goal 1 (PT)    Activity/Assistive Device (Transfer Goal 1, PT) sit-to-stand/stand-to-sit;bed-to-chair/chair-to-bed;walker, rolling  -DARRIN      [place] Level/Cues Needed (Transfer Goal 1, PT) modified independence  -DARRIN      Time Frame (Transfer Goal 1, PT) long term goal (LTG);by discharge  -DARRIN      Progress/Outcome (Transfer Goal 1, PT) goal not met  -DARRIN              Gait Training Goal 1 (PT)    Activity/Assistive Device (Gait Training Goal 1, PT) gait (walking locomotion);assistive device use;walker, rolling;increase endurance/gait distance;improve balance and speed  -DARRIN      [place] Level (Gait Training Goal 1, PT) standby assist  -DARRIN      Distance (Gait Training Goal 1, PT) 50ft with no standing rest breaks  -DARRIN      Time Frame (Gait Training Goal 1, PT) long term  goal (LTG);by discharge  -DARRIN      Progress/Outcome (Gait Training Goal 1, PT) goal not met  -DARRIN              Problem Specific Goal 1 (PT)    Problem Specific Goal 1 (PT) L unna boot will stay in place for 7 days allow for proper edema control  -DARRIN      Time Frame (Problem Specific Goal 1, PT) long-term goal (LTG);by discharge  -DARRIN      Progress/Outcome (Problem Specific Goal 1, PT) goal not met  -DARRIN              Problem Specific Goal 2 (PT)    Problem Specific Goal 2 (PT) R unna boot will stay in place for 7 days to allow for proper edema control  -DARRIN      Time Frame (Problem Specific Goal 2, PT) long-term goal (LTG);by discharge  -DARRIN      Progress/Outcome (Problem Specific Goal 2, PT) goal not met  -DARRIN            User Key  (r) = Recorded By, (t) = Taken By, (c) = Cosigned By    Initials Name Provider Type Discipline    Elvin Mata PTA Physical Therapy Assistant PT                Therapy Charges for Today     Code Description Service Date Service Provider Modifiers Qty    [id number]  GAIT TRAINING EA 15 MIN 3/12/2022 Elvin Mcallister PTA GP 1                 Elvin Mcallister PTA   3/13/2022

## 2022-03-13 NOTE — PLAN OF CARE
Goal Outcome Evaluation:         A&Ox4.  Pt. Requests no pain meds for C/O pain.  3 L NC/.  Up to bsc.  CATHERINE boots bilat.  Eliquis for VTE.  Up with walker.  Refuses scheduled valium. Resting well.

## 2022-03-13 NOTE — DISCHARGE PLACEMENT REQUEST
"Ben Hicks (86 y.o. Female)             Date of Birth   1935    Social Security Number       Address   [address] [facility] [facility] [place] [address]    Home Phone   [phone]    MRN   [id number]       [facility]    Marital Status                               Admission Date   3/8/22    Admission Type   Emergency    Admitting Provider   Gerardo Reveles MD    Attending Provider   Gerardo Reveles MD    Department, Room/Bed   [facility] 3A, 354/1       Discharge Date       Discharge Disposition       Discharge Destination                               Attending Provider: Gerardo Reveles MD    Allergies: Valium [Diazepam], Contrast Dye    Isolation: None   Infection: None   Code Status: No CPR   Advance Care Planning Activity    Ht: 165.1 cm (65\")   Wt: 76.8 kg (169 lb 4.8 oz)    Admission Cmt: None   Principal Problem: Recurrent pleural effusion on right [J90]                 Active Insurance as of 3/8/2022     Primary Coverage     Payor Plan Insurance Group Employer/Plan Group    MEDICARE MEDICARE A & B      Payor Plan Address Payor Plan Phone Number Payor Plan Fax Number Effective Dates    [address] [phone]  10/1/2000 - None Entered    Formerly [facility] [id number]       Subscriber Name Subscriber Birth Date Member ID       BEN HICKS 1935 [id number]           Secondary Coverage     Payor Plan Insurance Group Employer/Plan Group    [facility] BY BORA [facility] BY BORA [id number]     Payor Plan Address Payor Plan Phone Number Payor Plan Fax Number Effective Dates    [address]   1/1/2021 - None Entered    [facility] [address]       Subscriber Name Subscriber Birth Date Member ID       BEN HICKS 1935 [id number]                 Emergency Contacts      (Rel.) Home Phone Work Phone Mobile Phone    kait torres (Grandchild) [phone] -- --    Clara Krause (Grandchild) -- -- [phone]             "   History & Physical      Neha Dubon DO at 03/09/22 0237              [facility] Medicine Services  HISTORY AND PHYSICAL    Date of Admission: 3/8/2022  Primary Care Physician: Austin Wade DO    Subjective     Chief Complaint: SOA    History of Present Illness  86-year-old female who presents the emergency department with shortness of breath.  She has had several recent admissions for similar issues.  The patient presented in A. fib with RVR.  She also was noted to have a right pleural effusion.  She has had this drained in the past on 2/22/2022.  Patient wears chronic nasal cannula oxygen.  She is placed on a Cardizem drip in the emergency department.  She states she has had decreased urine output and bowel movements in the last several days.  She has had decreased ambulation secondary to shortness of breath.  She states she was not have come to the hospital, but Dr. Summers encouraged her to.  She states she is anxious, and asks for Atarax.  She is wearing bilateral Unna boots.      Cardiac echo on 1/24/2022:  Interpretation Summary    · Assessment of left ventricular ejection fraction somewhat difficult due to the presence of atrial fibrillation.  · Left ventricular systolic function is low normal. Left ventricular ejection fraction appears to be 51 - 55%.  · Left ventricular wall thickness is consistent with mild to moderate concentric hypertrophy.  · The right ventricular cavity is dilated. Mildly reduced right ventricular systolic function noted.  · Biatrial enlargment is noted.  · No hemodynamically significant valvular abnormalities identified on this study.      Review of Systems   SOA    Otherwise complete ROS reviewed and negative except as mentioned in the HPI.    Past Medical History:   Past Medical History:   Diagnosis Date   • Actinic keratosis    • Arthritis    • Atherosclerosis    • Benign fundic gland polyps of stomach    • Bleeding ulcer    • Carotid  artery bruit    • Carotid artery stenosis    • COPD (chronic obstructive pulmonary disease) (Formerly [facility])    • Diverticulosis    • Emphysema of lung (Formerly [facility])    • History of colon polyps    • Hyperlipidemia    • Hypertension    • IBS (irritable bowel syndrome)    • Macular degeneration    • Osteoporosis    • Prediabetes    • PVD (peripheral vascular disease) (Formerly [facility])    • TIA (transient ischemic attack)    • Vitamin D deficiency      Past Surgical History:  Past Surgical History:   Procedure Laterality Date   • CATARACT EXTRACTION     • COLONOSCOPY  03/15/2013    polyp, hyperplastic   • COLONOSCOPY N/A 10/2/2018    Procedure: COLONOSCOPY WITH ANESTHESIA;  Surgeon: Harris Escobar MD;  Location: [facility] ENDOSCOPY;  Service: Gastroenterology   • CYST REMOVAL     • ENDOSCOPY  2019   • ENDOSCOPY N/A 10/4/2019    Procedure: ESOPHAGOGASTRODUODENOSCOPY WITH ANESTHESIA;  Surgeon: Harris Escobar MD;  Location: [facility] ENDOSCOPY;  Service: Gastroenterology   • FEMORAL ARTERY STENT     • HYSTERECTOMY     • VASCULAR SURGERY      multiple     Social History:  reports that she quit smoking about 5 months ago. Her smoking use included cigarettes. She has a 35.00 pack-year smoking history. She has never used smokeless tobacco. She reports that she does not drink alcohol and does not use drugs.    Family History: family history includes Cancer in her father, mother, and sister; Colon cancer in her sister; Colon polyps in her brother; Heart disease in her daughter and son.       Allergies:  Allergies   Allergen Reactions   • Valium [Diazepam] Nausea Only   • Contrast Dye Itching       Medications:  Prior to Admission medications    Medication Sig Start Date End Date Taking? Authorizing Provider   vitamin D (ERGOCALCIFEROL) 1.25 MG (85106 UT) capsule capsule Take 1 capsule by mouth 1 (One) Time Per Week. 3/7/22   Shira Gabriel APRN   alendronate (FOSAMAX) 10 MG tablet Take 1 tablet by mouth 1 (One) Time Per Week. 2/17/22   Shira Gabriel APRN    apixaban (ELIQUIS) 2.5 MG tablet tablet Take 1 tablet by mouth Every 12 (Twelve) Hours. Indications: Atrial Fibrillation 2/16/22   Anuj Santos MD   atorvastatin (LIPITOR) 40 MG tablet Take 1 tablet by mouth Daily. 10/29/21   Austin Wade DO   budesonide-formoterol (SYMBICORT) 160-4.5 MCG/ACT inhaler Inhale 2 puffs 2 (Two) Times a Day. 11/2/21   Beth Tony APRN   bumetanide (BUMEX) 1 MG tablet Take 1 tablet by mouth Daily. 3/7/22   Trini Watts APRN   busPIRone (BUSPAR) 10 MG tablet Take 1 tablet by mouth 3 (Three) Times a Day. 3/6/22   Trini Watts APRN   docusate sodium (COLACE) 100 MG capsule Take 1 capsule by mouth 2 (Two) Times a Day. 11/26/21   Nelson Elizabeth MD   hydrOXYzine (ATARAX) 25 MG tablet Take 1 tablet by mouth 3 (Three) Times a Day As Needed for Anxiety. 3/6/22   Trini Watts APRN   ipratropium-albuterol (DUO-NEB) 0.5-2.5 mg/3 ml nebulizer Take 3 mL by nebulization 4 (Four) Times a Day As Needed for Wheezing or Shortness of Air.    Provider, MD Lainey   levocetirizine (XYZAL) 5 MG tablet Take 1 tablet by mouth Every Evening. 3/2/22   Austin Wade DO   lisinopril (PRINIVIL,ZESTRIL) 10 MG tablet Take 1 tablet by mouth Daily. 2/16/22   Anuj Santos MD   Metoprolol Tartrate 75 MG tablet Take 75 mg by mouth Every 12 (Twelve) Hours. 1/29/22   Alexander Thompson DO   Mucinex 600 MG 12 hr tablet Take 2 tablets by mouth 2 (Two) Times a Day. 9/18/21   Trini Watts APRN   multivitamin with minerals (VITRUM 50+ ADULT-MULTI PO) Take 1 tablet by mouth Daily.    Provider, MD Lainey   pantoprazole (PROTONIX) 40 MG EC tablet Take 40 mg by mouth 2 (Two) Times a Day.    Provider, MD Lainey   pramipexole (Mirapex) 0.25 MG tablet Take 1 tablet by mouth every night at bedtime. 2/7/22   Austin Wade,    sertraline (Zoloft) 50 MG tablet Take 1 tablet by mouth Daily. 1/31/22   Austin Wade,      I  "have utilized all available immediate resources to obtain, update, and review the patient's current medications.    Objective     Vital Signs: /83   Pulse 86   Temp 97.6 °F (36.4 °C) (Oral)   Resp 21   Ht 165.1 cm (65\")   Wt 79.2 kg (174 lb 11.2 oz)   SpO2 97%   BMI 29.07 kg/m²   Physical Exam  Vitals reviewed.   Constitutional:       Appearance: She is ill-appearing.   HENT:      Head: Normocephalic and atraumatic.      Right Ear: External ear normal.      Left Ear: External ear normal.      Nose: Nose normal.      Mouth/Throat:      Mouth: Mucous membranes are moist.      Pharynx: No oropharyngeal exudate.   Eyes:      General: No scleral icterus.     Conjunctiva/sclera: Conjunctivae normal.   Cardiovascular:      Rate and Rhythm: Tachycardia present. Rhythm irregular.      Heart sounds: Normal heart sounds.   Pulmonary:      Effort: Respiratory distress present.      Breath sounds: Rhonchi present.      Comments: She appears tachypneic with a wet cough.  Abdominal:      General: There is no distension.      Palpations: Abdomen is soft.   Musculoskeletal:         General: No swelling or tenderness.      Cervical back: Normal range of motion and neck supple.   Skin:     General: Skin is warm and dry.   Neurological:      General: No focal deficit present.      Mental Status: She is alert.      Cranial Nerves: No cranial nerve deficit.   Psychiatric:         Mood and Affect: Mood normal.         Behavior: Behavior normal.       Results Reviewed:  Lab Results (last 24 hours)     Procedure Component Value Units Date/Time    [place] Draw [[id number]] Collected: 03/08/22 2249    Specimen: Blood Updated: 03/09/22 0002    Narrative:      The following orders were created for panel order [place] Draw.  Procedure                               Abnormality         Status                     ---------                               -----------         ------                     Green Top (Gel)[[id number]]             "                      Final result               Lavender Top[[id number]]                                     Final result               Red Top[[id number]]                                          Final result               [place] Blood Culture Doni...[[id number]]                      Final result               [place] Top[[id number]]                                         In process                 Light Blue Top[[id number]]                                   Final result                 Please view results for these tests on the individual orders.    Green Top (Gel) [[id number]] Collected: 03/08/22 2249    Specimen: Blood Updated: 03/09/22 0002     Extra Tube Hold for add-ons.     Comment: Auto resulted.       Red Top [[id number]] Collected: 03/08/22 2249    Specimen: Blood Updated: 03/09/22 0002     Extra Tube Hold for add-ons.     Comment: Auto resulted.       Light Blue Top [[id number]] Collected: 03/08/22 2249    Specimen: Blood Updated: 03/09/22 0002     Extra Tube hold for add-on     Comment: Auto resulted       Lavender Top [[id number]] Collected: 03/08/22 2249    Specimen: Blood Updated: 03/09/22 0002     Extra Tube hold for add-on     Comment: Auto resulted       [place] Blood Culture Bottle Set [[id number]] Collected: 03/08/22 2249    Specimen: Blood from Arm, Right Updated: 03/09/22 0002     Extra Tube Hold for add-ons.     Comment: Auto resulted.       COVID PRE-OP / PRE-PROCEDURE SCREENING ORDER (NO ISOLATION) - Swab, Nasal Cavity [[id number]]  (Normal) Collected: 03/08/22 2247    Specimen: Swab from Nasal Cavity Updated: 03/08/22 2342    Narrative:      The following orders were created for panel order COVID PRE-OP / PRE-PROCEDURE SCREENING ORDER (NO ISOLATION) - Swab, Nasal Cavity.  Procedure                               Abnormality         Status                     ---------                               -----------         ------                     COVID-19,[facility] IN-THIEN...[[id number]]  Normal               Final result                 Please view results for these tests on the individual orders.    COVID-19,[facility] IN-HOUSE,Nasal Swab No Transport Media 3-4 HR TAT - Swab, Nasal Cavity [[id number]]  (Normal) Collected: 03/08/22 2247    Specimen: Swab from Nasal Cavity Updated: 03/08/22 2342     COVID19 Not Detected    Narrative:      Fact sheet for providers: https://www.fda.gov/media/811407/download     Fact sheet for patients: https://www.fda.gov/media/253318/download    Test performed by PCR.    Consider negative results in combination with clinical observations, patient history, and epidemiological information.    BNP [[id number]]  (Abnormal) Collected: 03/08/22 2249    Specimen: Blood Updated: 03/08/22 2329     proBNP 28,338.0 pg/mL     Narrative:      Among patients with dyspnea, NT-proBNP is highly sensitive for the detection of acute congestive heart failure. In addition NT-proBNP of <300 pg/ml effectively rules out acute congestive heart failure with 99% negative predictive value.    Results may be falsely decreased if patient taking Biotin.      Magnesium [[id number]]  (Normal) Collected: 03/08/22 2249    Specimen: Blood Updated: 03/08/22 2321     Magnesium 2.0 mg/dL     Comprehensive Metabolic Panel [[id number]]  (Abnormal) Collected: 03/08/22 2249    Specimen: Blood Updated: 03/08/22 2321     Glucose 186 mg/dL      BUN 46 mg/dL      Creatinine 1.69 mg/dL      Sodium 131 mmol/L      Potassium 5.0 mmol/L      Chloride 88 mmol/L      CO2 32.0 mmol/L      Calcium 9.2 mg/dL      Total Protein 6.4 g/dL      Albumin 3.70 g/dL      ALT (SGPT) 42 U/L      AST (SGOT) 31 U/L      Alkaline Phosphatase 82 U/L      Total Bilirubin 0.4 mg/dL      Globulin 2.7 gm/dL      A/G Ratio 1.4 g/dL      BUN/Creatinine Ratio 27.2     Anion Gap 11.0 mmol/L      eGFR 29.3 mL/min/1.73      Comment: National Kidney Foundation and American Society of Nephrology (ASN) Task Force recommended calculation based on the Chronic Kidney Disease  Epidemiology Collaboration (CKD-EPI) equation refit without adjustment for race.       Narrative:      GFR Normal >60  Chronic Kidney Disease <60  Kidney Failure <15      Troponin [[id number]]  (Abnormal) Collected: 03/08/22 2249    Specimen: Blood Updated: 03/08/22 2320     Troponin T 0.062 ng/mL     Narrative:      Troponin T Reference Range:  <= 0.03 ng/mL-   Negative for AMI  >0.03 ng/mL-     Abnormal for myocardial necrosis.  Clinicians would have to utilize clinical acumen, EKG, Troponin and serial changes to determine if it is an Acute Myocardial Infarction or myocardial injury due to an underlying chronic condition.       Results may be falsely decreased if patient taking Biotin.      Blood Gas, Arterial - [[id number]]  (Abnormal) Collected: 03/08/22 2308    Specimen: Arterial Blood Updated: 03/08/22 2310     Site Left Radial     Alexx's Test Positive     pH, Arterial 7.408 pH units      pCO2, Arterial 53.7 mm Hg      Comment: 83 Value above reference range        pO2, Arterial 131.0 mm Hg      Comment: 83 Value above reference range        HCO3, Arterial 33.8 mmol/L      Comment: 83 Value above reference range        Base Excess, Arterial 7.7 mmol/L      Comment: 83 Value above reference range        O2 Saturation, Arterial 99.7 %      Comment: 83 Value above reference range        Temperature 37.0 C      Barometric Pressure for Blood Gas 751 mmHg      Modality Nasal Cannula     Flow Rate 2.0 lpm      Ventilator Mode NA     Collected by [id number]     Comment: Meter: [id number]     :  [id number]        pCO2, Temperature Corrected 53.7 mm Hg      pH, Temp Corrected 7.408 pH Units      pO2, Temperature Corrected 131 mm Hg     Protime-INR [[id number]]  (Abnormal) Collected: 03/08/22 2249    Specimen: Blood Updated: 03/08/22 2307     Protime 18.7 Seconds      INR 1.63    CBC & Differential [[id number]]  (Abnormal) Collected: 03/08/22 2249    Specimen: Blood Updated: 03/08/22 2258    Narrative:      The  following orders were created for panel order CBC & Differential.  Procedure                               Abnormality         Status                     ---------                               -----------         ------                     CBC Auto Differential[[id number]]        Abnormal            Final result                 Please view results for these tests on the individual orders.    CBC Auto Differential [[id number]]  (Abnormal) Collected: 03/08/22 2249    Specimen: Blood Updated: 03/08/22 2258     WBC 11.59 10*3/mm3      RBC 4.18 10*6/mm3      Hemoglobin 11.7 g/dL      Hematocrit 37.7 %      MCV 90.2 fL      MCH 28.0 pg      MCHC 31.0 g/dL      RDW 14.9 %      RDW-SD 49.1 fl      MPV 9.6 fL      Platelets 299 10*3/mm3      Neutrophil % 83.4 %      Lymphocyte % 8.3 %      Monocyte % 6.5 %      Eosinophil % 0.8 %      Basophil % 0.3 %      Immature Grans % 0.7 %      Neutrophils, Absolute 9.67 10*3/mm3      Lymphocytes, Absolute 0.96 10*3/mm3      Monocytes, Absolute 0.75 10*3/mm3      Eosinophils, Absolute 0.09 10*3/mm3      Basophils, Absolute 0.04 10*3/mm3      Immature Grans, Absolute 0.08 10*3/mm3      nRBC 0.0 /100 WBC     Gray Top [[id number]] Collected: 03/08/22 2249    Specimen: Blood Updated: 03/08/22 2256        Imaging Results (Last 24 Hours)     Procedure Component Value Units Date/Time    XR Chest 1 View [[id number]] Resulted: 03/08/22 2315     Updated: 03/08/22 2315        I have personally reviewed and interpreted the radiology studies and ECG obtained at time of admission.     Assessment / Plan     Assessment:   Active Hospital Problems    Diagnosis    • Atrial fibrillation with RVR (HCC)      Impression:  1.  A. fib with RVR  2.  Right pleural effusion  3.  Dyspnea  4.  Elevated troponin  5.  Hyponatremia  6.  CKD stage IIIb    Plan:   1.  Admit to the hospital  2.  Continue Cardizem drip   3.  We will consult with primary team in the morning regarding potential thoracentesis.  We will hold  anticoagulation at this time and place n.p.o. pending potential procedure  4.  Bumex IV twice daily  5.  EKG and troponin in the morning   6.  Cardiology consult secondary to difficulty in control atrial fibrillation  7.  Labs in the morning  8.  Resume appropriate home medications       Code Status/Advanced Care Plan: DNR    The patient's surrogate decision maker is family.     I discussed my findings and recommendations with the patient.    Estimated length of stay is extended.     The patient was seen and examined by me on 3/9/2022 at 2.    Electronically signed by Neha Dubon DO, 03/09/22, 02:37 CST.                Electronically signed by Neha Dubon DO at 03/09/22 0243          Physician Progress Notes (last 48 hours)      Kenya Pickens APRN at 03/12/22 1406              [facility] Medicine Services  INPATIENT PROGRESS NOTE    Length of Stay: 3  Date of Admission: 3/8/2022  Primary Care Physician: Austin Wade DO    Subjective   Chief Complaint: Follow-up  HPI   Patient seen earlier sitting up on side of bed working with physical therapy.  She was able to sit to stand with physical therapy and get from the bed to the chair with rolling walker.  Oxygen at 3 L.  Pigtail catheter was in place at that time but Dr. Zimmer has since removed pigtail catheter.  Drainage at 400 chest tube prior to removal.  Denies chest pain or palpitations.  Denies nausea, vomiting or abdominal pain.  Blood pressure running lower 92/55.  Metoprolol held today.  Decrease lisinopril dose to 2.5 mg twice daily.  I discussed palliative care with patient yesterday and again today.  It is my understanding Dr. Reveles discussed palliative care with patient and if symptoms worsen consider hospice.  Could consider Pleurx catheter placement but would put her at increased risk for dehydration.     Review of systems  Constitutional: Positive for fatigue. Negative for appetite change,  chills and fever.   HENT: Negative for congestion and trouble swallowing.    Eyes: Negative for photophobia and visual disturbance.   Respiratory: Positive for shortness of breath. Negative for wheezing.    Cardiovascular: Negative for palpitations and leg swelling.   Gastrointestinal: Negative for constipation, diarrhea, nausea and vomiting.   Endocrine: Negative for cold intolerance, heat intolerance and polyuria.   Genitourinary: Negative for dysuria, frequency and urgency.   Musculoskeletal: Positive for gait problem.   Skin: Negative for pallor, rash and wound.   Allergic/Immunologic: Negative for immunocompromised state.   Neurological: Positive for weakness. Negative for light-headedness.   Hematological: Negative for adenopathy. Does not bruise/bleed easily.   Psychiatric/Behavioral: Negative for agitation, behavioral problems and confusion    All pertinent negatives and positives are as above. All other systems have been reviewed and are negative unless otherwise stated.     Objective    Temp:  [97.7 °F (36.5 °C)-97.9 °F (36.6 °C)] 97.8 °F (36.6 °C)  Heart Rate:  [] 110  Resp:  [16-20] 16  BP: ()/(48-80) 92/55  Physical Exam  Vitals and nursing note reviewed.   Constitutional:       Comments: Sitting up on side of bed.  Oxygen at 3 L.  No family in room.   HENT:      Head: Normocephalic and atraumatic.      Nose: No congestion.      Mouth/Throat:      Pharynx: Oropharynx is clear. No oropharyngeal exudate or posterior oropharyngeal erythema.   Eyes:      Extraocular Movements: Extraocular movements intact.      Pupils: Pupils are equal, round, and reactive to light.   Cardiovascular:      Rate and Rhythm: Normal rate. Rhythm irregular.      Heart sounds: No murmur heard.     Comments: Atrial fibrillation  on telemetry.  Pulmonary:      Breath sounds: No wheezing, rhonchi or rales.      Comments: Oxygen at 3 L.  Right pigtail catheter removed per Dr. Zimmer  Abdominal:      Palpations:  Abdomen is soft.   Genitourinary:     Comments: Voiding.  Musculoskeletal:         Cervical back: Normal range of motion and neck supple.      Comments: Unna boots bilateral lower extremities.   Skin:     General: Skin is warm and dry.   Neurological:      General: No focal deficit present.      Mental Status: She is alert and oriented to person, place, and time.   Psychiatric:         Mood and Affect: Mood normal.         Behavior: Behavior normal.         Thought Content: Thought content normal.         Judgment: Judgment normal.      Results Review:  I have reviewed the labs, radiology results, and diagnostic studies.    Laboratory Data:      Results from last 7 days   Lab Units 03/10/22  0740 03/09/22  1149 03/08/22 2249   WBC 10*3/mm3 8.83 8.81 11.59*   HEMOGLOBIN g/dL 10.9* 10.8* 11.7*   HEMATOCRIT % 34.9 34.9 37.7   PLATELETS 10*3/mm3 248 247 299     Results from last 7 days   Lab Units 03/12/22  0704 03/11/22  0749 03/10/22  0740 03/09/22  1149 03/08/22 2249 03/06/22  0749   SODIUM mmol/L 130* 133* 135* 133* 131* 136   POTASSIUM mmol/L 4.1 3.9 4.5 4.7 5.0 4.0   CHLORIDE mmol/L 89* 90* 93* 88* 88* 92*   CO2 mmol/L 35.0* 37.0* 33.0* 33.0* 32.0* 37.0*   BUN mg/dL 40* 40* 47* 45* 46* 40*   CREATININE mg/dL 1.51* 1.40* 1.45* 1.59* 1.69* 1.50*   GLUCOSE mg/dL 106* 152* 122* 94 186* 119*   CALCIUM mg/dL 8.7 8.6 8.7 9.4 9.2 8.8   ALT (SGPT) U/L  --   --   --  40* 42*  --      Culture Data:    No results found for: BLOODCX, URINECX, WOUNDCX, MRSACX, RESPCX, STOOLCX    Radiology Data:   Imaging Results (Last 7 Days)     Procedure Component Value Units Date/Time    XR Chest 1 View [[id number]] Collected: 03/12/22 0725     Updated: 03/12/22 0731    Narrative:      EXAMINATION:  XR CHEST 1 VW-  3/12/2022 4:20 AM CST     HISTORY: Right pleural effusion; I48.91-Unspecified atrial fibrillation;  Z31-Mmuzken effusion, not elsewhere classified; R06.00-Dyspnea,  unspecified; J44.9-Chronic obstructive pulmonary disease,  unspecified;  N18.9-Chronic kidney disease, unspecified; R77.8-Other specified  abnormalities of plasma proteins; Z74.09-Other reduced mobility;  Z78.9-Other specified health status; R60.0-Localized edema; Z74.09-Oth     COMPARISON: 3/11/2022.     FINDINGS:  There is cardiomegaly. There are patchy infiltrates in the  mid and lower lung zones. There is blunting of the costophrenic angles.  There is a right chest tube in place. There is no measurable  pneumothorax. There is atheromatous disease of the thoracic aorta. No  acute bony abnormality is seen.       Impression:      1. Patchy infiltrates in the mid and lower lung zones. No significant  change.  2. Right chest tube in place. No measurable pneumothorax.  3. Cardiomegaly.        This report was finalized on 03/12/2022 07:28 by Dr. Collin So MD.    XR Chest 1 View [[id number]] Collected: 03/11/22 0733     Updated: 03/11/22 0737    Narrative:      EXAM: XR CHEST 1 VW-     INDICATION: Right pleural effusion     COMPARISON: Prior day     FINDINGS:     Right-sided chest tube is stable in position. No change in small  bilateral pleural effusions and bilateral mid-lower lung zone pulmonary  infiltrates. Emphysema is again noted. Cardiac silhouette is markedly  enlarged but stable. No measurable pneumothorax.       Impression:         No change in appearance of the chest.  This report was finalized on 03/11/2022 07:34 by Dr. Nam Funez MD.    XR Chest 1 View [[id number]] Collected: 03/10/22 0713     Updated: 03/10/22 0717    Narrative:      EXAM: XR CHEST 1 VW-     INDICATION: RIGHT pleural effusion     COMPARISON: Prior day.     FINDINGS:     Right-sided chest tube is stable in position. No change in RIGHT greater  than LEFT small pleural effusions with overlying atelectasis. No  measurable pneumothorax. Small amount of soft tissue gas in the RIGHT  chest wall. Cardiac silhouette is enlarged but stable. Diffuse  interstitial coarsening is again noted.        Impression:         No change in appearance of the chest.  This report was finalized on 03/10/2022 07:14 by Dr. Nam Funez MD.    XR Chest 1 View [[id number]] Collected: 03/09/22 1656     Updated: 03/09/22 1701    Narrative:      EXAMINATION: XR CHEST 1 VW- 3/9/2022 4:56 PM CST     HISTORY: Follow up right thoracentesis with drainage tube placement;  I48.91-Unspecified atrial fibrillation; J65-Kejrjvp effusion, not  elsewhere classified; R06.00-Dyspnea, unspecified; J44.9-Chronic  obstructive pulmonary disease, unspecified; N18.9-Chronic kidney  disease, unspecified; R77.8-Other specified abnormalities of plasma  proteins; Z74.09-Other reduced mobility; Z78.9-Other specified health  status.     REPORT: A frontal view of the chest was obtained.     COMPARISON: Chest x-ray 3/8/2022 2304 hours.     A smallbore right basilar chest tube has been inserted, and appears to  be in satisfactory position, there is reduction in volume of the right  pleural effusion. No pneumothorax is identified. There appears to be a  small stable left pleural effusion. There are moderate volume loss in  the lung bases. The heart is enlarged. No overt CHF is identified.  Underlying chronic lung changes and interstitial disease appears stable.  The osseous structures are unremarkable.       Impression:      Interval insertion of a smallbore right basilar chest tube  in good position with reduction in volume of the right pleural effusion  and no pneumothorax. Continued extensive volume loss in the lung bases  with cardiomegaly.  This report was finalized on 03/09/2022 16:58 by Dr. Jhon Caruso MD.    XR Chest 1 View [[id number]] Collected: 03/09/22 0724     Updated: 03/09/22 0731    Narrative:      EXAM: XR CHEST 1 VW-     INDICATION: Shortness of air     COMPARISON: 3/2/2022     FINDINGS:     Cardiac silhouette is enlarged but stable. No change in moderate RIGHT  pleural effusion and trace LEFT pleural effusion. No significant change  in  bilateral interstitial opacity and patchy airspace opacity. No  visible pneumothorax. No acute osseous finding.       Impression:         Similar appearance to 3/2/2022 with cardiomegaly, persistent moderate  RIGHT and small LEFT pleural effusion with overlying atelectasis,  bilateral interstitial opacity, and patchy airspace opacities. Favor  volume overload/pulmonary edema.  This report was finalized on 03/09/2022 07:28 by Dr. Nam Funez MD.      Results for orders placed during the hospital encounter of 01/21/22    Adult Transthoracic Echo Complete W/ Cont if Necessary Per Protocol    Interpretation Summary  · Assessment of left ventricular ejection fraction somewhat difficult due to the presence of atrial fibrillation.  · Left ventricular systolic function is low normal. Left ventricular ejection fraction appears to be 51 - 55%.  · Left ventricular wall thickness is consistent with mild to moderate concentric hypertrophy.  · The right ventricular cavity is dilated. Mildly reduced right ventricular systolic function noted.  · Biatrial enlargment is noted.  · No hemodynamically significant valvular abnormalities identified on this study.      Intake/Output    Intake/Output Summary (Last 24 hours) at 3/12/2022 1412  Last data filed at 3/12/2022 0900  Gross per 24 hour   Intake 740 ml   Output 1165 ml   Net -425 ml     Scheduled Meds  apixaban, 2.5 mg, Oral, Q12H  atorvastatin, 40 mg, Oral, Daily  budesonide-formoterol, 2 puff, Inhalation, BID - RT  bumetanide, 1 mg, Intravenous, Q12H  busPIRone, 10 mg, Oral, TID  dilTIAZem CD, 120 mg, Oral, Q24H  docusate sodium, 100 mg, Oral, BID  guaiFENesin, 1,200 mg, Oral, BID  ipratropium, 0.5 mg, Nebulization, 4x Daily - RT  [START ON 3/13/2022] lisinopril, 2.5 mg, Oral, Daily  metoprolol tartrate, 75 mg, Oral, Q12H  morphine, 5 mg, Oral, TID  multivitamin with minerals, 1 tablet, Oral, Daily  pantoprazole, 40 mg, Oral, BID  pramipexole, 0.25 mg, Oral,  Nightly  sertraline, 50 mg, Oral, Daily  sodium chloride, 10 mL, Intravenous, Q12H      I have reviewed the patient current medications.     Assessment/Plan     Active Hospital Problems    Diagnosis    • **Recurrent pleural effusion on right    • Atrial fibrillation with RVR ([facility])    • Stage 3b chronic kidney disease ([facility])    • Hyponatremia    • Stage 3 severe COPD by GOLD classification ([facility])    • Acute on chronic diastolic CHF (congestive heart failure) ([facility])    • Chronic respiratory failure with hypoxia and hypercapnia ([facility])    • Essential hypertension      Plan:  1.  To ER 3/8/2022 with shortness of breath.  Patient has had several recent admissions with acute on chronic diastolic heart failure and volume overload with atrial fibrillation and recurrent right pleural effusion.  Found to be in atrial fibrillation with RVR  In ER.  Cardizem drip started in ER.     2.  Atrial fibrillation with RVR.  Cardiology following.  Cardizem  mg added 3/9.  Cardizem drip discontinued.  CardioMEMS mentioned with previous hospitalization but may be difficult given patient's age and comorbidities.  Outpatient cardioversion for rate control also mentioned as it is felt that atrial fibrillation with RVR is contributing to volume overload.  Remains atrial fibrillation rate controlled at present. Eliquis restarted 3/10 after discussing with CTS.    3.  Acute on chronic diastolic congestive heart failure secondary to atrial fibrillation with RVR.  Ejection fraction 51-55% per echo 1/2022.  1500 mL fluid restriction.  Has been on IV Bumex twice daily.  Transition to oral Bumex once daily.  Creatinine up to 1.51 today, 1.69 on admission.  Sodium trended down 130, 131 on admission.  Lisinopril (ACE) dose decreased to 2.5 mg daily due to low blood pressure.  Metoprolol (beta-blocker) held today.  May need to decrease dose of beta-blocker now that patient is also on Cardizem CD    4.  Recurrent right pleural effusion.  Chest x-ray  similar to 3/2 with cardiomegaly, persistent moderate right and small left pleural effusion with overlying atelectasis and bilateral interstitial opacities and patchy airspace opacities.  Favor pulmonary edema.  Patient had thoracentesis 2/22 per Dr. Salgado with 1200 mL fluid removed.  Per lights criteria transudate effusion consistent with CHF.  CTS, Dr. Zimmer following.  Right pigtail catheter placed at bedside.  1500 mL fluid drained.  Breathing better with fluid drained and Hydrophed controlled.  Continue diuresis.  Pigtail catheter removed today per Dr. Zimmer.  He discussed palliative care with patient.  If symptoms worsen consider hospice.  If recurrence of effusion consider palliative placement of Pleurx catheter but would put patient at increased risk for dehydration.     5.  COPD stage III.  No exacerbation.  Continue Symbicort and Mucinex.  Plain Atrovent as opposed to DuoNeb's with history of A. fib with RVR.  Incentive spirometry.  Continue oxygen 3 L home setting.     6.  Chronic kidney disease stage IIIb, stable.  Creatinine 1.69 on admission, 1.51 today.  Daily BMP.  BMP in a.m.     8.  Primary hypertension.  Blood pressure 92/55, 100/48.  Lisinopril dose decreased to 2.5 mg daily.  Dose held today.  Will decrease metoprolol dose to 25 mg twice daily but dose held today due to low blood pressure.     9.  Physical therapy consulted.  Unna boots bilateral lower extremities placed last admission for compression purposes.     10.   for skilled nursing facility placement.  [facility] to follow-up on Monday 3/14 to monitor progress.    11.  Palliative care consulted and saw patient yesterday.  CODE STATUS changed.  MOST form completed.     CODE STATUS/advance care planning: No CPR, no intubation, no cardioversion.  The patient surrogate decision-maker is her granddaughter, Genevieve.    The above documentation resulted from a face-to-face encounter by luis fernando VARGHESE, ACNP-BC.    Discharge  "Planning: I expect patient to be discharged to [facility] when bed offered and insurance approves.    Electronically signed by HALIMA Joy, 3/12/2022, 14:12 CST.        Electronically signed by Kenya Pickens APRN at 03/12/22 1423     Zimmer, Zac HORNER MD at 03/12/22 1254              [facility] Cardiothoracic Surgery  PROGRESS NOTE   CC: Recurrent right pleural effusion    Subjective:  Breathing is still improved from admission but still remains short of breath.  No significant pain.    ROS: Continued shortness of breath, no fevers or chills, hemodynamically stable    Objective:      BP 92/55 (BP Location: Left arm, Patient Position: Sitting)   Pulse 110   Temp 97.8 °F (36.6 °C) (Oral)   Resp 16   Ht 165.1 cm (65\")   Wt 76.8 kg (169 lb 4.8 oz)   SpO2 92%   BMI 28.17 kg/m²       Intake/Output Summary (Last 24 hours) at 3/12/2022 1254  Last data filed at 3/12/2022 0900  Gross per 24 hour   Intake 740 ml   Output 1165 ml   Net -425 ml       CT Output: 200 cc serous    PE:  Vitals:    03/12/22 1130   BP:    Pulse: 110   Resp: 16   Temp:    SpO2: 92%     GENERAL: NAD, resting comfortably, normal color, cachectic  CARDIOVASCULAR: regular, regular rate, sinus  PULMONARY: Normal bilateral breath sounds, mildly labored breathing  ABDOMEN: soft, nontender/nondistended  EXTREMITIES: mild peripheral edema, normal pulses, normal ROM        Lab Results (last 72 hours)     Procedure Component Value Units Date/Time    Basic Metabolic Panel [[id number]]  (Abnormal) Collected: 03/12/22 0704    Specimen: Blood Updated: 03/12/22 0744     Glucose 106 mg/dL      BUN 40 mg/dL      Creatinine 1.51 mg/dL      Sodium 130 mmol/L      Potassium 4.1 mmol/L      Chloride 89 mmol/L      CO2 35.0 mmol/L      Calcium 8.7 mg/dL      BUN/Creatinine Ratio 26.5     Anion Gap 6.0 mmol/L      eGFR 33.5 mL/min/1.73      Comment: National Kidney Foundation and American Society of Nephrology (ASN) Task Force recommended " "calculation based on the Chronic Kidney Disease Epidemiology Collaboration (CKD-EPI) equation refit without adjustment for race.       Narrative:      GFR Normal >60  Chronic Kidney Disease <60  Kidney Failure <15      Non-gynecologic Cytology [[id number]] Collected: 22 1343    Specimen: Body Fluid from Pleural Cavity Updated: 22 09     Case Report --     Non-gynecologic Cytology                          Case: [id number]                                  Authorizing Provider:  Zac Zimmer MD         Collected:           2022 01:43 PM          Ordering Location:     [address]  Received:            03/10/2022 11:46 AM          Pathologist:           Sergio Carlos MD                                                        Specimen:    Pleural Cavity                                                                              Final Diagnosis --     Pleural fluid, thoracentesis:  Negative for malignant cells.  Reactive mesothelial cells and lymphocytes.       Clinical Information --     Recurrent pleural effusion       Gross Description --     1. Pleural Cavity.  Received fresh labeled with patient name and  designated as \"pleural cavity.\"  60mL clear anisha fluid.  1 ThinPrep slide prepared.           Microscopic Description --     Cytologic examination shows a high cellularity pleural fluid consisting of reactive mesothelial cells and a polymorphous lymphocyte population.      Basic Metabolic Panel [[id number]]  (Abnormal) Collected: 22 0749    Specimen: Blood Updated: 22 08     Glucose 152 mg/dL      BUN 40 mg/dL      Creatinine 1.40 mg/dL      Sodium 133 mmol/L      Potassium 3.9 mmol/L      Chloride 90 mmol/L      CO2 37.0 mmol/L      Calcium 8.6 mg/dL      BUN/Creatinine Ratio 28.6     Anion Gap 6.0 mmol/L      eGFR 36.7 mL/min/1.73      Comment: National Kidney Foundation and American Society of Nephrology (ASN) Task Force recommended calculation based on " the Chronic Kidney Disease Epidemiology Collaboration (CKD-EPI) equation refit without adjustment for race.       Narrative:      GFR Normal >60  Chronic Kidney Disease <60  Kidney Failure <15      Basic Metabolic Panel [[id number]]  (Abnormal) Collected: 03/10/22 0740    Specimen: Blood Updated: 03/10/22 0818     Glucose 122 mg/dL      BUN 47 mg/dL      Creatinine 1.45 mg/dL      Sodium 135 mmol/L      Potassium 4.5 mmol/L      Chloride 93 mmol/L      CO2 33.0 mmol/L      Calcium 8.7 mg/dL      BUN/Creatinine Ratio 32.4     Anion Gap 9.0 mmol/L      eGFR 35.2 mL/min/1.73      Comment: National Kidney Foundation and American Society of Nephrology (ASN) Task Force recommended calculation based on the Chronic Kidney Disease Epidemiology Collaboration (CKD-EPI) equation refit without adjustment for race.       Narrative:      GFR Normal >60  Chronic Kidney Disease <60  Kidney Failure <15      CBC (No Diff) [[id number]]  (Abnormal) Collected: 03/10/22 0740    Specimen: Blood Updated: 03/10/22 0757     WBC 8.83 10*3/mm3      RBC 3.96 10*6/mm3      Hemoglobin 10.9 g/dL      Hematocrit 34.9 %      MCV 88.1 fL      MCH 27.5 pg      MCHC 31.2 g/dL      RDW 14.8 %      RDW-SD 47.4 fl      MPV 9.7 fL      Platelets 248 10*3/mm3               CXR: Small spaces been stable right lower lateral thorax, stable expansion of right lung    Assessment/Plan     Ms. Kate is a 86-year-old female who presents with recurrent right pleural effusion.  I placed a pigtail catheter and this has been drained, there is a small residual space without full expansion of the right lower lobe.  The drainage is decreased in chest tube was removed this morning.    Yesterday had long discussion with palliative care patient is okay with discharge with the palliative plan of care to the nursing home and if symptoms get worse consider hospice    If has significant recurrence of effusion, can consider palliative placement of a Pleurx catheter.  This is to  "likely put her at high risk of dehydration.  We discussed this again today.    Repeat chest x-ray this afternoon if stable we will sign off follow-up as needed.    Zac Zimmer M.D.  Cardiothoracic Surgeon      Electronically signed by Zac Zimmer MD at 03/12/22 1257          Consult Notes (last 48 hours)      Jaz Garcia APRN at 03/11/22 1311      Consult Orders    1. Inpatient Palliative Care Consult [[id number]] ordered by Kenya Pickens APRN at 03/11/22 1233               Palliative Care Initial Consult   Attending Physician: Gerardo Reveles MD  Referring Provider: Kenya Pickens APRN    Reason for Referral: assistance with clarification of goals of care and \"Frequent recurrent hospital admissions due to CHF exacerbation recurrent pleural effusions.\"  Family/Support: extended family    Code Status and Medical Interventions:   Ordered at: 03/09/22 0237     Medical Intervention Limits:    NO intubation (DNI)    NO cardioversion     Level Of Support Discussed With:    Patient     Code Status (Patient has no pulse and is not breathing):    No CPR (Do Not Attempt to Resuscitate)     Medical Interventions (Patient has pulse or is breathing):    Limited Support     Subjective   HPI: 86 y.o. female with past medical history of actinic keratosis, arthritis, atherosclerosis, atrial fibrillation, benign fundic gland polyps of stomach, bleeding ulcer, carotid artery bruit, carotid artery stenosis, chronic kidney disease, congestive heart failure, COPD requiring supplemental oxygen, diverticulosis, emphysema of lung, hyperlipidemia, hypertension, irritable bowel syndrome, macular degeneration, osteoporosis, prediabetes, peripheral vascular disease, TIA and vitamin D deficiency.  According to chart review she has been hospitalized 4 times in the last 2 months related to shortness of breath and COPD exacerbations.  Her most recent admission was from 3/2/2022 to 3/6/2022 with shortness of air, appears she " considered skilled nursing facility placement but ultimately decided to go home with home health. Patient presented to [facility] on [facility]/8/2022 related to shortness of breath, cough and congestion, dyspnea with exertion and decreased urinary output.  ECG completed in ED revealed she was in A. fib with RVR and had elevated troponin that was slightly higher than her baseline. Chart review indicates she wears 3 L of oxygen at home continuously and she was able to maintain oxygenation while in ED but was dyspneic and unable to complete full sentences.  Further workup in ED revealed WBC 11.59, ALT 42, creatinine 1.69, BUN 46, GFR 29.3, sodium 131 and BNP 28,338.  Chest x-ray revealed cardiomegaly, persistent moderate right and small left pleural effusion with overlying atelectasis and bilateral interstitial opacities favoring volume overload or pulmonary edema. She was placed on a Cardizem drip and admitted to the medical floor for treatment and further workup. Cardiology consulted and recommended continuing diuresis and rate control. Cardiothoracic surgery consulted regarding pleural effusions and a pigtail catheter was placed at bedside and sample sent to cytology on 3/9/2022. Cardiothoracic surgery notes reveal options of Pleurx placement as a palliative measures was discussed if pleural effision continues to recur. During previous admission in February of this year she required thoracentesis with removal of 1200 mL of fluid which was sent to cytology which was negative for malignancy. Therapy has evaluated patient and are recommending home with assist and home health versus skilled nursing facility. Per , [facility] is unable to continue providing service as she is unsafe at home and referrals have been sent to skilled nursing facilities. Reported chest pain on 3/10/2022 and ECG completed which revealed atrial fibrillation and ST depression. Cytology report from pleural fluid collected on  "3/9/2022 revealed reactive mesothelial cells and lymphocytes but negative for malignant cells. Labs completed this morning reveal she remains hyponatremic with sodium 133, renal function has improved since admission but remain abnormal with creatinine 1.40, BUN 40 and GFR 36.7. She has experienced increased output from pigtail drain and cardiothoracic surgery have recommended continuing for an additional day. She is currently sitting up in chair, nursing has just given her a dose of Atarax for anxiety.  She is alert and oriented, reports her breathing has improved dramatically since admission. Expresses worries regarding pleural effusion \"coming back\" after chest tube removed but states she is hopeful that it does not.     Advance Care Planning   ACP discussion was held with the patient during this visit. Patient has an advance directive in EMR which is still valid.      Advance Care Planning Discussion: Discussed current state of health and treatment options she has been offered for her pleural effusion management. At this time she states she does not wish to pursue any additional aggressive measures for pleural effusion. Discussed interventions regarding CODE STATUS, reports she does not wish to have aggressive measures such as artificial nutrition, vasopressors or dialysis in addition to intubation or cardioversion.  When discussing these interventions she expressed, \"I don't really see any reason to go through with these given the state I am in.\"  Explored discharge plans, she states that she is agreeable to going to nursing facility if they are able to accept her. She inquired about hospice services and if she were able to utilize their service.  She reflected on her experiences with hospice with her daughter who passed away 1 year ago tomorrow stating, \"They allowed my daughter a peaceful death, she didn't have to suffer and this is what I want in the end.\" Explained that since she is able to work with therapy " "she may benefit from nursing facility placement under skilled/palliative care and if/when she declines or feels her symptoms are no longer managed under palliative care she may benefit from hospice utilization. Expressed she was agreeable to this. Explored options for symptom management in regards to dyspnea including scheduled low dose opiate. She states she has never tried this before but reports she has been taking \"half of a Lortab in the morning and in the evening, I believe it's 5 mg\" and \"I have never had to take pain medicine in my life until here recently and it is working but I am scared I am going to get nauseated with some of it.\" Agreeable to starting low dose opiate to determine if this will benefit her dyspnea, reports atarax controls her anxiety well.     The patient receives support from her extended family.  POA/Healthcare Surrogate - She has named her grandchildren, Genevieve Strickland and Clara Krause, as her healthcare surrogates.    Advance Directive Status: Patient has advance directive, copy in chart     Review of Systems   Constitutional: Positive for malaise/fatigue. Negative for fever and weight loss.   HENT: Positive for congestion. Negative for hearing loss and stridor.    Eyes: Positive for vision loss in left eye (reports related to macular degeneration ) and vision loss in right eye (reports related to macular degeneration). Negative for pain.   Cardiovascular: Positive for chest pain (intermittently), dyspnea on exertion and leg swelling.   Respiratory: Positive for cough, shortness of breath and sputum production.    Skin: Negative for flushing, itching and rash.   Musculoskeletal: Positive for back pain (intermittently). Negative for falls and neck pain.   Gastrointestinal: Negative for abdominal pain, nausea and vomiting.   Genitourinary: Negative for bladder incontinence, dysuria and incomplete emptying.   Neurological: Positive for weakness. Negative for dizziness and loss of " balance.   Psychiatric/Behavioral: Negative for depression and suicidal ideas. The patient is nervous/anxious.        Pain Assessment  Pain Location: other (see comments) (generalized)  Pain Description: constant      Past Medical History:   Diagnosis Date   • Actinic keratosis    • Arthritis    • Atherosclerosis    • Benign fundic gland polyps of stomach    • Bleeding ulcer    • Carotid artery bruit    • Carotid artery stenosis    • COPD (chronic obstructive pulmonary disease) ([facility])    • Diverticulosis    • Emphysema of lung ([facility])    • History of colon polyps    • Hyperlipidemia    • Hypertension    • IBS (irritable bowel syndrome)    • Macular degeneration    • Osteoporosis    • Prediabetes    • PVD (peripheral vascular disease) ([facility])    • TIA (transient ischemic attack)    • Vitamin D deficiency       Past Surgical History:   Procedure Laterality Date   • CATARACT EXTRACTION     • COLONOSCOPY  03/15/2013    polyp, hyperplastic   • COLONOSCOPY N/A 10/2/2018    Procedure: COLONOSCOPY WITH ANESTHESIA;  Surgeon: Harris Escobar MD;  Location: [facility] ENDOSCOPY;  Service: Gastroenterology   • CYST REMOVAL     • ENDOSCOPY     • ENDOSCOPY N/A 10/4/2019    Procedure: ESOPHAGOGASTRODUODENOSCOPY WITH ANESTHESIA;  Surgeon: Harris Escobar MD;  Location: [facility] ENDOSCOPY;  Service: Gastroenterology   • FEMORAL ARTERY STENT     • HYSTERECTOMY     • VASCULAR SURGERY      multiple      Social History     Socioeconomic History   • Marital status:    Tobacco Use   • Smoking status: Former Smoker     Packs/day: 0.50     Years: 70.00     Pack years: 35.00     Types: Cigarettes     Quit date: 10/2021     Years since quittin.4   • Smokeless tobacco: Never Used   Vaping Use   • Vaping Use: Never used   Substance and Sexual Activity   • Alcohol use: No   • Drug use: No   • Sexual activity: Not Currently         Allergies   Allergen Reactions   • Valium [Diazepam] Nausea Only   • Contrast Dye Itching       Objective    Diagnostics: Reviewed    Intake/Output Summary (Last 24 hours) at 3/11/2022 1312  Last data filed at 3/11/2022 0500  Gross per 24 hour   Intake --   Output 1325 ml   Net -1325 ml       Current medication reviewed for route, type, dose and frequency and are current per MAR at time of dictation.  Current Facility-Administered Medications   Medication Dose Route Frequency Provider Last Rate Last Admin   • acetaminophen (TYLENOL) tablet 650 mg  650 mg Oral Q4H PRN Neha Dubon DO   650 mg at 03/09/22 1631   • apixaban (ELIQUIS) tablet 2.5 mg  2.5 mg Oral Q12H Kenya Pickens APRN   2.5 mg at 03/11/22 0947   • atorvastatin (LIPITOR) tablet 40 mg  40 mg Oral Daily Neha Dubon DO   40 mg at 03/11/22 0949   • budesonide-formoterol (SYMBICORT) 160-4.5 MCG/ACT inhaler 2 puff  2 puff Inhalation BID - RT Neha Dubon DO   2 puff at 03/11/22 0628   • bumetanide (BUMEX) injection 1 mg  1 mg Intravenous Q12H Neha Dubon DO   1 mg at 03/11/22 0518   • busPIRone (BUSPAR) tablet 10 mg  10 mg Oral TID Neha Dubon DO   10 mg at 03/11/22 0948   • dilTIAZem (CARDIZEM) 125 mg in 125 mL NS infusion  5-15 mg/hr Intravenous Titrated Nelson Elizabeth MD 5 mL/hr at 03/08/22 2335 5 mg/hr at 03/08/22 2335   • dilTIAZem CD (CARDIZEM CD) 24 hr capsule 120 mg  120 mg Oral Q24H Petey Walden MD   120 mg at 03/11/22 0947   • docusate sodium (COLACE) capsule 100 mg  100 mg Oral BID Neha Dubon DO   100 mg at 03/11/22 0948   • guaiFENesin (MUCINEX) 12 hr tablet 1,200 mg  1,200 mg Oral BID Neha Dubon DO   1,200 mg at 03/11/22 0948   • hydrOXYzine (ATARAX) tablet 25 mg  25 mg Oral TID PRN Neha Dubon DO   25 mg at 03/09/22 1926   • ipratropium (ATROVENT) nebulizer solution 0.5 mg  0.5 mg Nebulization 4x Daily - RT Trini Watts APRN   0.5 mg at 03/11/22 1016   • lisinopril (PRINIVIL,ZESTRIL) tablet 10 mg  10 mg Oral Daily Neha Dubon DO   10 mg at 03/11/22 0949   •  "metoprolol tartrate (LOPRESSOR) tablet 75 mg  75 mg Oral Q12H Neha Dubon DO   75 mg at 03/11/22 0947   • multivitamin with minerals 1 tablet  1 tablet Oral Daily Neha Dubon DO   1 tablet at 03/11/22 0949   • ondansetron (ZOFRAN) injection 4 mg  4 mg Intravenous Q6H PRN Neha Dubon DO       • pantoprazole (PROTONIX) EC tablet 40 mg  40 mg Oral BID Neha Dubon DO   40 mg at 03/11/22 0955   • pramipexole (MIRAPEX) tablet 0.25 mg  0.25 mg Oral Nightly Neha Dubon DO   0.25 mg at 03/10/22 2051   • sertraline (ZOLOFT) tablet 50 mg  50 mg Oral Daily Neha Dubon DO   50 mg at 03/11/22 0948   • sodium chloride 0.9 % flush 10 mL  10 mL Intravenous PRN Nelson Elizabeth MD       • sodium chloride 0.9 % flush 10 mL  10 mL Intravenous Q12H Neha Dubon DO   10 mL at 03/11/22 0950   • sodium chloride 0.9 % flush 10 mL  10 mL Intravenous PRN Neha Dubon DO         dilTIAZem, 5-15 mg/hr, Last Rate: 5 mg/hr (03/08/22 2335)      •  acetaminophen  •  hydrOXYzine  •  ondansetron  •  sodium chloride  •  sodium chloride    Assessment   /64 (BP Location: Right arm, Patient Position: Sitting)   Pulse 87   Temp 97.7 °F (36.5 °C) (Oral)   Resp 18   Ht 165.1 cm (65\")   Wt 76.8 kg (169 lb 4.8 oz)   SpO2 98%   BMI 28.17 kg/m²     Physical Exam  Vitals and nursing note reviewed.   Constitutional:       General: She is not in acute distress.     Appearance: She is ill-appearing.      Interventions: Nasal cannula in place.   HENT:      Head: Normocephalic and atraumatic.   Eyes:      General: Lids are normal.   Neck:      Vascular: No JVD.   Cardiovascular:      Rate and Rhythm: Normal rate. Rhythm irregular.      Heart sounds: Normal heart sounds.   Pulmonary:      Effort: Pulmonary effort is normal. No accessory muscle usage or respiratory distress.      Breath sounds: Decreased breath sounds present.   Chest:      Comments: Right pigtail catheter  Abdominal:      " General: Abdomen is flat.      Palpations: Abdomen is soft.   Musculoskeletal:      Cervical back: Neck supple.   Skin:     General: Skin is warm and dry.      Comments: Bilateral lower extremity jose boots present   Neurological:      Mental Status: She is alert and oriented to person, place, and time.      Motor: Weakness present.   Psychiatric:         Attention and Perception: Attention normal.         Mood and Affect: Mood is anxious (appears mildly anxious).         Speech: Speech normal.         Behavior: Behavior is cooperative.         Cognition and Memory: Cognition normal.     Patient status: Disease state: Controlled with current treatments.  Functional status: Palliative Performance Scale Score: Performance 60% based on the following measures: Ambulation: Reduced, Activity and Evidence of Disease: Unable to do hobby or some work, significant evidence of disease, Self-Care: Occasional assist necessary,  Intake: Normal or reduced, LOC: Full or confusion   Nutritional status: Albumin 3.70. Body mass index is 28.17 kg/m².     Impression/Problem List:  1.    Chronic respiratory failure with hypoxia and hypercapnia  2.    Stage 3 severe COPD by GOLD classification  3.    Acute on chronic diastolic CHF  4.    Stage IIIb chronic kidney disease  5.    Atrial fibrillation with RVR  6.    Recurrent pleural effusion on right  7.    ST segment abnormality per ECG 3/10/2022  8.    Essential hypertension  9.    Hyponatremia  10.  Chronic anticoagulation  11.  Volume overload  12.  Hypertension  13.  Hyperlipidemia  14.  Advanced age    Plan/Recommendations     1. Goals of care include CODE STATUS NO CPR with limited support interventions.    2. Palliative care encounter  - Prognosis is guarded long-term secondary to chronic respiratory failure with hypoxia and hypercapnia, severe COPD, acute on chronic diastolic CHF, chronic kidney disease, atrial fibrillation with RVR, chronic anticoagulation, recurrent pleural  "effusion, ST segment abnormalities on ECG, advanced age, essential hypertension, hyponatremia, hypertension and other comorbidities listed above.   - Discussed current state of health and treatment options she has been offered for her pleural effusion management, expresses she does not want to have to go through more aggressive measures.  - Explored goals of care regarding CODE STATUS, reports she does not wish to have aggressive measures such as artificial nutrition, vasopressors or dialysis in addition to intubation or cardioversion. She stated, \"I don't really see any reason to go through with these given the state I am in.\"   - CODE STATUS changes made to reflect wishes.   - Explored discharge plans, reports she is agreeable to SNF placement and inquired about hospice as she reflected on her recent interaction when her daughter passed away around 1 year ago.   - Explained that she might benefit more from skilled/palliative care placement while she is able to work with therapy and symptoms appear to be mostly under control with current management.   - Discussed that hospice services can be utilized at any point if she feels she is declining or that her symptoms are no longer managed under palliative care.   - MOST document completed on 2/7/2022 that reflect current wishes.     3. Dyspnea  - Options for symptom management in regards to dyspnea were discussed including scheduled low dose opiate.   - Reports she has been taking \"half of a Lortab in the morning and in the evening, I believe it's 5 mg\" however do not see this on her home medication list or current hospitalization list.   - Started on scheduled low dose morphine for dyspnea management with assistance of HALIMA Acevedo.   - If she remains hospitalized will plan to follow up with her on Monday or sooner if needed.     Thank you for this consult and allowing us to participate in patient's plan of care. Palliative Care Team will continue to follow " patient.     Time spent: 100 minutes spent reviewing medical and medication records, assessing and examining patient, discussing with family, answering questions, providing some guidance about a plan and documentation of care, and coordinating care with other healthcare members, with > 50% time spent face to face.   30 minutes spent on advance care planning.    HALIMA Lundberg  3/11/2022                Electronically signed by Jaz Garcia APRN at 03/11/22 1549              Inpatient Hospice / [facility] Consult [[id number]] (Order [id number])  Order  Date: 3/13/2022 Department: [facility] 3A Ordering/Authorizing: Gerardo Reveles MD             Standing Order Information    Remaining Occurrences Interval Last Released     0/1 Once 3/13/2022                  Order History  Inpatient  Date/Time Action Taken User Additional Information   03/13/22 1033 Sign Gerardo Reveles MD    03/13/22 1033 Release Instance Gerardo Reveles MD (auto-released) Released Order:[id number]   03/13/22 1038 Acknowledge Edgard Wallace RN New Order     Order Details    Frequency Duration Priority Order Class   Once 1  occurrence Routine Hospital Performed         Order Questions    Question Answer   Reason for Consult? CHF                Source Order Set / Preference List    Preference List   [facility] FACILITY CONSULTS [[id number]]     Process Instructions    [place]:  Please notify [facility] Palliative Care Department of the Hospice consult order at ext. [phone] and on Weekends and Holidays, call [facility] at [phone]     [place]:  Please call Hospice consults/concerns to Earlene Zimmer, ext [phone]. Cell number [phone]. On weekends/holidays please contact the inpatient hospice nurse, ext [phone].     [place]:  Please call [facility] at [phone]     [place]:  Please call the  for your unit. For after hours, contact the hospital .                  Consult Order Info    ID Description  Priority Start Date Start Time   [id number] Inpatient Hospice / [facility] Consult Routine 03/13/2022 10:33 AM   Provider Specialty Referred to   Hospice and Palliative Medicine _____________________________________                           Acknowledgement Info    For At Acknowledged By Acknowledged On   Placing Order 03/13/22 1033 Edgard Wallace RN 03/13/22 1038             Reprint Order Requisition    Inpatient Hospice / [facility] Consult (Order #[id number]) on 3/13/22       Encounter    View Encounter                  Order Provider Info        Office phone Pager E-mail   Ordering User Gerardo Reveles [phone] -- --   Authorizing Provider Gerardo Reveles [phone] -- --   Attending Provider Gerardo Reveles [phone] -- --     Tracking Reports    Cosign Tracking Order Transmittal Tracking

## 2022-03-14 NOTE — PLAN OF CARE
Problem: Adult Inpatient Plan of Care  Goal: Plan of Care Review  Outcome: Adequate for Care Transition  Flowsheets (Taken 3/12/2022 1635 by Maria M Crow RN)  Plan of Care Reviewed With: patient  Goal: Patient-Specific Goal (Individualized)  Outcome: Adequate for Care Transition  Goal: Absence of Hospital-Acquired Illness or Injury  Outcome: Adequate for Care Transition  Intervention: Identify and Manage Fall Risk  Recent Flowsheet Documentation  Taken 3/14/2022 1039 by Arelis Chew RN  Safety Promotion/Fall Prevention:   safety round/check completed   nonskid shoes/slippers when out of bed   fall prevention program maintained   clutter free environment maintained   assistive device/personal items within reach  Intervention: Prevent Skin Injury  Recent Flowsheet Documentation  Taken 3/14/2022 1039 by Arelis Chew RN  Skin Protection:   incontinence pads utilized   skin sealant/moisture barrier applied   transparent dressing maintained  Intervention: Prevent and Manage VTE (Venous Thromboembolism) Risk  Recent Flowsheet Documentation  Taken 3/14/2022 1039 by Arelis Chew RN  VTE Prevention/Management: (pt refusing, bathing and then likely d/c) other (see comments)  Goal: Optimal Comfort and Wellbeing  Outcome: Adequate for Care Transition  Goal: Readiness for Transition of Care  Outcome: Adequate for Care Transition     Problem: Fall Injury Risk  Goal: Absence of Fall and Fall-Related Injury  Outcome: Adequate for Care Transition  Intervention: Identify and Manage Contributors  Recent Flowsheet Documentation  Taken 3/14/2022 1039 by Arelis Chew RN  Medication Review/Management: medications reviewed  Intervention: Promote Injury-Free Environment  Recent Flowsheet Documentation  Taken 3/14/2022 1039 by Arelis Chew RN  Safety Promotion/Fall Prevention:   safety round/check completed   nonskid shoes/slippers when out of bed   fall prevention program maintained   clutter free environment maintained    assistive device/personal items within reach  Goal: Absence of Fall and Fall-Related Injury  Outcome: Adequate for Care Transition  Intervention: Identify and Manage Contributors  Recent Flowsheet Documentation  Taken 3/14/2022 1039 by Arelis Chew RN  Medication Review/Management: medications reviewed  Intervention: Promote Injury-Free Environment  Recent Flowsheet Documentation  Taken 3/14/2022 1039 by Arelis Chew RN  Safety Promotion/Fall Prevention:   safety round/check completed   nonskid shoes/slippers when out of bed   fall prevention program maintained   clutter free environment maintained   assistive device/personal items within reach  Goal: Absence of Fall and Fall-Related Injury  Outcome: Adequate for Care Transition  Intervention: Identify and Manage Contributors  Recent Flowsheet Documentation  Taken 3/14/2022 1039 by Arelis Chew RN  Medication Review/Management: medications reviewed  Intervention: Promote Injury-Free Environment  Recent Flowsheet Documentation  Taken 3/14/2022 1039 by Arelis Chew RN  Safety Promotion/Fall Prevention:   safety round/check completed   nonskid shoes/slippers when out of bed   fall prevention program maintained   clutter free environment maintained   assistive device/personal items within reach  Goal: Absence of Fall and Fall-Related Injury  Outcome: Adequate for Care Transition  Intervention: Identify and Manage Contributors  Recent Flowsheet Documentation  Taken 3/14/2022 1039 by Arelis Chew RN  Medication Review/Management: medications reviewed  Intervention: Promote Injury-Free Environment  Recent Flowsheet Documentation  Taken 3/14/2022 1039 by Arelis Chew RN  Safety Promotion/Fall Prevention:   safety round/check completed   nonskid shoes/slippers when out of bed   fall prevention program maintained   clutter free environment maintained   assistive device/personal items within reach  Goal: Absence of Fall and Fall-Related Injury  Outcome: Adequate  for Care Transition  Intervention: Identify and Manage Contributors  Recent Flowsheet Documentation  Taken 3/14/2022 1039 by Arelis Chew RN  Medication Review/Management: medications reviewed  Intervention: Promote Injury-Free Environment  Recent Flowsheet Documentation  Taken 3/14/2022 1039 by Arelis Chew RN  Safety Promotion/Fall Prevention:   safety round/check completed   nonskid shoes/slippers when out of bed   fall prevention program maintained   clutter free environment maintained   assistive device/personal items within reach  Goal: Absence of Fall and Fall-Related Injury  Outcome: Adequate for Care Transition  Intervention: Identify and Manage Contributors  Recent Flowsheet Documentation  Taken 3/14/2022 1039 by Arelis Chew RN  Medication Review/Management: medications reviewed  Intervention: Promote Injury-Free Environment  Recent Flowsheet Documentation  Taken 3/14/2022 1039 by Arelis Chew RN  Safety Promotion/Fall Prevention:   safety round/check completed   nonskid shoes/slippers when out of bed   fall prevention program maintained   clutter free environment maintained   assistive device/personal items within reach  Goal: Absence of Fall and Fall-Related Injury  Outcome: Adequate for Care Transition  Intervention: Identify and Manage Contributors  Recent Flowsheet Documentation  Taken 3/14/2022 1039 by Arelis Chew RN  Medication Review/Management: medications reviewed  Intervention: Promote Injury-Free Environment  Recent Flowsheet Documentation  Taken 3/14/2022 1039 by Arelis Chew RN  Safety Promotion/Fall Prevention:   safety round/check completed   nonskid shoes/slippers when out of bed   fall prevention program maintained   clutter free environment maintained   assistive device/personal items within reach  Goal: Absence of Fall and Fall-Related Injury  Outcome: Adequate for Care Transition  Intervention: Identify and Manage Contributors  Recent Flowsheet Documentation  Taken  3/14/2022 1039 by Arelis Chew RN  Medication Review/Management: medications reviewed  Intervention: Promote Injury-Free Environment  Recent Flowsheet Documentation  Taken 3/14/2022 1039 by Arelis Chew RN  Safety Promotion/Fall Prevention:   safety round/check completed   nonskid shoes/slippers when out of bed   fall prevention program maintained   clutter free environment maintained   assistive device/personal items within reach  Goal: Absence of Fall and Fall-Related Injury  Outcome: Adequate for Care Transition  Intervention: Identify and Manage Contributors  Recent Flowsheet Documentation  Taken 3/14/2022 1039 by Arelis Chew RN  Medication Review/Management: medications reviewed  Intervention: Promote Injury-Free Environment  Recent Flowsheet Documentation  Taken 3/14/2022 1039 by Arelis Chew RN  Safety Promotion/Fall Prevention:   safety round/check completed   nonskid shoes/slippers when out of bed   fall prevention program maintained   clutter free environment maintained   assistive device/personal items within reach  Goal: Absence of Fall and Fall-Related Injury  Outcome: Adequate for Care Transition  Intervention: Identify and Manage Contributors  Recent Flowsheet Documentation  Taken 3/14/2022 1039 by Arelis Chew RN  Medication Review/Management: medications reviewed  Intervention: Promote Injury-Free Environment  Recent Flowsheet Documentation  Taken 3/14/2022 1039 by Arelis Chew RN  Safety Promotion/Fall Prevention:   safety round/check completed   nonskid shoes/slippers when out of bed   fall prevention program maintained   clutter free environment maintained   assistive device/personal items within reach  Goal: Absence of Fall and Fall-Related Injury  Outcome: Adequate for Care Transition  Intervention: Identify and Manage Contributors  Recent Flowsheet Documentation  Taken 3/14/2022 1039 by Arelis Chew RN  Medication Review/Management: medications reviewed  Intervention: Promote  Injury-Free Environment  Recent Flowsheet Documentation  Taken 3/14/2022 1039 by Arelis Chew RN  Safety Promotion/Fall Prevention:   safety round/check completed   nonskid shoes/slippers when out of bed   fall prevention program maintained   clutter free environment maintained   assistive device/personal items within reach  Goal: Absence of Fall and Fall-Related Injury  Outcome: Adequate for Care Transition  Intervention: Identify and Manage Contributors  Recent Flowsheet Documentation  Taken 3/14/2022 1039 by Arelis Chew RN  Medication Review/Management: medications reviewed  Intervention: Promote Injury-Free Environment  Recent Flowsheet Documentation  Taken 3/14/2022 1039 by Arelis Chew RN  Safety Promotion/Fall Prevention:   safety round/check completed   nonskid shoes/slippers when out of bed   fall prevention program maintained   clutter free environment maintained   assistive device/personal items within reach  Goal: Absence of Fall and Fall-Related Injury  Outcome: Adequate for Care Transition  Intervention: Identify and Manage Contributors  Recent Flowsheet Documentation  Taken 3/14/2022 1039 by Arelis Chew RN  Medication Review/Management: medications reviewed  Intervention: Promote Injury-Free Environment  Recent Flowsheet Documentation  Taken 3/14/2022 1039 by Arelis Chew RN  Safety Promotion/Fall Prevention:   safety round/check completed   nonskid shoes/slippers when out of bed   fall prevention program maintained   clutter free environment maintained   assistive device/personal items within reach  Goal: Absence of Fall and Fall-Related Injury  Outcome: Adequate for Care Transition  Intervention: Identify and Manage Contributors  Recent Flowsheet Documentation  Taken 3/14/2022 1039 by Arelis Chew RN  Medication Review/Management: medications reviewed  Intervention: Promote Injury-Free Environment  Recent Flowsheet Documentation  Taken 3/14/2022 1039 by Arelis Chew RN  Safety  Promotion/Fall Prevention:   safety round/check completed   nonskid shoes/slippers when out of bed   fall prevention program maintained   clutter free environment maintained   assistive device/personal items within reach  Goal: Absence of Fall and Fall-Related Injury  Outcome: Adequate for Care Transition  Intervention: Identify and Manage Contributors  Recent Flowsheet Documentation  Taken 3/14/2022 1039 by Arelis Chew RN  Medication Review/Management: medications reviewed  Intervention: Promote Injury-Free Environment  Recent Flowsheet Documentation  Taken 3/14/2022 1039 by Arelis Chew RN  Safety Promotion/Fall Prevention:   safety round/check completed   nonskid shoes/slippers when out of bed   fall prevention program maintained   clutter free environment maintained   assistive device/personal items within reach  Goal: Absence of Fall and Fall-Related Injury  Outcome: Adequate for Care Transition  Intervention: Identify and Manage Contributors  Recent Flowsheet Documentation  Taken 3/14/2022 1039 by Arelis Chew RN  Medication Review/Management: medications reviewed  Intervention: Promote Injury-Free Environment  Recent Flowsheet Documentation  Taken 3/14/2022 1039 by Arelis Chew RN  Safety Promotion/Fall Prevention:   safety round/check completed   nonskid shoes/slippers when out of bed   fall prevention program maintained   clutter free environment maintained   assistive device/personal items within reach     Problem: Skin Injury Risk Increased  Goal: Skin Health and Integrity  Outcome: Adequate for Care Transition  Intervention: Promote and Optimize Oral Intake  Recent Flowsheet Documentation  Taken 3/14/2022 1039 by Arelis Chew RN  Oral Nutrition Promotion:   rest periods promoted   calorie-dense foods provided   calorie-dense liquids provided  Intervention: Optimize Skin Protection  Recent Flowsheet Documentation  Taken 3/14/2022 1039 by Arelis Chew RN  Pressure Reduction Techniques:    frequent weight shift encouraged   pressure points protected   positioned off wounds  Head of Bed (HOB) Positioning: HOB at 90 degrees  Pressure Reduction Devices: heel offloading device utilized  Skin Protection:   incontinence pads utilized   skin sealant/moisture barrier applied   transparent dressing maintained     Problem: COPD (Chronic Obstructive Pulmonary Disease) Comorbidity  Goal: Maintenance of COPD Symptom Control  Outcome: Adequate for Care Transition  Intervention: Maintain COPD-Symptom Control  Recent Flowsheet Documentation  Taken 3/14/2022 1039 by Arelis Chew RN  Medication Review/Management: medications reviewed     Problem: Heart Failure Comorbidity  Goal: Maintenance of Heart Failure Symptom Control  Outcome: Adequate for Care Transition  Intervention: Maintain Heart Failure-Management  Recent Flowsheet Documentation  Taken 3/14/2022 1039 by Arelis Chew RN  Medication Review/Management: medications reviewed     Problem: Hypertension Comorbidity  Goal: Blood Pressure in Desired Range  Outcome: Adequate for Care Transition  Intervention: Maintain Blood Pressure Management  Recent Flowsheet Documentation  Taken 3/14/2022 1039 by Arelis Chew RN  Medication Review/Management: medications reviewed   Goal Outcome Evaluation:  AOX4. VSS. 2LNC.   AFIB on tele   Denies pain.   Appetite fair. Adeq UOP  Grandaughter at bedside actively involved in care.     PIV and telemetry d/c per order.   D/c instructions given, verbalized  understanding.   Questions answered.   Belongings gathered and taken c eva and pt.   Pt to be d/c to home c hospice via private vehicle c family

## 2022-03-14 NOTE — DISCHARGE SUMMARY
[facility]  DISCHARGE SUMMARY     Date of Admission: 3/8/2022  Date of Discharge:  3/14/2022  Primary Care Physician: Austin Wade DO    Presenting Problem/History of Present Illness:  Atrial fibrillation with RVR ([facility]) [I48.91]     Discharge Diagnoses:  Active Hospital Problems    Diagnosis    • **Recurrent pleural effusion on right    • Atrial fibrillation with RVR ([facility])      [id number] score 8     • Stage 3b chronic kidney disease ([facility])    • Hyponatremia    • Stage 3 severe COPD by GOLD classification ([facility])    • Acute on chronic diastolic CHF (congestive heart failure) ([facility])    • Chronic respiratory failure with hypoxia and hypercapnia ([facility])    • Essential hypertension      Chief Complaint on Day of Discharge: Sitting up in bed without any complaints.  History of Present Illness on Day of Discharge:   Sitting up in bed.  Granddaughter, Genevieve in room.  Patient denies complaints today.  She discussed with her family yesterday and elected for home with hospice.  Oxygen at 2 to 3 L as per at home.  She denies chest pain or palpitations.  Denies nausea vomiting.  Remains atrial fibrillation  on telemetry.    Consults:   1.  Dr. Walden, cardiology  2.  Dr. Zimmer, cardiothoracic surgery  3.  Palliative care    Procedures Performed: Right pigtail catheter placement 3/9/2022 Dr. Zimmer    Pertinent Test Results:     Results for orders placed during the hospital encounter of 01/21/22    Adult Transthoracic Echo Complete W/ Cont if Necessary Per Protocol    Interpretation Summary  · Assessment of left ventricular ejection fraction somewhat difficult due to the presence of atrial fibrillation.  · Left ventricular systolic function is low normal. Left ventricular ejection fraction appears to be 51 - 55%.  · Left ventricular wall thickness is consistent with mild to moderate concentric hypertrophy.  · The right ventricular cavity is dilated. Mildly reduced  right ventricular systolic function noted.  · Biatrial enlargment is noted.  · No hemodynamically significant valvular abnormalities identified on this study.    Laboratory Data Last 7 Days:  Results from last 7 days   Lab Units 03/10/22  0740 03/09/22  1149 03/08/22  2249   WBC 10*3/mm3 8.83 8.81 11.59*   HEMOGLOBIN g/dL 10.9* 10.8* 11.7*   HEMATOCRIT % 34.9 34.9 37.7   PLATELETS 10*3/mm3 248 247 299     Results from last 7 days   Lab Units 03/14/22  0554 03/13/22  0554 03/12/22  0704 03/11/22  0749 03/10/22  0740 03/09/22  1149 03/08/22  2249   SODIUM mmol/L 131* 131* 130* 133* 135* 133* 131*   POTASSIUM mmol/L 4.3 4.2 4.1 3.9 4.5 4.7 5.0   CHLORIDE mmol/L 89* 90* 89* 90* 93* 88* 88*   CO2 mmol/L 33.0* 34.0* 35.0* 37.0* 33.0* 33.0* 32.0*   BUN mg/dL 47* 43* 40* 40* 47* 45* 46*   CREATININE mg/dL 1.71* 1.63* 1.51* 1.40* 1.45* 1.59* 1.69*   GLUCOSE mg/dL 129* 110* 106* 152* 122* 94 186*   CALCIUM mg/dL 8.6 8.6 8.7 8.6 8.7 9.4 9.2   ALT (SGPT) U/L  --   --   --   --   --  40* 42*       Laboratory Data Last 7 Days:    Lab Results (last 7 days)     Procedure Component Value Units Date/Time    Basic Metabolic Panel [[id number]]  (Abnormal) Collected: 03/14/22 0554    Specimen: Blood Updated: 03/14/22 0650     Glucose 129 mg/dL      BUN 47 mg/dL      Creatinine 1.71 mg/dL      Sodium 131 mmol/L      Potassium 4.3 mmol/L      Chloride 89 mmol/L      CO2 33.0 mmol/L      Calcium 8.6 mg/dL      BUN/Creatinine Ratio 27.5     Anion Gap 9.0 mmol/L      eGFR 28.9 mL/min/1.73      Comment: National Kidney Foundation and American Society of Nephrology (ASN) Task Force recommended calculation based on the Chronic Kidney Disease Epidemiology Collaboration (CKD-EPI) equation refit without adjustment for race.       Narrative:      GFR Normal >60  Chronic Kidney Disease <60  Kidney Failure <15      Basic Metabolic Panel [[id number]]  (Abnormal) Collected: 03/13/22 0554    Specimen: Blood Updated: 03/13/22 0653     Glucose 110 mg/dL       BUN 43 mg/dL      Creatinine 1.63 mg/dL      Sodium 131 mmol/L      Potassium 4.2 mmol/L      Chloride 90 mmol/L      CO2 34.0 mmol/L      Calcium 8.6 mg/dL      BUN/Creatinine Ratio 26.4     Anion Gap 7.0 mmol/L      eGFR 30.6 mL/min/1.73      Comment: National Kidney Foundation and American Society of Nephrology (ASN) Task Force recommended calculation based on the Chronic Kidney Disease Epidemiology Collaboration (CKD-EPI) equation refit without adjustment for race.       Narrative:      GFR Normal >60  Chronic Kidney Disease <60  Kidney Failure <15      Basic Metabolic Panel [[id number]]  (Abnormal) Collected: 03/12/22 0704    Specimen: Blood Updated: 03/12/22 0744     Glucose 106 mg/dL      BUN 40 mg/dL      Creatinine 1.51 mg/dL      Sodium 130 mmol/L      Potassium 4.1 mmol/L      Chloride 89 mmol/L      CO2 35.0 mmol/L      Calcium 8.7 mg/dL      BUN/Creatinine Ratio 26.5     Anion Gap 6.0 mmol/L      eGFR 33.5 mL/min/1.73      Comment: National Kidney Foundation and American Society of Nephrology (ASN) Task Force recommended calculation based on the Chronic Kidney Disease Epidemiology Collaboration (CKD-EPI) equation refit without adjustment for race.       Narrative:      GFR Normal >60  Chronic Kidney Disease <60  Kidney Failure <15      Non-gynecologic Cytology [[id number]] Collected: 03/09/22 1343    Specimen: Body Fluid from Pleural Cavity Updated: 03/11/22 0900     Case Report --     Non-gynecologic Cytology                          Case: [id number]                                  Authorizing Provider:  Zac Zimmer MD         Collected:           03/09/2022 01:43 PM          Ordering Location:     [address]  Received:            03/10/2022 11:46 AM          Pathologist:           Sergio Carlos MD                                                        Specimen:    Pleural Cavity                                                                              Final Diagnosis --      "Pleural fluid, thoracentesis:  Negative for malignant cells.  Reactive mesothelial cells and lymphocytes.       Clinical Information --     Recurrent pleural effusion       Gross Description --     1. Pleural Cavity.  Received fresh labeled with patient name and  designated as \"pleural cavity.\"  60mL clear anisha fluid.  1 ThinPrep slide prepared.           Microscopic Description --     Cytologic examination shows a high cellularity pleural fluid consisting of reactive mesothelial cells and a polymorphous lymphocyte population.      Basic Metabolic Panel [[id number]]  (Abnormal) Collected: 22    Specimen: Blood Updated: 22     Glucose 152 mg/dL      BUN 40 mg/dL      Creatinine 1.40 mg/dL      Sodium 133 mmol/L      Potassium 3.9 mmol/L      Chloride 90 mmol/L      CO2 37.0 mmol/L      Calcium 8.6 mg/dL      BUN/Creatinine Ratio 28.6     Anion Gap 6.0 mmol/L      eGFR 36.7 mL/min/1.73      Comment: National Kidney Foundation and American Society of Nephrology (ASN) Task Force recommended calculation based on the Chronic Kidney Disease Epidemiology Collaboration (CKD-EPI) equation refit without adjustment for race.       Narrative:      GFR Normal >60  Chronic Kidney Disease <60  Kidney Failure <15      Basic Metabolic Panel [[id number]]  (Abnormal) Collected: 03/10/22 0740    Specimen: Blood Updated: 03/10/22 0818     Glucose 122 mg/dL      BUN 47 mg/dL      Creatinine 1.45 mg/dL      Sodium 135 mmol/L      Potassium 4.5 mmol/L      Chloride 93 mmol/L      CO2 33.0 mmol/L      Calcium 8.7 mg/dL      BUN/Creatinine Ratio 32.4     Anion Gap 9.0 mmol/L      eGFR 35.2 mL/min/1.73      Comment: National Kidney Foundation and American Society of Nephrology (ASN) Task Force recommended calculation based on the Chronic Kidney Disease Epidemiology Collaboration (CKD-EPI) equation refit without adjustment for race.       Narrative:      GFR Normal >60  Chronic Kidney Disease <60  Kidney Failure <15      " CBC (No Diff) [[id number]]  (Abnormal) Collected: 03/10/22 0740    Specimen: Blood Updated: 03/10/22 0757     WBC 8.83 10*3/mm3      RBC 3.96 10*6/mm3      Hemoglobin 10.9 g/dL      Hematocrit 34.9 %      MCV 88.1 fL      MCH 27.5 pg      MCHC 31.2 g/dL      RDW 14.8 %      RDW-SD 47.4 fl      MPV 9.7 fL      Platelets 248 10*3/mm3     Comprehensive Metabolic Panel [[id number]]  (Abnormal) Collected: 03/09/22 1149    Specimen: Blood Updated: 03/09/22 1244     Glucose 94 mg/dL      BUN 45 mg/dL      Creatinine 1.59 mg/dL      Sodium 133 mmol/L      Potassium 4.7 mmol/L      Chloride 88 mmol/L      CO2 33.0 mmol/L      Calcium 9.4 mg/dL      Total Protein 5.7 g/dL      Albumin 3.70 g/dL      ALT (SGPT) 40 U/L      AST (SGOT) 31 U/L      Alkaline Phosphatase 81 U/L      Total Bilirubin 0.5 mg/dL      Globulin 2.0 gm/dL      A/G Ratio 1.9 g/dL      BUN/Creatinine Ratio 28.3     Anion Gap 12.0 mmol/L      eGFR 31.5 mL/min/1.73      Comment: National Kidney Foundation and American Society of Nephrology (ASN) Task Force recommended calculation based on the Chronic Kidney Disease Epidemiology Collaboration (CKD-EPI) equation refit without adjustment for race.       Narrative:      GFR Normal >60  Chronic Kidney Disease <60  Kidney Failure <15      Troponin [[id number]]  (Normal) Collected: 03/09/22 1149    Specimen: Blood Updated: 03/09/22 1244     Troponin T 0.030 ng/mL     Narrative:      Troponin T Reference Range:  <= 0.03 ng/mL-   Negative for AMI  >0.03 ng/mL-     Abnormal for myocardial necrosis.  Clinicians would have to utilize clinical acumen, EKG, Troponin and serial changes to determine if it is an Acute Myocardial Infarction or myocardial injury due to an underlying chronic condition.       Results may be falsely decreased if patient taking Biotin.      Phosphorus [[id number]]  (Normal) Collected: 03/09/22 1149    Specimen: Blood Updated: 03/09/22 1244     Phosphorus 4.5 mg/dL     Magnesium [[id number]]  (Normal)  Collected: 03/09/22 1149    Specimen: Blood Updated: 03/09/22 1244     Magnesium 1.7 mg/dL     CBC Auto Differential [[id number]]  (Abnormal) Collected: 03/09/22 1149    Specimen: Blood Updated: 03/09/22 1226     WBC 8.81 10*3/mm3      RBC 3.86 10*6/mm3      Hemoglobin 10.8 g/dL      Hematocrit 34.9 %      MCV 90.4 fL      MCH 28.0 pg      MCHC 30.9 g/dL      RDW 15.0 %      RDW-SD 49.1 fl      MPV 10.0 fL      Platelets 247 10*3/mm3      Neutrophil % 79.8 %      Lymphocyte % 9.4 %      Monocyte % 9.2 %      Eosinophil % 0.5 %      Basophil % 0.5 %      Immature Grans % 0.6 %      Neutrophils, Absolute 7.04 10*3/mm3      Lymphocytes, Absolute 0.83 10*3/mm3      Monocytes, Absolute 0.81 10*3/mm3      Eosinophils, Absolute 0.04 10*3/mm3      Basophils, Absolute 0.04 10*3/mm3      Immature Grans, Absolute 0.05 10*3/mm3      nRBC 0.0 /100 WBC     [place] Draw [[id number]] Collected: 03/08/22 2249    Specimen: Blood Updated: 03/09/22 0303    Narrative:      The following orders were created for panel order [place] Draw.  Procedure                               Abnormality         Status                     ---------                               -----------         ------                     Green Top (Gel)[[id number]]                                  Final result               Lavender Top[[id number]]                                     Final result               Red Top[[id number]]                                          Final result               [place] Blood Culture Doni...[[id number]]                      Final result               [place] Top[[id number]]                                         Final result               Light Blue Top[[id number]]                                   Final result                 Please view results for these tests on the individual orders.    Gray Top [[id number]] Collected: 03/08/22 2249    Specimen: Blood Updated: 03/09/22 0303     Extra Tube Hold for add-ons.     Comment: Auto resulted.        Green Top (Gel) [[id number]] Collected: 03/08/22 2249    Specimen: Blood Updated: 03/09/22 0002     Extra Tube Hold for add-ons.     Comment: Auto resulted.       Red Top [[id number]] Collected: 03/08/22 2249    Specimen: Blood Updated: 03/09/22 0002     Extra Tube Hold for add-ons.     Comment: Auto resulted.       Light Blue Top [[id number]] Collected: 03/08/22 2249    Specimen: Blood Updated: 03/09/22 0002     Extra Tube hold for add-on     Comment: Auto resulted       Lavender Top [[id number]] Collected: 03/08/22 2249    Specimen: Blood Updated: 03/09/22 0002     Extra Tube hold for add-on     Comment: Auto resulted       [place] Blood Culture Bottle Set [[id number]] Collected: 03/08/22 2249    Specimen: Blood from Arm, Right Updated: 03/09/22 0002     Extra Tube Hold for add-ons.     Comment: Auto resulted.       COVID PRE-OP / PRE-PROCEDURE SCREENING ORDER (NO ISOLATION) - Swab, Nasal Cavity [[id number]]  (Normal) Collected: 03/08/22 2247    Specimen: Swab from Nasal Cavity Updated: 03/08/22 2342    Narrative:      The following orders were created for panel order COVID PRE-OP / PRE-PROCEDURE SCREENING ORDER (NO ISOLATION) - Swab, Nasal Cavity.  Procedure                               Abnormality         Status                     ---------                               -----------         ------                     COVID-19,[facility] IN-THIEN...[[id number]]  Normal              Final result                 Please view results for these tests on the individual orders.    COVID-19,[facility] [employer] IN-HOUSE,Nasal Swab No Transport Media 3-4 HR TAT - Swab, Nasal Cavity [[id number]]  (Normal) Collected: 03/08/22 2247    Specimen: Swab from Nasal Cavity Updated: 03/08/22 2342     COVID19 Not Detected    Narrative:      Fact sheet for providers: https://www.fda.gov/media/077420/download     Fact sheet for patients: https://www.fda.gov/media/133610/download    Test performed by PCR.    Consider negative results in combination with  clinical observations, patient history, and epidemiological information.    BNP [[id number]]  (Abnormal) Collected: 03/08/22 2249    Specimen: Blood Updated: 03/08/22 2329     proBNP 28,338.0 pg/mL     Narrative:      Among patients with dyspnea, NT-proBNP is highly sensitive for the detection of acute congestive heart failure. In addition NT-proBNP of <300 pg/ml effectively rules out acute congestive heart failure with 99% negative predictive value.    Results may be falsely decreased if patient taking Biotin.      Magnesium [[id number]]  (Normal) Collected: 03/08/22 2249    Specimen: Blood Updated: 03/08/22 2321     Magnesium 2.0 mg/dL     Comprehensive Metabolic Panel [[id number]]  (Abnormal) Collected: 03/08/22 2249    Specimen: Blood Updated: 03/08/22 2321     Glucose 186 mg/dL      BUN 46 mg/dL      Creatinine 1.69 mg/dL      Sodium 131 mmol/L      Potassium 5.0 mmol/L      Chloride 88 mmol/L      CO2 32.0 mmol/L      Calcium 9.2 mg/dL      Total Protein 6.4 g/dL      Albumin 3.70 g/dL      ALT (SGPT) 42 U/L      AST (SGOT) 31 U/L      Alkaline Phosphatase 82 U/L      Total Bilirubin 0.4 mg/dL      Globulin 2.7 gm/dL      A/G Ratio 1.4 g/dL      BUN/Creatinine Ratio 27.2     Anion Gap 11.0 mmol/L      eGFR 29.3 mL/min/1.73      Comment: National Kidney Foundation and American Society of Nephrology (ASN) Task Force recommended calculation based on the Chronic Kidney Disease Epidemiology Collaboration (CKD-EPI) equation refit without adjustment for race.       Narrative:      GFR Normal >60  Chronic Kidney Disease <60  Kidney Failure <15      Troponin [[id number]]  (Abnormal) Collected: 03/08/22 2249    Specimen: Blood Updated: 03/08/22 2320     Troponin T 0.062 ng/mL     Narrative:      Troponin T Reference Range:  <= 0.03 ng/mL-   Negative for AMI  >0.03 ng/mL-     Abnormal for myocardial necrosis.  Clinicians would have to utilize clinical acumen, EKG, Troponin and serial changes to determine if it is an Acute  Myocardial Infarction or myocardial injury due to an underlying chronic condition.       Results may be falsely decreased if patient taking Biotin.      Blood Gas, Arterial - [[id number]]  (Abnormal) Collected: 03/08/22 2308    Specimen: Arterial Blood Updated: 03/08/22 2310     Site Left Radial     Alexx's Test Positive     pH, Arterial 7.408 pH units      pCO2, Arterial 53.7 mm Hg      Comment: 83 Value above reference range        pO2, Arterial 131.0 mm Hg      Comment: 83 Value above reference range        HCO3, Arterial 33.8 mmol/L      Comment: 83 Value above reference range        Base Excess, Arterial 7.7 mmol/L      Comment: 83 Value above reference range        O2 Saturation, Arterial 99.7 %      Comment: 83 Value above reference range        Temperature 37.0 C      Barometric Pressure for Blood Gas 751 mmHg      Modality Nasal Cannula     Flow Rate 2.0 lpm      Ventilator Mode NA     Collected by [id number]     Comment: Meter: [id number]     :  [id number]        pCO2, Temperature Corrected 53.7 mm Hg      pH, Temp Corrected 7.408 pH Units      pO2, Temperature Corrected 131 mm Hg     Protime-INR [[id number]]  (Abnormal) Collected: 03/08/22 2249    Specimen: Blood Updated: 03/08/22 2307     Protime 18.7 Seconds      INR 1.63    CBC & Differential [[id number]]  (Abnormal) Collected: 03/08/22 2249    Specimen: Blood Updated: 03/08/22 2258    Narrative:      The following orders were created for panel order CBC & Differential.  Procedure                               Abnormality         Status                     ---------                               -----------         ------                     CBC Auto Differential[[id number]]        Abnormal            Final result                 Please view results for these tests on the individual orders.    CBC Auto Differential [[id number]]  (Abnormal) Collected: 03/08/22 2249    Specimen: Blood Updated: 03/08/22 2258     WBC 11.59 10*3/mm3      RBC 4.18  10*6/mm3      Hemoglobin 11.7 g/dL      Hematocrit 37.7 %      MCV 90.2 fL      MCH 28.0 pg      MCHC 31.0 g/dL      RDW 14.9 %      RDW-SD 49.1 fl      MPV 9.6 fL      Platelets 299 10*3/mm3      Neutrophil % 83.4 %      Lymphocyte % 8.3 %      Monocyte % 6.5 %      Eosinophil % 0.8 %      Basophil % 0.3 %      Immature Grans % 0.7 %      Neutrophils, Absolute 9.67 10*3/mm3      Lymphocytes, Absolute 0.96 10*3/mm3      Monocytes, Absolute 0.75 10*3/mm3      Eosinophils, Absolute 0.09 10*3/mm3      Basophils, Absolute 0.04 10*3/mm3      Immature Grans, Absolute 0.08 10*3/mm3      nRBC 0.0 /100 WBC         Radiology Data:  XR Chest 2 View    Result Date: 3/12/2022  1.. Small effusions. A small right basilar hydropneumothorax is present stable in size and appearance post removal of a small caliber right-sided thoracostomy tube. This report was finalized on 03/12/2022 17:25 by Dr. Geraldo Fung MD.    XR Chest 1 View    Result Date: 3/14/2022  1. Stable small pneumothorax at the right lung base. 2. Stable bilateral infiltrates. Mild blunting of the costophrenic angles. 3. Cardiomegaly.   This report was finalized on 03/14/2022 07:33 by Dr. Collin So MD.    XR Chest 1 View    Result Date: 3/13/2022  1. Small pneumothorax in the right lung base, stable. 2. Stable bilateral infiltrates. 3. Cardiomegaly.   This report was finalized on 03/13/2022 07:01 by Dr. Collin So MD.    XR Chest 1 View    Result Date: 3/12/2022  1. Patchy infiltrates in the mid and lower lung zones. No significant change. 2. Right chest tube in place. No measurable pneumothorax. 3. Cardiomegaly.   This report was finalized on 03/12/2022 07:28 by Dr. Collin So MD.    XR Chest 1 View    Result Date: 3/11/2022   No change in appearance of the chest. This report was finalized on 03/11/2022 07:34 by Dr. Nam Funez MD.    XR Chest 1 View    Result Date: 3/10/2022   No change in appearance of the chest. This report was finalized on  03/10/2022 07:14 by Dr. Nam Funez MD.    XR Chest 1 View    Result Date: 3/9/2022  Interval insertion of a smallbore right basilar chest tube in good position with reduction in volume of the right pleural effusion and no pneumothorax. Continued extensive volume loss in the lung bases with cardiomegaly. This report was finalized on 03/09/2022 16:58 by Dr. Jhon Caruso MD.    XR Chest 1 View    Result Date: 3/9/2022   Similar appearance to 3/2/2022 with cardiomegaly, persistent moderate RIGHT and small LEFT pleural effusion with overlying atelectasis, bilateral interstitial opacity, and patchy airspace opacities. Favor volume overload/pulmonary edema. This report was finalized on 03/09/2022 07:28 by Dr. Nam Funez MD.       Hospital Course  Patient is a 86 y.o. female presented to [facility] emergency room 3/8/2022 with shortness of breath.  Patient has had several hospital admission with similar episodes.  She presented with atrial fibrillation with RVR and was noted to have recurrent right pleural effusion.  Patient had pleural effusion drained per thoracentesis on 2/22/2022.  She wears oxygen continuously at home.  She was started on a Cardizem drip in the emergency room.  Patient reported decreased urine output and decreased ambulation due to shortness of breath.  Patient reported she did not want to come to the hospital by her cardiologist encouraged her to be evaluated.  She has a history of diastolic heart failure and ongoing issues with volume low.  It was felt as though her atrial fibrillation is contributing to heart failure due to rapid ventricular response.    She was admitted to the medical floor with atrial fibrillation with RVR, acute on chronic diastolic congestive heart failure, chronic respiratory failure with hypoxia.  Cardizem drip ordered in the emergency room.  [id number] score 8.  Cardiology consulted and she was seen by Dr. Walden who added long-acting Cardizem  mg  to existing metoprolol.  Eliquis, chronic anticoagulation for atrial fibrillation held on admission as there was concern patient would require thoracentesis or chest tube placement.  In the past, cardiology had discussed outpatient CardioMEMS but due to patient's age and comorbidity this would likely be difficult.  Patient remained in atrial fibrillation and rate fairly controlled with addition of Cardizem CD.  However, blood pressure trended downward and metoprolol dose decreased to 25 mg twice daily and lisinopril dose decreased to 2.5 mg daily.  Cardizem CD continued.  She remains atrial fibrillation rate controlled 74-81 on day of discharge.  Eliquis resumed 3/11/2022 after discussing with CTS.    She has history of acute on chronic congestive heart failure secondary to atrial fibrillation with RVR.  Ejection fraction 51-55% per echocardiogram 1/2022.  IV Bumex started on admission and patient was placed on 1500 mL fluid restriction.  Daily weights monitor.  Lisinopril (ACE inhibitor), metoprolol (beta-blocker) continued.  Transitioned to oral home dose and continued at discharge.    Chest x-ray similar to previous x-ray 3/2 with cardiomegaly with persistent moderate right and small left pleural effusion and overlying atelectasis and bilateral interstitial opacities and patchy airspace opacities.  Favor pulmonary edema.  Patient had recurrent right pleural effusion.  She had thoracentesis on 2/22/2022 per Dr. Salgado with 1200 mL fluid removed.  Per lights criteria transudate effusion consistent with congestive heart failure.  Cardiothoracic surgery, Dr. Zimmer consulted and placed right pigtail catheter at bedside on 3/9/2022 with 1500 mL fluid drained.  Patient reported breathing better after fluid drained and atrial fibrillation rate improved.  IV diuretics continued.  Pigtail catheter remained in place and was removed on 3/12/2022.  Dr. Zimmer had long discussion with patient regarding palliative care and if  symptoms worsen consider hospice.  If recurrence of effusion could consider palliative placement of Pleurx catheter but this would put patient at increased risk of dehydration.  Dr. Zimmer discussed this with patient on multiple occasions.  Follow-up chest x-ray 3/14 noted stable small pneumothorax on the right.  Stable bilateral infiltrates.  Mild blunting of the costophrenic angles.    She has stage III COPD with no exacerbation.  Symbicort and Mucinex continued.  Plain Atrovent ordered as opposed to DuoNeb's as patient has history of atrial fibrillation with RVR.  Incentive spirometry ordered.  Patient remained on home oxygen setting 2 to 3 L.    She has chronic kidney disease stage IIIb that remained stable.  Creatinine 1.69 on admission and 1.71 at discharge.    She does have a history of primary hypertension; however, blood pressure trended downward with diuresis and addition of Cardizem CD for atrial fibrillation rate control.  Lisinopril decreased to 2.5 mg orally daily and metoprolol dose decreased to 25 mg twice daily due to lower blood pressure readings.  Blood pressure 94/80, 99/47 at discharge.  Discussed medication changes with patient and granddaughter, Genevieve present in room on date of discharge.    Physical therapy and Occupational Therapy consulted.  Unna boots bilateral lower extremities.    Social service consulted for skilled nursing facility placement.  [facility] planned to reevaluate patient on 3/14.  Palliative care saw patient on 3/11/2022 and options discussed.  Over the course of the weekend patient had discussions with her family and she elected to go home with [facility] hospice.  CODE STATUS changed to no CPR, no intubation, no cardioversion and MOST form completed.    On 3/14/2022, patient has elected for hospice care and will be discharged home with [facility] today.  Discussed medication changes to include decreasing lisinopril to 2.5 mg orally daily, decrease metoprolol to 25 mg twice  "daily and Cardizem CD added per cardiology.  Patient does not desire any morphine at the time of discharge.    Physical Exam on Discharge:  BP 94/80 (BP Location: Right arm, Patient Position: Lying)   Pulse 97   Temp 97.7 °F (36.5 °C) (Oral)   Resp 18   Ht 165.1 cm (65\")   Wt 76.8 kg (169 lb 4.8 oz)   SpO2 95%   BMI 28.17 kg/m²   Physical Exam  Vitals and nursing note reviewed.   Constitutional:       Comments: Sitting up in bed.  Oxygen 2 L.  Granddaughter in room.   HENT:      Head: Normocephalic and atraumatic.      Nose: No congestion.      Mouth/Throat:      Pharynx: Oropharynx is clear. No oropharyngeal exudate or posterior oropharyngeal erythema.   Eyes:      Extraocular Movements: Extraocular movements intact.      Pupils: Pupils are equal, round, and reactive to light.   Cardiovascular:      Rate and Rhythm: Normal rate. Rhythm irregular.      Heart sounds: No murmur heard.     Comments: Irregular rhythm.  Atrial fibrillation 80 on telemetry.  Pulmonary:      Breath sounds: No wheezing, rhonchi or rales.      Comments: Oxygen 2 L.  Decreased breath sounds.  No wheezing.  Abdominal:      Palpations: Abdomen is soft.      Tenderness: There is no abdominal tenderness.   Genitourinary:     Comments: Voiding.  Musculoskeletal:         General: No swelling or tenderness.      Cervical back: Normal range of motion and neck supple.   Skin:     General: Skin is warm and dry.      Comments: Unna boots bilateral lower extremities.   Neurological:      General: No focal deficit present.      Mental Status: She is alert and oriented to person, place, and time.   Psychiatric:         Mood and Affect: Mood normal.         Behavior: Behavior normal.         Thought Content: Thought content normal.         Judgment: Judgment normal.       Condition on Discharge: Stable for discharge with hospice    Discharge Disposition: Nursing home hospice    Discharge Diet:   Diet Instructions     Diet: Regular      Discharge " Diet: Regular      As tolerated    Activity at Discharge:   Activity Instructions     Activity as Tolerated        As tolerated    Discharge Care Plan / Instructions:   1.  Home with [facility]  2.  Decrease lisinopril to 2.5 mg orally daily  3.  Decrease metoprolol to 25 mg orally twice daily  4.  Cardizem  mg orally daily    Discharge Medications:     Discharge Medications      New Medications      Instructions Start Date   dilTIAZem  MG 24 hr capsule  Commonly known as: CARDIZEM CD   120 mg, Oral, Every 24 Hours Scheduled   Start Date: March 15, 2022        Changes to Medications      Instructions Start Date   lisinopril 2.5 MG tablet  Commonly known as: PRINIVIL,ZESTRIL  What changed:   · medication strength  · how much to take   2.5 mg, Oral, Daily   Start Date: March 15, 2022     metoprolol tartrate 25 MG tablet  Commonly known as: LOPRESSOR  What changed:   · medication strength  · how much to take   25 mg, Oral, Every 12 Hours Scheduled         Continue These Medications      Instructions Start Date   alendronate 10 MG tablet  Commonly known as: FOSAMAX  Notes to patient: Weekly as per home schedule   10 mg, Oral, Weekly      apixaban 2.5 MG tablet tablet  Commonly known as: ELIQUIS   2.5 mg, Oral, Every 12 Hours Scheduled      atorvastatin 40 MG tablet  Commonly known as: LIPITOR   40 mg, Oral, Daily      budesonide-formoterol 160-4.5 MCG/ACT inhaler  Commonly known as: SYMBICORT   2 puffs, Inhalation, 2 Times Daily      bumetanide 1 MG tablet  Commonly known as: BUMEX   1 mg, Oral, Daily      busPIRone 10 MG tablet  Commonly known as: BUSPAR   10 mg, Oral, 3 Times Daily      docusate sodium 100 MG capsule  Commonly known as: COLACE   100 mg, Oral, 2 Times Daily      hydrOXYzine 25 MG tablet  Commonly known as: ATARAX   25 mg, Oral, 3 Times Daily PRN      ipratropium-albuterol 0.5-2.5 mg/3 ml nebulizer  Commonly known as: DUO-NEB   3 mL, Nebulization, 4 Times Daily PRN      levocetirizine 5  MG tablet  Commonly known as: XYZAL   5 mg, Oral, Every Evening      Mucinex 600 MG 12 hr tablet  Generic drug: guaiFENesin   1,200 mg, Oral, 2 Times Daily      multivitamin with minerals tablet tablet   1 tablet, Oral, Daily      pantoprazole 40 MG EC tablet  Commonly known as: PROTONIX   40 mg, Oral, 2 Times Daily      pramipexole 0.25 MG tablet  Commonly known as: Mirapex   0.25 mg, Oral, Every Night at Bedtime      sertraline 50 MG tablet  Commonly known as: Zoloft   50 mg, Oral, Daily      vitamin D 1.25 MG (77010 UT) capsule capsule  Commonly known as: ERGOCALCIFEROL  Notes to patient: Weekly as per home schedule   50,000 Units, Oral, Weekly           Follow-up Appointments:    Contact information for follow-up providers     Austin Wade DO Follow up.    Specialties: Internal Medicine, Emergency Medicine  Why: As needed.  Home with [facility]  Contact information:  [address]  formerly [facility] [id number] [phone]                   Contact information for after-discharge care     Home Medical Care     [facility] .    Service: Home Hospice  Why: will be following you for care  Contact information:  [address]  [facility] [address] [phone]                           Future Appointments:  Future Appointments   Date Time Provider Department Center   4/27/2022  3:15 PM PAD BIC CT 1 BH PAD CT BI PAD   5/2/2022  4:00 PM CecilyBeth APRN [facility] RD PAD PAD   5/13/2022  3:00 PM Austin Wade DO [facility] PAD PAD     Test Results Pending at Discharge: None    The above documentation resulted from a face-to-face encounter by me Kenya VARGHESE, [facility].    Electronically signed by HALIMA Joy, 3/14/2022, 11:40 CDT.    Time: This discharge process required greater than 35 minutes for completion.    Plan discussed with Dr. Reveles, patient, and granddaughter Genevieve present in room.    Time spent in face-to-face evaluation, chart review,  planning and education greater than 35 minutes.

## 2022-03-14 NOTE — PLAN OF CARE
Goal Outcome Evaluation:         A&Ox4.  3 L NC/    Desats with activity. C/O pain medicated with Tylenol PO with good results.  Refused morphine. Tele in place. Afib HR .  Up with walker.  Up to bsc x 1 assist.  Resting well.  Will continue to monitor.

## 2022-03-14 NOTE — PROGRESS NOTES
"Palliative Care Daily Progress Note   Chief complaint: goals of care/advanced care planning and support for patient/family    Code Status and Medical Interventions:   Ordered at: 03/11/22 1437     Medical Intervention Limits:    NO intubation (DNI)    NO cardioversion    NO dialysis    NO artificial nutrition    NO vasopressors     Level Of Support Discussed With:    Patient     Code Status (Patient has no pulse and is not breathing):    No CPR (Do Not Attempt to Resuscitate)     Medical Interventions (Patient has pulse or is breathing):    Limited Support     Subjective   Medical record reviewed. Events noted. Had started scheduled low-dose morphine for symptom management after discussion with patient on 3/11/2022. Appears she only had one dose on 3/11/2022 but has since refused. Chest tube removed by CT surgery on 3/12/2022. Per chart review, CT surgery discussed options for symptom management if significant effusion recurs. Per chart review she decided she now wishes to transition home with hospice services instead of nursing facility placement. Sitting up in bed, family at bedside, breathing appears mildly labored but does not appear in any distress. Reports she was unable to take morphine because she was \"scared it was going to knock me out for 2 days.\" Requested to be sent home with atarax as needed. States she is going home with hospice and if her breathing gets worse she will see how they can help her.       Advance Care Planning   Advance Care Planning Discussion: Shared that she is waiting for discharge paperwork and going to her granddaughter's home with hospice services. Planning to initiate a new MOST form to reflect current wishes however she stated, \"I don't see any point. I won't be coming back to the hospital.\" Explained importance incase she were to experience episode where comfort could not be met but she said, \"No I don't think I will have to come back.\" Denied any questions or concerns regarding " hospice or symptom management.     Advance Directive Status: Patient has advance directive, copy in chart     POA/Healthcare Surrogate - She has named her grandchildren, Genevieve Strickland and Clara Krause, as her healthcare surrogates.  The patient receives support from her extended family.    Review of Systems   Constitutional: Positive for malaise/fatigue. Negative for fever and weight loss.   HENT: Positive for congestion. Negative for hearing loss and stridor.    Eyes: Positive for vision loss in left eye (reports related to macular degeneration ) and vision loss in right eye (reports related to macular degeneration). Negative for pain.   Cardiovascular: Positive for chest pain (intermittently), dyspnea on exertion and leg swelling.   Respiratory: Positive for cough, shortness of breath and sputum production.    Skin: Negative for flushing, itching and rash.   Musculoskeletal: Positive for back pain (intermittently). Negative for falls and neck pain.   Gastrointestinal: Negative for abdominal pain, nausea and vomiting.   Genitourinary: Negative for bladder incontinence, dysuria and incomplete emptying.   Neurological: Positive for weakness. Negative for dizziness and loss of balance.   Psychiatric/Behavioral: Negative for depression and suicidal ideas. The patient is nervous/anxious.      Pain Assessment  Preferred Pain Scale: number (Numeric Rating Pain Scale)  PAINAD Breathin-->normal  PAINAD Negative Vocalization: 0-->none  PAINAD Facial Expression: 0-->smiling or inexpressive  PAINAD Body Language: 0-->relaxed  PAINAD Consolability: 0-->no need to console  PAINAD Score: 0  Pain Location: head (legs)  Pain Description: constant, aching    Objective   Diagnostics: Reviewed      Intake/Output Summary (Last 24 hours) at 3/14/2022 0908  Last data filed at 3/14/2022 0645  Gross per 24 hour   Intake --   Output 300 ml   Net -300 ml     Current Facility-Administered Medications   Medication Dose Route  Frequency Provider Last Rate Last Admin   • acetaminophen (TYLENOL) tablet 650 mg  650 mg Oral Q4H PRN Neha Dubon DO   650 mg at 03/13/22 1944   • apixaban (ELIQUIS) tablet 2.5 mg  2.5 mg Oral Q12H Kenya Pickens APRN   2.5 mg at 03/13/22 1945   • atorvastatin (LIPITOR) tablet 40 mg  40 mg Oral Daily Neha Dubon DO   40 mg at 03/13/22 0907   • budesonide-formoterol (SYMBICORT) 160-4.5 MCG/ACT inhaler 2 puff  2 puff Inhalation BID - RT Neha Dubon DO   2 puff at 03/14/22 0657   • bumetanide (BUMEX) tablet 1 mg  1 mg Oral Daily Kenya Pickens APRN   1 mg at 03/13/22 0907   • busPIRone (BUSPAR) tablet 10 mg  10 mg Oral TID Neha Dubon DO   10 mg at 03/13/22 1945   • dilTIAZem (CARDIZEM) 125 mg in 125 mL NS infusion  5-15 mg/hr Intravenous Titrated Nelson Elizabeth MD 5 mL/hr at 03/08/22 2335 5 mg/hr at 03/08/22 2335   • dilTIAZem CD (CARDIZEM CD) 24 hr capsule 120 mg  120 mg Oral Q24H Petey Walden MD   120 mg at 03/13/22 0907   • docusate sodium (COLACE) capsule 100 mg  100 mg Oral BID Neha Dubon DO   100 mg at 03/13/22 1945   • guaiFENesin (MUCINEX) 12 hr tablet 1,200 mg  1,200 mg Oral BID Neha Dubon DO   1,200 mg at 03/12/22 1945   • hydrOXYzine (ATARAX) tablet 25 mg  25 mg Oral TID PRN Neha Dubon DO   25 mg at 03/13/22 1951   • ipratropium (ATROVENT) nebulizer solution 0.5 mg  0.5 mg Nebulization 4x Daily - RT Trini Watts APRN   0.5 mg at 03/14/22 0657   • lisinopril (PRINIVIL,ZESTRIL) tablet 2.5 mg  2.5 mg Oral Daily Kenya Pickens APRN   2.5 mg at 03/13/22 0907   • metoprolol tartrate (LOPRESSOR) tablet 25 mg  25 mg Oral Q12H Kenya Pickens APRN   25 mg at 03/13/22 1951   • morphine concentrated solution 5 mg  5 mg Oral TID Maura Bro APRN       • morphine concentrated solution 5 mg  5 mg Oral Q6H PRN Maura Bro APRN   5 mg at 03/11/22 1858   • multivitamin with minerals 1 tablet  1 tablet Oral Daily  "Neha Dubon DO   1 tablet at 03/12/22 0831   • ondansetron (ZOFRAN) injection 4 mg  4 mg Intravenous Q6H PRN Neha Dubon DO   4 mg at 03/11/22 1759   • pantoprazole (PROTONIX) EC tablet 40 mg  40 mg Oral BID Neha Dubon DO   40 mg at 03/13/22 1951   • pramipexole (MIRAPEX) tablet 0.25 mg  0.25 mg Oral Nightly Neha Dubon DO   0.25 mg at 03/13/22 1945   • sertraline (ZOLOFT) tablet 50 mg  50 mg Oral Daily Neha Dubon DO   50 mg at 03/13/22 0907   • sodium chloride 0.9 % flush 10 mL  10 mL Intravenous PRN Nelson Elizabeth MD       • sodium chloride 0.9 % flush 10 mL  10 mL Intravenous Q12H Neha Dubon DO   10 mL at 03/13/22 1945   • sodium chloride 0.9 % flush 10 mL  10 mL Intravenous PRN Neha Dubon DO         dilTIAZem, 5-15 mg/hr, Last Rate: 5 mg/hr (03/08/22 2335)      •  acetaminophen  •  hydrOXYzine  •  morphine  •  ondansetron  •  sodium chloride  •  sodium chloride    Assessment:  Vital Signs: BP 94/80 (BP Location: Right arm, Patient Position: Lying)   Pulse 78   Temp 97.7 °F (36.5 °C) (Oral)   Resp 18   Ht 165.1 cm (65\")   Wt 76.8 kg (169 lb 4.8 oz)   SpO2 95%   BMI 28.17 kg/m²     Physical Exam  Vitals and nursing note reviewed.   Constitutional:       General: She is not in acute distress.     Appearance: She is ill-appearing.      Interventions: Nasal cannula in place.   HENT:      Head: Normocephalic and atraumatic.   Eyes:      General: Lids are normal.   Neck:      Vascular: No JVD.   Cardiovascular:      Rate and Rhythm: Normal rate. Rhythm irregular.      Heart sounds: Normal heart sounds.   Pulmonary:      Effort: Pulmonary effort is mildly labored. No accessory muscle usage or respiratory distress.      Breath sounds: Decreased breath sounds present.   Abdominal:      General: Abdomen is flat.      Palpations: Abdomen is soft.   Musculoskeletal:      Cervical back: Neck supple.   Skin:     General: Skin is warm and dry.      " Comments: Bilateral lower extremity jose boots present   Neurological:      Mental Status: She is alert and oriented to person, place, and time.      Motor: Weakness present.   Psychiatric:         Attention and Perception: Attention normal.         Mood and Affect: Mood is anxious (appears mildly anxious).         Speech: Speech normal.         Behavior: Behavior is cooperative.         Cognition and Memory: Cognition normal.     Patient status: Disease state: No further treatment being pursued.  Functional status: Palliative Performance Scale Score: Performance 50% based on the following measures: Ambulation: Mainly sit or lie down, Activity and Evidence of Disease: Unable to do any work, extensive evidence of disease, Self-Care: Considerable assistance required,  Intake: Normal or reduced, LOC: Full or confusion   Nutritional status: Albumin 3.70.Body mass index is 28.17 kg/m².     Impression/Problem List:  1.    Chronic respiratory failure with hypoxia and hypercapnia  2.    Stage 3 severe COPD by GOLD classification  3.    Acute on chronic diastolic CHF  4.    Stage IIIb chronic kidney disease  5.    Atrial fibrillation with RVR  6.    Recurrent pleural effusion on right  7.    ST segment abnormality per ECG 3/10/2022  8.    Essential hypertension  9.    Hyponatremia  10.  Chronic anticoagulation  11.  Volume overload  12.  Hypertension  13.  Hyperlipidemia  14.  Advanced age    Plans/Recommendations     1. Goals of care include CODE STATUS NO CPR with limited support interventions.     2. Palliative care encounter  - Prognosis is guarded long-term secondary to chronic respiratory failure with hypoxia and hypercapnia, severe COPD, acute on chronic diastolic CHF, chronic kidney disease, atrial fibrillation with RVR, chronic anticoagulation, recurrent pleural effusion, ST segment abnormalities on ECG, advanced age, essential hypertension, hyponatremia, hypertension and other comorbidities listed above.  "    3/11/2022:  - Discussed current state of health and treatment options she has been offered for her pleural effusion management, expresses she does not want to have to go through more aggressive measures.  - Explored goals of care regarding CODE STATUS, reports she does not wish to have aggressive measures such as artificial nutrition, vasopressors or dialysis in addition to intubation or cardioversion. She stated, \"I don't really see any reason to go through with these given the state I am in.\"   - CODE STATUS changes made to reflect wishes.   - Explored discharge plans, reports she is agreeable to SNF placement and inquired about hospice as she reflected on her recent interaction when her daughter passed away around 1 year ago.   - Explained that she might benefit more from skilled/palliative care placement while she is able to work with therapy and symptoms appear to be mostly under control with current management.   - Discussed that hospice services can be utilized at any point if she feels she is declining or that her symptoms are no longer managed under palliative care.   - MOST document completed on 2/7/2022.    3/14/2022:  - She and family have decided to transition home with hospice services.   - Attempted to initiate new MOST form to reflect current wishes, that reflect current wishes, she stated, \"I don't see any point. I won't be coming back to the hospital.\" Explained that if comfort needs are unable to be met at home it is important to have if she required rehospitalization. She replied, \"No I don't think I will have to come back.\"   - Denied any questions or concerns regarding hospice or symptom management.      3. Dyspnea  - Options for symptom management in regards to dyspnea were discussed including scheduled low dose opiate on 3/11/2022.  - Reports she has been taking \"half of a Lortab in the morning and in the evening, I believe it's 5 mg\" however do not see this on her home medication list or " "current hospitalization list.   - Started on scheduled low dose morphine for dyspnea management with assistance of Maura Bro, APRN however she only received one dose on 3/11/2022. Reports she was \"scared it was going to knock me out for 2 days.\"  - Continued PRN low dose morphine if she requires before discharge.   - Requested to be sent home with atarax as needed.        Thank you for allowing us to participate in patient's plan of care. Palliative Care Team will continue to follow patient.     Time spent: 55 minutes spent reviewing medical and medication records, assessing and examining patient, discussing with family, answering questions, providing some guidance about a plan and documentation of care, and coordinating care with other healthcare members, with > 50% time spent face to face.   20 minutes spent on advance care planning.    Jaz Garcia, APRN  3/14/2022    "

## 2022-03-14 NOTE — THERAPY DISCHARGE NOTE
Acute Care - Physical Therapy Discharge Summary  [facility]       Patient Name: Cindy Hicks  : 1935  MRN: [id number]    Today's Date: 3/14/2022                 Admit Date: 3/8/2022      PT Recommendation and Plan    Visit Dx:    ICD-10-CM ICD-9-CM   1. Atrial fibrillation with RVR ([facility])  I48.91 427.31   2. Pleural effusion  J90 511.9   3. Dyspnea, unspecified type  R06.00 786.09   4. Chronic obstructive pulmonary disease, unspecified COPD type ([facility])  J44.9 496   5. Chronic kidney disease, unspecified CKD stage  N18.9 585.9   6. Elevated troponin  R77.8 790.6   7. Impaired mobility and ADLs  Z74.09 V49.89    Z78.9    8. Edema of both lower legs  R60.0 782.3   9. Impaired mobility  Z74.09 799.89                PT Rehab Goals     Row Name 22 1437             Bed Mobility Goal 1 (PT)    Activity/Assistive Device (Bed Mobility Goal 1, PT) bed mobility activities, all  -AB      Harper Level/Cues Needed (Bed Mobility Goal 1, PT) modified independence  -AB      Time Frame (Bed Mobility Goal 1, PT) long term goal (LTG);by discharge  -AB      Progress/Outcomes (Bed Mobility Goal 1, PT) goal not met  -AB              Transfer Goal 1 (PT)    Activity/Assistive Device (Transfer Goal 1, PT) sit-to-stand/stand-to-sit;bed-to-chair/chair-to-bed;walker, rolling  -AB      Harper Level/Cues Needed (Transfer Goal 1, PT) modified independence  -AB      Time Frame (Transfer Goal 1, PT) long term goal (LTG);by discharge  -AB      Progress/Outcome (Transfer Goal 1, PT) goal not met  -AB              Gait Training Goal 1 (PT)    Activity/Assistive Device (Gait Training Goal 1, PT) gait (walking locomotion);assistive device use;walker, rolling;increase endurance/gait distance;improve balance and speed  -AB      Harper Level (Gait Training Goal 1, PT) standby assist  -AB      Distance (Gait Training Goal 1, PT) 50ft with no standing rest breaks  -AB      Time Frame (Gait Training Goal 1, PT) long term  goal (LTG);by discharge  -AB      Progress/Outcome (Gait Training Goal 1, PT) goal not met  -AB              Problem Specific Goal 1 (PT)    Problem Specific Goal 1 (PT) L unna boot will stay in place for 7 days allow for proper edema control  -AB      Time Frame (Problem Specific Goal 1, PT) long-term goal (LTG);by discharge  -AB      Progress/Outcome (Problem Specific Goal 1, PT) goal not met  -AB              Problem Specific Goal 2 (PT)    Problem Specific Goal 2 (PT) R unna boot will stay in place for 7 days to allow for proper edema control  -AB      Time Frame (Problem Specific Goal 2, PT) long-term goal (LTG);by discharge  -AB      Progress/Outcome (Problem Specific Goal 2, PT) goal not met  -AB            User Key  (r) = Recorded By, (t) = Taken By, (c) = Cosigned By    Initials Name Provider Type Discipline    Nasra Mosley, PTA Physical Therapy Assistant PT                    PT Discharge Summary  Anticipated Discharge Disposition (PT): skilled nursing facility  Reason for Discharge: Discharge from facility  Outcomes Achieved: Refer to plan of care for updates on goals achieved  Discharge Destination: Home with assist (Hospice)      Nasra Cash PTA   3/14/2022

## 2022-03-14 NOTE — CASE MANAGEMENT/SOCIAL WORK
Continued Stay Note   Michele     Patient Name: Cindy Hicks  MRN: [id number]  Today's Date: 3/14/2022    Admit Date: 3/8/2022     Discharge Plan     Row Name 03/14/22 1043       Plan    Final Discharge Disposition Code 50 - home with hospice    Final Note Pt is being dcd home with [facility] today. Spoke to Ronel at [facility] ([phone]) and DME has been delivered. Ronel states she will follow up with family shortly. Will fax scripts/DC summary and orders to [phone] when completed.    Row Name 03/14/22 1034       Plan    Final Discharge Disposition Code 50 - home with hospice               Discharge Codes    No documentation.               Expected Discharge Date and Time     Expected Discharge Date Expected Discharge Time    Mar 14, 2022             LIEN Arrington

## 2022-04-27 ENCOUNTER — APPOINTMENT (OUTPATIENT)
Dept: CT IMAGING | Facility: HOSPITAL | Age: 87
End: 2022-04-27

## 2023-04-14 NOTE — TELEPHONE ENCOUNTER
Called patient to schedule a Direct Access Screening Colonoscopy (DASC).  Following information obtained to determine if patient meets DASC criteria.    Exclusion Criteria:  1. Do you have any of the following symptoms:  Rectal bleeding, change in bowel habits, abdominal pain, anemia, unintentional weight loss?  no  2. Do you currently have any nausea/vomiting, uncontrolled heartburn, difficulty swallowing, or upper abdominal pain? no  3. Do you take any blood thinning medications? no  4. Are you on home O2 or dialysis? no  5. Have you had a recent heart attack or cardiac intervention/stent placement in the past 12 months? no  6. Do you have a pacemaker/defibrillator?  no  7. Weight greater than 350 pounds? no  Additional Information:  1. Do have a family history of colon cancer? no  2. Are you pre diabetic on medication or a diabetic? no  3. When was your last colonoscopy? No      Criteria Met:  yes    Spoke with patient and scheduled patient for colonoscopy with  on 06/23/23 at 0930.  Instructions given to patient, patient verbalized understanding.  Informed that an email will be sent with the instructions.  Bowel prep suprep electronically submitted today.           I did that but it said I could not sign the note

## 2023-09-18 NOTE — H&P (VIEW-ONLY)
[facility] GASTROENTEROLOGY - OFFICE NOTE    10/3/2019    Cindy Hicks   1935    Primary Physician: Pam Sin APRN    Chief Complaint   Patient presents with   • Black or Bloody Stool     recent hospital stay ([facility])         HISTORY OF PRESENT ILLNESS:    Cindy Hicks is a 83 y.o. female presents with black stool and aniyah pain x 3 days. Had recent egd by Dr. Mahajan at [facility] 9-13-19 noting antral ulcer. Was on protonix in the hospital but did not take it after discharge. Takes asa daily. On plavix for history vascular surgeries and has not taken in the last 3 days.  She started back on her Plavix after she was discharged from [facility] on September 13, 2019.  No n/v. No bright red blood per rectum.        On admission to [facility] September 11, 2019 hemoglobin was 9.2 and prior to discharge hemoglobin was 8.2.  She did not require blood transfusion.   August 14, 2019 hemoglobin 11.9.  July 2019 hemoglobin 13.1.      EGD  at [facility] 9-13-19  By Dr. Mahajan  IMPRESSION:  1. Clean based antral ulcer (4mm)    Last colonoscopy 10/2018 by dr mckay.     She is on Plavix and states is managed by Dr. Calderon.  She had a left common femoral proximal superficial femoral and profound femoris endarterectomy with bovine pericardial patch on August 13, 2019.  This was performed by Dr. Calderon.      Past Medical History:   Diagnosis Date   • Actinic keratosis    • Arthritis    • Atherosclerosis    • Benign fundic gland polyps of stomach    • Bleeding ulcer    • Blocked artery    • Carotid artery bruit    • Carotid artery stenosis    • COPD (chronic obstructive pulmonary disease) (CMS/[facility])    • Diverticulosis    • Emphysema of lung (CMS/[facility])    • History of colon polyps    • Hyperlipidemia    • Hypertension    • IBS (irritable bowel syndrome)    • Macular degeneration    • Osteoporosis    • Prediabetes    • PVD (peripheral vascular disease) (CMS/[facility])    • TIA (transient ischemic attack)    • Vitamin D  deficiency        Past Surgical History:   Procedure Laterality Date   • CATARACT EXTRACTION     • COLONOSCOPY  03/15/2013    polyp, hyperplastic   • COLONOSCOPY N/A 10/2/2018    Procedure: COLONOSCOPY WITH ANESTHESIA;  Surgeon: Harris Escobar MD;  Location: [facility] ENDOSCOPY;  Service: Gastroenterology   • CYST REMOVAL     • ENDOSCOPY  2019   • FEMORAL ARTERY STENT     • HYSTERECTOMY     • VASCULAR SURGERY      multiple       Outpatient Medications Marked as Taking for the 10/3/19 encounter (Office Visit) with Elsa Ward APRN   Medication Sig Dispense Refill   • amLODIPine (NORVASC) 5 MG tablet Take 5 mg by mouth Daily.     • aspirin 81 MG EC tablet Take 81 mg by mouth Daily.     • atorvastatin (LIPITOR) 40 MG tablet Take 40 mg by mouth Daily.     • budesonide-formoterol (SYMBICORT) 160-4.5 MCG/ACT inhaler Inhale 2 puffs 2 (Two) Times a Day.     • clopidogrel (PLAVIX) 75 MG tablet Take 75 mg by mouth Daily.     • dicyclomine (BENTYL) 10 MG capsule Take 10 mg by mouth 3 (Three) Times a Day.     • hydrochlorothiazide (MICROZIDE) 12.5 MG capsule Take 12.5 mg by mouth Daily.     • HYDROcodone-acetaminophen (NORCO) 5-325 MG per tablet Take 1 tablet by mouth As Needed.     • lisinopril (PRINIVIL,ZESTRIL) 40 MG tablet Take 40 mg by mouth Daily.     • pramipexole (MIRAPEX) 0.25 MG tablet Take 0.25 mg by mouth 3 (Three) Times a Day.     • vitamin D (ERGOCALCIFEROL) 59436 units capsule capsule Take 50,000 Units by mouth 1 (One) Time Per Week.         Allergies   Allergen Reactions   • Codeine Nausea And Vomiting   • Valium [Diazepam] Nausea Only       Social History     Socioeconomic History   • Marital status:      Spouse name: Not on file   • Number of children: Not on file   • Years of education: Not on file   • Highest education level: Not on file   Tobacco Use   • Smoking status: Current Every Day Smoker     Packs/day: 1.00     Types: Cigarettes   • Smokeless tobacco: Never Used   Substance and Sexual  "Activity   • Alcohol use: No   • Drug use: No   • Sexual activity: Defer       Family History   Problem Relation Age of Onset   • Colon cancer Sister    • Colon polyps Brother        Review of Systems   Constitutional: Negative for chills and fever.   Respiratory: Negative for cough, shortness of breath and wheezing.    Cardiovascular: Negative for chest pain and palpitations.   Gastrointestinal: Positive for abdominal pain. Negative for abdominal distention, constipation, diarrhea, nausea and vomiting.        Vitals:    10/03/19 1252   BP: 142/50   Pulse: 69   Temp: 97.9 °F (36.6 °C)   SpO2: 99%   Weight: 63 kg (139 lb)   Height: 163.8 cm (64.5\")      Body mass index is 23.49 kg/m².    Physical Exam   Constitutional: No distress.   Cardiovascular: Normal rate, regular rhythm and normal heart sounds.   Pulmonary/Chest: Effort normal and breath sounds normal.   Abdominal: Soft. Bowel sounds are normal. She exhibits no distension. There is no tenderness.   Musculoskeletal: She exhibits no edema.   Neurological: She is alert.   Skin: Skin is warm and dry.   Vitals reviewed.      Results for orders placed or performed during the hospital encounter of 10/02/18   Tissue Pathology Exam   Result Value Ref Range    Case Report       Surgical Pathology Report                         Case: [id number]                                  Authorizing Provider:  Harris Escobar MD      Collected:           10/02/2018 08:21 AM          Ordering Location:     [facility]     Received:            10/02/2018 10:49 AM                                 ENDOSCOPY                                                                    Pathologist:           Lito Sin MD                                                     Specimens:   1) - Large Intestine, polyp at proximal transverse x2                                               2) - Large Intestine, polyp at distal ascending                                            " "Final Diagnosis       1.  Colon, proximal transverse colonic polypectomies ×2: Tubular adenomas, negative for evidence of high-grade dysplasia.    2.  Colon, distal ascending colonic polypectomy: Tubular adenoma, negative for evidence of high-grade dysplasia.      Gross Description       Specimen #1 is received in a formalin filled container, labeled with the patient's name, date of birth, and \"polyp at proximal transverse ×2\".  The specimen consists of 2 red–tan soft tissue polyps aggregating to 0.5 x 0.3 x 0.2 cm.  The resection margin of the larger polyp is inked blue and the polyp is bisected.  The specimen is totally submitted in block 1A.    Specimen #2 is received in a formalin filled container, labeled with the patient's name, date of birth, and \"polyp at distal ascending\".  The specimen consists of one yellow–pink soft tissue polyp measuring 0.4 x 0.3 x 0.3 cm.  The polyp is bisected and totally submitted in block 2A.      Microscopic Description       1–2.Microscopic examination reveal sections of polypoid fragments of colonic-type mucosa demonstrating pseudostratified columnar lining epithelium with elongated hyperchromatic nuclei.  The underlying colonic crypts are crowded and slightly irregularly shaped and also lined by pseudostratified columnar epithelium.  No evidence of high-grade dysplasia is noted.             ASSESSMENT AND PLAN    Assessment/Plan     Cindy was seen today for black or bloody stool.    Diagnoses and all orders for this visit:    Melena  -     CBC & Differential  -     Case Request; Standing  -     Case Request    Peptic ulcer disease  -     Case Request; Standing  -     Case Request    Coagulopathy (CMS/HCC)    Other orders  -     Follow Anesthesia Guidelines / Standing Orders; Future  -     Implement Anesthesia Orders Day of Procedure; Standing  -     Obtain Informed Consent; Standing  -     Obtain Informed Consent; Future          She had recent upper endoscopy noting an " antral ulcer which was clean based.  She was supposed to be on Protonix upon discharge but the patient cannot remember if she was taking.  She stopped Plavix 3 days ago due to black stool.  She is hemodynamically stable.  I have instructed that if she has worsening symptoms then she must go to the emergency room.  We did discuss symptoms of too much blood loss.  She verbalized understanding.  We are going to schedule her for an upper endoscopy in the morning.  Her daughter is with her today and she is going to check her medications at home to see if she does have Protonix.  I have asked her to take either that or over-the-counter Prilosec daily.  I would like her to take today as well.  I am going to check a CBC and will contact her with results.  Recommend ER if worsening symptoms.      ESOPHAGOGASTRODUODENOSCOPY WITH ANESTHESIA (N/A)   Risk, benefits, and alternatives of endoscopy were explained in full.  They understand that there is a risk of bleeding, perforation, and infection.  The risk of perforation goes up with esophageal dilation.  Other options to evaluate UGI complaints could involve barium swallow or UGI series, but these would be diagnostic tests only.  Patient was given time to ask questions.  I answered them to their satisfaction and they are agreeable to proceeding         Body mass index is 23.49 kg/m².    Patient's Body mass index is 23.49 kg/m². BMI is within normal parameters. No follow-up required..             HALIMA Garrett    EMR Dragon/transcription disclaimer:  Much of this encounter note is electronic transcription/translation of spoken language to printed text.  The electronic translation of spoken language may be erroneous, or at times, nonsensical words or phrases may be inadvertently transcribed.  Although I have reviewed the note for such errors, some may still exist.       No

## (undated) DEVICE — SOLUTION IV 1000ML 0.9% SOD CHL PH 5 INJ USP VIAFLX PLAS

## (undated) DEVICE — ENDOGATOR AUXILIARY WATER JET CONNECTOR: Brand: ENDOGATOR

## (undated) DEVICE — SUTURE VCRL SZ 4-0 L18IN ABSRB UD VCRL POLYGLACTIN 910 COAT J109T

## (undated) DEVICE — SUTURE VCRL SZ 3-0 L27IN ABSRB UD L26MM SH 1/2 CIR J416H

## (undated) DEVICE — SOLUTION IRRIG 1000ML 09% SOD CHL USP PIC PLAS CONTAINER

## (undated) DEVICE — AGENT HEMSTAT W4XL4IN OXIDIZED REGENERATED CELOS STRUCTURED

## (undated) DEVICE — SNAR POLYP SENSATION MICRO OVL 13 240X40

## (undated) DEVICE — TUBE ET 7MM NSL ORAL BASIC CUF INTMED MURPHY EYE RADPQ MRK

## (undated) DEVICE — SURGICAL PROCEDURE PACK VASC LOURDES HOSP

## (undated) DEVICE — CLIP INT SM WIDE RED TI TRNSVRS GRV CHEVRON SHP W/ PRECIS

## (undated) DEVICE — TBG SMPL FLTR LINE NASL 02/C02 A/ BX/100

## (undated) DEVICE — TOWEL,OR,DSP,ST,BLUE,DLX,4/PK,20PK/CS: Brand: MEDLINE

## (undated) DEVICE — GLIDESHEATH BASIC HYDROPHILIC COATED INTRODUCER SHEATH: Brand: GLIDESHEATH

## (undated) DEVICE — GLOVE SURG SZ 7 L12IN FNGR THK79MIL GRN LTX FREE

## (undated) DEVICE — ENDO KIT,LOURDES HOSPITAL: Brand: MEDLINE INDUSTRIES, INC.

## (undated) DEVICE — TOTAL TRAY, 16FR 10ML SIL FOLEY, URN: Brand: MEDLINE

## (undated) DEVICE — GEL US 20GM NONIRRITATING OVERWRAPPED FILE PCH TRNSMIT

## (undated) DEVICE — SUTURE VCRL SZ 3-0 L18IN ABSRB UD L26MM SH 1/2 CIR J864D

## (undated) DEVICE — MEDIA CONTRAST INJ VISIPAQUE 150ML 320MG

## (undated) DEVICE — CUFF,BP,DISP,1 TUBE,ADULT,HP: Brand: MEDLINE

## (undated) DEVICE — Device: Brand: DEFENDO AIR/WATER/SUCTION AND BIOPSY VALVE

## (undated) DEVICE — SUTURE VCRL SZ 2-0 L36IN ABSRB UD L36MM CT-1 1/2 CIR J945H

## (undated) DEVICE — CATHETER KIT 5 FR 21 GAX7 CM MICROINTRODUCER GUIDEWIRE STIFF

## (undated) DEVICE — SENSR O2 OXIMAX FNGR A/ 18IN NONSTR

## (undated) DEVICE — Z INACTIVE USE 2535480 CLIP LIG M BLU TI HRT SHP WIRE HORZ 180 PER BX

## (undated) DEVICE — YANKAUER,BULB TIP WITH VENT: Brand: ARGYLE

## (undated) DEVICE — SUTURE VCRL SZ 2-0 L18IN ABSRB UD POLYGLACTIN 910 BRAID TIE J111T

## (undated) DEVICE — SUTURE VCRL SZ 3-0 L18IN ABSRB UD W/O NDL POLYGLACTIN 910 J110T

## (undated) DEVICE — CONMED SCOPE SAVER BITE BLOCK, 20X27 MM: Brand: SCOPE SAVER

## (undated) DEVICE — THE SINGLE USE ETRAP – POLYP TRAP IS USED FOR SUCTION RETRIEVAL OF ENDOSCOPICALLY REMOVED POLYPS.: Brand: ETRAP

## (undated) DEVICE — SUTURE PROL SZ 6-0 L30IN NONABSORBABLE BLU L9.3MM BV-1 3/8 M8709

## (undated) DEVICE — 3M™ IOBAN™ 2 ANTIMICROBIAL INCISE DRAPE 6651EZ: Brand: IOBAN™ 2

## (undated) DEVICE — SOLUTION IV 100ML 0.9% SOD CHL PLAS CONT USP VIAFLX 1 PER

## (undated) DEVICE — SUTURE VCRL CTRL REL 2-0 CTX 18IN ABSRB BRAID UD J723D

## (undated) DEVICE — THE CHANNEL CLEANING BRUSH IS A NYLON FLEXI BRUSH ATTACHED TO A FLEXIBLE PLASTIC SHEATH DESIGNED TO SAFELY REMOVE DEBRIS FROM FLEXIBLE ENDOSCOPES.

## (undated) DEVICE — BLADE LARYNSCP HNDL MAC 3 DISP CURAVIEW LED

## (undated) DEVICE — C-ARM: Brand: UNBRANDED

## (undated) DEVICE — STAPLER SKIN L39MM DIA0.53MM CRWN 5.7MM S STL FIX HD PROX

## (undated) DEVICE — MASK,OXYGEN,MED CONC,ADLT,7' TUB, UC: Brand: PENDING